# Patient Record
Sex: MALE | Race: WHITE | URBAN - METROPOLITAN AREA
[De-identification: names, ages, dates, MRNs, and addresses within clinical notes are randomized per-mention and may not be internally consistent; named-entity substitution may affect disease eponyms.]

---

## 2020-05-06 ENCOUNTER — APPOINTMENT (OUTPATIENT)
Dept: URBAN - METROPOLITAN AREA CLINIC 232 | Age: 75
Setting detail: DERMATOLOGY
End: 2020-05-06

## 2020-05-06 DIAGNOSIS — D485 NEOPLASM OF UNCERTAIN BEHAVIOR OF SKIN: ICD-10-CM

## 2020-05-06 DIAGNOSIS — D18.0 HEMANGIOMA: ICD-10-CM

## 2020-05-06 DIAGNOSIS — Z85.828 PERSONAL HISTORY OF OTHER MALIGNANT NEOPLASM OF SKIN: ICD-10-CM

## 2020-05-06 DIAGNOSIS — L91.8 OTHER HYPERTROPHIC DISORDERS OF THE SKIN: ICD-10-CM

## 2020-05-06 DIAGNOSIS — Z85.820 PERSONAL HISTORY OF MALIGNANT MELANOMA OF SKIN: ICD-10-CM

## 2020-05-06 DIAGNOSIS — D22 MELANOCYTIC NEVI: ICD-10-CM

## 2020-05-06 DIAGNOSIS — L57.0 ACTINIC KERATOSIS: ICD-10-CM

## 2020-05-06 PROBLEM — D22.5 MELANOCYTIC NEVI OF TRUNK: Status: ACTIVE | Noted: 2020-05-06

## 2020-05-06 PROBLEM — D48.5 NEOPLASM OF UNCERTAIN BEHAVIOR OF SKIN: Status: ACTIVE | Noted: 2020-05-06

## 2020-05-06 PROBLEM — D18.01 HEMANGIOMA OF SKIN AND SUBCUTANEOUS TISSUE: Status: ACTIVE | Noted: 2020-05-06

## 2020-05-06 PROCEDURE — 17000 DESTRUCT PREMALG LESION: CPT | Mod: 59

## 2020-05-06 PROCEDURE — OTHER ADDITIONAL NOTES: OTHER

## 2020-05-06 PROCEDURE — 99214 OFFICE O/P EST MOD 30 MIN: CPT | Mod: 25

## 2020-05-06 PROCEDURE — 11102 TANGNTL BX SKIN SINGLE LES: CPT

## 2020-05-06 PROCEDURE — OTHER PHOTO-DOCUMENTATION: OTHER

## 2020-05-06 PROCEDURE — OTHER OBSERVATION: OTHER

## 2020-05-06 PROCEDURE — 17003 DESTRUCT PREMALG LES 2-14: CPT

## 2020-05-06 PROCEDURE — OTHER BIOPSY BY SHAVE METHOD: OTHER

## 2020-05-06 PROCEDURE — OTHER OBSERVATION AND MEASURE: OTHER

## 2020-05-06 PROCEDURE — OTHER LIQUID NITROGEN: OTHER

## 2020-05-06 PROCEDURE — OTHER COUNSELING: OTHER

## 2020-05-06 ASSESSMENT — LOCATION DETAILED DESCRIPTION DERM
LOCATION DETAILED: RIGHT LATERAL MALAR CHEEK
LOCATION DETAILED: RIGHT CENTRAL EYEBROW
LOCATION DETAILED: PERIANAL SKIN
LOCATION DETAILED: LEFT ULNAR DORSAL HAND
LOCATION DETAILED: RIGHT BUTTOCK
LOCATION DETAILED: LEFT RIB CAGE
LOCATION DETAILED: LEFT INFERIOR FOREHEAD
LOCATION DETAILED: RIGHT NASAL ALA
LOCATION DETAILED: SUPERIOR THORACIC SPINE
LOCATION DETAILED: LEFT CLAVICULAR NECK
LOCATION DETAILED: LEFT RADIAL DORSAL HAND
LOCATION DETAILED: RIGHT LATERAL UPPER BACK
LOCATION DETAILED: RIGHT DISTAL POSTERIOR THIGH
LOCATION DETAILED: RIGHT CENTRAL ZYGOMA
LOCATION DETAILED: EPIGASTRIC SKIN

## 2020-05-06 ASSESSMENT — LOCATION SIMPLE DESCRIPTION DERM
LOCATION SIMPLE: LEFT HAND
LOCATION SIMPLE: UPPER BACK
LOCATION SIMPLE: RIGHT CHEEK
LOCATION SIMPLE: RIGHT POSTERIOR THIGH
LOCATION SIMPLE: RIGHT EYEBROW
LOCATION SIMPLE: RIGHT ZYGOMA
LOCATION SIMPLE: LEFT ANTERIOR NECK
LOCATION SIMPLE: PERIANAL SKIN
LOCATION SIMPLE: LEFT FOREHEAD
LOCATION SIMPLE: RIGHT NOSE
LOCATION SIMPLE: ABDOMEN
LOCATION SIMPLE: RIGHT BUTTOCK
LOCATION SIMPLE: RIGHT UPPER BACK

## 2020-05-06 ASSESSMENT — LOCATION ZONE DERM
LOCATION ZONE: NOSE
LOCATION ZONE: LEG
LOCATION ZONE: NECK
LOCATION ZONE: HAND
LOCATION ZONE: TRUNK
LOCATION ZONE: FACE
LOCATION ZONE: ANUS

## 2020-05-06 NOTE — PROCEDURE: OBSERVATION
Size Of Lesion: 5x2 mm
X Size Of Lesion In Cm (Optional): 0
Morphology Per Location (Optional): Medium brown
Detail Level: Detailed
Morphology Per Location (Optional): Dark brown m

## 2020-05-06 NOTE — PROCEDURE: BIOPSY BY SHAVE METHOD
X Size Of Lesion In Cm: 0
Electrodesiccation And Curettage Text: The wound bed was treated with electrodesiccation and curettage after the biopsy was performed.
Notification Instructions: Patient will be notified of biopsy results. However, patient instructed to call the office if not contacted within 2 weeks.
Validate Triangulation: No
Biopsy Method: Dermablade
Post-Care Instructions: I reviewed with the patient in detail post-care instructions. Patient is to keep the biopsy site dry overnight, and then apply bacitracin twice daily until healed. Patient may apply hydrogen peroxide soaks to remove any crusting.
Dressing: Band-Aid
Information: Selecting Yes will display possible errors in your note based on the variables you have selected. This validation is only offered as a suggestion for you. PLEASE NOTE THAT THE VALIDATION TEXT WILL BE REMOVED WHEN YOU FINALIZE YOUR NOTE. IF YOU WANT TO FAX A PRELIMINARY NOTE YOU WILL NEED TO TOGGLE THIS TO 'NO' IF YOU DO NOT WANT IT IN YOUR FAXED NOTE.
Billing Type: Third-Party Bill
Biopsy Type: H and E
Cryotherapy Text: The wound bed was treated with cryotherapy after the biopsy was performed.
Hemostasis: Electrocautery
Was A Bandage Applied: Yes
Electrodesiccation Text: The wound bed was treated with electrodesiccation after the biopsy was performed.
Wound Care: Petrolatum
Curettage Text: The wound bed was treated with curettage after the biopsy was performed.
Depth Of Biopsy: dermis
Silver Nitrate Text: The wound bed was treated with silver nitrate after the biopsy was performed.
Anesthesia Type: 1% lidocaine with epinephrine
Type Of Destruction Used: Curettage
Detail Level: Detailed
Anesthesia Volume In Cc (Will Not Render If 0): 0.1
Consent: Written consent was obtained and risks were reviewed including but not limited to scarring, infection, bleeding, scabbing, incomplete removal, nerve damage and allergy to anesthesia.

## 2020-05-06 NOTE — PROCEDURE: LIQUID NITROGEN
Number Of Freeze-Thaw Cycles: 2 freeze-thaw cycles
Render Post-Care Instructions In Note?: no
Post-Care Instructions: I reviewed with the patient in detail post-care instructions. Patient is to wear sunprotection, and avoid picking at any of the treated lesions. Pt may apply Vaseline to crusted or scabbing areas.
Duration Of Freeze Thaw-Cycle (Seconds): 0
Consent: The patient's consent was obtained including but not limited to risks of crusting, scabbing, blistering, scarring, darker or lighter pigmentary change, recurrence, incomplete removal and infection.
Detail Level: Simple

## 2020-06-02 ENCOUNTER — APPOINTMENT (OUTPATIENT)
Dept: URBAN - METROPOLITAN AREA CLINIC 232 | Age: 75
Setting detail: DERMATOLOGY
End: 2020-06-02

## 2020-06-02 PROBLEM — C44.41 BASAL CELL CARCINOMA OF SKIN OF SCALP AND NECK: Status: ACTIVE | Noted: 2020-06-02

## 2020-06-02 PROCEDURE — 13132 CMPLX RPR F/C/C/M/N/AX/G/H/F: CPT

## 2020-06-02 PROCEDURE — OTHER EXCISION: OTHER

## 2020-06-02 PROCEDURE — 11623 EXC S/N/H/F/G MAL+MRG 2.1-3: CPT

## 2020-06-02 NOTE — PROCEDURE: EXCISION
Bill [id number] For Specimen Handling/Conveyance To Laboratory?: no
O-L Flap Text: The defect edges were debeveled with a #15 scalpel blade.  Given the location of the defect, shape of the defect and the proximity to free margins an O-L flap was deemed most appropriate.  Using a sterile surgical marker, an appropriate advancement flap was drawn incorporating the defect and placing the expected incisions within the relaxed skin tension lines where possible.    The area thus outlined was incised deep to adipose tissue with a #15 scalpel blade.  The skin margins were undermined to an appropriate distance in all directions utilizing iris scissors.
Complex Repair And Rhombic Flap Text: The defect edges were debeveled with a #15 scalpel blade.  The primary defect was closed partially with a complex linear closure.  Given the location of the remaining defect, shape of the defect and the proximity to free margins a rhombic flap was deemed most appropriate for complete closure of the defect.  Using a sterile surgical marker, an appropriate advancement flap was drawn incorporating the defect and placing the expected incisions within the relaxed skin tension lines where possible.    The area thus outlined was incised deep to adipose tissue with a #15 scalpel blade.  The skin margins were undermined to an appropriate distance in all directions utilizing iris scissors.
Chonodrocutaneous Helical Advancement Flap Text: The defect edges were debeveled with a #15 scalpel blade.  Given the location of the defect and the proximity to free margins a chondrocutaneous helical advancement flap was deemed most appropriate.  Using a sterile surgical marker, the appropriate advancement flap was drawn incorporating the defect and placing the expected incisions within the relaxed skin tension lines where possible.    The area thus outlined was incised deep to adipose tissue with a #15 scalpel blade.  The skin margins were undermined to an appropriate distance in all directions utilizing iris scissors.
Muscle Hinge Flap Text: The defect edges were debeveled with a #15 scalpel blade.  Given the size, depth and location of the defect and the proximity to free margins a muscle hinge flap was deemed most appropriate.  Using a sterile surgical marker, an appropriate hinge flap was drawn incorporating the defect. The area thus outlined was incised with a #15 scalpel blade.  The skin margins were undermined to an appropriate distance in all directions utilizing iris scissors.
Burow's Graft Text: The defect edges were debeveled with a #15 scalpel blade.  Given the location of the defect, shape of the defect, the proximity to free margins and the presence of a standing cone deformity a Burow's skin graft was deemed most appropriate. The standing cone was removed and this tissue was then trimmed to the shape of the primary defect. The adipose tissue was also removed until only dermis and epidermis were left.  The skin margins of the secondary defect were undermined to an appropriate distance in all directions utilizing iris scissors.  The secondary defect was closed with interrupted buried subcutaneous sutures.  The skin edges were then re-apposed with running  sutures.  The skin graft was then placed in the primary defect and oriented appropriately.
Bilobed Transposition Flap Text: The defect edges were debeveled with a #15 scalpel blade.  Given the location of the defect and the proximity to free margins a bilobed transposition flap was deemed most appropriate.  Using a sterile surgical marker, an appropriate bilobe flap drawn around the defect.    The area thus outlined was incised deep to adipose tissue with a #15 scalpel blade.  The skin margins were undermined to an appropriate distance in all directions utilizing iris scissors.
Posterior Auricular Interpolation Flap Text: A decision was made to reconstruct the defect utilizing an interpolation axial flap and a staged reconstruction.  A telfa template was made of the defect.  This telfa template was then used to outline the posterior auricular interpolation flap.  The donor area for the pedicle flap was then injected with anesthesia.  The flap was excised through the skin and subcutaneous tissue down to the layer of the underlying musculature.  The pedicle flap was carefully excised within this deep plane to maintain its blood supply.  The edges of the donor site were undermined.   The donor site was closed in a primary fashion.  The pedicle was then rotated into position and sutured.  Once the tube was sutured into place, adequate blood supply was confirmed with blanching and refill.  The pedicle was then wrapped with xeroform gauze and dressed appropriately with a telfa and gauze bandage to ensure continued blood supply and protect the attached pedicle.
Complex Repair And Epidermal Autograft Text: The defect edges were debeveled with a #15 scalpel blade.  The primary defect was closed partially with a complex linear closure.  Given the location of the defect, shape of the defect and the proximity to free margins an epidermal autograft was deemed most appropriate to repair the remaining defect.  The graft was trimmed to fit the size of the remaining defect.  The graft was then placed in the primary defect, oriented appropriately, and sutured into place.
Keystone Flap Text: The defect edges were debeveled with a #15 scalpel blade.  Given the location of the defect, shape of the defect a keystone flap was deemed most appropriate.  Using a sterile surgical marker, an appropriate keystone flap was drawn incorporating the defect, outlining the appropriate donor tissue and placing the expected incisions within the relaxed skin tension lines where possible. The area thus outlined was incised deep to adipose tissue with a #15 scalpel blade.  The skin margins were undermined to an appropriate distance in all directions around the primary defect and laterally outward around the flap utilizing iris scissors.
Show Date Of Previous Biopsy Variable: Yes
Alar Island Pedicle Flap Text: The defect edges were debeveled with a #15 scalpel blade.  Given the location of the defect, shape of the defect and the proximity to the alar rim an island pedicle advancement flap was deemed most appropriate.  Using a sterile surgical marker, an appropriate advancement flap was drawn incorporating the defect, outlining the appropriate donor tissue and placing the expected incisions within the nasal ala running parallel to the alar rim. The area thus outlined was incised with a #15 scalpel blade.  The skin margins were undermined minimally to an appropriate distance in all directions around the primary defect and laterally outward around the island pedicle utilizing iris scissors.  There was minimal undermining beneath the pedicle flap.
Composite Graft Text: The defect edges were debeveled with a #15 scalpel blade.  Given the location of the defect, shape of the defect, the proximity to free margins and the fact the defect was full thickness a composite graft was deemed most appropriate.  The defect was outline and then transferred to the donor site.  A full thickness graft was then excised from the donor site. The graft was then placed in the primary defect, oriented appropriately and then sutured into place.  The secondary defect was then repaired using a primary closure.
Excision Method: Elliptical
Advancement Flap (Single) Text: The defect edges were debeveled with a #15 scalpel blade.  Given the location of the defect and the proximity to free margins a single advancement flap was deemed most appropriate.  Using a sterile surgical marker, an appropriate advancement flap was drawn incorporating the defect and placing the expected incisions within the relaxed skin tension lines where possible.    The area thus outlined was incised deep to adipose tissue with a #15 scalpel blade.  The skin margins were undermined to an appropriate distance in all directions utilizing iris scissors.
Anesthesia Volume In Cc: 3
Island Pedicle Flap Text: The defect edges were debeveled with a #15 scalpel blade.  Given the location of the defect, shape of the defect and the proximity to free margins an island pedicle advancement flap was deemed most appropriate.  Using a sterile surgical marker, an appropriate advancement flap was drawn incorporating the defect, outlining the appropriate donor tissue and placing the expected incisions within the relaxed skin tension lines where possible.    The area thus outlined was incised deep to adipose tissue with a #15 scalpel blade.  The skin margins were undermined to an appropriate distance in all directions around the primary defect and laterally outward around the island pedicle utilizing iris scissors.  There was minimal undermining beneath the pedicle flap.
Rotation Flap Text: The defect edges were debeveled with a #15 scalpel blade.  Given the location of the defect, shape of the defect and the proximity to free margins a rotation flap was deemed most appropriate.  Using a sterile surgical marker, an appropriate rotation flap was drawn incorporating the defect and placing the expected incisions within the relaxed skin tension lines where possible.    The area thus outlined was incised deep to adipose tissue with a #15 scalpel blade.  The skin margins were undermined to an appropriate distance in all directions utilizing iris scissors.
Island Pedicle Flap With Canthal Suspension Text: The defect edges were debeveled with a #15 scalpel blade.  Given the location of the defect, shape of the defect and the proximity to free margins an island pedicle advancement flap was deemed most appropriate.  Using a sterile surgical marker, an appropriate advancement flap was drawn incorporating the defect, outlining the appropriate donor tissue and placing the expected incisions within the relaxed skin tension lines where possible. The area thus outlined was incised deep to adipose tissue with a #15 scalpel blade.  The skin margins were undermined to an appropriate distance in all directions around the primary defect and laterally outward around the island pedicle utilizing iris scissors.  There was minimal undermining beneath the pedicle flap. A suspension suture was placed in the canthal tendon to prevent tension and prevent ectropion.
Excisional Biopsy Additional Text (Leave Blank If You Do Not Want): The margin was drawn around the clinically apparent lesion. An elliptical shape was then drawn on the skin incorporating the lesion and margins.  Incisions were then made along these lines to the appropriate tissue plane and the lesion was extirpated.
Complex Repair And A-T Advancement Flap Text: The defect edges were debeveled with a #15 scalpel blade.  The primary defect was closed partially with a complex linear closure.  Given the location of the remaining defect, shape of the defect and the proximity to free margins an A-T advancement flap was deemed most appropriate for complete closure of the defect.  Using a sterile surgical marker, an appropriate advancement flap was drawn incorporating the defect and placing the expected incisions within the relaxed skin tension lines where possible.    The area thus outlined was incised deep to adipose tissue with a #15 scalpel blade.  The skin margins were undermined to an appropriate distance in all directions utilizing iris scissors.
Complex Repair And O-T Advancement Flap Text: The defect edges were debeveled with a #15 scalpel blade.  The primary defect was closed partially with a complex linear closure.  Given the location of the remaining defect, shape of the defect and the proximity to free margins an O-T advancement flap was deemed most appropriate for complete closure of the defect.  Using a sterile surgical marker, an appropriate advancement flap was drawn incorporating the defect and placing the expected incisions within the relaxed skin tension lines where possible.    The area thus outlined was incised deep to adipose tissue with a #15 scalpel blade.  The skin margins were undermined to an appropriate distance in all directions utilizing iris scissors.
Complex Repair And W Plasty Text: The defect edges were debeveled with a #15 scalpel blade.  The primary defect was closed partially with a complex linear closure.  Given the location of the remaining defect, shape of the defect and the proximity to free margins a W plasty was deemed most appropriate for complete closure of the defect.  Using a sterile surgical marker, an appropriate advancement flap was drawn incorporating the defect and placing the expected incisions within the relaxed skin tension lines where possible.    The area thus outlined was incised deep to adipose tissue with a #15 scalpel blade.  The skin margins were undermined to an appropriate distance in all directions utilizing iris scissors.
V-Y Flap Text: The defect edges were debeveled with a #15 scalpel blade.  Given the location of the defect, shape of the defect and the proximity to free margins a V-Y flap was deemed most appropriate.  Using a sterile surgical marker, an appropriate advancement flap was drawn incorporating the defect and placing the expected incisions within the relaxed skin tension lines where possible.    The area thus outlined was incised deep to adipose tissue with a #15 scalpel blade.  The skin margins were undermined to an appropriate distance in all directions utilizing iris scissors.
Additional Anesthesia Volume In Cc: 6
Complex Repair And Dorsal Nasal Flap Text: The defect edges were debeveled with a #15 scalpel blade.  The primary defect was closed partially with a complex linear closure.  Given the location of the remaining defect, shape of the defect and the proximity to free margins a dorsal nasal flap was deemed most appropriate for complete closure of the defect.  Using a sterile surgical marker, an appropriate flap was drawn incorporating the defect and placing the expected incisions within the relaxed skin tension lines where possible.    The area thus outlined was incised deep to adipose tissue with a #15 scalpel blade.  The skin margins were undermined to an appropriate distance in all directions utilizing iris scissors.
Lazy S Intermediate Repair Preamble Text (Leave Blank If You Do Not Want): Undermining was performed with blunt dissection.
Positioning (Leave Blank If You Do Not Want): The patient was placed in a comfortable position exposing the surgical site.
Lip Wedge Excision Repair Text: Given the location of the defect and the proximity to free margins a full thickness wedge repair was deemed most appropriate.  Using a sterile surgical marker, the appropriate repair was drawn incorporating the defect and placing the expected incisions perpendicular to the vermilion border.  The vermilion border was also meticulously outlined to ensure appropriate reapproximation during the repair.  The area thus outlined was incised through and through with a #15 scalpel blade.  The muscularis and dermis were reaproximated with deep sutures following hemostasis. Care was taken to realign the vermilion border before proceeding with the superficial closure.  Once the vermilion was realigned the superfical and mucosal closure was finished.
Complex Repair Preamble Text (Leave Blank If You Do Not Want): Extensive wide undermining was performed.
Z Plasty Text: The lesion was extirpated to the level of the fat with a #15 scalpel blade.  Given the location of the defect, shape of the defect and the proximity to free margins a Z-plasty was deemed most appropriate for repair.  Using a sterile surgical marker, the appropriate transposition arms of the Z-plasty were drawn incorporating the defect and placing the expected incisions within the relaxed skin tension lines where possible.    The area thus outlined was incised deep to adipose tissue with a #15 scalpel blade.  The skin margins were undermined to an appropriate distance in all directions utilizing iris scissors.  The opposing transposition arms were then transposed into place in opposite direction and anchored with interrupted buried subcutaneous sutures.
H Plasty Text: Given the location of the defect, shape of the defect and the proximity to free margins a H-plasty was deemed most appropriate for repair.  Using a sterile surgical marker, the appropriate advancement arms of the H-plasty were drawn incorporating the defect and placing the expected incisions within the relaxed skin tension lines where possible. The area thus outlined was incised deep to adipose tissue with a #15 scalpel blade. The skin margins were undermined to an appropriate distance in all directions utilizing iris scissors.  The opposing advancement arms were then advanced into place in opposite direction and anchored with interrupted buried subcutaneous sutures.
Anesthesia Type: 1% lidocaine with epinephrine and a 1:10 solution of 8.4% sodium bicarbonate
Complex Repair And Ftsg Text: The defect edges were debeveled with a #15 scalpel blade.  The primary defect was closed partially with a complex linear closure.  Given the location of the defect, shape of the defect and the proximity to free margins a full thickness skin graft was deemed most appropriate to repair the remaining defect.  The graft was trimmed to fit the size of the remaining defect.  The graft was then placed in the primary defect, oriented appropriately, and sutured into place.
Epidermal Closure Graft Donor Site (Optional): simple interrupted
Complex Repair And Dermal Autograft Text: The defect edges were debeveled with a #15 scalpel blade.  The primary defect was closed partially with a complex linear closure.  Given the location of the defect, shape of the defect and the proximity to free margins an dermal autograft was deemed most appropriate to repair the remaining defect.  The graft was trimmed to fit the size of the remaining defect.  The graft was then placed in the primary defect, oriented appropriately, and sutured into place.
Detail Level: Detailed
Transposition Flap Text: The defect edges were debeveled with a #15 scalpel blade.  Given the location of the defect and the proximity to free margins a transposition flap was deemed most appropriate.  Using a sterile surgical marker, an appropriate transposition flap was drawn incorporating the defect.    The area thus outlined was incised deep to adipose tissue with a #15 scalpel blade.  The skin margins were undermined to an appropriate distance in all directions utilizing iris scissors.
Partial Purse String (Intermediate) Text: Given the location of the defect and the characteristics of the surrounding skin an intermediate purse string closure was deemed most appropriate.  Undermining was performed circumferentially around the surgical defect.  A purse string suture was then placed and tightened. Wound tension of the circular defect prevented complete closure of the wound.
Purse String (Intermediate) Text: Given the location of the defect and the characteristics of the surrounding skin a purse string intermediate closure was deemed most appropriate.  Undermining was performed circumfirentially around the surgical defect.  A purse string suture was then placed and tightened.
Saucerization Excision Additional Text (Leave Blank If You Do Not Want): The margin was drawn around the clinically apparent lesion.  Incisions were then made along these lines, in a tangential fashion, to the appropriate tissue plane and the lesion was extirpated.
O-Z Flap Text: The defect edges were debeveled with a #15 scalpel blade.  Given the location of the defect, shape of the defect and the proximity to free margins an O-Z flap was deemed most appropriate.  Using a sterile surgical marker, an appropriate transposition flap was drawn incorporating the defect and placing the expected incisions within the relaxed skin tension lines where possible. The area thus outlined was incised deep to adipose tissue with a #15 scalpel blade.  The skin margins were undermined to an appropriate distance in all directions utilizing iris scissors.
Slit Excision Additional Text (Leave Blank If You Do Not Want): A linear line was drawn on the skin overlying the lesion. An incision was made slowly until the lesion was visualized.  Once visualized, the lesion was removed with blunt dissection.
No Repair - Repaired With Adjacent Surgical Defect Text (Leave Blank If You Do Not Want): After the excision the defect was repaired concurrently with another surgical defect which was in close approximation.
Primary Defect Width (In Cm): 0
Epidermal Autograft Text: The defect edges were debeveled with a #15 scalpel blade.  Given the location of the defect, shape of the defect and the proximity to free margins an epidermal autograft was deemed most appropriate.  Using a sterile surgical marker, the primary defect shape was transferred to the donor site. The epidermal graft was then harvested.  The skin graft was then placed in the primary defect and oriented appropriately.
Peng Advancement Flap Text: The defect edges were debeveled with a #15 scalpel blade.  Given the location of the defect, shape of the defect and the proximity to free margins a Peng advancement flap was deemed most appropriate.  Using a sterile surgical marker, an appropriate advancement flap was drawn incorporating the defect and placing the expected incisions within the relaxed skin tension lines where possible. The area thus outlined was incised deep to adipose tissue with a #15 scalpel blade.  The skin margins were undermined to an appropriate distance in all directions utilizing iris scissors.
Complex Repair And Rotation Flap Text: The defect edges were debeveled with a #15 scalpel blade.  The primary defect was closed partially with a complex linear closure.  Given the location of the remaining defect, shape of the defect and the proximity to free margins a rotation flap was deemed most appropriate for complete closure of the defect.  Using a sterile surgical marker, an appropriate advancement flap was drawn incorporating the defect and placing the expected incisions within the relaxed skin tension lines where possible.    The area thus outlined was incised deep to adipose tissue with a #15 scalpel blade.  The skin margins were undermined to an appropriate distance in all directions utilizing iris scissors.
Complex Repair And Transposition Flap Text: The defect edges were debeveled with a #15 scalpel blade.  The primary defect was closed partially with a complex linear closure.  Given the location of the remaining defect, shape of the defect and the proximity to free margins a transposition flap was deemed most appropriate for complete closure of the defect.  Using a sterile surgical marker, an appropriate advancement flap was drawn incorporating the defect and placing the expected incisions within the relaxed skin tension lines where possible.    The area thus outlined was incised deep to adipose tissue with a #15 scalpel blade.  The skin margins were undermined to an appropriate distance in all directions utilizing iris scissors.
Xenograft Text: The defect edges were debeveled with a #15 scalpel blade.  Given the location of the defect, shape of the defect and the proximity to free margins a xenograft was deemed most appropriate.  The graft was then trimmed to fit the size of the defect.  The graft was then placed in the primary defect and oriented appropriately.
Advancement-Rotation Flap Text: The defect edges were debeveled with a #15 scalpel blade.  Given the location of the defect, shape of the defect and the proximity to free margins an advancement-rotation flap was deemed most appropriate.  Using a sterile surgical marker, an appropriate flap was drawn incorporating the defect and placing the expected incisions within the relaxed skin tension lines where possible. The area thus outlined was incised deep to adipose tissue with a #15 scalpel blade.  The skin margins were undermined to an appropriate distance in all directions utilizing iris scissors.
Fusiform Excision Additional Text (Leave Blank If You Do Not Want): The margin was drawn around the clinically apparent lesion.  A fusiform shape was then drawn on the skin incorporating the lesion and margins.  Incisions were then made along these lines to the appropriate tissue plane and the lesion was extirpated.
Double O-Z Flap Text: The defect edges were debeveled with a #15 scalpel blade.  Given the location of the defect, shape of the defect and the proximity to free margins a Double O-Z flap was deemed most appropriate.  Using a sterile surgical marker, an appropriate transposition flap was drawn incorporating the defect and placing the expected incisions within the relaxed skin tension lines where possible. The area thus outlined was incised deep to adipose tissue with a #15 scalpel blade.  The skin margins were undermined to an appropriate distance in all directions utilizing iris scissors.
Advancement Flap (Double) Text: The defect edges were debeveled with a #15 scalpel blade.  Given the location of the defect and the proximity to free margins a double advancement flap was deemed most appropriate.  Using a sterile surgical marker, the appropriate advancement flaps were drawn incorporating the defect and placing the expected incisions within the relaxed skin tension lines where possible.    The area thus outlined was incised deep to adipose tissue with a #15 scalpel blade.  The skin margins were undermined to an appropriate distance in all directions utilizing iris scissors.
Complex Repair And Skin Substitute Graft Text: The defect edges were debeveled with a #15 scalpel blade.  The primary defect was closed partially with a complex linear closure.  Given the location of the remaining defect, shape of the defect and the proximity to free margins a skin substitute graft was deemed most appropriate to repair the remaining defect.  The graft was trimmed to fit the size of the remaining defect.  The graft was then placed in the primary defect, oriented appropriately, and sutured into place.
Size Of Lesion In Cm: 2
Helical Rim Text: The closure involved the helical rim.
Where Do You Want The Question To Include Opioid Counseling Located?: Case Summary Tab
Burow's Advancement Flap Text: The defect edges were debeveled with a #15 scalpel blade.  Given the location of the defect and the proximity to free margins a Burow's advancement flap was deemed most appropriate.  Using a sterile surgical marker, the appropriate advancement flap was drawn incorporating the defect and placing the expected incisions within the relaxed skin tension lines where possible.    The area thus outlined was incised deep to adipose tissue with a #15 scalpel blade.  The skin margins were undermined to an appropriate distance in all directions utilizing iris scissors.
Partial Purse String (Simple) Text: Given the location of the defect and the characteristics of the surrounding skin a simple purse string closure was deemed most appropriate.  Undermining was performed circumferentially around the surgical defect.  A purse string suture was then placed and tightened. Wound tension of the circular defect prevented complete closure of the wound.
V-Y Plasty Text: The defect edges were debeveled with a #15 scalpel blade.  Given the location of the defect, shape of the defect and the proximity to free margins an V-Y advancement flap was deemed most appropriate.  Using a sterile surgical marker, an appropriate advancement flap was drawn incorporating the defect and placing the expected incisions within the relaxed skin tension lines where possible.    The area thus outlined was incised deep to adipose tissue with a #15 scalpel blade.  The skin margins were undermined to an appropriate distance in all directions utilizing iris scissors.
Star Wedge Flap Text: The defect edges were debeveled with a #15 scalpel blade.  Given the location of the defect, shape of the defect and the proximity to free margins a star wedge flap was deemed most appropriate.  Using a sterile surgical marker, an appropriate rotation flap was drawn incorporating the defect and placing the expected incisions within the relaxed skin tension lines where possible. The area thus outlined was incised deep to adipose tissue with a #15 scalpel blade.  The skin margins were undermined to an appropriate distance in all directions utilizing iris scissors.
A-T Advancement Flap Text: The defect edges were debeveled with a #15 scalpel blade.  Given the location of the defect, shape of the defect and the proximity to free margins an A-T advancement flap was deemed most appropriate.  Using a sterile surgical marker, an appropriate advancement flap was drawn incorporating the defect and placing the expected incisions within the relaxed skin tension lines where possible.    The area thus outlined was incised deep to adipose tissue with a #15 scalpel blade.  The skin margins were undermined to an appropriate distance in all directions utilizing iris scissors.
Repair Performed By Another Provider Text (Leave Blank If You Do Not Want): After the tissue was excised the defect was repaired by another provider.
Dorsal Nasal Flap Text: The defect edges were debeveled with a #15 scalpel blade.  Given the location of the defect and the proximity to free margins a dorsal nasal flap was deemed most appropriate.  Using a sterile surgical marker, an appropriate dorsal nasal flap was drawn around the defect.    The area thus outlined was incised deep to adipose tissue with a #15 scalpel blade.  The skin margins were undermined to an appropriate distance in all directions utilizing iris scissors.
Cartilage Graft Text: The defect edges were debeveled with a #15 scalpel blade.  Given the location of the defect, shape of the defect, the fact the defect involved a full thickness cartilage defect a cartilage graft was deemed most appropriate.  An appropriate donor site was identified, cleansed, and anesthetized. The cartilage graft was then harvested and transferred to the recipient site, oriented appropriately and then sutured into place.  The secondary defect was then repaired using a primary closure.
Complex Repair And Modified Advancement Flap Text: The defect edges were debeveled with a #15 scalpel blade.  The primary defect was closed partially with a complex linear closure.  Given the location of the remaining defect, shape of the defect and the proximity to free margins a modified advancement flap was deemed most appropriate for complete closure of the defect.  Using a sterile surgical marker, an appropriate advancement flap was drawn incorporating the defect and placing the expected incisions within the relaxed skin tension lines where possible.    The area thus outlined was incised deep to adipose tissue with a #15 scalpel blade.  The skin margins were undermined to an appropriate distance in all directions utilizing iris scissors.
Bi-Rhombic Flap Text: The defect edges were debeveled with a #15 scalpel blade.  Given the location of the defect and the proximity to free margins a bi-rhombic flap was deemed most appropriate.  Using a sterile surgical marker, an appropriate rhombic flap was drawn incorporating the defect. The area thus outlined was incised deep to adipose tissue with a #15 scalpel blade.  The skin margins were undermined to an appropriate distance in all directions utilizing iris scissors.
Complex Repair And Double M Plasty Text: The defect edges were debeveled with a #15 scalpel blade.  The primary defect was closed partially with a complex linear closure.  Given the location of the remaining defect, shape of the defect and the proximity to free margins a double M plasty was deemed most appropriate for complete closure of the defect.  Using a sterile surgical marker, an appropriate advancement flap was drawn incorporating the defect and placing the expected incisions within the relaxed skin tension lines where possible.    The area thus outlined was incised deep to adipose tissue with a #15 scalpel blade.  The skin margins were undermined to an appropriate distance in all directions utilizing iris scissors.
O-T Advancement Flap Text: The defect edges were debeveled with a #15 scalpel blade.  Given the location of the defect, shape of the defect and the proximity to free margins an O-T advancement flap was deemed most appropriate.  Using a sterile surgical marker, an appropriate advancement flap was drawn incorporating the defect and placing the expected incisions within the relaxed skin tension lines where possible.    The area thus outlined was incised deep to adipose tissue with a #15 scalpel blade.  The skin margins were undermined to an appropriate distance in all directions utilizing iris scissors.
Graft Donor Site Bandage (Optional-Leave Blank If You Don't Want In Note): Steri-strips and a pressure bandage were applied to the donor site.
Complex Repair And M Plasty Text: The defect edges were debeveled with a #15 scalpel blade.  The primary defect was closed partially with a complex linear closure.  Given the location of the remaining defect, shape of the defect and the proximity to free margins an M plasty was deemed most appropriate for complete closure of the defect.  Using a sterile surgical marker, an appropriate advancement flap was drawn incorporating the defect and placing the expected incisions within the relaxed skin tension lines where possible.    The area thus outlined was incised deep to adipose tissue with a #15 scalpel blade.  The skin margins were undermined to an appropriate distance in all directions utilizing iris scissors.
Anesthesia Type: 1% lidocaine with epinephrine
Double Island Pedicle Flap Text: The defect edges were debeveled with a #15 scalpel blade.  Given the location of the defect, shape of the defect and the proximity to free margins a double island pedicle advancement flap was deemed most appropriate.  Using a sterile surgical marker, an appropriate advancement flap was drawn incorporating the defect, outlining the appropriate donor tissue and placing the expected incisions within the relaxed skin tension lines where possible.    The area thus outlined was incised deep to adipose tissue with a #15 scalpel blade.  The skin margins were undermined to an appropriate distance in all directions around the primary defect and laterally outward around the island pedicle utilizing iris scissors.  There was minimal undermining beneath the pedicle flap.
Purse String (Simple) Text: Given the location of the defect and the characteristics of the surrounding skin a purse string simple closure was deemed most appropriate.  Undermining was performed circumferentially around the surgical defect.  A purse string suture was then placed and tightened.
Mastoid Interpolation Flap Text: A decision was made to reconstruct the defect utilizing an interpolation axial flap and a staged reconstruction.  A telfa template was made of the defect.  This telfa template was then used to outline the mastoid interpolation flap.  The donor area for the pedicle flap was then injected with anesthesia.  The flap was excised through the skin and subcutaneous tissue down to the layer of the underlying musculature.  The pedicle flap was carefully excised within this deep plane to maintain its blood supply.  The edges of the donor site were undermined.   The donor site was closed in a primary fashion.  The pedicle was then rotated into position and sutured.  Once the tube was sutured into place, adequate blood supply was confirmed with blanching and refill.  The pedicle was then wrapped with xeroform gauze and dressed appropriately with a telfa and gauze bandage to ensure continued blood supply and protect the attached pedicle.
Complex Repair And Z Plasty Text: The defect edges were debeveled with a #15 scalpel blade.  The primary defect was closed partially with a complex linear closure.  Given the location of the remaining defect, shape of the defect and the proximity to free margins a Z plasty was deemed most appropriate for complete closure of the defect.  Using a sterile surgical marker, an appropriate advancement flap was drawn incorporating the defect and placing the expected incisions within the relaxed skin tension lines where possible.    The area thus outlined was incised deep to adipose tissue with a #15 scalpel blade.  The skin margins were undermined to an appropriate distance in all directions utilizing iris scissors.
Curvilinear Excision Additional Text (Leave Blank If You Do Not Want): The margin was drawn around the clinically apparent lesion.  A curvilinear shape was then drawn on the skin incorporating the lesion and margins.  Incisions were then made along these lines to the appropriate tissue plane and the lesion was extirpated.
Debridement Text: The wound edges were debrided prior to proceeding with the closure to facilitate wound healing.
Ear Star Wedge Flap Text: The defect edges were debeveled with a #15 blade scalpel.  Given the location of the defect and the proximity to free margins (helical rim) an ear star wedge flap was deemed most appropriate.  Using a sterile surgical marker, the appropriate flap was drawn incorporating the defect and placing the expected incisions between the helical rim and antihelix where possible.  The area thus outlined was incised through and through with a #15 scalpel blade.
Information: Selecting Yes will display possible errors in your note based on the variables you have selected. This validation is only offered as a suggestion for you. PLEASE NOTE THAT THE VALIDATION TEXT WILL BE REMOVED WHEN YOU FINALIZE YOUR NOTE. IF YOU WANT TO FAX A PRELIMINARY NOTE YOU WILL NEED TO TOGGLE THIS TO 'NO' IF YOU DO NOT WANT IT IN YOUR FAXED NOTE.
W Plasty Text: The lesion was extirpated to the level of the fat with a #15 scalpel blade.  Given the location of the defect, shape of the defect and the proximity to free margins a W-plasty was deemed most appropriate for repair.  Using a sterile surgical marker, the appropriate transposition arms of the W-plasty were drawn incorporating the defect and placing the expected incisions within the relaxed skin tension lines where possible.    The area thus outlined was incised deep to adipose tissue with a #15 scalpel blade.  The skin margins were undermined to an appropriate distance in all directions utilizing iris scissors.  The opposing transposition arms were then transposed into place in opposite direction and anchored with interrupted buried subcutaneous sutures.
Elliptical Excision Additional Text (Leave Blank If You Do Not Want): The margin was drawn around the clinically apparent lesion.  An elliptical shape was then drawn on the skin incorporating the lesion and margins.  Incisions were then made along these lines to the appropriate tissue plane and the lesion was extirpated.
Post-Care Instructions: I reviewed with the patient in detail post-care instructions. Patient is not to engage in any heavy lifting, exercise, or swimming for the next 14 days. Should the patient develop any fevers, chills, bleeding, severe pain patient will contact the office immediately. HS wound care given and discussed.
Nostril Rim Text: The closure involved the nostril rim.
Home Suture Removal Text: Patient was provided a home suture removal kit and will remove their sutures at home.  If they have any questions or difficulties they will call the office.
Complex Repair And Burow's Graft Text: The defect edges were debeveled with a #15 scalpel blade.  The primary defect was closed partially with a complex linear closure.  Given the location of the defect, shape of the defect, the proximity to free margins and the presence of a standing cone deformity a Burow's graft was deemed most appropriate to repair the remaining defect.  The graft was trimmed to fit the size of the remaining defect.  The graft was then placed in the primary defect, oriented appropriately, and sutured into place.
Mucosal Advancement Flap Text: Given the location of the defect, shape of the defect and the proximity to free margins a mucosal advancement flap was deemed most appropriate. Incisions were made with a 15 blade scalpel in the appropriate fashion along the cutaneous vermilion border and the mucosal lip. The remaining actinically damaged mucosal tissue was excised.  The mucosal advancement flap was then elevated to the gingival sulcus with care taken to preserve the neurovascular structures and advanced into the primary defect. Care was taken to ensure that precise realignment of the vermilion border was achieved.
Wound Care: Petrolatum
Crescentic Advancement Flap Text: The defect edges were debeveled with a #15 scalpel blade.  Given the location of the defect and the proximity to free margins a crescentic advancement flap was deemed most appropriate.  Using a sterile surgical marker, the appropriate advancement flap was drawn incorporating the defect and placing the expected incisions within the relaxed skin tension lines where possible.    The area thus outlined was incised deep to adipose tissue with a #15 scalpel blade.  The skin margins were undermined to an appropriate distance in all directions utilizing iris scissors.
Deep Sutures: 4-0 Polysorb
Cheek Interpolation Flap Text: A decision was made to reconstruct the defect utilizing an interpolation axial flap and a staged reconstruction.  A telfa template was made of the defect.  This telfa template was then used to outline the Cheek Interpolation flap.  The donor area for the pedicle flap was then injected with anesthesia.  The flap was excised through the skin and subcutaneous tissue down to the layer of the underlying musculature.  The interpolation flap was carefully excised within this deep plane to maintain its blood supply.  The edges of the donor site were undermined.   The donor site was closed in a primary fashion.  The pedicle was then rotated into position and sutured.  Once the tube was sutured into place, adequate blood supply was confirmed with blanching and refill.  The pedicle was then wrapped with xeroform gauze and dressed appropriately with a telfa and gauze bandage to ensure continued blood supply and protect the attached pedicle.
Hemostasis: Electrocautery
Bilobed Flap Text: The defect edges were debeveled with a #15 scalpel blade.  Given the location of the defect and the proximity to free margins a bilobe flap was deemed most appropriate.  Using a sterile surgical marker, an appropriate bilobe flap drawn around the defect.    The area thus outlined was incised deep to adipose tissue with a #15 scalpel blade.  The skin margins were undermined to an appropriate distance in all directions utilizing iris scissors.
Path Notes (To The Dermatopathologist): Please check margins.
Pre-Excision Curettage Text (Leave Blank If You Do Not Want): Prior to drawing the surgical margin the visible lesion was removed with electrodesiccation and curettage to clearly define the lesion size.
Helical Rim Advancement Flap Text: The defect edges were debeveled with a #15 blade scalpel.  Given the location of the defect and the proximity to free margins (helical rim) a double helical rim advancement flap was deemed most appropriate.  Using a sterile surgical marker, the appropriate advancement flaps were drawn incorporating the defect and placing the expected incisions between the helical rim and antihelix where possible.  The area thus outlined was incised through and through with a #15 scalpel blade.  With a skin hook and iris scissors, the flaps were gently and sharply undermined and freed up.
Suture Removal: 9 days
Complex Repair And Bilobe Flap Text: The defect edges were debeveled with a #15 scalpel blade.  The primary defect was closed partially with a complex linear closure.  Given the location of the remaining defect, shape of the defect and the proximity to free margins a bilobe flap was deemed most appropriate for complete closure of the defect.  Using a sterile surgical marker, an appropriate advancement flap was drawn incorporating the defect and placing the expected incisions within the relaxed skin tension lines where possible.    The area thus outlined was incised deep to adipose tissue with a #15 scalpel blade.  The skin margins were undermined to an appropriate distance in all directions utilizing iris scissors.
Ftsg Text: The defect edges were debeveled with a #15 scalpel blade.  Given the location of the defect, shape of the defect and the proximity to free margins a full thickness skin graft was deemed most appropriate.  Using a sterile surgical marker, the primary defect shape was transferred to the donor site. The area thus outlined was incised deep to adipose tissue with a #15 scalpel blade.  The harvested graft was then trimmed of adipose tissue until only dermis and epidermis was left.  The skin margins of the secondary defect were undermined to an appropriate distance in all directions utilizing iris scissors.  The secondary defect was closed with interrupted buried subcutaneous sutures.  The skin edges were then re-apposed with running  sutures.  The skin graft was then placed in the primary defect and oriented appropriately.
Complex Repair And Double Advancement Flap Text: The defect edges were debeveled with a #15 scalpel blade.  The primary defect was closed partially with a complex linear closure.  Given the location of the remaining defect, shape of the defect and the proximity to free margins a double advancement flap was deemed most appropriate for complete closure of the defect.  Using a sterile surgical marker, an appropriate advancement flap was drawn incorporating the defect and placing the expected incisions within the relaxed skin tension lines where possible.    The area thus outlined was incised deep to adipose tissue with a #15 scalpel blade.  The skin margins were undermined to an appropriate distance in all directions utilizing iris scissors.
Scalpel Size: 15 blade
Trilobed Flap Text: The defect edges were debeveled with a #15 scalpel blade.  Given the location of the defect and the proximity to free margins a trilobed flap was deemed most appropriate.  Using a sterile surgical marker, an appropriate trilobed flap drawn around the defect.    The area thus outlined was incised deep to adipose tissue with a #15 scalpel blade.  The skin margins were undermined to an appropriate distance in all directions utilizing iris scissors.
Complex Repair And Xenograft Text: The defect edges were debeveled with a #15 scalpel blade.  The primary defect was closed partially with a complex linear closure.  Given the location of the defect, shape of the defect and the proximity to free margins a xenograft was deemed most appropriate to repair the remaining defect.  The graft was trimmed to fit the size of the remaining defect.  The graft was then placed in the primary defect, oriented appropriately, and sutured into place.
Dermal Autograft Text: The defect edges were debeveled with a #15 scalpel blade.  Given the location of the defect, shape of the defect and the proximity to free margins a dermal autograft was deemed most appropriate.  Using a sterile surgical marker, the primary defect shape was transferred to the donor site. The area thus outlined was incised deep to adipose tissue with a #15 scalpel blade.  The harvested graft was then trimmed of adipose and epidermal tissue until only dermis was left.  The skin graft was then placed in the primary defect and oriented appropriately.
Banner Transposition Flap Text: The defect edges were debeveled with a #15 scalpel blade.  Given the location of the defect and the proximity to free margins a Banner transposition flap was deemed most appropriate.  Using a sterile surgical marker, an appropriate flap drawn around the defect. The area thus outlined was incised deep to adipose tissue with a #15 scalpel blade.  The skin margins were undermined to an appropriate distance in all directions utilizing iris scissors.
Bilateral Helical Rim Advancement Flap Text: The defect edges were debeveled with a #15 blade scalpel.  Given the location of the defect and the proximity to free margins (helical rim) a bilateral helical rim advancement flap was deemed most appropriate.  Using a sterile surgical marker, the appropriate advancement flaps were drawn incorporating the defect and placing the expected incisions between the helical rim and antihelix where possible.  The area thus outlined was incised through and through with a #15 scalpel blade.  With a skin hook and iris scissors, the flaps were gently and sharply undermined and freed up.
Interpolation Flap Text: A decision was made to reconstruct the defect utilizing an interpolation axial flap and a staged reconstruction.  A telfa template was made of the defect.  This telfa template was then used to outline the interpolation flap.  The donor area for the pedicle flap was then injected with anesthesia.  The flap was excised through the skin and subcutaneous tissue down to the layer of the underlying musculature.  The interpolation flap was carefully excised within this deep plane to maintain its blood supply.  The edges of the donor site were undermined.   The donor site was closed in a primary fashion.  The pedicle was then rotated into position and sutured.  Once the tube was sutured into place, adequate blood supply was confirmed with blanching and refill.  The pedicle was then wrapped with xeroform gauze and dressed appropriately with a telfa and gauze bandage to ensure continued blood supply and protect the attached pedicle.
X Size Of Lesion In Cm (Optional): 0.5
Complex Repair And O-L Flap Text: The defect edges were debeveled with a #15 scalpel blade.  The primary defect was closed partially with a complex linear closure.  Given the location of the remaining defect, shape of the defect and the proximity to free margins an O-L flap was deemed most appropriate for complete closure of the defect.  Using a sterile surgical marker, an appropriate flap was drawn incorporating the defect and placing the expected incisions within the relaxed skin tension lines where possible.    The area thus outlined was incised deep to adipose tissue with a #15 scalpel blade.  The skin margins were undermined to an appropriate distance in all directions utilizing iris scissors.
Tissue Cultured Epidermal Autograft Text: The defect edges were debeveled with a #15 scalpel blade.  Given the location of the defect, shape of the defect and the proximity to free margins a tissue cultured epidermal autograft was deemed most appropriate.  The graft was then trimmed to fit the size of the defect.  The graft was then placed in the primary defect and oriented appropriately.
Modified Advancement Flap Text: The defect edges were debeveled with a #15 scalpel blade.  Given the location of the defect, shape of the defect and the proximity to free margins a modified advancement flap was deemed most appropriate.  Using a sterile surgical marker, an appropriate advancement flap was drawn incorporating the defect and placing the expected incisions within the relaxed skin tension lines where possible.    The area thus outlined was incised deep to adipose tissue with a #15 scalpel blade.  The skin margins were undermined to an appropriate distance in all directions utilizing iris scissors.
Complex Repair And V-Y Plasty Text: The defect edges were debeveled with a #15 scalpel blade.  The primary defect was closed partially with a complex linear closure.  Given the location of the remaining defect, shape of the defect and the proximity to free margins a V-Y plasty was deemed most appropriate for complete closure of the defect.  Using a sterile surgical marker, an appropriate advancement flap was drawn incorporating the defect and placing the expected incisions within the relaxed skin tension lines where possible.    The area thus outlined was incised deep to adipose tissue with a #15 scalpel blade.  The skin margins were undermined to an appropriate distance in all directions utilizing iris scissors.
Complex Repair And Melolabial Flap Text: The defect edges were debeveled with a #15 scalpel blade.  The primary defect was closed partially with a complex linear closure.  Given the location of the remaining defect, shape of the defect and the proximity to free margins a melolabial flap was deemed most appropriate for complete closure of the defect.  Using a sterile surgical marker, an appropriate advancement flap was drawn incorporating the defect and placing the expected incisions within the relaxed skin tension lines where possible.    The area thus outlined was incised deep to adipose tissue with a #15 scalpel blade.  The skin margins were undermined to an appropriate distance in all directions utilizing iris scissors.
Hatchet Flap Text: The defect edges were debeveled with a #15 scalpel blade.  Given the location of the defect, shape of the defect and the proximity to free margins a hatchet flap was deemed most appropriate.  Using a sterile surgical marker, an appropriate hatchet flap was drawn incorporating the defect and placing the expected incisions within the relaxed skin tension lines where possible.    The area thus outlined was incised deep to adipose tissue with a #15 scalpel blade.  The skin margins were undermined to an appropriate distance in all directions utilizing iris scissors.
O-T Plasty Text: The defect edges were debeveled with a #15 scalpel blade.  Given the location of the defect, shape of the defect and the proximity to free margins an O-T plasty was deemed most appropriate.  Using a sterile surgical marker, an appropriate O-T plasty was drawn incorporating the defect and placing the expected incisions within the relaxed skin tension lines where possible.    The area thus outlined was incised deep to adipose tissue with a #15 scalpel blade.  The skin margins were undermined to an appropriate distance in all directions utilizing iris scissors.
Complex Repair And Tissue Cultured Epidermal Autograft Text: The defect edges were debeveled with a #15 scalpel blade.  The primary defect was closed partially with a complex linear closure.  Given the location of the defect, shape of the defect and the proximity to free margins an tissue cultured epidermal autograft was deemed most appropriate to repair the remaining defect.  The graft was trimmed to fit the size of the remaining defect.  The graft was then placed in the primary defect, oriented appropriately, and sutured into place.
Surgeon Performing The Repair (Optional): Caryn Wasserman PA-C
Complex Repair And Single Advancement Flap Text: The defect edges were debeveled with a #15 scalpel blade.  The primary defect was closed partially with a complex linear closure.  Given the location of the remaining defect, shape of the defect and the proximity to free margins a single advancement flap was deemed most appropriate for complete closure of the defect.  Using a sterile surgical marker, an appropriate advancement flap was drawn incorporating the defect and placing the expected incisions within the relaxed skin tension lines where possible.    The area thus outlined was incised deep to adipose tissue with a #15 scalpel blade.  The skin margins were undermined to an appropriate distance in all directions utilizing iris scissors.
Repair Type: Complex
Consent was obtained from the patient. The risks and benefits to therapy were discussed in detail. Specifically, the risks of infection, scarring, bleeding, prolonged wound healing, incomplete removal, allergy to anesthesia, nerve injury and recurrence were addressed. Prior to the procedure, the treatment site was clearly identified and confirmed by the patient. All components of Universal Protocol/PAUSE Rule completed.
Excision Depth: adipose tissue
Rhomboid Transposition Flap Text: The defect edges were debeveled with a #15 scalpel blade.  Given the location of the defect and the proximity to free margins a rhomboid transposition flap was deemed most appropriate.  Using a sterile surgical marker, an appropriate rhomboid flap was drawn incorporating the defect.    The area thus outlined was incised deep to adipose tissue with a #15 scalpel blade.  The skin margins were undermined to an appropriate distance in all directions utilizing iris scissors.
S Plasty Text: Given the location and shape of the defect, and the orientation of relaxed skin tension lines, an S-plasty was deemed most appropriate for repair.  Using a sterile surgical marker, the appropriate outline of the S-plasty was drawn, incorporating the defect and placing the expected incisions within the relaxed skin tension lines where possible.  The area thus outlined was incised deep to adipose tissue with a #15 scalpel blade.  The skin margins were undermined to an appropriate distance in all directions utilizing iris scissors. The skin flaps were advanced over the defect.  The opposing margins were then approximated with interrupted buried subcutaneous sutures.
Spiral Flap Text: The defect edges were debeveled with a #15 scalpel blade.  Given the location of the defect, shape of the defect and the proximity to free margins a spiral flap was deemed most appropriate.  Using a sterile surgical marker, an appropriate rotation flap was drawn incorporating the defect and placing the expected incisions within the relaxed skin tension lines where possible. The area thus outlined was incised deep to adipose tissue with a #15 scalpel blade.  The skin margins were undermined to an appropriate distance in all directions utilizing iris scissors.
Undermining Type: Entire Wound
Epidermal Closure: running
O-Z Plasty Text: The defect edges were debeveled with a #15 scalpel blade.  Given the location of the defect, shape of the defect and the proximity to free margins an O-Z plasty (double transposition flap) was deemed most appropriate.  Using a sterile surgical marker, the appropriate transposition flaps were drawn incorporating the defect and placing the expected incisions within the relaxed skin tension lines where possible.    The area thus outlined was incised deep to adipose tissue with a #15 scalpel blade.  The skin margins were undermined to an appropriate distance in all directions utilizing iris scissors.  Hemostasis was achieved with electrocautery.  The flaps were then transposed into place, one clockwise and the other counterclockwise, and anchored with interrupted buried subcutaneous sutures.
Intermediate / Complex Repair - Final Wound Length In Cm: 4.4
Epidermal Sutures: 5-0 Prolene
Dressing: pressure dressing
Melolabial Transposition Flap Text: The defect edges were debeveled with a #15 scalpel blade.  Given the location of the defect and the proximity to free margins a melolabial flap was deemed most appropriate.  Using a sterile surgical marker, an appropriate melolabial transposition flap was drawn incorporating the defect.    The area thus outlined was incised deep to adipose tissue with a #15 scalpel blade.  The skin margins were undermined to an appropriate distance in all directions utilizing iris scissors.
Retention Suture Text: Retention sutures were placed to support the closure and prevent dehiscence.
Billing Type: Third-Party Bill
Double O-Z Plasty Text: The defect edges were debeveled with a #15 scalpel blade.  Given the location of the defect, shape of the defect and the proximity to free margins a Double O-Z plasty (double transposition flap) was deemed most appropriate.  Using a sterile surgical marker, the appropriate transposition flaps were drawn incorporating the defect and placing the expected incisions within the relaxed skin tension lines where possible. The area thus outlined was incised deep to adipose tissue with a #15 scalpel blade.  The skin margins were undermined to an appropriate distance in all directions utilizing iris scissors.  Hemostasis was achieved with electrocautery.  The flaps were then transposed into place, one clockwise and the other counterclockwise, and anchored with interrupted buried subcutaneous sutures.
Complex Repair And Split-Thickness Skin Graft Text: The defect edges were debeveled with a #15 scalpel blade.  The primary defect was closed partially with a complex linear closure.  Given the location of the defect, shape of the defect and the proximity to free margins a split thickness skin graft was deemed most appropriate to repair the remaining defect.  The graft was trimmed to fit the size of the remaining defect.  The graft was then placed in the primary defect, oriented appropriately, and sutured into place.
Perilesional Excision Additional Text (Leave Blank If You Do Not Want): The margin was drawn around the clinically apparent lesion. Incisions were then made along these lines to the appropriate tissue plane and the lesion was extirpated.
Size Of Margin In Cm: 0.4
Island Pedicle Flap-Requiring Vessel Identification Text: The defect edges were debeveled with a #15 scalpel blade.  Given the location of the defect, shape of the defect and the proximity to free margins an island pedicle advancement flap was deemed most appropriate.  Using a sterile surgical marker, an appropriate advancement flap was drawn, based on the axial vessel mentioned above, incorporating the defect, outlining the appropriate donor tissue and placing the expected incisions within the relaxed skin tension lines where possible.    The area thus outlined was incised deep to adipose tissue with a #15 scalpel blade.  The skin margins were undermined to an appropriate distance in all directions around the primary defect and laterally outward around the island pedicle utilizing iris scissors.  There was minimal undermining beneath the pedicle flap.
Split-Thickness Skin Graft Text: The defect edges were debeveled with a #15 scalpel blade.  Given the location of the defect, shape of the defect and the proximity to free margins a split thickness skin graft was deemed most appropriate.  Using a sterile surgical marker, the primary defect shape was transferred to the donor site. The split thickness graft was then harvested.  The skin graft was then placed in the primary defect and oriented appropriately.
Vermilion Border Text: The closure involved the vermilion border.
Skin Substitute Text: The defect edges were debeveled with a #15 scalpel blade.  Given the location of the defect, shape of the defect and the proximity to free margins a skin substitute graft was deemed most appropriate.  The graft material was trimmed to fit the size of the defect. The graft was then placed in the primary defect and oriented appropriately.
Surgeon (Optional): Caryn Wasserman PA-C
Paramedian Forehead Flap Text: A decision was made to reconstruct the defect utilizing an interpolation axial flap and a staged reconstruction.  A telfa template was made of the defect.  This telfa template was then used to outline the paramedian forehead pedicle flap.  The donor area for the pedicle flap was then injected with anesthesia.  The flap was excised through the skin and subcutaneous tissue down to the layer of the underlying musculature.  The pedicle flap was carefully excised within this deep plane to maintain its blood supply.  The edges of the donor site were undermined.   The donor site was closed in a primary fashion.  The pedicle was then rotated into position and sutured.  Once the tube was sutured into place, adequate blood supply was confirmed with blanching and refill.  The pedicle was then wrapped with xeroform gauze and dressed appropriately with a telfa and gauze bandage to ensure continued blood supply and protect the attached pedicle.
Estimated Blood Loss (Cc): minimal
Mercedes Flap Text: The defect edges were debeveled with a #15 scalpel blade.  Given the location of the defect, shape of the defect and the proximity to free margins a Mercedes flap was deemed most appropriate.  Using a sterile surgical marker, an appropriate advancement flap was drawn incorporating the defect and placing the expected incisions within the relaxed skin tension lines where possible. The area thus outlined was incised deep to adipose tissue with a #15 scalpel blade.  The skin margins were undermined to an appropriate distance in all directions utilizing iris scissors.
Melolabial Interpolation Flap Text: A decision was made to reconstruct the defect utilizing an interpolation axial flap and a staged reconstruction.  A telfa template was made of the defect.  This telfa template was then used to outline the melolabial interpolation flap.  The donor area for the pedicle flap was then injected with anesthesia.  The flap was excised through the skin and subcutaneous tissue down to the layer of the underlying musculature.  The pedicle flap was carefully excised within this deep plane to maintain its blood supply.  The edges of the donor site were undermined.   The donor site was closed in a primary fashion.  The pedicle was then rotated into position and sutured.  Once the tube was sutured into place, adequate blood supply was confirmed with blanching and refill.  The pedicle was then wrapped with xeroform gauze and dressed appropriately with a telfa and gauze bandage to ensure continued blood supply and protect the attached pedicle.
Rhombic Flap Text: The defect edges were debeveled with a #15 scalpel blade.  Given the location of the defect and the proximity to free margins a rhombic flap was deemed most appropriate.  Using a sterile surgical marker, an appropriate rhombic flap was drawn incorporating the defect.    The area thus outlined was incised deep to adipose tissue with a #15 scalpel blade.  The skin margins were undermined to an appropriate distance in all directions utilizing iris scissors.
Cheek-To-Nose Interpolation Flap Text: A decision was made to reconstruct the defect utilizing an interpolation axial flap and a staged reconstruction.  A telfa template was made of the defect.  This telfa template was then used to outline the Cheek-To-Nose Interpolation flap.  The donor area for the pedicle flap was then injected with anesthesia.  The flap was excised through the skin and subcutaneous tissue down to the layer of the underlying musculature.  The interpolation flap was carefully excised within this deep plane to maintain its blood supply.  The edges of the donor site were undermined.   The donor site was closed in a primary fashion.  The pedicle was then rotated into position and sutured.  Once the tube was sutured into place, adequate blood supply was confirmed with blanching and refill.  The pedicle was then wrapped with xeroform gauze and dressed appropriately with a telfa and gauze bandage to ensure continued blood supply and protect the attached pedicle.

## 2020-06-11 ENCOUNTER — APPOINTMENT (OUTPATIENT)
Dept: URBAN - METROPOLITAN AREA CLINIC 232 | Age: 75
Setting detail: DERMATOLOGY
End: 2020-06-11

## 2020-06-11 DIAGNOSIS — Z48.02 ENCOUNTER FOR REMOVAL OF SUTURES: ICD-10-CM

## 2020-06-11 PROCEDURE — 99024 POSTOP FOLLOW-UP VISIT: CPT

## 2020-06-11 PROCEDURE — OTHER SUTURE REMOVAL (GLOBAL PERIOD): OTHER

## 2020-06-11 ASSESSMENT — LOCATION DETAILED DESCRIPTION DERM: LOCATION DETAILED: LEFT CLAVICULAR NECK

## 2020-06-11 ASSESSMENT — LOCATION ZONE DERM: LOCATION ZONE: NECK

## 2020-06-11 ASSESSMENT — LOCATION SIMPLE DESCRIPTION DERM: LOCATION SIMPLE: LEFT ANTERIOR NECK

## 2020-06-11 NOTE — PROCEDURE: SUTURE REMOVAL (GLOBAL PERIOD)
Add 60429 Cpt? (Important Note: In 2017 The Use Of 41156 Is Being Tracked By Cms To Determine Future Global Period Reimbursement For Global Periods): yes
Detail Level: Detailed

## 2020-11-04 ENCOUNTER — APPOINTMENT (OUTPATIENT)
Dept: URBAN - METROPOLITAN AREA CLINIC 232 | Age: 75
Setting detail: DERMATOLOGY
End: 2020-11-04

## 2020-11-04 DIAGNOSIS — Z85.828 PERSONAL HISTORY OF OTHER MALIGNANT NEOPLASM OF SKIN: ICD-10-CM

## 2020-11-04 DIAGNOSIS — D22 MELANOCYTIC NEVI: ICD-10-CM

## 2020-11-04 DIAGNOSIS — L81.4 OTHER MELANIN HYPERPIGMENTATION: ICD-10-CM

## 2020-11-04 DIAGNOSIS — L85.3 XEROSIS CUTIS: ICD-10-CM

## 2020-11-04 DIAGNOSIS — D18.0 HEMANGIOMA: ICD-10-CM

## 2020-11-04 DIAGNOSIS — Z85.820 PERSONAL HISTORY OF MALIGNANT MELANOMA OF SKIN: ICD-10-CM

## 2020-11-04 DIAGNOSIS — L82.1 OTHER SEBORRHEIC KERATOSIS: ICD-10-CM

## 2020-11-04 PROBLEM — D48.5 NEOPLASM OF UNCERTAIN BEHAVIOR OF SKIN: Status: ACTIVE | Noted: 2020-11-04

## 2020-11-04 PROBLEM — D22.5 MELANOCYTIC NEVI OF TRUNK: Status: ACTIVE | Noted: 2020-11-04

## 2020-11-04 PROBLEM — D18.01 HEMANGIOMA OF SKIN AND SUBCUTANEOUS TISSUE: Status: ACTIVE | Noted: 2020-11-04

## 2020-11-04 PROCEDURE — 99214 OFFICE O/P EST MOD 30 MIN: CPT | Mod: 25

## 2020-11-04 PROCEDURE — OTHER OBSERVATION: OTHER

## 2020-11-04 PROCEDURE — OTHER SHAVE REMOVAL: OTHER

## 2020-11-04 PROCEDURE — 11305 SHAVE SKIN LESION 0.5 CM/<: CPT

## 2020-11-04 PROCEDURE — OTHER PRESCRIPTION: OTHER

## 2020-11-04 PROCEDURE — OTHER COUNSELING: OTHER

## 2020-11-04 RX ORDER — TRIAMCINOLONE ACETONIDE 1 MG/G
CREAM TOPICAL
Qty: 1 | Refills: 1 | Status: ERX | COMMUNITY
Start: 2020-11-04

## 2020-11-04 ASSESSMENT — LOCATION SIMPLE DESCRIPTION DERM
LOCATION SIMPLE: LEFT UPPER BACK
LOCATION SIMPLE: LEFT ANTERIOR NECK
LOCATION SIMPLE: LEFT PRETIBIAL REGION
LOCATION SIMPLE: RIGHT LOWER BACK
LOCATION SIMPLE: RIGHT UPPER BACK
LOCATION SIMPLE: LEFT FOOT

## 2020-11-04 ASSESSMENT — LOCATION ZONE DERM
LOCATION ZONE: FEET
LOCATION ZONE: TRUNK
LOCATION ZONE: LEG
LOCATION ZONE: NECK

## 2020-11-04 ASSESSMENT — LOCATION DETAILED DESCRIPTION DERM
LOCATION DETAILED: LEFT DORSAL FOOT
LOCATION DETAILED: LEFT PROXIMAL PRETIBIAL REGION
LOCATION DETAILED: LEFT CLAVICULAR NECK
LOCATION DETAILED: LEFT SUPERIOR UPPER BACK
LOCATION DETAILED: RIGHT MEDIAL UPPER BACK
LOCATION DETAILED: RIGHT SUPERIOR UPPER BACK
LOCATION DETAILED: RIGHT SUPERIOR MEDIAL MIDBACK

## 2020-11-04 NOTE — PROCEDURE: SHAVE REMOVAL
Wound Care: Petrolatum
Was A Bandage Applied: Yes
Bill 85257 For Specimen Handling/Conveyance To Laboratory?: no
Detail Level: Detailed
X Size Of Lesion In Cm (Optional): 0
Post-Care Instructions: I reviewed with the patient in detail post-care instructions. Patient is to keep the biopsy site dry overnight, and then apply bacitracin twice daily until healed. Patient may apply hydrogen peroxide soaks to remove any crusting.
Notification Instructions: Patient will be notified of biopsy results. However, patient instructed to call the office if not contacted within 2 weeks.
Consent was obtained from the patient. The risks and benefits to therapy were discussed in detail. Specifically, the risks of infection, scarring, bleeding, prolonged wound healing, incomplete removal, allergy to anesthesia, nerve injury and recurrence were addressed. Prior to the procedure, the treatment site was clearly identified and confirmed by the patient. All components of Universal Protocol/PAUSE Rule completed.
Size Of Lesion In Cm (Required): 0.2
Medical Necessity Clause: This procedure was medically necessary because the lesion that was treated was:
Biopsy Method: Dermablade
Path Notes (To The Dermatopathologist): Please check margins.
Anesthesia Type: 1% lidocaine with epinephrine
Medical Necessity Information: It is in your best interest to select a reason for this procedure from the list below. All of these items fulfill various CMS LCD requirements except the new and changing color options.
Billing Type: Third-Party Bill
Hemostasis: Drysol

## 2020-11-04 NOTE — HPI: EVALUATION OF SKIN LESION(S)
What Type Of Note Output Would You Prefer (Optional)?: Standard Output
How Severe Are Your Spot(S)?: mild
Have Your Spot(S) Been Treated In The Past?: has not been treated
Hpi Title: Evaluation of Skin Lesions
Additional History: Pt is here for a FBSE he has a history of BCC on his left clavicle

## 2021-07-28 ENCOUNTER — HOSPITAL ENCOUNTER (EMERGENCY)
Facility: CLINIC | Age: 76
Discharge: ANOTHER HOSPITAL | End: 2021-07-28
Attending: EMERGENCY MEDICINE | Admitting: EMERGENCY MEDICINE
Payer: COMMERCIAL

## 2021-07-28 ENCOUNTER — HOSPITAL ENCOUNTER (INPATIENT)
Facility: CLINIC | Age: 76
LOS: 9 days | Discharge: HOME HEALTH | DRG: 673 | End: 2021-08-06
Attending: HOSPITALIST | Admitting: HOSPITALIST
Payer: COMMERCIAL

## 2021-07-28 ENCOUNTER — APPOINTMENT (OUTPATIENT)
Dept: GENERAL RADIOLOGY | Facility: CLINIC | Age: 76
End: 2021-07-28
Attending: EMERGENCY MEDICINE
Payer: COMMERCIAL

## 2021-07-28 VITALS
HEART RATE: 78 BPM | TEMPERATURE: 97.6 F | RESPIRATION RATE: 11 BRPM | SYSTOLIC BLOOD PRESSURE: 165 MMHG | OXYGEN SATURATION: 100 % | DIASTOLIC BLOOD PRESSURE: 94 MMHG

## 2021-07-28 DIAGNOSIS — N18.6 END STAGE RENAL DISEASE (H): Primary | ICD-10-CM

## 2021-07-28 DIAGNOSIS — E11.10 DIABETIC KETOACIDOSIS WITHOUT COMA ASSOCIATED WITH TYPE 2 DIABETES MELLITUS (H): ICD-10-CM

## 2021-07-28 DIAGNOSIS — R79.89 ELEVATED TROPONIN: ICD-10-CM

## 2021-07-28 DIAGNOSIS — M62.81 GENERALIZED MUSCLE WEAKNESS: ICD-10-CM

## 2021-07-28 DIAGNOSIS — N18.6 END STAGE RENAL DISEASE (H): ICD-10-CM

## 2021-07-28 LAB
ALBUMIN SERPL-MCNC: 2.4 G/DL (ref 3.4–5)
ALBUMIN UR-MCNC: 300 MG/DL
ALP SERPL-CCNC: 258 U/L (ref 40–150)
ALT SERPL W P-5'-P-CCNC: 52 U/L (ref 0–70)
ANION GAP SERPL CALCULATED.3IONS-SCNC: 11 MMOL/L (ref 3–14)
ANION GAP SERPL CALCULATED.3IONS-SCNC: 4 MMOL/L (ref 3–14)
APPEARANCE UR: CLEAR
AST SERPL W P-5'-P-CCNC: 27 U/L (ref 0–45)
ATRIAL RATE - MUSE: 150 BPM
ATRIAL RATE - MUSE: 312 BPM
BASOPHILS # BLD AUTO: 0 10E3/UL (ref 0–0.2)
BASOPHILS NFR BLD AUTO: 0 %
BILIRUB DIRECT SERPL-MCNC: 0.1 MG/DL (ref 0–0.2)
BILIRUB SERPL-MCNC: 0.4 MG/DL (ref 0.2–1.3)
BILIRUB UR QL STRIP: NEGATIVE
BUN SERPL-MCNC: 55 MG/DL (ref 7–30)
BUN SERPL-MCNC: 57 MG/DL (ref 7–30)
CALCIUM SERPL-MCNC: 8.8 MG/DL (ref 8.5–10.1)
CALCIUM SERPL-MCNC: 9.3 MG/DL (ref 8.5–10.1)
CHLORIDE BLD-SCNC: 101 MMOL/L (ref 94–109)
CHLORIDE BLD-SCNC: 108 MMOL/L (ref 94–109)
CO2 SERPL-SCNC: 23 MMOL/L (ref 20–32)
CO2 SERPL-SCNC: 28 MMOL/L (ref 20–32)
COLOR UR AUTO: YELLOW
CREAT SERPL-MCNC: 3.66 MG/DL (ref 0.66–1.25)
CREAT SERPL-MCNC: 4.31 MG/DL (ref 0.66–1.25)
DIASTOLIC BLOOD PRESSURE - MUSE: NORMAL MMHG
DIASTOLIC BLOOD PRESSURE - MUSE: NORMAL MMHG
EOSINOPHIL # BLD AUTO: 0.1 10E3/UL (ref 0–0.7)
EOSINOPHIL NFR BLD AUTO: 2 %
ERYTHROCYTE [DISTWIDTH] IN BLOOD BY AUTOMATED COUNT: 14 % (ref 10–15)
ETHANOL SERPL-MCNC: <0.01 G/DL
GFR SERPL CREATININE-BSD FRML MDRD: 13 ML/MIN/1.73M2
GFR SERPL CREATININE-BSD FRML MDRD: 15 ML/MIN/1.73M2
GLUCOSE BLD-MCNC: 259 MG/DL (ref 70–99)
GLUCOSE BLD-MCNC: 646 MG/DL (ref 70–99)
GLUCOSE BLDC GLUCOMTR-MCNC: 133 MG/DL (ref 70–99)
GLUCOSE BLDC GLUCOMTR-MCNC: 169 MG/DL (ref 70–99)
GLUCOSE BLDC GLUCOMTR-MCNC: 212 MG/DL (ref 70–99)
GLUCOSE BLDC GLUCOMTR-MCNC: 266 MG/DL (ref 70–99)
GLUCOSE BLDC GLUCOMTR-MCNC: 299 MG/DL (ref 70–99)
GLUCOSE BLDC GLUCOMTR-MCNC: 363 MG/DL (ref 70–99)
GLUCOSE BLDC GLUCOMTR-MCNC: 385 MG/DL (ref 70–99)
GLUCOSE BLDC GLUCOMTR-MCNC: 439 MG/DL (ref 70–99)
GLUCOSE BLDC GLUCOMTR-MCNC: 487 MG/DL (ref 70–99)
GLUCOSE BLDC GLUCOMTR-MCNC: 552 MG/DL (ref 70–99)
GLUCOSE BLDC GLUCOMTR-MCNC: 564 MG/DL (ref 70–99)
GLUCOSE UR STRIP-MCNC: >=1000 MG/DL
HBA1C MFR BLD: 11.7 % (ref 0–5.6)
HCO3 BLDV-SCNC: 22 MMOL/L (ref 21–28)
HCT VFR BLD AUTO: 32 % (ref 40–53)
HGB BLD-MCNC: 10.6 G/DL (ref 13.3–17.7)
HGB UR QL STRIP: ABNORMAL
HOLD SPECIMEN: NORMAL
HOLD SPECIMEN: NORMAL
HYALINE CASTS: 1 /LPF
IMM GRANULOCYTES # BLD: 0 10E3/UL
IMM GRANULOCYTES NFR BLD: 0 %
INTERPRETATION ECG - MUSE: NORMAL
INTERPRETATION ECG - MUSE: NORMAL
KETONES BLD-SCNC: 0.2 MMOL/L (ref 0–0.6)
KETONES BLD-SCNC: 4.3 MMOL/L (ref 0–0.6)
KETONES UR STRIP-MCNC: 20 MG/DL
LACTATE BLD-SCNC: 1.5 MMOL/L
LEUKOCYTE ESTERASE UR QL STRIP: NEGATIVE
LYMPHOCYTES # BLD AUTO: 1.1 10E3/UL (ref 0.8–5.3)
LYMPHOCYTES NFR BLD AUTO: 15 %
MAGNESIUM SERPL-MCNC: 2.5 MG/DL (ref 1.6–2.3)
MCH RBC QN AUTO: 29 PG (ref 26.5–33)
MCHC RBC AUTO-ENTMCNC: 33.1 G/DL (ref 31.5–36.5)
MCV RBC AUTO: 87 FL (ref 78–100)
MONOCYTES # BLD AUTO: 0.4 10E3/UL (ref 0–1.3)
MONOCYTES NFR BLD AUTO: 5 %
MUCOUS THREADS #/AREA URNS LPF: PRESENT /LPF
NEUTROPHILS # BLD AUTO: 5.5 10E3/UL (ref 1.6–8.3)
NEUTROPHILS NFR BLD AUTO: 78 %
NITRATE UR QL: NEGATIVE
NRBC # BLD AUTO: 0 10E3/UL
NRBC BLD AUTO-RTO: 0 /100
NT-PROBNP SERPL-MCNC: 8429 PG/ML (ref 0–900)
OSMOLALITY SERPL: 336 MMOL/KG (ref 280–301)
P AXIS - MUSE: NORMAL DEGREES
P AXIS - MUSE: NORMAL DEGREES
PCO2 BLDV: 45 MM HG (ref 40–50)
PH BLDV: 7.31 [PH] (ref 7.32–7.43)
PH UR STRIP: 6 [PH] (ref 5–7)
PHOSPHATE SERPL-MCNC: 1.6 MG/DL (ref 2.5–4.5)
PLATELET # BLD AUTO: 223 10E3/UL (ref 150–450)
PO2 BLDV: 23 MM HG (ref 25–47)
POTASSIUM BLD-SCNC: 3.3 MMOL/L (ref 3.4–5.3)
POTASSIUM BLD-SCNC: 3.8 MMOL/L (ref 3.4–5.3)
PR INTERVAL - MUSE: NORMAL MS
PR INTERVAL - MUSE: NORMAL MS
PROT SERPL-MCNC: 6.1 G/DL (ref 6.8–8.8)
QRS DURATION - MUSE: 92 MS
QRS DURATION - MUSE: 96 MS
QT - MUSE: 402 MS
QT - MUSE: 404 MS
QTC - MUSE: 499 MS
QTC - MUSE: 513 MS
R AXIS - MUSE: -17 DEGREES
R AXIS - MUSE: -22 DEGREES
RBC # BLD AUTO: 3.66 10E6/UL (ref 4.4–5.9)
RBC URINE: 1 /HPF
SAO2 % BLDV: 35 % (ref 94–100)
SARS-COV-2 RNA RESP QL NAA+PROBE: NEGATIVE
SODIUM SERPL-SCNC: 135 MMOL/L (ref 133–144)
SODIUM SERPL-SCNC: 140 MMOL/L (ref 133–144)
SP GR UR STRIP: 1.02 (ref 1–1.03)
SQUAMOUS EPITHELIAL: <1 /HPF
SYSTOLIC BLOOD PRESSURE - MUSE: NORMAL MMHG
SYSTOLIC BLOOD PRESSURE - MUSE: NORMAL MMHG
T AXIS - MUSE: 194 DEGREES
T AXIS - MUSE: 217 DEGREES
TROPONIN I SERPL-MCNC: 0.06 UG/L (ref 0–0.04)
TROPONIN I SERPL-MCNC: 0.07 UG/L (ref 0–0.04)
TSH SERPL DL<=0.005 MIU/L-ACNC: 2.58 MU/L (ref 0.4–4)
UROBILINOGEN UR STRIP-MCNC: NORMAL MG/DL
VENTRICULAR RATE- MUSE: 93 BPM
VENTRICULAR RATE- MUSE: 97 BPM
WBC # BLD AUTO: 7.1 10E3/UL (ref 4–11)
WBC URINE: 1 /HPF

## 2021-07-28 PROCEDURE — 85025 COMPLETE CBC W/AUTO DIFF WBC: CPT | Performed by: EMERGENCY MEDICINE

## 2021-07-28 PROCEDURE — 93005 ELECTROCARDIOGRAM TRACING: CPT | Mod: 76

## 2021-07-28 PROCEDURE — 250N000009 HC RX 250: Performed by: EMERGENCY MEDICINE

## 2021-07-28 PROCEDURE — 999N000127 HC STATISTIC PERIPHERAL IV START W US GUIDANCE

## 2021-07-28 PROCEDURE — 36415 COLL VENOUS BLD VENIPUNCTURE: CPT | Performed by: EMERGENCY MEDICINE

## 2021-07-28 PROCEDURE — 250N000011 HC RX IP 250 OP 636: Performed by: PHYSICIAN ASSISTANT

## 2021-07-28 PROCEDURE — 96361 HYDRATE IV INFUSION ADD-ON: CPT

## 2021-07-28 PROCEDURE — 82077 ASSAY SPEC XCP UR&BREATH IA: CPT | Performed by: EMERGENCY MEDICINE

## 2021-07-28 PROCEDURE — 258N000003 HC RX IP 258 OP 636: Performed by: PHYSICIAN ASSISTANT

## 2021-07-28 PROCEDURE — 84100 ASSAY OF PHOSPHORUS: CPT | Performed by: PHYSICIAN ASSISTANT

## 2021-07-28 PROCEDURE — 83880 ASSAY OF NATRIURETIC PEPTIDE: CPT | Performed by: EMERGENCY MEDICINE

## 2021-07-28 PROCEDURE — 258N000001 HC RX 258: Performed by: PHYSICIAN ASSISTANT

## 2021-07-28 PROCEDURE — 250N000011 HC RX IP 250 OP 636: Performed by: EMERGENCY MEDICINE

## 2021-07-28 PROCEDURE — 96365 THER/PROPH/DIAG IV INF INIT: CPT

## 2021-07-28 PROCEDURE — 83735 ASSAY OF MAGNESIUM: CPT | Performed by: EMERGENCY MEDICINE

## 2021-07-28 PROCEDURE — 93005 ELECTROCARDIOGRAM TRACING: CPT

## 2021-07-28 PROCEDURE — 258N000003 HC RX IP 258 OP 636: Performed by: EMERGENCY MEDICINE

## 2021-07-28 PROCEDURE — 81001 URINALYSIS AUTO W/SCOPE: CPT | Performed by: EMERGENCY MEDICINE

## 2021-07-28 PROCEDURE — 82247 BILIRUBIN TOTAL: CPT | Performed by: EMERGENCY MEDICINE

## 2021-07-28 PROCEDURE — 84484 ASSAY OF TROPONIN QUANT: CPT | Mod: 91 | Performed by: EMERGENCY MEDICINE

## 2021-07-28 PROCEDURE — 82803 BLOOD GASES ANY COMBINATION: CPT

## 2021-07-28 PROCEDURE — 96366 THER/PROPH/DIAG IV INF ADDON: CPT

## 2021-07-28 PROCEDURE — 80048 BASIC METABOLIC PNL TOTAL CA: CPT | Performed by: EMERGENCY MEDICINE

## 2021-07-28 PROCEDURE — 250N000013 HC RX MED GY IP 250 OP 250 PS 637: Performed by: EMERGENCY MEDICINE

## 2021-07-28 PROCEDURE — 82010 KETONE BODYS QUAN: CPT | Performed by: EMERGENCY MEDICINE

## 2021-07-28 PROCEDURE — 84155 ASSAY OF PROTEIN SERUM: CPT | Performed by: EMERGENCY MEDICINE

## 2021-07-28 PROCEDURE — 99223 1ST HOSP IP/OBS HIGH 75: CPT | Mod: AI | Performed by: PHYSICIAN ASSISTANT

## 2021-07-28 PROCEDURE — 250N000012 HC RX MED GY IP 250 OP 636 PS 637: Performed by: PHYSICIAN ASSISTANT

## 2021-07-28 PROCEDURE — 84484 ASSAY OF TROPONIN QUANT: CPT | Performed by: EMERGENCY MEDICINE

## 2021-07-28 PROCEDURE — 83036 HEMOGLOBIN GLYCOSYLATED A1C: CPT | Performed by: EMERGENCY MEDICINE

## 2021-07-28 PROCEDURE — 82010 KETONE BODYS QUAN: CPT | Performed by: PHYSICIAN ASSISTANT

## 2021-07-28 PROCEDURE — 83930 ASSAY OF BLOOD OSMOLALITY: CPT | Performed by: EMERGENCY MEDICINE

## 2021-07-28 PROCEDURE — 87635 SARS-COV-2 COVID-19 AMP PRB: CPT | Performed by: EMERGENCY MEDICINE

## 2021-07-28 PROCEDURE — 80048 BASIC METABOLIC PNL TOTAL CA: CPT | Performed by: PHYSICIAN ASSISTANT

## 2021-07-28 PROCEDURE — 99285 EMERGENCY DEPT VISIT HI MDM: CPT | Mod: 25

## 2021-07-28 PROCEDURE — 258N000002 HC RX IP 258 OP 250: Performed by: PHYSICIAN ASSISTANT

## 2021-07-28 PROCEDURE — 84443 ASSAY THYROID STIM HORMONE: CPT | Performed by: EMERGENCY MEDICINE

## 2021-07-28 PROCEDURE — 250N000013 HC RX MED GY IP 250 OP 250 PS 637: Performed by: PHYSICIAN ASSISTANT

## 2021-07-28 PROCEDURE — 36415 COLL VENOUS BLD VENIPUNCTURE: CPT | Performed by: PHYSICIAN ASSISTANT

## 2021-07-28 PROCEDURE — 120N000013 HC R&B IMCU

## 2021-07-28 PROCEDURE — 71046 X-RAY EXAM CHEST 2 VIEWS: CPT

## 2021-07-28 RX ORDER — SIMVASTATIN 40 MG
40 TABLET ORAL AT BEDTIME
Status: ON HOLD | COMMUNITY
End: 2021-08-06

## 2021-07-28 RX ORDER — EZETIMIBE 10 MG/1
10 TABLET ORAL EVERY MORNING
Status: DISCONTINUED | OUTPATIENT
Start: 2021-07-29 | End: 2021-08-01

## 2021-07-28 RX ORDER — SIMVASTATIN 40 MG
40 TABLET ORAL AT BEDTIME
Status: DISCONTINUED | OUTPATIENT
Start: 2021-07-28 | End: 2021-08-01

## 2021-07-28 RX ORDER — ASPIRIN 81 MG/1
81 TABLET ORAL AT BEDTIME
Status: DISCONTINUED | OUTPATIENT
Start: 2021-07-28 | End: 2021-08-06 | Stop reason: HOSPADM

## 2021-07-28 RX ORDER — METOPROLOL TARTRATE 100 MG
100 TABLET ORAL 2 TIMES DAILY
Status: ON HOLD | COMMUNITY
End: 2021-08-06

## 2021-07-28 RX ORDER — HYDRALAZINE HYDROCHLORIDE 25 MG/1
25 TABLET, FILM COATED ORAL 2 TIMES DAILY
Status: ON HOLD | COMMUNITY
End: 2021-08-06

## 2021-07-28 RX ORDER — FAMOTIDINE 20 MG
1000 TABLET ORAL EVERY MORNING
Status: ON HOLD | COMMUNITY
End: 2023-01-01

## 2021-07-28 RX ORDER — FUROSEMIDE 40 MG
40 TABLET ORAL EVERY MORNING
Status: ON HOLD | COMMUNITY
End: 2021-08-06

## 2021-07-28 RX ORDER — HYDRALAZINE HYDROCHLORIDE 25 MG/1
25 TABLET, FILM COATED ORAL 2 TIMES DAILY
Status: DISCONTINUED | OUTPATIENT
Start: 2021-07-28 | End: 2021-08-01

## 2021-07-28 RX ORDER — FOLIC ACID 1 MG/1
1 TABLET ORAL EVERY MORNING
Status: DISCONTINUED | OUTPATIENT
Start: 2021-07-29 | End: 2021-08-06 | Stop reason: HOSPADM

## 2021-07-28 RX ORDER — DEXTROSE MONOHYDRATE 25 G/50ML
25-50 INJECTION, SOLUTION INTRAVENOUS
Status: DISCONTINUED | OUTPATIENT
Start: 2021-07-28 | End: 2021-07-28 | Stop reason: HOSPADM

## 2021-07-28 RX ORDER — POTASSIUM CHLORIDE 1500 MG/1
20 TABLET, EXTENDED RELEASE ORAL ONCE
Status: COMPLETED | OUTPATIENT
Start: 2021-07-28 | End: 2021-07-28

## 2021-07-28 RX ORDER — POTASSIUM CHLORIDE 1500 MG/1
40 TABLET, EXTENDED RELEASE ORAL ONCE
Status: COMPLETED | OUTPATIENT
Start: 2021-07-28 | End: 2021-07-28

## 2021-07-28 RX ORDER — EZETIMIBE 10 MG/1
10 TABLET ORAL EVERY MORNING
Status: ON HOLD | COMMUNITY
End: 2023-01-01

## 2021-07-28 RX ORDER — METOPROLOL TARTRATE 100 MG
100 TABLET ORAL 2 TIMES DAILY
Status: DISCONTINUED | OUTPATIENT
Start: 2021-07-28 | End: 2021-08-03

## 2021-07-28 RX ORDER — CLONIDINE HYDROCHLORIDE 0.1 MG/1
0.2 TABLET ORAL 2 TIMES DAILY
Status: DISCONTINUED | OUTPATIENT
Start: 2021-07-28 | End: 2021-08-06 | Stop reason: HOSPADM

## 2021-07-28 RX ORDER — CLOPIDOGREL BISULFATE 75 MG/1
75 TABLET ORAL EVERY MORNING
Status: ON HOLD | COMMUNITY
End: 2021-08-11

## 2021-07-28 RX ORDER — ALLOPURINOL 300 MG/1
300 TABLET ORAL EVERY MORNING
Status: ON HOLD | COMMUNITY
End: 2021-08-06

## 2021-07-28 RX ORDER — INSULIN ASPART 100 [IU]/ML
15 INJECTION, SOLUTION INTRAVENOUS; SUBCUTANEOUS
Status: ON HOLD | COMMUNITY
End: 2021-08-06

## 2021-07-28 RX ORDER — ALLOPURINOL 300 MG/1
300 TABLET ORAL EVERY MORNING
Status: DISCONTINUED | OUTPATIENT
Start: 2021-07-29 | End: 2021-07-29

## 2021-07-28 RX ORDER — CLONIDINE HYDROCHLORIDE 0.2 MG/1
0.2 TABLET ORAL 2 TIMES DAILY
Status: ON HOLD | COMMUNITY
End: 2021-12-09

## 2021-07-28 RX ORDER — FOLIC ACID 1 MG/1
1 TABLET ORAL EVERY MORNING
Status: ON HOLD | COMMUNITY
End: 2023-01-01

## 2021-07-28 RX ORDER — DEXTROSE MONOHYDRATE 25 G/50ML
25-50 INJECTION, SOLUTION INTRAVENOUS
Status: DISCONTINUED | OUTPATIENT
Start: 2021-07-28 | End: 2021-08-03

## 2021-07-28 RX ORDER — ASPIRIN 81 MG/1
81 TABLET ORAL AT BEDTIME
Status: ON HOLD | COMMUNITY
End: 2021-12-09

## 2021-07-28 RX ORDER — ZOLPIDEM TARTRATE 6.25 MG/1
6.25 TABLET, FILM COATED, EXTENDED RELEASE ORAL
Status: ON HOLD | COMMUNITY
End: 2021-08-06

## 2021-07-28 RX ORDER — POTASSIUM CHLORIDE 7.45 MG/ML
10 INJECTION INTRAVENOUS ONCE
Status: COMPLETED | OUTPATIENT
Start: 2021-07-28 | End: 2021-07-28

## 2021-07-28 RX ORDER — POTASSIUM CHLORIDE 1500 MG/1
20 TABLET, EXTENDED RELEASE ORAL ONCE
Status: DISCONTINUED | OUTPATIENT
Start: 2021-07-28 | End: 2021-07-28

## 2021-07-28 RX ORDER — CHLORAL HYDRATE 500 MG
1 CAPSULE ORAL EVERY MORNING
Status: ON HOLD | COMMUNITY
End: 2023-01-01

## 2021-07-28 RX ORDER — NICOTINE POLACRILEX 4 MG
15-30 LOZENGE BUCCAL
Status: DISCONTINUED | OUTPATIENT
Start: 2021-07-28 | End: 2021-08-03

## 2021-07-28 RX ORDER — CLOPIDOGREL BISULFATE 75 MG/1
75 TABLET ORAL EVERY MORNING
Status: DISCONTINUED | OUTPATIENT
Start: 2021-07-29 | End: 2021-08-06 | Stop reason: HOSPADM

## 2021-07-28 RX ADMIN — METOPROLOL TARTRATE 100 MG: 100 TABLET, FILM COATED ORAL at 22:57

## 2021-07-28 RX ADMIN — POTASSIUM CHLORIDE: 149 INJECTION, SOLUTION, CONCENTRATE INTRAVENOUS at 22:23

## 2021-07-28 RX ADMIN — SODIUM CHLORIDE: 9 INJECTION, SOLUTION INTRAVENOUS at 21:04

## 2021-07-28 RX ADMIN — SODIUM CHLORIDE: 9 INJECTION, SOLUTION INTRAVENOUS at 23:36

## 2021-07-28 RX ADMIN — HYDRALAZINE HYDROCHLORIDE 25 MG: 25 TABLET ORAL at 22:58

## 2021-07-28 RX ADMIN — POTASSIUM CHLORIDE 20 MEQ: 1500 TABLET, EXTENDED RELEASE ORAL at 22:58

## 2021-07-28 RX ADMIN — SODIUM CHLORIDE 16 UNITS/HR: 9 INJECTION, SOLUTION INTRAVENOUS at 18:38

## 2021-07-28 RX ADMIN — ASPIRIN 81 MG: 81 TABLET, COATED ORAL at 22:57

## 2021-07-28 RX ADMIN — POTASSIUM CHLORIDE 10 MEQ: 7.46 INJECTION, SOLUTION INTRAVENOUS at 14:45

## 2021-07-28 RX ADMIN — SODIUM CHLORIDE, POTASSIUM CHLORIDE, SODIUM LACTATE AND CALCIUM CHLORIDE 1000 ML: 600; 310; 30; 20 INJECTION, SOLUTION INTRAVENOUS at 12:48

## 2021-07-28 RX ADMIN — POTASSIUM CHLORIDE: 149 INJECTION, SOLUTION, CONCENTRATE INTRAVENOUS at 23:05

## 2021-07-28 RX ADMIN — SIMVASTATIN 40 MG: 40 TABLET, FILM COATED ORAL at 22:58

## 2021-07-28 RX ADMIN — SODIUM CHLORIDE: 9 INJECTION, SOLUTION INTRAVENOUS at 14:45

## 2021-07-28 RX ADMIN — CLONIDINE HYDROCHLORIDE 0.2 MG: 0.1 TABLET ORAL at 22:57

## 2021-07-28 RX ADMIN — SODIUM CHLORIDE, POTASSIUM CHLORIDE, SODIUM LACTATE AND CALCIUM CHLORIDE 1000 ML: 600; 310; 30; 20 INJECTION, SOLUTION INTRAVENOUS at 15:37

## 2021-07-28 RX ADMIN — POTASSIUM CHLORIDE 40 MEQ: 1500 TABLET, EXTENDED RELEASE ORAL at 15:16

## 2021-07-28 ASSESSMENT — ENCOUNTER SYMPTOMS
FREQUENCY: 1
CONFUSION: 1
WEAKNESS: 1

## 2021-07-28 ASSESSMENT — MIFFLIN-ST. JEOR: SCORE: 1579.5

## 2021-07-28 NOTE — ED PROVIDER NOTES
History   Chief Complaint:  Hyperglycemia                      Gordon Gann is a 76 year old male on Eliquis and Plavix with a history of atrial fibrillation, type 2 diabetes, and hypertension who presents via EMS after his son-in-law, who is an ICU nurse, noticed a high blood sugar reading. EMS note that son-in-law reported increased urination, generalized weakness, and confusion. He was given 15 units of Novolog about 2 hours ago.      Collateral from the daughter.  Gradually worsening functional decline over the last several weeks and may be as long as months.  She states that he has had cognitive slowing and slow speech as well as being slow to answer questions over the last couple of months as well.  She is not aware of any falls.  She is not aware of any fever recently.  He came to live with her and her  in the middle of June approximately 6 weeks ago when he moved from [place].  Prior to that he was hospitalized in [place] for she tells me his DKA and had similar symptoms to what he is having now.  She does not remember an inciting/provoking underlying medical pathology at that time and sounds like it may well have been a combination of noncompliance as well as using alcohol.  She does not think he has been drinking at her house.  They have explored independent senior living or possible assisted living as he seems to struggle with glucose control.        Review of Systems   Genitourinary: Positive for frequency.   Neurological: Positive for weakness.   Psychiatric/Behavioral: Positive for confusion.   All other systems reviewed and are negative.      Allergies:  The patient has no known allergies.     Medications:  Novolog  Eliquis   Plavix  Catapres  Zetia  Lasix  Apresoline  Lantus  Lopressor  Zocor  Ambien    Past Medical History:    Atrial fibrillation  DKA  Type 2 diabetes  Hypertension  Depression  Alcohol abuse  CKD  Anemia    Social History:  Patient presents to the ED via EMS.  Moved  from [place]    Physical Exam     Patient Vitals for the past 24 hrs:   BP Temp Temp src Pulse Resp SpO2   07/28/21 1500 -- -- -- -- 12 100 %   07/28/21 1459 -- -- -- -- 19 100 %   07/28/21 1315 (!) 149/98 -- -- 92 17 --   07/28/21 1245 130/84 -- -- 86 19 --   07/28/21 1200 (!) 137/96 97.6  F (36.4  C) Oral 103 18 99 %       Physical Exam    HENT: Atraumatic, normocephalic, mucous membranes are dry, no rhinorrhea  Eyes: periorbital tissues and sclera normal, pupils 4 mm bilaterally, no nystagmus  Neck: supple, no abnormal swelling  Lungs:  CTAB,  no resp distress  CV: Tachycardic, no m/r/g, ppi  Abd: soft, nontender, nondistended, no rebound/masses/guarding/hsm  Ext: 3+ pitting edema bilateral lower extremities to the mid tibia  Skin: warm, dry, well perfused, no rashes/bruising/lesions on exposed skin  Neuro: Awake, oriented to person place and time.  Able to follow two-step commands and answer questions.  He is a slow to respond to questions often a several second pause from question to beginning of his answer and he speaks with a slow laure.  No discrete dysarthria or aphasia.  Remainder of his cranial nerves are normal.  5 out of 5 strength bilateral upper and lower extremities.  No pronator drift.  Psych: Normal mood, normal affect      Emergency Department Course   ECG  ECG taken at 1214, ECG read at 1220  Atrial fibrillation. ST depression, consider subendocardial injury. Abnormal QRS-T felicia, consider primary T wave abnormality. Prolonged QT. Abnormal ECG.   Rate 97 bpm. WY interval * ms. QRS duration 96 ms. QT/QTc 404/513 ms. P-R-T axes * -17 194.     ECG #2  ECG taken at 1405, ECG read at 1408  Atrial fibrillation. Minimal voltage criteria for LVH, may be normal variant. ST & T wave abnormality, consider lateral ischemia. Abnormal ECG.   No significant change as compared to prior, dated 7/28/21.  Rate 93 bpm. WY interval * ms. QRS duration 92 ms. QT/QTc 402/499 ms. P-R-T axes * -96 109.     Imaging:  XR  Chest, G/E 2 views:   No radiographic evidence of acute chest abnormality. As per radiology.    Laboratory:   CBC: WBC: 7.1, HGB: 10.6 (L), PLT: 223    BMP: Glucose 646 (H), Urea Nitrogen: 57 (H), GFR: 13 (L), o/w WNL (Creatinine: 4.31 (H))    ISTAT gases venous POCT: O2 sat venous: 35 (L), ph Venous: 7.31 (L), pO2 venous: 23 (L), o/w WNL    Hepatic Panel: Protein: 6.1 (L), Albumin: 2.4 (L), Alkaline Phosphatase: 258 (H) o/w WNL     Glucose by meter(Collection time 1404): 564 (H)    Glucose by meter(Collection time 1438): 552 (H)     Glucose by meter(Collection time 1517): 487 (H)     Troponin (Collected 1237): 0.064 (H)    Troponin (Collected 1409): 0.068 (H)    TSH with Free T4 Reflex: 2.58    Ketone Beta-Hydroxybutyrate: 4.3 (H)    Hemoglobin A1c: 11.7 (H)    UA with microscopic: Glucose: >=1000 (A), Ketones: 20 (A), Blood: Small (A), Protein Albumin: 300 (A), Mucous: Present (A) o/w WNL     Osmolality: Pending    Magnesium: Pending    Alcohol level blood: Pending    Asymptomatic COVID19 Virus PCR by nasopharyngeal swab: Pending     Emergency Department Course:    Reviewed:  I reviewed nursing notes, vitals and past medical history    Assessments:  1202 I obtained history and examined the patient as noted above.     1250 I rechecked the patient and explained findings.     1512 Spoke with patient's daughter to obtain further history.    Consults:   1502 I spoke with Dr. Muhammad, hospitalist, who accepts the patient.     Interventions:  1248 Lactated Ringers 1,000 mL IV  1445 Potassium chloride 10 mEq IV  1445 Humulin regular 1 unit/1 mL drip IV  1516 Klor-Con 40 mEq PO    Disposition:  The patient was transferred to [facility] via EMS. Dr. Muhammad accepted the patient for transfer.     Impression & Plan   Medical Decision Makin-year-old male here with hyperglycemia and mild DKA.  No evidence at this point of underlying provoking infection.  No secondary fall/trauma.  He has a nonfocal neurologic examination but does  have a general cognitive slowing.  Supportive care with intravenous fluids, insulin drip, supplemental potassium.  He does have an elevated troponin I suspect this is due to demand as well as chronic underlying renal dysfunction.  A repeat troponin do not show significant rise in a repeat EKG did not show dynamic ischemic changes.  He does not describe any what would be considered anginal equivalent symptoms.  I do not think he needs emergent revascularization therapy.  He is chronically on Eliquis due to chronic atrial fibrillation.  Peripheral edema suggests possible underlying pulmonary hypertension or congestive heart failure.  He has no significant abnormal lung sounds hypoxia or tachypnea.  We will add on a BNP chest x-ray shows no concerning findings and may need formal echocardiogram during his hospitalization.  Last hospitalization was in [facility] in [facility].  I do not see that in Care Everywhere at this time of the patient has not been registered.  We will do paper fax request to see if we can obtain those records from his hospitalization in June.        Covid-19  Gordon Gann was evaluated during a global COVID-19 pandemic, which necessitated consideration that the patient might be at risk for infection with the SARS-CoV-2 virus that causes COVID-19.   Applicable protocols for evaluation were followed during the patient's care.   COVID-19 was considered as part of the patient's evaluation. The plan for testing is:  a test was obtained during this visit.    Diagnosis:    ICD-10-CM    1. Diabetic ketoacidosis without coma associated with type 2 diabetes mellitus (H)  E11.10    2. Generalized muscle weakness  M62.81    3. Elevated troponin  R77.8        Scribe Disclosure:  I, Collin Gillespie, am serving as a scribe at 12:06 PM on 7/28/2021 to document services personally performed by Pierre Padilla MD based on my observations and the provider's statements to me.                Randy  Pierre Portillo MD  07/28/21 6521

## 2021-07-28 NOTE — ED NOTES
DATE:  7/28/2021   TIME OF RECEIPT FROM LAB:  1331  LAB TEST:  glucose  LAB VALUE:  646  RESULTS GIVEN WITH READ-BACK TO (PROVIDER):  Pierre Padilla, *  TIME LAB VALUE REPORTED TO PROVIDER:   5331

## 2021-07-28 NOTE — ED TRIAGE NOTES
"Pt arrives via EMS from home after son-in-low noted pt's BG reading of \"high\" and increased urination. Pt reports being \"confused\" but is A&Ox4. Son-in-law gave 15 units Novolog 1115. ABC intact. A&O x4.   "

## 2021-07-29 ENCOUNTER — APPOINTMENT (OUTPATIENT)
Dept: PHYSICAL THERAPY | Facility: CLINIC | Age: 76
DRG: 673 | End: 2021-07-29
Attending: PHYSICIAN ASSISTANT
Payer: COMMERCIAL

## 2021-07-29 ENCOUNTER — APPOINTMENT (OUTPATIENT)
Dept: CARDIOLOGY | Facility: CLINIC | Age: 76
DRG: 673 | End: 2021-07-29
Attending: PHYSICIAN ASSISTANT
Payer: COMMERCIAL

## 2021-07-29 ENCOUNTER — APPOINTMENT (OUTPATIENT)
Dept: OCCUPATIONAL THERAPY | Facility: CLINIC | Age: 76
DRG: 673 | End: 2021-07-29
Attending: PHYSICIAN ASSISTANT
Payer: COMMERCIAL

## 2021-07-29 LAB
ANION GAP SERPL CALCULATED.3IONS-SCNC: 4 MMOL/L (ref 3–14)
ANION GAP SERPL CALCULATED.3IONS-SCNC: 4 MMOL/L (ref 3–14)
ANION GAP SERPL CALCULATED.3IONS-SCNC: 5 MMOL/L (ref 3–14)
BUN SERPL-MCNC: 51 MG/DL (ref 7–30)
BUN SERPL-MCNC: 52 MG/DL (ref 7–30)
BUN SERPL-MCNC: 53 MG/DL (ref 7–30)
CALCIUM SERPL-MCNC: 8.5 MG/DL (ref 8.5–10.1)
CALCIUM SERPL-MCNC: 8.6 MG/DL (ref 8.5–10.1)
CALCIUM SERPL-MCNC: 8.8 MG/DL (ref 8.5–10.1)
CHLORIDE BLD-SCNC: 110 MMOL/L (ref 94–109)
CHLORIDE BLD-SCNC: 111 MMOL/L (ref 94–109)
CHLORIDE BLD-SCNC: 111 MMOL/L (ref 94–109)
CO2 SERPL-SCNC: 26 MMOL/L (ref 20–32)
CO2 SERPL-SCNC: 27 MMOL/L (ref 20–32)
CO2 SERPL-SCNC: 27 MMOL/L (ref 20–32)
CREAT SERPL-MCNC: 3.73 MG/DL (ref 0.66–1.25)
CREAT SERPL-MCNC: 3.73 MG/DL (ref 0.66–1.25)
CREAT SERPL-MCNC: 3.8 MG/DL (ref 0.66–1.25)
GFR SERPL CREATININE-BSD FRML MDRD: 15 ML/MIN/1.73M2
GLUCOSE BLD-MCNC: 134 MG/DL (ref 70–99)
GLUCOSE BLD-MCNC: 135 MG/DL (ref 70–99)
GLUCOSE BLD-MCNC: 149 MG/DL (ref 70–99)
GLUCOSE BLDC GLUCOMTR-MCNC: 122 MG/DL (ref 70–99)
GLUCOSE BLDC GLUCOMTR-MCNC: 123 MG/DL (ref 70–99)
GLUCOSE BLDC GLUCOMTR-MCNC: 124 MG/DL (ref 70–99)
GLUCOSE BLDC GLUCOMTR-MCNC: 136 MG/DL (ref 70–99)
GLUCOSE BLDC GLUCOMTR-MCNC: 142 MG/DL (ref 70–99)
GLUCOSE BLDC GLUCOMTR-MCNC: 177 MG/DL (ref 70–99)
GLUCOSE BLDC GLUCOMTR-MCNC: 195 MG/DL (ref 70–99)
GLUCOSE BLDC GLUCOMTR-MCNC: 201 MG/DL (ref 70–99)
GLUCOSE BLDC GLUCOMTR-MCNC: 284 MG/DL (ref 70–99)
GLUCOSE BLDC GLUCOMTR-MCNC: 298 MG/DL (ref 70–99)
LVEF ECHO: NORMAL
PHOSPHATE SERPL-MCNC: 2.1 MG/DL (ref 2.5–4.5)
PHOSPHATE SERPL-MCNC: 3.2 MG/DL (ref 2.5–4.5)
PHOSPHATE SERPL-MCNC: 4.3 MG/DL (ref 2.5–4.5)
POTASSIUM BLD-SCNC: 3 MMOL/L (ref 3.4–5.3)
POTASSIUM BLD-SCNC: 3.3 MMOL/L (ref 3.4–5.3)
POTASSIUM BLD-SCNC: 3.3 MMOL/L (ref 3.4–5.3)
POTASSIUM BLD-SCNC: 4.6 MMOL/L (ref 3.4–5.3)
SODIUM SERPL-SCNC: 141 MMOL/L (ref 133–144)
SODIUM SERPL-SCNC: 142 MMOL/L (ref 133–144)
SODIUM SERPL-SCNC: 142 MMOL/L (ref 133–144)

## 2021-07-29 PROCEDURE — 99233 SBSQ HOSP IP/OBS HIGH 50: CPT | Performed by: HOSPITALIST

## 2021-07-29 PROCEDURE — 120N000013 HC R&B IMCU

## 2021-07-29 PROCEDURE — 97530 THERAPEUTIC ACTIVITIES: CPT | Mod: GP

## 2021-07-29 PROCEDURE — 250N000013 HC RX MED GY IP 250 OP 250 PS 637: Performed by: STUDENT IN AN ORGANIZED HEALTH CARE EDUCATION/TRAINING PROGRAM

## 2021-07-29 PROCEDURE — 93306 TTE W/DOPPLER COMPLETE: CPT | Mod: 26 | Performed by: INTERNAL MEDICINE

## 2021-07-29 PROCEDURE — 250N000013 HC RX MED GY IP 250 OP 250 PS 637: Performed by: HOSPITALIST

## 2021-07-29 PROCEDURE — 255N000002 HC RX 255 OP 636: Performed by: HOSPITALIST

## 2021-07-29 PROCEDURE — 250N000012 HC RX MED GY IP 250 OP 636 PS 637: Performed by: HOSPITALIST

## 2021-07-29 PROCEDURE — 84132 ASSAY OF SERUM POTASSIUM: CPT | Performed by: HOSPITALIST

## 2021-07-29 PROCEDURE — 250N000013 HC RX MED GY IP 250 OP 250 PS 637: Performed by: PHYSICIAN ASSISTANT

## 2021-07-29 PROCEDURE — 80048 BASIC METABOLIC PNL TOTAL CA: CPT | Performed by: PHYSICIAN ASSISTANT

## 2021-07-29 PROCEDURE — 36415 COLL VENOUS BLD VENIPUNCTURE: CPT | Performed by: PHYSICIAN ASSISTANT

## 2021-07-29 PROCEDURE — 97161 PT EVAL LOW COMPLEX 20 MIN: CPT | Mod: GP

## 2021-07-29 PROCEDURE — 84100 ASSAY OF PHOSPHORUS: CPT | Performed by: PHYSICIAN ASSISTANT

## 2021-07-29 PROCEDURE — 258N000003 HC RX IP 258 OP 636: Performed by: PHYSICIAN ASSISTANT

## 2021-07-29 PROCEDURE — 36415 COLL VENOUS BLD VENIPUNCTURE: CPT | Performed by: HOSPITALIST

## 2021-07-29 PROCEDURE — 250N000009 HC RX 250: Performed by: PHYSICIAN ASSISTANT

## 2021-07-29 PROCEDURE — 97535 SELF CARE MNGMENT TRAINING: CPT | Mod: GO

## 2021-07-29 PROCEDURE — 999N000208 ECHOCARDIOGRAM COMPLETE

## 2021-07-29 PROCEDURE — 97165 OT EVAL LOW COMPLEX 30 MIN: CPT | Mod: GO

## 2021-07-29 RX ORDER — POTASSIUM CHLORIDE 1500 MG/1
20 TABLET, EXTENDED RELEASE ORAL ONCE
Status: COMPLETED | OUTPATIENT
Start: 2021-07-29 | End: 2021-07-29

## 2021-07-29 RX ORDER — NICOTINE POLACRILEX 4 MG
15-30 LOZENGE BUCCAL
Status: DISCONTINUED | OUTPATIENT
Start: 2021-07-29 | End: 2021-07-29

## 2021-07-29 RX ORDER — FUROSEMIDE 40 MG
40 TABLET ORAL DAILY
Status: DISCONTINUED | OUTPATIENT
Start: 2021-07-29 | End: 2021-08-01

## 2021-07-29 RX ORDER — DEXTROSE MONOHYDRATE 25 G/50ML
25-50 INJECTION, SOLUTION INTRAVENOUS
Status: DISCONTINUED | OUTPATIENT
Start: 2021-07-29 | End: 2021-07-29

## 2021-07-29 RX ORDER — LANOLIN ALCOHOL/MO/W.PET/CERES
3 CREAM (GRAM) TOPICAL
Status: DISCONTINUED | OUTPATIENT
Start: 2021-07-29 | End: 2021-08-06 | Stop reason: HOSPADM

## 2021-07-29 RX ORDER — ALLOPURINOL 100 MG/1
200 TABLET ORAL DAILY
Status: DISCONTINUED | OUTPATIENT
Start: 2021-07-29 | End: 2021-08-06 | Stop reason: HOSPADM

## 2021-07-29 RX ADMIN — FOLIC ACID 1 MG: 1 TABLET ORAL at 08:40

## 2021-07-29 RX ADMIN — MELATONIN 3 MG: 3 TAB ORAL at 22:02

## 2021-07-29 RX ADMIN — METOPROLOL TARTRATE 100 MG: 100 TABLET, FILM COATED ORAL at 21:04

## 2021-07-29 RX ADMIN — CLOPIDOGREL BISULFATE 75 MG: 75 TABLET ORAL at 08:40

## 2021-07-29 RX ADMIN — INSULIN GLARGINE 15 UNITS: 100 INJECTION, SOLUTION SUBCUTANEOUS at 22:02

## 2021-07-29 RX ADMIN — ALLOPURINOL 200 MG: 100 TABLET ORAL at 11:12

## 2021-07-29 RX ADMIN — HYDRALAZINE HYDROCHLORIDE 25 MG: 25 TABLET ORAL at 08:40

## 2021-07-29 RX ADMIN — INSULIN ASPART 3 UNITS: 100 INJECTION, SOLUTION INTRAVENOUS; SUBCUTANEOUS at 10:29

## 2021-07-29 RX ADMIN — INSULIN ASPART 6 UNITS: 100 INJECTION, SOLUTION INTRAVENOUS; SUBCUTANEOUS at 18:24

## 2021-07-29 RX ADMIN — INSULIN ASPART 3 UNITS: 100 INJECTION, SOLUTION INTRAVENOUS; SUBCUTANEOUS at 12:52

## 2021-07-29 RX ADMIN — FUROSEMIDE 40 MG: 40 TABLET ORAL at 17:11

## 2021-07-29 RX ADMIN — INSULIN GLARGINE 15 UNITS: 100 INJECTION, SOLUTION SUBCUTANEOUS at 10:29

## 2021-07-29 RX ADMIN — HYDRALAZINE HYDROCHLORIDE 25 MG: 25 TABLET ORAL at 21:04

## 2021-07-29 RX ADMIN — POTASSIUM CHLORIDE 20 MEQ: 1500 TABLET, EXTENDED RELEASE ORAL at 08:39

## 2021-07-29 RX ADMIN — EZETIMIBE 10 MG: 10 TABLET ORAL at 08:40

## 2021-07-29 RX ADMIN — SODIUM PHOSPHATE, MONOBASIC, MONOHYDRATE 15 MMOL: 276; 142 INJECTION, SOLUTION INTRAVENOUS at 00:35

## 2021-07-29 RX ADMIN — ASPIRIN 81 MG: 81 TABLET, COATED ORAL at 21:04

## 2021-07-29 RX ADMIN — HUMAN ALBUMIN MICROSPHERES AND PERFLUTREN 9 ML: 10; .22 INJECTION, SOLUTION INTRAVENOUS at 15:53

## 2021-07-29 RX ADMIN — SIMVASTATIN 40 MG: 40 TABLET, FILM COATED ORAL at 21:04

## 2021-07-29 RX ADMIN — CLONIDINE HYDROCHLORIDE 0.2 MG: 0.1 TABLET ORAL at 08:39

## 2021-07-29 RX ADMIN — CLONIDINE HYDROCHLORIDE 0.2 MG: 0.1 TABLET ORAL at 21:04

## 2021-07-29 RX ADMIN — METOPROLOL TARTRATE 100 MG: 100 TABLET, FILM COATED ORAL at 08:39

## 2021-07-29 RX ADMIN — APIXABAN 2.5 MG: 2.5 TABLET, FILM COATED ORAL at 08:41

## 2021-07-29 ASSESSMENT — ACTIVITIES OF DAILY LIVING (ADL)
ADLS_ACUITY_SCORE: 21
ADLS_ACUITY_SCORE: 21
PREVIOUS_RESPONSIBILITIES: MEAL PREP;HOUSEKEEPING;LAUNDRY;MEDICATION MANAGEMENT;FINANCES;DRIVING
ADLS_ACUITY_SCORE: 20
ADLS_ACUITY_SCORE: 21

## 2021-07-29 NOTE — PHARMACY-ADMISSION MEDICATION HISTORY
"Admission medication history interview status for the 7/28/2021  admission is complete. See EPIC admission navigator for prior to admission medications     Medication history source reliability:Good    Actions taken by pharmacist (provider contacted, etc):Referenced Surescripts, interviewed daughter Omayra, and interviewed patient.    Additional medication history information not noted on PTA med list :None    Medication reconciliation/reorder completed by provider prior to medication history? No    Time spent in this activity: 60\"    Prior to Admission medications    Medication Sig Last Dose Taking? Auth Provider   allopurinol (ZYLOPRIM) 300 MG tablet Take 300 mg by mouth every morning 7/28/2021 at am Yes Unknown, Entered By History   apixaban ANTICOAGULANT (ELIQUIS) 2.5 MG tablet Take 2.5 mg by mouth every morning RX is for twice daily but he takes once daily in the a.m.  He states twice daily causes a rash. 7/28/2021 at 0830 Yes Unknown, Entered By History   aspirin 81 MG EC tablet Take 81 mg by mouth At Bedtime 7/27/2021 at hs Yes Unknown, Entered By History   cloNIDine (CATAPRES) 0.2 MG tablet Take 0.2 mg by mouth 2 times daily 7/28/2021 at am Yes Unknown, Entered By History   clopidogrel (PLAVIX) 75 MG tablet Take 75 mg by mouth every morning 7/28/2021 at am Yes Unknown, Entered By History   ezetimibe (ZETIA) 10 MG tablet Take 10 mg by mouth every morning 7/28/2021 at am Yes Unknown, Entered By History   fish oil-omega-3 fatty acids 1000 MG capsule Take 1 g by mouth every morning 7/28/2021 at am Yes Unknown, Entered By History   folic acid (FOLVITE) 1 MG tablet Take 1 mg by mouth every morning 7/28/2021 at am Yes Unknown, Entered By History   furosemide (LASIX) 40 MG tablet Take 40 mg by mouth every morning 7/27/2021 at am Yes Unknown, Entered By History   hydrALAZINE (APRESOLINE) 25 MG tablet Take 25 mg by mouth 2 times daily 7/28/2021 at am Yes Unknown, Entered By History   insulin aspart (NOVOLOG FLEXPEN) " 100 UNIT/ML pen Inject 15 Units Subcutaneous 3 times daily (with meals) 7/28/2021 at am Yes Unknown, Entered By History   insulin glargine (LANTUS PEN) 100 UNIT/ML pen Inject 30 Units Subcutaneous At Bedtime 7/27/2021 at hs Yes Unknown, Entered By History   metoprolol tartrate (LOPRESSOR) 100 MG tablet Take 100 mg by mouth 2 times daily 7/27/2021 at not sure a.m. or pm Yes Unknown, Entered By History   simvastatin (ZOCOR) 40 MG tablet Take 40 mg by mouth At Bedtime 7/27/2021 at hs Yes Unknown, Entered By History   vitamin B complex with vitamin C (STRESS TAB) tablet Take 1 tablet by mouth every morning 7/28/2021 at am Yes Unknown, Entered By History   Vitamin D, Cholecalciferol, 25 MCG (1000 UT) CAPS Take 1,000 Units by mouth every morning 7/28/2021 at am Yes Unknown, Entered By History   zolpidem ER (AMBIEN CR) 6.25 MG CR tablet Take 6.25 mg by mouth nightly as needed for sleep 7/28/2021 at am Yes Unknown, Entered By History

## 2021-07-29 NOTE — PROGRESS NOTES
07/29/21 0948   Quick Adds   Type of Visit Initial PT Evaluation   Living Environment   People in home child(shirley), adult   Current Living Arrangements house   Home Accessibility no concerns   Transportation Anticipated car, drives self;family or friend will provide   Living Environment Comments Pt is in transition, moving from [place] to daughter's home here in [place]. Pt reports he does not have any stairs to do at daughter's home.   Self-Care   Usual Activity Tolerance good   Current Activity Tolerance moderate   Regular Exercise No   Equipment Currently Used at Home none   Activity/Exercise/Self-Care Comment Pt reports he is independent without use of assistive device at baseline though does have FWW and cane available for use if needed.   Disability/Function   Fall history within last six months no   General Information   Onset of Illness/Injury or Date of Surgery 07/28/21   Referring Physician Pierre Padilla MD   Patient/Family Therapy Goals Statement (PT) To get better   Pertinent History of Current Problem (include personal factors and/or comorbidities that impact the POC) Pt is 76 year old male adm on 7/28/21 as a direct admit from [facility] for further management of DKA.    Existing Precautions/Restrictions fall   Cognition   Orientation Status (Cognition) oriented x 3   Affect/Mental Status (Cognition) WFL   Follows Commands (Cognition) WFL   Cognitive Status Comments Pt follows commands appropriately, able to answer orientation questions.   Pain Assessment   Patient Currently in Pain No   Integumentary/Edema   Integumentary/Edema no deficits were identifed   Posture    Posture Forward head position;Protracted shoulders   Range of Motion (ROM)   ROM Comment B LE ROM WFL   Strength   Strength Comments B LE strength WFL, generally deconditioned but no focal weakness noted   Bed Mobility   Comment (Bed Mobility) Min assist   Transfers   Transfer Safety Comments Min assist   Gait/Stairs  (Locomotion)   Comment (Gait/Stairs) Min assist without assistive device   Balance   Balance Comments Fair sitting balance, needing min assist and UE support with standing balance   Sensory Examination   Sensory Perception Comments Denies any changes in sensation   Clinical Impression   Criteria for Skilled Therapeutic Intervention yes, treatment indicated   PT Diagnosis (PT) Impaired mobility   Influenced by the following impairments Decreased strength, decreased balance, decreased activity tolerance   Functional limitations due to impairments Decreased ability to participate in daily tasks   Clinical Presentation Stable/Uncomplicated   Clinical Presentation Rationale Current presentation, [facility]   Clinical Decision Making (Complexity) low complexity   Therapy Frequency (PT) Daily   Predicted Duration of Therapy Intervention (days/wks) 4 days   Planned Therapy Interventions (PT) balance training;bed mobility training;gait training;home exercise program;patient/family education;stair training;strengthening;transfer training   Risk & Benefits of therapy have been explained evaluation/treatment results reviewed;care plan/treatment goals reviewed;risks/benefits reviewed;current/potential barriers reviewed;participants voiced agreement with care plan;participants included;patient   PT Discharge Planning    PT Discharge Recommendation (DC Rec) Transitional Care Facility   PT Rationale for DC Rec At this time recommend continued inpatient rehab to optimize functional recovery and safety. Depending on progress during hospital stay, pt may be able to discharge to home, will continue to follow during hospital stay.   Total Evaluation Time   Total Evaluation Time (Minutes) 10

## 2021-07-29 NOTE — PROGRESS NOTES
[facility]    Medicine Progress Note - Hospitalist Service       Date of Admission:  7/28/2021    Assessment & Plan           Gordon Gann is a 76 year old male admitted on 7/28/2021.  Past history DM II on insulin, depression, alcohol abuse, hypertension, chronic kidney disease stage V, chronic atrial fibrillation, who presents as a direct admission from [facility], where he was brought in by family with generalized weakness, confusion, and increased urination, found to have mild diabetic ketoacidosis.       Diabetic ketoacidosis, resolving  DM II on long term insulin, uncontrolled  [Lantus 30 units at bedtime and novolog 15 units t.i.d. with meals]  Recent history of non-compliance with insulin, admission A1C 11.7%.  Initial glucose >600, ketones elevated with acidosis and borderline elevated gap, improved with IVF and insulin gtt.   - transition off insulin gtt; start lantus 15 units bid with prandial insulin 1u/10g carb, high ssi  - discontinue IVF, start mod carb diet    Hypokalemia  Hypophosphatemia  - received supplementation with improvement; will discontinue electrolyte protocol due to renal function    Anasarca and bilateral peripheral edema  Possible type II NSTEMI  Serial trop mildly elevated but flat 0.06, EKG with non-specific ST changes.  Pro-BNP >8400.  CXR negative for edema.  - continue tele for now  - TTE pending  - resume PTA lasix    CKD stage V  It appears that his nephrologist has been discussing dialysis.  Baseline creatinine is in the upper-3 to low-4 range.  - Cr remains stable    Chronic A-fib  CAD s/p PCI 2006  HTN  HLD  Further details unknown of underlying CAD.  Pharmacy confirms the patient only takes Eliquis once daily, stating he gets a rash if he takes it twice daily.  - continue PTA aspirin, Plavix, Zetia, simvastatin, hydralazine, clonidine, metoprolol and Eliquis  - resume lasix as above  - Follow I's and O's, and daily weights    Possible  cognitive impairment  Possible depression  Daughter concerned about depression and also reports he has been mismanaging his insulin recently and has concerns regarding forgetfulness.   - monitor  - discontinue Ambien  - will discuss depressive symptoms with patient  - outpatient cognitive eval with OT    Insomnia  - Hold PTA Ambien as the patient is high risk for delirium.  - prn melatonin    History of alcohol abuse  Reports no alcohol use for >1 week    Gout   - continue allopurinol (dose reduced due to renal function)         Diet: Consistent Carb 75 grams CHO per Meal Diet    DVT Prophylaxis: eliquis  Flood Catheter: Not present  Central Lines: None  Code Status: No CPR- Do NOT Intubate      Disposition Plan   Expected discharge: 07/31/2021 recommended to transitional care unit once DKA resolved, glucose controlled.     The patient's care was discussed with the Bedside Nurse, Patient and Patient's Family.    Samir Simmons MD  Hospitalist Service  [facility]  Securely message with the Vocera Web Console (learn more here)  Text page via [employer] Paging/Directory      Clinically Significant Risk Factors Present on Admission             # Coagulation Defect: home medication list includes an anticoagulant medication  # Platelet Defect: home medication list includes an antiplatelet medication      ______________________________________________________________________    Interval History   He reports feeling well.  Denies any fever/chills, dyspnea, chest pain/pressure.  Reports some mild right abdominal tenderness which has been present for some time.  He reports issues with constipation.  No other complaints.     Data reviewed today: I reviewed all medications, new labs and imaging results over the last 24 hours. I personally reviewed no images or EKG's today.    Physical Exam   Vital Signs: Temp: 97.7  F (36.5  C) Temp src: Oral BP: 125/85 Pulse: 71   Resp: 8 SpO2: 99 % O2 Device: None (Room  air)    Weight: 191 lbs 12.8 oz  General Appearance: Well nourished male in NAD  Respiratory: lungs CTAB, no wheezes or crackles, no tachypnea  Cardiovascular: RRR, normal s1/s2 without murmur  GI: abdomen soft, normal bowel sounds, reports mild right sided tenderness with palpation  Skin: 2-3+ pitting lower extremity edema   Other: Alert and appropriate, cranial nerves grossly intact     Data   Recent Labs   Lab 07/29/21  1233 07/29/21  1027 07/29/21  0829 07/29/21  0634 07/29/21  0208 07/28/21  2335 07/28/21  1438 07/28/21  1409 07/28/21  1237   WBC  --   --   --   --   --   --   --   --  7.1   HGB  --   --   --   --   --   --   --   --  10.6*   MCV  --   --   --   --   --   --   --   --  87   PLT  --   --   --   --   --   --   --   --  223   NA  --   --   --  141 142 142   < >  --  135   POTASSIUM  --   --   --  3.3* 3.3* 3.0*   < >  --  3.8   CHLORIDE  --   --   --  111* 111* 110*   < >  --  101   CO2  --   --   --  26 27 27   < >  --  23   BUN  --   --   --  51* 53* 52*   < >  --  57*   CR  --   --   --  3.80* 3.73* 3.73*   < >  --  4.31*   ANIONGAP  --   --   --  4 4 5   < >  --  11   LC  --   --   --  8.6 8.5 8.8   < >  --  8.8   * 201* 177* 135*  123* 134* 149*   < >  --  646*   ALBUMIN  --   --   --   --   --   --   --   --  2.4*   PROTTOTAL  --   --   --   --   --   --   --   --  6.1*   BILITOTAL  --   --   --   --   --   --   --   --  0.4   ALKPHOS  --   --   --   --   --   --   --   --  258*   ALT  --   --   --   --   --   --   --   --  52   AST  --   --   --   --   --   --   --   --  27   TROPONIN  --   --   --   --   --   --   --  0.068* 0.064*    < > = values in this interval not displayed.

## 2021-07-29 NOTE — H&P
Admitted: 07/28/2021    HOSPITALIST ADMISSION    CHIEF COMPLAINT:  DKA.    History is provided by the patient and in chart review.    HISTORY OF PRESENT ILLNESS:  Gordon Gann is a 76-year-old male with depression, alcohol abuse, hypertension, CKD stage V, chronic atrial fibrillation, and insulin-dependent type 2 diabetes, who presents as a direct admission from [facility], where he presented with generalized weakness, increased urination and confusion, found to have DKA.    In the Emergency Department, the patient was found to have an initial blood sugar of 646.  His ketones were 4.3.  He did not have an elevated anion gap.  A #venous pH was 7.31, and CO2 was 45, and bicarbonate was 22.  The patient's son-in-law, who is an ICU nurse, noted his symptoms and gave him 15 units of NovoLog around 10:00 a.m. prior to presenting to the Emergency Department.  It appears from chart review and the ED provider's discussion with family today that the patient has a history of noncompliance and recently moved from [place] into the patient's daughter's home out of concerns for a general decline in cognitive and physical function.  It appears that they are looking for a higher level of care setting.    The patient was treated with a total of 2 liters LR fluid bolus and was started on an insulin drip as well as received a total of 50 mEq of potassium at the Emergency Department.    The patient is presently evaluated at bedside.  He is a poor historian.  He feels thirsty and states that he feels confused.  He is oriented x 3 and answers questions overall appropriately.  He denies recent fevers, URI symptoms, coughing, chest pain, trouble breathing, abdominal pain, nausea, vomiting, diarrhea, new skin rashes or lesions.  He does feel constipated, stating that his last good bowel movement was 24 days ago on 07/04/2021, but states that he has had small nuggets since that time.    The patient endorses that he has not  consistently been taking his insulin medications and attributes this to the stress of moving in with his daughter, of which he has a very different daily schedule then.    In chart review, I see a history of alcohol abuse.  The patient reports he has cut down but still drinks a little.  His last drink was over a week ago, when he was at a restaurant.  He has not been drinking since moving in with his daughter.    PAST MEDICAL HISTORY:    1.  CAD with stenting 2006.  Further details unknown.  2.  Hypertension.  3.  Hyperlipidemia.  4.  Insulin-dependent type 2 diabetes.  5.  Chronic kidney disease stage V.  He is debating whether or not to pursue hemodialysis with his nephrologist.  6.  Alcohol abuse.  7.  Anemia of chronic kidney disease.    PAST SURGICAL HISTORY:    1.  Cardiac stenting.  2.  Parotidectomy.  3.  TURP.  4.  Mohs surgery.    FAMILY HISTORY:  -- Father:  Heart disease.  -- Mother:  Cerebral aneurysm.    SOCIAL HISTORY:  The patient is a never-smoker.  He does not use illicit drugs.  His more recent alcohol use has been negligible since moving in with his daughter.  Prior to that is unknown at this time.    JZCEG-KI-ZMXPLACDD MEDICATIONS:  Medications Prior to Admission   Medication Sig Dispense Refill Last Dose     allopurinol (ZYLOPRIM) 300 MG tablet Take 300 mg by mouth every morning   7/28/2021 at am     apixaban ANTICOAGULANT (ELIQUIS) 2.5 MG tablet Take 2.5 mg by mouth every morning RX is for twice daily but he takes once daily in the a.m.  He states twice daily causes a rash.   7/28/2021 at 0830     aspirin 81 MG EC tablet Take 81 mg by mouth At Bedtime   7/27/2021 at hs     cloNIDine (CATAPRES) 0.2 MG tablet Take 0.2 mg by mouth 2 times daily   7/28/2021 at am     clopidogrel (PLAVIX) 75 MG tablet Take 75 mg by mouth every morning   7/28/2021 at am     ezetimibe (ZETIA) 10 MG tablet Take 10 mg by mouth every morning   7/28/2021 at am     fish oil-omega-3 fatty acids 1000 MG capsule Take 1 g by  mouth every morning   7/28/2021 at am     folic acid (FOLVITE) 1 MG tablet Take 1 mg by mouth every morning   7/28/2021 at am     furosemide (LASIX) 40 MG tablet Take 40 mg by mouth every morning   7/27/2021 at am     hydrALAZINE (APRESOLINE) 25 MG tablet Take 25 mg by mouth 2 times daily   7/28/2021 at am     insulin aspart (NOVOLOG FLEXPEN) 100 UNIT/ML pen Inject 15 Units Subcutaneous 3 times daily (with meals)   7/28/2021 at am     insulin glargine (LANTUS PEN) 100 UNIT/ML pen Inject 30 Units Subcutaneous At Bedtime   7/27/2021 at hs     metoprolol tartrate (LOPRESSOR) 100 MG tablet Take 100 mg by mouth 2 times daily   7/27/2021 at not sure a.m. or pm     simvastatin (ZOCOR) 40 MG tablet Take 40 mg by mouth At Bedtime   7/27/2021 at hs     vitamin B complex with vitamin C (STRESS TAB) tablet Take 1 tablet by mouth every morning   7/28/2021 at am     Vitamin D, Cholecalciferol, 25 MCG (1000 UT) CAPS Take 1,000 Units by mouth every morning   7/28/2021 at am     zolpidem ER (AMBIEN CR) 6.25 MG CR tablet Take 6.25 mg by mouth nightly as needed for sleep   7/28/2021 at am     ALLERGIES:  NO KNOWN DRUG ALLERGIES.    REVIEW OF SYSTEMS:  A complete review of systems was performed and was negative except for that noted in the HPI.    PHYSICAL EXAMINATION:    VITAL SIGNS:  Heart rate is 69, respirations 16, and oxygen saturation is 99%.  GENERAL:  A pleasant male who appears stated age.  He looks comfortable, sitting upright in bed.  He answers questions appropriately and is alert and oriented x 3.  SKIN:  Warm and dry.  He is pale.  HEENT:  Normocephalic, atraumatic.  EOMs grossly intact.  NECK:  Supple.  LUNGS:  Breath sounds clear to auscultation.  No increased work of breathing on room air.  CARDIOVASCULAR:  Irregularly irregular.  No rub or murmurs appreciated.  He has 1+ bilateral peripheral edema.  ABDOMEN:  Soft, obese, nontender, nondistended.  He has trace anasarca.  MUSCULOSKELETAL:  Moves all 4 extremities.   Diffuse muscle atrophy.  NEUROLOGICAL:  Cranial nerves II-XII grossly intact.    LABORATORY DATA:    1.  Sodium 135, potassium 3.8, BUN is 57, creatinine 4.31, GFR is 13, initial glucose 646, most recent glucose 266.  2.  Alkaline phosphatase 258, ALT 52, AST 27.  3.  Initial ketones 4.3, most recent ketones 0.2.   4.  WBC 7.1, hemoglobin 10.6, platelets 223.  5.  Troponin 0.064 and 0.068.  6.  BNP 8429.  7.  Ethanol level undetectable.    IMAGING:  Chest x-ray does not show acute cardiopulmonary pathology.    EKG personally reviewed and shows ST depression in the V4, V5, V6 leads that is less than 2 boxes.    ASSESSMENT AND PLAN:  Loi Gann is a 76-year-old male with depression, alcohol abuse, hypertension, chronic kidney disease stage V, insulin-dependent diabetes mellitus type 2, chronic atrial fibrillation, who presents as a direct admission from [facility], where he was brought in by family with generalized weakness, confusion, and increased urination, found to have mild diabetic ketoacidosis.    Diabetic ketoacidosis.  -- Continue insulin drip and will transition to PTA medications in the morning as long as continues to trend in the right direction.  -- Half normal saline with potassium at 50 mL per hour given his underlying peripheral edema and anasarca.  Transition to D5 half normal saline with potassium once serum glucose of 200.  We will trend BMP, phosphorus and ketones.  -- Social Work consulted.  Anticipate the patient will need a TCU at discharge or, at minimum, home healthcare to assist with medication set up and compliance.    Anasarca and bilateral peripheral edema:  BNP is elevated at 8.4 thousand.  Aside from his edema, chest x-ray does not show pulmonary congestion.  I do not appreciate crackles on exam, or JVD.  He does have a mild troponin elevation at 0.064 that is flat on serial troponin.  His EKG does show nonsignificant ST depressions in lateral leads V4 through V6.  He denies  ischemic symptoms. Suspect troponin elevation is from demand ischemia in the context of his underlying heart disease and stage V kidney disease.  -- Telemetry.  -- Trend troponin.  -- Obtain echocardiogram.  -- Resume PTA diuretic once off IV fluids.  -- Reducing rate of IV fluids to 50 mL per hour as above.      Chronic atrial fibrillation.  -- Resume PTA Eliquis and Lopressor.  -- Pharmacy informs the patient only takes Eliquis once daily, stating he gets a rash if he takes it twice daily.    Coronary artery disease status post stenting in 2006.  Hypertension.  Hyperlipidemia.  Further details unknown of underlying CAD.  I do not see recent echocardiogram in Care Everywhere.  -- Resume PTA aspirin 81 mg, clonidine, Plavix, Zetia, hydralazine.  -- Resume PTA Lasix once off IV fluids.  -- Follow I's and O's, and daily weights.    Insulin-dependent type 2 diabetes:  Last A1c 11.7.   -- PTA Lantus 30 units at bedtime and 15 units t.i.d. with meals.  The patient endorses noncompliance attributed to the stress of moving and different routine as daughter, whom he now lives with.    Sleep disturbance:  Requests a sleep aid.  Pharmacist informs the patient believes he accidentally took his Ambien on the morning of admission.  -- Hold PTA Ambien as the patient is high risk for delirium.  -- Melatonin available.  This was discussed with the patient.    Chronic kidney disease stage V:  It appears that his nephrologist has been discussing dialysis.  The patient remains to be thinking about it.  His baseline creatinine is in the upper-3 to low-4 range, and he is near this on admission.  -- Close attention to fluid status.  Will monitor kidney function.     History of alcohol abuse:  The patient has not been drinking since he moved in with his daughter.  His last drink was over 1 week ago.    DEEP VENOUS THROMBOSIS PROPHYLAXIS:  PTA Apixaban and PCDS.    CODE STATUS:  DNR/DNI expressed by patient.  While he feels confused, he  answers questions appropriately and is oriented x 3.  I believe he has decision-making capacity.    DISPOSITION:  I anticipate discharge in 2-3 days.  We will have PT, OT evaluate prior to discharge.    This patient was discussed with Dr. Berta Muhammad of the Hospitalist Service, who is in agreement with my assessment and plan of care as outlined above.      Berta Muhammad, DO    As Dictated by JOANNA ULMEN BARTHELL, PA-C        D: 2021   T: 2021   MT: [id number]    Name:     JULIO RIZVISaloni  MRN:      [id number]        Account:     [id number]   :      1945           Admitted:    2021       Document: [id number]

## 2021-07-29 NOTE — PLAN OF CARE
A/OX3-4. Can be forgetful at times. AVSS . Tele controlled A-fib. Advanced to mod carb diet. Tolerating well. Insulin drip discontinued at 1230. IMC and fluids also discontinued. K 3.3 replaced and recheck now 4.6. voiding adequately.  Denies pain. Started on sliding scale and Lantus. , 195 and 284. PT eval. today, possible discharge to TCU tomorrow.

## 2021-07-29 NOTE — PLAN OF CARE
Pt here with DKA. Alert and orientated, but confused at baseline. VSS on RA. Neuros intact. IVF and insulin gtt infusing. Tele: A fib with CVR. Denies pain. NPO except meds and ice chips. Takes pills whole. K+ and phosphorus replaced X1. Pt had minimal sleep overnight d/t frequent BS checks. Plan for PT/OT/SW evaluation. Discharge likely to TCU vs home healthcare pending.

## 2021-07-29 NOTE — PROGRESS NOTES
HENOK  D: SW aware of consult and was informed by hospitalist that pt would be ready for discharge tomorrow. SW called pt's daughter Omayra since pt is confused. Omayra did not answer, so HENOK left a VM requesting a return call.     P: Would continue to follow     LIANA Santiago     [facility]

## 2021-07-29 NOTE — PROGRESS NOTES
07/29/21 1510   Quick Adds   Type of Visit Occupational Therapy Re-evaluation   Living Environment   People in home child(shirley), adult   Current Living Arrangements house   Home Accessibility no concerns   Transportation Anticipated car, drives self;family or friend will provide   Living Environment Comments Pt is in transition, moving from [place] to daughter's home here in [place]. Pt reports he does not have any stairs to do at daughter's home. He states he will likely be moving into [facility] [place] Sept 1st.   Self-Care   Usual Activity Tolerance good   Current Activity Tolerance moderate   Regular Exercise No   Equipment Currently Used at Home none   Activity/Exercise/Self-Care Comment At baseline pt reports indep all ADL, IADL, mob without AD. Owns FWW and cane.    Instrumental Activities of Daily Living (IADL)   Previous Responsibilities meal prep;housekeeping;laundry;medication management;finances;driving   IADL Comments Above per pt's report when  he was living indep   General Information   Onset of Illness/Injury or Date of Surgery 07/28/21   Referring Physician Joanna Barthell PA-C   Patient/Family Therapy Goal Statement (OT) discharge to daughter's home   Additional Occupational Profile Info/Pertinent History of Current Problem 77 yo M with h/o depression, alcohol abuse, hypertension, CKD 5, diabetes mellitus, chronic atrial fibrillation, admitted with generalized weakness, confusion, increased urination with mild diabetic ketoacidosis   Existing Precautions/Restrictions fall   Limitations/Impairments safety/cognitive   Cognitive Status Examination   Orientation Status orientation to person, place and time   Affect/Mental Status (Cognitive) WFL   Follows Commands follows one-step commands;over 90% accuracy   Cognitive Status Comments Will further  assess - patient contributes recent confusion to low blood sugar   Visual Perception   Visual Impairment/Limitations WFL;corrective lenses for reading    Impact of Vision Impairment on Function (Vision) Able to read menu w/glasses, and see TV, read clock and board on far wall without   Pain Assessment   Patient Currently in Pain No   Range of Motion Comprehensive   General Range of Motion no range of motion deficits identified   Strength Comprehensive (MMT)   Comment, General Manual Muscle Testing (MMT) Assessment R delmar 5/5, L delmar 4/5   Bed Mobility   Comment (Bed Mobility) supine to sit Min A at trunk, sit to supine SBA   Transfers   Transfers sit-stand transfer;toilet transfer   Sit-Stand Transfer   Sit-Stand [place] (Transfers) contact guard   Toilet Transfer   Type (Toilet Transfer) sit-stand;stand-sit   [place] Level (Toilet Transfer) contact guard   Assistive Device (Toilet Transfer) raised toilet seat;grab bars/safety frame   Balance   Balance Comments Dynamic sitting balance fair (during LB dressing task)   Activities of Daily Living   BADL Assessment lower body dressing   Lower Body Dressing Assessment   [place] Level (Lower Body Dressing) minimum assist (75% patient effort);set up   Position (Lower Body Dressing) edge of bed sitting   Comment (Lower Body Dressing) Patient fatigued, much distraction and noise in room, he reports he gets dressed from lower surface at home   Clinical Impression   Criteria for Skilled Therapeutic Interventions Met (OT) yes   OT Diagnosis decline in ADL/IADL performance   OT Problem List-Impairments impacting ADL problems related to;activity tolerance impaired;cognition;strength   Assessment of Occupational Performance 3-5 Performance Deficits   Identified Performance Deficits functional mob, dressing, household mgmt, medication-financial mgmt   Planned Therapy Interventions (OT) ADL retraining;IADL retraining;cognition   Clinical Decision Making Complexity (OT) low complexity   Therapy Frequency (OT) Daily   Predicted Duration of Therapy 3 days   Risk & Benefits of therapy have been explained  evaluation/treatment results reviewed;care plan/treatment goals reviewed;participants included;patient   OT Discharge Planning    OT Discharge Recommendation (DC Rec) Home with assist;home with outpatient occupational therapy   OT Rationale for DC Rec Anticipate patient will be able to discharge back to daughter's home w/A for medication/financial mgmt, transport and supv with other potentially hazardous tasks such as stove use. If daughter unable to provide that level of assist, recommend TCU w/continued OT to address safety and indep with IADLs. If home w/daughter, recommend follow up with OP OT for IADL/cognitive function.    Total Evaluation Time (Minutes)   Total Evaluation Time (Minutes) 15

## 2021-07-29 NOTE — PROGRESS NOTES
Hospitalist admission note dictated. Confirmation #: [id number].    JoAnna Barthell, PA-C  7/28/2021   10:07 PM    76 year old male adm with dka d/t non-compliance. No localizing sxs infection.  CKD 5  Chronic afib  CAD  HTN  HLD    DNR/DNI

## 2021-07-29 NOTE — UTILIZATION REVIEW
[facility] Utilization Review  Admission Status; Secondary Review Determination     Admission Date: 7/28/2021  7:45 PM      Under the authority of the Utilization Management Committee, the utilization review process indicated a secondary review on the above patient.  The review outcome is based on review of the medical records, discussions with staff, and applying clinical experience noted on the date of the review.        (X)      Inpatient Status Appropriate - This patient's medical care is consistent with medical management for inpatient care and reasonable inpatient medical practice.          RATIONALE FOR DETERMINATION   75 yo M with h/o depression, alcohol abuse, hypertension, CKD 5, diabetes mellitus, chronic atrial fibrillation, admitted with generalized weakness, confusion, increased urination with mild diabetic ketoacidosis.  Patient started on insulin drip, now transitioning to Lantus and sliding scale insulin, with IV fluid hydration with potassium, checking serial BMPs.  Patient continues to have hypokalemia, blood sugars improving.  Complex patient needs close monitoring in the hospital with aggressive blood sugar check, with diabetic ketoacidosis, also aggressive potassium replacement, needs close monitoring in the hospital with ongoing interventions, anticipate more than 2 midnight hospital stay, recommend continue inpatient status      The severity of illness, intensity of service provided, expected LOS and risk for adverse outcome make the care complex, high risk and appropriate for hospital admission.The patient requires hospital based medical care which is anticipated to require a stay of 2 or more midnights; according to CMS guidelines the patient should be admitted as inpatient        The information on this document is developed by the utilization review team in order for the business office to ensure compliance.  This only denotes the appropriateness of proper admission status and  does not reflect the quality of care rendered.         The definitions of Inpatient Status and Observation Status used in making the determination above are those provided in the CMS Coverage Manual, Chapter 1 and Chapter 6, section 70.4.      Sincerely,       Jacob Duque MD  Physician Advisor  Utilization Review-[place]    Phone: [phone]

## 2021-07-30 ENCOUNTER — APPOINTMENT (OUTPATIENT)
Dept: ULTRASOUND IMAGING | Facility: CLINIC | Age: 76
DRG: 673 | End: 2021-07-30
Attending: HOSPITALIST
Payer: COMMERCIAL

## 2021-07-30 ENCOUNTER — APPOINTMENT (OUTPATIENT)
Dept: OCCUPATIONAL THERAPY | Facility: CLINIC | Age: 76
DRG: 673 | End: 2021-07-30
Attending: HOSPITALIST
Payer: COMMERCIAL

## 2021-07-30 LAB
ALBUMIN SERPL-MCNC: 1.9 G/DL (ref 3.4–5)
ALP SERPL-CCNC: 203 U/L (ref 40–150)
ALT SERPL W P-5'-P-CCNC: 96 U/L (ref 0–70)
ANION GAP SERPL CALCULATED.3IONS-SCNC: 3 MMOL/L (ref 3–14)
AST SERPL W P-5'-P-CCNC: 115 U/L (ref 0–45)
BILIRUB DIRECT SERPL-MCNC: <0.1 MG/DL (ref 0–0.2)
BILIRUB SERPL-MCNC: 0.4 MG/DL (ref 0.2–1.3)
BUN SERPL-MCNC: 54 MG/DL (ref 7–30)
CALCIUM SERPL-MCNC: 8.5 MG/DL (ref 8.5–10.1)
CHLORIDE BLD-SCNC: 109 MMOL/L (ref 94–109)
CO2 SERPL-SCNC: 26 MMOL/L (ref 20–32)
CREAT SERPL-MCNC: 3.94 MG/DL (ref 0.66–1.25)
GFR SERPL CREATININE-BSD FRML MDRD: 14 ML/MIN/1.73M2
GLUCOSE BLD-MCNC: 212 MG/DL (ref 70–99)
GLUCOSE BLDC GLUCOMTR-MCNC: 204 MG/DL (ref 70–99)
GLUCOSE BLDC GLUCOMTR-MCNC: 231 MG/DL (ref 70–99)
GLUCOSE BLDC GLUCOMTR-MCNC: 236 MG/DL (ref 70–99)
GLUCOSE BLDC GLUCOMTR-MCNC: 248 MG/DL (ref 70–99)
GLUCOSE BLDC GLUCOMTR-MCNC: 301 MG/DL (ref 70–99)
PHOSPHATE SERPL-MCNC: 4.2 MG/DL (ref 2.5–4.5)
POTASSIUM BLD-SCNC: 3.9 MMOL/L (ref 3.4–5.3)
PROT SERPL-MCNC: 5.3 G/DL (ref 6.8–8.8)
SODIUM SERPL-SCNC: 138 MMOL/L (ref 133–144)

## 2021-07-30 PROCEDURE — 82040 ASSAY OF SERUM ALBUMIN: CPT | Performed by: HOSPITALIST

## 2021-07-30 PROCEDURE — 76705 ECHO EXAM OF ABDOMEN: CPT

## 2021-07-30 PROCEDURE — 97535 SELF CARE MNGMENT TRAINING: CPT | Mod: GO

## 2021-07-30 PROCEDURE — 250N000013 HC RX MED GY IP 250 OP 250 PS 637: Performed by: PHYSICIAN ASSISTANT

## 2021-07-30 PROCEDURE — 250N000013 HC RX MED GY IP 250 OP 250 PS 637: Performed by: HOSPITALIST

## 2021-07-30 PROCEDURE — 84100 ASSAY OF PHOSPHORUS: CPT | Performed by: HOSPITALIST

## 2021-07-30 PROCEDURE — 99233 SBSQ HOSP IP/OBS HIGH 50: CPT | Performed by: HOSPITALIST

## 2021-07-30 PROCEDURE — 80069 RENAL FUNCTION PANEL: CPT | Performed by: HOSPITALIST

## 2021-07-30 PROCEDURE — 250N000012 HC RX MED GY IP 250 OP 636 PS 637: Performed by: HOSPITALIST

## 2021-07-30 PROCEDURE — 97116 GAIT TRAINING THERAPY: CPT | Mod: GP | Performed by: PHYSICAL THERAPIST

## 2021-07-30 PROCEDURE — 36415 COLL VENOUS BLD VENIPUNCTURE: CPT | Performed by: HOSPITALIST

## 2021-07-30 PROCEDURE — 97530 THERAPEUTIC ACTIVITIES: CPT | Mod: GP | Performed by: PHYSICAL THERAPIST

## 2021-07-30 PROCEDURE — 120N000013 HC R&B IMCU

## 2021-07-30 RX ORDER — SENNOSIDES 8.6 MG
1-2 TABLET ORAL 2 TIMES DAILY PRN
Status: DISCONTINUED | OUTPATIENT
Start: 2021-07-30 | End: 2021-08-06 | Stop reason: HOSPADM

## 2021-07-30 RX ORDER — BISACODYL 10 MG
10 SUPPOSITORY, RECTAL RECTAL DAILY PRN
Status: DISCONTINUED | OUTPATIENT
Start: 2021-07-30 | End: 2021-08-06 | Stop reason: HOSPADM

## 2021-07-30 RX ORDER — POLYETHYLENE GLYCOL 3350 17 G/17G
17 POWDER, FOR SOLUTION ORAL 2 TIMES DAILY PRN
Status: DISCONTINUED | OUTPATIENT
Start: 2021-07-30 | End: 2021-08-06 | Stop reason: HOSPADM

## 2021-07-30 RX ORDER — MIRTAZAPINE 7.5 MG/1
7.5 TABLET, FILM COATED ORAL AT BEDTIME
Status: DISCONTINUED | OUTPATIENT
Start: 2021-07-30 | End: 2021-07-31

## 2021-07-30 RX ADMIN — SIMVASTATIN 40 MG: 40 TABLET, FILM COATED ORAL at 23:18

## 2021-07-30 RX ADMIN — SENNOSIDES 1 TABLET: 8.6 TABLET, FILM COATED ORAL at 23:18

## 2021-07-30 RX ADMIN — ALLOPURINOL 200 MG: 100 TABLET ORAL at 09:48

## 2021-07-30 RX ADMIN — METOPROLOL TARTRATE 100 MG: 100 TABLET, FILM COATED ORAL at 23:17

## 2021-07-30 RX ADMIN — HYDRALAZINE HYDROCHLORIDE 25 MG: 25 TABLET ORAL at 09:48

## 2021-07-30 RX ADMIN — HYDRALAZINE HYDROCHLORIDE 25 MG: 25 TABLET ORAL at 23:18

## 2021-07-30 RX ADMIN — CLOPIDOGREL BISULFATE 75 MG: 75 TABLET ORAL at 09:48

## 2021-07-30 RX ADMIN — INSULIN ASPART 7 UNITS: 100 INJECTION, SOLUTION INTRAVENOUS; SUBCUTANEOUS at 17:37

## 2021-07-30 RX ADMIN — SENNOSIDES 1 TABLET: 8.6 TABLET, FILM COATED ORAL at 14:16

## 2021-07-30 RX ADMIN — INSULIN GLARGINE 15 UNITS: 100 INJECTION, SOLUTION SUBCUTANEOUS at 09:47

## 2021-07-30 RX ADMIN — FOLIC ACID 1 MG: 1 TABLET ORAL at 09:48

## 2021-07-30 RX ADMIN — INSULIN GLARGINE 20 UNITS: 100 INJECTION, SOLUTION SUBCUTANEOUS at 21:35

## 2021-07-30 RX ADMIN — FUROSEMIDE 40 MG: 40 TABLET ORAL at 09:48

## 2021-07-30 RX ADMIN — INSULIN GLARGINE 5 UNITS: 100 INJECTION, SOLUTION SUBCUTANEOUS at 14:17

## 2021-07-30 RX ADMIN — APIXABAN 2.5 MG: 2.5 TABLET, FILM COATED ORAL at 09:48

## 2021-07-30 RX ADMIN — ASPIRIN 81 MG: 81 TABLET, COATED ORAL at 23:18

## 2021-07-30 RX ADMIN — INSULIN ASPART 3 UNITS: 100 INJECTION, SOLUTION INTRAVENOUS; SUBCUTANEOUS at 10:57

## 2021-07-30 RX ADMIN — METOPROLOL TARTRATE 100 MG: 100 TABLET, FILM COATED ORAL at 09:47

## 2021-07-30 RX ADMIN — MIRTAZAPINE 7.5 MG: 7.5 TABLET, FILM COATED ORAL at 23:18

## 2021-07-30 RX ADMIN — INSULIN ASPART 5 UNITS: 100 INJECTION, SOLUTION INTRAVENOUS; SUBCUTANEOUS at 14:16

## 2021-07-30 RX ADMIN — EZETIMIBE 10 MG: 10 TABLET ORAL at 09:48

## 2021-07-30 RX ADMIN — CLONIDINE HYDROCHLORIDE 0.2 MG: 0.1 TABLET ORAL at 09:48

## 2021-07-30 RX ADMIN — CLONIDINE HYDROCHLORIDE 0.2 MG: 0.1 TABLET ORAL at 23:17

## 2021-07-30 ASSESSMENT — ACTIVITIES OF DAILY LIVING (ADL)
ADLS_ACUITY_SCORE: 20

## 2021-07-30 NOTE — PLAN OF CARE
Alert and oriented x4. Vital signs stable on RA. Assist of 1, GB. Tolerating consistent CHO diet. Lungs sounds clear. BS+. BM-. Voiding. Denies pain. Denies nausea. Tele afib, CVR.  and 231.

## 2021-07-30 NOTE — PROGRESS NOTES
Care Management Follow Up    Length of Stay (days): 2    Expected Discharge Date: 07/30/2021  Expected Time of Departure: Unknown, but pt's daughter would like to provide transport  Concerns to be Addressed: discharge planning  Pt recently moved from [place] to [place]. Pt lives with his marva in her home in [place].  Patient plan of care discussed at interdisciplinary rounds: Yes    Anticipated Discharge Disposition: Transitional Care  Disposition Comments: pt for pt to discharge to TCU.  Anticipated Discharge Services: None  Anticipated Discharge DME: None    Patient/family educated on Medicare website which has current facility and service quality ratings: yes  Education Provided on the Discharge Plan:    Patient/Family in Agreement with the Plan: yes    Referrals Placed by CM/SW: [facility], Post Acute Facilities  Private pay costs discussed: Not applicable    Additional Information:  SW notified pt was declined by [facility] due to no beds. Call placed to [facility] regarding recent referral. HENOK spoke with Yen in admissions and she reports they will not take more admits for today and throughout the weekend. HENOK waiting on a return call from Chandler LANDON.       LIANA Ballard

## 2021-07-30 NOTE — CONSULTS
Care Management Initial Consult    General Information  Assessment completed with: Family, Omayra, pt's daughter   Type of CM/SW Visit: Initial Assessment    Primary Care Provider verified and updated as needed: No   Readmission within the last 30 days: no previous admission in last 30 days      Reason for Consult: discharge planning  Advance Care Planning: Advance Care Planning Reviewed: other (comment) (No scanned docs, but dtr thinks she is the healthcare agent )          Communication Assessment  Patient's communication style: spoken language (English or Bilingual)    Hearing Difficulty or Deaf: yes   Wear Glasses or Blind: yes    Cognitive  Cognitive/Neuro/Behavioral: WDL  Level of Consciousness: alert  Arousal Level: opens eyes spontaneously  Orientation: oriented x 4  Mood/Behavior: calm, cooperative  Best Language: 0 - No aphasia  Speech: clear, spontaneous    Living Environment:   People in home: child(shirley), adult  Omayra  Current living Arrangements: house      Able to return to prior arrangements:    Living Arrangement Comments: Pt's daughter lives in [place]     Family/Social Support:  Care provided by: child(shirley) (Living with daughter Omayra, until he moves into an [facility])  Provides care for: no one  Marital Status:   Children          Description of Support System:           Current Resources:   Patient receiving home care services: No     Community Resources: None  Equipment currently used at home: walker, standard  Supplies currently used at home: Diabetic Supplies    Employment/Financial:  Employment Status: retired     Employment/ Comments: No  Financial Concerns: other (see comments) (Pt has Medicare & BCBS from [place]. Insurance info not in chart)   Referral to Financial Counselor: No (FC team called Omayra 7/29 for insurance info)       Lifestyle & Psychosocial Needs:  Social Determinants of Health     Tobacco Use:      Smoking Tobacco Use:      Smokeless Tobacco Use:    Alcohol Use:       Frequency of Alcohol Consumption:      Average Number of Drinks:      Frequency of Binge Drinking:    Financial Resource Strain:      Difficulty of Paying Living Expenses:    Food Insecurity:      Worried About Running Out of Food in the Last Year:      Ran Out of Food in the Last Year:    Transportation Needs:      Lack of Transportation (Medical):      Lack of Transportation (Non-Medical):    Physical Activity:      Days of Exercise per Week:      Minutes of Exercise per Session:    Stress:      Feeling of Stress :    Social Connections:      Frequency of Communication with Friends and Family:      Frequency of Social Gatherings with Friends and Family:      Attends Confucianist Services:      Active Member of Clubs or Organizations:      Attends Club or Organization Meetings:      Marital Status:    Intimate Partner Violence:      Fear of Current or Ex-Partner:      Emotionally Abused:      Physically Abused:      Sexually Abused:    Depression:      PHQ-2 Score:    Housing Stability:      Unable to Pay for Housing in the Last Year:      Number of Places Lived in the Last Year:      Unstable Housing in the Last Year:        Functional Status:  Prior to admission patient needed assistance:              Mental Health Status:          Chemical Dependency Status:                Values/Beliefs:  Spiritual, Cultural Beliefs, Confucianist Practices, Values that affect care: no               Additional Information:  HENOK called pt's daughter, Omayra at [phone]. Omayra reports pt recently moved from his condo to in [place] to [place] to live with her. Pt's stay at Omayra's is to be temporary. Pt plans to move into independent living at [facility] in [facility]. Omayra is setting up this transition. Omayra reports pt has a 2WW that he uses in the community and is of ambulating devices when at home. Omayra and her boyfriend cannot care for pt at home due to their work schedules. Omayra asked if pt could attend TCU. HENOK went over  some options and Omayra reports she is interested in referrals at [facility], [facility], and [facility]. SW inquired what type of insurance pt has. SW was informed pt still has his [place] insurance which is Medicare and BCBS. Omayra reports financial counseling called her yesterday to obtain this information to update the chart.  HENOK sent referrals via DOD.     LIANA Ballard

## 2021-07-30 NOTE — PROGRESS NOTES
[facility]    Medicine Progress Note - Hospitalist Service       Date of Admission:  7/28/2021    Assessment & Plan           Gordon Gann is a 76 year old male admitted on 7/28/2021.  Past history DM II on insulin, depression, alcohol abuse, hypertension, chronic kidney disease stage V, chronic atrial fibrillation, who presents as a direct admission from [facility], where he was brought in by family with generalized weakness, confusion, and increased urination, found to have mild diabetic ketoacidosis.       Diabetic ketoacidosis, resolved  DM II on long term insulin, uncontrolled  [Lantus 30 units at bedtime and novolog 15 units t.i.d. with meals]  Recent history of non-compliance with insulin, admission A1C 11.7%.  Initial glucose >600, ketones elevated with acidosis and borderline elevated gap, improved with IVF and insulin gtt.   - increase lantus to 20 units bid (give 5 units x1 now)  - prandial insulin 1u/10g carb, high ssi  - mod carb diet    Elevated LFT's  - LFT's up from admission and reporting intermittent RUQ pain though currently non-tender on exam  - obtain RUQ US and repeat LFT's in AM    Hypokalemia  Hypophosphatemia  - normalized with supplementation  - no protocol due to renal function    Anasarca and bilateral peripheral edema  Possible type II NSTEMI  Serial trop mildly elevated but flat 0.06, EKG with non-specific ST changes.  Pro-BNP >8400.  CXR negative for edema.  TTE with EF 50-55%, septal motion consistent with conduction abnormality, no valvular disease.   - continue tele for now  - continue PTA lasix    CKD stage V  It appears that his nephrologist has been discussing dialysis (he remains undecided).  Baseline creatinine is in the upper-3 to low-4 range.  - Cr remains stable 7/30    Chronic A-fib  CAD s/p PCI 2006  HTN  HLD  Further details unknown of underlying CAD.  Pharmacy confirms the patient only takes Eliquis once daily, stating he gets a rash if  he takes it twice daily.  - continue PTA aspirin, Plavix, Zetia, simvastatin, hydralazine, clonidine, metoprolol and Eliquis, lasix  - Follow I's and O's, and daily weights    Possible cognitive impairment  Possible depression  Daughter concerned about depression and also reports he has been mismanaging his insulin recently and has concerns regarding forgetfulness.   - discontinue Ambien  - outpatient cognitive eval with OT  - on discussion 7/30 he does agree he has been feeling depressed, states that he has though about suicide but would never act on it - denies any plan and currently declines psychiatry assessment; he has been on mirtazapine in the past (primarily as a sleep aid) but stopped this about 1 year ago as he felt it no longer worked but does not recall the dose - agrees to resume at this time; will start 7.5 at bedtime with plan to increase in 1 week if tolerated    Insomnia  - Hold PTA Ambien as the patient is high risk for delirium.  - prn melatonin    History of alcohol abuse  Reports no alcohol use for >1 week    Gout   - continue allopurinol (dose reduced due to renal function)         Diet: Consistent Carb 75 grams CHO per Meal Diet    DVT Prophylaxis: eliquis  Flood Catheter: Not present  Central Lines: None  Code Status: No CPR- Do NOT Intubate      Disposition Plan   Expected discharge: 07/30/2021 recommended to transitional care unit once glucose adequately controlled, LFT's stable, dispo found.     The patient's care was discussed with the Bedside Nurse, Patient and Patient's Family.    Samir Simmons MD  Hospitalist Service  [facility]  Securely message with the Vocera Web Console (learn more here)  Text page via [employer] Paging/Directory      Clinically Significant Risk Factors Present on Admission                ______________________________________________________________________    Interval History   Reports mild intermittent RUQ pain, not associated with food.   "Denies fever/chills, dyspnea, chest pressure.   Otherwise feeling well physically.  Admits he has been feeling depressed largely due to medical issues and loss of independence with aging.  Has thought about suicide but states he would never act.  Declines psych assessment, \"I hate psychiatrists\" but is willing to resume antidepressant as above.      Data reviewed today: I reviewed all medications, new labs and imaging results over the last 24 hours. I personally reviewed no images or EKG's today.    Physical Exam   Vital Signs: Temp: 97.5  F (36.4  C) Temp src: Oral BP: 107/69 Pulse: 68   Resp: 16 SpO2: 98 % O2 Device: None (Room air)    Weight: 191 lbs 12.8 oz     General Appearance: Well nourished male in NAD  Respiratory: lungs CTAB, no wheezes or crackles, no tachypnea  Cardiovascular: RRR, normal s1/s2 without murmur  GI: abdomen soft, normal bowel sounds, nontender, nondistended  Skin: 2+ pitting lower extremity edema   Other: Alert and appropriate, cranial nerves grossly intact  Psych: flat affect    Data   Recent Labs   Lab 07/30/21  0807 07/30/21  0730 07/30/21  0215 07/29/21  1602 07/29/21  0634 07/29/21  0208 07/28/21  1438 07/28/21  1409 07/28/21  1237   WBC  --   --   --   --   --   --   --   --  7.1   HGB  --   --   --   --   --   --   --   --  10.6*   MCV  --   --   --   --   --   --   --   --  87   PLT  --   --   --   --   --   --   --   --  223   NA  --  138  --   --  141 142   < >  --  135   POTASSIUM  --  3.9  --  4.6 3.3* 3.3*   < >  --  3.8   CHLORIDE  --  109  --   --  111* 111*   < >  --  101   CO2  --  26  --   --  26 27   < >  --  23   BUN  --  54*  --   --  51* 53*   < >  --  57*   CR  --  3.94*  --   --  3.80* 3.73*   < >  --  4.31*   ANIONGAP  --  3  --   --  4 4   < >  --  11   LC  --  8.5  --   --  8.6 8.5   < >  --  8.8   * 212* 231*  --  135*  123* 134*   < >  --  646*   ALBUMIN  --  1.9*  --   --   --   --   --   --  2.4*   PROTTOTAL  --  5.3*  --   --   --   --   --   --  " 6.1*   BILITOTAL  --  0.4  --   --   --   --   --   --  0.4   ALKPHOS  --  203*  --   --   --   --   --   --  258*   ALT  --  96*  --   --   --   --   --   --  52   AST  --  115*  --   --   --   --   --   --  27   TROPONIN  --   --   --   --   --   --   --  0.068* 0.064*    < > = values in this interval not displayed.

## 2021-07-30 NOTE — PROVIDER NOTIFICATION
MD Notification    Notified Person: MD    Notified Person Name:  Katherinejonny    Notification Date/Time: 1350 7/30/21    Notification Interaction: Amcom    Purpose of Notification: 320-1: pt to be NPO for ultrasound from 2PM to 10PM, would you like 5 units lantus to be given still? Thank you! Anaay Frankel RN    Orders Received: ok to give.    Comments:

## 2021-07-30 NOTE — PLAN OF CARE
A&Ox4, VSS on RA. Ax1, w/GB. NPO from 2PM to 10PM for abdominal ultrasound. LS clear. Had a small BM in the AM, senna given at pt request. Denies pain. BS in the 200's, additional 5 units lantus given at lunchtime. Tele afib CVR.

## 2021-07-30 NOTE — PROVIDER NOTIFICATION
MD Notification    Notified Person: Jett Branham MD    Notified Person Name: Yoandy Dick RN    Notification Date/Time: 2134    Notification Interaction: Amcom    Purpose of Notification: Patient requesting sleep aid.    Orders Received:    Comments:

## 2021-07-30 NOTE — PROGRESS NOTES
Dr. Simmons, the pt's BG is 301 and he is NPO from 2-10pm for a US set to happen sometime in the night. Would you like me to administer the Novolog, per sliding scale? TY

## 2021-07-31 ENCOUNTER — APPOINTMENT (OUTPATIENT)
Dept: PHYSICAL THERAPY | Facility: CLINIC | Age: 76
DRG: 673 | End: 2021-07-31
Attending: HOSPITALIST
Payer: COMMERCIAL

## 2021-07-31 LAB
ALBUMIN SERPL-MCNC: 1.9 G/DL (ref 3.4–5)
ALP SERPL-CCNC: 212 U/L (ref 40–150)
ALT SERPL W P-5'-P-CCNC: 101 U/L (ref 0–70)
ANION GAP SERPL CALCULATED.3IONS-SCNC: 3 MMOL/L (ref 3–14)
AST SERPL W P-5'-P-CCNC: 77 U/L (ref 0–45)
BILIRUB SERPL-MCNC: 0.3 MG/DL (ref 0.2–1.3)
BUN SERPL-MCNC: 58 MG/DL (ref 7–30)
CALCIUM SERPL-MCNC: 8 MG/DL (ref 8.5–10.1)
CHLORIDE BLD-SCNC: 110 MMOL/L (ref 94–109)
CO2 SERPL-SCNC: 27 MMOL/L (ref 20–32)
CREAT SERPL-MCNC: 4.12 MG/DL (ref 0.66–1.25)
ERYTHROCYTE [DISTWIDTH] IN BLOOD BY AUTOMATED COUNT: 14.4 % (ref 10–15)
GFR SERPL CREATININE-BSD FRML MDRD: 13 ML/MIN/1.73M2
GLUCOSE BLD-MCNC: 152 MG/DL (ref 70–99)
GLUCOSE BLDC GLUCOMTR-MCNC: 131 MG/DL (ref 70–99)
GLUCOSE BLDC GLUCOMTR-MCNC: 204 MG/DL (ref 70–99)
GLUCOSE BLDC GLUCOMTR-MCNC: 234 MG/DL (ref 70–99)
GLUCOSE BLDC GLUCOMTR-MCNC: 253 MG/DL (ref 70–99)
HBA1C MFR BLD: 11 % (ref 0–5.6)
HCT VFR BLD AUTO: 34 % (ref 40–53)
HGB BLD-MCNC: 11.2 G/DL (ref 13.3–17.7)
MCH RBC QN AUTO: 28.4 PG (ref 26.5–33)
MCHC RBC AUTO-ENTMCNC: 32.9 G/DL (ref 31.5–36.5)
MCV RBC AUTO: 86 FL (ref 78–100)
PLATELET # BLD AUTO: 276 10E3/UL (ref 150–450)
POTASSIUM BLD-SCNC: 3.9 MMOL/L (ref 3.4–5.3)
PROT SERPL-MCNC: 5.4 G/DL (ref 6.8–8.8)
RBC # BLD AUTO: 3.94 10E6/UL (ref 4.4–5.9)
SODIUM SERPL-SCNC: 140 MMOL/L (ref 133–144)
WBC # BLD AUTO: 7.1 10E3/UL (ref 4–11)

## 2021-07-31 PROCEDURE — 83036 HEMOGLOBIN GLYCOSYLATED A1C: CPT | Performed by: INTERNAL MEDICINE

## 2021-07-31 PROCEDURE — 250N000013 HC RX MED GY IP 250 OP 250 PS 637: Performed by: PHYSICIAN ASSISTANT

## 2021-07-31 PROCEDURE — 36415 COLL VENOUS BLD VENIPUNCTURE: CPT | Performed by: INTERNAL MEDICINE

## 2021-07-31 PROCEDURE — 250N000013 HC RX MED GY IP 250 OP 250 PS 637: Performed by: HOSPITALIST

## 2021-07-31 PROCEDURE — 250N000012 HC RX MED GY IP 250 OP 636 PS 637: Performed by: HOSPITALIST

## 2021-07-31 PROCEDURE — 80053 COMPREHEN METABOLIC PANEL: CPT | Performed by: HOSPITALIST

## 2021-07-31 PROCEDURE — 250N000013 HC RX MED GY IP 250 OP 250 PS 637: Performed by: INTERNAL MEDICINE

## 2021-07-31 PROCEDURE — 120N000013 HC R&B IMCU

## 2021-07-31 PROCEDURE — 97750 PHYSICAL PERFORMANCE TEST: CPT | Mod: GP | Performed by: PHYSICAL THERAPIST

## 2021-07-31 PROCEDURE — 36415 COLL VENOUS BLD VENIPUNCTURE: CPT | Performed by: HOSPITALIST

## 2021-07-31 PROCEDURE — 85014 HEMATOCRIT: CPT | Performed by: HOSPITALIST

## 2021-07-31 PROCEDURE — 99233 SBSQ HOSP IP/OBS HIGH 50: CPT | Performed by: INTERNAL MEDICINE

## 2021-07-31 PROCEDURE — 97116 GAIT TRAINING THERAPY: CPT | Mod: GP | Performed by: PHYSICAL THERAPIST

## 2021-07-31 RX ORDER — HYDROXYZINE HYDROCHLORIDE 10 MG/1
10 TABLET, FILM COATED ORAL 3 TIMES DAILY PRN
Status: DISCONTINUED | OUTPATIENT
Start: 2021-07-31 | End: 2021-07-31

## 2021-07-31 RX ORDER — NICOTINE POLACRILEX 4 MG
15-30 LOZENGE BUCCAL
Status: DISCONTINUED | OUTPATIENT
Start: 2021-07-31 | End: 2021-07-31

## 2021-07-31 RX ORDER — DEXTROSE MONOHYDRATE 25 G/50ML
25-50 INJECTION, SOLUTION INTRAVENOUS
Status: DISCONTINUED | OUTPATIENT
Start: 2021-07-31 | End: 2021-07-31

## 2021-07-31 RX ADMIN — Medication 1 MG: at 21:32

## 2021-07-31 RX ADMIN — CLOPIDOGREL BISULFATE 75 MG: 75 TABLET ORAL at 08:36

## 2021-07-31 RX ADMIN — INSULIN GLARGINE 20 UNITS: 100 INJECTION, SOLUTION SUBCUTANEOUS at 21:27

## 2021-07-31 RX ADMIN — INSULIN GLARGINE 20 UNITS: 100 INJECTION, SOLUTION SUBCUTANEOUS at 09:37

## 2021-07-31 RX ADMIN — FUROSEMIDE 40 MG: 40 TABLET ORAL at 08:36

## 2021-07-31 RX ADMIN — CLONIDINE HYDROCHLORIDE 0.2 MG: 0.1 TABLET ORAL at 21:32

## 2021-07-31 RX ADMIN — CLONIDINE HYDROCHLORIDE 0.2 MG: 0.1 TABLET ORAL at 08:35

## 2021-07-31 RX ADMIN — HYDRALAZINE HYDROCHLORIDE 25 MG: 25 TABLET ORAL at 08:35

## 2021-07-31 RX ADMIN — METOPROLOL TARTRATE 100 MG: 100 TABLET, FILM COATED ORAL at 08:35

## 2021-07-31 RX ADMIN — FOLIC ACID 1 MG: 1 TABLET ORAL at 08:35

## 2021-07-31 RX ADMIN — INSULIN ASPART 3 UNITS: 100 INJECTION, SOLUTION INTRAVENOUS; SUBCUTANEOUS at 18:39

## 2021-07-31 RX ADMIN — ALLOPURINOL 200 MG: 100 TABLET ORAL at 08:35

## 2021-07-31 RX ADMIN — METOPROLOL TARTRATE 100 MG: 100 TABLET, FILM COATED ORAL at 21:32

## 2021-07-31 RX ADMIN — ASPIRIN 81 MG: 81 TABLET, COATED ORAL at 21:32

## 2021-07-31 RX ADMIN — SIMVASTATIN 40 MG: 40 TABLET, FILM COATED ORAL at 21:32

## 2021-07-31 RX ADMIN — INSULIN ASPART 4 UNITS: 100 INJECTION, SOLUTION INTRAVENOUS; SUBCUTANEOUS at 12:06

## 2021-07-31 RX ADMIN — HYDRALAZINE HYDROCHLORIDE 25 MG: 25 TABLET ORAL at 21:32

## 2021-07-31 RX ADMIN — EZETIMIBE 10 MG: 10 TABLET ORAL at 08:35

## 2021-07-31 RX ADMIN — APIXABAN 2.5 MG: 2.5 TABLET, FILM COATED ORAL at 08:35

## 2021-07-31 ASSESSMENT — ACTIVITIES OF DAILY LIVING (ADL)
ADLS_ACUITY_SCORE: 20

## 2021-07-31 ASSESSMENT — MIFFLIN-ST. JEOR: SCORE: 1600.5

## 2021-07-31 NOTE — PROGRESS NOTES
A&O X4; VSS on RA; NPO until US after 10pm;  ( 7U Arun given per protocol); A1 gb/walker; denies pain.

## 2021-07-31 NOTE — PROGRESS NOTES
Care Management Follow Up    Length of Stay (days): 3    Expected Discharge Date: 08/01/2021  Expected Time of Departure: Unknown, but pt's daughter would like to provide transport  Concerns to be Addressed: discharge planning  Pt recently moved from [place] to [place]. Pt lives with his marva in her home in [place].  Patient plan of care discussed at interdisciplinary rounds: Yes    Anticipated Discharge Disposition: Transitional Care  Disposition Comments: pt for pt to discharge to TCU.  Anticipated Discharge Services: None  Anticipated Discharge DME: None    Patient/family educated on Medicare website which has current facility and service quality ratings: yes  Education Provided on the Discharge Plan:  ye  Patient/Family in Agreement with the Plan: yes    Referrals Placed by CM/SW: [facility], Post Acute Facilities  Private pay costs discussed: Not applicable    Additional Information:  Call placed to patient's daughter Omayra to discuss discharge plans.  Reviewed the therapy discharge recommendations with Omayra and she us worried about patient being home alone all day.  Belen states that if her boyfriend was not home the ay that patient was admitted he never would have been found when he was down.  Explained that we have to have a skilled need to send him to TCU so maybe homecare may be an option too.  Belen states she understands and would be in agreement with homecare if that is the best option for him.  Explained that we will see how well he does in the therapy yet today and tomorrow and then we can make the final decision.  Belen states that patient has been vaccinated.  And she gave me patient's insurance information, Patient's Medicare # [id number].  His [facility] # is [id number] with a group # [id number].    Gave this information to [facility].  They will review the referral and return the call.  Will follow up with patient's daughter tomorrow.    Katlyn FERRARA  ZULEMA Aldana, [facility]    [phone]  [facility]

## 2021-07-31 NOTE — PLAN OF CARE
A&O x4, VSS on RA. Pt up with assist of 1 GB/W. Pt denies pain. Voiding adequately. CMS intact. Tolerating consistent CHO diet. Tele afib CVR.

## 2021-07-31 NOTE — PROGRESS NOTES
[facility]    Medicine Progress Note - Hospitalist Service       Date of Admission:  7/28/2021    Assessment & Plan           Gordon Gann is a 76 year old male admitted on 7/28/2021.  Past history DM II on insulin, depression, alcohol abuse, hypertension, chronic kidney disease stage V, chronic atrial fibrillation, who presents as a direct admission from [facility], where he was brought in by family with generalized weakness, confusion, and increased urination, found to have mild diabetic ketoacidosis.        Diabetic ketoacidosis, resolved  DM II on long term insulin, uncontrolled  [Lantus 30 units at bedtime and novolog 15 units t.i.d. with meals]  Recent history of non-compliance with insulin, admission A1C 11.7%.  Initial glucose >600, ketones elevated with acidosis and borderline elevated gap, improved with IVF and insulin gtt.   - continue with lantus to 20 units bid  - may be hard for patient to do carb counting at home  - change Aspart to 7 units TID, continue with sliding scale insulin   - mod carb diet     Elevated LFT's  LFT's up from admission on 7/30 and reporting intermittent RUQ pain though currently non-tender on exam- RUQ US without acute finding.  On 7/31, no abdominal pain, n/v.   - AST down, ALT/alk phos slightly increased but stable  - will monitor     Hypokalemia  Hypophosphatemia  - normalized with supplementation  - no protocol due to renal function     Anasarca and bilateral peripheral edema  Possible type II NSTEMI  Serial trop mildly elevated but flat 0.06, EKG with non-specific ST changes.  Pro-BNP >8400.  CXR negative for edema.  TTE with EF 50-55%, septal motion consistent with conduction abnormality, no valvular disease.   - continue tele for now  - continue PTA lasix     CKD stage V  It appears that his nephrologist has been discussing dialysis (he remains undecided). Patient just moved from [place] and does not have established nephrologist here.   Baseline creatinine is in the upper-3 to low-4 range. Reviewed his nephrologist note from 5/4 in care  Everywhere, labs from 5/4 listed as cr of 4.16.  - Cr 3.7>3.8>>4.12  - will monitor renal function, if trending up may need to see nephrologist  - he will need nephrology referral at discharge to establish care     Chronic A-fib  CAD s/p PCI 2006  HTN  HLD  Further details unknown of underlying CAD.  Pharmacy confirms the patient only takes Eliquis once daily, stating he gets a rash if he takes it twice daily.  - continue PTA aspirin, Plavix, Zetia, simvastatin, hydralazine, clonidine, metoprolol and Eliquis, lasix  - Follow I's and O's, and daily weights     Possible cognitive impairment  Possible depression  Daughter concerned about depression and also reports he has been mismanaging his insulin recently and has concerns regarding forgetfulness.   - discontinue Ambien  - outpatient cognitive eval with OT  - after discussion with prior rounder, started on Remeron 7.5mg at bedtime on 7/30. He slept good, but feeling very tired today. Will stop remeron     Insomnia  - Hold PTA Ambien as the patient is high risk for delirium.  - prn melatonin     History of alcohol abuse  Reports no alcohol use for >1 week     Gout   - continue allopurinol (dose reduced due to renal function)       Diet: Consistent Carb 75 grams CHO per Meal Diet    DVT Prophylaxis: Pneumatic Compression Devices  Flood Catheter: Not present  Central Lines: None  Code Status: No CPR- Do NOT Intubate      Disposition Plan   Expected discharge: 08/01/2021 recommended to transitional care unit once stable renal function, better control BG and stable LFT.     The patient's care was discussed with the Bedside Nurse and Patient.    Palak Wheeler MD  Hospitalist Service  [facility]  Securely message with the Vocera Web Console (learn more here)  Text page via [employer] Paging/Directory      Clinically Significant Risk Factors  Present on Admission               ______________________________________________________________________    Interval History   Patient reports he feels very tired today. States he did sleep fine last night. Of note, started on Remeron last night.   He otherwise denies n/v/abdominal pain, chest pain or shortness of breath. He is afebrile.     Data reviewed today: I reviewed all medications, new labs and imaging results over the last 24 hours. I personally reviewed no images or EKG's today.    Physical Exam   Vital Signs: Temp: 97.8  F (36.6  C) Temp src: Oral BP: 119/74 Pulse: 58   Resp: 16 SpO2: 95 % O2 Device: None (Room air)    Weight: 196 lbs 6.88 oz  General Appearance: Alert, awake and appropriate  Respiratory: clear to ausculat  Cardiovascular: regular rate and rhythm  GI: soft and non-tender  Skin: warm and dry      Data   Recent Labs   Lab 07/31/21  1146 07/31/21  0751 07/31/21  0746 07/30/21  0730 07/29/21  1602 07/29/21  0634 07/28/21  1438 07/28/21  1409 07/28/21  1237   WBC  --  7.1  --   --   --   --   --   --  7.1   HGB  --  11.2*  --   --   --   --   --   --  10.6*   MCV  --  86  --   --   --   --   --   --  87   PLT  --  276  --   --   --   --   --   --  223   NA  --  140  --  138  --  141   < >  --  135   POTASSIUM  --  3.9  --  3.9 4.6 3.3*   < >  --  3.8   CHLORIDE  --  110*  --  109  --  111*   < >  --  101   CO2  --  27  --  26  --  26   < >  --  23   BUN  --  58*  --  54*  --  51*   < >  --  57*   CR  --  4.12*  --  3.94*  --  3.80*   < >  --  4.31*   ANIONGAP  --  3  --  3  --  4   < >  --  11   LC  --  8.0*  --  8.5  --  8.6   < >  --  8.8   * 152* 131* 212*  --  135*  123*   < >  --  646*   ALBUMIN  --  1.9*  --  1.9*  --   --   --   --  2.4*   PROTTOTAL  --  5.4*  --  5.3*  --   --   --   --  6.1*   BILITOTAL  --  0.3  --  0.4  --   --   --   --  0.4   ALKPHOS  --  212*  --  203*  --   --   --   --  258*   ALT  --  101*  --  96*  --   --   --   --  52   AST  --  77*  --  115*  --    --   --   --  27   TROPONIN  --   --   --   --   --   --   --  0.068* 0.064*    < > = values in this interval not displayed.     Recent Results (from the past 24 hour(s))   US Abdomen Limited    Narrative    EXAM: US ABDOMEN LIMITED  LOCATION: [facility]  DATE/TIME: 7/30/2021 10:10 PM    INDICATION: Intermittent right upper quadrant abdominal pain with elevated liver function studies.  COMPARISON: None.  TECHNIQUE: Limited abdominal ultrasound.    FINDINGS:    GALLBLADDER: Normal. No gallstones, wall thickening, or pericholecystic fluid. Negative sonographic Velasquez's sign.    BILE DUCTS: No biliary dilatation. The common duct measures 4 mm.    LIVER: Normal parenchyma with smooth contour. No focal mass.    RIGHT KIDNEY: No hydronephrosis. 11 cm in length.    PANCREAS: Not visualized due to bowel gas.    Minimal right-sided pleural fluid. No ascites.      Impression    IMPRESSION:  1.  No overt hepatic abnormality.    2.  No calcified gallstones, gallbladder wall thickening or pericholecystic fluid.    3.  No biliary dilatation.    4.  No hydronephrosis.         Medications       allopurinol  200 mg Oral Daily     apixaban ANTICOAGULANT  2.5 mg Oral QAM     aspirin  81 mg Oral At Bedtime     cloNIDine  0.2 mg Oral BID     clopidogrel  75 mg Oral QAM     ezetimibe  10 mg Oral QAM     folic acid  1 mg Oral QAM     furosemide  40 mg Oral Daily     hydrALAZINE  25 mg Oral BID     insulin aspart  7 Units Subcutaneous TID w/meals     insulin aspart  1-10 Units Subcutaneous TID AC     insulin aspart  1-7 Units Subcutaneous At Bedtime     insulin glargine  20 Units Subcutaneous BID     melatonin  1 mg Oral At Bedtime     metoprolol tartrate  100 mg Oral BID     simvastatin  40 mg Oral At Bedtime

## 2021-07-31 NOTE — PLAN OF CARE
A&O x4. Pt tired this shift. Discussed with provider. Possible medication related. VSS HTN prior to antihypertensives on RA. Neuros intact. Lungs clear. BS+, BM-, flatus+. Tolerating CHO diet. - N/V. BGs (131, 234, 204). Voiding good UOP. denied pain. Up 1 assist.

## 2021-07-31 NOTE — PROGRESS NOTES
07/31/21 1600   Signing Clinician's Name / Credentials   Signing clinician's name / credentials Liliane Patton PT   Hooker Balance Scale (HOOKER MOSES, ALVARADO S, EMMANUEL SHEPHERD, TIFFANY, B: MEASURING BALANCE IN THE ELDERLY: VALIDATION OF AN INSTRUMENT. CAN. J. PUB. HEALTH, JULY/AUGUST SUPPLEMENT 2:S7-11, 1992.)   Sit To Stand 4   Standing Unsupported 4   Sitting Unsupported 4   Stand to Sit 4   Transfers 4   Standing with Eyes Closed 4   Standing Unsupported, Feet Together 4   Reach Forward With Outstretched Arm 3   Retrieve Object From Floor 1  (Needs to put hand on a stable object and then can. )   Turning to Look Behind 4   Turn 360 Degrees 2   Placing Alternate Foot on Stool (4-6 inches) 1   Unsupported Tandem Stand (Demonstrate to Subject) 3   One Leg Stand 1   Total Score (A score of 45 or less has been correlated with an increased risk of falls)   Total Score (out of 56) 43   Hooker Balance Scale (BBS) Cutoff Scores: A score of < 45/56 indicates an increased risk for falls.   Patient Score: 43/56    The BBS is a measure of static and dynamic standing balance that has been validated in community dwelling elderly individuals and individuals who have Parkinson's Disease, MS, and those who are s/p CVA and TBI. The test is administered without an assistive device. Scores from the Hooker are used to determine the probability of falling based on the patient's previous history of falls and their test performance.     Minimal Detectable Change = 6.5   & Minimal Detectable Change (Parkinson's Disease) = 5 according to Mo & Rogelioey 2008    Assessment (rationale for performing, application to patient s function & care plan): Impaired balance  Minutes billed as physical performance test: 15

## 2021-08-01 ENCOUNTER — APPOINTMENT (OUTPATIENT)
Dept: PHYSICAL THERAPY | Facility: CLINIC | Age: 76
DRG: 673 | End: 2021-08-01
Attending: HOSPITALIST
Payer: COMMERCIAL

## 2021-08-01 LAB
ALBUMIN SERPL-MCNC: 1.8 G/DL (ref 3.4–5)
ALP SERPL-CCNC: 247 U/L (ref 40–150)
ALT SERPL W P-5'-P-CCNC: 148 U/L (ref 0–70)
ANION GAP SERPL CALCULATED.3IONS-SCNC: 5 MMOL/L (ref 3–14)
AST SERPL W P-5'-P-CCNC: 128 U/L (ref 0–45)
BILIRUB SERPL-MCNC: 0.6 MG/DL (ref 0.2–1.3)
BUN SERPL-MCNC: 64 MG/DL (ref 7–30)
CALCIUM SERPL-MCNC: 7.9 MG/DL (ref 8.5–10.1)
CHLORIDE BLD-SCNC: 110 MMOL/L (ref 94–109)
CK SERPL-CCNC: 46 U/L (ref 30–300)
CO2 SERPL-SCNC: 26 MMOL/L (ref 20–32)
CREAT SERPL-MCNC: 4.36 MG/DL (ref 0.66–1.25)
GFR SERPL CREATININE-BSD FRML MDRD: 12 ML/MIN/1.73M2
GLUCOSE BLD-MCNC: 105 MG/DL (ref 70–99)
GLUCOSE BLDC GLUCOMTR-MCNC: 112 MG/DL (ref 70–99)
GLUCOSE BLDC GLUCOMTR-MCNC: 136 MG/DL (ref 70–99)
GLUCOSE BLDC GLUCOMTR-MCNC: 169 MG/DL (ref 70–99)
GLUCOSE BLDC GLUCOMTR-MCNC: 222 MG/DL (ref 70–99)
GLUCOSE BLDC GLUCOMTR-MCNC: 253 MG/DL (ref 70–99)
POTASSIUM BLD-SCNC: 3.7 MMOL/L (ref 3.4–5.3)
PROT SERPL-MCNC: 5.3 G/DL (ref 6.8–8.8)
SODIUM SERPL-SCNC: 141 MMOL/L (ref 133–144)

## 2021-08-01 PROCEDURE — 82550 ASSAY OF CK (CPK): CPT | Performed by: INTERNAL MEDICINE

## 2021-08-01 PROCEDURE — 250N000012 HC RX MED GY IP 250 OP 636 PS 637: Performed by: HOSPITALIST

## 2021-08-01 PROCEDURE — 250N000013 HC RX MED GY IP 250 OP 250 PS 637: Performed by: PHYSICIAN ASSISTANT

## 2021-08-01 PROCEDURE — 99233 SBSQ HOSP IP/OBS HIGH 50: CPT | Performed by: INTERNAL MEDICINE

## 2021-08-01 PROCEDURE — 36415 COLL VENOUS BLD VENIPUNCTURE: CPT | Performed by: INTERNAL MEDICINE

## 2021-08-01 PROCEDURE — 250N000013 HC RX MED GY IP 250 OP 250 PS 637: Performed by: HOSPITALIST

## 2021-08-01 PROCEDURE — 97530 THERAPEUTIC ACTIVITIES: CPT | Mod: GP | Performed by: PHYSICAL THERAPIST

## 2021-08-01 PROCEDURE — 80053 COMPREHEN METABOLIC PANEL: CPT | Performed by: INTERNAL MEDICINE

## 2021-08-01 PROCEDURE — 120N000013 HC R&B IMCU

## 2021-08-01 PROCEDURE — 250N000013 HC RX MED GY IP 250 OP 250 PS 637: Performed by: INTERNAL MEDICINE

## 2021-08-01 PROCEDURE — 97116 GAIT TRAINING THERAPY: CPT | Mod: GP | Performed by: PHYSICAL THERAPIST

## 2021-08-01 PROCEDURE — 99232 SBSQ HOSP IP/OBS MODERATE 35: CPT | Performed by: INTERNAL MEDICINE

## 2021-08-01 RX ORDER — FUROSEMIDE 20 MG
20 TABLET ORAL DAILY
Status: DISCONTINUED | OUTPATIENT
Start: 2021-08-02 | End: 2021-08-06 | Stop reason: HOSPADM

## 2021-08-01 RX ADMIN — POLYETHYLENE GLYCOL 3350 17 G: 17 POWDER, FOR SOLUTION ORAL at 10:14

## 2021-08-01 RX ADMIN — APIXABAN 2.5 MG: 2.5 TABLET, FILM COATED ORAL at 09:37

## 2021-08-01 RX ADMIN — INSULIN GLARGINE 20 UNITS: 100 INJECTION, SOLUTION SUBCUTANEOUS at 09:36

## 2021-08-01 RX ADMIN — INSULIN GLARGINE 20 UNITS: 100 INJECTION, SOLUTION SUBCUTANEOUS at 21:10

## 2021-08-01 RX ADMIN — CLONIDINE HYDROCHLORIDE 0.2 MG: 0.1 TABLET ORAL at 09:37

## 2021-08-01 RX ADMIN — ALLOPURINOL 200 MG: 100 TABLET ORAL at 09:37

## 2021-08-01 RX ADMIN — METOPROLOL TARTRATE 100 MG: 100 TABLET, FILM COATED ORAL at 21:09

## 2021-08-01 RX ADMIN — CLONIDINE HYDROCHLORIDE 0.2 MG: 0.1 TABLET ORAL at 21:09

## 2021-08-01 RX ADMIN — CLOPIDOGREL BISULFATE 75 MG: 75 TABLET ORAL at 09:38

## 2021-08-01 RX ADMIN — FOLIC ACID 1 MG: 1 TABLET ORAL at 09:37

## 2021-08-01 RX ADMIN — Medication 1 MG: at 21:10

## 2021-08-01 RX ADMIN — METOPROLOL TARTRATE 100 MG: 100 TABLET, FILM COATED ORAL at 09:38

## 2021-08-01 RX ADMIN — HYDRALAZINE HYDROCHLORIDE 25 MG: 25 TABLET ORAL at 09:37

## 2021-08-01 RX ADMIN — SENNOSIDES 2 TABLET: 8.6 TABLET, FILM COATED ORAL at 09:38

## 2021-08-01 RX ADMIN — ASPIRIN 81 MG: 81 TABLET, COATED ORAL at 21:10

## 2021-08-01 RX ADMIN — INSULIN ASPART 2 UNITS: 100 INJECTION, SOLUTION INTRAVENOUS; SUBCUTANEOUS at 17:40

## 2021-08-01 RX ADMIN — FUROSEMIDE 40 MG: 40 TABLET ORAL at 09:38

## 2021-08-01 ASSESSMENT — ACTIVITIES OF DAILY LIVING (ADL)
ADLS_ACUITY_SCORE: 20
ADLS_ACUITY_SCORE: 20
ADLS_ACUITY_SCORE: 18
ADLS_ACUITY_SCORE: 18
ADLS_ACUITY_SCORE: 20
ADLS_ACUITY_SCORE: 20

## 2021-08-01 ASSESSMENT — MIFFLIN-ST. JEOR: SCORE: 1595.5

## 2021-08-01 NOTE — PROGRESS NOTES
[facility]    Medicine Progress Note - Hospitalist Service       Date of Admission:  7/28/2021    Assessment & Plan           Gordon Gann is a 76 year old male admitted on 7/28/2021.  Past history DM II on insulin, depression, alcohol abuse, hypertension, chronic kidney disease stage V, chronic atrial fibrillation, who presents as a direct admission from [facility], where he was brought in by family with generalized weakness, confusion, and increased urination, found to have mild diabetic ketoacidosis.        Diabetic ketoacidosis, resolved  DM II on long term insulin, uncontrolled  [Lantus 30 units at bedtime and novolog 15 units t.i.d. with meals]  Recent history of non-compliance with insulin, admission A1C 11.7%.  Initial glucose >600, ketones elevated with acidosis and borderline elevated gap, improved with IVF and insulin gtt.   - AM  this morning  - continue with lantus to 20 units bid  - continue Aspart to 7 units TID, continue with sliding scale insulin   - mod carb diet     Elevated transaminase/alk phos  LFT's up from admission on 7/30 and reporting intermittent RUQ pain though currently non-tender on exam- RUQ US without acute finding.  Currently patient without abdominal pain, nausea or vomiting. Etiology not clear at this time  - ALT 96> 101->148, >77->128, alk phos also increased  - check CK  - hold PTA Zetia/statin at this time. Not on acetaminophen  - CMP in AM     Hypokalemia  Hypophosphatemia  - normalized with supplementation  - no protocol due to renal function     Anasarca and bilateral peripheral edema  Possible type II NSTEMI  Serial trop mildly elevated but flat 0.06, EKG with non-specific ST changes.  Pro-BNP >8400.  CXR negative for edema.  TTE with EF 50-55%, septal motion consistent with conduction abnormality, no valvular disease.   - continue tele for now  - continue PTA lasix     CKD stage V  It appears that his nephrologist has been  discussing dialysis (he remains undecided). Patient just moved from [place] and does not have established nephrologist locally.  Baseline creatinine is in the upper-3 to low-4 range. Reviewed his nephrologist note from 5/4 in care  Everywhere, labs from 5/4 listed as cr of 4.16.  - Cr 3.7>3.8>>4.12>4.36  - will consult nephrology for further recommendations given uptrend creatine since admission     Chronic A-fib  CAD s/p PCI 2006  HTN  HLD  Further details unknown of underlying CAD.  Pharmacy confirms the patient only takes Eliquis once daily, stating he gets a rash if he takes it twice daily.  - continue PTA aspirin, Plavix, Zetia, simvastatin, hydralazine, clonidine, metoprolol and Eliquis, lasix  - Follow I's and O's, and daily weights     Possible cognitive impairment  Possible depression  Daughter concerned about depression and also reports he has been mismanaging his insulin recently and has concerns regarding forgetfulness.   - discontinue Ambien  - outpatient cognitive eval with OT  - after discussion with prior rounder, started on Remeron 7.5mg at bedtime on 7/30. He slept good, however remained tired most of the day on 7/31. Remeron stopped.      Insomnia  - resume PTA Ambien at lower dose 2.5mg at bedtime prn per patient request. He reports he is not sleeping well  - prn melatonin     History of alcohol abuse  Reports no alcohol use for >1 week     Gout   - continue allopurinol (dose reduced due to renal function)       Diet: Consistent Carb 75 grams CHO per Meal Diet    DVT Prophylaxis: Pneumatic Compression Devices  Flood Catheter: Not present  Central Lines: None  Code Status: No CPR- Do NOT Intubate      Disposition Plan   Expected discharge: 08/01/2021 recommended to transitional care unit once stable/improving transaminase, stable renal function.     The patient's care was discussed with the Bedside Nurse and Patient.    Palak Wheeler MD  Hospitalist Service  [facility]  Hospital  Securely message with the Vocera Web Console (learn more here)  Text page via [employer] Paging/Directory      Clinically Significant Risk Factors Present on Admission                ______________________________________________________________________    Interval History   Patient reports he did not sleep well last night, requests to have ambien restarted. Denies chest pain or shortness of breath. Denies n/v,abdominal pain,fever or chills.     Data reviewed today: I reviewed all medications, new labs and imaging results over the last 24 hours. I personally reviewed no images or EKG's today.    Physical Exam   Vital Signs: Temp: 97.7  F (36.5  C) Temp src: Oral BP: (!) 145/99 Pulse: 57   Resp: 16 SpO2: 96 % O2 Device: None (Room air)    Weight: 195 lbs 5.24 oz  General Appearance: Alert, awake and appropriate  Respiratory: clear to ausculat  Cardiovascular: regular rate and rhythm, +LE edema  GI: soft and non-tender  Skin: warm and dry      Data   Recent Labs   Lab 08/01/21  0748 08/01/21  0743 08/01/21  0205 07/31/21  0751 07/30/21  0730 07/28/21  1438 07/28/21  1409 07/28/21  1237   WBC  --   --   --  7.1  --   --   --  7.1   HGB  --   --   --  11.2*  --   --   --  10.6*   MCV  --   --   --  86  --   --   --  87   PLT  --   --   --  276  --   --   --  223   NA  --  141  --  140 138   < >  --  135   POTASSIUM  --  3.7  --  3.9 3.9   < >  --  3.8   CHLORIDE  --  110*  --  110* 109   < >  --  101   CO2  --  26  --  27 26   < >  --  23   BUN  --  64*  --  58* 54*   < >  --  57*   CR  --  4.36*  --  4.12* 3.94*   < >  --  4.31*   ANIONGAP  --  5  --  3 3   < >  --  11   LC  --  7.9*  --  8.0* 8.5   < >  --  8.8   * 105* 222* 152* 212*   < >  --  646*   ALBUMIN  --  1.8*  --  1.9* 1.9*  --   --  2.4*   PROTTOTAL  --  5.3*  --  5.4* 5.3*  --   --  6.1*   BILITOTAL  --  0.6  --  0.3 0.4  --   --  0.4   ALKPHOS  --  247*  --  212* 203*  --   --  258*   ALT  --  148*  --  101* 96*  --   --  52   AST  --  128*   --  77* 115*  --   --  27   TROPONIN  --   --   --   --   --   --  0.068* 0.064*    < > = values in this interval not displayed.     No results found for this or any previous visit (from the past 24 hour(s)).  Medications       allopurinol  200 mg Oral Daily     apixaban ANTICOAGULANT  2.5 mg Oral QAM     aspirin  81 mg Oral At Bedtime     cloNIDine  0.2 mg Oral BID     clopidogrel  75 mg Oral QAM     [Held by provider] ezetimibe  10 mg Oral QAM     folic acid  1 mg Oral QAM     furosemide  40 mg Oral Daily     hydrALAZINE  25 mg Oral BID     insulin aspart  7 Units Subcutaneous TID w/meals     insulin aspart  1-10 Units Subcutaneous TID AC     insulin aspart  1-7 Units Subcutaneous At Bedtime     insulin glargine  20 Units Subcutaneous BID     melatonin  1 mg Oral At Bedtime     metoprolol tartrate  100 mg Oral BID     [Held by provider] simvastatin  40 mg Oral At Bedtime

## 2021-08-01 NOTE — PLAN OF CARE
A&O x4, VSS on RA. Pt up with assist of 1 GB/W. Pt denies pain. Voiding adequately. CMS intact. Tolerating consistent CHO diet. Tele afib CVR. Pt has been tired this shift. +BS, + flatus, -BM this shift.

## 2021-08-01 NOTE — PLAN OF CARE
8/1/2021 [id number]  A&O X4; flat affect. VSS on RA, bradycardic at times. Combo renal diet. Ax1 gb and walker, little more unsteady today. Encouraged to ambulate. Edema BLE +2/3, pitting. Encouraged to elevate legs and have daughter bring compression stockings. Denies pain. Continent, up to bathroom. Constipated - gave PRN Senna and Miralax. PIV SL. , 136, 169. , . Tele Afiv SVR. Neuros intact. Skin pale, CDI, bruising and scabs to BUE. CMS intact, pulses present - weak in feet. Nephrology consulted today. Pending liver labs in morning then planned to discharge to TCU. PT/OT/SW following.

## 2021-08-01 NOTE — CONSULTS
Consult Date: 08/01/2021    REFERRING PHYSICIAN: Hospitalist    CONSULTANT:  Dontae Roger MD    REASON FOR CONSULTATION:  Chronic kidney disease stage V.    HISTORY OF PRESENT ILLNESS:  This is a 76-year-old admitted on 07/28/2021, 4 days ago with DKA.  He was treated with IV fluids and insulin and this resolved.  He apparently has been drinking heavily and is noncompliant with his medications.  He has chronic kidney disease stage V.  He saw a nephrologist in [place] before he moved here who told him he would need dialysis and was considering placing a fistula.  This was not done, however.  The patient now lives with his daughter in [place].  Prior to admission, he was on clonidine, metoprolol, Lasix and hydralazine.    PAST MEDICAL HISTORY:  Includes alcoholism, hypertension, atrial fibrillation, ASCVD status post stents, diabetes and gout.    SURGICAL HISTORY:  Includes Mohs surgery, TURP, parotid surgery.  Currently, he is on clonidine, hydralazine, metoprolol and also on Lasix 40 mg per day.    SOCIAL HISTORY:  He lives with his daughter.  He drinks heavily.  He does not smoke.    FAMILY HISTORY:  Father had heart disease.  Mother had an aneurysm.    PHYSICAL EXAMINATION:    GENERAL:  He is alert.  He is lying comfortably in bed.  His weight is 87.0 kilograms on admission and 88.6 kilograms today.  VITAL SIGNS:  His blood pressure is 111/69, respiratory rate 16, pulse 65.  SKIN:  Negative.  HEENT:  Head shows no trauma.  The eyes show pupils, round and react to light.  Mouth shows good dentition.  Posterior pharynx is clear.  NECK:  Supple.  LUNGS:  Clear breath sounds.  CARDIOVASCULAR:  No jugular venous distention.  Regular rate and rhythm, normal S1, S2, no murmur.  ABDOMEN:  Normal bowel sounds, soft and nontender.  No bruit.  EXTREMITIES:  1+ edema in the lower extremities, good pulses.  Upper extremities shows normal nails, joints and pulses.  NEUROLOGIC:  Speech is slow, but intact.  Cranial  nerves appear symmetric.    LABORATORY DATA:  Today, his creatinine is 4.36, estimated GFR 12, potassium 3.7, bicarbonate 26.  Sodium 141.  Albumin 1.8.  He has abnormal liver function tests.  On admission, his creatinine was 3.66, estimated GFR 15.  A urinalysis showed 300 protein, small blood.  Ultrasound done to evaluate his liver showed right kidney 11.0 cm and no hydronephrosis.    IMPRESSION:    1.  This patient has chronic kidney disease stage V with proteinuria.  This is almost certainly due to diabetes and hypertension.  We will put him on a renal diet.  I am going to decrease his Lasix to 20 mg per day and stop the hydralazine.  He will continue on clonidine, metoprolol, and the lower dose of Lasix for blood pressure control.  Most importantly, he needs follow up in our office 2 weeks after discharge for establishment of care and transition smoothly into end-stage renal disease and hemodialysis.  Given his alcoholism, he would not be a candidate for peritoneal dialysis and given his advanced age, he would not be a candidate for transplant.  2.  Hypertension.  Again, I am just adjusting his meds and decreasing his diuretics.    Dontae Roger MD        D: 2021   T: 2021   MT: [id number]    Name:     JULIO RIZVI  MRN:      [id number]        Account:      [id number]   :      1945           Consult Date: 2021     Document: [id number]

## 2021-08-01 NOTE — PROGRESS NOTES
Care Management Follow Up    Length of Stay (days): 4    Expected Discharge Date: 08/01/2021  Expected Time of Departure: Unknown, but pt's daughter would like to provide transport  Concerns to be Addressed: discharge planning  Pt recently moved from [place] to [place]. Pt lives with his marva in her home in [place].  Patient plan of care discussed at interdisciplinary rounds: Yes    Anticipated Discharge Disposition: Transitional Care  Disposition Comments: pt for pt to discharge to TCU.  Anticipated Discharge Services: None  Anticipated Discharge DME: None    Patient/family educated on Medicare website which has current facility and service quality ratings: yes  Education Provided on the Discharge Plan:  yes  Patient/Family in Agreement with the Plan: yes    Referrals Placed by CM/SW: Senior Linkage Line, Post Acute Facilities  Private pay costs discussed: Not applicable    Additional Information:  Received a call back from [facility].  They can accept patient tomorrow.  Since patient has [employer] Out of State he will likely have a co-pay if he stays longer than 20 days.  She is also asking if the family can get their hands on the COVID vaccine card since he was vaccinated in [place] and it is not in the [facility] site.  Call placed to patient's daughter to update her that [facility] can accept patient, however he may be more appropriate for homecare.  Explained that we will see how well he does in therapy today.  Explained that yesterday afternoon he did not do as well in therapy.  Patient's daughter is in agreement.  Also asked about the vaccine card.  Patient's daughter states she will look for it and let us know.  Explained that we will follow up with her tomorrow regarding the discharge plan.    Will continue to follow.      ZULEMA Palma, [facility]    [phone]

## 2021-08-02 ENCOUNTER — APPOINTMENT (OUTPATIENT)
Dept: OCCUPATIONAL THERAPY | Facility: CLINIC | Age: 76
DRG: 673 | End: 2021-08-02
Attending: HOSPITALIST
Payer: COMMERCIAL

## 2021-08-02 ENCOUNTER — APPOINTMENT (OUTPATIENT)
Dept: PHYSICAL THERAPY | Facility: CLINIC | Age: 76
DRG: 673 | End: 2021-08-02
Attending: HOSPITALIST
Payer: COMMERCIAL

## 2021-08-02 LAB
ALBUMIN SERPL-MCNC: 2 G/DL (ref 3.4–5)
ALP SERPL-CCNC: 288 U/L (ref 40–150)
ALT SERPL W P-5'-P-CCNC: 199 U/L (ref 0–70)
ANION GAP SERPL CALCULATED.3IONS-SCNC: 5 MMOL/L (ref 3–14)
AST SERPL W P-5'-P-CCNC: 181 U/L (ref 0–45)
BILIRUB SERPL-MCNC: 0.2 MG/DL (ref 0.2–1.3)
BUN SERPL-MCNC: 64 MG/DL (ref 7–30)
CALCIUM SERPL-MCNC: 8.3 MG/DL (ref 8.5–10.1)
CHLORIDE BLD-SCNC: 107 MMOL/L (ref 94–109)
CO2 SERPL-SCNC: 26 MMOL/L (ref 20–32)
CREAT SERPL-MCNC: 4.26 MG/DL (ref 0.66–1.25)
CREAT UR-MCNC: 109 MG/DL
FERRITIN SERPL-MCNC: 410 NG/ML (ref 26–388)
FERRITIN SERPL-MCNC: 420 NG/ML (ref 26–388)
GFR SERPL CREATININE-BSD FRML MDRD: 13 ML/MIN/1.73M2
GLUCOSE BLD-MCNC: 68 MG/DL (ref 70–99)
GLUCOSE BLDC GLUCOMTR-MCNC: 113 MG/DL (ref 70–99)
GLUCOSE BLDC GLUCOMTR-MCNC: 148 MG/DL (ref 70–99)
GLUCOSE BLDC GLUCOMTR-MCNC: 156 MG/DL (ref 70–99)
GLUCOSE BLDC GLUCOMTR-MCNC: 189 MG/DL (ref 70–99)
GLUCOSE BLDC GLUCOMTR-MCNC: 96 MG/DL (ref 70–99)
HAV IGM SERPL QL IA: NONREACTIVE
HBV SURFACE AB SERPL IA-ACNC: 0.96 M[IU]/ML
HBV SURFACE AG SERPL QL IA: NONREACTIVE
HCV AB SERPL QL IA: NONREACTIVE
INR PPP: 0.99 (ref 0.85–1.15)
IRON SATN MFR SERPL: 14 % (ref 15–46)
IRON SATN MFR SERPL: 16 % (ref 15–46)
IRON SERPL-MCNC: 31 UG/DL (ref 35–180)
IRON SERPL-MCNC: 35 UG/DL (ref 35–180)
PHOSPHATE SERPL-MCNC: 6.2 MG/DL (ref 2.5–4.5)
POTASSIUM BLD-SCNC: 3.7 MMOL/L (ref 3.4–5.3)
PROT SERPL-MCNC: 5.7 G/DL (ref 6.8–8.8)
PROT UR-MCNC: 5.73 G/L
PROT/CREAT 24H UR: 5.26 G/G CR (ref 0–0.2)
SODIUM SERPL-SCNC: 138 MMOL/L (ref 133–144)
TIBC SERPL-MCNC: 224 UG/DL (ref 240–430)
TIBC SERPL-MCNC: 224 UG/DL (ref 240–430)

## 2021-08-02 PROCEDURE — 86803 HEPATITIS C AB TEST: CPT | Performed by: NURSE PRACTITIONER

## 2021-08-02 PROCEDURE — 82784 ASSAY IGA/IGD/IGG/IGM EACH: CPT | Performed by: NURSE PRACTITIONER

## 2021-08-02 PROCEDURE — 250N000012 HC RX MED GY IP 250 OP 636 PS 637: Performed by: INTERNAL MEDICINE

## 2021-08-02 PROCEDURE — 250N000013 HC RX MED GY IP 250 OP 250 PS 637: Performed by: HOSPITALIST

## 2021-08-02 PROCEDURE — 97535 SELF CARE MNGMENT TRAINING: CPT | Mod: GO

## 2021-08-02 PROCEDURE — 97530 THERAPEUTIC ACTIVITIES: CPT | Mod: GO

## 2021-08-02 PROCEDURE — 82728 ASSAY OF FERRITIN: CPT | Performed by: INTERNAL MEDICINE

## 2021-08-02 PROCEDURE — 84100 ASSAY OF PHOSPHORUS: CPT | Performed by: INTERNAL MEDICINE

## 2021-08-02 PROCEDURE — 86709 HEPATITIS A IGM ANTIBODY: CPT | Performed by: NURSE PRACTITIONER

## 2021-08-02 PROCEDURE — 83516 IMMUNOASSAY NONANTIBODY: CPT | Performed by: NURSE PRACTITIONER

## 2021-08-02 PROCEDURE — 97110 THERAPEUTIC EXERCISES: CPT | Mod: GP | Performed by: PHYSICAL THERAPIST

## 2021-08-02 PROCEDURE — 99232 SBSQ HOSP IP/OBS MODERATE 35: CPT | Performed by: INTERNAL MEDICINE

## 2021-08-02 PROCEDURE — 250N000013 HC RX MED GY IP 250 OP 250 PS 637: Performed by: INTERNAL MEDICINE

## 2021-08-02 PROCEDURE — 84075 ASSAY ALKALINE PHOSPHATASE: CPT | Performed by: NURSE PRACTITIONER

## 2021-08-02 PROCEDURE — 82040 ASSAY OF SERUM ALBUMIN: CPT | Performed by: INTERNAL MEDICINE

## 2021-08-02 PROCEDURE — 120N000013 HC R&B IMCU

## 2021-08-02 PROCEDURE — 87340 HEPATITIS B SURFACE AG IA: CPT | Performed by: NURSE PRACTITIONER

## 2021-08-02 PROCEDURE — 99233 SBSQ HOSP IP/OBS HIGH 50: CPT | Performed by: INTERNAL MEDICINE

## 2021-08-02 PROCEDURE — 36415 COLL VENOUS BLD VENIPUNCTURE: CPT | Performed by: NURSE PRACTITIONER

## 2021-08-02 PROCEDURE — 36415 COLL VENOUS BLD VENIPUNCTURE: CPT | Performed by: INTERNAL MEDICINE

## 2021-08-02 PROCEDURE — 83550 IRON BINDING TEST: CPT | Performed by: INTERNAL MEDICINE

## 2021-08-02 PROCEDURE — 82728 ASSAY OF FERRITIN: CPT | Performed by: NURSE PRACTITIONER

## 2021-08-02 PROCEDURE — 86706 HEP B SURFACE ANTIBODY: CPT | Performed by: NURSE PRACTITIONER

## 2021-08-02 PROCEDURE — 85610 PROTHROMBIN TIME: CPT | Performed by: NURSE PRACTITIONER

## 2021-08-02 PROCEDURE — 250N000013 HC RX MED GY IP 250 OP 250 PS 637: Performed by: PHYSICIAN ASSISTANT

## 2021-08-02 PROCEDURE — 83550 IRON BINDING TEST: CPT | Performed by: NURSE PRACTITIONER

## 2021-08-02 PROCEDURE — 84156 ASSAY OF PROTEIN URINE: CPT | Performed by: INTERNAL MEDICINE

## 2021-08-02 PROCEDURE — 97116 GAIT TRAINING THERAPY: CPT | Mod: GP | Performed by: PHYSICAL THERAPIST

## 2021-08-02 RX ORDER — LORAZEPAM 0.5 MG/1
0.25 TABLET ORAL ONCE
Status: DISCONTINUED | OUTPATIENT
Start: 2021-08-02 | End: 2021-08-06 | Stop reason: HOSPADM

## 2021-08-02 RX ADMIN — METOPROLOL TARTRATE 100 MG: 100 TABLET, FILM COATED ORAL at 08:12

## 2021-08-02 RX ADMIN — POLYETHYLENE GLYCOL 3350 17 G: 17 POWDER, FOR SOLUTION ORAL at 08:11

## 2021-08-02 RX ADMIN — ALLOPURINOL 200 MG: 100 TABLET ORAL at 08:12

## 2021-08-02 RX ADMIN — CLOPIDOGREL BISULFATE 75 MG: 75 TABLET ORAL at 08:12

## 2021-08-02 RX ADMIN — APIXABAN 2.5 MG: 2.5 TABLET, FILM COATED ORAL at 08:12

## 2021-08-02 RX ADMIN — METOPROLOL TARTRATE 100 MG: 100 TABLET, FILM COATED ORAL at 21:35

## 2021-08-02 RX ADMIN — CLONIDINE HYDROCHLORIDE 0.2 MG: 0.1 TABLET ORAL at 21:35

## 2021-08-02 RX ADMIN — FUROSEMIDE 20 MG: 20 TABLET ORAL at 08:12

## 2021-08-02 RX ADMIN — FOLIC ACID 1 MG: 1 TABLET ORAL at 08:12

## 2021-08-02 RX ADMIN — SENNOSIDES 2 TABLET: 8.6 TABLET, FILM COATED ORAL at 08:12

## 2021-08-02 RX ADMIN — Medication 1 MG: at 21:34

## 2021-08-02 RX ADMIN — INSULIN ASPART 2 UNITS: 100 INJECTION, SOLUTION INTRAVENOUS; SUBCUTANEOUS at 18:17

## 2021-08-02 RX ADMIN — CLONIDINE HYDROCHLORIDE 0.2 MG: 0.1 TABLET ORAL at 08:12

## 2021-08-02 RX ADMIN — INSULIN GLARGINE 25 UNITS: 100 INJECTION, SOLUTION SUBCUTANEOUS at 21:37

## 2021-08-02 RX ADMIN — ASPIRIN 81 MG: 81 TABLET, COATED ORAL at 21:35

## 2021-08-02 ASSESSMENT — ACTIVITIES OF DAILY LIVING (ADL)
ADLS_ACUITY_SCORE: 18

## 2021-08-02 NOTE — PROGRESS NOTES
[facility]    Medicine Progress Note - Hospitalist Service       Date of Admission:  7/28/2021    Assessment & Plan           Gordon Gann is a 76 year old male admitted on 7/28/2021.  Past history DM II on insulin, depression, alcohol abuse, hypertension, chronic kidney disease stage V, chronic atrial fibrillation, who presents as a direct admission from [facility], where he was brought in by family with generalized weakness, confusion, and increased urination, found to have mild diabetic ketoacidosis.        Diabetic ketoacidosis, resolved  DM II on long term insulin, uncontrolled  [Lantus 30 units at bedtime and novolog 15 units t.i.d. with meals]  Recent history of non-compliance with insulin, admission A1C 11.7%.  Initial glucose >600, ketones elevated with acidosis and borderline elevated gap, improved with IVF and insulin gtt.   -Lantus adjusted to 20 units twice daily this hospital stay, hypoglycemic episode this morning down to 68.  Hold a.m. dose of Lantus.  We will schedule Lantus 25 units at bedtime  - continue Aspart to 7 units TID, continue with sliding scale insulin   -mod carb diet  -Monitor for hypoglycemia     Elevated transaminase/alk phos  LFT's up from admission on 7/30 and reporting intermittent RUQ pain though currently non-tender on exam- RUQ US without acute finding.  Currently patient without abdominal pain, nausea or vomiting. Etiology not clear at this time.  CK is normal  -AST/ALT/alk phos with upward trend  -GI consulted, appreciate assistance  -MRCP ordered, patient initially refused, after discussion agreed to proceed with MRCP.  Will give lorazepam 0.25 mg 30 minutes before MRI  -Hepatitis panel, mitochondrial antibody, ferritin, tissue transglutaminase antibody have been ordered per GI  - hold PTA Zetia/statin at this time. Not on acetaminophen  -Follow labs in a.m.     Hypokalemia  Hypophosphatemia  - normalized with supplementation  - no protocol  due to renal function     Anasarca and bilateral peripheral edema  Possible type II NSTEMI  Serial trop mildly elevated but flat 0.06, EKG with non-specific ST changes.  Pro-BNP >8400.  CXR negative for edema.  TTE with EF 50-55%, septal motion consistent with conduction abnormality, no valvular disease.   - continue tele for now  - PTA Lasix 40 mg daily reduced to 20 mg daily per nephrology     CKD stage V  It appears that his nephrologist has been discussing dialysis (he remains undecided). Patient just moved from [place] and does not have established nephrologist locally.  Baseline creatinine is in the upper-3 to low-4 range. Reviewed his nephrologist note from 5/4 in care  Everywhere, labs from 5/4 listed as cr of 4.16.  - Cr 3.7>3.8>>4.12>4.36-4.26-stable  -Appreciate nephrology consultation, reduced Lasix dose.  Hydralazine stopped.  Other PTA medications resumed as below  - will need nephrology clinic follow-up in 2 weeks after discharge     Chronic A-fib  CAD s/p PCI 2006  HTN  HLD  Further details unknown of underlying CAD.  Pharmacy confirms the patient only takes Eliquis once daily, stating he gets a rash if he takes it twice daily.  - continue PTA aspirin, Plavix, clonidine, metoprolol and Eliquis  -As above Lasix reduced to 20 mg daily  -Zetia and simvastatin on hold at this time given rising transaminase levels  - Follow I's and O's, and daily weights     Possible cognitive impairment  Possible depression  Daughter concerned about depression and also reports he has been mismanaging his insulin recently and has concerns regarding forgetfulness.   - outpatient cognitive eval with OT  - after discussion with prior rounder, started on Remeron 7.5mg at bedtime on 7/30. He slept good, however remained tired most of the day on 7/31. Remeron stopped.      Insomnia  - resume PTA Ambien at lower dose 2.5mg at bedtime prn per patient request. He reports he is not sleeping well  - prn melatonin     History of alcohol  abuse  Reports no alcohol use for >1 week     Gout   - continue allopurinol (dose reduced due to renal function)       Diet: Combination Diet Renal Diet    DVT Prophylaxis: Pneumatic Compression Devices  Flood Catheter: Not present  Central Lines: None  Code Status: No CPR- Do NOT Intubate      Disposition Plan   Expected discharge: 08/02/2021 recommended to transitional care unit once stable/improving transaminase, MRCP of the abdomen.     The patient's care was discussed with the Bedside Nurse, Care Coordinator/ and Patient.    Palak Wheeler MD  Hospitalist Service  [facility]  Securely message with the Vocera Web Console (learn more here)  Text page via [employer] Paging/Directory      Clinically Significant Risk Factors Present on Admission                ______________________________________________________________________    Interval History   Patient denies chest pain, shortness of breath, nausea, vomiting or abdominal pain.  He is afebrile.    Patient initially declined MRCP.  He reports he did not like the tubing when he had MRI previously.  Discussed about trial of lorazepam and that attempting to do MRCP.  He has agreed to to do the MRCP  Data reviewed today: I reviewed all medications, new labs and imaging results over the last 24 hours. I personally reviewed no images or EKG's today.    Physical Exam   Vital Signs: Temp: 98.6  F (37  C) Temp src: Oral BP: 123/82 Pulse: 64   Resp: 18 SpO2: 96 % O2 Device: None (Room air)    Weight: 195 lbs 5.24 oz  General Appearance: Alert, awake and appropriate  Respiratory: clear to ausculat  Cardiovascular: regular rate and rhythm, +LE edema  GI: soft and non-tender  Skin: warm and dry      Data   Recent Labs   Lab 08/02/21  1152 08/02/21  0859 08/02/21  0734 08/01/21  0743 07/31/21  0751 07/28/21  1438 07/28/21  1409 07/28/21  1237   WBC  --   --   --   --  7.1  --   --  7.1   HGB  --   --   --   --  11.2*  --   --  10.6*    MCV  --   --   --   --  86  --   --  87   PLT  --   --   --   --  276  --   --  223   NA  --   --  138 141 140   < >  --  135   POTASSIUM  --   --  3.7 3.7 3.9   < >  --  3.8   CHLORIDE  --   --  107 110* 110*   < >  --  101   CO2  --   --  26 26 27   < >  --  23   BUN  --   --  64* 64* 58*   < >  --  57*   CR  --   --  4.26* 4.36* 4.12*   < >  --  4.31*   ANIONGAP  --   --  5 5 3   < >  --  11   LC  --   --  8.3* 7.9* 8.0*   < >  --  8.8   * 96 68* 105* 152*   < >  --  646*   ALBUMIN  --   --  2.0* 1.8* 1.9*   < >  --  2.4*   PROTTOTAL  --   --  5.7* 5.3* 5.4*   < >  --  6.1*   BILITOTAL  --   --  0.2 0.6 0.3   < >  --  0.4   ALKPHOS  --   --  288* 247* 212*   < >  --  258*   ALT  --   --  199* 148* 101*   < >  --  52   AST  --   --  181* 128* 77*   < >  --  27   TROPONIN  --   --   --   --   --   --  0.068* 0.064*    < > = values in this interval not displayed.     No results found for this or any previous visit (from the past 24 hour(s)).  Medications       allopurinol  200 mg Oral Daily     apixaban ANTICOAGULANT  2.5 mg Oral QAM     aspirin  81 mg Oral At Bedtime     cloNIDine  0.2 mg Oral BID     clopidogrel  75 mg Oral QAM     folic acid  1 mg Oral QAM     furosemide  20 mg Oral Daily     insulin aspart  7 Units Subcutaneous TID w/meals     insulin aspart  1-10 Units Subcutaneous TID AC     insulin aspart  1-7 Units Subcutaneous At Bedtime     [Held by provider] insulin glargine  20 Units Subcutaneous BID     melatonin  1 mg Oral At Bedtime     metoprolol tartrate  100 mg Oral BID

## 2021-08-02 NOTE — PROGRESS NOTES
Care Management Follow Up    Length of Stay (days): 5    Expected Discharge Date: 08/02/2021  Expected Time of Departure: Unknown, but pt's daughter would like to provide transport  Concerns to be Addressed: discharge planning  Pt recently moved from [place] to [place]. Pt lives with his marva in her home in [place].  Patient plan of care discussed at interdisciplinary rounds: Yes    Anticipated Discharge Disposition: Transitional Care  Disposition Comments: pt for pt to discharge to TCU.  Anticipated Discharge Services: None  Anticipated Discharge DME: None    Patient/family educated on Medicare website which has current facility and service quality ratings: yes  Education Provided on the Discharge Plan:    Patient/Family in Agreement with the Plan: yes    Referrals Placed by CM/SW: Senior Linkage Line, Post Acute Facilities  Private pay costs discussed: Not applicable    Additional Information:  HENOK received a call from Hina at [facility] reporting they can take pt, but are wondering what the plan for discharge is. HENOK expressed that TCU is the current recommendation. Hina reports she would start auth now, but it may take a few days since it is out-of-state insurance. HENOK called pt's dtr and jacquelyn SESAY. HENOK went to update pt on placement. Pt was receptive to placement, but seemed a little upset he could not leave today.        LIANA Ballard

## 2021-08-02 NOTE — PROGRESS NOTES
Renal Medicine Progress Note                                Gordon Gann MRN# [id number]   Age: 76 year old YOB: 1945   Date of Admission: 7/28/2021 Hospital LOS: 5                  Assessment/Plan:       76 year old male admitted on 7/28/2021.  Past history DM II on insulin, depression, alcohol abuse, hypertension, chronic kidney disease stage V, chronic atrial fibrillation, who presents as a direct admission from [facility], where he was brought in by family with generalized weakness, confusion, and increased urination, found to have mild diabetic ketoacidosis    Seen for evaluation of elevated creatinine      1.  CKD V   -eGFR 15 ml/min range   -presumed diabetic nephropathy   -progressive   -willing to proceed to dialysis as indicated   2.  Proteinuria   -no quantification  3.  HTN   -moderate control   4.  Anemia  5.  Secondary hyperparathyroidism  6.  DM   -poor control   -A1C 11% range   7.  DKA   -resolved  8.  Hypoalbuminemia  9.  ? ARF component      PCR  Fe studies  PTH and vitamin D   Watch creatinine on  Diuretic    Consider vascular surgery evaluation for AVF          Interval History:     Up in chair follows  Flat  IO and urine reviewed    Labs reviewed  Recent hospitalization in [place] reviewed  Nephrology evaluation at that time    GN workup negative      ROS:     GENERAL: NAD, No fever,chills  R: NEGATIVE for significant cough or SOB  CV: NEGATIVE for chest pain, palpitations  EXT: no change edema  ROS otherwise negative    Medications and Allergies:     Reviewed    Physical Exam:     Vitals were reviewed  Patient Vitals for the past 8 hrs:   BP Temp Temp src Pulse Resp SpO2   08/02/21 1151 123/82 98.6  F (37  C) Oral 64 18 96 %   08/02/21 0848 (!) 177/94 -- -- 85 -- --   08/02/21 0727 137/78 97.8  F (36.6  C) Oral 62 16 95 %     I/O last 3 completed shifts:  In: 360 [P.O.:360]  Out: 450 [Urine:450]    Vitals:    07/28/21 2100 07/31/21 0550 08/01/21 0610   Weight: 87 kg (191 lb  12.8 oz) 89.1 kg (196 lb 6.9 oz) 88.6 kg (195 lb 5.2 oz)         GENERAL: awake, alert, follows  HEENT: NC/AT, PERRLA, EOMI, non icteric, pharynx moist without lesion  RESP:  clear anteriorly  CV: RRR, normal S1 S2  ABDOMEN: soft, nontender, no HSM or masses and bowel sounds normal  MS: no clubbing, cyanosis   SKIN: clear without significant rashes or lesions  NEURO: speech normal and cranial nerves 2-12 intact  PSYCH: affect flat  EXT: 1 plus edema    Data:     Recent Labs   Lab 08/02/21  1152 08/02/21  0859 08/02/21  0734 08/02/21  0150 08/01/21  0743 07/31/21  0751 07/30/21  0730   NA  --   --  138  --  141 140 138   POTASSIUM  --   --  3.7  --  3.7 3.9 3.9   CHLORIDE  --   --  107  --  110* 110* 109   CO2  --   --  26  --  26 27 26   ANIONGAP  --   --  5  --  5 3 3   * 96 68* 148* 105* 152* 212*   BUN  --   --  64*  --  64* 58* 54*   CR  --   --  4.26*  --  4.36* 4.12* 3.94*   GFRESTIMATED  --   --  13*  --  12* 13* 14*   LC  --   --  8.3*  --  7.9* 8.0* 8.5         Recent Labs   Lab Test 08/02/21  0734 08/01/21  0743 07/31/21  0751 07/30/21  0730 07/29/21  0634 07/29/21  0208 07/28/21  2335 07/28/21  2118 07/28/21  1237   CR 4.26* 4.36* 4.12* 3.94* 3.80* 3.73* 3.73* 3.66* 4.31*     Recent Labs   Lab 08/02/21  0734 08/01/21  0743 07/31/21  0751 07/30/21  0730   ALBUMIN 2.0* 1.8* 1.9* 1.9*     Recent Labs   Lab 07/31/21  0751 07/30/21  0730 07/29/21  0634 07/29/21  0208 07/28/21  2335 07/28/21  1237   PHOS  --  4.2 4.3 3.2 2.1*  --    HGB 11.2*  --   --   --   --  10.6*         G Garret Perla MD    [facility]ph[facility]  [phone]

## 2021-08-02 NOTE — PLAN OF CARE
A&O x4, VSS on RA. Pt up with assist of 1 GB/W. Pt denies pain. Voiding adequately. CMS intact. Tolerating combo renal diet. Edema to BLE +2 pitting. Pt has ambulated 2 times this shift. +BS, + flatus, -BM this shift. Neuro's intact.

## 2021-08-02 NOTE — PLAN OF CARE
A&O x4. VSS on RA. Tele afib cvr. CMS intact. Neuros intact. Lungs clear. BS+, BM+, flatus+. Tolerating renal diet. Denies N/V. BGs 96/113/186. Voiding adequately. Denies pain. Up SBA. Awaiting MRI

## 2021-08-02 NOTE — CONSULTS
"GASTROENTEROLOGY CONSULTATION    Gordon Gann  [address]  [facility] [address]  76 year old male    Admission Date/Time: 7/28/2021  Primary Care Provider: No Ref-Primary, Physician    We were asked to see the patient in consultation by Dr. Wheeler for evaluation of abnormal LFTs.       HPI: Gordon Gann is a 76 year old male with PMH including DM II on insulin, depression, alcohol abuse, hypertension, chronic kidney disease stage V, chronic atrial fibrillation, who presents as a direct admission from [facility], where he was brought in by family with generalized weakness, confusion, and increased urination, found to have mild diabetic ketoacidosis.    From admission to now total bili 0.4-->0.2, alk phos 258-->288, ALT 52-->199, AST 27-->181. Creatinine 4.26. Hgb 11.2, WBC 7.1. US 7/30/21 no overt hepatic abnormality, no biliary dilation, no gallbladder abnormality.      There is report of intermittent RUQ discomfort in the chart. He thought this was due to his fall. He denies RUQ at home or currently. Zetia and statin are on hold. He has never been told his liver enzymes were elevated. No new medications or supplements. Rare tylenol use.     There is report of alcohol abuse. He reports drinking 3-4 drinks/day until the last 6 months. He has been gradually drinking less over the last several months and states he has \"almost stopped.\"     No family history of liver disease. There is gallbladder disease in his daughter.     ROS: A comprehensive ten point review of systems was negative aside from those in mentioned in the HPI.      MEDICATIONS: No current facility-administered medications on file prior to encounter.  allopurinol (ZYLOPRIM) 300 MG tablet, Take 300 mg by mouth every morning  apixaban ANTICOAGULANT (ELIQUIS) 2.5 MG tablet, Take 2.5 mg by mouth every morning RX is for twice daily but he takes once daily in the a.m.  He states twice daily causes a rash.  aspirin 81 MG EC tablet, Take 81 mg by " "mouth At Bedtime  cloNIDine (CATAPRES) 0.2 MG tablet, Take 0.2 mg by mouth 2 times daily  clopidogrel (PLAVIX) 75 MG tablet, Take 75 mg by mouth every morning  ezetimibe (ZETIA) 10 MG tablet, Take 10 mg by mouth every morning  fish oil-omega-3 fatty acids 1000 MG capsule, Take 1 g by mouth every morning  folic acid (FOLVITE) 1 MG tablet, Take 1 mg by mouth every morning  furosemide (LASIX) 40 MG tablet, Take 40 mg by mouth every morning  hydrALAZINE (APRESOLINE) 25 MG tablet, Take 25 mg by mouth 2 times daily  insulin aspart (NOVOLOG FLEXPEN) 100 UNIT/ML pen, Inject 15 Units Subcutaneous 3 times daily (with meals)  insulin glargine (LANTUS PEN) 100 UNIT/ML pen, Inject 30 Units Subcutaneous At Bedtime  metoprolol tartrate (LOPRESSOR) 100 MG tablet, Take 100 mg by mouth 2 times daily  simvastatin (ZOCOR) 40 MG tablet, Take 40 mg by mouth At Bedtime  vitamin B complex with vitamin C (STRESS TAB) tablet, Take 1 tablet by mouth every morning  Vitamin D, Cholecalciferol, 25 MCG (1000 UT) CAPS, Take 1,000 Units by mouth every morning  zolpidem ER (AMBIEN CR) 6.25 MG CR tablet, Take 6.25 mg by mouth nightly as needed for sleep        ALLERGIES: No Known Allergies    No past medical history on file.    No past surgical history on file.    SOCIAL HISTORY:  Social History     Tobacco Use     Smoking status: Not on file   Substance Use Topics     Alcohol use: Not on file     Drug use: Not on file       FAMILY HISTORY:  No family history on file.    PHYSICAL EXAM:   BP (!) 177/94 (BP Location: Left arm)   Pulse 85   Temp 97.8  F (36.6  C) (Oral)   Resp 16   Ht 1.727 m (5' 8\")   Wt 88.6 kg (195 lb 5.2 oz)   SpO2 95%   BMI 29.70 kg/m     Constitutional: no acute distress  Cardiovascular: regular rate and rhythm  Respiratory: clear to auscultation bilaterally  Psychiatric: normal pleasant affect  Neck: supple, no thyromegaly  ENT: mucous membranes are moist, no oral lesions are noted, no scleral icterus   Abdomen: soft, " non-tender, non-distended, normally active bowel sounds  Neuro: Neurologically intact grossly   Skin: warm, dry, no rashes are noted      LABORATORY WORK  Recent Labs   Lab Test 07/31/21  0751 07/28/21  1237   WBC 7.1 7.1   HGB 11.2* 10.6*   MCV 86 87    223     Recent Labs   Lab Test 08/02/21  0734 08/01/21  0743 07/31/21  0751    141 140   POTASSIUM 3.7 3.7 3.9   CHLORIDE 107 110* 110*   CO2 26 26 27   BUN 64* 64* 58*   CR 4.26* 4.36* 4.12*   ANIONGAP 5 5 3   LC 8.3* 7.9* 8.0*     Recent Labs   Lab Test 08/02/21  0734 08/01/21  0743 07/31/21  0751 07/30/21  0730 07/28/21  1354 07/28/21  1237   ALBUMIN 2.0* 1.8* 1.9* 1.9*  --  2.4*   BILITOTAL 0.2 0.6 0.3 0.4  --  0.4   DBIL  --   --   --  <0.1  --  0.1   * 148* 101* 96*  --  52   * 128* 77* 115*  --  27   ALKPHOS 288* 247* 212* 203*  --  258*   PROTEIN  --   --   --   --  300 *  --        IMAGING   EXAM: US ABDOMEN LIMITED  LOCATION: [facility]  DATE/TIME: 7/30/2021 10:10 PM    IMPRESSION:  1.  No overt hepatic abnormality.     2.  No calcified gallstones, gallbladder wall thickening or pericholecystic fluid.     3.  No biliary dilatation.     4.  No hydronephrosis.    CONSULTATION ASSESSMENT AND PLAN  76 year old with a history of DM II on insulin, depression, alcohol abuse, hypertension, chronic kidney disease stage V, chronic atrial fibrillation, who presents as a direct admission from [facility], where he was brought in by family with generalized weakness, confusion, and increased urination, found to have mild diabetic ketoacidosis. Alk phos has been elevated since admission and transaminases have been slowly increasing. He had RUQ discomfort earlier during hospitalization, which he attributed to falling on that side prior to admission. It has resolved and he has no tenderness on palpation. No evidence of fatty liver, biliary/gallbladder abnormality on recent ultrasound. Bilirubin is normal. Platelets  are normal. Differentials are broad and include passed nonobstructive  Sludge/stone in bile duct, viral hepatitis, other evolving liver disease, PSC, medication side effect, among others.     Plan   -Monitor LFTs/check INR   -MRCP for further assessment of bile ducts  -Additional liver serology workup initiated (alk phos isoenzymes, hepatitis serology, autoimmune hepatitis, iron studies, etc).     I will discuss the patient plan with Dr. Naik. Thank you for asking us to participate in the care of this patient.    Toshia Guerrero, CNP  [facility]  Cell: [phone] until 1PM.   Office: [phone]

## 2021-08-03 ENCOUNTER — APPOINTMENT (OUTPATIENT)
Dept: MRI IMAGING | Facility: CLINIC | Age: 76
DRG: 673 | End: 2021-08-03
Attending: NURSE PRACTITIONER
Payer: COMMERCIAL

## 2021-08-03 ENCOUNTER — ANESTHESIA (OUTPATIENT)
Dept: MEDSURG UNIT | Facility: CLINIC | Age: 76
DRG: 673 | End: 2021-08-03
Payer: COMMERCIAL

## 2021-08-03 ENCOUNTER — APPOINTMENT (OUTPATIENT)
Dept: OCCUPATIONAL THERAPY | Facility: CLINIC | Age: 76
DRG: 673 | End: 2021-08-03
Attending: HOSPITALIST
Payer: COMMERCIAL

## 2021-08-03 ENCOUNTER — ANESTHESIA EVENT (OUTPATIENT)
Dept: MEDSURG UNIT | Facility: CLINIC | Age: 76
DRG: 673 | End: 2021-08-03
Payer: COMMERCIAL

## 2021-08-03 ENCOUNTER — APPOINTMENT (OUTPATIENT)
Dept: ULTRASOUND IMAGING | Facility: CLINIC | Age: 76
DRG: 673 | End: 2021-08-03
Attending: SURGERY
Payer: COMMERCIAL

## 2021-08-03 LAB
ALBUMIN SERPL-MCNC: 2.4 G/DL (ref 3.4–5)
ALP BONE SERPL-CCNC: 46 U/L
ALP LIVER SERPL-CCNC: 242 U/L
ALP OTHER SERPL-CCNC: 0 U/L
ALP SERPL-CCNC: 288 U/L
ALP SERPL-CCNC: 298 U/L (ref 40–150)
ALT SERPL W P-5'-P-CCNC: 189 U/L (ref 0–70)
ANION GAP SERPL CALCULATED.3IONS-SCNC: 8 MMOL/L (ref 3–14)
AST SERPL W P-5'-P-CCNC: 128 U/L (ref 0–45)
BILIRUB DIRECT SERPL-MCNC: 0.1 MG/DL (ref 0–0.2)
BILIRUB SERPL-MCNC: 0.2 MG/DL (ref 0.2–1.3)
BUN SERPL-MCNC: 68 MG/DL (ref 7–30)
CALCIUM SERPL-MCNC: 8.6 MG/DL (ref 8.5–10.1)
CHLORIDE BLD-SCNC: 106 MMOL/L (ref 94–109)
CO2 SERPL-SCNC: 25 MMOL/L (ref 20–32)
CREAT SERPL-MCNC: 4.49 MG/DL (ref 0.66–1.25)
DEPRECATED CALCIDIOL+CALCIFEROL SERPL-MC: 24 UG/L (ref 20–75)
GFR SERPL CREATININE-BSD FRML MDRD: 12 ML/MIN/1.73M2
GLUCOSE BLD-MCNC: 46 MG/DL (ref 70–99)
GLUCOSE BLDC GLUCOMTR-MCNC: 114 MG/DL (ref 70–99)
GLUCOSE BLDC GLUCOMTR-MCNC: 180 MG/DL (ref 70–99)
GLUCOSE BLDC GLUCOMTR-MCNC: 213 MG/DL (ref 70–99)
GLUCOSE BLDC GLUCOMTR-MCNC: 237 MG/DL (ref 70–99)
GLUCOSE BLDC GLUCOMTR-MCNC: 268 MG/DL (ref 70–99)
GLUCOSE BLDC GLUCOMTR-MCNC: 281 MG/DL (ref 70–99)
GLUCOSE BLDC GLUCOMTR-MCNC: 60 MG/DL (ref 70–99)
HOLD SPECIMEN: NORMAL
IGA SERPL-MCNC: 266 MG/DL (ref 84–499)
IGG SERPL-MCNC: 720 MG/DL (ref 610–1616)
IGM SERPL-MCNC: 103 MG/DL (ref 35–242)
INR PPP: 1.01 (ref 0.85–1.15)
MAGNESIUM SERPL-MCNC: 2.3 MG/DL (ref 1.6–2.3)
MITOCHONDRIA M2 IGG SER-ACNC: <1 U/ML
POTASSIUM BLD-SCNC: 3.1 MMOL/L (ref 3.4–5.3)
POTASSIUM BLD-SCNC: 3.9 MMOL/L (ref 3.4–5.3)
POTASSIUM BLD-SCNC: 4.2 MMOL/L (ref 3.4–5.3)
PROT SERPL-MCNC: 6.5 G/DL (ref 6.8–8.8)
PTH-INTACT SERPL-MCNC: 143 PG/ML (ref 18–80)
SMA IGG SER-ACNC: 8 UNITS
SODIUM SERPL-SCNC: 139 MMOL/L (ref 133–144)
TTG IGA SER-ACNC: 1 U/ML
TTG IGG SER-ACNC: <0.6 U/ML

## 2021-08-03 PROCEDURE — 250N000013 HC RX MED GY IP 250 OP 250 PS 637: Performed by: INTERNAL MEDICINE

## 2021-08-03 PROCEDURE — 93970 EXTREMITY STUDY: CPT

## 2021-08-03 PROCEDURE — 250N000011 HC RX IP 250 OP 636: Performed by: INTERNAL MEDICINE

## 2021-08-03 PROCEDURE — 85610 PROTHROMBIN TIME: CPT | Performed by: INTERNAL MEDICINE

## 2021-08-03 PROCEDURE — 99233 SBSQ HOSP IP/OBS HIGH 50: CPT | Performed by: INTERNAL MEDICINE

## 2021-08-03 PROCEDURE — 97535 SELF CARE MNGMENT TRAINING: CPT | Mod: GO

## 2021-08-03 PROCEDURE — 36415 COLL VENOUS BLD VENIPUNCTURE: CPT | Performed by: INTERNAL MEDICINE

## 2021-08-03 PROCEDURE — 82248 BILIRUBIN DIRECT: CPT | Performed by: INTERNAL MEDICINE

## 2021-08-03 PROCEDURE — 250N000012 HC RX MED GY IP 250 OP 636 PS 637: Performed by: INTERNAL MEDICINE

## 2021-08-03 PROCEDURE — 86038 ANTINUCLEAR ANTIBODIES: CPT | Performed by: HOSPITALIST

## 2021-08-03 PROCEDURE — 84132 ASSAY OF SERUM POTASSIUM: CPT | Performed by: INTERNAL MEDICINE

## 2021-08-03 PROCEDURE — 36415 COLL VENOUS BLD VENIPUNCTURE: CPT | Performed by: HOSPITALIST

## 2021-08-03 PROCEDURE — 120N000013 HC R&B IMCU

## 2021-08-03 PROCEDURE — 370N000003 HC ANESTHESIA WARD SERVICE

## 2021-08-03 PROCEDURE — 74181 MRI ABDOMEN W/O CONTRAST: CPT

## 2021-08-03 PROCEDURE — 258N000003 HC RX IP 258 OP 636: Performed by: INTERNAL MEDICINE

## 2021-08-03 PROCEDURE — 83970 ASSAY OF PARATHORMONE: CPT | Performed by: HOSPITALIST

## 2021-08-03 PROCEDURE — 250N000013 HC RX MED GY IP 250 OP 250 PS 637: Performed by: HOSPITALIST

## 2021-08-03 PROCEDURE — 93005 ELECTROCARDIOGRAM TRACING: CPT

## 2021-08-03 PROCEDURE — 83735 ASSAY OF MAGNESIUM: CPT | Performed by: INTERNAL MEDICINE

## 2021-08-03 PROCEDURE — 250N000013 HC RX MED GY IP 250 OP 250 PS 637: Performed by: PHYSICIAN ASSISTANT

## 2021-08-03 PROCEDURE — 82306 VITAMIN D 25 HYDROXY: CPT | Performed by: INTERNAL MEDICINE

## 2021-08-03 RX ORDER — METOPROLOL TARTRATE 50 MG
50 TABLET ORAL 2 TIMES DAILY
Status: DISCONTINUED | OUTPATIENT
Start: 2021-08-03 | End: 2021-08-06 | Stop reason: HOSPADM

## 2021-08-03 RX ORDER — DIPHENHYDRAMINE HYDROCHLORIDE 50 MG/ML
50 INJECTION INTRAMUSCULAR; INTRAVENOUS
Status: DISCONTINUED | OUTPATIENT
Start: 2021-08-03 | End: 2021-08-06 | Stop reason: HOSPADM

## 2021-08-03 RX ORDER — NICOTINE POLACRILEX 4 MG
15-30 LOZENGE BUCCAL
Status: DISCONTINUED | OUTPATIENT
Start: 2021-08-03 | End: 2021-08-06 | Stop reason: HOSPADM

## 2021-08-03 RX ORDER — METHYLPREDNISOLONE SODIUM SUCCINATE 125 MG/2ML
125 INJECTION, POWDER, LYOPHILIZED, FOR SOLUTION INTRAMUSCULAR; INTRAVENOUS
Status: DISCONTINUED | OUTPATIENT
Start: 2021-08-03 | End: 2021-08-06 | Stop reason: HOSPADM

## 2021-08-03 RX ORDER — HYDRALAZINE HYDROCHLORIDE 25 MG/1
25 TABLET, FILM COATED ORAL EVERY 6 HOURS PRN
Status: DISCONTINUED | OUTPATIENT
Start: 2021-08-03 | End: 2021-08-06 | Stop reason: HOSPADM

## 2021-08-03 RX ORDER — POTASSIUM CHLORIDE 750 MG/1
10 TABLET, EXTENDED RELEASE ORAL ONCE
Status: COMPLETED | OUTPATIENT
Start: 2021-08-03 | End: 2021-08-03

## 2021-08-03 RX ORDER — DEXTROSE MONOHYDRATE 25 G/50ML
25-50 INJECTION, SOLUTION INTRAVENOUS
Status: DISCONTINUED | OUTPATIENT
Start: 2021-08-03 | End: 2021-08-06 | Stop reason: HOSPADM

## 2021-08-03 RX ADMIN — FOLIC ACID 1 MG: 1 TABLET ORAL at 08:43

## 2021-08-03 RX ADMIN — ALLOPURINOL 200 MG: 100 TABLET ORAL at 08:43

## 2021-08-03 RX ADMIN — METOPROLOL TARTRATE 50 MG: 50 TABLET, FILM COATED ORAL at 21:46

## 2021-08-03 RX ADMIN — INSULIN GLARGINE 10 UNITS: 100 INJECTION, SOLUTION SUBCUTANEOUS at 21:46

## 2021-08-03 RX ADMIN — IRON SUCROSE 200 MG: 20 INJECTION, SOLUTION INTRAVENOUS at 12:19

## 2021-08-03 RX ADMIN — Medication 1 MG: at 21:45

## 2021-08-03 RX ADMIN — CLOPIDOGREL BISULFATE 75 MG: 75 TABLET ORAL at 08:43

## 2021-08-03 RX ADMIN — ASPIRIN 81 MG: 81 TABLET, COATED ORAL at 21:46

## 2021-08-03 RX ADMIN — POTASSIUM CHLORIDE 10 MEQ: 750 TABLET, EXTENDED RELEASE ORAL at 10:26

## 2021-08-03 RX ADMIN — APIXABAN 2.5 MG: 2.5 TABLET, FILM COATED ORAL at 08:43

## 2021-08-03 RX ADMIN — CLONIDINE HYDROCHLORIDE 0.2 MG: 0.1 TABLET ORAL at 21:46

## 2021-08-03 RX ADMIN — FUROSEMIDE 20 MG: 20 TABLET ORAL at 08:44

## 2021-08-03 ASSESSMENT — ACTIVITIES OF DAILY LIVING (ADL)
ADLS_ACUITY_SCORE: 18
ADLS_ACUITY_SCORE: 18
ADLS_ACUITY_SCORE: 20
ADLS_ACUITY_SCORE: 21
ADLS_ACUITY_SCORE: 18
ADLS_ACUITY_SCORE: 18

## 2021-08-03 NOTE — PROGRESS NOTES
"Patient approached to discuss MRCP. Currently refuses MRCP, stating \"I'm 76 I don't care what it says.\" Will continue to re-approach/encourage,  "

## 2021-08-03 NOTE — PROVIDER NOTIFICATION
MD Notification    Notified Person: MD    Notified Person Name: Dr. Wheeler    Notification Date/Time: 1719 8/3/21    Notification Interaction: Amcom    Purpose of Notification: 320-1: unable to get IV access, have not been able to give iron, please advice. Thank you. ANA Julien    Orders Received: ok to hold iron, order for midline placed, page IV team, if unable to do today, monitor till able to.     Comments:

## 2021-08-03 NOTE — PROGRESS NOTES
Renal Medicine Progress Note                                Gordon Gann MRN# [id number]   Age: 76 year old YOB: 1945   Date of Admission: 7/28/2021 Hospital LOS: 6                  Assessment/Plan:       76 year old male admitted on 7/28/2021.  Past history DM II on insulin, depression, alcohol abuse, hypertension, chronic kidney disease stage V, chronic atrial fibrillation, who presents as a direct admission from [facility], where he was brought in by family with generalized weakness, confusion, and increased urination, found to have mild diabetic ketoacidosis    Seen for evaluation of elevated creatinine      1.  CKD V   -eGFR 15 ml/min range   -presumed diabetic nephropathy   -progressive   -willing to proceed to dialysis as indicated   2.  Proteinuria   -PCR: 5.26 g/g  3.  HTN   -moderate control   4.  Anemia   -borderline Fe studies  5.  Secondary hyperparathyroidism   - pg/ml   -vitamin D pending   6.  DM   -poor control   -A1C 11% range   7.  DKA   -resolved  8.  Hypoalbuminemia  9.  ? ARF component      Creatinine increased slightly  ? intravascular volume    Hold diuretic (already given today)  Reassess in am    IV Fe  AVF consult         Interval History:     Up in chair follows  Flat  IO and urine reviewed    Reviewed issues with daughter by phone  Agreed with rationale for AVF       ROS:     GENERAL: NAD, No fever,chills  R: NEGATIVE for significant cough or SOB  CV: NEGATIVE for chest pain, palpitations  EXT: no change edema  ROS otherwise negative    Medications and Allergies:     Reviewed    Physical Exam:     Vitals were reviewed  Patient Vitals for the past 8 hrs:   BP Temp Temp src Pulse Resp SpO2   08/03/21 0832 (!) 127/98 -- -- 53 -- --   08/03/21 0741 124/73 97.9  F (36.6  C) Oral 96 16 98 %   08/03/21 0400 131/60 97.3  F (36.3  C) Oral 59 20 96 %     I/O last 3 completed shifts:  In: 1080 [P.O.:1080]  Out: 500 [Urine:500]    Vitals:    07/28/21 2100 07/31/21 0550  08/01/21 0610   Weight: 87 kg (191 lb 12.8 oz) 89.1 kg (196 lb 6.9 oz) 88.6 kg (195 lb 5.2 oz)         GENERAL: awake, alert, follows  HEENT: NC/AT, PERRLA, EOMI, non icteric, pharynx moist without lesion  RESP:  clear anteriorly  CV: RRR, normal S1 S2  ABDOMEN: soft, nontender, no HSM or masses and bowel sounds normal  MS: no clubbing, cyanosis   SKIN: clear without significant rashes or lesions  NEURO: speech normal and cranial nerves 2-12 intact  PSYCH: affect flat  EXT: 1 plus edema    Data:     Recent Labs   Lab 08/03/21  0839 08/03/21  0748 08/03/21  0512 08/02/21 2116 08/02/21  0734 08/01/21  0743 07/31/21  0751   NA  --   --  139  --  138 141 140   POTASSIUM  --   --  3.1*  --  3.7 3.7 3.9   CHLORIDE  --   --  106  --  107 110* 110*   CO2  --   --  25  --  26 26 27   ANIONGAP  --   --  8  --  5 5 3   * 60* 46* 156* 68* 105* 152*   BUN  --   --  68*  --  64* 64* 58*   CR  --   --  4.49*  --  4.26* 4.36* 4.12*   GFRESTIMATED  --   --  12*  --  13* 12* 13*   LC  --   --  8.6  --  8.3* 7.9* 8.0*         Recent Labs   Lab Test 08/03/21  0512 08/02/21  0734 08/01/21 0743 07/31/21  0751 07/30/21  0730 07/29/21  0634 07/29/21  0208 07/28/21  2335 07/28/21  2118 07/28/21  1237   CR 4.49* 4.26* 4.36* 4.12* 3.94* 3.80* 3.73* 3.73* 3.66* 4.31*     Recent Labs   Lab 08/03/21  0512 08/02/21  0734 08/01/21  0743 07/31/21  0751   ALBUMIN 2.4* 2.0* 1.8* 1.9*     Recent Labs   Lab 08/02/21  1212 07/31/21  0751 07/30/21  0730 07/29/21  0634 07/29/21  0208 07/28/21  1237   PHOS 6.2*  --  4.2 4.3 3.2  --    HGB  --  11.2*  --   --   --  10.6*         G Garret Perla MD    [facility] - Nephrology  [phone]

## 2021-08-03 NOTE — CONSULTS
Vascular Surgery Consultation    Gordon Gann MRN# [id number]   Age: 76 year old YOB: 1945     Date of Admission:  7/28/2021    Reason for consult: Long term vascular access       Requesting physician: Dr. Garret Perla                Assessment and Plan:   Assessment:   Gordon Gann is a 76 year old male, right-hand dominant, with PMHx of CKD stage V secondary to T2DM, atrial fibrillation on eliquis, CAD s/p PCI in 2006 now on plavix, MDD, EtOH abuse, HTN, who was admitted with generalized weakness, confusion, and found to be in diabetic ketoacidosis. Given the progression of his CKD, nephrology has been following and recommended initiation of hemodialysis. Vein mapping of bilateral upper extremities performed which showed small caliber cephalic and basilic veins in bilateral upper extremities, proximal right basilic vein measures 4.3 mm in diameter, but is 2.1 mm at the AC fossa. Given the small caliber of his veins, would recommend placement of left upper extremity loop graft. If able to hold his plavix and eliquis, may be able to plan for surgery on 8/5      Plan:   - Plan for placement of left upper extremity loop graft for hemodialysis, tentatively on 8/5  - Will discuss with primary service regarding holding plavix and eliquis for surgery  - NPO 8/4/21 at midnight  - Vascular surgery team will continue to follow             Chief Complaint:   No chief complaint on file.     History is obtained from the patient         History of Present Illness:   This patient is a 76 year old  male with a significant past medical history of CKD stage V secondary to T2DM, atrial fibrillation on eliquis, CAD s/p PCI in 2006 now on plavix, MDD, EtOH abuse, HTN who was admitted for management of diabetic ketoacidosis. Please see most recent hospitalist note for further details. Vascular surgery team consulted for initiation of hemodialysis. Met with patient to discuss surgical options and plans. He  denies ever being on dialysis before, states he has never had any procedures or lines placed in his neck in the past. States he is right-hand dominant. Discussed both hemodialysis and peritoneal dialysis, explained recovery times and that both are acceptable options for dialysis needs. He states he would prefer a fistula or graft in his arm so that he can continue to shower and swim if he wants. He otherwise feels well and is amenable to moving forward with dialysis as needed.   ..          Past Medical History:   - CKD stage V  - T2DM  - atrial fibrillation  - CAD s/p PCI  - MDD  - EtOH abuse  - HTN          Past Surgical History:   - Coronary artery stent placement  - TURP  - Parotidectomy        Medications:   No current facility-administered medications on file prior to encounter.  allopurinol (ZYLOPRIM) 300 MG tablet, Take 300 mg by mouth every morning  apixaban ANTICOAGULANT (ELIQUIS) 2.5 MG tablet, Take 2.5 mg by mouth every morning RX is for twice daily but he takes once daily in the a.m.  He states twice daily causes a rash.  aspirin 81 MG EC tablet, Take 81 mg by mouth At Bedtime  cloNIDine (CATAPRES) 0.2 MG tablet, Take 0.2 mg by mouth 2 times daily  clopidogrel (PLAVIX) 75 MG tablet, Take 75 mg by mouth every morning  ezetimibe (ZETIA) 10 MG tablet, Take 10 mg by mouth every morning  fish oil-omega-3 fatty acids 1000 MG capsule, Take 1 g by mouth every morning  folic acid (FOLVITE) 1 MG tablet, Take 1 mg by mouth every morning  furosemide (LASIX) 40 MG tablet, Take 40 mg by mouth every morning  hydrALAZINE (APRESOLINE) 25 MG tablet, Take 25 mg by mouth 2 times daily  insulin aspart (NOVOLOG FLEXPEN) 100 UNIT/ML pen, Inject 15 Units Subcutaneous 3 times daily (with meals)  insulin glargine (LANTUS PEN) 100 UNIT/ML pen, Inject 30 Units Subcutaneous At Bedtime  metoprolol tartrate (LOPRESSOR) 100 MG tablet, Take 100 mg by mouth 2 times daily  simvastatin (ZOCOR) 40 MG tablet, Take 40 mg by mouth At  "Bedtime  vitamin B complex with vitamin C (STRESS TAB) tablet, Take 1 tablet by mouth every morning  Vitamin D, Cholecalciferol, 25 MCG (1000 UT) CAPS, Take 1,000 Units by mouth every morning  zolpidem ER (AMBIEN CR) 6.25 MG CR tablet, Take 6.25 mg by mouth nightly as needed for sleep          Allergies:    No Known Allergies            Family History:   The patient has no family history of any bleeding, clotting or anesthesia problems.          Review of Systems:   Ten point Review of Systems is negative other than noted in the HPI          Physical Exam:   Gen:  This is a well developed, well nourished man in no apparent distress.  Blood pressure (!) 127/98, pulse 53, temperature 97.9  F (36.6  C), temperature source Oral, resp. rate 16, height 1.727 m (5' 8\"), weight 88.6 kg (195 lb 5.2 oz), SpO2 98 %.  HEENT - Normocephalic, atraumatic, mucous membranes moist.    Neck - supple without masses  Lungs - nonlabored breathing on room air.    Heart - Extremities warm and well perfused  Abdomen: Soft, nontender, nondistended  Extremities - radial pulse 2+ bilaterally  Neurologic - nonfocal          Data:   WBC -   WBC Count   Date Value Ref Range Status   07/31/2021 7.1 4.0 - 11.0 10e3/uL Final   ], HgB -   Hemoglobin   Date Value Ref Range Status   07/31/2021 11.2 (L) 13.3 - 17.7 g/dL Final       Ultrasound of bilateral upper extremities (8/3/21):  Right upper extremity: The right cephalic vein is patent in the arm  and has diameters that range between 1.7 to 1.0 mm. There is occlusive  thrombus in the proximal to mid distal forearm.     The right basilic vein is patent in the arm and has diameters that  range between 4.3 to 2.1 mm.     Left upper extremity: The left cephalic vein is patent. Its diameters  in the arm range between 1.9 to 0.9 mm. Its diameters in the forearm  range between 1.2 to 0.7 mm.     The left basilic vein is patent in the arm. Its diameters range  between 1.6 to 1.5 mm.     Darwin Bee MD "   8/3/2021 at 1:29 PM  General Surgery - PGY4  [phone]

## 2021-08-03 NOTE — PROGRESS NOTES
"  GASTROENTEROLOGY PROGRESS NOTE     IMPRESSION:  1. Increased LFTs - uncertain source, awaiting the MRCP and extended serologic work-up.    RECOMMENDATIONS:  1. MRCP  2. Monitor LFTs    Marcel Naik MD  [facility]  [phone]      ________________________________________________________________________      SUBJECTIVE:  Patient would like to get the MRCP done today.       OBJECTIVE:  BP (!) 127/98   Pulse 53   Temp 97.9  F (36.6  C) (Oral)   Resp 16   Ht 1.727 m (5' 8\")   Wt 88.6 kg (195 lb 5.2 oz)   SpO2 98%   BMI 29.70 kg/m    Temp (24hrs), Av.7  F (36.5  C), Min:97.2  F (36.2  C), Max:98.6  F (37  C)    Patient Vitals for the past 72 hrs:   Weight   21 0610 88.6 kg (195 lb 5.2 oz)        PHYSICAL EXAM  GEN: Alert, NAD.    HRT: reg  LUNGS: CTA  ABD: +BS, non-tender, non-distended, no rebound or guarding.    Additional Data:  I have reviewed the patient's new clinical lab results:     Recent Labs   Lab Test 21  0512 21  1212 21  0751 21  1237   WBC  --   --  7.1 7.1   HGB  --   --  11.2* 10.6*   MCV  --   --  86 87   PLT  --   --  276 223   INR 1.01 0.99  --   --      Recent Labs   Lab Test 21  0839 21  0748 21  0512 21  0734 21  0743   NA  --   --  139 138 141   POTASSIUM  --   --  3.1* 3.7 3.7   CHLORIDE  --   --  106 107 110*   CO2  --   --  25 26 26   BUN  --   --  68* 64* 64*   CR  --   --  4.49* 4.26* 4.36*   ANIONGAP  --   --  8 5 5   LC  --   --  8.6 8.3* 7.9*   * 60* 46* 68* 105*     Recent Labs   Lab Test 21  0512 21  0734 21  0743 21  1354   ALBUMIN 2.4* 2.0* 1.8*  --    BILITOTAL 0.2 0.2 0.6  --    * 199* 148*  --    * 181* 128*  --    PROTEIN  --   --   --  300 *          "

## 2021-08-03 NOTE — PROGRESS NOTES
"Patient re-approached to encourage MRCP. Currently refuses MRCP, stating \"I don't care, let me sleep.\"   "

## 2021-08-03 NOTE — PROGRESS NOTES
PIV requested by nursing staff.  Patient found with minimal visible, palpable vasculature suitable for PIV access.  Two attempts by two VAT RN's without success.  Recommend anesthesia consult, primary RN to call.

## 2021-08-03 NOTE — PROGRESS NOTES
Consult received. Upper extremity vein mapping ordered. I introduced the vascular surgery team to the patient.  Full note to follow. Appreciate the consult.

## 2021-08-03 NOTE — PROVIDER NOTIFICATION
MD Notification    Notified Person: MD    Notified Person Name: Dr. Wheeler    Notification Date/Time: 8/3/21     Notification Interaction: Amcom    Purpose of Notification: HR in the low 50's, held metoprolol, any other meds to hold?    Orders Received: hold clonidine.    Comments:

## 2021-08-03 NOTE — PLAN OF CARE
A&Ox4, VSS on RA. SBA with walker/gb. Tele afib CVR with PVCs. CMS intact. LS clear. Voiding well, pt having diarrhea. NPO for MRCP, pt was upset that it is taking so long. Pt willing to wait till 10PM and states will then eat and refuse to do the test after. Denies nausea/pain. Lost IV access today, midline to be placed tomorrow. Plan for fistula to be placed for possible dialysis.

## 2021-08-03 NOTE — PROGRESS NOTES
[facility]    Medicine Progress Note - Hospitalist Service       Date of Admission:  7/28/2021    Assessment & Plan           Gordon Gann is a 76 year old male admitted on 7/28/2021.  Past history DM II on insulin, depression, alcohol abuse, hypertension, chronic kidney disease stage V, chronic atrial fibrillation, who presents as a direct admission from [facility], where he was brought in by family with generalized weakness, confusion, and increased urination, found to have mild diabetic ketoacidosis.        Diabetic ketoacidosis, resolved  DM II on long term insulin, uncontrolled  [Lantus 30 units at bedtime and novolog 15 units t.i.d. with meals]  Recent history of non-compliance with insulin, admission A1C 11.7%.  Initial glucose >600, ketones elevated with acidosis and borderline elevated gap, improved with IVF and insulin gtt.   -Hypoglycemic episode down to 68 on 8/2, insulin changed from Lantus 20 twice daily to 25 units nightly  -Recurrent hypoglycemic episode down to 48 on 8/3, will further reduce bedtime Lantus from 25 units nightly to 10 units nightly, will need to gradually titrate up for better control of blood sugar  -Decrease aspart from 7 units to 5 units 3 times daily with meals, adjust sliding scale insulin to medium intensity from high intensity  -mod carb diet  -Monitor for hypoglycemia  -Currently n.p.o. x6 hours for MRCP this afternoon, will check blood glucose every hour while he is n.p.o. to ensure he does not have recurrent hypoglycemic episodes     Elevated transaminase/alk phos  LFT's up from admission on 7/30 and reporting intermittent RUQ pain though currently non-tender on exam- RUQ US without acute finding.  Currently patient without abdominal pain, nausea or vomiting. Etiology not clear at this time.  CK is normal.  Ferritin 420, hepatitis serologies negative.  -AST/ALT/alk phos remain elevated, but stable relative to yesterday, INR  1.01  -mitochondrial antibody, ferritin, tissue transglutaminase antibody pending  -MRCP for today,  lorazepam 0.25 mg 30 minutes before MRI  -GI consulted, appreciate assistance  - hold PTA Zetia/statin at this time. Not on acetaminophen  -Follow LFT/INR in a.m.     Hypokalemia  -Now potassium down to 3.1  -Potassium chloride 10 mEq x 1, recheck this afternoon and replace as needed  -No protocol ordered due to renal dysfunction    ADDENDUM:  Tele with possible PVCs.   -check K and Mag now.  - EKG to confirm rhythm    EKG shows sinus dolly with PAC, non-specific t wave changes in lateral leads. Follow lytes and replace cautiously.     Hypophosphatemia  - normalized with supplementation  - no protocol due to renal function     Anasarca and bilateral peripheral edema  Possible type II NSTEMI  Serial trop mildly elevated but flat 0.06, EKG with non-specific ST changes.  Pro-BNP >8400.  CXR negative for edema.  TTE with EF 50-55%, septal motion consistent with conduction abnormality, no valvular disease.   - continue tele for now  -Compression stockings for lower extremity edema  - PTA Lasix 40 mg daily reduced to 20 mg daily and now stopped for nephrology, last dose on 8/3 morning     CKD stage V  It appears that his nephrologist has been discussing dialysis (he remains undecided). Patient just moved from [place] and does not have established nephrologist locally.  Baseline creatinine is in the upper-3 to low-4 range. Reviewed his nephrologist note from 5/4 in care  Everywhere, labs from 5/4 listed as cr of 4.16.  - Cr 3.7>3.8>>4.12>4.36-4.26>49  -Appreciate nephrology consultation,Hydralazine stopped.  Lasix now stopped.   -  Other PTA medications resumed as below  -Nephrology follow-up scheduled with Dr. Chowdhury for 8/24/2021  -Vascular surgery consulted for aVF, venous mapping ultrasound completed today     Chronic A-fib  CAD s/p PCI 2006  HTN  HLD  Further details unknown of underlying CAD.  Pharmacy confirms the  patient only takes Eliquis once daily, stating he gets a rash if he takes it twice daily. PTA aspirin, Plavix, clonidine, metoprolol and Eliquis  -Heart rate in the 50s this morning, reduce metoprolol from 100 mg twice daily to 50 mg twice daily with holding parameters,   -Continue clonidine 0.2 mg twice daily, with holding parameters as this could also cause bradycardia  -Zetia and simvastatin on hold at this time given rising transaminase levels  - Follow I's and O's, and daily weights     Possible cognitive impairment  Possible depression  Daughter concerned about depression and also reports he has been mismanaging his insulin recently and has concerns regarding forgetfulness.   - outpatient cognitive eval with OT  - after discussion with prior rounder, started on Remeron 7.5mg at bedtime on 7/30. He slept good, however remained tired most of the day on 7/31. Remeron stopped.   -Discussed with patient on 8/3, possible psychiatric consultation for further recommendation, patient declined at this time     Insomnia  - resume PTA Ambien at lower dose 2.5mg at bedtime prn per patient request. He reports he is not sleeping well  - prn melatonin     History of alcohol abuse  Reports no alcohol use for >1 week     Gout   - continue allopurinol (dose reduced due to renal function)       Diet: Combination Diet Renal Diet    DVT Prophylaxis: Pneumatic Compression Devices  Flood Catheter: Not present  Central Lines: None  Code Status: No CPR- Do NOT Intubate      Disposition Plan   Expected discharge: 08/04/2021 recommended to transitional care unit once stable/improving transaminase, MRCP of the abdomen, stable blood glucose.     The patient's care was discussed with the Bedside Nurse, Care Coordinator/ and Patient.    Palak Wheeler MD  Hospitalist Service  [facility]  Securely message with the Vocera Web Console (learn more here)  Text page via [employer]  Paging/Directory      Clinically Significant Risk Factors Present on Admission                ______________________________________________________________________    Interval History   Hypoglycemic episode this morning down to 48.  Patient does report symptoms of feeling sweaty and chilled. This was corrected.  Patient currently denies nausea, vomiting, abdominal pain.  He reports he had a bowel movement today and does not feel constipated.    Data reviewed today: I reviewed all medications, new labs and imaging results over the last 24 hours. I personally reviewed no images or EKG's today.    Physical Exam   Vital Signs: Temp: 97.9  F (36.6  C) Temp src: Oral BP: (!) 127/98 Pulse: 53   Resp: 16 SpO2: 98 % O2 Device: None (Room air)    Weight: 195 lbs 5.24 oz  General Appearance: Alert, awake and appropriate  Respiratory: clear to ausculat  Cardiovascular: regular rate and rhythm, +LE edema  GI: soft and non-tender  Skin: warm and dry      Data   Recent Labs   Lab 08/03/21  0839 08/03/21  0748 08/03/21  0512 08/02/21  1212 08/02/21  0734 08/01/21  0743 07/31/21  0751 07/28/21  1438 07/28/21  1409 07/28/21  1237   WBC  --   --   --   --   --   --  7.1  --   --  7.1   HGB  --   --   --   --   --   --  11.2*  --   --  10.6*   MCV  --   --   --   --   --   --  86  --   --  87   PLT  --   --   --   --   --   --  276  --   --  223   INR  --   --  1.01 0.99  --   --   --   --   --   --    NA  --   --  139  --  138 141 140   < >  --  135   POTASSIUM  --   --  3.1*  --  3.7 3.7 3.9   < >  --  3.8   CHLORIDE  --   --  106  --  107 110* 110*   < >  --  101   CO2  --   --  25  --  26 26 27   < >  --  23   BUN  --   --  68*  --  64* 64* 58*   < >  --  57*   CR  --   --  4.49*  --  4.26* 4.36* 4.12*   < >  --  4.31*   ANIONGAP  --   --  8  --  5 5 3   < >  --  11   LC  --   --  8.6  --  8.3* 7.9* 8.0*   < >  --  8.8   * 60* 46*  --  68* 105* 152*   < >  --  646*   ALBUMIN  --   --  2.4*  --  2.0* 1.8* 1.9*   < >  --   2.4*   PROTTOTAL  --   --  6.5*  --  5.7* 5.3* 5.4*   < >  --  6.1*   BILITOTAL  --   --  0.2  --  0.2 0.6 0.3   < >  --  0.4   ALKPHOS  --   --  298*  --  288* 247* 212*   < >  --  258*   ALT  --   --  189*  --  199* 148* 101*   < >  --  52   AST  --   --  128*  --  181* 128* 77*   < >  --  27   TROPONIN  --   --   --   --   --   --   --   --  0.068* 0.064*    < > = values in this interval not displayed.     No results found for this or any previous visit (from the past 24 hour(s)).  Medications       allopurinol  200 mg Oral Daily     apixaban ANTICOAGULANT  2.5 mg Oral QAM     aspirin  81 mg Oral At Bedtime     cloNIDine  0.2 mg Oral BID     clopidogrel  75 mg Oral QAM     folic acid  1 mg Oral QAM     [Held by provider] furosemide  20 mg Oral Daily     insulin aspart  5 Units Subcutaneous TID w/meals     insulin aspart  1-7 Units Subcutaneous TID AC     insulin glargine  10 Units Subcutaneous At Bedtime     iron sucrose (VENOFER) intermittent infusion (200 mg)  200 mg Intravenous Daily     LORazepam  0.25 mg Oral Once     melatonin  1 mg Oral At Bedtime     metoprolol tartrate  100 mg Oral BID

## 2021-08-03 NOTE — PROGRESS NOTES
Renal Medicine       Patient has scheduled Renal follow-up  08/24/21 @ 0966 Dr. Acacia Perla    [facility]  [phone]

## 2021-08-03 NOTE — PROGRESS NOTES
"A&O x4. VSS on RA. Tele afib cvr. CMS intact. Neuros intact. Lungs clear. BS+, BM+, flatus+. Tolerating renal diet. Denies N/V.  Voiding adequately. Denies pain. Up SBA.   Denies abdominal pain. See previous notes concerning MRI. Patient continues to refuse at this time, stating, \"just let me sleep.\"      "

## 2021-08-04 ENCOUNTER — APPOINTMENT (OUTPATIENT)
Dept: PHYSICAL THERAPY | Facility: CLINIC | Age: 76
DRG: 673 | End: 2021-08-04
Attending: HOSPITALIST
Payer: COMMERCIAL

## 2021-08-04 ENCOUNTER — APPOINTMENT (OUTPATIENT)
Dept: ULTRASOUND IMAGING | Facility: CLINIC | Age: 76
DRG: 673 | End: 2021-08-04
Attending: SURGERY
Payer: COMMERCIAL

## 2021-08-04 PROBLEM — N18.6 END STAGE RENAL DISEASE (H): Status: ACTIVE | Noted: 2021-07-28

## 2021-08-04 LAB
ALBUMIN SERPL-MCNC: 1.9 G/DL (ref 3.4–5)
ALP SERPL-CCNC: 226 U/L (ref 40–150)
ALT SERPL W P-5'-P-CCNC: 118 U/L (ref 0–70)
ANA PAT SER IF-IMP: ABNORMAL
ANA SER QL IF: ABNORMAL
ANA TITR SER IF: ABNORMAL {TITER}
ANION GAP SERPL CALCULATED.3IONS-SCNC: 6 MMOL/L (ref 3–14)
AST SERPL W P-5'-P-CCNC: 68 U/L (ref 0–45)
BILIRUB DIRECT SERPL-MCNC: 0.1 MG/DL (ref 0–0.2)
BILIRUB SERPL-MCNC: 0.3 MG/DL (ref 0.2–1.3)
BUN SERPL-MCNC: 62 MG/DL (ref 7–30)
CALCIUM SERPL-MCNC: 8.2 MG/DL (ref 8.5–10.1)
CHLORIDE BLD-SCNC: 108 MMOL/L (ref 94–109)
CO2 SERPL-SCNC: 24 MMOL/L (ref 20–32)
CREAT SERPL-MCNC: 4.23 MG/DL (ref 0.66–1.25)
GFR SERPL CREATININE-BSD FRML MDRD: 13 ML/MIN/1.73M2
GLUCOSE BLD-MCNC: 75 MG/DL (ref 70–99)
GLUCOSE BLDC GLUCOMTR-MCNC: 105 MG/DL (ref 70–99)
GLUCOSE BLDC GLUCOMTR-MCNC: 157 MG/DL (ref 70–99)
GLUCOSE BLDC GLUCOMTR-MCNC: 192 MG/DL (ref 70–99)
GLUCOSE BLDC GLUCOMTR-MCNC: 201 MG/DL (ref 70–99)
INR PPP: 1.12 (ref 0.85–1.15)
POTASSIUM BLD-SCNC: 3.8 MMOL/L (ref 3.4–5.3)
PROT SERPL-MCNC: 5.3 G/DL (ref 6.8–8.8)
SODIUM SERPL-SCNC: 138 MMOL/L (ref 133–144)

## 2021-08-04 PROCEDURE — 250N000012 HC RX MED GY IP 250 OP 636 PS 637: Performed by: INTERNAL MEDICINE

## 2021-08-04 PROCEDURE — 36415 COLL VENOUS BLD VENIPUNCTURE: CPT | Performed by: INTERNAL MEDICINE

## 2021-08-04 PROCEDURE — 82248 BILIRUBIN DIRECT: CPT | Performed by: INTERNAL MEDICINE

## 2021-08-04 PROCEDURE — 99233 SBSQ HOSP IP/OBS HIGH 50: CPT | Performed by: INTERNAL MEDICINE

## 2021-08-04 PROCEDURE — 97116 GAIT TRAINING THERAPY: CPT | Mod: GP | Performed by: PHYSICAL THERAPIST

## 2021-08-04 PROCEDURE — 97530 THERAPEUTIC ACTIVITIES: CPT | Mod: GP | Performed by: PHYSICAL THERAPIST

## 2021-08-04 PROCEDURE — 250N000013 HC RX MED GY IP 250 OP 250 PS 637: Performed by: HOSPITALIST

## 2021-08-04 PROCEDURE — 250N000013 HC RX MED GY IP 250 OP 250 PS 637: Performed by: PHYSICIAN ASSISTANT

## 2021-08-04 PROCEDURE — 999N000040 HC STATISTIC CONSULT NO CHARGE VASC ACCESS

## 2021-08-04 PROCEDURE — 250N000013 HC RX MED GY IP 250 OP 250 PS 637: Performed by: INTERNAL MEDICINE

## 2021-08-04 PROCEDURE — 120N000013 HC R&B IMCU

## 2021-08-04 PROCEDURE — 80048 BASIC METABOLIC PNL TOTAL CA: CPT | Performed by: INTERNAL MEDICINE

## 2021-08-04 PROCEDURE — 93970 EXTREMITY STUDY: CPT

## 2021-08-04 PROCEDURE — 85610 PROTHROMBIN TIME: CPT | Performed by: INTERNAL MEDICINE

## 2021-08-04 RX ADMIN — ASPIRIN 81 MG: 81 TABLET, COATED ORAL at 22:03

## 2021-08-04 RX ADMIN — INSULIN GLARGINE 7 UNITS: 100 INJECTION, SOLUTION SUBCUTANEOUS at 22:38

## 2021-08-04 RX ADMIN — CLONIDINE HYDROCHLORIDE 0.2 MG: 0.1 TABLET ORAL at 08:49

## 2021-08-04 RX ADMIN — Medication 1 MG: at 22:03

## 2021-08-04 RX ADMIN — METOPROLOL TARTRATE 50 MG: 50 TABLET, FILM COATED ORAL at 08:49

## 2021-08-04 RX ADMIN — ALLOPURINOL 200 MG: 100 TABLET ORAL at 08:49

## 2021-08-04 RX ADMIN — HYDRALAZINE HYDROCHLORIDE 25 MG: 25 TABLET, FILM COATED ORAL at 09:26

## 2021-08-04 RX ADMIN — FOLIC ACID 1 MG: 1 TABLET ORAL at 08:49

## 2021-08-04 ASSESSMENT — ACTIVITIES OF DAILY LIVING (ADL)
ADLS_ACUITY_SCORE: 21
ADLS_ACUITY_SCORE: 20
ADLS_ACUITY_SCORE: 20
ADLS_ACUITY_SCORE: 21
ADLS_ACUITY_SCORE: 21
ADLS_ACUITY_SCORE: 19

## 2021-08-04 ASSESSMENT — MIFFLIN-ST. JEOR: SCORE: 1607.5

## 2021-08-04 NOTE — PROGRESS NOTES
"BRIEF NUTRITION ASSESSMENT      REASON FOR ASSESSMENT:  Gordon Gann is a 76 year old male seen by Registered Dietitian for [facility]    NUTRITION HISTORY:  Patient reports eating 2-3 meals daily at baseline. He has been trying to follow a low CHO diet in an effort to control his blood sugar and states he has lost 25 lb within the past 3 months as a result of this. Recently moved to [place] from [place] to live with his daughter and son-in-law due to general decline in cognitive and physical function. No known food allergies.     CURRENT DIET AND INTAKE:  Diet:  Renal               Eating 100% of 2-3 meals daily over admission (mostly 3). States the food here is really good and has a good appetite.     ANTHROPOMETRICS:  Height: 5' 8\"  Weight:  197 lbs 15.57 oz  Body mass index is 30.1 kg/m .   Weight Status: Obesity Grade I BMI 30-34.9  Weight History: Pt appears to have lost 20 lb within the past 3 months (9% wt loss), though states this was intentional with low CHO diet and trying to control blood sugars.   Wt Readings from Last 10 Encounters:   08/04/21 : 89.8 kg (197 lb 15.6 oz)     Per Care Everywhere --   5/17/21: 217 lb (98.8 kg)    LABS:  Labs noted    MALNUTRITION:  Visual Nutrition Focused Physical Assessment (NFPA) completed.   Patient does not meet two of the following criteria necessary for diagnosing malnutrition.     % Weight Loss:  > 7.5% in 3 months (severe malnutrition)  % Intake:  No decreased intake noted  Subcutaneous Fat Loss:  None observed  Muscle Loss:  None observed  Fluid Retention:  None noted    NUTRITION INTERVENTION:  Nutrition Diagnosis:  No nutrition diagnosis at this time.    Implementation:  Nutrition Education: Per Provider order if indicated    FOLLOW UP/MONITORING:   Will re-evaluate in 7 - 10 days, or sooner, if re-consulted.      Rachel Lange RD, LD  Unit RD Pager: [phone]        "

## 2021-08-04 NOTE — PLAN OF CARE
A&O X4. VSS on RA; HTN, gave PRN hydralazine. Combo renal diet tolerated. Ax1 gb and walker. Encouraged to ambulate. Edema BLE & BUE 2+. Denies pain. Continent, up to bathroom. Neuros intact. Skin pale, CDI, bruising and scabs to BUE. CMS intact.  CTM

## 2021-08-04 NOTE — PROGRESS NOTES
Renal Medicine Progress Note                                Gordon Gann MRN# [id number]   Age: 76 year old YOB: 1945   Date of Admission: 7/28/2021 Hospital LOS: 7                  Assessment/Plan:       76 year old male admitted on 7/28/2021.  Past history DM II on insulin, depression, alcohol abuse, hypertension, chronic kidney disease stage V, chronic atrial fibrillation, who presents as a direct admission from [facility], where he was brought in by family with generalized weakness, confusion, and increased urination, found to have mild diabetic ketoacidosis    Seen for evaluation of elevated creatinine      1.  CKD V   -eGFR 15 ml/min range   -presumed diabetic nephropathy   -progressive   -willing to proceed to dialysis as indicated   2.  Proteinuria   -PCR: 5.26 g/g  3.  HTN   -moderate control   4.  Anemia   -borderline Fe studies  5.  Secondary hyperparathyroidism   - pg/ml   -vitamin D pending   6.  DM   -poor control   -A1C 11% range   7.  DKA   -resolved  8.  Hypoalbuminemia  9.  ? ARF component      Creatinine increased slightly  ? intravascular volume    Hold diuretic   AVG per vascular surgery    Renal follow-up as scheduled         Interval History:     Up in chair follows  Flat  IO and urine reviewed    Reviewed issues with daughter by phone  Agreed with rationale for AVF       ROS:     GENERAL: NAD, No fever,chills  R: NEGATIVE for significant cough or SOB  CV: NEGATIVE for chest pain, palpitations  EXT: no change edema  ROS otherwise negative    Medications and Allergies:     Reviewed    Physical Exam:     Vitals were reviewed  Patient Vitals for the past 8 hrs:   BP Temp Temp src Pulse Resp SpO2 Weight   08/04/21 1128 135/70 97.3  F (36.3  C) Oral 50 20 100 % --   08/04/21 0900 (!) 182/91 -- -- -- -- -- --   08/04/21 0809 (!) 197/88 98  F (36.7  C) Oral 58 16 100 % --   08/04/21 0615 -- -- -- -- -- -- 89.8 kg (197 lb 15.6 oz)     I/O last 3 completed shifts:  In: 600  [P.O.:600]  Out: -     Vitals:    07/28/21 2100 07/31/21 0550 08/01/21 0610 08/04/21 0615   Weight: 87 kg (191 lb 12.8 oz) 89.1 kg (196 lb 6.9 oz) 88.6 kg (195 lb 5.2 oz) 89.8 kg (197 lb 15.6 oz)       GENERAL: awake, alert, follows  HEENT: NC/AT, PERRLA, EOMI, non icteric, pharynx moist without lesion  RESP:  clear anteriorly  CV: RRR, normal S1 S2  ABDOMEN: soft, nontender, no HSM or masses and bowel sounds normal  MS: no clubbing, cyanosis   SKIN: clear without significant rashes or lesions  NEURO: speech normal and cranial nerves 2-12 intact  PSYCH: affect flat  EXT: 1 plus edema    Data:     Recent Labs   Lab 08/04/21  1139 08/04/21  0701 08/04/21  0230 08/03/21  2105 08/03/21  1508 08/03/21  1354 08/03/21  0512 08/02/21  0734 08/01/21  0743   NA  --  138  --   --   --   --  139 138 141   POTASSIUM  --  3.8  --   --  3.9 4.2 3.1* 3.7 3.7   CHLORIDE  --  108  --   --   --   --  106 107 110*   CO2  --  24  --   --   --   --  25 26 26   ANIONGAP  --  6  --   --   --   --  8 5 5   * 75 157* 180*  --   --  46* 68* 105*   BUN  --  62*  --   --   --   --  68* 64* 64*   CR  --  4.23*  --   --   --   --  4.49* 4.26* 4.36*   GFRESTIMATED  --  13*  --   --   --   --  12* 13* 12*   LC  --  8.2*  --   --   --   --  8.6 8.3* 7.9*         Recent Labs   Lab Test 08/04/21  0701 08/03/21  0512 08/02/21  0734 08/01/21  0743 07/31/21  0751 07/30/21  0730 07/29/21  0634 07/29/21  0208 07/28/21  2335 07/28/21  2118   CR 4.23* 4.49* 4.26* 4.36* 4.12* 3.94* 3.80* 3.73* 3.73* 3.66*     Recent Labs   Lab 08/04/21  0701 08/03/21  0512 08/02/21  0734 08/01/21  0743   ALBUMIN 1.9* 2.4* 2.0* 1.8*     Recent Labs   Lab 08/02/21  1212 07/31/21  0751 07/30/21  0730 07/29/21  0634 07/29/21  0208 07/28/21  1237   PHOS 6.2*  --  4.2 4.3 3.2  --    HGB  --  11.2*  --   --   --  10.6*         G Garret Perla MD    [facility] - Nephrology  [phone]

## 2021-08-04 NOTE — PROGRESS NOTES
"  GASTROENTEROLOGY PROGRESS NOTE     IMPRESSION:  1. Increased LFTs - some improvement. Work-up:  - Negative - HCV, HAV IgM, HBsAg, HBsAb  - Borderline ADILSON, but negative F-actin  - AMA negative  - TTG IgA negative, normal IgA level.  - Alk phos - increased liver source  - Iron studies consistent with anemia of chronic disease. No iron overload.  - Ultrasound negative  - MRCP negative    -- No clear source at this time, some improvement, may be a combination of prior alcohol and fatty liver given diabetes.    RECOMMENDATIONS:  1. Given improvement, would check LFTs as an outpatient by primary clinic.  2. If ongoing elevation in LFTs, then would refer patient to [facility] for ongoing evaluation.  3. We will sign off, please call with any questions or concerns.    Marcel Naik MD  [facility]  [phone]      ________________________________________________________________________      SUBJECTIVE:  Patient feels fine, just awaiting determination on fistula       OBJECTIVE:  /70 (BP Location: Right arm)   Pulse 50   Temp 97.3  F (36.3  C) (Oral)   Resp 20   Ht 1.727 m (5' 8\")   Wt 89.8 kg (197 lb 15.6 oz)   SpO2 100%   BMI 30.10 kg/m    Temp (24hrs), Av  F (36.7  C), Min:97.3  F (36.3  C), Max:98.4  F (36.9  C)    Patient Vitals for the past 72 hrs:   Weight   21 0615 89.8 kg (197 lb 15.6 oz)        PHYSICAL EXAM  GEN: Alert, NAD.    HRT: reg  LUNGS: CTA  ABD: +BS, non-tender, non-distended, no rebound or guarding.    Additional Data:  I have reviewed the patient's new clinical lab results:     Recent Labs   Lab Test 21  0701 21  0512 21  1212 21  0751 21  1237   WBC  --   --   --  7.1 7.1   HGB  --   --   --  11.2* 10.6*   MCV  --   --   --  86 87   PLT  --   --   --  276 223   INR 1.12 1.01 0.99  --   --      Recent Labs   Lab Test 21  1139 21  0701 21  0230 21  1508 21  1354 21  0512 21  0734   NA  -- "  138  --   --   --  139 138   POTASSIUM  --  3.8  --  3.9 4.2 3.1* 3.7   CHLORIDE  --  108  --   --   --  106 107   CO2  --  24  --   --   --  25 26   BUN  --  62*  --   --   --  68* 64*   CR  --  4.23*  --   --   --  4.49* 4.26*   ANIONGAP  --  6  --   --   --  8 5   LC  --  8.2*  --   --   --  8.6 8.3*   * 75 157*  --   --  46* 68*     Recent Labs   Lab Test 08/04/21  0701 08/03/21  0512 08/02/21  0734 07/28/21  1354   ALBUMIN 1.9* 2.4* 2.0*  --    BILITOTAL 0.3 0.2 0.2  --    * 189* 199*  --    AST 68* 128* 181*  --    PROTEIN  --   --   --  300 *

## 2021-08-04 NOTE — PROGRESS NOTES
"[facility]  VASCULAR SURGERY Progress Note    Admission Date: 7/28/2021 8/4/2021         Assessment and Plan:     Gordon Gann is a 76 year old male, right-hand dominant, with PMHx of CKD stage V secondary to T2DM, atrial fibrillation on eliquis, CAD s/p PCI in 2006 now on plavix, MDD, EtOH abuse, HTN, who was admitted with generalized weakness, confusion, and found to be in diabetic ketoacidosis. Given the progression of his CKD, nephrology has been following and recommended initiation of hemodialysis. Vein mapping of bilateral upper extremities performed which showed small caliber cephalic and basilic veins in bilateral upper extremities, proximal right basilic vein measures 4.3 mm in diameter, but is 2.1 mm at the AC fossa. Given the small caliber of his veins, would recommend placement of left upper extremity loop graft.     Please do not discharge yet.                Interval History:     Ambulating with PT this morning, no issues overnight. No fevers, chills, nausea, emesis, dyspnea                     Physical Exam:   Blood pressure (!) 182/91, pulse 58, temperature 98  F (36.7  C), temperature source Oral, resp. rate 16, height 1.727 m (5' 8\"), weight 89.8 kg (197 lb 15.6 oz), SpO2 100 %.  I/O last 3 completed shifts:  In: 600 [P.O.:600]  Out: -   Constitutional:  Awake, alert, oriented, and in no apparent distress.   Lungs: No increased work of breathing on room air   Cardiovascular: Extremities warm and well perfused   Extremities: Ambulatory, no focal deficits          Data:     Recent Labs   Lab Test 07/31/21  0751 07/28/21  1237   WBC 7.1 7.1   HGB 11.2* 10.6*   HCT 34.0* 32.0*    223      Recent Labs   Lab Test 08/04/21  0701 08/03/21  1508 08/03/21  1354 08/03/21  0512 08/02/21  0734     --   --  139 138   POTASSIUM 3.8 3.9 4.2 3.1* 3.7   CHLORIDE 108  --   --  106 107   CO2 24  --   --  25 26   BUN 62*  --   --  68* 64*   CR 4.23*  --   --  4.49* 4.26*     Recent Labs "   Lab Test 08/04/21  0701 08/03/21  0512 08/02/21  0734   BILITOTAL 0.3 0.2 0.2   * 189* 199*   AST 68* 128* 181*   ALKPHOS 226* 298* 288*      Recent Labs   Lab Test 08/04/21  0701 08/03/21  0512 08/02/21  1212   INR 1.12 1.01 0.99           Darwin Rohit  General Surgery Resident - [id number]  [phone]

## 2021-08-04 NOTE — PLAN OF CARE
Care Plan Nursing Note    Patient Information  Name: Gordon Gann  Age: 76 year old  Reason for admission: DKA    Patient Assessment   DATE & TIME: 08/04/2021, 1473-2063   Cognitive Concerns/ Orientation : A & O times four  BEHAVIOR & AGGRESSION TOOL COLOR: calm  ABNL VS/O2:  VSS, room air  MOBILITY: SBA  PAIN MANAGMENT: Denied  DIET: Renal  BOWEL/BLADDER: continent  ABNL LAB/BG: GFR=13, cr=4.23, BUN=62, ALT=118, AST=68, Alkaline phosphate 226, ZVK=979/  DRAIN/DEVICES: PIV SL  TELEMETRY RHYTHM:  SKIN: Bruises on arms, and mild edema in the lower ext  TESTS/PROCEDURES: Arteriovenous Graft (AVG) in AM, will be NPO after mid night  D/C DATE: Pending  OTHER IMPORTANT INFO:  Continue to monitor

## 2021-08-04 NOTE — PROGRESS NOTES
Care Management Follow Up    Length of Stay (days): 7    Expected Discharge Date: 08/05/2021  Expected Time of Departure: Unknown, but pt's daughter would like to provide transport  Concerns to be Addressed: discharge planning  Pt recently moved from [place] to [place]. Pt lives with his marva in her home in [place].  Patient plan of care discussed at interdisciplinary rounds: Yes    Anticipated Discharge Disposition: Transitional Care  Disposition Comments: pt for pt to discharge to TCU.  Anticipated Discharge Services: None  Anticipated Discharge DME: None    Patient/family educated on Medicare website which has current facility and service quality ratings: yes  Education Provided on the Discharge Plan:    Patient/Family in Agreement with the Plan: yes    Referrals Placed by CM/SW: Senior Linkage Line, Post Acute Facilities  Private pay costs discussed: Not applicable    Additional Information:  Chandler LANDON has not received word on the insurance auth for t's BC[place]of[place] insurance. Pt's daughter, Omayra, found pt's vaccination card. The following is a picture of the vaccination card. SW did speak with Omayra about pt discharging home. Omayra felt it would be best of pt discharged to TCU. SW recommended Omayra call pt to discuss discharge planning and if having pt at Omayra's home is appropriate.           LIANA Ballard

## 2021-08-04 NOTE — PROGRESS NOTES
A+Ox4, withdrawn and irritable at times. Verbalized frustration with possibility of fistula placement, stating he was certain he was discharging today. Encouraged to communicate with physician in AM.  VSS on RA, occasionally hypertensive. Hypertension last evening resolved after MRI complete - patient highly agitated waiting for MRI. MRI performed.    Up SBA. LS CTA, RA. BG's WNL. IV access remains tenuous, flushes though sluggish. Plan for midline placement today. NPO since midnight per MD note, though no order exists, and note indicates potential loop graft placement on 8/5, will seek clarification.

## 2021-08-04 NOTE — PROGRESS NOTES
Per Jocelynn RN, we are going to hold on Midline placement for now.  Would need to consult with Renal if fistula being placed in patient or out patient as to whether Midline is appropriate.  Patient really wants discharge today.  Jocelynn states that fistula will be placed as outpatient.  RN will follow up with MD to change meds to PO or discontinue completely.  Will re-contact us if line is needed.

## 2021-08-04 NOTE — PROGRESS NOTES
[facility]    Medicine Progress Note - Hospitalist Service       Date of Admission:  7/28/2021    Assessment & Plan           Gordon Gann is a 76 year old male admitted on 7/28/2021.  Past history DM II on insulin, depression, alcohol abuse, hypertension, chronic kidney disease stage V, chronic atrial fibrillation, who presents as a direct admission from [facility], where he was brought in by family with generalized weakness, confusion, and increased urination, found to have mild diabetic ketoacidosis.        Diabetic ketoacidosis, resolved  DM II on long term insulin, uncontrolled  [Lantus 30 units at bedtime and novolog 15 units t.i.d. with meals]  Recent history of non-compliance with insulin, admission A1C 11.7%.  Initial glucose >600, ketones elevated with acidosis and borderline elevated gap, improved with IVF and insulin gtt.   *Hypoglycemic episode down to 68 on 8/2, insulin changed from Lantus 20 twice daily to 25 units nightly. Recurrent hypoglycemic episode down to 48 on 8/3,. Lantus decreased to10 units nightly, will need to gradually titrate up for better control of blood sugar  - decreased Lantus to 7 units at bedtime for 8/4 given NPO status  - continue with aspart 5 units TID with meals  - continue medium intensity sliding scale insulin TID. No bed time sliding scale insulin to minimize risk of hypoglycemia  - monitor for hypoglycemia  - patient NPO after MN for possible procedure tomorrow, need to monitor for hypoglycemia       Elevated transaminase/alk phos  Etiology not clear. LFT's up from admission on 7/30 and reporting intermittent RUQ pain though currently non-tender on exam- RUQ US without acute finding.  Currently patient without abdominal pain, nausea or vomiting. Etiology not clear at this time.  CK is normal.  Ferritin 420, hepatitis serologies negative.  -mitochondrial antibody, ferritin, tissue transglutaminase antibody pending  -MRCP normal gall  bladder, liver  - mitochondrial antibody, tissue transglutaminase antibody pending  - LFTs are now trending down, will monitor intermittently  - GI consulted, appreciate assistance  - hold PTA Zetia/statin at this time, can be restarted once LFTs improved       Hypokalemia- resolved  - replaced orally. Not on protocol due to renal insufficiency.     Hypophosphatemia  - normalized with supplementation  - no protocol due to renal function     Anasarca and bilateral peripheral edema  Possible type II NSTEMI  Serial trop mildly elevated but flat 0.06, EKG with non-specific ST changes.  Pro-BNP >8400.  CXR negative for edema.  TTE with EF 50-55%, septal motion consistent with conduction abnormality, no valvular disease.   - continue tele for now  - Compression stockings for lower extremity edema  - PTA Lasix stopped per nephrology recommendation.      CKD stage V  It appears that his nephrologist has been discussing dialysis (he remains undecided). Patient just moved from [place] and does not have established nephrologist locally.  Baseline creatinine is in the upper-3 to low-4 range. Reviewed his nephrologist note from 5/4 in care  Everywhere, labs from 5/4 listed as cr of 4.16.  - creatinine is currently stable  - PTA Lasix and hydralazine stopped per nephrology recommendation  -Nephrology follow-up scheduled with Dr. Roger for 8/24/2021  - Vascular surgery consulted for AVF, vein mapping completed   - Discussed with Dr. Hernandez, if patient staying in hospital, will be able to do procedure tomorrow 8/5 afternoon.   - discussed with patient. He is hesitant about it and would like to discuss with vascular surgery tomorrow morning. He agrees with NPO after MN. Vascular surgery needs to discuss with patient in AM   - Plavix and Eliquis are on hold, last given on 8/3 in an event he proceeds with surgery       Chronic A-fib  CAD s/p PCI 2006  HTN  HLD  Further details unknown of underlying CAD.  Pharmacy confirms the patient  "only takes Eliquis once daily, stating he gets a rash if he takes it twice daily. PTA aspirin, Plavix, clonidine, metoprolol and Eliquis  - PTA metoprolol decreased to 50mg BID due to low HR   -Continue clonidine 0.2 mg twice daily, with holding parameters   - Zetia and simvastatin on hold at this time given rising transaminase levels  - continue with ASA  - Plavix and Eliquis held due to possible surgery tomorrow.  - Follow I's and O's, and daily weights  - per discussion with patient, last stent was 2006 done in [place]. Has not followed in long time and not sure how long he needs to be on Plavix.      Possible cognitive impairment  Possible depression  Daughter concerned about depression and also reports he has been mismanaging his insulin recently and has concerns regarding forgetfulness.   - outpatient cognitive eval with OT  - after discussion with prior rounder, started on Remeron 7.5mg at bedtime on 7/30. He slept good, however remained tired most of the day on 7/31. Remeron stopped.   -Discussed with patient on 8/3, possible psychiatric consultation for further recommendation, patient declined at this time     Insomnia  - resume PTA Ambien at lower dose 2.5mg at bedtime prn per patient request. He reports he is not sleeping well  - prn melatonin     History of alcohol abuse  Reports no alcohol use for >1 week     Gout   - continue allopurinol (dose reduced due to renal function)    Discharge planning:  PT/OT recommend TCU.  Patient want to discharge back to his daughters home and then TCU after that. Discussed with patient's daughter who is concerned about patient's safety if he goes home. He won't have continuous supervision. She works all day and her boyfriend also not at home during the day. Discussed with patient his daughter's concerns and PT recommendations. He states \"If my daughter says, I will stay.\" He is currently awaiting insurance authorization for TCU placement.        Diet: Combination Diet Renal " Diet  NPO for Medical/Clinical Reasons Except for: Meds    DVT Prophylaxis: Pneumatic Compression Devices  Flood Catheter: Not present  Central Lines: None  Code Status: No CPR- Do NOT Intubate      Disposition Plan   Expected discharge: 08/05/2021 recommended to transitional care unit once safe disposition plan/ TCU bed available and awaiting insurance authorization.     The patient's care was discussed with the Bedside Nurse, Care Coordinator/, Patient and Patient's Family.    Palak Wheeler MD  Hospitalist Service  [facility]  Securely message with the Vocera Web Console (learn more here)  Text page via [facility] Paging/Directory      Clinically Significant Risk Factors Present on Admission                ______________________________________________________________________    Interval History   Denies chest pain or shortness of breath. He is afebrile. .  Denies abdominal pain, nausea or vomiting.     Data reviewed today: I reviewed all medications, new labs and imaging results over the last 24 hours. I personally reviewed no images or EKG's today.    Physical Exam   Vital Signs: Temp: 98  F (36.7  C) Temp src: Oral BP: (!) 175/82 Pulse: (!) 49   Resp: 20 SpO2: 93 % O2 Device: None (Room air)    Weight: 197 lbs 15.57 oz  General Appearance: Alert, awake and appropriate  Respiratory: clear to auscultation bilaterally.  Cardiovascular: regular rate and rhythm, +LE edema  GI: soft and non-tender  Skin: warm and dry      Data   Recent Labs   Lab 08/04/21  1139 08/04/21  0701 08/04/21  0230 08/03/21  1508 08/03/21  1354 08/03/21  0512 08/02/21  1212 08/02/21  0734 07/31/21  1146 07/31/21  0751   WBC  --   --   --   --   --   --   --   --   --  7.1   HGB  --   --   --   --   --   --   --   --   --  11.2*   MCV  --   --   --   --   --   --   --   --   --  86   PLT  --   --   --   --   --   --   --   --   --  276   INR  --  1.12  --   --   --  1.01 0.99  --   --   --    NA  --   138  --   --   --  139  --  138   < > 140   POTASSIUM  --  3.8  --  3.9 4.2 3.1*  --  3.7   < > 3.9   CHLORIDE  --  108  --   --   --  106  --  107   < > 110*   CO2  --  24  --   --   --  25  --  26   < > 27   BUN  --  62*  --   --   --  68*  --  64*   < > 58*   CR  --  4.23*  --   --   --  4.49*  --  4.26*   < > 4.12*   ANIONGAP  --  6  --   --   --  8  --  5   < > 3   LC  --  8.2*  --   --   --  8.6  --  8.3*   < > 8.0*   * 75 157*  --   --  46*  --  68*   < > 152*   ALBUMIN  --  1.9*  --   --   --  2.4*  --  2.0*   < > 1.9*   PROTTOTAL  --  5.3*  --   --   --  6.5*  --  5.7*   < > 5.4*   BILITOTAL  --  0.3  --   --   --  0.2  --  0.2   < > 0.3   ALKPHOS  --  226*  --   --   --  298*  --  288*   < > 212*   ALT  --  118*  --   --   --  189*  --  199*   < > 101*   AST  --  68*  --   --   --  128*  --  181*   < > 77*    < > = values in this interval not displayed.     Recent Results (from the past 24 hour(s))   MR Abdomen MRCP without Contrast    Narrative    MRI ABDOMEN    CLINICAL HISTORY:  Elevated alkaline phosphatase, aspartate  aminotransferase and is empty. Previous history of right upper  quadrant pain.    TECHNIQUE: MRI of the abdomen without intravenous contrast and 3-D  MRCP. FINDINGS:    Comparison study: Right upper quadrant ultrasound 7/30/2021    FINDINGS: Evaluation is limited by absence of intravenous contrast.    Normal gallbladder. No intrahepatic or extrahepatic biliary ductal  dilatation.  No significant hepatic steatosis. No focal hepatic masses evident on  this noncontrast exam. Normal size and signal of the pancreatic  parenchyma. No pancreatic duct dilatation. The spleen and adrenal  glands are normal. Simple and punctate hemorrhagic cysts in the  kidneys. Visualized loops of small bowel and colon are normal in  caliber. No abdominal lymphadenopathy. No ascites. Partly visualized  small right pleural effusion.      Impression    IMPRESSION:  1. No intrahepatic or extrahepatic biliary  ductal dilatation. Normal  gallbladder,  liver and pancreas on this noncontrast MRI.  2. Partially visualized small right pleural effusion.    BRIDGETT WARNER MD         SYSTEM ID:  [id number]   US Upper Extremity Venous Duplex Bilat    Narrative    US UPPER EXTREMITY VENOUS DUPLEX BILATERAL  8/4/2021 11:19 AM     HISTORY: 76-year-old patient with plan for AV fistula creation.  Request made to ensure patency of veins in both upper extremities as  well as diameter measurements of applicable veins in both upper  extremities.    COMPARISON: None    TECHNIQUE: Color Doppler and spectral waveform analysis obtained  throughout the deep and superficial veins of both upper extremities.    FINDINGS: Both internal jugular, subclavian, axillary, and brachial  veins demonstrate normal blood flow, compression (where applicable),  and augmentation. The basilic and cephalic veins are patent.    Brachial vein measurements were obtained with tourniquet applied in  both upper extremities.    Right upper extremity: The medial brachial venous branches 2.2 mm and  the lateral branch is 3.3 mm. The axillary vein a 7.6 mm, subclavian  vein ranges from 5 to 6.8 mm and the internal jugular vein is 10.4 mm.    Left upper extremity: The medial brachial vein is 2.7 mm and lateral  brachial vein is 3.8 mm. The axillary vein is 6.7 mm. The subclavian  vein ranges from 6.7 to 6.9 mm. The internal jugular vein is 9.2 mm.      Impression    IMPRESSION:  1. Negative for deep venous thrombosis throughout both upper  extremities.  2. Venous measurements of both brachial, axillary, subclavian, and  internal jugular veins as described above.    BON ROSE MD         SYSTEM ID:  [id number]     Medications       allopurinol  200 mg Oral Daily     [Held by provider] apixaban ANTICOAGULANT  2.5 mg Oral QAM     aspirin  81 mg Oral At Bedtime     cloNIDine  0.2 mg Oral BID     [Held by provider] clopidogrel  75 mg Oral QAM     folic acid  1 mg Oral QAM      [Held by provider] furosemide  20 mg Oral Daily     insulin aspart  5 Units Subcutaneous TID w/meals     insulin aspart  1-7 Units Subcutaneous TID AC     insulin glargine  7 Units Subcutaneous At Bedtime     iron sucrose (VENOFER) intermittent infusion (200 mg)  200 mg Intravenous Daily     LORazepam  0.25 mg Oral Once     melatonin  1 mg Oral At Bedtime     metoprolol tartrate  50 mg Oral BID

## 2021-08-05 ENCOUNTER — ANESTHESIA EVENT (OUTPATIENT)
Dept: SURGERY | Facility: CLINIC | Age: 76
DRG: 673 | End: 2021-08-05
Payer: COMMERCIAL

## 2021-08-05 ENCOUNTER — ANESTHESIA (OUTPATIENT)
Dept: SURGERY | Facility: CLINIC | Age: 76
DRG: 673 | End: 2021-08-05
Payer: COMMERCIAL

## 2021-08-05 LAB
FASTING STATUS PATIENT QL REPORTED: YES
GLUCOSE BLD-MCNC: 124 MG/DL (ref 70–99)
GLUCOSE BLDC GLUCOMTR-MCNC: 104 MG/DL (ref 70–99)
GLUCOSE BLDC GLUCOMTR-MCNC: 110 MG/DL (ref 70–99)
GLUCOSE BLDC GLUCOMTR-MCNC: 112 MG/DL (ref 70–99)
GLUCOSE BLDC GLUCOMTR-MCNC: 121 MG/DL (ref 70–99)
GLUCOSE BLDC GLUCOMTR-MCNC: 198 MG/DL (ref 70–99)
GLUCOSE BLDC GLUCOMTR-MCNC: 68 MG/DL (ref 70–99)
GLUCOSE BLDC GLUCOMTR-MCNC: 70 MG/DL (ref 70–99)
GLUCOSE BLDC GLUCOMTR-MCNC: 76 MG/DL (ref 70–99)
POTASSIUM BLD-SCNC: 3.7 MMOL/L (ref 3.4–5.3)
SARS-COV-2 RNA RESP QL NAA+PROBE: NEGATIVE

## 2021-08-05 PROCEDURE — 250N000011 HC RX IP 250 OP 636: Performed by: SURGERY

## 2021-08-05 PROCEDURE — 99233 SBSQ HOSP IP/OBS HIGH 50: CPT | Performed by: INTERNAL MEDICINE

## 2021-08-05 PROCEDURE — 250N000013 HC RX MED GY IP 250 OP 250 PS 637: Performed by: PHYSICIAN ASSISTANT

## 2021-08-05 PROCEDURE — 120N000013 HC R&B IMCU

## 2021-08-05 PROCEDURE — 710N000009 HC RECOVERY PHASE 1, LEVEL 1, PER MIN: Performed by: SURGERY

## 2021-08-05 PROCEDURE — 87635 SARS-COV-2 COVID-19 AMP PRB: CPT | Performed by: INTERNAL MEDICINE

## 2021-08-05 PROCEDURE — 360N000077 HC SURGERY LEVEL 4, PER MIN: Performed by: SURGERY

## 2021-08-05 PROCEDURE — 250N000009 HC RX 250: Performed by: HOSPITALIST

## 2021-08-05 PROCEDURE — 272N000001 HC OR GENERAL SUPPLY STERILE: Performed by: SURGERY

## 2021-08-05 PROCEDURE — 272N000433 HC KIT CATH IV 18 OR 20G CM, POWERGLIDE W MAX BARRIER

## 2021-08-05 PROCEDURE — 250N000013 HC RX MED GY IP 250 OP 250 PS 637: Performed by: HOSPITALIST

## 2021-08-05 PROCEDURE — 250N000009 HC RX 250: Performed by: ANESTHESIOLOGY

## 2021-08-05 PROCEDURE — 258N000003 HC RX IP 258 OP 636: Performed by: ANESTHESIOLOGY

## 2021-08-05 PROCEDURE — 250N000013 HC RX MED GY IP 250 OP 250 PS 637: Performed by: INTERNAL MEDICINE

## 2021-08-05 PROCEDURE — 36830 ARTERY-VEIN NONAUTOGRAFT: CPT | Mod: AS | Performed by: PHYSICIAN ASSISTANT

## 2021-08-05 PROCEDURE — 84132 ASSAY OF SERUM POTASSIUM: CPT | Performed by: ANESTHESIOLOGY

## 2021-08-05 PROCEDURE — 36569 INSJ PICC 5 YR+ W/O IMAGING: CPT

## 2021-08-05 PROCEDURE — 370N000017 HC ANESTHESIA TECHNICAL FEE, PER MIN: Performed by: SURGERY

## 2021-08-05 PROCEDURE — 36415 COLL VENOUS BLD VENIPUNCTURE: CPT | Performed by: ANESTHESIOLOGY

## 2021-08-05 PROCEDURE — 250N000011 HC RX IP 250 OP 636: Performed by: ANESTHESIOLOGY

## 2021-08-05 PROCEDURE — C1768 GRAFT, VASCULAR: HCPCS | Performed by: SURGERY

## 2021-08-05 PROCEDURE — 250N000012 HC RX MED GY IP 250 OP 636 PS 637: Performed by: PHYSICIAN ASSISTANT

## 2021-08-05 PROCEDURE — 250N000009 HC RX 250: Performed by: SURGERY

## 2021-08-05 PROCEDURE — 03180JD BYPASS LEFT BRACHIAL ARTERY TO UPPER ARM VEIN WITH SYNTHETIC SUBSTITUTE, OPEN APPROACH: ICD-10-PCS | Performed by: SURGERY

## 2021-08-05 PROCEDURE — 999N000141 HC STATISTIC PRE-PROCEDURE NURSING ASSESSMENT: Performed by: SURGERY

## 2021-08-05 PROCEDURE — 99232 SBSQ HOSP IP/OBS MODERATE 35: CPT | Performed by: INTERNAL MEDICINE

## 2021-08-05 PROCEDURE — 258N000001 HC RX 258: Performed by: INTERNAL MEDICINE

## 2021-08-05 PROCEDURE — 36830 ARTERY-VEIN NONAUTOGRAFT: CPT | Performed by: SURGERY

## 2021-08-05 PROCEDURE — 82947 ASSAY GLUCOSE BLOOD QUANT: CPT | Performed by: ANESTHESIOLOGY

## 2021-08-05 PROCEDURE — 99222 1ST HOSP IP/OBS MODERATE 55: CPT | Mod: 57 | Performed by: SURGERY

## 2021-08-05 PROCEDURE — 258N000003 HC RX IP 258 OP 636: Performed by: SURGERY

## 2021-08-05 DEVICE — PROPATEN VASCULAR GRAFT SW 4-7MMX45CM TAPERED HEPARIN
Type: IMPLANTABLE DEVICE | Site: ARM | Status: FUNCTIONAL
Brand: GORE PROPATEN VASCULAR GRAFT

## 2021-08-05 RX ORDER — ACETAMINOPHEN 325 MG/1
650 TABLET ORAL EVERY 4 HOURS PRN
Status: DISCONTINUED | OUTPATIENT
Start: 2021-08-05 | End: 2021-08-06 | Stop reason: HOSPADM

## 2021-08-05 RX ORDER — BUPIVACAINE HYDROCHLORIDE 5 MG/ML
INJECTION, SOLUTION EPIDURAL; INTRACAUDAL
Status: DISCONTINUED
Start: 2021-08-05 | End: 2021-08-05 | Stop reason: HOSPADM

## 2021-08-05 RX ORDER — FENTANYL CITRATE 50 UG/ML
INJECTION, SOLUTION INTRAMUSCULAR; INTRAVENOUS PRN
Status: DISCONTINUED | OUTPATIENT
Start: 2021-08-05 | End: 2021-08-05

## 2021-08-05 RX ORDER — ONDANSETRON 4 MG/1
4 TABLET, ORALLY DISINTEGRATING ORAL EVERY 30 MIN PRN
Status: DISCONTINUED | OUTPATIENT
Start: 2021-08-05 | End: 2021-08-05 | Stop reason: HOSPADM

## 2021-08-05 RX ORDER — SODIUM CHLORIDE, SODIUM LACTATE, POTASSIUM CHLORIDE, CALCIUM CHLORIDE 600; 310; 30; 20 MG/100ML; MG/100ML; MG/100ML; MG/100ML
INJECTION, SOLUTION INTRAVENOUS CONTINUOUS
Status: DISCONTINUED | OUTPATIENT
Start: 2021-08-05 | End: 2021-08-05 | Stop reason: HOSPADM

## 2021-08-05 RX ORDER — LIDOCAINE 40 MG/G
CREAM TOPICAL
Status: DISCONTINUED | OUTPATIENT
Start: 2021-08-05 | End: 2021-08-05 | Stop reason: HOSPADM

## 2021-08-05 RX ORDER — HYDROMORPHONE HCL IN WATER/PF 6 MG/30 ML
0.2 PATIENT CONTROLLED ANALGESIA SYRINGE INTRAVENOUS
Status: DISCONTINUED | OUTPATIENT
Start: 2021-08-05 | End: 2021-08-06 | Stop reason: HOSPADM

## 2021-08-05 RX ORDER — ONDANSETRON 2 MG/ML
4 INJECTION INTRAMUSCULAR; INTRAVENOUS EVERY 30 MIN PRN
Status: DISCONTINUED | OUTPATIENT
Start: 2021-08-05 | End: 2021-08-05 | Stop reason: HOSPADM

## 2021-08-05 RX ORDER — HEPARIN SODIUM 1000 [USP'U]/ML
INJECTION, SOLUTION INTRAVENOUS; SUBCUTANEOUS
Status: DISCONTINUED
Start: 2021-08-05 | End: 2021-08-05 | Stop reason: HOSPADM

## 2021-08-05 RX ORDER — ACETAMINOPHEN 650 MG/1
650 SUPPOSITORY RECTAL EVERY 4 HOURS PRN
Status: DISCONTINUED | OUTPATIENT
Start: 2021-08-05 | End: 2021-08-06 | Stop reason: HOSPADM

## 2021-08-05 RX ORDER — PROTAMINE SULFATE 10 MG/ML
INJECTION, SOLUTION INTRAVENOUS PRN
Status: DISCONTINUED | OUTPATIENT
Start: 2021-08-05 | End: 2021-08-05

## 2021-08-05 RX ORDER — PROPOFOL 10 MG/ML
INJECTION, EMULSION INTRAVENOUS PRN
Status: DISCONTINUED | OUTPATIENT
Start: 2021-08-05 | End: 2021-08-05

## 2021-08-05 RX ORDER — SODIUM CHLORIDE 9 MG/ML
INJECTION, SOLUTION INTRAVENOUS CONTINUOUS
Status: DISCONTINUED | OUTPATIENT
Start: 2021-08-05 | End: 2021-08-05 | Stop reason: HOSPADM

## 2021-08-05 RX ORDER — NALOXONE HYDROCHLORIDE 0.4 MG/ML
0.2 INJECTION, SOLUTION INTRAMUSCULAR; INTRAVENOUS; SUBCUTANEOUS
Status: DISCONTINUED | OUTPATIENT
Start: 2021-08-05 | End: 2021-08-06 | Stop reason: HOSPADM

## 2021-08-05 RX ORDER — FENTANYL CITRATE 50 UG/ML
25 INJECTION, SOLUTION INTRAMUSCULAR; INTRAVENOUS EVERY 5 MIN PRN
Status: DISCONTINUED | OUTPATIENT
Start: 2021-08-05 | End: 2021-08-05 | Stop reason: HOSPADM

## 2021-08-05 RX ORDER — LIDOCAINE HYDROCHLORIDE 10 MG/ML
INJECTION, SOLUTION INFILTRATION; PERINEURAL
Status: DISCONTINUED
Start: 2021-08-05 | End: 2021-08-05 | Stop reason: HOSPADM

## 2021-08-05 RX ORDER — NALOXONE HYDROCHLORIDE 0.4 MG/ML
0.4 INJECTION, SOLUTION INTRAMUSCULAR; INTRAVENOUS; SUBCUTANEOUS
Status: DISCONTINUED | OUTPATIENT
Start: 2021-08-05 | End: 2021-08-06 | Stop reason: HOSPADM

## 2021-08-05 RX ORDER — FENTANYL CITRATE 0.05 MG/ML
50 INJECTION, SOLUTION INTRAMUSCULAR; INTRAVENOUS
Status: DISCONTINUED | OUTPATIENT
Start: 2021-08-05 | End: 2021-08-05 | Stop reason: HOSPADM

## 2021-08-05 RX ORDER — OXYCODONE HYDROCHLORIDE 5 MG/1
5 TABLET ORAL EVERY 4 HOURS PRN
Status: DISCONTINUED | OUTPATIENT
Start: 2021-08-05 | End: 2021-08-05

## 2021-08-05 RX ORDER — HEPARIN SODIUM 1000 [USP'U]/ML
INJECTION, SOLUTION INTRAVENOUS; SUBCUTANEOUS PRN
Status: DISCONTINUED | OUTPATIENT
Start: 2021-08-05 | End: 2021-08-05

## 2021-08-05 RX ORDER — HEPARIN SODIUM 1000 [USP'U]/ML
INJECTION, SOLUTION INTRAVENOUS; SUBCUTANEOUS PRN
Status: DISCONTINUED | OUTPATIENT
Start: 2021-08-05 | End: 2021-08-05 | Stop reason: HOSPADM

## 2021-08-05 RX ORDER — HYDROMORPHONE HCL IN WATER/PF 6 MG/30 ML
0.2 PATIENT CONTROLLED ANALGESIA SYRINGE INTRAVENOUS EVERY 5 MIN PRN
Status: DISCONTINUED | OUTPATIENT
Start: 2021-08-05 | End: 2021-08-05 | Stop reason: HOSPADM

## 2021-08-05 RX ORDER — CEFAZOLIN SODIUM 2 G/100ML
INJECTION, SOLUTION INTRAVENOUS PRN
Status: DISCONTINUED | OUTPATIENT
Start: 2021-08-05 | End: 2021-08-05

## 2021-08-05 RX ORDER — HYDROCODONE BITARTRATE AND ACETAMINOPHEN 5; 325 MG/1; MG/1
1 TABLET ORAL EVERY 6 HOURS PRN
Status: DISCONTINUED | OUTPATIENT
Start: 2021-08-05 | End: 2021-08-06 | Stop reason: HOSPADM

## 2021-08-05 RX ADMIN — DEXMEDETOMIDINE HYDROCHLORIDE 0.5 MCG/KG/HR: 100 INJECTION, SOLUTION INTRAVENOUS at 14:14

## 2021-08-05 RX ADMIN — CLONIDINE HYDROCHLORIDE 0.2 MG: 0.1 TABLET ORAL at 08:11

## 2021-08-05 RX ADMIN — MIDAZOLAM HYDROCHLORIDE 2 MG: 1 INJECTION, SOLUTION INTRAMUSCULAR; INTRAVENOUS at 13:40

## 2021-08-05 RX ADMIN — INSULIN GLARGINE 7 UNITS: 100 INJECTION, SOLUTION SUBCUTANEOUS at 21:39

## 2021-08-05 RX ADMIN — MELATONIN 3 MG: 3 TAB ORAL at 22:48

## 2021-08-05 RX ADMIN — LIDOCAINE HYDROCHLORIDE 0.5 ML: 10 INJECTION, SOLUTION INFILTRATION; PERINEURAL at 12:37

## 2021-08-05 RX ADMIN — PROPOFOL 20 MG: 10 INJECTION, EMULSION INTRAVENOUS at 15:13

## 2021-08-05 RX ADMIN — ZOLPIDEM TARTRATE 2.5 MG: 5 TABLET, FILM COATED ORAL at 22:48

## 2021-08-05 RX ADMIN — FOLIC ACID 1 MG: 1 TABLET ORAL at 08:11

## 2021-08-05 RX ADMIN — HEPARIN SODIUM 5000 UNITS: 1000 INJECTION INTRAVENOUS; SUBCUTANEOUS at 15:39

## 2021-08-05 RX ADMIN — PROPOFOL 20 MG: 10 INJECTION, EMULSION INTRAVENOUS at 15:41

## 2021-08-05 RX ADMIN — ACETAMINOPHEN 650 MG: 325 TABLET, FILM COATED ORAL at 22:47

## 2021-08-05 RX ADMIN — ALLOPURINOL 200 MG: 100 TABLET ORAL at 08:11

## 2021-08-05 RX ADMIN — PROPOFOL 10 MG: 10 INJECTION, EMULSION INTRAVENOUS at 15:00

## 2021-08-05 RX ADMIN — PROPOFOL 10 MG: 10 INJECTION, EMULSION INTRAVENOUS at 15:05

## 2021-08-05 RX ADMIN — CEFAZOLIN SODIUM 2 G: 2 INJECTION, SOLUTION INTRAVENOUS at 14:14

## 2021-08-05 RX ADMIN — Medication 1 MG: at 22:47

## 2021-08-05 RX ADMIN — DEXTROSE MONOHYDRATE 25 ML: 500 INJECTION PARENTERAL at 12:42

## 2021-08-05 RX ADMIN — METOPROLOL TARTRATE 50 MG: 50 TABLET, FILM COATED ORAL at 08:11

## 2021-08-05 RX ADMIN — ASPIRIN 81 MG: 81 TABLET, COATED ORAL at 21:34

## 2021-08-05 RX ADMIN — CLONIDINE HYDROCHLORIDE 0.2 MG: 0.1 TABLET ORAL at 21:34

## 2021-08-05 RX ADMIN — FENTANYL CITRATE 25 MCG: 50 INJECTION, SOLUTION INTRAMUSCULAR; INTRAVENOUS at 15:09

## 2021-08-05 RX ADMIN — SODIUM CHLORIDE: 9 INJECTION, SOLUTION INTRAVENOUS at 13:50

## 2021-08-05 RX ADMIN — PROPOFOL 10 MG: 10 INJECTION, EMULSION INTRAVENOUS at 14:43

## 2021-08-05 RX ADMIN — PROTAMINE SULFATE 25 MG: 10 INJECTION, SOLUTION INTRAVENOUS at 16:01

## 2021-08-05 ASSESSMENT — ACTIVITIES OF DAILY LIVING (ADL)
ADLS_ACUITY_SCORE: 20
ADLS_ACUITY_SCORE: 19

## 2021-08-05 ASSESSMENT — ENCOUNTER SYMPTOMS
SEIZURES: 0
DYSRHYTHMIAS: 1

## 2021-08-05 ASSESSMENT — LIFESTYLE VARIABLES: TOBACCO_USE: 0

## 2021-08-05 NOTE — ANESTHESIA PREPROCEDURE EVALUATION
Anesthesia Pre-Procedure Evaluation    Patient: Gordon Gann   MRN: [id number] : 1945        Preoperative Diagnosis: End stage renal disease (H) [N18.6]   Procedure : Procedure(s):  left arm arteriovenous graft fistula.     No past medical history on file.   No past surgical history on file.   No Known Allergies   Social History     Tobacco Use     Smoking status: Not on file   Substance Use Topics     Alcohol use: Not on file      Wt Readings from Last 1 Encounters:   21 89.8 kg (197 lb 15.6 oz)        No Known Allergies  Prior to Admission medications    Medication Sig Start Date End Date Taking? Authorizing Provider   allopurinol (ZYLOPRIM) 300 MG tablet Take 300 mg by mouth every morning   Yes Unknown, Entered By History   apixaban ANTICOAGULANT (ELIQUIS) 2.5 MG tablet Take 2.5 mg by mouth every morning RX is for twice daily but he takes once daily in the a.m.  He states twice daily causes a rash.   Yes Unknown, Entered By History   aspirin 81 MG EC tablet Take 81 mg by mouth At Bedtime   Yes Unknown, Entered By History   cloNIDine (CATAPRES) 0.2 MG tablet Take 0.2 mg by mouth 2 times daily   Yes Unknown, Entered By History   clopidogrel (PLAVIX) 75 MG tablet Take 75 mg by mouth every morning   Yes Unknown, Entered By History   ezetimibe (ZETIA) 10 MG tablet Take 10 mg by mouth every morning   Yes Unknown, Entered By History   fish oil-omega-3 fatty acids 1000 MG capsule Take 1 g by mouth every morning   Yes Unknown, Entered By History   folic acid (FOLVITE) 1 MG tablet Take 1 mg by mouth every morning   Yes Unknown, Entered By History   furosemide (LASIX) 40 MG tablet Take 40 mg by mouth every morning   Yes Unknown, Entered By History   hydrALAZINE (APRESOLINE) 25 MG tablet Take 25 mg by mouth 2 times daily   Yes Unknown, Entered By History   insulin aspart (NOVOLOG FLEXPEN) 100 UNIT/ML pen Inject 15 Units Subcutaneous 3 times daily (with meals)   Yes Unknown, Entered By History   insulin  glargine (LANTUS PEN) 100 UNIT/ML pen Inject 30 Units Subcutaneous At Bedtime   Yes Unknown, Entered By History   metoprolol tartrate (LOPRESSOR) 100 MG tablet Take 100 mg by mouth 2 times daily   Yes Unknown, Entered By History   simvastatin (ZOCOR) 40 MG tablet Take 40 mg by mouth At Bedtime   Yes Unknown, Entered By History   vitamin B complex with vitamin C (STRESS TAB) tablet Take 1 tablet by mouth every morning   Yes Unknown, Entered By History   Vitamin D, Cholecalciferol, 25 MCG (1000 UT) CAPS Take 1,000 Units by mouth every morning   Yes Unknown, Entered By History   zolpidem ER (AMBIEN CR) 6.25 MG CR tablet Take 6.25 mg by mouth nightly as needed for sleep   Yes Unknown, Entered By History       ECG 8-3-21:   Sinus bradycardia with Premature atrial complexes   Incomplete right bundle branch block   Nonspecific ST and T wave abnormality   Prolonged QT    ECHO 7-29-21: Interpretation Summary     The left ventricle is normal in size.  There is mild concentric left ventricular hypertrophy.  The visual ejection fraction is 50-55%.  Septal motion is consistent with conduction abnormality.  The right ventricle is normal in size and function.  No significant valvular heart disease.  ______________________________________________________________________________  Left Ventricle  The left ventricle is normal in size. There is mild concentric left  ventricular hypertrophy. The visual ejection fraction is 50-55%. Diastolic  Doppler findings (E/E' ratio and/or other parameters) suggest left ventricular  filling pressures are indeterminate. Septal motion is consistent with  conduction abnormality.     Right Ventricle  The right ventricle is normal in size and function.     Atria  The left atrium is borderline dilated. Right atrial size is normal. There is  no color Doppler evidence of an atrial shunt.     Mitral Valve  The mitral valve leaflets are mildly thickened. There is trace mitral  regurgitation.     Tricuspid  Valve  There is trace tricuspid regurgitation. IVC diameter <2.1 cm collapsing >50%  with sniff suggests a normal RA pressure of 3 mmHg.     Aortic Valve  There is trivial trileaflet aortic sclerosis. No aortic regurgitation is  present.     Pulmonic Valve  There is trace pulmonic valvular regurgitation.     Vessels  Normal size aorta. The aortic root is normal size.     Pericardium  There is no pericardial effusion.     Rhythm  The rhythm was atrial fibrillation       Anesthesia Evaluation   Pt has had prior anesthetic. Type: General.    No history of anesthetic complications       ROS/MED HX  ENT/Pulmonary:    (-) tobacco use, asthma and sleep apnea   Neurologic:    (-) no seizures, no CVA and migraines   Cardiovascular:     (+) hypertension--CAD --stent-dysrhythmias, a-fib,  (-) BORDEN, valvular problems/murmurs and dyslipidemia   METS/Exercise Tolerance: >4 METS    Hematologic:     (+) anemia,  (-) history of blood clots   Musculoskeletal: Comment: Gout     (-) arthritis   GI/Hepatic:    (-) GERD and liver disease   Renal/Genitourinary:     (+) renal disease, type: ESRD,  (-) nephrolithiasis   Endo:     (+) type II DM (DM admitted with DKA), Diabetic complications: nephropathy neuropathy.  (-) Type I DM, thyroid disease and obesity   Psychiatric/Substance Use:     (+) alcohol abuse  (-) psychiatric history   Infectious Disease:    (-) Recent Fever   Malignancy:       Other:            Physical Exam    Airway  airway exam normal           Respiratory Devices and Support         Dental  no notable dental history         Cardiovascular   cardiovascular exam normal       Rhythm and rate: normal     Pulmonary   pulmonary exam normal        breath sounds clear to auscultation           OUTSIDE LABS:  CBC:   Lab Results   Component Value Date    WBC 7.1 07/31/2021    WBC 7.1 07/28/2021    HGB 11.2 (L) 07/31/2021    HGB 10.6 (L) 07/28/2021    HCT 34.0 (L) 07/31/2021    HCT 32.0 (L) 07/28/2021     07/31/2021    PLT  223 07/28/2021     BMP:   Lab Results   Component Value Date     08/04/2021     08/03/2021    POTASSIUM 3.8 08/04/2021    POTASSIUM 3.9 08/03/2021    CHLORIDE 108 08/04/2021    CHLORIDE 106 08/03/2021    CO2 24 08/04/2021    CO2 25 08/03/2021    BUN 62 (H) 08/04/2021    BUN 68 (H) 08/03/2021    CR 4.23 (H) 08/04/2021    CR 4.49 (H) 08/03/2021    GLC 76 08/05/2021    GLC 68 (L) 08/05/2021     COAGS:   Lab Results   Component Value Date    INR 1.12 08/04/2021     POC: No results found for: BGM, HCG, HCGS  HEPATIC:   Lab Results   Component Value Date    ALBUMIN 1.9 (L) 08/04/2021    PROTTOTAL 5.3 (L) 08/04/2021     (H) 08/04/2021    AST 68 (H) 08/04/2021    ALKPHOS 226 (H) 08/04/2021    BILITOTAL 0.3 08/04/2021     OTHER:   Lab Results   Component Value Date    PH 7.31 (L) 07/28/2021    LACT 1.5 07/28/2021    A1C 11.0 (H) 07/31/2021    LC 8.2 (L) 08/04/2021    PHOS 6.2 (H) 08/02/2021    MAG 2.3 08/03/2021    TSH 2.58 07/28/2021       Anesthesia Plan    ASA Status:  3   NPO Status:  NPO Appropriate    Anesthesia Type: Peripheral Nerve Block.              Consents    Anesthesia Plan(s) and associated risks, benefits, and realistic alternatives discussed. Questions answered and patient/representative(s) expressed understanding.     - Discussed with:  Patient         Postoperative Care    Pain management: Multi-modal analgesia, Peripheral nerve block (Single Shot).   PONV prophylaxis: Ondansetron (or other 5HT-3)     Comments:                Oriana Moya MD

## 2021-08-05 NOTE — PROVIDER NOTIFICATION
MD Notification    Notified Person: MD    Notified Person Name: Dr. Perla, nephrology    Notification Date/Time: 1053 8/5/21    Notification Interaction: office page    Purpose of Notification: vascular team needs clearance from nephrology for midline placement. Pt with low blood sugars and also to have surgery for dialysis fistula placement.    Orders Received: ok to place midline on the right    Comments:

## 2021-08-05 NOTE — PROCEDURES
[facility]    Single Lumen Midline Placement    Date/Time: 8/5/2021 12:31 PM  Performed by: Fern Topete RN  Authorized by: Berta Muhammad DO   Indications: vascular access    UNIVERSAL PROTOCOL   Site Marked: Yes  Prior Images Obtained and Reviewed:  Yes  Required items: Required blood products, implants, devices and special equipment available    Patient identity confirmed:  Verbally with patient and arm band  NA - No sedation, light sedation, or local anesthesia  Confirmation Checklist:  Patient's identity using two indicators  Time out: Immediately prior to the procedure a time out was called    Universal Protocol: the Joint Commission Universal Protocol was followed    Preparation: Patient was prepped and draped in usual sterile fashion    ESBL (mL):  0         ANESTHESIA    Local Anesthetic: Lidocaine 1% without epinephrine  Anesthetic Total (mL):  0.5      SEDATION    Patient Sedated: No        Preparation: skin prepped with ChloraPrep  Skin prep agent: skin prep agent completely dried prior to procedure  Sterile barriers: maximum sterile barriers were used: cap, mask, sterile gown, sterile gloves, and large sterile sheet  Hand hygiene: hand hygiene performed prior to central venous catheter insertion  Type of line used: Midline  Catheter type: single lumen  Lumen type: non-valved  Catheter size: 4 Fr  Brand: Bard  Lot number: [id number]  Placement method: MST  Number of attempts: 1  Successful placement: yes  Orientation: right  Location: basilic vein  Arm circumference: adults 10 cm (20 g)  Extremity circumference: 31  Visible catheter length: 0  Internal length: 8 cm  Total catheter length: 8  Dressing and securement: blood cleaned with CHG, chlorhexidine disc applied, occlusive dressing applied, secured with tape and securement device  Post procedure assessment: blood return through all ports and free fluid flow  PROCEDURE   Patient Tolerance:  Patient tolerated the  procedure well with no immediate complications  Describe Procedure: Midline placed HARMONY without difficulty.

## 2021-08-05 NOTE — PLAN OF CARE
A&Ox4, VSS on RA except dolly at times. Ax1 with walker/gb. LS clear. Pt had midline placed in R arm and dialysis fistula in L arm. Pt with numbness and unable to move LUE due to LUE nerve block. Skin tear to L forearm, with dressing with drainage. +2 edema to LUE and BLE. Discharge pending placement.

## 2021-08-05 NOTE — PROGRESS NOTES
Renal Medicine Progress Note                                Gordon Gann MRN# [id number]   Age: 76 year old YOB: 1945   Date of Admission: 7/28/2021 Hospital LOS: 8                  Assessment/Plan:       76 year old male admitted on 7/28/2021.  Past history DM II on insulin, depression, alcohol abuse, hypertension, chronic kidney disease stage V, chronic atrial fibrillation, who presents as a direct admission from [facility], where he was brought in by family with generalized weakness, confusion, and increased urination, found to have mild diabetic ketoacidosis    Seen for evaluation of elevated creatinine      1.  CKD V   -eGFR 15 ml/min range   -presumed diabetic nephropathy   -progressive   -willing to proceed to dialysis as indicated   2.  Proteinuria   -PCR: 5.26 g/g  3.  HTN   -moderate control   4.  Anemia   -borderline Fe studies  5.  Secondary hyperparathyroidism   - pg/ml   -vitamin D pending   6.  DM   -poor control   -A1C 11% range   7.  DKA   -resolved  8.  Hypoalbuminemia  9.  ? ARF component          Continue to hold diuretic   AVG today     Renal follow-up as scheduled   OK for discharge post procedure from renal standpoint       Interval History:     Up in chair follows  Flat  IO and urine reviewed    No clinical change       ROS:     GENERAL: NAD, No fever,chills  R: NEGATIVE for significant cough or SOB  CV: NEGATIVE for chest pain, palpitations  EXT: no change edema  ROS otherwise negative    Medications and Allergies:     Reviewed    Physical Exam:     Vitals were reviewed  Patient Vitals for the past 8 hrs:   BP Temp Temp src Pulse Resp SpO2   08/05/21 1006 (!) 145/76 -- -- -- -- --   08/05/21 0715 (!) 186/88 98.6  F (37  C) Oral 62 17 96 %   08/05/21 0404 (!) 174/91 98.1  F (36.7  C) Oral 53 16 98 %     I/O last 3 completed shifts:  In: 600 [P.O.:600]  Out: -     Vitals:    07/28/21 2100 07/31/21 0550 08/01/21 0610 08/04/21 0615   Weight: 87 kg (191 lb 12.8 oz)  89.1 kg (196 lb 6.9 oz) 88.6 kg (195 lb 5.2 oz) 89.8 kg (197 lb 15.6 oz)       GENERAL: awake, alert, follows  HEENT: NC/AT, PERRLA, EOMI, non icteric, pharynx moist without lesion  RESP:  clear anteriorly  CV: RRR, normal S1 S2  ABDOMEN: soft, nontender, no HSM or masses and bowel sounds normal  MS: no clubbing, cyanosis   SKIN: clear without significant rashes or lesions  NEURO: speech normal and cranial nerves 2-12 intact  PSYCH: affect flat  EXT: 1 plus edema    Data:     Recent Labs   Lab 08/05/21  1001 08/05/21  0818 08/05/21  0212 08/04/21  2129 08/04/21  0701 08/03/21  1508 08/03/21  1354 08/03/21  0512 08/02/21  0734 08/01/21  0743   NA  --   --   --   --  138  --   --  139 138 141   POTASSIUM  --   --   --   --  3.8 3.9 4.2 3.1* 3.7 3.7   CHLORIDE  --   --   --   --  108  --   --  106 107 110*   CO2  --   --   --   --  24  --   --  25 26 26   ANIONGAP  --   --   --   --  6  --   --  8 5 5   GLC 68* 70 112* 192* 75  --   --  46* 68* 105*   BUN  --   --   --   --  62*  --   --  68* 64* 64*   CR  --   --   --   --  4.23*  --   --  4.49* 4.26* 4.36*   GFRESTIMATED  --   --   --   --  13*  --   --  12* 13* 12*   LC  --   --   --   --  8.2*  --   --  8.6 8.3* 7.9*         Recent Labs   Lab Test 08/04/21  0701 08/03/21  0512 08/02/21  0734 08/01/21  0743 07/31/21  0751 07/30/21  0730 07/29/21  0634 07/29/21  0208 07/28/21  2335 07/28/21  2118   CR 4.23* 4.49* 4.26* 4.36* 4.12* 3.94* 3.80* 3.73* 3.73* 3.66*     Recent Labs   Lab 08/04/21  0701 08/03/21  0512 08/02/21  0734 08/01/21  0743   ALBUMIN 1.9* 2.4* 2.0* 1.8*     Recent Labs   Lab 08/02/21  1212 07/31/21  0751 07/30/21  0730   PHOS 6.2*  --  4.2   HGB  --  11.2*  --          MIGDALIA Perla MD    [facility] - Nephrology  [phone]

## 2021-08-05 NOTE — ANESTHESIA CARE TRANSFER NOTE
Patient: Gordon Gann    Procedure(s):  left arm arteriovenous graft fistula.    Diagnosis: End stage renal disease (H) [N18.6]  Diagnosis Additional Information: No value filed.    Anesthesia Type:   Peripheral Nerve Block     Note:    Oropharynx: oropharynx clear of all foreign objects and spontaneously breathing  Level of Consciousness: awake  Oxygen Supplementation: room air    Independent Airway: airway patency satisfactory and stable  Dentition: dentition unchanged  Vital Signs Stable: post-procedure vital signs reviewed and stable  Report to RN Given: handoff report given  Patient transferred to: PACU  Comments: At end of procedure, spontaneous respirations. Patient breathing room air at room air to PACU. SpO2, NiBP, and EKG monitors and alarms on and functioning, Erick Hugger warmer connected to patient gown, report on patient's clinical status given to PACU RN, RN questions answered.  Handoff Report: Identifed the Patient, Identified the Reponsible Provider, Reviewed the pertinent medical history, Discussed the surgical course, Reviewed Intra-OP anesthesia mangement and issues during anesthesia, Set expectations for post-procedure period and Allowed opportunity for questions and acknowledgement of understanding      Vitals:  Vitals Value Taken Time   /73 08/05/21 1640   Temp     Pulse 48 08/05/21 1644   Resp 10 08/05/21 1644   SpO2 89 % 08/05/21 1644   Vitals shown include unvalidated device data.    Electronically Signed By: Melly Hanna  August 5, 2021  4:45 PM

## 2021-08-05 NOTE — PLAN OF CARE
A&O x4. VSS with bradycardia (50's). Mild neck pain, denied intervention. Tele: SB. NPO as ordered for possible procedure in the morning. Edema in BLE +2. LS: clear/diminished. BS: audible. -void, +flatus, -BM. Up A-1 w/gb.

## 2021-08-05 NOTE — ANESTHESIA POSTPROCEDURE EVALUATION
Patient: Gordon Gann    Procedure(s):  left arm arteriovenous graft fistula.    Diagnosis:End stage renal disease (H) [N18.6]    Anesthesia Type:  Peripheral Nerve Block    Note:  Disposition: Inpatient   Postop Pain Control: Uneventful            Sign Out: Well controlled pain   PONV: No   Neuro/Psych: Uneventful            Sign Out: Acceptable/Baseline neuro status   Airway/Respiratory: Uneventful            Sign Out: Acceptable/Baseline resp. status   CV/Hemodynamics: Uneventful            Sign Out: Acceptable CV status; No obvious hypovolemia; No obvious fluid overload   Other NRE: NONE   DID A NON-ROUTINE EVENT OCCUR? No           Last vitals:  Vitals Value Taken Time   /80 08/05/21 1740   Temp 36.7  C (98  F) 08/05/21 1747   Pulse 47 08/05/21 1744   Resp 13 08/05/21 1744   SpO2 97 % 08/05/21 1747   Vitals shown include unvalidated device data.    Electronically Signed By: Myles Manning DO  August 5, 2021  6:50 PM

## 2021-08-05 NOTE — OP NOTE
Vascular Surgery Operative Note     PREOPERATIVE DIAGNOSIS:  End-stage renal disease, with need for hemodialysis access     POSTOPERATIVE DIAGNOSIS:  same     PROCEDURE: left brachioaxillary arteriovenous graft creation with tapered PTFE     SURGEON:  Noemí Hernandez MD     ASSISTANT:  Dick Weiner PA-C     ANESTHESIA:  General Endotracheal     ESTIMATED BLOOD LOSS:  30 mL    SPECIMENS: None      INDICATIONS:    Mr. Gann is a 76M with history of CAD s/p stenting, a-fib on eliquis anticoagulation, IDDM, depression, alcohol abuse, HTN, DL, gout, and CKD stage V. He was admitted with DKA and progression of renal insufficiency, with presumed need for HD in the near future. He was brought to the OR today to undergo access creation. He has small deep veins and no adequately sized basilic or cephalic venous conduit, and so synthetic conduit was used.     INTRAOPERATIVE FINDINGS:    Marginal size of the left axillary vein. Minimal calcification of the left brachial artery. Palpable thrill in the fistula following surgery. Edema of the left arm with scattered ecchymoses throughout. Skin tear in forearm.      DESCRIPTION OF TECHNIQUE:   Mr. Gann underwent regional block pre-operatively by anesthesia. He was brought into the operating room and transferred to the operating table in the supine position. Monitored anesthesia care was initiated. Antibiotics were administered. The left arm was prepped and draped in standard sterile fashion. Timeout was performed and the entire surgical team was in agreement with the planned procedure and correctly marked side.      The left axillary vein was evaluated with intraoperative ultrasonography, confirmed to be of adequate size, and the course marked. The skin overlying this was longitudinally incised and dissection deepened through subcutaneous tissue with bovie electrocautery. The fascia was incised and the vein was identified, with great care taken to avoid injury to the cords  of the brachial plexus, which were gently retracted. The vein was sharply dissected circumferentially and was noted to be relatively small in size. Vessel loops were used to encircle the axillary vein at this point.      A transverse incision was created sharply in the antecubital fossa. The incision was deepened through the subcutaneous fat with bovie electrocautery. The brachial artery was palpated and the overlying fascia incised. Sharp dissection was used to clear the periadventitial areolar tissues, taking care to preserve the brachial veins and ligating small branches. The artery was circumferentially dissected and vessel loops placed proximally and distally. A subcutaneous tunnel was created and a 4-to-7 mm tapered PTFE heparin-bonded Propatent graft was brought through this, taking care to avoid twisting or kinking.     Systemic heparinization was given. The vein was clamped at inflow and outflow. A longitudinal venotomy was created and extended. The distal end of the graft was spatulated and an end-to-side anastomosis was created with running prolene suture, taking care to flush the lumen with venous flow prior to completion of the anastomosis. The vein was opened and the anastomosis found to be essentially hemostatic. The clamp was moved to the graft. The artery was elevated and clamped. An arteriotomy was created and extended with Nino scissors to a length of approximately 1 cm. The proximal end of the graft was spatulated and anastomosed to the artery in an end-to-side fashion with running prolene suture. Prior to completion of the anastomosis, the vessels were flushed with inflow, backbleeding, and heparinized saline. The anastomosis was completed and the clamps serially released on the graft, arterial inflow, and arterial outflow. The suture line was hemostatic and a thrill was palpable in the vein adjacent to the anastomosis. The radial and ulnar pulses were found to be triphasic on Doppler  evaluation. A single repair suture was used at the heel of the venous anastomosis to achieve hemostasis. Protamine was administered for heparin reversal.     The incisions were dried and hemostasis confirmed. The subcutaneous tissues were closed with interrupted absorbable sutures, followed by skin closure in a running subcuticular fashion with a 4-0 Monocryl suture. The skin was cleaned and dried and skin glue and a sterile bandage applied.     At the end of the case all needle, sponge and instrument counts were correct.  Mr. Gann was awoken from sedation in stable condition and was taken to the postoperative care unit.      The Physician Assistant was medically necessary for their expertise in retraction, exposure, and suctioning.

## 2021-08-05 NOTE — PROVIDER NOTIFICATION
MD Notification    Notified Person: MD    Notified Person Name: Dr. Combs Julia    Notification Date/Time: 8/5/21 0821    Notification Interaction: [employer]    Purpose of Notification: 320-1: pt NPO, blood glucose 70, please advice. Thank you. ANA Julien    Orders Received: order for midline placed, give IM if BG dips below 60 in the meantime.    Comments:    1011 8/5/21, repage: 320-1: blood glucose 68, no IV access, Pt NPO, please advice. ANA julien

## 2021-08-05 NOTE — PROGRESS NOTES
[facility]    Medicine Progress Note - Hospitalist Service       Date of Admission:  7/28/2021    Assessment & Plan           Gordon Gann is a 76 year old male admitted on 7/28/2021.  Past history DM II on insulin, depression, alcohol abuse, hypertension, chronic kidney disease stage V, chronic atrial fibrillation, who presents as a direct admission from [facility], where he was brought in by family with generalized weakness, confusion, and increased urination, found to have mild diabetic ketoacidosis.        Diabetic ketoacidosis, resolved  DM II on long term insulin, uncontrolled  [Lantus 30 units at bedtime and novolog 15 units t.i.d. with meals]  Recent history of non-compliance with insulin, admission A1C 11.7%.  Initial glucose >600, ketones elevated with acidosis and borderline elevated gap, improved with IVF and insulin gtt.   *Hypoglycemic episode down to 68 on 8/2, insulin changed from Lantus 20 twice daily to 25 units nightly. Recurrent hypoglycemic episode down to 48 on 8/3,. Lantus decreased to10 units nightly, will need to gradually titrate up for better control of blood sugar  -Patient received only 7 units of Lantus at bedtime on 8/4,, 8/5 he had low blood sugars in the morning, still above 60 without any symptoms, plan is to monitor.  - continue with aspart 5 units TID with meals  - continue medium intensity sliding scale insulin TID. No bed time sliding scale insulin to minimize risk of hypoglycemia  - monitor for hypoglycemia  -Will continue with 7 units of Lantus tonight bedtime as well due to hypoglycemic events in the morning.       Elevated transaminase/alk phos  Etiology not clear. LFT's up from admission on 7/30 and reporting intermittent RUQ pain though currently non-tender on exam- RUQ US without acute finding.  Currently patient without abdominal pain, nausea or vomiting. Etiology not clear at this time.  CK is normal.  Ferritin 420, hepatitis serologies  negative.  -mitochondrial antibody, ferritin, tissue transglutaminase antibody pending  -MRCP normal gall bladder, liver  - mitochondrial antibody, tissue transglutaminase antibody pending  - LFTs are now trending down, will obtain CMP in a.m.  - GI consulted, appreciate assistance  - hold PTA Zetia/statin at this time, can be restarted once LFTs improved       Hypokalemia- resolved  - replaced orally. Not on protocol due to renal insufficiency.     Hypophosphatemia  - normalized with supplementation  - no protocol due to renal function     Anasarca and bilateral peripheral edema  Possible type II NSTEMI  Serial trop mildly elevated but flat 0.06, EKG with non-specific ST changes.  Pro-BNP >8400.  CXR negative for edema.  TTE with EF 50-55%, septal motion consistent with conduction abnormality, no valvular disease.   - continue tele for now  - Compression stockings for lower extremity edema  - PTA Lasix stopped per nephrology recommendation.      CKD stage V  It appears that his nephrologist has been discussing dialysis (he remains undecided). Patient just moved from [place] and does not have established nephrologist locally.  Baseline creatinine is in the upper-3 to low-4 range. Reviewed his nephrologist note from 5/4 in care  Everywhere, labs from 5/4 listed as cr of 4.16.  - creatinine is currently stable  - PTA Lasix and hydralazine stopped per nephrology recommendation  -Nephrology follow-up scheduled with Dr. Roger for 8/24/2021  - Vascular surgery consulted for AVF, vein mapping completed   -Vascular surgery discussed with the patient about the procedure and plan is to proceed with the AV fistula placement  on 8/5.   - Plavix and Eliquis are on hold, last given on 8/3 in an event he proceeds with surgery       Chronic A-fib  CAD s/p PCI 2006  HTN  HLD  Further details unknown of underlying CAD.  Pharmacy confirms the patient only takes Eliquis once daily, stating he gets a rash if he takes it twice daily. PTA  "aspirin, Plavix, clonidine, metoprolol and Eliquis  - PTA metoprolol decreased to 50mg BID due to low HR   -Continue clonidine 0.2 mg twice daily, with holding parameters   - Zetia and simvastatin on hold at this time given rising transaminase levels  - continue with ASA  - Plavix and Eliquis held due to surgery today, or can restart once okay by vascular surgery.  - Follow I's and O's, and daily weights  - per discussion with patient, last stent was 2006 done in [place]. Has not followed in long time and not sure how long he needs to be on Plavix.      Possible cognitive impairment  Possible depression  Daughter concerned about depression and also reports he has been mismanaging his insulin recently and has concerns regarding forgetfulness.   - outpatient cognitive eval with OT  - after discussion with prior rounder, started on Remeron 7.5mg at bedtime on 7/30. He slept good, however remained tired most of the day on 7/31. Remeron stopped.   -Discussed with patient on 8/3, possible psychiatric consultation for further recommendation, patient declined at this time     Insomnia  - resume PTA Ambien at lower dose 2.5mg at bedtime prn per patient request. He reports he is not sleeping well  - prn melatonin     History of alcohol abuse  Reports no alcohol use for >1 week     Gout   - continue allopurinol (dose reduced due to renal function)    Discharge planning:  PT/OT recommend TCU.  Patient want to discharge back to his daughters home and then TCU after that. Discussed with patient's daughter who is concerned about patient's safety if he goes home. He won't have continuous supervision. She works all day and her boyfriend also not at home during the day. Discussed with patient his daughter's concerns and PT recommendations. He states \"If my daughter says, I will stay.\" He is currently awaiting insurance authorization for TCU placement.  So far no information regarding that, will discharge to TCU when okay by vascular " surgery after today's procedure.       Diet: NPO for Medical/Clinical Reasons Except for: Meds    DVT Prophylaxis: Pneumatic Compression Devices  Flood Catheter: Not present  Central Lines: None  Code Status: No CPR- Do NOT Intubate      Disposition Plan   Expected discharge: 08/05/2021 recommended to transitional care unit once safe disposition plan/ TCU bed available and awaiting insurance authorization.     The patient's care was discussed with the Bedside Nurse, Care Coordinator/, Patient and Patient's Family.    Julia Combs MD  Hospitalist Service  [facility]  Securely message with the Vocera Web Console (learn more here)  Text page via [email or web address] Paging/Directory      Clinically Significant Risk Factors Present on Admission                ______________________________________________________________________    Interval History   Patient is resting comfortably, he had low blood sugars today morning, he was n.p.o. for AV fistula placement, he received only 7 units of Lantus yesterday night, blood sugars have not dropped below 68, he did not have any significant symptoms with his low blood sugars.  Discussed with the nursing.  Patient is anxious to go home or to be discharged from the hospital, he was asking about that, discussed about pending insurance authorization.    Data reviewed today: I reviewed all medications, new labs and imaging results over the last 24 hours. I personally reviewed no images or EKG's today.    Physical Exam   Vital Signs: Temp: 98.6  F (37  C) Temp src: Oral BP: (!) 186/88 (pt given AM BP meds) Pulse: 62   Resp: 17 SpO2: 96 % O2 Device: None (Room air)    Weight: 197 lbs 15.57 oz  Constitutional: Awake, alert, cooperative, no apparent distress  Respiratory: Clear to auscultation bilaterally, no crackles or wheezing  Cardiovascular: Regular rate and rhythm, normal S1 and S2, and no murmur noted  GI: Normal bowel sounds, soft, non-distended,  non-tender  Skin/Integumen:  no cyanosis, 2+ edema noted bilateral lower extremity, scattered areas of skin breakdown and ecchymosis noted bilaterally.  Neuro : moving all 4 extremities, no focal deficit noted       Data   Recent Labs   Lab 08/05/21  0818 08/05/21  0212 08/04/21  2129 08/04/21  0701 08/03/21  1508 08/03/21  1354 08/03/21  0512 08/02/21  1212 08/02/21  0734 07/31/21  1146 07/31/21  0751   WBC  --   --   --   --   --   --   --   --   --   --  7.1   HGB  --   --   --   --   --   --   --   --   --   --  11.2*   MCV  --   --   --   --   --   --   --   --   --   --  86   PLT  --   --   --   --   --   --   --   --   --   --  276   INR  --   --   --  1.12  --   --  1.01 0.99  --   --   --    NA  --   --   --  138  --   --  139  --  138   < > 140   POTASSIUM  --   --   --  3.8 3.9 4.2 3.1*  --  3.7   < > 3.9   CHLORIDE  --   --   --  108  --   --  106  --  107   < > 110*   CO2  --   --   --  24  --   --  25  --  26   < > 27   BUN  --   --   --  62*  --   --  68*  --  64*   < > 58*   CR  --   --   --  4.23*  --   --  4.49*  --  4.26*   < > 4.12*   ANIONGAP  --   --   --  6  --   --  8  --  5   < > 3   LC  --   --   --  8.2*  --   --  8.6  --  8.3*   < > 8.0*   GLC 70 112* 192* 75  --   --  46*  --  68*   < > 152*   ALBUMIN  --   --   --  1.9*  --   --  2.4*  --  2.0*   < > 1.9*   PROTTOTAL  --   --   --  5.3*  --   --  6.5*  --  5.7*   < > 5.4*   BILITOTAL  --   --   --  0.3  --   --  0.2  --  0.2   < > 0.3   ALKPHOS  --   --   --  226*  --   --  298*  --  288*   < > 212*   ALT  --   --   --  118*  --   --  189*  --  199*   < > 101*   AST  --   --   --  68*  --   --  128*  --  181*   < > 77*    < > = values in this interval not displayed.     Recent Results (from the past 24 hour(s))   US Upper Extremity Venous Duplex Bilat    Narrative    US UPPER EXTREMITY VENOUS DUPLEX BILATERAL  8/4/2021 11:19 AM     HISTORY: 76-year-old patient with plan for AV fistula creation.  Request made to ensure patency of veins  in both upper extremities as  well as diameter measurements of applicable veins in both upper  extremities.    COMPARISON: None    TECHNIQUE: Color Doppler and spectral waveform analysis obtained  throughout the deep and superficial veins of both upper extremities.    FINDINGS: Both internal jugular, subclavian, axillary, and brachial  veins demonstrate normal blood flow, compression (where applicable),  and augmentation. The basilic and cephalic veins are patent.    Brachial vein measurements were obtained with tourniquet applied in  both upper extremities.    Right upper extremity: The medial brachial venous branches 2.2 mm and  the lateral branch is 3.3 mm. The axillary vein a 7.6 mm, subclavian  vein ranges from 5 to 6.8 mm and the internal jugular vein is 10.4 mm.    Left upper extremity: The medial brachial vein is 2.7 mm and lateral  brachial vein is 3.8 mm. The axillary vein is 6.7 mm. The subclavian  vein ranges from 6.7 to 6.9 mm. The internal jugular vein is 9.2 mm.      Impression    IMPRESSION:  1. Negative for deep venous thrombosis throughout both upper  extremities.  2. Venous measurements of both brachial, axillary, subclavian, and  internal jugular veins as described above.    BON ROSE MD         SYSTEM ID:  [id number]     Medications       allopurinol  200 mg Oral Daily     [Held by provider] apixaban ANTICOAGULANT  2.5 mg Oral QAM     aspirin  81 mg Oral At Bedtime     cloNIDine  0.2 mg Oral BID     [Held by provider] clopidogrel  75 mg Oral QAM     folic acid  1 mg Oral QAM     [Held by provider] furosemide  20 mg Oral Daily     insulin aspart  5 Units Subcutaneous TID w/meals     insulin aspart  1-7 Units Subcutaneous TID AC     insulin glargine  7 Units Subcutaneous At Bedtime     iron sucrose (VENOFER) intermittent infusion (200 mg)  200 mg Intravenous Daily     LORazepam  0.25 mg Oral Once     melatonin  1 mg Oral At Bedtime     metoprolol tartrate  50 mg Oral BID

## 2021-08-05 NOTE — OR NURSING
Order for potassium draw prior to surgery, lab here to draw now. K+ 3.8 yesterday. Will draw now. Discussed with Dr. Moya, will bring to surgery without results.

## 2021-08-05 NOTE — PROGRESS NOTES
Vascular Surgery Progress Note    Long discussion with Mr. Gann this morning.   Discussed rationale behind dialysis, along with a brief overview of peritoneal dialysis vs hemodialysis, and catheter-based HD vs arteriovenous fistula-based HD.     Reviewed the basics of how hemodialysis works.     Explained he does not have enough autologous conduit (vein) for an AVF, and we would construct an arteriovenous graft instead.     Reviewed the risks: numbness, weakness in the hand or arm; bruising or bleeding from surgery; arterial steal syndrome; swelling in the hand or arm; perioperative pain; infection; clotting of the access; pseudoaneurysmal degeneration of the access; scar formation requiring revision; future failure of the access. Explained that he most likely will require long-term surveillance and maintenance of his access.     Mr. Gann is unsure about whether he would like to proceed with dialysis.     He states that he is not sure he values prolonging his life.    Cites his ex-wife and his daughter as being part of the reason he is making the decision to go on dialysis, and he isn't sure if he is making the decision for himself or for others.     I asked him what he enjoys doing or would enjoy doing; he says he has finished most of his hobbies, which included playing golf, tennis, basketball, and now mostly just enjoys watching them on television. He is not sure what he would look forward to beyond travel, and he feels he is not likely to achieve his travel goals at this stage in his life.     I explained that establishing access is reasonable if he is considering dialysis.     If he decides he absolutely does not want it, we should not do the surgery.     If he has some ambiguity (which he does this morning), I explained he will still have opportunities either to not initiate or to not continue dialysis once he begins.     Current plan to proceed with creation of left arm arteriovenous graft  (brachioaxillary) today in the OR. Case scheduled for approx 1 pm. He understands and agrees to go ahead with surgery.    Noemí Hernandez MD

## 2021-08-05 NOTE — OR NURSING
Called Station #[id number] for report on patient. No current IV access, midline catheter being placed at this time. Per ANA Julien, on Station #33, she will call when this is placed. Notified OR control desk.

## 2021-08-05 NOTE — DISCHARGE INSTRUCTIONS
HOMECARE NOTE:   Your doctor has ordered home care to help you after your hospital stay.  The staff will contact you to schedule your first visit.  This service will be provided by [facility].  If you have any question, or have not received a call within 48 hours of discharge, please call them at [phone].  *please see homecare quality ratings for all homecares in your area at www.medicare.gov       Activities  1. Do not lift anything heavier than 10 lbs (nothing heavier than a gallon of milk) for the next 4 weeks.  2. Do not do any strenuous exercise or activity. It is OK to walk and climb stairs.   3. Do not drive while taking pain medications after surgery.     Diet  You can resume your regular diet without restrictions.      Medications  1. Please call the office if you have any trouble getting your medications or for refills.   2. Alternate taking acetaminophen (Tylenol) and ibuprofen (Motrin) every 4-6 hours for additional pain relief. You can also alternate heat packs and ice packs over the incision for pain relief.     Taking care of the incision  1.  Keep dressings and bandages clean and dry for 48 hours.  2.  You may shower beginning 48 hours after surgery. Do not soak the incision; let soap and water rinse over it and carefully dry it.   3.  If you have white steri-strips on over the incision, these will peel up and fall off on their own. It is OK to wash over them.  4. You do not need another bandage or any ointment over the incision.   5. Please call the office if you have a temperature above 101 F, chills, nausea, vomiting, redness, drainage, or increasing pain.     Follow up   1. Follow up with Dr. Hernandez in 2 weeks with an ultrasound of the left arm fistula. Call [phone] to schedule appointment.

## 2021-08-05 NOTE — OR NURSING
Patient denying pain, nausea, VSS. Sign out given by MDA for patient to return to inpatient floor.

## 2021-08-06 VITALS
SYSTOLIC BLOOD PRESSURE: 123 MMHG | HEIGHT: 68 IN | RESPIRATION RATE: 18 BRPM | BODY MASS INDEX: 30 KG/M2 | HEART RATE: 84 BPM | OXYGEN SATURATION: 98 % | WEIGHT: 197.97 LBS | TEMPERATURE: 98.2 F | DIASTOLIC BLOOD PRESSURE: 60 MMHG

## 2021-08-06 LAB
ATRIAL RATE - MUSE: 57 BPM
DIASTOLIC BLOOD PRESSURE - MUSE: NORMAL MMHG
GLUCOSE BLDC GLUCOMTR-MCNC: 107 MG/DL (ref 70–99)
GLUCOSE BLDC GLUCOMTR-MCNC: 126 MG/DL (ref 70–99)
GLUCOSE BLDC GLUCOMTR-MCNC: 186 MG/DL (ref 70–99)
INTERPRETATION ECG - MUSE: NORMAL
P AXIS - MUSE: 35 DEGREES
PR INTERVAL - MUSE: 198 MS
QRS DURATION - MUSE: 94 MS
QT - MUSE: 488 MS
QTC - MUSE: 474 MS
R AXIS - MUSE: 21 DEGREES
SYSTOLIC BLOOD PRESSURE - MUSE: NORMAL MMHG
T AXIS - MUSE: 91 DEGREES
VENTRICULAR RATE- MUSE: 57 BPM

## 2021-08-06 PROCEDURE — 250N000013 HC RX MED GY IP 250 OP 250 PS 637: Performed by: INTERNAL MEDICINE

## 2021-08-06 PROCEDURE — 250N000013 HC RX MED GY IP 250 OP 250 PS 637: Performed by: PHYSICIAN ASSISTANT

## 2021-08-06 PROCEDURE — 99239 HOSP IP/OBS DSCHRG MGMT >30: CPT | Performed by: INTERNAL MEDICINE

## 2021-08-06 PROCEDURE — 99232 SBSQ HOSP IP/OBS MODERATE 35: CPT | Performed by: INTERNAL MEDICINE

## 2021-08-06 RX ORDER — OXYCODONE HYDROCHLORIDE 5 MG/1
5 TABLET ORAL EVERY 6 HOURS PRN
Qty: 12 TABLET | Refills: 0 | Status: ON HOLD | OUTPATIENT
Start: 2021-08-06 | End: 2021-08-11

## 2021-08-06 RX ORDER — ACETAMINOPHEN 325 MG/1
325 TABLET ORAL EVERY 6 HOURS PRN
Status: ON HOLD | COMMUNITY
Start: 2021-08-06 | End: 2021-12-09

## 2021-08-06 RX ORDER — ALLOPURINOL 100 MG/1
200 TABLET ORAL DAILY
Qty: 30 TABLET | Refills: 0 | Status: ON HOLD | OUTPATIENT
Start: 2021-08-07 | End: 2021-10-12

## 2021-08-06 RX ORDER — HYDRALAZINE HYDROCHLORIDE 25 MG/1
25 TABLET, FILM COATED ORAL EVERY 6 HOURS PRN
Qty: 30 TABLET | Refills: 0 | Status: ON HOLD | OUTPATIENT
Start: 2021-08-06 | End: 2023-01-01

## 2021-08-06 RX ORDER — FUROSEMIDE 20 MG
20 TABLET ORAL DAILY
Qty: 30 TABLET | Refills: 0 | Status: ON HOLD | OUTPATIENT
Start: 2021-08-07 | End: 2021-10-12

## 2021-08-06 RX ORDER — SIMVASTATIN 40 MG
40 TABLET ORAL AT BEDTIME
Status: ON HOLD | COMMUNITY
Start: 2021-08-06 | End: 2021-12-04

## 2021-08-06 RX ORDER — ZOLPIDEM TARTRATE 5 MG/1
2.5 TABLET ORAL
Qty: 30 TABLET | Refills: 0 | Status: ON HOLD | OUTPATIENT
Start: 2021-08-06 | End: 2021-08-11

## 2021-08-06 RX ORDER — METOPROLOL TARTRATE 50 MG
50 TABLET ORAL 2 TIMES DAILY
Qty: 60 TABLET | Refills: 0 | Status: ON HOLD | OUTPATIENT
Start: 2021-08-06 | End: 2021-08-11

## 2021-08-06 RX ADMIN — CLOPIDOGREL BISULFATE 75 MG: 75 TABLET ORAL at 10:30

## 2021-08-06 RX ADMIN — APIXABAN 2.5 MG: 2.5 TABLET, FILM COATED ORAL at 10:30

## 2021-08-06 RX ADMIN — METOPROLOL TARTRATE 50 MG: 50 TABLET, FILM COATED ORAL at 08:48

## 2021-08-06 RX ADMIN — ACETAMINOPHEN 650 MG: 325 TABLET, FILM COATED ORAL at 08:49

## 2021-08-06 RX ADMIN — ALLOPURINOL 200 MG: 100 TABLET ORAL at 08:49

## 2021-08-06 RX ADMIN — FUROSEMIDE 20 MG: 20 TABLET ORAL at 10:30

## 2021-08-06 RX ADMIN — ACETAMINOPHEN 650 MG: 325 TABLET, FILM COATED ORAL at 16:27

## 2021-08-06 RX ADMIN — FOLIC ACID 1 MG: 1 TABLET ORAL at 08:49

## 2021-08-06 RX ADMIN — CLONIDINE HYDROCHLORIDE 0.2 MG: 0.1 TABLET ORAL at 08:49

## 2021-08-06 ASSESSMENT — ACTIVITIES OF DAILY LIVING (ADL)
ADLS_ACUITY_SCORE: 20

## 2021-08-06 NOTE — PLAN OF CARE
PT: Pt reports no PT needs, plan is to disch home this evening at 6pm. Has a walker for home use.  Physical Therapy Discharge Summary    Reason for therapy discharge:    Discharged to home with home therapy.    Progress towards therapy goal(s). See goals on Care Plan in [facility] electronic health record for goal details.  Goals partially met.  Barriers to achieving goals:   limited tolerance for therapy and discharge from facility.    Therapy recommendation(s):    Continued therapy is recommended.  Rationale/Recommendations:  Pt is improving but remains below baseline at this time. Pt currently requiring assist with functional mobility. Pt would benefit from continued skilled PT services to improve activity tolerance, balance, and IND with safety and functional mobility..

## 2021-08-06 NOTE — DISCHARGE SUMMARY
[facility]    Discharge Summary  Hospitalist    Date of Admission:  7/28/2021  Date of Discharge:  8/6/2021  Discharging Provider: Julia Combs MD    Discharge Diagnoses   DKA  ESRD status post AV fistula placement this admission.    History of Present Illness   Please review admission history and physical.    Hospital Course   Gordon Gann was admitted on 7/28/2021.  The following problems were addressed during his hospitalization:    Principal Problem:    End stage renal disease (H)  Active Problems:    DKA (diabetic ketoacidoses) (H)    Gordon Gann is a 76 year old male admitted on 7/28/2021.  Past history DM II on insulin, depression, alcohol abuse, hypertension, chronic kidney disease stage V, chronic atrial fibrillation, who presents as a direct admission from [facility], where he was brought in by family with generalized weakness, confusion, and increased urination, found to have mild diabetic ketoacidosis.        Diabetic ketoacidosis, resolved  DM II on long term insulin, uncontrolled  [Lantus 30 units at bedtime and novolog 15 units t.i.d. with meals]  Recent history of non-compliance with insulin, admission A1C 11.7%.  Initial glucose >600, ketones elevated with acidosis and borderline elevated gap, improved with IVF and insulin gtt.   *Hypoglycemic episode down to 68 on 8/2, insulin changed from Lantus 20 twice daily to 25 units nightly. Recurrent hypoglycemic episode down to 48 on 8/3,. Lantus decreased to10 units nightly, will need to gradually titrate up for better control of blood sugar  -Patient received only 7 units of Lantus at bedtime on 8/4,, 8/5 he had low blood sugars in the morning, currently he is being discharged on 7 units of Lantus at bedtime.  This can be adjusted according to his morning sugars.continue with aspart 5 units TID with meals, he is to continue with sliding scale. monitor for hypoglycemia at home.       Elevated transaminase/alk  phos  Etiology not clear. LFT's up from admission on 7/30 and reporting intermittent RUQ pain though currently non-tender on exam- RUQ US without acute finding.  Currently patient without abdominal pain, nausea or vomiting. Etiology not clear at this time.  CK is normal.  Ferritin 420, hepatitis serologies negative.mitochondrial antibody, ferritin, tissue transglutaminase antibody pending, these need to be followed up outpatient, repeat CMP ordered on discharge.  MRCP done here indicates normal gallbladder and liver, statin is on hold, LFTs are now trending down, CMP to be done in few days, GI was consulted this admission.  Statin is still on hold.          Hypokalemia- resolved       Hypophosphatemia  - normalized with supplementation    Anasarca and bilateral peripheral edema  Possible type II NSTEMI  Serial trop mildly elevated but flat 0.06, EKG with non-specific ST changes.  Pro-BNP >8400.  CXR negative for edema.  TTE with EF 50-55%, septal motion consistent with conduction abnormality, no valvular disease.  He was monitored on telemetry, compression stockings to continue, PTA Lasix was stopped for due to nephrology recommendations.       CKD stage V  It appears that his nephrologist has been discussing dialysis (he remains undecided). Patient just moved from [place] and does not have established nephrologist locally.  Baseline creatinine is in the upper-3 to low-4 range. Reviewed his nephrologist note from 5/4 in care  Everywhere, labs from 5/4 listed as cr of 4.16.  - creatinine is currently stable, PTA Lasix and hydralazine stopped per nephrology recommendations, currently only as needed, patient to follow-up with Dr. Mckeon on 8/24/2021.  Vascular surgery was consulted and he underwent AV fistula placement on 8/5, he had swelling on his arm after that for which he needs to continue compression dressings.       Chronic A-fib  CAD s/p PCI 2006  HTN  HLD  Further details unknown of underlying CAD.  Pharmacy  confirms the patient only takes Eliquis once daily, stating he gets a rash if he takes it twice daily. PTA aspirin, Plavix, clonidine, metoprolol and Eliquis  - PTA metoprolol decreased to 50mg BID due to low HR, clonidine to continue 0.2 mg twice daily, continue ASA, Plavix and Eliquis was temporarily held for vascular surgery but later restarted.       Possible cognitive impairment  Possible depression  Daughter concerned about depression and also reports he has been mismanaging his insulin recently and has concerns regarding forgetfulness.  Patient will be discharged with home OT PT and RN visits.  He had received a dose of Remeron here which was stopped because he was quite sleepy, psychiatry evaluation was recommended which he declined at this time.       Insomnia  - resume PTA Ambien at lower dose 2.5mg at bedtime prn per patient request. He reports he is not sleeping well, prescription provided for home use, use cautiously due to side effects.       History of alcohol abuse  Reports no alcohol use for >1 week     Gout   - continue allopurinol (dose reduced due to renal function)    Julia Combs MD    Significant Results and Procedures       Pending Results     Unresulted Labs Ordered in the Past 30 Days of this Admission     No orders found from 6/28/2021 to 7/29/2021.          Code Status   DNR / DNI       Primary Care Physician   Belen Prieto    Physical Exam   Temp: 98.2  F (36.8  C) Temp src: Oral BP: 123/65 Pulse: 50   Resp: 16 SpO2: 95 % O2 Device: None (Room air) Oxygen Delivery: 3 LPM  Vitals:    07/31/21 0550 08/01/21 0610 08/04/21 0615   Weight: 89.1 kg (196 lb 6.9 oz) 88.6 kg (195 lb 5.2 oz) 89.8 kg (197 lb 15.6 oz)     Vital Signs with Ranges  Temp:  [97.1  F (36.2  C)-98.8  F (37.1  C)] 98.2  F (36.8  C)  Pulse:  [46-74] 50  Resp:  [11-18] 16  BP: (119-174)/(65-86) 123/65  SpO2:  [93 %-98 %] 95 %  I/O last 3 completed shifts:  In: 100 [I.V.:100]  Out: 730 [Urine:700; Blood:30]    The patient  was examined on the day of discharge.    Discharge Disposition   Discharged to home with home care  Condition at discharge: Stable    Consultations This Hospital Stay   PHYSICAL THERAPY ADULT IP CONSULT  OCCUPATIONAL THERAPY ADULT IP CONSULT  CARE MANAGEMENT / SOCIAL WORK IP CONSULT  NEPHROLOGY IP CONSULT  GASTROENTEROLOGY IP CONSULT  VASCULAR SURGERY IP CONSULT  VASCULAR ACCESS ADULT IP CONSULT  VASCULAR ACCESS ADULT IP CONSULT  VASCULAR ACCESS ADULT IP CONSULT    Time Spent on this Encounter   Julia DA SILVA MD, personally saw the patient today and spent greater than 30 minutes discharging this patient.    Discharge Orders      Home care nursing referral      Home Care PT Referral for Hospital Discharge      Home Care OT Referral for Hospital Discharge      Follow-up and recommended labs and tests     Nephrology Follow up 2 weeks  You have an Appointment scheduled with Dr Roger Aug 24 at 9:30 am  [facility].   [phone]  [facility]  [place], [place]     Reason for your hospital stay    Dka     Follow-up and recommended labs and tests     Follow up with primary care provider, Belen Prieto, within 7 days to evaluate medication change, for hospital follow- up, and to follow up on results.  The following labs/tests are recommended: cmp in 5-6 days, cbc in few days .  Follow-up with the vascular surgery as recommended.     Activity    Your activity upon discharge: activity as tolerated     MD face to face encounter    Documentation of Face to Face and Certification for Home Health Services    I certify that patient: Gordon Gann is under my care and that I, or a nurse practitioner or physician's assistant working with me, had a face-to-face encounter that meets the physician face-to-face encounter requirements with this patient on: 8/6/2021.    This encounter with the patient was in whole, or in part, for the following medical condition, which is the primary reason for home  health care: .    I certify that, based on my findings, the following services are medically necessary home health services: Nursing, Occupational Therapy, and Physical Therapy.    My clinical findings support the need for the above services because: Nurse is needed: To provide assessment and oversight required in the home to assure adherence to the medical plan due to: Deconditioning, occasional confusion.., Occupational Therapy Services are needed to assess and treat cognitive ability and address ADL safety due to impairment in deconditioning., and Physical Therapy Services are needed to assess and treat the following functional impairments: Deconditioning    Further, I certify that my clinical findings support that this patient is homebound (i.e. absences from home require considerable and taxing effort and are for medical reasons or [employer] services or infrequently or of short duration when for other reasons) because: Requires assistance of another person or specialized equipment to access medical services because patient: Is unable to operate assistive equipment on their own...    Based on the above findings. I certify that this patient is confined to the home and needs intermittent skilled nursing care, physical therapy and/or speech therapy.  The patient is under my care, and I have initiated the establishment of the plan of care.  This patient will be followed by a physician who will periodically review the plan of care.  Physician/Provider to provide follow up care: Belen Prieto    Attending hospital physician (the Medicare certified [place] provider): Julia Combs MD  Physician Signature: See electronic signature associated with these discharge orders.  Date: 8/6/2021     Diet    Follow this diet upon discharge: Orders Placed This Encounter      Combination Diet Renal Diet; Consistent Carb 75 Grams CHO per Meal Diet     Discharge Medications   Current Discharge Medication List      START taking these  medications    Details   acetaminophen (TYLENOL) 325 MG tablet Take 1 tablet (325 mg) by mouth every 6 hours as needed for mild pain      oxyCODONE (ROXICODONE) 5 MG tablet Take 1 tablet (5 mg) by mouth every 6 hours as needed for pain  Qty: 12 tablet, Refills: 0    Associated Diagnoses: Diabetic ketoacidosis without coma associated with type 2 diabetes mellitus (H)      zolpidem (AMBIEN) 5 MG tablet Take 0.5 tablets (2.5 mg) by mouth nightly as needed for sleep  Qty: 30 tablet, Refills: 0    Associated Diagnoses: Diabetic ketoacidosis without coma associated with type 2 diabetes mellitus (H)         CONTINUE these medications which have CHANGED    Details   allopurinol (ZYLOPRIM) 100 MG tablet Take 2 tablets (200 mg) by mouth daily  Qty: 30 tablet, Refills: 0    Associated Diagnoses: Diabetic ketoacidosis without coma associated with type 2 diabetes mellitus (H)      apixaban ANTICOAGULANT (ELIQUIS) 2.5 MG tablet Take 1 tablet (2.5 mg) by mouth every morning  Qty: 30 tablet, Refills: 0    Associated Diagnoses: Diabetic ketoacidosis without coma associated with type 2 diabetes mellitus (H)      furosemide (LASIX) 20 MG tablet Take 1 tablet (20 mg) by mouth daily  Qty: 30 tablet, Refills: 0    Associated Diagnoses: Diabetic ketoacidosis without coma associated with type 2 diabetes mellitus (H)      hydrALAZINE (APRESOLINE) 25 MG tablet Take 1 tablet (25 mg) by mouth every 6 hours as needed (SBP >180)  Qty: 30 tablet, Refills: 0    Associated Diagnoses: Diabetic ketoacidosis without coma associated with type 2 diabetes mellitus (H)      insulin aspart (NOVOLOG PEN) 100 UNIT/ML pen Inject 5 Units Subcutaneous 3 times daily (with meals)  Qty: 15 mL, Refills: 0    Associated Diagnoses: Diabetic ketoacidosis without coma associated with type 2 diabetes mellitus (H)      insulin glargine (LANTUS PEN) 100 UNIT/ML pen Inject 7 Units Subcutaneous At Bedtime  Qty: 15 mL, Refills: 0    Comments: If Lantus is not covered by  insurance, may substitute Basaglar at same dose and frequency.    Associated Diagnoses: Diabetic ketoacidosis without coma associated with type 2 diabetes mellitus (H)      metoprolol tartrate (LOPRESSOR) 50 MG tablet Take 1 tablet (50 mg) by mouth 2 times daily  Qty: 60 tablet, Refills: 0    Associated Diagnoses: Diabetic ketoacidosis without coma associated with type 2 diabetes mellitus (H)      simvastatin (ZOCOR) 40 MG tablet Take 1 tablet (40 mg) by mouth At Bedtime    Comments: Hold until liver function test is close to baseline.cmp ordered in 5-6 days         CONTINUE these medications which have NOT CHANGED    Details   aspirin 81 MG EC tablet Take 81 mg by mouth At Bedtime      cloNIDine (CATAPRES) 0.2 MG tablet Take 0.2 mg by mouth 2 times daily      clopidogrel (PLAVIX) 75 MG tablet Take 75 mg by mouth every morning      ezetimibe (ZETIA) 10 MG tablet Take 10 mg by mouth every morning      fish oil-omega-3 fatty acids 1000 MG capsule Take 1 g by mouth every morning      folic acid (FOLVITE) 1 MG tablet Take 1 mg by mouth every morning      vitamin B complex with vitamin C (STRESS TAB) tablet Take 1 tablet by mouth every morning      Vitamin D, Cholecalciferol, 25 MCG (1000 UT) CAPS Take 1,000 Units by mouth every morning         STOP taking these medications       zolpidem ER (AMBIEN CR) 6.25 MG CR tablet Comments:   Reason for Stopping:             Allergies   No Known Allergies  Data   Most Recent 3 CBC's:Recent Labs   Lab Test 07/31/21  0751 07/28/21  1237   WBC 7.1 7.1   HGB 11.2* 10.6*   MCV 86 87    223      Most Recent 3 BMP's:  Recent Labs   Lab Test 08/06/21  1206 08/06/21  0233 08/05/21  2123 08/05/21  1413 08/04/21  0701 08/03/21  1508 08/03/21  0748 08/03/21  0512 08/02/21  0734   NA  --   --   --   --  138  --   --  139 138   POTASSIUM  --   --   --  3.7 3.8 3.9   < > 3.1* 3.7   CHLORIDE  --   --   --   --  108  --   --  106 107   CO2  --   --   --   --  24  --   --  25 26   BUN  --    --   --   --  62*  --   --  68* 64*   CR  --   --   --   --  4.23*  --   --  4.49* 4.26*   ANIONGAP  --   --   --   --  6  --   --  8 5   LC  --   --   --   --  8.2*  --   --  8.6 8.3*   * 107* 198* 124* 75  --   --  46* 68*    < > = values in this interval not displayed.     Most Recent 2 LFT's:  Recent Labs   Lab Test 08/04/21  0701 08/03/21  0512   AST 68* 128*   * 189*   ALKPHOS 226* 298*   BILITOTAL 0.3 0.2     Most Recent INR's and Anticoagulation Dosing History:  Anticoagulation Dose History     Recent Dosing and Labs Latest Ref Rng & Units 8/2/2021 8/3/2021 8/4/2021    INR 0.85 - 1.15 0.99 1.01 1.12        Most Recent 3 Troponin's:  Recent Labs   Lab Test 07/28/21  1409 07/28/21  1237   TROPONIN 0.068* 0.064*     Most Recent Cholesterol Panel:No lab results found.  Most Recent 6 Bacteria Isolates From Any Culture (See EPIC Reports for Culture Details):No lab results found.  Most Recent TSH, T4 and A1c Labs:  Recent Labs   Lab Test 07/31/21  1445 07/28/21  1237   TSH  --  2.58   A1C 11.0* 11.7*     Results for orders placed or performed during the hospital encounter of 07/28/21   US Abdomen Limited    Narrative    EXAM: US ABDOMEN LIMITED  LOCATION: [facility]  DATE/TIME: 7/30/2021 10:10 PM    INDICATION: Intermittent right upper quadrant abdominal pain with elevated liver function studies.  COMPARISON: None.  TECHNIQUE: Limited abdominal ultrasound.    FINDINGS:    GALLBLADDER: Normal. No gallstones, wall thickening, or pericholecystic fluid. Negative sonographic Velasquez's sign.    BILE DUCTS: No biliary dilatation. The common duct measures 4 mm.    LIVER: Normal parenchyma with smooth contour. No focal mass.    RIGHT KIDNEY: No hydronephrosis. 11 cm in length.    PANCREAS: Not visualized due to bowel gas.    Minimal right-sided pleural fluid. No ascites.      Impression    IMPRESSION:  1.  No overt hepatic abnormality.    2.  No calcified gallstones, gallbladder wall  thickening or pericholecystic fluid.    3.  No biliary dilatation.    4.  No hydronephrosis.       MR Abdomen MRCP without Contrast    Narrative    MRI ABDOMEN    CLINICAL HISTORY:  Elevated alkaline phosphatase, aspartate  aminotransferase and is empty. Previous history of right upper  quadrant pain.    TECHNIQUE: MRI of the abdomen without intravenous contrast and 3-D  MRCP. FINDINGS:    Comparison study: Right upper quadrant ultrasound 7/30/2021    FINDINGS: Evaluation is limited by absence of intravenous contrast.    Normal gallbladder. No intrahepatic or extrahepatic biliary ductal  dilatation.  No significant hepatic steatosis. No focal hepatic masses evident on  this noncontrast exam. Normal size and signal of the pancreatic  parenchyma. No pancreatic duct dilatation. The spleen and adrenal  glands are normal. Simple and punctate hemorrhagic cysts in the  kidneys. Visualized loops of small bowel and colon are normal in  caliber. No abdominal lymphadenopathy. No ascites. Partly visualized  small right pleural effusion.      Impression    IMPRESSION:  1. No intrahepatic or extrahepatic biliary ductal dilatation. Normal  gallbladder,  liver and pancreas on this noncontrast MRI.  2. Partially visualized small right pleural effusion.    BRIDGETT WARNER MD         SYSTEM ID:  [id number]   US Upper Extremity Venous Mapping Bilateral    Narrative    ULTRASOUND BILATERAL UPPER EXTREMITIES VENOUS MAPPING August 3, 2021  11:28 AM     HISTORY: End-stage renal disease requires dialysis access.  Preoperative evaluation in a revascularization procedure when it is  expected that an autologous vein will be used and if there is  uncertainty regarding availability of a suitable vein for bypass.      COMPARISON: None.    FINDINGS:   Right upper extremity: The right cephalic vein is patent in the arm  and has diameters that range between 1.7 to 1.0 mm. There is occlusive  thrombus in the proximal to mid distal forearm.    The right  basilic vein is patent in the arm and has diameters that  range between 4.3 to 2.1 mm.    Left upper extremity: The left cephalic vein is patent. Its diameters  in the arm range between 1.9 to 0.9 mm. Its diameters in the forearm  range between 1.2 to 0.7 mm.    The left basilic vein is patent in the arm. Its diameters range  between 1.6 to 1.5 mm.      Impression    IMPRESSION: Vein mapping performed as above. The right basilic vein in  the arm is the largest caliber vessel.     BOBY KAUR MD         SYSTEM ID:  [id number]    Upper Extremity Venous Duplex Bilat    Narrative    US UPPER EXTREMITY VENOUS DUPLEX BILATERAL  8/4/2021 11:19 AM     HISTORY: 76-year-old patient with plan for AV fistula creation.  Request made to ensure patency of veins in both upper extremities as  well as diameter measurements of applicable veins in both upper  extremities.    COMPARISON: None    TECHNIQUE: Color Doppler and spectral waveform analysis obtained  throughout the deep and superficial veins of both upper extremities.    FINDINGS: Both internal jugular, subclavian, axillary, and brachial  veins demonstrate normal blood flow, compression (where applicable),  and augmentation. The basilic and cephalic veins are patent.    Brachial vein measurements were obtained with tourniquet applied in  both upper extremities.    Right upper extremity: The medial brachial venous branches 2.2 mm and  the lateral branch is 3.3 mm. The axillary vein a 7.6 mm, subclavian  vein ranges from 5 to 6.8 mm and the internal jugular vein is 10.4 mm.    Left upper extremity: The medial brachial vein is 2.7 mm and lateral  brachial vein is 3.8 mm. The axillary vein is 6.7 mm. The subclavian  vein ranges from 6.7 to 6.9 mm. The internal jugular vein is 9.2 mm.      Impression    IMPRESSION:  1. Negative for deep venous thrombosis throughout both upper  extremities.  2. Venous measurements of both brachial, axillary, subclavian, and  internal jugular  veins as described above.    BON RSOE MD         SYSTEM ID:  [id number]   Echocardiogram Complete     Value    LVEF  50-55%    Narrative    [id number]  [id number]  [id number]^BARTHELL^NORMA^ASAEL CHANDRA     [facility]  Echocardiography Laboratory  [address] [place] [address]     Name: JULIO RIZVI  MRN: [id number]  : 1945  Study Date: 2021 03:23 PM  Age: 76 yrs  Gender: Male  Patient Location: [facility]  Reason For Study: Edema  Ordering Physician: BARTHELL, JOANNA KERSTEN ULMEN  Referring Physician: GI HINKLE  Performed By: Carlos Davis RDCS     BSA: 2.0 m2  Height: 68 in  Weight: 191 lb  HR: 64  BP: 165/94 mmHg  ______________________________________________________________________________  Procedure  Complete Portable Echo Adult. Optison (NDC #5431-6232) given intravenously.  ______________________________________________________________________________  Interpretation Summary     The left ventricle is normal in size.  There is mild concentric left ventricular hypertrophy.  The visual ejection fraction is 50-55%.  Septal motion is consistent with conduction abnormality.  The right ventricle is normal in size and function.  No significant valvular heart disease.  ______________________________________________________________________________  Left Ventricle  The left ventricle is normal in size. There is mild concentric left  ventricular hypertrophy. The visual ejection fraction is 50-55%. Diastolic  Doppler findings (E/E' ratio and/or other parameters) suggest left ventricular  filling pressures are indeterminate. Septal motion is consistent with  conduction abnormality.     Right Ventricle  The right ventricle is normal in size and function.     Atria  The left atrium is borderline dilated. Right atrial size is normal. There is  no color Doppler evidence of an atrial shunt.     Mitral Valve  The mitral valve leaflets are mildly thickened. There is  trace mitral  regurgitation.     Tricuspid Valve  There is trace tricuspid regurgitation. IVC diameter <2.1 cm collapsing >50%  with sniff suggests a normal RA pressure of 3 mmHg.     Aortic Valve  There is trivial trileaflet aortic sclerosis. No aortic regurgitation is  present.     Pulmonic Valve  There is trace pulmonic valvular regurgitation.     Vessels  Normal size aorta. The aortic root is normal size.     Pericardium  There is no pericardial effusion.     Rhythm  The rhythm was atrial fibrillation.  ______________________________________________________________________________  MMode/2D Measurements & Calculations  IVSd: 1.5 cm     LVIDd: 4.9 cm  LVIDs: 3.3 cm  LVPWd: 1.2 cm  FS: 33.5 %  LV mass(C)d: 270.7 grams  LV mass(C)dI: 135.1 grams/m2  Ao root diam: 3.4 cm  LA dimension: 4.7 cm  LA/Ao: 1.4  LA Volume (BP): 61.8 ml  LA Volume Index (BP): 30.9 ml/m2  RWT: 0.51     Doppler Measurements & Calculations  MV E max michelle: 61.1 cm/sec  MV dec time: 0.18 sec  PA acc time: 0.11 sec  E/E' av.9  Lateral E/e': 5.7  Medial E/e': 12.2     ______________________________________________________________________________  Report approved by: Luisa Alvarez 2021 04:16 PM

## 2021-08-06 NOTE — PROGRESS NOTES
Care Management Discharge Note    Discharge Date: 08/06/2021  Expected Time of Departure: Unknown, but pt's daughter would like to provide transport    Discharge Disposition: Transitional Care    Discharge Services: None    Discharge DME: None    Discharge Transportation: family or friend will provide    Private pay costs discussed: Not applicable    Patient/family educated on Medicare website which has current facility and service quality ratings: yes    Education Provided on the Discharge Plan: yes   Persons Notified of Discharge Plans: pt's daughter Omayra  Patient/Family in Agreement with the Plan: yes    Handoff Referral Completed: No    Additional Information:  Per , plan for discharge now home with daughter and [facility].  Referral had been sent to [facility] [place] but they are unable to do SOC until Thursday.  Contacted other agencies, and [facility] is able to accept referral with SOC by Monday.  Referral/orders faxed to [facility] at [phone].  Called patient's daughter Omayra to update her with this information.    Vonda Valdez RN

## 2021-08-06 NOTE — PROGRESS NOTES
SPIRITUAL HEALTH SERVICES Progress Note  [facility]    Visited pt due to length of stay. I introduced myself and SH services and pt decline visit.    SHS remains available.      Annette Pacheco  Chaplain Resident

## 2021-08-06 NOTE — PLAN OF CARE
VSS, on RA, A/Ox4 and flat affect, L arm numbness due to nerve block from fistula placement, fistula site CDI, faint thrill and bruit, L forearm skin tear, voiding in urinal, systolic BP <180, tele: junctional, complains of neck stiffness addressed with hot packs and tylenol, continue to monitor

## 2021-08-06 NOTE — PROGRESS NOTES
MD Notification    Notified Person: MD    Notified Person Name: Dr. Winn/Lila    Notification Date/Time: August 6, 2021, 4:53 AM    Notification Interaction: pgd    Purpose of Notification:  Increased swelling in arm of new fistula with redness    Orders Received: No orders received, MD explained that this can be a normal reaction and rounding team will assess in the AM     Comments:

## 2021-08-06 NOTE — PROGRESS NOTES
Care Management Follow Up    Length of Stay (days): 9    Expected Discharge Date: 08/05/2021  Expected Time of Departure: Unknown, but pt's daughter would like to provide transport  Concerns to be Addressed: discharge planning  Pt recently moved from [place] to [place]. Pt lives with his marva in her home in [place].  Patient plan of care discussed at interdisciplinary rounds: Yes    Anticipated Discharge Disposition: Transitional Care  Disposition Comments: pt for pt to discharge to TCU.  Anticipated Discharge Services: None  Anticipated Discharge DME: None    Patient/family educated on Medicare website which has current facility and service quality ratings: yes  Education Provided on the Discharge Plan:    Patient/Family in Agreement with the Plan: yes    Referrals Placed by CM/HENOK: Senior Linkage Line, Post Acute Facilities  Private pay costs discussed: Not applicable    Additional Information:  LÁZARO left with Chandler LANDON regarding status of  Insurance auth. HENOK to wait for return call.      Call back from Hina in admissions. She reports she sent in more updated information to the insurance company for the auth. Hina reports she would call once she had more information. HENOK went to update pt and would call his daughter, Omayra.     Call placed to Omayra and she inquired if pt went home on home care, would we run into the same insurance issue? HENOK consulted with RNCC about this and she requested HENOK send an email to the [facility] home care liaisons. HENOK also paged surgery to clarify when pt is ready. Call from Grace Meyer MD regarding when pt is ready for discharge. She reports pt can discharge at anytime. HENOK called central intake with [facility]  to inquire about the possibility of an insurance auth. HENOK was informed when they have encountered similar plans in the past that an authorization did not hold up the discharge process. HENOK called Omayra back to inform her. Omayra would like to pursue home care  instead of TCU due to the length of the authorization. [place]may[place] [address] [place] [address]. HENOK sent a referral to [facility].     HENOK updated RNCC and she would page hospitalist regarding discharge orders. HENOK called Omayra regarding plans for discharge. Omayra reports she can come pick pt up at 6:00pm. HENOK updated pt and called Chandler Torres handed off to RNCC to finalize discharge plan.     LIANA Ballard

## 2021-08-06 NOTE — PROGRESS NOTES
[facility]    Medicine Progress Note - Hospitalist Service       Date of Admission:  7/28/2021    Assessment & Plan           Gordon Gann is a 76 year old male admitted on 7/28/2021.  Past history DM II on insulin, depression, alcohol abuse, hypertension, chronic kidney disease stage V, chronic atrial fibrillation, who presents as a direct admission from [facility], where he was brought in by family with generalized weakness, confusion, and increased urination, found to have mild diabetic ketoacidosis.        Diabetic ketoacidosis, resolved  DM II on long term insulin, uncontrolled  [Lantus 30 units at bedtime and novolog 15 units t.i.d. with meals]  Recent history of non-compliance with insulin, admission A1C 11.7%.  Initial glucose >600, ketones elevated with acidosis and borderline elevated gap, improved with IVF and insulin gtt.   *Hypoglycemic episode down to 68 on 8/2, insulin changed from Lantus 20 twice daily to 25 units nightly. Recurrent hypoglycemic episode down to 48 on 8/3,. Lantus decreased to10 units nightly, will need to gradually titrate up for better control of blood sugar  -Patient received only 7 units of Lantus at bedtime on 8/4,, 8/5 he had low blood sugars in the morning, still above 60 without any symptoms, plan is to monitor.  - continue with aspart 5 units TID with meals  - continue medium intensity sliding scale insulin TID. No bed time sliding scale insulin to minimize risk of hypoglycemia  - monitor for hypoglycemia  -Will continue with 7 units of Lantus tonight bedtime as well due to hypoglycemic events 8/5     Elevated transaminase/alk phos  Etiology not clear. LFT's up from admission on 7/30 and reporting intermittent RUQ pain though currently non-tender on exam- RUQ US without acute finding.  Currently patient without abdominal pain, nausea or vomiting. Etiology not clear at this time.  CK is normal.  Ferritin 420, hepatitis serologies  negative.  -mitochondrial antibody, ferritin, tissue transglutaminase antibody pending  -MRCP normal gall bladder, liver  - mitochondrial antibody, tissue transglutaminase antibody pending  - LFTs are now trending down, will obtain CMP in a.m.  - GI consulted, appreciate assistance  - hold PTA Zetia/statin at this time, can be restarted once LFTs improved       Hypokalemia- resolved  - replaced orally. Not on protocol due to renal insufficiency.     Hypophosphatemia  - normalized with supplementation  - no protocol due to renal function     Anasarca and bilateral peripheral edema  Possible type II NSTEMI  Serial trop mildly elevated but flat 0.06, EKG with non-specific ST changes.  Pro-BNP >8400.  CXR negative for edema.  TTE with EF 50-55%, septal motion consistent with conduction abnormality, no valvular disease.   - continue tele for now  - Compression stockings for lower extremity edema  - PTA Lasix stopped per nephrology recommendation.      CKD stage V  It appears that his nephrologist has been discussing dialysis (he remains undecided). Patient just moved from [place] and does not have established nephrologist locally.  Baseline creatinine is in the upper-3 to low-4 range. Reviewed his nephrologist note from 5/4 in care  Everywhere, labs from 5/4 listed as cr of 4.16.  - creatinine is currently stable  - PTA Lasix and hydralazine stopped per nephrology recommendation  -Nephrology follow-up scheduled with Dr. Roger for 8/24/2021  - Vascular surgery consulted for AVF, vein mapping completed   -Vascular surgery discussed with the patient about the procedure and plan is to proceed with the AV fistula placement  on 8/5.  -Patient had AV fistula placed on 8/5, restarted Plavix and Eliquis on 8/6.       Chronic A-fib  CAD s/p PCI 2006  HTN  HLD  Further details unknown of underlying CAD.  Pharmacy confirms the patient only takes Eliquis once daily, stating he gets a rash if he takes it twice daily. PTA aspirin, Plavix,  "clonidine, metoprolol and Eliquis  - PTA metoprolol decreased to 50mg BID due to low HR   -Continue clonidine 0.2 mg twice daily, with holding parameters   - Zetia and simvastatin on hold at this time given rising transaminase levels  - continue with ASA  - Plavix and Eliquis held due to surgery today, or can restart once okay by vascular surgery.  - Follow I's and O's, and daily weights  - per discussion with patient, last stent was 2006 done in [place]. Has not followed in long time and not sure how long he needs to be on Plavix.      Possible cognitive impairment  Possible depression  Daughter concerned about depression and also reports he has been mismanaging his insulin recently and has concerns regarding forgetfulness.   - outpatient cognitive eval with OT  - after discussion with prior rounder, started on Remeron 7.5mg at bedtime on 7/30. He slept good, however remained tired most of the day on 7/31. Remeron stopped.   -Discussed with patient on 8/3, possible psychiatric consultation for further recommendation, patient declined at this time     Insomnia  - resume PTA Ambien at lower dose 2.5mg at bedtime prn per patient request. He reports he is not sleeping well  - prn melatonin     History of alcohol abuse  Reports no alcohol use for >1 week     Gout   - continue allopurinol (dose reduced due to renal function)    Discharge planning:  PT/OT recommend TCU.  Patient want to discharge back to his daughters home and then TCU after that. Discussed with patient's daughter who is concerned about patient's safety if he goes home. He won't have continuous supervision. She works all day and her boyfriend also not at home during the day. Discussed with patient his daughter's concerns and PT recommendations. He states \"If my daughter says, I will stay.\" He is currently awaiting insurance authorization for TCU placement.  So far no information regarding that, will discharge to TCU when okay by vascular surgery .  Patient has " TCU acceptance but insurance authorization is pending, waiting for that.       Diet: Combination Diet Renal Diet; Consistent Carb 75 Grams CHO per Meal Diet    DVT Prophylaxis: Pneumatic Compression Devices  Flood Catheter: Not present  Central Lines: None  Code Status: No CPR- Do NOT Intubate      Disposition Plan   Expected discharge: 08/06/2021 recommended to transitional care unit once safe disposition plan/ TCU bed available and awaiting insurance authorization.     The patient's care was discussed with the Bedside Nurse, Care Coordinator/, Patient and Patient's Family.    Julia Combs MD  Hospitalist Service  [facility]  Securely message with the Vocera Web Console (learn more here)  Text page via [email or web address] Paging/Directory      Clinically Significant Risk Factors Present on Admission                ______________________________________________________________________    Interval History   He had AV fistula placed on the left upper extremity, edema noted, currently his arm is wrapped, seen by vascular surgery today, recommendation is to elevate above the heart, he denies any other concerns, he is anxious to know when he can be released from the hospital.    Data reviewed today: I reviewed all medications, new labs and imaging results over the last 24 hours. I personally reviewed no images or EKG's today.    Physical Exam   Vital Signs: Temp: 98.2  F (36.8  C) Temp src: Oral BP: 123/65 Pulse: 50   Resp: 16 SpO2: 95 % O2 Device: None (Room air) Oxygen Delivery: 3 LPM  Weight: 197 lbs 15.57 oz  Constitutional: Awake, alert, cooperative, no apparent distress  Respiratory: Clear to auscultation bilaterally, no crackles or wheezing  Cardiovascular: Regular rate and rhythm, normal S1 and S2, and no murmur noted  GI: Normal bowel sounds, soft, non-distended, non-tender  Skin/Integumen:  no cyanosis, 2+ edema noted bilateral lower extremity, scattered areas of skin breakdown and  ecchymosis noted bilaterally.  Neuro : moving all 4 extremities, no focal deficit noted       Data   Recent Labs   Lab 08/06/21  1206 08/06/21  0233 08/05/21  2123 08/05/21  1413 08/04/21  0701 08/03/21  1508 08/03/21  0748 08/03/21  0512 08/02/21  1212 08/02/21  0734 07/31/21  1146 07/31/21  0751   WBC  --   --   --   --   --   --   --   --   --   --   --  7.1   HGB  --   --   --   --   --   --   --   --   --   --   --  11.2*   MCV  --   --   --   --   --   --   --   --   --   --   --  86   PLT  --   --   --   --   --   --   --   --   --   --   --  276   INR  --   --   --   --  1.12  --   --  1.01 0.99  --   --   --    NA  --   --   --   --  138  --   --  139  --  138   < > 140   POTASSIUM  --   --   --  3.7 3.8 3.9   < > 3.1*  --  3.7   < > 3.9   CHLORIDE  --   --   --   --  108  --   --  106  --  107   < > 110*   CO2  --   --   --   --  24  --   --  25  --  26   < > 27   BUN  --   --   --   --  62*  --   --  68*  --  64*   < > 58*   CR  --   --   --   --  4.23*  --   --  4.49*  --  4.26*   < > 4.12*   ANIONGAP  --   --   --   --  6  --   --  8  --  5   < > 3   LC  --   --   --   --  8.2*  --   --  8.6  --  8.3*   < > 8.0*   * 107* 198* 124* 75  --   --  46*  --  68*   < > 152*   ALBUMIN  --   --   --   --  1.9*  --   --  2.4*  --  2.0*   < > 1.9*   PROTTOTAL  --   --   --   --  5.3*  --   --  6.5*  --  5.7*   < > 5.4*   BILITOTAL  --   --   --   --  0.3  --   --  0.2  --  0.2   < > 0.3   ALKPHOS  --   --   --   --  226*  --   --  298*  --  288*   < > 212*   ALT  --   --   --   --  118*  --   --  189*  --  199*   < > 101*   AST  --   --   --   --  68*  --   --  128*  --  181*   < > 77*    < > = values in this interval not displayed.     No results found for this or any previous visit (from the past 24 hour(s)).  Medications       - MEDICATION INSTRUCTIONS for Dialysis Patients -   Does not apply See Admin Instructions     allopurinol  200 mg Oral Daily     apixaban ANTICOAGULANT  2.5 mg Oral QAM      aspirin  81 mg Oral At Bedtime     cloNIDine  0.2 mg Oral BID     clopidogrel  75 mg Oral QAM     folic acid  1 mg Oral QAM     furosemide  20 mg Oral Daily     insulin aspart  5 Units Subcutaneous TID w/meals     insulin aspart  1-7 Units Subcutaneous TID AC     insulin glargine  7 Units Subcutaneous At Bedtime     LORazepam  0.25 mg Oral Once     melatonin  1 mg Oral At Bedtime     metoprolol tartrate  50 mg Oral BID     sodium chloride (PF)  10 mL Intracatheter Q8H     sodium chloride (PF)  10 mL Intracatheter Q8H

## 2021-08-06 NOTE — PROGRESS NOTES
"VASCULAR SURGERY PROGRESS NOTE    Subjective:  Reports left upper extremity swelling and pain as block has worn off.    Objective:  Intake/Output Summary (Last 24 hours) at 8/6/2021 0739  Last data filed at 8/5/2021 2200  Gross per 24 hour   Intake 100 ml   Output 730 ml   Net -630 ml     Labs:  ROUTINE IP LABS (Last four results)  BMP  Recent Labs   Lab 08/06/21  0233 08/05/21  2123 08/05/21  1817 08/05/21  1651 08/05/21  1413 08/04/21  0701 08/03/21  1508 08/03/21  1354 08/03/21  0512 08/02/21  0734 08/01/21  0743   NA  --   --   --   --   --  138  --   --  139 138 141   POTASSIUM  --   --   --   --  3.7 3.8 3.9 4.2 3.1* 3.7 3.7   CHLORIDE  --   --   --   --   --  108  --   --  106 107 110*   LC  --   --   --   --   --  8.2*  --   --  8.6 8.3* 7.9*   CO2  --   --   --   --   --  24  --   --  25 26 26   BUN  --   --   --   --   --  62*  --   --  68* 64* 64*   CR  --   --   --   --   --  4.23*  --   --  4.49* 4.26* 4.36*   * 198* 104* 121* 124* 75  --   --  46* 68* 105*     CBC  Recent Labs   Lab 07/31/21  0751   WBC 7.1   RBC 3.94*   HGB 11.2*   HCT 34.0*   MCV 86   MCH 28.4   MCHC 32.9   RDW 14.4        INR  Recent Labs   Lab 08/04/21  0701 08/03/21  0512 08/02/21  1212   INR 1.12 1.01 0.99     PHYSICAL EXAM:  BP (!) 169/86   Pulse 58   Temp 97.7  F (36.5  C) (Oral)   Resp 16   Ht 1.727 m (5' 8\")   Wt 89.8 kg (197 lb 15.6 oz)   SpO2 98%   BMI 30.10 kg/m    General: The patient is alert and oriented. Appropriate. No acute distress  Psych: Pleasant affect, Answers questions appropriately  Skin: Color appropriate for race, warm, dry.  Respiratory: Breathing unlabored  GI:  Abdomen soft, nontender to light palpation.  Extremities: See picture below, expected ecchymosis and swelling with incisions clean dry intact, strong multiphasic ulnar signal, monophasic radial and palmar arch signal, +signal through graft w/ doppler              Imaging:   No new imaging.    ASSESSMENT:  76M with history of " CAD s/p stenting, a-fib on eliquis anticoagulation, IDDM, depression, alcohol abuse, HTN, DL, gout, and CKD stage V.  S/p L brachioaxillary AV graft POD1.    PLAN:  Elevate left upper extremity above heart such as possible  Will place compression to assist w/ swelling  Continue ASA, ok to start plavix and Tim Meyer MD  Vascular Surgery Fellow  Pager: [phone]

## 2021-08-06 NOTE — PROGRESS NOTES
MD Notification    Notified Person: MD    Notified Person Name: Robert    Notification Date/Time: August 5, 2021, 10:13 PM    Notification Interaction: pgd    Purpose of Notification:  320-1 (JF)   Pt requesting Tylenol, no Tylenol orders in MAR. Complaining of stiff muscles, could we get a PRN Tylenol and/or muscle relaxer ordered?   Thanks,   Nasreen PEREZ *[phone]    Orders Received: PRN Tylenol entered by MD     Comments:

## 2021-08-06 NOTE — PROGRESS NOTES
Renal Medicine Progress Note                                Gordon Gann MRN# [id number]   Age: 76 year old YOB: 1945   Date of Admission: 7/28/2021 Hospital LOS: 9                  Assessment/Plan:       76 year old male admitted on 7/28/2021.  Past history DM II on insulin, depression, alcohol abuse, hypertension, chronic kidney disease stage V, chronic atrial fibrillation, who presents as a direct admission from [facility], where he was brought in by family with generalized weakness, confusion, and increased urination, found to have mild diabetic ketoacidosis    Seen for evaluation of elevated creatinine      1.  CKD V   -eGFR 15 ml/min range   -presumed diabetic nephropathy   -progressive   -willing to proceed to dialysis as indicated   2.  Proteinuria   -PCR: 5.26 g/g  3.  HTN   -moderate control   4.  Anemia   -borderline Fe studies  5.  Secondary hyperparathyroidism   - pg/ml   -vitamin D 24  6.  DM   -poor control   -A1C 11% range   7.  DKA   -resolved  8.  Hypoalbuminemia  9.  ? ARF component            AVG today placed 08/05/21    Renal follow-up as scheduled         Interval History:     In bed  Arm wrapped  Some soreness       ROS:     GENERAL: NAD, No fever,chills  R: NEGATIVE for significant cough or SOB  CV: NEGATIVE for chest pain, palpitations  EXT: no change edema  ROS otherwise negative    Medications and Allergies:     Reviewed    Physical Exam:     Vitals were reviewed  Patient Vitals for the past 8 hrs:   BP Temp Temp src Pulse Resp SpO2   08/06/21 0752 (!) 157/74 98.8  F (37.1  C) Oral 74 16 97 %     I/O last 3 completed shifts:  In: 100 [I.V.:100]  Out: 730 [Urine:700; Blood:30]    Vitals:    07/28/21 2100 07/31/21 0550 08/01/21 0610 08/04/21 0615   Weight: 87 kg (191 lb 12.8 oz) 89.1 kg (196 lb 6.9 oz) 88.6 kg (195 lb 5.2 oz) 89.8 kg (197 lb 15.6 oz)       GENERAL: awake, alert, follows  HEENT: NC/AT, PERRLA, EOMI, non icteric, pharynx moist without lesion  RESP:   clear anteriorly  CV: RRR, normal S1 S2  ABDOMEN: soft, nontender, no HSM or masses and bowel sounds normal  MS: no clubbing, cyanosis   SKIN: clear without significant rashes or lesions  NEURO: speech normal and cranial nerves 2-12 intact  PSYCH: affect flat  EXT: 1 plus edema    Data:     Recent Labs   Lab 08/06/21  0233 08/05/21  2123 08/05/21  1817 08/05/21  1651 08/05/21  1413 08/04/21  0701 08/03/21  1508 08/03/21  1354 08/03/21  0512 08/02/21  0734 08/01/21  0743   NA  --   --   --   --   --  138  --   --  139 138 141   POTASSIUM  --   --   --   --  3.7 3.8 3.9 4.2 3.1* 3.7 3.7   CHLORIDE  --   --   --   --   --  108  --   --  106 107 110*   CO2  --   --   --   --   --  24  --   --  25 26 26   ANIONGAP  --   --   --   --   --  6  --   --  8 5 5   * 198* 104* 121* 124* 75  --   --  46* 68* 105*   BUN  --   --   --   --   --  62*  --   --  68* 64* 64*   CR  --   --   --   --   --  4.23*  --   --  4.49* 4.26* 4.36*   GFRESTIMATED  --   --   --   --   --  13*  --   --  12* 13* 12*   LC  --   --   --   --   --  8.2*  --   --  8.6 8.3* 7.9*         Recent Labs   Lab Test 08/04/21  0701 08/03/21  0512 08/02/21  0734 08/01/21  0743 07/31/21  0751 07/30/21  0730 07/29/21  0634 07/29/21  0208 07/28/21  2335 07/28/21  2118   CR 4.23* 4.49* 4.26* 4.36* 4.12* 3.94* 3.80* 3.73* 3.73* 3.66*     Recent Labs   Lab 08/04/21  0701 08/03/21  0512 08/02/21  0734 08/01/21  0743   ALBUMIN 1.9* 2.4* 2.0* 1.8*     Recent Labs   Lab 08/02/21  1212 07/31/21  0751   PHOS 6.2*  --    HGB  --  11.2*         G Garret Perla MD    [facility] - Nephrology  [phone]

## 2021-08-07 ENCOUNTER — HOSPITAL ENCOUNTER (OUTPATIENT)
Facility: CLINIC | Age: 76
Setting detail: OBSERVATION
Discharge: NURSING, REHAB OR LONG-TERM CARE | End: 2021-08-11
Attending: EMERGENCY MEDICINE | Admitting: HOSPITALIST
Payer: COMMERCIAL

## 2021-08-07 DIAGNOSIS — I10 HYPERTENSION, UNSPECIFIED TYPE: ICD-10-CM

## 2021-08-07 DIAGNOSIS — E11.10 DIABETIC KETOACIDOSIS WITHOUT COMA ASSOCIATED WITH TYPE 2 DIABETES MELLITUS (H): ICD-10-CM

## 2021-08-07 DIAGNOSIS — R73.9 HYPERGLYCEMIA: ICD-10-CM

## 2021-08-07 DIAGNOSIS — N18.6 END STAGE RENAL DISEASE (H): Primary | ICD-10-CM

## 2021-08-07 DIAGNOSIS — I48.20 CHRONIC ATRIAL FIBRILLATION (H): ICD-10-CM

## 2021-08-07 DIAGNOSIS — K75.9 HEPATITIS: ICD-10-CM

## 2021-08-07 DIAGNOSIS — Z78.9 UNABLE TO CARE FOR SELF: ICD-10-CM

## 2021-08-07 DIAGNOSIS — N18.9 CHRONIC KIDNEY DISEASE, UNSPECIFIED CKD STAGE: ICD-10-CM

## 2021-08-07 LAB
ALBUMIN SERPL-MCNC: 2.1 G/DL (ref 3.4–5)
ALP SERPL-CCNC: 273 U/L (ref 40–150)
ALT SERPL W P-5'-P-CCNC: 51 U/L (ref 0–70)
ANION GAP SERPL CALCULATED.3IONS-SCNC: 10 MMOL/L (ref 3–14)
AST SERPL W P-5'-P-CCNC: 56 U/L (ref 0–45)
BASE EXCESS BLDV CALC-SCNC: -2.5 MMOL/L (ref -7.7–1.9)
BASOPHILS # BLD AUTO: 0 10E3/UL (ref 0–0.2)
BASOPHILS NFR BLD AUTO: 0 %
BILIRUB SERPL-MCNC: 0.3 MG/DL (ref 0.2–1.3)
BUN SERPL-MCNC: 64 MG/DL (ref 7–30)
CALCIUM SERPL-MCNC: 8.4 MG/DL (ref 8.5–10.1)
CHLORIDE BLD-SCNC: 107 MMOL/L (ref 94–109)
CO2 SERPL-SCNC: 21 MMOL/L (ref 20–32)
CREAT SERPL-MCNC: 4.33 MG/DL (ref 0.66–1.25)
EOSINOPHIL # BLD AUTO: 0.1 10E3/UL (ref 0–0.7)
EOSINOPHIL NFR BLD AUTO: 1 %
ERYTHROCYTE [DISTWIDTH] IN BLOOD BY AUTOMATED COUNT: 14.2 % (ref 10–15)
GFR SERPL CREATININE-BSD FRML MDRD: 12 ML/MIN/1.73M2
GLUCOSE BLD-MCNC: 149 MG/DL (ref 70–99)
GLUCOSE BLDC GLUCOMTR-MCNC: 116 MG/DL (ref 70–99)
GLUCOSE BLDC GLUCOMTR-MCNC: 121 MG/DL (ref 70–99)
GLUCOSE BLDC GLUCOMTR-MCNC: 125 MG/DL (ref 70–99)
GLUCOSE BLDC GLUCOMTR-MCNC: 130 MG/DL (ref 70–99)
GLUCOSE BLDC GLUCOMTR-MCNC: 130 MG/DL (ref 70–99)
GLUCOSE BLDC GLUCOMTR-MCNC: 133 MG/DL (ref 70–99)
GLUCOSE BLDC GLUCOMTR-MCNC: 147 MG/DL (ref 70–99)
GLUCOSE BLDC GLUCOMTR-MCNC: 287 MG/DL (ref 70–99)
HCO3 BLDV-SCNC: 21 MMOL/L (ref 21–28)
HCT VFR BLD AUTO: 27.7 % (ref 40–53)
HGB BLD-MCNC: 9 G/DL (ref 13.3–17.7)
HOLD SPECIMEN: NORMAL
IMM GRANULOCYTES # BLD: 0 10E3/UL
IMM GRANULOCYTES NFR BLD: 0 %
KETONES BLD-SCNC: 0.1 MMOL/L (ref 0–0.6)
LYMPHOCYTES # BLD AUTO: 2.4 10E3/UL (ref 0.8–5.3)
LYMPHOCYTES NFR BLD AUTO: 22 %
MAGNESIUM SERPL-MCNC: 2.2 MG/DL (ref 1.6–2.3)
MCH RBC QN AUTO: 28.9 PG (ref 26.5–33)
MCHC RBC AUTO-ENTMCNC: 32.5 G/DL (ref 31.5–36.5)
MCV RBC AUTO: 89 FL (ref 78–100)
MONOCYTES # BLD AUTO: 1.5 10E3/UL (ref 0–1.3)
MONOCYTES NFR BLD AUTO: 13 %
NEUTROPHILS # BLD AUTO: 7 10E3/UL (ref 1.6–8.3)
NEUTROPHILS NFR BLD AUTO: 64 %
NRBC # BLD AUTO: 0 10E3/UL
NRBC BLD AUTO-RTO: 0 /100
O2/TOTAL GAS SETTING VFR VENT: 0 %
OXYHGB MFR BLDV: 66 % (ref 70–75)
PCO2 BLDV: 32 MM HG (ref 40–50)
PH BLDV: 7.43 [PH] (ref 7.32–7.43)
PLATELET # BLD AUTO: 305 10E3/UL (ref 150–450)
PO2 BLDV: 34 MM HG (ref 25–47)
POTASSIUM BLD-SCNC: 3.9 MMOL/L (ref 3.4–5.3)
PROT SERPL-MCNC: 6.1 G/DL (ref 6.8–8.8)
RBC # BLD AUTO: 3.11 10E6/UL (ref 4.4–5.9)
SODIUM SERPL-SCNC: 138 MMOL/L (ref 133–144)
WBC # BLD AUTO: 11.2 10E3/UL (ref 4–11)

## 2021-08-07 PROCEDURE — G0378 HOSPITAL OBSERVATION PER HR: HCPCS

## 2021-08-07 PROCEDURE — 250N000012 HC RX MED GY IP 250 OP 636 PS 637: Performed by: PHYSICIAN ASSISTANT

## 2021-08-07 PROCEDURE — 96360 HYDRATION IV INFUSION INIT: CPT

## 2021-08-07 PROCEDURE — 99220 PR INITIAL OBSERVATION CARE,LEVEL III: CPT | Performed by: PHYSICIAN ASSISTANT

## 2021-08-07 PROCEDURE — 83735 ASSAY OF MAGNESIUM: CPT | Performed by: EMERGENCY MEDICINE

## 2021-08-07 PROCEDURE — 82040 ASSAY OF SERUM ALBUMIN: CPT | Performed by: EMERGENCY MEDICINE

## 2021-08-07 PROCEDURE — 250N000013 HC RX MED GY IP 250 OP 250 PS 637: Mod: GY | Performed by: PHYSICIAN ASSISTANT

## 2021-08-07 PROCEDURE — 36415 COLL VENOUS BLD VENIPUNCTURE: CPT | Performed by: EMERGENCY MEDICINE

## 2021-08-07 PROCEDURE — 99285 EMERGENCY DEPT VISIT HI MDM: CPT | Mod: 25

## 2021-08-07 PROCEDURE — 85025 COMPLETE CBC W/AUTO DIFF WBC: CPT | Performed by: EMERGENCY MEDICINE

## 2021-08-07 PROCEDURE — 82805 BLOOD GASES W/O2 SATURATION: CPT | Performed by: EMERGENCY MEDICINE

## 2021-08-07 PROCEDURE — 96372 THER/PROPH/DIAG INJ SC/IM: CPT | Mod: XS | Performed by: PHYSICIAN ASSISTANT

## 2021-08-07 PROCEDURE — 82010 KETONE BODYS QUAN: CPT | Performed by: EMERGENCY MEDICINE

## 2021-08-07 PROCEDURE — 258N000003 HC RX IP 258 OP 636: Performed by: EMERGENCY MEDICINE

## 2021-08-07 RX ORDER — AMOXICILLIN 250 MG
1 CAPSULE ORAL 2 TIMES DAILY PRN
Status: DISCONTINUED | OUTPATIENT
Start: 2021-08-07 | End: 2021-08-11 | Stop reason: HOSPADM

## 2021-08-07 RX ORDER — POLYETHYLENE GLYCOL 3350 17 G/17G
17 POWDER, FOR SOLUTION ORAL DAILY PRN
Status: DISCONTINUED | OUTPATIENT
Start: 2021-08-07 | End: 2021-08-11 | Stop reason: HOSPADM

## 2021-08-07 RX ORDER — ONDANSETRON 2 MG/ML
4 INJECTION INTRAMUSCULAR; INTRAVENOUS EVERY 6 HOURS PRN
Status: DISCONTINUED | OUTPATIENT
Start: 2021-08-07 | End: 2021-08-11 | Stop reason: HOSPADM

## 2021-08-07 RX ORDER — METOPROLOL TARTRATE 50 MG
50 TABLET ORAL 2 TIMES DAILY
Status: DISCONTINUED | OUTPATIENT
Start: 2021-08-07 | End: 2021-08-09

## 2021-08-07 RX ORDER — FOLIC ACID 1 MG/1
1 TABLET ORAL EVERY MORNING
Status: DISCONTINUED | OUTPATIENT
Start: 2021-08-08 | End: 2021-08-11 | Stop reason: HOSPADM

## 2021-08-07 RX ORDER — NALOXONE HYDROCHLORIDE 0.4 MG/ML
0.2 INJECTION, SOLUTION INTRAMUSCULAR; INTRAVENOUS; SUBCUTANEOUS
Status: DISCONTINUED | OUTPATIENT
Start: 2021-08-07 | End: 2021-08-11 | Stop reason: HOSPADM

## 2021-08-07 RX ORDER — AMOXICILLIN 250 MG
2 CAPSULE ORAL 2 TIMES DAILY PRN
Status: DISCONTINUED | OUTPATIENT
Start: 2021-08-07 | End: 2021-08-11 | Stop reason: HOSPADM

## 2021-08-07 RX ORDER — NALOXONE HYDROCHLORIDE 0.4 MG/ML
0.4 INJECTION, SOLUTION INTRAMUSCULAR; INTRAVENOUS; SUBCUTANEOUS
Status: DISCONTINUED | OUTPATIENT
Start: 2021-08-07 | End: 2021-08-11 | Stop reason: HOSPADM

## 2021-08-07 RX ORDER — ACETAMINOPHEN 650 MG/1
650 SUPPOSITORY RECTAL EVERY 6 HOURS PRN
Status: DISCONTINUED | OUTPATIENT
Start: 2021-08-07 | End: 2021-08-11 | Stop reason: HOSPADM

## 2021-08-07 RX ORDER — SIMVASTATIN 40 MG
40 TABLET ORAL AT BEDTIME
Status: DISCONTINUED | OUTPATIENT
Start: 2021-08-07 | End: 2021-08-08

## 2021-08-07 RX ORDER — LIDOCAINE 40 MG/G
CREAM TOPICAL
Status: DISCONTINUED | OUTPATIENT
Start: 2021-08-07 | End: 2021-08-11 | Stop reason: HOSPADM

## 2021-08-07 RX ORDER — FUROSEMIDE 20 MG
20 TABLET ORAL DAILY
Status: DISCONTINUED | OUTPATIENT
Start: 2021-08-07 | End: 2021-08-11 | Stop reason: HOSPADM

## 2021-08-07 RX ORDER — OXYCODONE HYDROCHLORIDE 5 MG/1
5 TABLET ORAL EVERY 6 HOURS PRN
Status: DISCONTINUED | OUTPATIENT
Start: 2021-08-07 | End: 2021-08-11 | Stop reason: HOSPADM

## 2021-08-07 RX ORDER — NICOTINE POLACRILEX 4 MG
15-30 LOZENGE BUCCAL
Status: DISCONTINUED | OUTPATIENT
Start: 2021-08-07 | End: 2021-08-11 | Stop reason: HOSPADM

## 2021-08-07 RX ORDER — CLOPIDOGREL BISULFATE 75 MG/1
75 TABLET ORAL EVERY MORNING
Status: DISCONTINUED | OUTPATIENT
Start: 2021-08-08 | End: 2021-08-11 | Stop reason: HOSPADM

## 2021-08-07 RX ORDER — ONDANSETRON 4 MG/1
4 TABLET, ORALLY DISINTEGRATING ORAL EVERY 6 HOURS PRN
Status: DISCONTINUED | OUTPATIENT
Start: 2021-08-07 | End: 2021-08-11 | Stop reason: HOSPADM

## 2021-08-07 RX ORDER — EZETIMIBE 10 MG/1
10 TABLET ORAL EVERY MORNING
Status: DISCONTINUED | OUTPATIENT
Start: 2021-08-08 | End: 2021-08-11 | Stop reason: HOSPADM

## 2021-08-07 RX ORDER — CLONIDINE HYDROCHLORIDE 0.1 MG/1
0.2 TABLET ORAL 2 TIMES DAILY
Status: DISCONTINUED | OUTPATIENT
Start: 2021-08-07 | End: 2021-08-11 | Stop reason: HOSPADM

## 2021-08-07 RX ORDER — ASPIRIN 81 MG/1
81 TABLET ORAL AT BEDTIME
Status: DISCONTINUED | OUTPATIENT
Start: 2021-08-07 | End: 2021-08-11 | Stop reason: HOSPADM

## 2021-08-07 RX ORDER — HYDRALAZINE HYDROCHLORIDE 25 MG/1
25 TABLET, FILM COATED ORAL EVERY 6 HOURS PRN
Status: DISCONTINUED | OUTPATIENT
Start: 2021-08-07 | End: 2021-08-11 | Stop reason: HOSPADM

## 2021-08-07 RX ORDER — ACETAMINOPHEN 325 MG/1
650 TABLET ORAL EVERY 6 HOURS PRN
Status: DISCONTINUED | OUTPATIENT
Start: 2021-08-07 | End: 2021-08-11 | Stop reason: HOSPADM

## 2021-08-07 RX ORDER — ALLOPURINOL 100 MG/1
200 TABLET ORAL DAILY
Status: DISCONTINUED | OUTPATIENT
Start: 2021-08-07 | End: 2021-08-11 | Stop reason: HOSPADM

## 2021-08-07 RX ORDER — DEXTROSE MONOHYDRATE 25 G/50ML
25-50 INJECTION, SOLUTION INTRAVENOUS
Status: DISCONTINUED | OUTPATIENT
Start: 2021-08-07 | End: 2021-08-11 | Stop reason: HOSPADM

## 2021-08-07 RX ADMIN — CLONIDINE HYDROCHLORIDE 0.2 MG: 0.1 TABLET ORAL at 19:48

## 2021-08-07 RX ADMIN — ACETAMINOPHEN 650 MG: 325 TABLET, FILM COATED ORAL at 16:17

## 2021-08-07 RX ADMIN — ACETAMINOPHEN 650 MG: 325 TABLET, FILM COATED ORAL at 22:17

## 2021-08-07 RX ADMIN — INSULIN ASPART 5 UNITS: 100 INJECTION, SOLUTION INTRAVENOUS; SUBCUTANEOUS at 18:21

## 2021-08-07 RX ADMIN — METOPROLOL TARTRATE 50 MG: 50 TABLET, FILM COATED ORAL at 19:47

## 2021-08-07 RX ADMIN — APIXABAN 2.5 MG: 2.5 TABLET, FILM COATED ORAL at 16:18

## 2021-08-07 RX ADMIN — SODIUM CHLORIDE 500 ML: 9 INJECTION, SOLUTION INTRAVENOUS at 11:05

## 2021-08-07 RX ADMIN — ASPIRIN 81 MG: 81 TABLET ORAL at 22:17

## 2021-08-07 RX ADMIN — ALLOPURINOL 200 MG: 100 TABLET ORAL at 16:17

## 2021-08-07 RX ADMIN — ZOLPIDEM TARTRATE 2.5 MG: 5 TABLET, FILM COATED ORAL at 22:27

## 2021-08-07 RX ADMIN — INSULIN GLARGINE 7 UNITS: 100 INJECTION, SOLUTION SUBCUTANEOUS at 22:17

## 2021-08-07 RX ADMIN — FUROSEMIDE 20 MG: 20 TABLET ORAL at 16:17

## 2021-08-07 RX ADMIN — SIMVASTATIN 40 MG: 40 TABLET, FILM COATED ORAL at 22:17

## 2021-08-07 ASSESSMENT — ENCOUNTER SYMPTOMS: HEADACHES: 1

## 2021-08-07 ASSESSMENT — ACTIVITIES OF DAILY LIVING (ADL): DEPENDENT_IADLS:: CLEANING;COOKING;LAUNDRY;SHOPPING;MEDICATION MANAGEMENT

## 2021-08-07 NOTE — ED PROVIDER NOTES
History   Chief Complaint:  Hyperglycemia     The history is provided by the patient.      Gordon Gann is a 76 year old male with history of end stage renal disease, chronic a-fib, and type II diabetes who presents for evaluation of hyperglycemia. The patient was admitted to [facility] on 07/28/21 and was discharged on 08/06/21. He was admitted for concerns of end stage renal disease and DKA. He was discharged yesterday after a left brachioaxillary arteriovenous graft creation with tapered PTFE. The patient comes in today because when he got home last night he had a large dinner with a cheeseburger and fries and did not take insulin. Then this morning around 0600 when he woke up his family took his glucose levels and it was measured to be in the 480s. At 0600 he got 12 units of insulin, then 8 units at 0700, and 6 units at 0900. Thus he presents with concerns of this hyperglycemia and a mild headache. He states that he does not have much left arm pain after his procedure and denies eating any food yet this morning.     Review of Systems   Cardiovascular:        Elevated Glucose    Neurological: Positive for headaches.   All other systems reviewed and are negative.        Allergies:  The patient has no known allergies.     Medications:  Zyloprim  Eliquis  Aspirin 81 mg   Catapres  Plavix  Zetia  Plavix  Folvite  Lasix  Insulin Aspart & Glargine  Lopressor  Zocor  Vitamin B  Vitamin D    Past Medical History:    Alcohol Abuse  Atrial fibrillation  End stage renal disease  Diabetes, Type II  Gout  Hypokalemia  Hypophosphatemia  Insomnia  STEMI  DKA     Past Surgical History:    Left brachioaxillary arteriovenous graft creation with tapered PTFE  Coronary stent placement  Parotidectomy  MOHS Surgery     Social History:  The patient presents to the ED with daughter.    Physical Exam     Patient Vitals for the past 24 hrs:   BP Temp Temp src Pulse Resp SpO2   08/07/21 1300 (!) 166/69 -- -- 57 -- --    08/07/21 1230 (!) 159/98 -- -- 81 -- 99 %   08/07/21 1200 (!) 150/88 -- -- 69 -- 100 %   08/07/21 1145 -- -- -- -- -- 100 %   08/07/21 1130 (!) 161/86 -- -- 71 -- 100 %   08/07/21 1115 (!) 162/73 -- -- 61 -- 100 %   08/07/21 1114 -- 97.7  F (36.5  C) Oral -- -- --   08/07/21 1049 (!) 169/74 -- -- -- -- --   08/07/21 1046 -- -- Oral 70 18 99 %       Physical Exam  Constitutional:       Appearance: He is well-developed.   HENT:      Right Ear: External ear normal.      Left Ear: External ear normal.      Mouth/Throat:      Mouth: Mucous membranes are moist.      Pharynx: Oropharynx is clear. No oropharyngeal exudate or posterior oropharyngeal erythema.   Eyes:      General: No scleral icterus.     Extraocular Movements: Extraocular movements intact.      Conjunctiva/sclera: Conjunctivae normal.      Pupils: Pupils are equal, round, and reactive to light.   Cardiovascular:      Rate and Rhythm: Normal rate and regular rhythm.      Heart sounds: Normal heart sounds. No murmur heard.   No friction rub. No gallop.    Pulmonary:      Effort: Pulmonary effort is normal. No respiratory distress.      Breath sounds: Normal breath sounds. No wheezing or rales.   Abdominal:      General: Bowel sounds are normal. There is no distension.      Palpations: Abdomen is soft. There is no mass.      Tenderness: There is no abdominal tenderness.   Musculoskeletal:         General: Swelling present.      Cervical back: Normal range of motion and neck supple.      Comments: LUE swelling due to recent AV fistula surgery. No bleeding noted   Skin:     General: Skin is warm and dry.      Capillary Refill: Capillary refill takes less than 2 seconds.      Findings: No rash.   Neurological:      General: No focal deficit present.      Mental Status: He is alert.           Emergency Department Course   Laboratory:  CBC: WBC 11.2 (H), HGB 9.0 (L),     CMP: Urea Nitrogen 64 (H), Creat 4.33 (H), Ca 8.4 (L), Glucose 149 (H), Alkaline  Phosphatase 273 (H), AST 56 (H), Protein 6.1 (L), Albumin 2.1 (L), GFR 12 (L), o/w WNL    Blood Gas Venous and OxyHGB: PCO2 32 (L), OxyHemoglobin 66 (L), o/w WNL    Ketone Beta-Hydroxybutyrate: 0.1    Magnesium: 2.2    Glucose by meter (1117): 133 (H)    Glucose by meter (1147): 130 (H)    Glucose by meter (1219): 130 (H)    Glucose by meter (1252): 125 (H)    Glucose by meter (1404): 116 (H)    Emergency Department Course:  Reviewed:  I reviewed nursing notes, vitals, past medical history and care everywhere    Assessments:  1046 I performed a physical exam of the patient. Findings as above.     1105 Patient rechecked and updated.     1209 Spoke with daughter and updated her.     1313 Spoke with patient and family. Plan of care discussed and questions answered.     Consults:  1325 I spoke with Nidhi Aldana PA-C for  of the Hospitalist service regarding patient's presentation, findings, and plan of care.    Interventions:  1105 NaCl 0.9% BOLUS 500 mL IV    Disposition:  The patient was admitted to the hospital under the care of Dr. Cartagena.     Impression & Plan   Medical Decision Making:  Gordon Gann is a 76 year old male who presents from home after being discharged by [facility] yesterday. Blood sugar was elevated at home and he has received total of 26 units of insulin from family member already., We monitored his glucose very 30 minutes just to avoid any substantial drop given his insulin intake recently. Apparently he is not having insulin since he was discharged yesterday and he also ate a very large meal and was non-compliant with his diabetic diet. Daughter did come here and indicated they were having trouble trying to take care of him. They are uncomfortable giving insulin at home and also is concerned that he is unmonitored while family goes to work. I did consult social work and tried to figure out what was done at [facility] during his last admission. It sounds like they were looking at [facility] but  no beds were found so family agreed to do home health care with PT/OT Nurse that is not supposed to start until Monday which is 2 days from now. Given the surrounding situation the family is requesting admission as they do not feel they can care for him and he can't care for himself. At this point I did consult social work requiring TCU placement. Patient needs authorization which will not happen on the weekend so we have no choice other than to admit this patient. The patient was submitted to  for observation that is pending for social work evaluation and placement.     Diagnosis:    ICD-10-CM    1. Hyperglycemia  R73.9    2. Unable to care for self  Z78.9    3. Chronic kidney disease, unspecified CKD stage  N18.9      Scribe Disclosure:  I, Kareem Guan, am serving as a scribe at 10:46 AM on 8/7/2021 to document services personally performed by Ines Cardoza MD based on my observations and the provider's statements to me.     [facility]         Ines Cardoza MD  08/07/21 [id number]

## 2021-08-07 NOTE — ED NOTES
[facility]  ED Nurse Handoff Report    Gordon Gann is a 76 year old male   ED Chief complaint: Hyperglycemia  . ED Diagnosis:   Final diagnoses:   Hyperglycemia   Unable to care for self   Chronic kidney disease, unspecified CKD stage     Allergies: No Known Allergies    Code Status: DNR / DNI  Activity level - Baseline/Home:  Assist X 1. Activity Level - Current:   Assist X 1. Lift room needed: No. Bariatric: No   Needed: No   Isolation: No. Infection: Not Applicable.     Vital Signs:   Vitals:    08/07/21 1115 08/07/21 1130 08/07/21 1145 08/07/21 1200   BP: (!) 162/73 (!) 161/86  (!) 150/88   Pulse: 61 71  69   Resp:       Temp:       TempSrc:       SpO2: 100% 100% 100% 100%       Cardiac Rhythm:  ,      Pain level:    Patient confused: No. Patient Falls Risk: Yes.   Elimination Status: Has voided   Patient Report - Initial Complaint: high blood sugar. Focused Assessment: Pt with high blood sugar reading at home this morning, in 400s. Family giving Novolog [id number] PTA. Denies dizziness but c/o increase in urination.   Tests Performed: labs, BS checks. Abnormal Results: N/A.   Treatments provided: 500ml NS  Family Comments: Daughter at bedside. Additional education pamphlet provided by pharmacy regarding managing diabetes. Social work consult done also.   OBS brochure/video discussed/provided to patient:  YES  ED Medications:   Medications   0.9% sodium chloride BOLUS (0 mLs Intravenous Stopped 8/7/21 1212)     Drips infusing:  No  For the majority of the shift, the patient's behavior Green. Interventions performed were N/A.    Sepsis treatment initiated: No     Patient tested for COVID 19 prior to admission: NO-negative test 8/5/21.    ED Nurse Name/Phone Number: Doretha Brumfield RN,   1:22 PM    RECEIVING UNIT ED HANDOFF REVIEW    Above ED Nurse Handoff Report was reviewed: yes  Reviewed by: Evonne Rain RN on August 7, 2021 at 2:37 PM

## 2021-08-07 NOTE — ED TRIAGE NOTES
Pt with high blood sugar reading when waking up, 480 at home. Pt took 28 units Novolog, now 185. C/o headache also. ABC's intact, alert and oriented X3.

## 2021-08-07 NOTE — PHARMACY-ADMISSION MEDICATION HISTORY
Admission medication history interview status for this patient is complete. See [facility] admission navigator for allergy information, prior to admission medications and immunization status.     Medication history interview done, indicate source(s): Patient and Family (Omayra Daughter)  Medication history resources (including written lists, pill bottles, clinic record): [facility], [facility] and [facility]: [place], [place] - [address] [facility]      Changes made to PTA medication list:  Added: None  Changed: None  Reported as Not Taking: Oxycodone  Removed: None    Actions taken by pharmacist (provider contacted, etc): Sticky Note to provider     Additional medication history information: Patient confused about sliding insulin scale. Daughter assisted in insulin dosing.    Medication reconciliation/reorder completed by provider prior to medication history?  N   (Y/N)     For patients on insulin therapy:   Do you use sliding scale insulin based on blood sugars? Yes  What is your pre-meal insulin coverage?  5U  Do you typically eat three meals a day? Yes  How many times do you check your blood glucose per day? 2-3 times  How many episodes of hypoglycemia do you typically have per month? None  Do you have a Continuous Glucose Monitor (CGM)?  No    Prior to Admission medications    Medication Sig Last Dose Taking? Auth Provider   acetaminophen (TYLENOL) 325 MG tablet Take 1 tablet (325 mg) by mouth every 6 hours as needed for mild pain 8/6/2021 at Unknown time Yes Julia Combs MD   allopurinol (ZYLOPRIM) 100 MG tablet Take 2 tablets (200 mg) by mouth daily 8/6/2021 at AM Yes Julia Combs MD   apixaban ANTICOAGULANT (ELIQUIS) 2.5 MG tablet Take 1 tablet (2.5 mg) by mouth every morning 8/6/2021 at AM Yes Julia Combs MD   aspirin 81 MG EC tablet Take 81 mg by mouth At Bedtime 8/6/2021 at PM Yes Unknown, Entered By History   cloNIDine (CATAPRES) 0.2 MG tablet Take 0.2 mg by  mouth 2 times daily 8/6/2021 at x2 Yes Unknown, Entered By History   clopidogrel (PLAVIX) 75 MG tablet Take 75 mg by mouth every morning 8/6/2021 at AM Yes Unknown, Entered By History   ezetimibe (ZETIA) 10 MG tablet Take 10 mg by mouth every morning 8/6/2021 at AM Yes Unknown, Entered By History   fish oil-omega-3 fatty acids 1000 MG capsule Take 1 g by mouth every morning 8/6/2021 at AM Yes Unknown, Entered By History   folic acid (FOLVITE) 1 MG tablet Take 1 mg by mouth every morning 8/6/2021 at AM Yes Unknown, Entered By History   furosemide (LASIX) 20 MG tablet Take 1 tablet (20 mg) by mouth daily 8/6/2021 at AM Yes Julia Combs MD   hydrALAZINE (APRESOLINE) 25 MG tablet Take 1 tablet (25 mg) by mouth every 6 hours as needed (SBP >180) 8/6/2021 at Unknown time Yes Julia Combs MD   insulin aspart (NOVOLOG PEN) 100 UNIT/ML pen Inject 5 Units Subcutaneous 3 times daily (with meals) 8/7/2021 at Unknown time Yes Julia Combs MD   insulin aspart (NOVOLOG PEN) 100 UNIT/ML pen Inject 1-20 Units Subcutaneous 3 times daily (before meals) 8/7/2021 at Unknown time Yes Julia Combs MD   insulin glargine (LANTUS PEN) 100 UNIT/ML pen Inject 7 Units Subcutaneous At Bedtime 8/6/2021 at Unknown time Yes Julia Combs MD   metoprolol tartrate (LOPRESSOR) 50 MG tablet Take 1 tablet (50 mg) by mouth 2 times daily 8/6/2021 at x2 Yes Julia Combs MD   simvastatin (ZOCOR) 40 MG tablet Take 1 tablet (40 mg) by mouth At Bedtime 8/6/2021 at PM Yes Julia Combs MD   vitamin B complex with vitamin C (STRESS TAB) tablet Take 1 tablet by mouth every morning 8/6/2021 at AM Yes Unknown, Entered By History   Vitamin D, Cholecalciferol, 25 MCG (1000 UT) CAPS Take 1,000 Units by mouth every morning 8/6/2021 at AM Yes Unknown, Entered By History   zolpidem (AMBIEN) 5 MG tablet Take 0.5 tablets (2.5 mg) by mouth nightly as needed for sleep 8/6/2021 at PM Yes Julia Combs MD   oxyCODONE (ROXICODONE) 5 MG tablet Take 1  tablet (5 mg) by mouth every 6 hours as needed for pain  Patient not taking: Reported on 8/7/2021 Not Taking at Unknown time  Julia Combs MD

## 2021-08-07 NOTE — PLAN OF CARE
Occupational Therapy Discharge Summary    Reason for therapy discharge:    Discharged to home with home therapy.    Progress towards therapy goal(s). See goals on Care Plan in [facility] electronic health record for goal details.  Goals partially met.  Barriers to achieving goals:   discharge from facility.    Therapy recommendation(s):    Continued therapy is recommended.  Rationale/Recommendations:  home with home therapy to address deficits in ADLs and functional mobility. Last treatment session discharge instructions were to TCU, however pt went home with homecare.

## 2021-08-07 NOTE — PROGRESS NOTES
ROOM # 212-2    Living Situation (if not independent, order SW consult): Ind with daughter   Facility name:  : Daughter     Activity level at baseline: Ind  Activity level on admit: x1      Patient registered to observation; given Patient Bill of Rights; given the opportunity to ask questions about observation status and their plan of care.  Patient has been oriented to the observation room, bathroom and call light is in place.    Discussed discharge goals and expectations with patient/family.

## 2021-08-07 NOTE — PROGRESS NOTES
Called [facility] TCU. They reported they had a bed for patient, but he went home from hospital yesterday with Home Care. [facility] reported they will need a new referral with updated PT/ OT notes. Patient has BCBS[place]BCBS out of [place] medicare. Patient and daughter are aware of the authorization process and the time it may take to get into TCU. Patient and family also aware of the limited TCU beds. Family prefers [facility] or [facility] in [place], but would be open to going anywhere [facility].       ZULEMA Callejas, [facility]  , ED Care Management   [phone]

## 2021-08-07 NOTE — H&P
History and Physical     Gordon Gann MRN# [id number]   YOB: 1945 Age: 76 year old      Date of Admission:  8/7/2021    Primary care provider: Belen Prieto          Assessment and Plan:   Gordon Gann is a 76 year old male with a PMH significant for end-stage renal disease, type 2 diabetes with insulin dependence, depression, alcohol abuse, hypertension, chronic atrial fibrillation and recent admission for diabetic ketoacidosis who presents from home after being discharged from [facility] on 8/6 with inability to take care of himself at home requesting TCU placement now.    Patient was discussed with Dr. Cardoza, who was provider in ED. Chart review of ED work up was reviewed as well as chart review of Care Everywhere, previous visits and admissions.     #Failure to thrive due to multiple medical comorbidities  Patient was waiting for TCU placement and insurance authorization at but went home on 8/6 returning today with family stating he is unable to take care of himself after he forgot to take his insulin yesterday.  -PT/OT and social work consult for TCU placement    #DM II on long term insulin, uncontrolled  Recent history of non-compliance with insulin, A1C 11.7% and recent [facility] admission for DKA.  -Lantus 7 units at bedtime  -Aspart 5 units with meals  -Insulin sliding scale ordered  -We will need titration based on glucoses here      #Elevated transaminase/alk phos  Etiology not clear.  RUQ negative. MRCP negative.  - Negative - HCV, HAV IgM, HBsAg, HBsAb  - Borderline ADILSON, but negative F-actin  - AMA negative  - TTG IgA negative, normal IgA level.  - Alk phos - increased liver source  - Iron studies consistent with anemia of chronic disease. No iron overload.  - Ultrasound negative  - MRCP negative  -If LFTs worsening GI recommends referring to [facility] liver clinic     #CKD stage V  It appears that his nephrologist has been discussing dialysis (he remains undecided). [place] and does not have established nephrologist locally.  Baseline creatinine is in the upper-3 to low-4 range. Reviewed his nephrologist note from 5/4 in care  Everywhere, labs from 5/4 listed as cr of 4.16.  - creatinine is currently stable, PTA Lasix and hydralazine stopped per nephrology recommendations, currently only as needed, patient to follow-up with Dr. Mckeon on 8/24/2021. -Vascular surgery underwent AV fistula placement on 8/5, he had swelling on his arm after that for which he needs to continue compression dressings. Follow up with them as outpatient        #Chronic A-fib  #CAD s/p PCI 2006  #HTN  #HLD  Further details unknown of underlying CAD.  Pharmacy confirms the patient only takes Eliquis once daily, stating he gets a rash if he takes it twice daily. PTA aspirin, Plavix, clonidine, metoprolol and Eliquis  - PTA metoprolol decreased to 50mg BID due to low HR, clonidine to continue 0.2 mg twice daily, continue ASA, Plavix and Eliquis         #Possible cognitive impairment  #Possible depression  Daughter concerned about depression and also reports he has been mismanaging his insulin recently and has concerns regarding forgetfulness.          #Insomnia  - resume PTA Ambien at lower dose 2.5mg at bedtime prn per patient request. He reports he is not sleeping well, prescription provided for home use, use cautiously due to side effects.        #History of alcohol abuse     Social: Likely needs a higher level of care  Code: Discussed with patient and they have chosen DNR/DNI  VTE prophylaxis: Eliquis  Disposition: Observation                    Chief Complaint:   Failure to thrive         History of Present Illness:   Gordon Gann is a 76 year old male who presents from home after being discharged from [facility] yesterday.  He reports that since going home with his daughter he has not taking any of his medicines or insulin.  He reports he just forgot and his daughter did not remember either.  He  denies specific pain, shortness of breath, cough, chest discomfort, lightheadedness, dizziness, nausea, vomiting, diarrhea and constipation.  He did not smoke any cigarettes when he went home or drink alcohol.             Past Medical History:     Past Medical History:   Diagnosis Date     Alcohol abuse      Atrial fibrillation (H)      CKD (chronic kidney disease) stage 4, GFR 15-29 ml/min (H)      Diabetes (H)      Gout      Hypokalemia      Hypophosphatemia      Insomnia      STEMI (ST elevation myocardial infarction) (H)                Past Surgical History:   No past surgical history on file.            Social History:     Social History     Socioeconomic History     Marital status:      Spouse name: Not on file     Number of children: Not on file     Years of education: Not on file     Highest education level: Not on file   Occupational History     Not on file   Tobacco Use     Smoking status: Not on file   Substance and Sexual Activity     Alcohol use: Not on file     Drug use: Not on file     Sexual activity: Not on file   Other Topics Concern     Parent/sibling w/ CABG, MI or angioplasty before 65F 55M? Not Asked   Social History Narrative     Not on file     Social Determinants of Health     Financial Resource Strain:      Difficulty of Paying Living Expenses:    Food Insecurity:      Worried About Running Out of Food in the Last Year:      Ran Out of Food in the Last Year:    Transportation Needs:      Lack of Transportation (Medical):      Lack of Transportation (Non-Medical):    Physical Activity:      Days of Exercise per Week:      Minutes of Exercise per Session:    Stress:      Feeling of Stress :    Social Connections:      Frequency of Communication with Friends and Family:      Frequency of Social Gatherings with Friends and Family:      Attends Shinto Services:      Active Member of Clubs or Organizations:      Attends Club or Organization Meetings:      Marital Status:    Intimate  Partner Violence:      Fear of Current or Ex-Partner:      Emotionally Abused:      Physically Abused:      Sexually Abused:                Family History:   Family history reviewed and is non contributory         Allergies:    No Known Allergies            Medications:     Prior to Admission medications    Medication Sig Last Dose Taking? Auth Provider   acetaminophen (TYLENOL) 325 MG tablet Take 1 tablet (325 mg) by mouth every 6 hours as needed for mild pain   Julia Combs MD   allopurinol (ZYLOPRIM) 100 MG tablet Take 2 tablets (200 mg) by mouth daily   Julia Combs MD   apixaban ANTICOAGULANT (ELIQUIS) 2.5 MG tablet Take 1 tablet (2.5 mg) by mouth every morning   Julia Combs MD   aspirin 81 MG EC tablet Take 81 mg by mouth At Bedtime   Unknown, Entered By History   cloNIDine (CATAPRES) 0.2 MG tablet Take 0.2 mg by mouth 2 times daily   Unknown, Entered By History   clopidogrel (PLAVIX) 75 MG tablet Take 75 mg by mouth every morning   Unknown, Entered By History   ezetimibe (ZETIA) 10 MG tablet Take 10 mg by mouth every morning   Unknown, Entered By History   fish oil-omega-3 fatty acids 1000 MG capsule Take 1 g by mouth every morning   Unknown, Entered By History   folic acid (FOLVITE) 1 MG tablet Take 1 mg by mouth every morning   Unknown, Entered By History   furosemide (LASIX) 20 MG tablet Take 1 tablet (20 mg) by mouth daily   Julia Combs MD   hydrALAZINE (APRESOLINE) 25 MG tablet Take 1 tablet (25 mg) by mouth every 6 hours as needed (SBP >180)   Julia Combs MD   insulin aspart (NOVOLOG PEN) 100 UNIT/ML pen Inject 5 Units Subcutaneous 3 times daily (with meals)   Julia Combs MD   insulin aspart (NOVOLOG PEN) 100 UNIT/ML pen Inject 1-20 Units Subcutaneous 3 times daily (before meals)   Julia Combs MD   insulin glargine (LANTUS PEN) 100 UNIT/ML pen Inject 7 Units Subcutaneous At Bedtime   Julia Combs MD   metoprolol tartrate (LOPRESSOR) 50 MG tablet Take 1 tablet (50 mg)  by mouth 2 times daily   Julia Combs MD   oxyCODONE (ROXICODONE) 5 MG tablet Take 1 tablet (5 mg) by mouth every 6 hours as needed for pain   Julia Combs MD   simvastatin (ZOCOR) 40 MG tablet Take 1 tablet (40 mg) by mouth At Bedtime   Julia Combs MD   vitamin B complex with vitamin C (STRESS TAB) tablet Take 1 tablet by mouth every morning   Unknown, Entered By History   Vitamin D, Cholecalciferol, 25 MCG (1000 UT) CAPS Take 1,000 Units by mouth every morning   Unknown, Entered By History   zolpidem (AMBIEN) 5 MG tablet Take 0.5 tablets (2.5 mg) by mouth nightly as needed for sleep   Julia Combs MD              Review of Systems:   A Comprehensive greater than 10 system review of systems was carried out.  Pertinent positives and negatives are noted above.  Otherwise negative for contributory information.            Physical Exam:   Blood pressure (!) 158/69, pulse 65, temperature 97.7  F (36.5  C), temperature source Oral, resp. rate 18, SpO2 99 %.  Exam:  GENERAL:  Comfortable.  PSYCH: flat affect, oriented, No acute distress.  HEENT:  PERRLA. Normal conjunctiva, normal hearing, nasal mucosa and Oropharynx are normal.  NECK:  Supple, no neck vein distention, adenopathy or bruits, normal thyroid.  HEART:  Normal S1, S2 with no murmur, no pericardial rub, gallops or S3 or S4.  LUNGS:  Clear to auscultation, normal Respiratory effort. No wheezing, rales or ronchi.  ABDOMEN:  Soft, no hepatosplenomegaly, normal bowel sounds. Non-tender, non distended.   EXTREMITIES:  No pedal edema, +2 pulses bilateral and equal.  SKIN:  Dry to touch, No rash, wound or ulcerations.  NEUROLOGIC:  CN 2-12 grossly intact,  sensation is intact with no focal deficits.               Data:     Recent Labs   Lab 08/07/21  1103   WBC 11.2*   HGB 9.0*   HCT 27.7*   MCV 89        Recent Labs   Lab 08/07/21  1404 08/07/21  1252 08/07/21  1219 08/07/21  1103 08/05/21  1413 08/04/21  0701 08/03/21  1403 08/03/21  1354  08/03/21  0512 08/02/21  1212   NA  --   --   --  138  --  138  --   --  139  --    POTASSIUM  --   --   --  3.9 3.7 3.8   < > 4.2 3.1*  --    CHLORIDE  --   --   --  107  --  108  --   --  106  --    CO2  --   --   --  21  --  24  --   --  25  --    ANIONGAP  --   --   --  10  --  6  --   --  8  --    * 125* 130* 149* 124* 75  --   --  46*  --    BUN  --   --   --  64*  --  62*  --   --  68*  --    CR  --   --   --  4.33*  --  4.23*  --   --  4.49*  --    GFRESTIMATED  --   --   --  12*  --  13*  --   --  12*  --    LC  --   --   --  8.4*  --  8.2*  --   --  8.6  --    MAG  --   --   --  2.2  --   --   --  2.3  --   --    PHOS  --   --   --   --   --   --   --   --   --  6.2*   PROTTOTAL  --   --   --  6.1*  --  5.3*  --   --  6.5*  --    ALBUMIN  --   --   --  2.1*  --  1.9*  --   --  2.4*  --    BILITOTAL  --   --   --  0.3  --  0.3  --   --  0.2  --    ALKPHOS  --   --   --  273*  --  226*  --   --  298*  --    AST  --   --   --  56*  --  68*  --   --  128*  --    ALT  --   --   --  51  --  118*  --   --  189*  --     < > = values in this interval not displayed.     No results for input(s): COLOR, APPEARANCE, URINEGLC, URINEBILI, URINEKETONE, SG, UBLD, URINEPH, PROTEIN, UROBILINOGEN, NITRITE, LEUKEST, RBCU, WBCU in the last 168 hours.      No results found for this or any previous visit (from the past 24 hour(s)).      Nidhi Aldana PA-C

## 2021-08-07 NOTE — ED NOTES
Care Management Initial Consult    General Information  Assessment completed with: Patient, Children (Daughter Omayra), Patient and Daughter Omayra  Type of CM/SW Visit: Offer D/C Planning    Primary Care Provider verified and updated as needed: Yes (Barry in [place])   Readmission within the last 30 days: previous discharge plan unsuccessful   Return Category: Medically unsuccessful treatment plan  Reason for Consult: discharge planning  Advance Care Planning:            Communication Assessment  Patient's communication style: spoken language (English or Bilingual)             Cognitive  Cognitive/Neuro/Behavioral: WDL                      Living Environment:   People in home: child(shirley), adult  Omayra  Current living Arrangements: house      Able to return to prior arrangements: other (see comments)  Living Arrangement Comments: TBD- Can return home but would like TCU    Family/Social Support:  Care provided by: self, homecare agency  Provides care for: no one     Children          Description of Support System: Supportive, Involved    Support Assessment: Adequate family and caregiver support    Current Resources:   Patient receiving home care services:       Community Resources:    Equipment currently used at home:    Supplies currently used at home:      Employment/Financial:  Employment Status:          Financial Concerns:             Lifestyle & Psychosocial Needs:  Social Determinants of Health     Tobacco Use:      Smoking Tobacco Use:      Smokeless Tobacco Use:    Alcohol Use:      Frequency of Alcohol Consumption:      Average Number of Drinks:      Frequency of Binge Drinking:    Financial Resource Strain:      Difficulty of Paying Living Expenses:    Food Insecurity:      Worried About Running Out of Food in the Last Year:      Ran Out of Food in the Last Year:    Transportation Needs:      Lack of Transportation (Medical):      Lack of Transportation (Non-Medical):    Physical Activity:      Days of  Exercise per Week:      Minutes of Exercise per Session:    Stress:      Feeling of Stress :    Social Connections:      Frequency of Communication with Friends and Family:      Frequency of Social Gatherings with Friends and Family:      Attends Jainism Services:      Active Member of Clubs or Organizations:      Attends Club or Organization Meetings:      Marital Status:    Intimate Partner Violence:      Fear of Current or Ex-Partner:      Emotionally Abused:      Physically Abused:      Sexually Abused:    Depression:      PHQ-2 Score:    Housing Stability:      Unable to Pay for Housing in the Last Year:      Number of Places Lived in the Last Year:      Unstable Housing in the Last Year:        Functional Status:  Prior to admission patient needed assistance:   Dependent ADLs:: Dressing, Bathing  Dependent IADLs:: Cleaning, Cooking, Laundry, Shopping, Medication Management  Assesssment of Functional Status: Not at baseline with ADL Functioning, Not at  functional baseline, Needs assistance with dressing, Needs assistance with bathing, Needs assistance with medications, Needs assistance with laundry/houskeeping    Mental Health Status:          Chemical Dependency Status:                Values/Beliefs:  Spiritual, Cultural Beliefs, Jainism Practices, Values that affect care:                 Additional Information:  Met with patient and patient's daughter Omayra after Dr. Cardoza requested consult.     Patient was discharged from hospital yesterday. Daughter stated they wanted TCU but there were no beds available, so they went home with Home Care. Daughter believed patient was ordered OT, PT, and nurse, but did not know what Home Care agency it was.     Patient is home bound.     Daughter reported her father has been pretty weak and feels like the best plan for her father is to go to TCU. She would prefer [facility] in [place] or [facility], but also noted she is open to anywhere in the [facility].      Patient is supposed to move to independent living facility on 8/22/21 at [facility] in [place].     ZULEMA Callejas, [facility]  , ED Care Management   [phone]      Shelby Hector

## 2021-08-07 NOTE — PROGRESS NOTES
PRIMARY DIAGNOSIS: GENERALIZED WEAKNESS    OUTPATIENT/OBSERVATION GOALS TO BE MET BEFORE DISCHARGE  1. Orthostatic performed: N/A    2. Tolerating PO medications: Yes    3. Return to near baseline physical activity: No    4. Cleared for discharge by consultants (if involved): No- PT/OT/SW    Discharge Planner Nurse   Safe discharge environment identified: Yes  Barriers to discharge: Yes       Entered by: Evonne Rain 08/07/2021 5:06 PM     Please review provider order for any additional goals.   Nurse to notify provider when observation goals have been met and patient is ready for discharge.    3/10 left arm pain- prn tylenol given. Alert and oriented. Up x1-2 with gb. PT/OT/SW consulted. Using urinal in bed. Lives with daughter baseline. Renal diet ordered. Pt has fistula on left arm- not using this stay. Left arm swollen and painful. SL.

## 2021-08-07 NOTE — PROGRESS NOTES
SADE mcfarland for pt not to be swabbed for covid prior to admission. Patient was covid negative 2 days ago and in epic.

## 2021-08-07 NOTE — ED NOTES
Bed: ED19  Expected date: 8/7/21  Expected time: 10:36 AM  Means of arrival: Ambulance  Comments:  BV2

## 2021-08-08 ENCOUNTER — APPOINTMENT (OUTPATIENT)
Dept: PHYSICAL THERAPY | Facility: CLINIC | Age: 76
End: 2021-08-08
Attending: PHYSICIAN ASSISTANT
Payer: COMMERCIAL

## 2021-08-08 ENCOUNTER — APPOINTMENT (OUTPATIENT)
Dept: OCCUPATIONAL THERAPY | Facility: CLINIC | Age: 76
End: 2021-08-08
Attending: PHYSICIAN ASSISTANT
Payer: COMMERCIAL

## 2021-08-08 LAB
ALBUMIN UR-MCNC: 300 MG/DL
AMORPH CRY #/AREA URNS HPF: ABNORMAL /HPF
ANION GAP SERPL CALCULATED.3IONS-SCNC: 9 MMOL/L (ref 3–14)
APPEARANCE UR: ABNORMAL
BACTERIA #/AREA URNS HPF: ABNORMAL /HPF
BILIRUB UR QL STRIP: NEGATIVE
BUN SERPL-MCNC: 56 MG/DL (ref 7–30)
CALCIUM SERPL-MCNC: 8.5 MG/DL (ref 8.5–10.1)
CHLORIDE BLD-SCNC: 107 MMOL/L (ref 94–109)
CO2 SERPL-SCNC: 19 MMOL/L (ref 20–32)
COLOR UR AUTO: ABNORMAL
CREAT SERPL-MCNC: 4.01 MG/DL (ref 0.66–1.25)
ERYTHROCYTE [DISTWIDTH] IN BLOOD BY AUTOMATED COUNT: 14.1 % (ref 10–15)
GFR SERPL CREATININE-BSD FRML MDRD: 14 ML/MIN/1.73M2
GLUCOSE BLD-MCNC: 237 MG/DL (ref 70–99)
GLUCOSE BLDC GLUCOMTR-MCNC: 215 MG/DL (ref 70–99)
GLUCOSE BLDC GLUCOMTR-MCNC: 251 MG/DL (ref 70–99)
GLUCOSE BLDC GLUCOMTR-MCNC: 264 MG/DL (ref 70–99)
GLUCOSE BLDC GLUCOMTR-MCNC: 282 MG/DL (ref 70–99)
GLUCOSE BLDC GLUCOMTR-MCNC: 301 MG/DL (ref 70–99)
GLUCOSE UR STRIP-MCNC: 1000 MG/DL
GRANULAR CAST: 5 /LPF
HCT VFR BLD AUTO: 29.2 % (ref 40–53)
HGB BLD-MCNC: 9.6 G/DL (ref 13.3–17.7)
HGB UR QL STRIP: ABNORMAL
HYALINE CASTS: 9 /LPF
KETONES UR STRIP-MCNC: NEGATIVE MG/DL
LACTATE SERPL-SCNC: 1 MMOL/L (ref 0.7–2)
LEUKOCYTE ESTERASE UR QL STRIP: NEGATIVE
MCH RBC QN AUTO: 29 PG (ref 26.5–33)
MCHC RBC AUTO-ENTMCNC: 32.9 G/DL (ref 31.5–36.5)
MCV RBC AUTO: 88 FL (ref 78–100)
MUCOUS THREADS #/AREA URNS LPF: PRESENT /LPF
NITRATE UR QL: NEGATIVE
PH UR STRIP: 6 [PH] (ref 5–7)
PLATELET # BLD AUTO: 341 10E3/UL (ref 150–450)
POTASSIUM BLD-SCNC: 4.3 MMOL/L (ref 3.4–5.3)
RBC # BLD AUTO: 3.31 10E6/UL (ref 4.4–5.9)
RBC URINE: 5 /HPF
SODIUM SERPL-SCNC: 135 MMOL/L (ref 133–144)
SP GR UR STRIP: 1.02 (ref 1–1.03)
SQUAMOUS EPITHELIAL: <1 /HPF
UROBILINOGEN UR STRIP-MCNC: NORMAL MG/DL
WBC # BLD AUTO: 15.7 10E3/UL (ref 4–11)
WBC URINE: 1 /HPF

## 2021-08-08 PROCEDURE — 85027 COMPLETE CBC AUTOMATED: CPT | Performed by: PHYSICIAN ASSISTANT

## 2021-08-08 PROCEDURE — 80048 BASIC METABOLIC PNL TOTAL CA: CPT | Performed by: PHYSICIAN ASSISTANT

## 2021-08-08 PROCEDURE — 99226 PR SUBSEQUENT OBSERVATION CARE,LEVEL III: CPT | Performed by: PHYSICIAN ASSISTANT

## 2021-08-08 PROCEDURE — 81001 URINALYSIS AUTO W/SCOPE: CPT | Performed by: PHYSICIAN ASSISTANT

## 2021-08-08 PROCEDURE — 97116 GAIT TRAINING THERAPY: CPT | Mod: GP | Performed by: PHYSICAL THERAPIST

## 2021-08-08 PROCEDURE — G0378 HOSPITAL OBSERVATION PER HR: HCPCS

## 2021-08-08 PROCEDURE — 83605 ASSAY OF LACTIC ACID: CPT | Performed by: PHYSICIAN ASSISTANT

## 2021-08-08 PROCEDURE — 250N000013 HC RX MED GY IP 250 OP 250 PS 637: Performed by: PHYSICIAN ASSISTANT

## 2021-08-08 PROCEDURE — 250N000012 HC RX MED GY IP 250 OP 636 PS 637: Performed by: PHYSICIAN ASSISTANT

## 2021-08-08 PROCEDURE — 96372 THER/PROPH/DIAG INJ SC/IM: CPT | Performed by: PHYSICIAN ASSISTANT

## 2021-08-08 PROCEDURE — 97161 PT EVAL LOW COMPLEX 20 MIN: CPT | Mod: GP | Performed by: PHYSICAL THERAPIST

## 2021-08-08 PROCEDURE — 36415 COLL VENOUS BLD VENIPUNCTURE: CPT | Performed by: PHYSICIAN ASSISTANT

## 2021-08-08 PROCEDURE — 97165 OT EVAL LOW COMPLEX 30 MIN: CPT | Mod: GO | Performed by: OCCUPATIONAL THERAPIST

## 2021-08-08 PROCEDURE — 97530 THERAPEUTIC ACTIVITIES: CPT | Mod: GO | Performed by: OCCUPATIONAL THERAPIST

## 2021-08-08 RX ADMIN — APIXABAN 2.5 MG: 2.5 TABLET, FILM COATED ORAL at 09:20

## 2021-08-08 RX ADMIN — ACETAMINOPHEN 650 MG: 325 TABLET, FILM COATED ORAL at 16:13

## 2021-08-08 RX ADMIN — CLONIDINE HYDROCHLORIDE 0.2 MG: 0.1 TABLET ORAL at 22:00

## 2021-08-08 RX ADMIN — FUROSEMIDE 20 MG: 20 TABLET ORAL at 09:19

## 2021-08-08 RX ADMIN — EZETIMIBE 10 MG: 10 TABLET ORAL at 09:20

## 2021-08-08 RX ADMIN — METOPROLOL TARTRATE 50 MG: 50 TABLET, FILM COATED ORAL at 22:00

## 2021-08-08 RX ADMIN — CLONIDINE HYDROCHLORIDE 0.2 MG: 0.1 TABLET ORAL at 09:20

## 2021-08-08 RX ADMIN — ACETAMINOPHEN 650 MG: 325 TABLET, FILM COATED ORAL at 09:20

## 2021-08-08 RX ADMIN — CLOPIDOGREL BISULFATE 75 MG: 75 TABLET ORAL at 09:20

## 2021-08-08 RX ADMIN — ALLOPURINOL 200 MG: 100 TABLET ORAL at 09:21

## 2021-08-08 RX ADMIN — ASPIRIN 81 MG: 81 TABLET ORAL at 22:01

## 2021-08-08 RX ADMIN — INSULIN ASPART 5 UNITS: 100 INJECTION, SOLUTION INTRAVENOUS; SUBCUTANEOUS at 11:12

## 2021-08-08 RX ADMIN — INSULIN GLARGINE 10 UNITS: 100 INJECTION, SOLUTION SUBCUTANEOUS at 21:58

## 2021-08-08 RX ADMIN — ACETAMINOPHEN 650 MG: 325 TABLET, FILM COATED ORAL at 22:00

## 2021-08-08 RX ADMIN — INSULIN ASPART 5 UNITS: 100 INJECTION, SOLUTION INTRAVENOUS; SUBCUTANEOUS at 14:00

## 2021-08-08 RX ADMIN — METOPROLOL TARTRATE 50 MG: 50 TABLET, FILM COATED ORAL at 09:20

## 2021-08-08 RX ADMIN — FOLIC ACID 1 MG: 1 TABLET ORAL at 09:21

## 2021-08-08 ASSESSMENT — ACTIVITIES OF DAILY LIVING (ADL): PREVIOUS_RESPONSIBILITIES: MEAL PREP;HOUSEKEEPING;LAUNDRY;SHOPPING;MEDICATION MANAGEMENT;DRIVING

## 2021-08-08 NOTE — PROGRESS NOTES
[facility]    Hospitalist Progress Note  Name: Gordon Gann    MRN: [id number]  Provider:  Daniela Valentine PA-C  Date of Service: 08/08/2021    Assessment & Plan   Summary of Stay: Gordon Gann is a 76 year old male with a PMH significant for end-stage renal disease, type 2 diabetes with insulin dependence, depression, alcohol abuse, hypertension, chronic atrial fibrillation and recent admission for diabetic ketoacidosis who presents from home on 8/7/21 after being discharged from [facility] on 8/6 with inability to take care of himself at home requesting TCU placement now.    #Failure to thrive due to multiple medical comorbidities  Patient was waiting for TCU placement and insurance authorization at [facility] and per patient was declined. He ultimately went home on 8/6 with home care and was readmitted on 8/7 due to family's inability to care for him.   -PT consult, appreciate recommendation  -OT consult, recommend TCU  -SW consulted to assist with discharge planning      #Weakness, low grade fever: patient reports increased weakness and lethargy today along with low grade fever of 100.7 and increased leukocytosis of 15.7 today. Denies urinary symptoms. He has an occasional cough which is reportedly his baseline. Recent AV fistula does not appear to have surrounding erythema.   - check UA  - monitor fever trend  - per RN patient appears to be coughing with PO intake at times, will monitor and consider speech evaluation  - if ongoing fever with cough, would consider checking CXR     #DM II on long term insulin, uncontrolled  Recent history of non-compliance with insulin, A1C 11.7% and recent [facility] admission for DKA. Blood glucose levels elevated in the 200s over the last day.  -Lantus 10 units at bedtime (increase from 7)  -Aspart 6 units with meals (increase from 6)  -Medium insulin sliding scale ordered  -Will continue to titrate insulin as appropriate while admitted      #Elevated  transaminase/alk phos  Etiology not clear. RUQ negative. MRCP negative.  - Negative - HCV, HAV IgM, HBsAg, HBsAb  - Borderline ADILSON, but negative F-actin  - AMA negative  - TTG IgA negative, normal IgA level.  - Alk phos - increased liver source  - Iron studies consistent with anemia of chronic disease. No iron overload.  - RUQ Ultrasound negative  - MRCP negative  - If LFTs worsening GI recommends referring to [facility] liver clinic, stable on 8/7     #CKD stage V  Previous followed with nephrologist in [place] and had discussed possible dialysis which patient had been undecided about as of 05/2021.  Baseline creatinine is in the upper-3 to low-4 range. Per nephology note on 5/17/21 patient's creatinine was 4.16.  - creatinine is currently stable at 4.01 today  - nephrology decreased PTA Lasix to 20 mg daily and Hydralazine was stopped during most recent hospital stay  - scheduled follow-up with Dr. Mckeon on 8/24/2021  - s/p AV fistula placement on 8/5 by vascular surgery, he had swelling on his arm after that for which he needs to continue compression dressing; keep planned f/u with vascular surgery as outpatient    #Chronic A-fib  #CAD s/p PCI 2006  #HTN  #HLD  Further details unknown of underlying CAD.  Pharmacy confirms the patient only takes Eliquis once daily, stating he gets a rash if he takes it twice daily. PTA aspirin, Plavix, clonidine, metoprolol and Eliquis  - PTA metoprolol decreased to 50mg BID due to low HR, clonidine to continue 0.2 mg twice daily, continue ASA, Plavix and Eliquis      #Possible cognitive impairment  #Possible depression  Daughter concerned about depression and also reports he has been mismanaging his insulin recently and has concerns regarding forgetfulness.       #Insomnia  - resume PTA Ambien at lower dose 2.5mg at bedtime prn per patient request. He reports he is not sleeping well, prescription provided for home use, use cautiously due to side effects.     #History of alcohol  abuse    DVT Prophylaxis: on Eliquis   Code Status: No CPR- Do NOT Intubate  Disposition: Expected discharge in 1-2 days pending safe discharge plan     Daniela Valentine PA-C  [facility]    Interval History   Patient reports feeling weaker and more lethargic today.  He states he got little sleep yesterday and believes that may be the reason.  He reports an occasional cough which is his baseline.  Denies dysuria, urinary urgency, but does have baseline frequency.  He notes feeling lightheaded with standing.  Denies feeling chilled or diaphoretic with low-grade fever earlier today.     -Data reviewed today: I reviewed all new labs and imaging reports over the last 24 hours.    Physical Exam   Temp: 98.3  F (36.8  C) Temp src: Oral BP: 131/55 Pulse: 54   Resp: 18 SpO2: 97 % O2 Device: None (Room air)    There were no vitals filed for this visit.  Vital Signs with Ranges  Temp:  [97.9  F (36.6  C)-100.7  F (38.2  C)] 98.3  F (36.8  C)  Pulse:  [54-94] 54  Resp:  [18-22] 18  BP: (131-173)/(55-79) 131/55  SpO2:  [96 %-99 %] 97 %  I/O last 3 completed shifts:  In: 480 [P.O.:480]  Out: 950 [Urine:950]    GEN:  Alert, oriented x 3, appears comfortable sitting up in chair, NAD.  HEENT:  Normocephalic/atraumatic, no scleral icterus, no nasal discharge, mouth moist.  CV: Regular rate and rhythm, no murmur or JVD.  S1 + S2 noted, no S3 or S4.  LUNGS:  Clear to auscultation bilaterally without rales/rhonchi/wheezing/retractions.  Symmetric chest rise on inhalation noted.  ABD:  Active bowel sounds, soft, non-tender/non-distended.  No rebound/guarding/rigidity.  EXT:  No edema.  No cyanosis.  No acute joint synovitis noted. AV fistula to left forearm without surrounding erythema or warmth to touch.  SKIN:  Dry to touch, no exanthems noted in the visualized areas.  PSYCH: flat affect     Medications       allopurinol  200 mg Oral Daily     apixaban ANTICOAGULANT  2.5 mg Oral QAM     aspirin  81 mg Oral At Bedtime     cloNIDine   0.2 mg Oral BID     clopidogrel  75 mg Oral QAM     ezetimibe  10 mg Oral QAM     folic acid  1 mg Oral QAM     furosemide  20 mg Oral Daily     insulin aspart  1-7 Units Subcutaneous TID AC     insulin aspart  1-5 Units Subcutaneous At Bedtime     insulin aspart  5 Units Subcutaneous TID w/meals     insulin glargine  7 Units Subcutaneous At Bedtime     metoprolol tartrate  50 mg Oral BID     sodium chloride (PF)  3 mL Intracatheter Q8H     Data   Results for orders placed or performed during the hospital encounter of 08/07/21   CBC with platelets differential     Status: Abnormal    Narrative    The following orders were created for panel order CBC with platelets differential.  Procedure                               Abnormality         Status                     ---------                               -----------         ------                     CBC with platelets and d...[[id number]]  Abnormal            Final result                 Please view results for these tests on the individual orders.   Comprehensive metabolic panel     Status: Abnormal   Result Value Ref Range    Sodium 138 133 - 144 mmol/L    Potassium 3.9 3.4 - 5.3 mmol/L    Chloride 107 94 - 109 mmol/L    Carbon Dioxide (CO2) 21 20 - 32 mmol/L    Anion Gap 10 3 - 14 mmol/L    Urea Nitrogen 64 (H) 7 - 30 mg/dL    Creatinine 4.33 (H) 0.66 - 1.25 mg/dL    Calcium 8.4 (L) 8.5 - 10.1 mg/dL    Glucose 149 (H) 70 - 99 mg/dL    Alkaline Phosphatase 273 (H) 40 - 150 U/L    AST 56 (H) 0 - 45 U/L    ALT 51 0 - 70 U/L    Protein Total 6.1 (L) 6.8 - 8.8 g/dL    Albumin 2.1 (L) 3.4 - 5.0 g/dL    Bilirubin Total 0.3 0.2 - 1.3 mg/dL    GFR Estimate 12 (L) >60 mL/min/1.73m2   Blood gas venous and oxyhgb     Status: Abnormal   Result Value Ref Range    pH Venous 7.43 7.32 - 7.43    pCO2 Venous 32 (L) 40 - 50 mm Hg    pO2 Venous 34 25 - 47 mm Hg    Bicarbonate Venous 21 21 - 28 mmol/L    FIO2 0     Oxyhemoglobin Venous 66 (L) 70 - 75 %    Base Excess/Deficit (+/-)  -2.5 -7.7 - 1.9 mmol/L   Ketone Beta-Hydroxybutyrate Quantitative     Status: Normal   Result Value Ref Range    Ketone (Beta-Hydroxybutyrate) Quantitative 0.1 0.0 - 0.6 mmol/L   Magnesium     Status: Normal   Result Value Ref Range    Magnesium 2.2 1.6 - 2.3 mg/dL   CBC with platelets and differential     Status: Abnormal   Result Value Ref Range    WBC Count 11.2 (H) 4.0 - 11.0 10e3/uL    RBC Count 3.11 (L) 4.40 - 5.90 10e6/uL    Hemoglobin 9.0 (L) 13.3 - 17.7 g/dL    Hematocrit 27.7 (L) 40.0 - 53.0 %    MCV 89 78 - 100 fL    MCH 28.9 26.5 - 33.0 pg    MCHC 32.5 31.5 - 36.5 g/dL    RDW 14.2 10.0 - 15.0 %    Platelet Count 305 150 - 450 10e3/uL    % Neutrophils 64 %    % Lymphocytes 22 %    % Monocytes 13 %    % Eosinophils 1 %    % Basophils 0 %    % Immature Granulocytes 0 %    NRBCs per 100 WBC 0 <1 /100    Absolute Neutrophils 7.0 1.6 - 8.3 10e3/uL    Absolute Lymphocytes 2.4 0.8 - 5.3 10e3/uL    Absolute Monocytes 1.5 (H) 0.0 - 1.3 10e3/uL    Absolute Eosinophils 0.1 0.0 - 0.7 10e3/uL    Absolute Basophils 0.0 0.0 - 0.2 10e3/uL    Absolute Immature Granulocytes 0.0 <=0.0 10e3/uL    Absolute NRBCs 0.0 10e3/uL   Extra Red Top Tube     Status: None   Result Value Ref Range    Hold Specimen [facility]    Glucose by meter     Status: Abnormal   Result Value Ref Range    GLUCOSE BY METER POCT 133 (H) 70 - 99 mg/dL   Glucose by meter     Status: Abnormal   Result Value Ref Range    GLUCOSE BY METER POCT 130 (H) 70 - 99 mg/dL   Glucose by meter     Status: Abnormal   Result Value Ref Range    GLUCOSE BY METER POCT 130 (H) 70 - 99 mg/dL   Glucose by meter     Status: Abnormal   Result Value Ref Range    GLUCOSE BY METER POCT 125 (H) 70 - 99 mg/dL   Glucose by meter     Status: Abnormal   Result Value Ref Range    GLUCOSE BY METER POCT 116 (H) 70 - 99 mg/dL   Glucose by meter     Status: Abnormal   Result Value Ref Range    GLUCOSE BY METER POCT 121 (H) 70 - 99 mg/dL   Glucose by meter     Status: Abnormal   Result Value Ref  Range    GLUCOSE BY METER POCT 147 (H) 70 - 99 mg/dL   Glucose by meter     Status: Abnormal   Result Value Ref Range    GLUCOSE BY METER POCT 287 (H) 70 - 99 mg/dL   Basic metabolic panel     Status: Abnormal   Result Value Ref Range    Sodium 135 133 - 144 mmol/L    Potassium 4.3 3.4 - 5.3 mmol/L    Chloride 107 94 - 109 mmol/L    Carbon Dioxide (CO2) 19 (L) 20 - 32 mmol/L    Anion Gap 9 3 - 14 mmol/L    Urea Nitrogen 56 (H) 7 - 30 mg/dL    Creatinine 4.01 (H) 0.66 - 1.25 mg/dL    Calcium 8.5 8.5 - 10.1 mg/dL    Glucose 237 (H) 70 - 99 mg/dL    GFR Estimate 14 (L) >60 mL/min/1.73m2   CBC with platelets     Status: Abnormal   Result Value Ref Range    WBC Count 15.7 (H) 4.0 - 11.0 10e3/uL    RBC Count 3.31 (L) 4.40 - 5.90 10e6/uL    Hemoglobin 9.6 (L) 13.3 - 17.7 g/dL    Hematocrit 29.2 (L) 40.0 - 53.0 %    MCV 88 78 - 100 fL    MCH 29.0 26.5 - 33.0 pg    MCHC 32.9 31.5 - 36.5 g/dL    RDW 14.1 10.0 - 15.0 %    Platelet Count 341 150 - 450 10e3/uL   Glucose by meter     Status: Abnormal   Result Value Ref Range    GLUCOSE BY METER POCT 215 (H) 70 - 99 mg/dL   Glucose by meter     Status: Abnormal   Result Value Ref Range    GLUCOSE BY METER POCT 251 (H) 70 - 99 mg/dL   Lactic Acid STAT     Status: Normal   Result Value Ref Range    Lactic Acid 1.0 0.7 - 2.0 mmol/L   Glucose by meter     Status: Abnormal   Result Value Ref Range    GLUCOSE BY METER POCT 282 (H) 70 - 99 mg/dL   Care Management / Social Work IP Consult     Status: None ()    Narrative    Shelby Hector     8/7/2021 11:50 AM  See ED Notes for SW Care Management Consult.    Extra Tube (Morrison Draw)     Status: None    Narrative    The following orders were created for panel order Extra Tube (Morrison Draw).  Procedure                               Abnormality         Status                     ---------                               -----------         ------                     Extra Red Top Tube[[id number]]                               Final result                  Please view results for these tests on the individual orders.

## 2021-08-08 NOTE — PROGRESS NOTES
08/08/21 1437   Quick Adds   Type of Visit Initial PT Evaluation   Living Environment   Living Environment Comments Pt lives in a home with daughter since move to [place]. 2 JEFF with grab bar. Pt use of walker at times.    Self-Care   Usual Activity Tolerance good   Current Activity Tolerance moderate   Equipment Currently Used at Home cane, straight;walker, standard   Disability/Function   Fall history within last six months yes   Number of times patient has fallen within last six months 1   General Information   Onset of Illness/Injury or Date of Surgery 08/07/21   Referring Physician Nidhi Aldana PA-C   Patient/Family Therapy Goals Statement (PT) To improve mobility to return home   Pertinent History of Current Problem (include personal factors and/or comorbidities that impact the POC) Pt is a 76 year old male with a PMH significant for end-stage renal disease, type 2 diabetes with insulin dependence, depression, alcohol abuse, hypertension, chronic atrial fibrillation and recent admission for diabetic ketoacidosis who presents from home on 8/7/21 after being discharged from [facility] on 8/6 with inability to take care of himself at home requesting TCU placement now.   Existing Precautions/Restrictions fall   Weight-Bearing Status - LLE full weight-bearing   Weight-Bearing Status - RLE full weight-bearing   Cognition   Orientation Status (Cognition) oriented x 3   Affect/Mental Status (Cognition) WFL   Follows Commands (Cognition) WFL   Pain Assessment   Patient Currently in Pain No   Range of Motion (ROM)   ROM Comment B LE WFL   Strength   Strength Comments B LE functionally demonstrated >3/5 strength; grossly decreased strength/activity tolerance   Bed Mobility   Comment (Bed Mobility) [place]y supine <> sit   Transfers   Transfer Safety Comments CGA sit <> stand   Gait/Stairs (Locomotion)   Pattern (Gait) step-through   Deviations/Abnormal Patterns (Gait) base of support, wide;gait speed  decreased  (lateral path deviation)   Comment (Gait/Stairs) CGA with FWW   Balance   Balance Comments fair sitting balance; fair standing balance w/o UE support   Clinical Impression   Criteria for Skilled Therapeutic Intervention yes, treatment indicated   PT Diagnosis (PT) impaired functional mobilit   Influenced by the following impairments impaired balance, B LE functional strength   Functional limitations due to impairments impaired bed mobility, transfers, ambulation, stairs   Clinical Presentation Stable/Uncomplicated   Clinical Presentation Rationale Pt is medically stable   Clinical Decision Making (Complexity) low complexity   Therapy Frequency (PT) 5x/week   Predicted Duration of Therapy Intervention (days/wks) 3 days   Planned Therapy Interventions (PT) balance training;bed mobility training;gait training;home exercise program;patient/family education;stair training;strengthening;transfer training   Anticipated Equipment Needs at Discharge (PT) walker, rolling   Risk & Benefits of therapy have been explained evaluation/treatment results reviewed;care plan/treatment goals reviewed;risks/benefits reviewed;current/potential barriers reviewed;participants voiced agreement with care plan;participants included;patient   PT Discharge Planning    PT Discharge Recommendation (DC Rec) Transitional Care Facility   PT Rationale for DC Rec Patient below baseline level of mobility, would benefit from continued skilled PT services in TCU setting to address functional impairments.    Total Evaluation Time   Total Evaluation Time (Minutes) 5

## 2021-08-08 NOTE — PLAN OF CARE
PRIMARY DIAGNOSIS: General weakness  OUTPATIENT/OBSERVATION GOALS TO BE MET BEFORE DISCHARGE:  1. Vitals stable or at baseline : HTN     2. Tolerating PO medications: Yes     3. Return to near baseline physical activity: No - A1 w/ walker and belt     4. Cleared for discharge by consultants and safe plan (if involved): No- PT/OT/SW    Discharge Planner Nurse   Safe discharge environment identified: No - waiting for placement  Barriers to discharge: Yes       Entered by: Collin Huang 08/08/2021 12:14 AM     Please review provider order for any additional goals.   Nurse to notify provider when observation goals have been met and patient is ready for discharge.    Upper L arm pain 5/10. Forgetful as night went on. A1 w/ walker. Renal diet.

## 2021-08-08 NOTE — PROGRESS NOTES
PRIMARY DIAGNOSIS: GENERALIZED WEAKNESS     OUTPATIENT/OBSERVATION GOALS TO BE MET BEFORE DISCHARGE  1. Orthostatic performed: N/A     2. Tolerating PO medications: Yes     3. Return to near baseline physical activity: No     4. Cleared for discharge by consultants (if involved): No- PT/SW to see         Discharge Planner Nurse      Safe discharge environment identified: Yes  Barriers to discharge: Yes- possibly placement        Entered by: Evonne Rain 08/08/2021 10:47 AM        Please review provider order for any additional goals.   Nurse to notify provider when observation goals have been met and patient is ready for discharge.     Denies pain . Afebrile. SL. Left arm swollen and ace-wrapped from fistula placement. Alert and oriented but forgetful. OT saw and recommended tcu. PT/SW still to see.  Up x1 in chair this am. Lives with daughter baseline. Renal diet ordered.  Pt very sleepy this am- arouses to voice but easily falls back asleep. Per report- pt did not sleep much last night. UA still needed.

## 2021-08-08 NOTE — PROGRESS NOTES
PRIMARY DIAGNOSIS: GENERALIZED WEAKNESS    OUTPATIENT/OBSERVATION GOALS TO BE MET BEFORE DISCHARGE  1. Orthostatic performed: N/A    2. Tolerating PO medications: Yes    3. Return to near baseline physical activity: No    4. Cleared for discharge by consultants (if involved): No- PT/OT/SW to see    Discharge Planner Nurse   Safe discharge environment identified: Yes  Barriers to discharge: Yes- possibly placement        Entered by: Evonne Rain 08/08/2021 10:47 AM     Please review provider order for any additional goals.   Nurse to notify provider when observation goals have been met and patient is ready for discharge.    5/10 headache- prn tylenol given. Febrile this morning-100.7, on recheck this was resolved before tylenol was given. SL. Left arm swollen and ace-wrapped from fistula placement. Alert and oriented but forgetful. PT/OT/SW to see. Up x1 in chair this am. Lives with daughter baseline. Renal diet ordered.

## 2021-08-08 NOTE — PROGRESS NOTES
08/08/21 0953   Quick Adds   Type of Visit Initial Occupational Therapy Evaluation   Living Environment   People in home child(shirley), adult   Current Living Arrangements house  (rambler with basement)   Home Accessibility no concerns  (pt stays on main floor)   Transportation Anticipated family or friend will provide   Living Environment Comments pt currently living with cassi, had moved from [place] to [place].  Note in chart pt will be moving to an [facility] 8/22/21   Self-Care   Usual Activity Tolerance good   Current Activity Tolerance poor   Regular Exercise No   Equipment Currently Used at Home cane, straight;walker, standard   Activity/Exercise/Self-Care Comment pt reports being independent in basic ADLS until recently   Instrumental Activities of Daily Living (IADL)   Previous Responsibilities meal prep;housekeeping;laundry;shopping;medication management;driving   Disability/Function   Hearing Difficulty or Deaf no   Describe hearing loss bilateral hearing loss   Use of hearing assistive devices bilateral hearing aids   Were auxiliary aids offered? no   Wear Glasses or Blind yes   Vision Management glasses for reading   Concentrating, Remembering or Making Decisions Difficulty yes   Difficulty Communicating no   Difficulty Eating/Swallowing no   Walking or Climbing Stairs Difficulty no   Dressing/Bathing Difficulty no   Toileting issues no   Doing Errands Independently Difficulty (such as shopping) no   Fall history within last six months no   General Information   Onset of Illness/Injury or Date of Surgery 08/07/21   Referring Physician Nidhi Aldana PA-C   Patient/Family Therapy Goal Statement (OT) eventual move to [facility] [place], wanting to have rehab at this time   Additional Occupational Profile Info/Pertinent History of Current Problem Pt is a 76 year old male admitted from home with failure to thrive and inability to care for self at home.  Pt discharged from [facility] 8/6 for diabetic  ketoacidosis.  PMH includes end stage renal disease (CKD5), DM2 with insulin dependence, depression, ETOH abuse, HTN, chronic Afib   Performance Patterns (Routines, Roles, Habits) pt is currently dependent at home for dressing, bathing, medication management.  Has considered dialysis, port inserted in left UE this admission   Existing Precautions/Restrictions fall   Limitations/Impairments safety/cognitive   General Observations and Info pt sitting up in recliner, willing to participate in a limited fashion   Cognitive Status Examination   Orientation Status orientation to person, place and time   Affect/Mental Status (Cognitive) WFL   Visual Perception   Visual Impairment/Limitations corrective lenses for reading   Pain Assessment   Patient Currently in Pain Yes, see Vital Sign flowsheet   Range of Motion Comprehensive   General Range of Motion upper extremity range of motion deficits identified   General Upper Extremity Assessment (Range of Motion)   Comment: Upper Extremity ROM Pt has full AROM of right arm and left hand.  Very cautious about moving left arm secondary to fistula placement in arm and pain.  Able to get WFL movement of left arm and shoulder movement although pt grimacing with movement   Strength Comprehensive (MMT)   General Manual Muscle Testing (MMT) Assessment upper extremity strength deficits identified   Comment, General Manual Muscle Testing (MMT) Assessment Pt is very cautious about moving arms, 4-/3+ movement with pain in left arm.  Gross grasp very weak   Coordination   Upper Extremity Coordination No deficits were identified   Transfers   Transfers sit-stand transfer   Transfer Comments Pt delined all activity out of chair despite encouragement.  Was able to achieve a figure 4 position on both sides sitting in the recliner   Activities of Daily Living   BADL Assessment lower body dressing   Lower Body Dressing Assessment   [place] Level (Lower Body Dressing) dependent (less than 25%  patient effort)   Clinical Impression   Criteria for Skilled Therapeutic Interventions Met (OT) yes   OT Diagnosis decreased independence and endurance in ADLS   OT Problem List-Impairments impacting ADL problems related to;activity tolerance impaired;cognition;inability to direct their own care;strength   Assessment of Occupational Performance 5 or more Performance Deficits   Identified Performance Deficits decreased independence in drsssing, bathing, functional mobility, household chores medication management, financial management   Planned Therapy Interventions (OT) ADL retraining;cognition   Clinical Decision Making Complexity (OT) low complexity   Therapy Frequency (OT) 5x/week   Predicted Duration of Therapy 3 days   Risk & Benefits of therapy have been explained evaluation/treatment results reviewed;care plan/treatment goals reviewed;patient   OT Discharge Planning    OT Discharge Recommendation (DC Rec) Transitional Care Facility   OT Rationale for DC Rec OT: Pt is currently very weak and at least Mod/Max A with all ADLS and movement for transfers.  Pt is oriented but demonstrated some inconsistencies in typical home problem solving, evidence of mismanagement of medications.  Would benefit from skilled OT to gain strength and mobility for greater independence in ADLS, check on cognitive skills.  Would recommend 24 hour supervision at this time due to weakness, some confusion, and pt being a fall risk.  Pt reports daughter works and not able to manage this level of care at this time.  Recommend TCU placement to allow pt to get rehab services in a superised safe environment   Total Evaluation Time (Minutes)   Total Evaluation Time (Minutes) 20

## 2021-08-08 NOTE — PLAN OF CARE
PRIMARY DIAGNOSIS: General weakness  OUTPATIENT/OBSERVATION GOALS TO BE MET BEFORE DISCHARGE:  1. Vitals stable or at baseline : HTN     2. Tolerating PO medications: Yes     3. Return to near baseline physical activity: No - A1 w/ walker and belt     4. Cleared for discharge by consultants and safe plan (if involved): No- PT/OT/SW     Discharge Planner Nurse   Safe discharge environment identified: No - waiting for placement  Barriers to discharge: Yes       Entered by: Collin Huang 08/08/2021 12:14 AM  Please review provider order for any additional goals.   Nurse to notify provider when observation goals have been met and patient is ready for discharge.     Forgetful as night went on. A1 w/ walker. Renal diet.

## 2021-08-09 LAB
ALBUMIN SERPL-MCNC: 1.7 G/DL (ref 3.4–5)
ALP SERPL-CCNC: 285 U/L (ref 40–150)
ALT SERPL W P-5'-P-CCNC: 44 U/L (ref 0–70)
ANION GAP SERPL CALCULATED.3IONS-SCNC: 7 MMOL/L (ref 3–14)
AST SERPL W P-5'-P-CCNC: 38 U/L (ref 0–45)
BASOPHILS # BLD AUTO: 0 10E3/UL (ref 0–0.2)
BASOPHILS NFR BLD AUTO: 0 %
BILIRUB SERPL-MCNC: 0.4 MG/DL (ref 0.2–1.3)
BUN SERPL-MCNC: 60 MG/DL (ref 7–30)
CALCIUM SERPL-MCNC: 8.1 MG/DL (ref 8.5–10.1)
CHLORIDE BLD-SCNC: 106 MMOL/L (ref 94–109)
CO2 SERPL-SCNC: 22 MMOL/L (ref 20–32)
CREAT SERPL-MCNC: 4.02 MG/DL (ref 0.66–1.25)
EOSINOPHIL # BLD AUTO: 0.5 10E3/UL (ref 0–0.7)
EOSINOPHIL NFR BLD AUTO: 5 %
ERYTHROCYTE [DISTWIDTH] IN BLOOD BY AUTOMATED COUNT: 13.8 % (ref 10–15)
GFR SERPL CREATININE-BSD FRML MDRD: 14 ML/MIN/1.73M2
GLUCOSE BLD-MCNC: 185 MG/DL (ref 70–99)
GLUCOSE BLDC GLUCOMTR-MCNC: 139 MG/DL (ref 70–99)
GLUCOSE BLDC GLUCOMTR-MCNC: 154 MG/DL (ref 70–99)
GLUCOSE BLDC GLUCOMTR-MCNC: 189 MG/DL (ref 70–99)
GLUCOSE BLDC GLUCOMTR-MCNC: 201 MG/DL (ref 70–99)
GLUCOSE BLDC GLUCOMTR-MCNC: 226 MG/DL (ref 70–99)
GLUCOSE BLDC GLUCOMTR-MCNC: 227 MG/DL (ref 70–99)
HCT VFR BLD AUTO: 26.5 % (ref 40–53)
HGB BLD-MCNC: 8.3 G/DL (ref 13.3–17.7)
IMM GRANULOCYTES # BLD: 0 10E3/UL
IMM GRANULOCYTES NFR BLD: 0 %
LYMPHOCYTES # BLD AUTO: 2.3 10E3/UL (ref 0.8–5.3)
LYMPHOCYTES NFR BLD AUTO: 25 %
MCH RBC QN AUTO: 28.6 PG (ref 26.5–33)
MCHC RBC AUTO-ENTMCNC: 31.3 G/DL (ref 31.5–36.5)
MCV RBC AUTO: 91 FL (ref 78–100)
MONOCYTES # BLD AUTO: 1.1 10E3/UL (ref 0–1.3)
MONOCYTES NFR BLD AUTO: 12 %
NEUTROPHILS # BLD AUTO: 5.3 10E3/UL (ref 1.6–8.3)
NEUTROPHILS NFR BLD AUTO: 58 %
NRBC # BLD AUTO: 0 10E3/UL
NRBC BLD AUTO-RTO: 0 /100
PLATELET # BLD AUTO: 269 10E3/UL (ref 150–450)
POTASSIUM BLD-SCNC: 4.2 MMOL/L (ref 3.4–5.3)
PROT SERPL-MCNC: 5.5 G/DL (ref 6.8–8.8)
RBC # BLD AUTO: 2.9 10E6/UL (ref 4.4–5.9)
SODIUM SERPL-SCNC: 135 MMOL/L (ref 133–144)
WBC # BLD AUTO: 9.2 10E3/UL (ref 4–11)

## 2021-08-09 PROCEDURE — G0378 HOSPITAL OBSERVATION PER HR: HCPCS

## 2021-08-09 PROCEDURE — 250N000012 HC RX MED GY IP 250 OP 636 PS 637: Performed by: PHYSICIAN ASSISTANT

## 2021-08-09 PROCEDURE — 36415 COLL VENOUS BLD VENIPUNCTURE: CPT | Performed by: PHYSICIAN ASSISTANT

## 2021-08-09 PROCEDURE — 99226 PR SUBSEQUENT OBSERVATION CARE,LEVEL III: CPT | Performed by: PHYSICIAN ASSISTANT

## 2021-08-09 PROCEDURE — 250N000013 HC RX MED GY IP 250 OP 250 PS 637: Mod: GY | Performed by: PHYSICIAN ASSISTANT

## 2021-08-09 PROCEDURE — 85025 COMPLETE CBC W/AUTO DIFF WBC: CPT | Performed by: PHYSICIAN ASSISTANT

## 2021-08-09 PROCEDURE — 82040 ASSAY OF SERUM ALBUMIN: CPT | Performed by: PHYSICIAN ASSISTANT

## 2021-08-09 PROCEDURE — 96372 THER/PROPH/DIAG INJ SC/IM: CPT | Performed by: PHYSICIAN ASSISTANT

## 2021-08-09 RX ORDER — METOPROLOL TARTRATE 25 MG/1
25 TABLET, FILM COATED ORAL 2 TIMES DAILY
Status: DISCONTINUED | OUTPATIENT
Start: 2021-08-09 | End: 2021-08-10

## 2021-08-09 RX ADMIN — ASPIRIN 81 MG: 81 TABLET ORAL at 21:50

## 2021-08-09 RX ADMIN — ACETAMINOPHEN 650 MG: 325 TABLET, FILM COATED ORAL at 22:00

## 2021-08-09 RX ADMIN — ALLOPURINOL 200 MG: 100 TABLET ORAL at 08:44

## 2021-08-09 RX ADMIN — CLOPIDOGREL BISULFATE 75 MG: 75 TABLET ORAL at 08:44

## 2021-08-09 RX ADMIN — ACETAMINOPHEN 650 MG: 325 TABLET, FILM COATED ORAL at 17:30

## 2021-08-09 RX ADMIN — METOPROLOL TARTRATE 25 MG: 25 TABLET, FILM COATED ORAL at 20:22

## 2021-08-09 RX ADMIN — INSULIN GLARGINE 12 UNITS: 100 INJECTION, SOLUTION SUBCUTANEOUS at 21:49

## 2021-08-09 RX ADMIN — APIXABAN 2.5 MG: 2.5 TABLET, FILM COATED ORAL at 08:44

## 2021-08-09 RX ADMIN — CLONIDINE HYDROCHLORIDE 0.2 MG: 0.1 TABLET ORAL at 08:42

## 2021-08-09 RX ADMIN — FOLIC ACID 1 MG: 1 TABLET ORAL at 08:44

## 2021-08-09 RX ADMIN — EZETIMIBE 10 MG: 10 TABLET ORAL at 08:44

## 2021-08-09 RX ADMIN — CLONIDINE HYDROCHLORIDE 0.2 MG: 0.1 TABLET ORAL at 20:19

## 2021-08-09 RX ADMIN — FUROSEMIDE 20 MG: 20 TABLET ORAL at 08:44

## 2021-08-09 NOTE — PLAN OF CARE
PRIMARY DIAGNOSIS: Hyperglycemia/ Placement     OUTPATIENT/OBSERVATION GOALS TO BE MET BEFORE DISCHARGE  1. Orthostatic performed: No     2. Tolerating PO medications: Yes     3. Return to near baseline physical activity: No     4. Cleared for discharge by consultants (if involved): No     Discharge Planner Nurse   Safe discharge environment identified: No  Barriers to discharge: Yes       Entered by: Denise Le 08/08/2021 5:03 PM  Please review provider order for any additional goals.   Nurse to notify provider when observation goals have been met and patient is ready for discharge.     Temp: 98.6  F (37  C) Temp src: Oral BP: (!) 143/54 Pulse: 54   Resp: 18 SpO2: 96 % O2 Device: None (Room air)         A&Ox4. Pt up Ax1 with walker and gait belt. Complains of neck pain from the hospital bed, prn tylenol given x1 and heating pad in place. Renal diet- ate well for dinner. LUE swollen/ace wrapped, elevated on pillows. PT/OT recommending TCU. UA collected, sent to lab. Plan- elevate LUE, recheck labs in am, SW involvement for TCU placement.

## 2021-08-09 NOTE — PLAN OF CARE
PRIMARY DIAGNOSIS: GENERALIZED WEAKNESS/hyperglycemia    OUTPATIENT/OBSERVATION GOALS TO BE MET BEFORE DISCHARGE  1. Orthostatic performed: No    2. Tolerating PO medications: Yes    3. Return to near baseline physical activity: No    4. Cleared for discharge by consultants (if involved): No    Discharge Planner Nurse   Safe discharge environment identified: No  Barriers to discharge: Yes       Entered by: Ingrid Huang 08/09/2021 3:55 AM     Patient alert and oriented, forgetful at times. Up with assist of 1, walker, and GB. Denied pain overnight. 2am bs was 226. PT recommending TCU, SW following for discharge planning.     Please review provider order for any additional goals.   Nurse to notify provider when observation goals have been met and patient is ready for discharge.

## 2021-08-09 NOTE — PLAN OF CARE
Pt is alert and oriented - very flat affect, assist of 1 w/ walker, renal diet - ordered late lunch, history of CKD - does produce some urine, currently up in chair, left hand/forearm minor edema - mild ULE motor weakness/reduced grasp strength, PT/OT and SW following, continued BG monitoring and supportive care.    /57 (BP Location: Right arm)   Pulse 57   Temp 97.6  F (36.4  C) (Oral)   Resp 18   SpO2 96%        PRIMARY DIAGNOSIS: HYPERGLYCEMIA    OUTPATIENT/OBSERVATION GOALS TO BE MET BEFORE DISCHARGE  1. BG greater than 100 and less than 250 on two consecutive readings: No  No results for input(s): BGM in the last 96940 hours.    2. Ketones absent from urine  (hyperglycemia):  n/a - untested    3. Tolerating oral intake to maintain hydration: Yes    4. Return to near baseline physical activity: Yes    Discharge Planner Nurse   Safe discharge environment identified: No - DKA once discharged home  Barriers to discharge: Yes       Entered by: Collin Colmenares 08/09/2021      Please review provider order for any additional goals.   Nurse to notify provider when observation goals have been met and patient is ready for discharge.

## 2021-08-09 NOTE — PROGRESS NOTES
Care Management Follow Up    Length of Stay (days): 0    Expected Discharge Date: 08/09/2021     Concerns to be Addressed: Discharge planning      Patient plan of care discussed at interdisciplinary rounds: Yes    Anticipated Discharge Disposition:  TCU, referrals sent    Patient/family educated on Medicare website which has current facility and service quality ratings:  Yes  Education Provided on the Discharge Plan:  Yes  Patient/Family in Agreement with the Plan:  Yes    Referrals Placed by CM/SW:  Skilled Nursing Facility  Private pay costs discussed: Not applicable    Additional Information:  Initial assessment completed in ED. PT and OT have evaluated and are recommending TCU. Referrals were sent to [facility] and [facility] in [place]. Awaiting response on bed availability. Pt will need prior auth with BCBS out of [place]. SW will continue to follow.     1035: [facility] TCU has clinically accepted patient, semi-prvt room available. Facility has requested [employer] out of [place] Medicare insurance auth. Updated daughter Omayra via phone. Family will transport at discharge.SW will continue to follow.     ZULEMA Murray

## 2021-08-09 NOTE — PROGRESS NOTES
[facility]    Hospitalist Progress Note  Name: Gordon Gann    MRN: [id number]  Provider:  Daniela Valentine PA-C  Date of Service: 08/09/2021    Assessment & Plan   Summary of Stay: Gordon Gann is a 76 year old male with a PMH significant for end-stage renal disease, type 2 diabetes with insulin dependence, depression, alcohol abuse, hypertension, chronic atrial fibrillation and recent admission for diabetic ketoacidosis who presents from home on 8/7/21 after being discharged from [facility] on 8/6 with inability to take care of himself at home requesting TCU placement now.    #Failure to thrive due to multiple medical comorbidities  Patient was waiting for TCU placement where he was accepted and insurance authorized (patient reported initially this was not the case) but declined to go at time of discharge. He ultimately went home on 8/6 with home care and was readmitted on 8/7 due to family's inability to care for him.   -PT/OT recommend TCU  -SW consulted to assist with discharge planning, medically accepted at [facility] with insurance authorization pending on 8/9     #Weakness, low grade fever: patient reported increased weakness and lethargy on 8/8 along with low grade fever of 100.7 and increased leukocytosis of 15.7. No urinary symptoms and UA unremarkable for infection. He has an occasional cough which is reportedly his baseline. Recent AV fistula does not appear to have surrounding erythema or signs of cellulitis.   - no additional fevers  - WBC normalized on recheck on 8/9  - hold off on further infectious workup at this time    #DM II on long term insulin, uncontrolled  Recent history of non-compliance with insulin, A1C 11.7% and recent [facility] admission for DKA. PTA taking Lantus 7 units at bedtime and Aspart 5 units TID with meals.  -increase Lantus 12 units at bedtime   -keep Aspart at 6 units TID with meals   -Medium insulin sliding scale ordered  -Will continue to  titrate insulin as appropriate while admitted   -daughter plans to discuss with PCP about patient obtaining DexCom for glucose monitoring upon discharge      #Elevated transaminase/alk phos  Etiology not clear. RUQ negative. MRCP negative.  - Negative - HCV, HAV IgM, HBsAg, HBsAb  - Borderline ADILSON, but negative F-actin  - AMA negative  - TTG IgA negative, normal IgA level.  - Alk phos - increased liver source  - Iron studies consistent with anemia of chronic disease. No iron overload.  - RUQ Ultrasound negative  - MRCP negative  - If LFTs worsening GI recommends referring to [facility] liver clinic, ALT, AST and total bilirubin WNL with alkaline phosphatase still elevated at 285     #CKD stage V  Previous followed with nephrologist in [place] and had discussed possible dialysis which patient had been undecided about as of 05/2021.  Baseline creatinine is in the upper-3 to low-4 range. Per nephology note on 5/17/21 patient's creatinine was 4.16.  - creatinine is currently stable at 4.02 today  - nephrology decreased PTA Lasix to 20 mg daily and Hydralazine was stopped during most recent hospital stay  - scheduled follow-up with Dr. Mckeon on 8/24/2021  - s/p AV fistula placement on 8/5 by vascular surgery, he had swelling on his arm after that for which he needs to continue compression dressing; keep planned f/u with vascular surgery as outpatient    #Chronic A-fib  #CAD s/p PCI 2006  #HTN  #HLD  Further details unknown of underlying CAD.  Pharmacy confirms the patient only takes Eliquis once daily, stating he gets a rash if he takes it twice daily. PTA aspirin, Plavix, clonidine, metoprolol and Eliquis  - decrease PTA Metoprolol further to 25 mg BID due to bradycardia in the 50s  - continue clonidine 0.2 mg twice daily, ASA, Plavix and Eliquis      #Possible cognitive impairment  #Possible depression  Daughter concerned about depression and also reports he has been mismanaging his insulin recently and has concerns regarding  forgetfulness. Previously tried Mirtazapine during last hospital stay which was discontinued due to increased somnolence.   -would discuss antidepressant with PCP if willing, patient previously declined psychiatry evaluation while admitted at [facility]      #Insomnia  - PTA taking Ambien, possibly causing side effects of hallucinations and patient has not requested since admission, will discontinue      #History of alcohol abuse    DVT Prophylaxis: on Eliquis   Code Status: No CPR- Do NOT Intubate  Disposition: Expected discharge in 1-2 days pending safe discharge plan     Daniela Valentine PA-C  Hospital Medicine    Interval History   Patient is still feeling weak but better than yesterday. He feels the swelling in his arm is the same. He notes discomfort in left arm with movement. Denies chest pain, shortness of breath, nausea, vomiting, or abdominal pain. He has a bowel movement over the last day.      -Data reviewed today: I reviewed all new labs and imaging reports over the last 24 hours.    Physical Exam   Temp: 97.6  F (36.4  C) Temp src: Oral BP: (!) 153/61 Pulse: 53   Resp: 18 SpO2: 100 % O2 Device: None (Room air)    There were no vitals filed for this visit.  Vital Signs with Ranges  Temp:  [97.6  F (36.4  C)-98.6  F (37  C)] 97.6  F (36.4  C)  Pulse:  [50-81] 53  Resp:  [18] 18  BP: (120-161)/(54-61) 153/61  SpO2:  [93 %-100 %] 100 %  I/O last 3 completed shifts:  In: 720 [P.O.:720]  Out: -     GEN:  Alert, oriented x 3, appears comfortable sitting up in chair, NAD.  HEENT:  Normocephalic/atraumatic, no scleral icterus, no nasal discharge, mouth moist.  CV: Regular rate and rhythm, no murmur or JVD.  S1 + S2 noted, no S3 or S4.  LUNGS:  Clear to auscultation bilaterally without rales/rhonchi/wheezing/retractions.  Symmetric chest rise on inhalation noted.  ABD:  Active bowel sounds, soft, non-tender/non-distended.  No rebound/guarding/rigidity.  EXT: swelling of left hand and arm (improving with ACE wrap),   No cyanosis.  No acute joint synovitis noted. AV fistula to left forearm without surrounding erythema or warmth to touch.  SKIN:  Dry to touch, no exanthems noted in the visualized areas.  PSYCH: flat affect, no SI or HI, no hallucinations     Medications       allopurinol  200 mg Oral Daily     apixaban ANTICOAGULANT  2.5 mg Oral QAM     aspirin  81 mg Oral At Bedtime     cloNIDine  0.2 mg Oral BID     clopidogrel  75 mg Oral QAM     ezetimibe  10 mg Oral QAM     folic acid  1 mg Oral QAM     furosemide  20 mg Oral Daily     insulin aspart  6 Units Subcutaneous TID w/meals     insulin aspart  1-7 Units Subcutaneous TID AC     insulin aspart  1-5 Units Subcutaneous At Bedtime     insulin glargine  10 Units Subcutaneous At Bedtime     metoprolol tartrate  50 mg Oral BID     sodium chloride (PF)  3 mL Intracatheter Q8H     Data   Results for orders placed or performed during the hospital encounter of 08/07/21   CBC with platelets differential     Status: Abnormal    Narrative    The following orders were created for panel order CBC with platelets differential.  Procedure                               Abnormality         Status                     ---------                               -----------         ------                     CBC with platelets and d...[[id number]]  Abnormal            Final result                 Please view results for these tests on the individual orders.   Comprehensive metabolic panel     Status: Abnormal   Result Value Ref Range    Sodium 138 133 - 144 mmol/L    Potassium 3.9 3.4 - 5.3 mmol/L    Chloride 107 94 - 109 mmol/L    Carbon Dioxide (CO2) 21 20 - 32 mmol/L    Anion Gap 10 3 - 14 mmol/L    Urea Nitrogen 64 (H) 7 - 30 mg/dL    Creatinine 4.33 (H) 0.66 - 1.25 mg/dL    Calcium 8.4 (L) 8.5 - 10.1 mg/dL    Glucose 149 (H) 70 - 99 mg/dL    Alkaline Phosphatase 273 (H) 40 - 150 U/L    AST 56 (H) 0 - 45 U/L    ALT 51 0 - 70 U/L    Protein Total 6.1 (L) 6.8 - 8.8 g/dL    Albumin 2.1 (L) 3.4  - 5.0 g/dL    Bilirubin Total 0.3 0.2 - 1.3 mg/dL    GFR Estimate 12 (L) >60 mL/min/1.73m2   Blood gas venous and oxyhgb     Status: Abnormal   Result Value Ref Range    pH Venous 7.43 7.32 - 7.43    pCO2 Venous 32 (L) 40 - 50 mm Hg    pO2 Venous 34 25 - 47 mm Hg    Bicarbonate Venous 21 21 - 28 mmol/L    FIO2 0     Oxyhemoglobin Venous 66 (L) 70 - 75 %    Base Excess/Deficit (+/-) -2.5 -7.7 - 1.9 mmol/L   Ketone Beta-Hydroxybutyrate Quantitative     Status: Normal   Result Value Ref Range    Ketone (Beta-Hydroxybutyrate) Quantitative 0.1 0.0 - 0.6 mmol/L   Magnesium     Status: Normal   Result Value Ref Range    Magnesium 2.2 1.6 - 2.3 mg/dL   CBC with platelets and differential     Status: Abnormal   Result Value Ref Range    WBC Count 11.2 (H) 4.0 - 11.0 10e3/uL    RBC Count 3.11 (L) 4.40 - 5.90 10e6/uL    Hemoglobin 9.0 (L) 13.3 - 17.7 g/dL    Hematocrit 27.7 (L) 40.0 - 53.0 %    MCV 89 78 - 100 fL    MCH 28.9 26.5 - 33.0 pg    MCHC 32.5 31.5 - 36.5 g/dL    RDW 14.2 10.0 - 15.0 %    Platelet Count 305 150 - 450 10e3/uL    % Neutrophils 64 %    % Lymphocytes 22 %    % Monocytes 13 %    % Eosinophils 1 %    % Basophils 0 %    % Immature Granulocytes 0 %    NRBCs per 100 WBC 0 <1 /100    Absolute Neutrophils 7.0 1.6 - 8.3 10e3/uL    Absolute Lymphocytes 2.4 0.8 - 5.3 10e3/uL    Absolute Monocytes 1.5 (H) 0.0 - 1.3 10e3/uL    Absolute Eosinophils 0.1 0.0 - 0.7 10e3/uL    Absolute Basophils 0.0 0.0 - 0.2 10e3/uL    Absolute Immature Granulocytes 0.0 <=0.0 10e3/uL    Absolute NRBCs 0.0 10e3/uL   Extra Red Top Tube     Status: None   Result Value Ref Range    Hold Specimen JI    Glucose by meter     Status: Abnormal   Result Value Ref Range    GLUCOSE BY METER POCT 133 (H) 70 - 99 mg/dL   Glucose by meter     Status: Abnormal   Result Value Ref Range    GLUCOSE BY METER POCT 130 (H) 70 - 99 mg/dL   Glucose by meter     Status: Abnormal   Result Value Ref Range    GLUCOSE BY METER POCT 130 (H) 70 - 99 mg/dL   Glucose  by meter     Status: Abnormal   Result Value Ref Range    GLUCOSE BY METER POCT 125 (H) 70 - 99 mg/dL   Glucose by meter     Status: Abnormal   Result Value Ref Range    GLUCOSE BY METER POCT 116 (H) 70 - 99 mg/dL   Glucose by meter     Status: Abnormal   Result Value Ref Range    GLUCOSE BY METER POCT 121 (H) 70 - 99 mg/dL   Glucose by meter     Status: Abnormal   Result Value Ref Range    GLUCOSE BY METER POCT 147 (H) 70 - 99 mg/dL   Glucose by meter     Status: Abnormal   Result Value Ref Range    GLUCOSE BY METER POCT 287 (H) 70 - 99 mg/dL   Basic metabolic panel     Status: Abnormal   Result Value Ref Range    Sodium 135 133 - 144 mmol/L    Potassium 4.3 3.4 - 5.3 mmol/L    Chloride 107 94 - 109 mmol/L    Carbon Dioxide (CO2) 19 (L) 20 - 32 mmol/L    Anion Gap 9 3 - 14 mmol/L    Urea Nitrogen 56 (H) 7 - 30 mg/dL    Creatinine 4.01 (H) 0.66 - 1.25 mg/dL    Calcium 8.5 8.5 - 10.1 mg/dL    Glucose 237 (H) 70 - 99 mg/dL    GFR Estimate 14 (L) >60 mL/min/1.73m2   CBC with platelets     Status: Abnormal   Result Value Ref Range    WBC Count 15.7 (H) 4.0 - 11.0 10e3/uL    RBC Count 3.31 (L) 4.40 - 5.90 10e6/uL    Hemoglobin 9.6 (L) 13.3 - 17.7 g/dL    Hematocrit 29.2 (L) 40.0 - 53.0 %    MCV 88 78 - 100 fL    MCH 29.0 26.5 - 33.0 pg    MCHC 32.9 31.5 - 36.5 g/dL    RDW 14.1 10.0 - 15.0 %    Platelet Count 341 150 - 450 10e3/uL   Glucose by meter     Status: Abnormal   Result Value Ref Range    GLUCOSE BY METER POCT 215 (H) 70 - 99 mg/dL   Glucose by meter     Status: Abnormal   Result Value Ref Range    GLUCOSE BY METER POCT 251 (H) 70 - 99 mg/dL   Lactic Acid STAT     Status: Normal   Result Value Ref Range    Lactic Acid 1.0 0.7 - 2.0 mmol/L   UA reflex to Microscopic and Culture     Status: Abnormal    Specimen: Urine, Clean Catch   Result Value Ref Range    Color Urine Light Yellow Colorless, Straw, Light Yellow, Yellow    Appearance Urine Slightly Cloudy (A) Clear    Glucose Urine 1000  (A) Negative mg/dL     Bilirubin Urine Negative Negative    Ketones Urine Negative Negative mg/dL    Specific Gravity Urine 1.017 1.003 - 1.035    Blood Urine Small (A) Negative    pH Urine 6.0 5.0 - 7.0    Protein Albumin Urine 300  (A) Negative mg/dL    Urobilinogen Urine Normal Normal, 2.0 mg/dL    Nitrite Urine Negative Negative    Leukocyte Esterase Urine Negative Negative    Bacteria Urine Few (A) None Seen /HPF    Mucus Urine Present (A) None Seen /LPF    Amorphous Crystals Urine Few (A) None Seen /HPF    RBC Urine 5 (H) <=2 /HPF    WBC Urine 1 <=5 /HPF    Squamous Epithelials Urine <1 <=1 /HPF    Hyaline Casts Urine 9 (H) <=2 /LPF    Granular Casts Urine 5 (H) None Seen /LPF    Narrative    Urine Culture not indicated   Glucose by meter     Status: Abnormal   Result Value Ref Range    GLUCOSE BY METER POCT 282 (H) 70 - 99 mg/dL   Glucose by meter     Status: Abnormal   Result Value Ref Range    GLUCOSE BY METER POCT 264 (H) 70 - 99 mg/dL   Glucose by meter     Status: Abnormal   Result Value Ref Range    GLUCOSE BY METER POCT 301 (H) 70 - 99 mg/dL   Glucose by meter     Status: Abnormal   Result Value Ref Range    GLUCOSE BY METER POCT 226 (H) 70 - 99 mg/dL   Glucose by meter     Status: Abnormal   Result Value Ref Range    GLUCOSE BY METER POCT 154 (H) 70 - 99 mg/dL   CBC with Platelets & Differential     Status: Abnormal    Narrative    The following orders were created for panel order CBC with Platelets & Differential.  Procedure                               Abnormality         Status                     ---------                               -----------         ------                     CBC with platelets and d...[[id number]]  Abnormal            Final result                 Please view results for these tests on the individual orders.   Comprehensive metabolic panel     Status: Abnormal   Result Value Ref Range    Sodium 135 133 - 144 mmol/L    Potassium 4.2 3.4 - 5.3 mmol/L    Chloride 106 94 - 109 mmol/L    Carbon Dioxide  (CO2) 22 20 - 32 mmol/L    Anion Gap 7 3 - 14 mmol/L    Urea Nitrogen 60 (H) 7 - 30 mg/dL    Creatinine 4.02 (H) 0.66 - 1.25 mg/dL    Calcium 8.1 (L) 8.5 - 10.1 mg/dL    Glucose 185 (H) 70 - 99 mg/dL    Alkaline Phosphatase 285 (H) 40 - 150 U/L    AST 38 0 - 45 U/L    ALT 44 0 - 70 U/L    Protein Total 5.5 (L) 6.8 - 8.8 g/dL    Albumin 1.7 (L) 3.4 - 5.0 g/dL    Bilirubin Total 0.4 0.2 - 1.3 mg/dL    GFR Estimate 14 (L) >60 mL/min/1.73m2   CBC with platelets and differential     Status: Abnormal   Result Value Ref Range    WBC Count 9.2 4.0 - 11.0 10e3/uL    RBC Count 2.90 (L) 4.40 - 5.90 10e6/uL    Hemoglobin 8.3 (L) 13.3 - 17.7 g/dL    Hematocrit 26.5 (L) 40.0 - 53.0 %    MCV 91 78 - 100 fL    MCH 28.6 26.5 - 33.0 pg    MCHC 31.3 (L) 31.5 - 36.5 g/dL    RDW 13.8 10.0 - 15.0 %    Platelet Count 269 150 - 450 10e3/uL    % Neutrophils 58 %    % Lymphocytes 25 %    % Monocytes 12 %    % Eosinophils 5 %    % Basophils 0 %    % Immature Granulocytes 0 %    NRBCs per 100 WBC 0 <1 /100    Absolute Neutrophils 5.3 1.6 - 8.3 10e3/uL    Absolute Lymphocytes 2.3 0.8 - 5.3 10e3/uL    Absolute Monocytes 1.1 0.0 - 1.3 10e3/uL    Absolute Eosinophils 0.5 0.0 - 0.7 10e3/uL    Absolute Basophils 0.0 0.0 - 0.2 10e3/uL    Absolute Immature Granulocytes 0.0 <=0.0 10e3/uL    Absolute NRBCs 0.0 10e3/uL   Glucose by meter     Status: Abnormal   Result Value Ref Range    GLUCOSE BY METER POCT 201 (H) 70 - 99 mg/dL   Glucose by meter     Status: Abnormal   Result Value Ref Range    GLUCOSE BY METER POCT 139 (H) 70 - 99 mg/dL   Care Management / Social Work IP Consult     Status: None ()    Narrative    Shelby Hector     8/7/2021 11:50 AM  See ED Notes for SW Care Management Consult.    Extra Tube ([facility] Draw)     Status: None    Narrative    The following orders were created for panel order Extra Tube ([facility] Draw).  Procedure                               Abnormality         Status                     ---------                                -----------         ------                     Extra Red Top Tube[[id number]]                               Final result                 Please view results for these tests on the individual orders.

## 2021-08-09 NOTE — PROGRESS NOTES
Care Management Follow Up    Length of Stay (days): 0    Expected Discharge Date: 08/09/2021     Additional Information:  Received call from [employer]. [phone]. Patient was discharged on 8/6 with referral for home care. Patient was readmitted on 8/7. Home Care referral cancelled at this time.    CM will continue to follow for discharge planning to TCU.     Mitra Young RN      Mitra Young RN Case Manager  Inpatient Care Coordination  [facility]   [phone]

## 2021-08-09 NOTE — PLAN OF CARE
Pt is alert and oriented - very flat affect, assist of 1 w/ walker, renal diet, history of CKD - does produce some urine, left hand/forearm edema, PT/OT and SW following, continued BG monitoring and supportive care.    Glucose Values Latest Ref Rng & Units 8/2/2021 8/3/2021 8/4/2021 8/5/2021 8/7/2021 8/8/2021 8/9/2021   Bedside Glucose (mg/dl )  - -- -- -- -- -- 264 --   GLUCOSE 70 - 99 mg/dL 68(L) 46(LL) 75 124(H) 149(H) 237(H) 185(H)   Some recent data might be hidden        PRIMARY DIAGNOSIS: HYPERGLYCEMIA    OUTPATIENT/OBSERVATION GOALS TO BE MET BEFORE DISCHARGE  1. BG greater than 100 and less than 250 on two consecutive readings: No  No results for input(s): BGM in the last 22203 hours.    2. Ketones absent from urine  (hyperglycemia):  n/a - untested    3. Tolerating oral intake to maintain hydration: Yes    4. Return to near baseline physical activity: Yes    Discharge Planner Nurse   Safe discharge environment identified: No - DKA once discharged home  Barriers to discharge: Yes       Entered by: Collin Colmenares 08/09/2021      Please review provider order for any additional goals.   Nurse to notify provider when observation goals have been met and patient is ready for discharge.

## 2021-08-09 NOTE — PLAN OF CARE
PRIMARY DIAGNOSIS: GENERALIZED WEAKNESS/hyperglycemia     OUTPATIENT/OBSERVATION GOALS TO BE MET BEFORE DISCHARGE  1. Orthostatic performed: No     2. Tolerating PO medications: Yes     3. Return to near baseline physical activity: No-up with assist of 1, walker, gait belt.     4. Cleared for discharge by consultants (if involved): No- SW following     Discharge Planner Nurse   Safe discharge environment identified: No  Barriers to discharge: Yes       Entered by: Ingrid Huang RN on 8/9/2021 at 6:46 AM    Slept most of the night. Fistula in left arm covered by ace bandage. PT recommending TCU, SW following for discharge planning.      Please review provider order for any additional goals.   Nurse to notify provider when observation goals have been met and patient is ready for discharge.

## 2021-08-09 NOTE — PLAN OF CARE
PRIMARY DIAGNOSIS: GENERALIZED WEAKNESS    OUTPATIENT/OBSERVATION GOALS TO BE MET BEFORE DISCHARGE  1. Orthostatic performed: No    2. Tolerating PO medications: Yes    3. Return to near baseline physical activity: No    4. Cleared for discharge by consultants (if involved): No    Patient alert and oriented x4. Vitals are Temp: 97.6  F (36.4  C) Temp src: Oral BP: (!) 153/61 Pulse: 53   Resp: 18 SpO2: 100 % RA. Tylenol given for 5/10 generalized pain. Up with assist of 1 with walker and gait belt. PT and OT recommending TCU. SW assisting with placement. Continuing to monitor blood sugars- insulin given per orders. Continuing with symptom management and supportive cares.      Discharge Planner Nurse   Safe discharge environment identified: No  Barriers to discharge: Yes       Entered by: Carie Beltran 08/09/2021     Please review provider order for any additional goals.   Nurse to notify provider when observation goals have been met and patient is ready for discharge.

## 2021-08-10 LAB
GLUCOSE BLDC GLUCOMTR-MCNC: 210 MG/DL (ref 70–99)
GLUCOSE BLDC GLUCOMTR-MCNC: 212 MG/DL (ref 70–99)
GLUCOSE BLDC GLUCOMTR-MCNC: 217 MG/DL (ref 70–99)
GLUCOSE BLDC GLUCOMTR-MCNC: 275 MG/DL (ref 70–99)
GLUCOSE BLDC GLUCOMTR-MCNC: 280 MG/DL (ref 70–99)

## 2021-08-10 PROCEDURE — 250N000013 HC RX MED GY IP 250 OP 250 PS 637: Performed by: PHYSICIAN ASSISTANT

## 2021-08-10 PROCEDURE — G0378 HOSPITAL OBSERVATION PER HR: HCPCS

## 2021-08-10 PROCEDURE — 96372 THER/PROPH/DIAG INJ SC/IM: CPT | Performed by: PHYSICIAN ASSISTANT

## 2021-08-10 PROCEDURE — 99226 PR SUBSEQUENT OBSERVATION CARE,LEVEL III: CPT | Performed by: PHYSICIAN ASSISTANT

## 2021-08-10 PROCEDURE — 250N000012 HC RX MED GY IP 250 OP 636 PS 637: Mod: GY | Performed by: PHYSICIAN ASSISTANT

## 2021-08-10 RX ADMIN — CLONIDINE HYDROCHLORIDE 0.2 MG: 0.1 TABLET ORAL at 08:02

## 2021-08-10 RX ADMIN — FOLIC ACID 1 MG: 1 TABLET ORAL at 08:02

## 2021-08-10 RX ADMIN — CLONIDINE HYDROCHLORIDE 0.2 MG: 0.1 TABLET ORAL at 20:28

## 2021-08-10 RX ADMIN — FUROSEMIDE 20 MG: 20 TABLET ORAL at 08:02

## 2021-08-10 RX ADMIN — APIXABAN 2.5 MG: 2.5 TABLET, FILM COATED ORAL at 08:02

## 2021-08-10 RX ADMIN — ACETAMINOPHEN 650 MG: 325 TABLET, FILM COATED ORAL at 20:34

## 2021-08-10 RX ADMIN — EZETIMIBE 10 MG: 10 TABLET ORAL at 08:02

## 2021-08-10 RX ADMIN — ALLOPURINOL 200 MG: 100 TABLET ORAL at 08:02

## 2021-08-10 RX ADMIN — ASPIRIN 81 MG: 81 TABLET ORAL at 22:28

## 2021-08-10 RX ADMIN — INSULIN GLARGINE 12 UNITS: 100 INJECTION, SOLUTION SUBCUTANEOUS at 22:36

## 2021-08-10 RX ADMIN — CLOPIDOGREL BISULFATE 75 MG: 75 TABLET ORAL at 08:02

## 2021-08-10 NOTE — PLAN OF CARE
PRIMARY DIAGNOSIS: GENERALIZED WEAKNESS     OUTPATIENT/OBSERVATION GOALS TO BE MET BEFORE DISCHARGE  1. Orthostatic performed: No     2. Tolerating PO medications: Yes     3. Return to near baseline physical activity: No     4. Cleared for discharge by consultants (if involved): No        Temp: 97.6  F (36.4  C) Temp src: Oral BP: (!) 156/72 Pulse: 50   Resp: 20 SpO2: 100 % O2 Device: None (Room air)       Patient alert and oriented x4. VSS. Up with assist of 1 with walker and gait belt. PT and OT recommending TCU. SW assisting with placement. Pt is resting w/ no complaints. Continue monitoring blood sugars- insulin given per orders. Continuing with symptom management and supportive cares.       Discharge Planner Nurse   Safe discharge environment identified: No  Barriers to discharge: Yes       Entered by: Brianna Emery 08/10/2021        Please review provider order for any additional goals.   Nurse to notify provider when observation goals have been met and patient is ready for discharge

## 2021-08-10 NOTE — PLAN OF CARE
PRIMARY DIAGNOSIS: GENERALIZED WEAKNESS    OUTPATIENT/OBSERVATION GOALS TO BE MET BEFORE DISCHARGE  1. Orthostatic performed: No    2. Tolerating PO medications: Yes    3. Return to near baseline physical activity: No    4. Cleared for discharge by consultants (if involved): No    Discharge Planner Nurse   Safe discharge environment identified: Yes  Barriers to discharge: Yes       Entered by: Doretha Lomeli 08/10/2021 4:04 PM     Please review provider order for any additional goals.   Nurse to notify provider when observation goals have been met and patient is ready for discharge.  .  End of Shift Note  See flowsheets for vital signs and complete assessments.    Pertinent Assessments: A&Ox4 but forgetful. Bradycardic throughout the day, asymptomatic. Denies pain, nausea, and SOB. Baseline numbness & tingling noted. Up SBA with walker and GB.     Major Shift Events: None    Treatment Plan: Symptom management.    Discharge: Pending insurance authorization, accepted at TCU.    Bedside RN: Doretha Lomeli

## 2021-08-10 NOTE — PLAN OF CARE
[facility]      OUTPATIENT PHYSICAL THERAPY EVALUATION  PLAN OF TREATMENT FOR OUTPATIENT REHABILITATION  (COMPLETE FOR INITIAL CLAIMS ONLY)  Patient's Last Name, First Name, M.I.  YOB: 1945  Gordon Gann                        Provider's Name  [facility] Medical Record No.  [id number]                               Onset Date:  08/07/21   Start of Care Date:    8/8/2021     Type:     _X_PT   ___OT   ___SLP Medical Diagnosis:   failure to thrive                        PT Diagnosis:  impaired functional mobilit   Visits from SOC:  1   _________________________________________________________________________________  Plan of Treatment/Functional Goals    Planned Interventions: balance training, bed mobility training, gait training, home exercise program, patient/family education, stair training, strengthening, transfer training     Goals: See Physical Therapy Goals on Care Plan in [facility] electronic health record.    Therapy Frequency: 5x/week  Predicted Duration of Therapy Intervention: 3 days  _________________________________________________________________________________    I CERTIFY THE NEED FOR THESE SERVICES FURNISHED UNDER        THIS PLAN OF TREATMENT AND WHILE UNDER MY CARE     (Physician co-signature of this document indicates review and certification of the therapy plan).               ,      Referring Physician: Nidhi Aldana PA-C            Initial Assessment        See Physical Therapy evaluation dated   in Epic electronic health record.

## 2021-08-10 NOTE — PROGRESS NOTES
Care Management Discharge Note    Discharge Date: 08/10/2021     Discharge Disposition:  [facility], pending [employer] insurance auth    Discharge Transportation: family or friend will provide. Carlos Cutler    Private pay costs discussed: Not applicable    PAS Confirmation Code: [id number]  Patient/family educated on Medicare website which has current facility and service quality ratings:  Yes    Education Provided on the Discharge Plan:  Yes  Persons Notified of Discharge Plans: Patient, daughter Omayra  Patient/Family in Agreement with the Plan:  Yes    Handoff Referral Completed: No    Additional Information:  HENOK spoke with [facility], insurance auth still pending. PAS completed. Daughter to transport. SW will continue to follow.    1315: Updated carlos Cutler via phone.      ZULEMA Murray

## 2021-08-10 NOTE — PROGRESS NOTES
[facility]    Hospitalist Progress Note      Assessment & Plan   Gordon Gann is a 76 year old male with a PMH significant for end-stage renal disease, type 2 diabetes with insulin dependence, depression, alcohol abuse, hypertension, chronic atrial fibrillation and recent admission for diabetic ketoacidosis who presents from home on 8/7/21 after being discharged from [facility] on 8/6 with inability to take care of himself at home requesting TCU placement now.     #Failure to thrive due to multiple medical comorbidities  Patient was waiting for TCU placement where he was accepted and insurance authorized (patient reported initially this was not the case) but declined to go at time of discharge. He ultimately went home on 8/6 with home care and was readmitted on 8/7 due to family's inability to care for him.   -PT/OT recommend TCU  -decrease metoprolol  -monitor hgb  -SW consulted to assist with discharge planning, [facility] with [facility] pending on 8/9     #Generalized Weakness, low grade fever: patient reported increased weakness and lethargy on 8/8 along with low grade fever of 100.7 and increased leukocytosis of 15.7.    No urinary symptoms and UA unremarkable for infection. He has an occasional cough which is reportedly his baseline. Recent AV fistula does not appear to have surrounding erythema or signs of cellulitis.   - no additional fevers  - WBC normalized on recheck on 8/9  - hold off on further infectious workup at this time    #Anemia of Chronic Disease:   Hgb 8.3 on dual antiplatelet therapy and anticoagulation. May be contributing to global weakness. No active bleeding symptoms. Hgb 10-11 in prior hospitalization with fistula placement. Iron studies borderline, consistent with anemia of chronic disease. No iron overload.  -will need ongoing outpatient follow up and management  -monitor for bleeding sx  -transfuse for hgb  <7  -may need to hold/stop plavix ; per discussion with patient, last stent was 2006 done in [place]. Has not followed in long time and not sure how long he needs to be on Plavix.      #DM II on long term insulin, uncontrolled  Recent history of non-compliance with insulin, A1C 11.7% and recent [facility] admission for DKA. PTA taking Lantus 7 units at bedtime and Aspart 5 units TID with meals.  -increase Lantus 12 units at bedtime   -increase PTA Aspart at 8 units TID with meals   -Medium insulin sliding scale ordered  -Will continue to titrate insulin as appropriate while admitted   -daughter plans to discuss with PCP about patient obtaining DexCom for glucose monitoring upon discharge      #Elevated transaminase/alk phos  Etiology not clear. RUQ negative. MRCP negative.  - Negative - HCV, HAV IgM, HBsAg, HBsAb  - Borderline ADILSON, but negative F-actin  - AMA negative  - TTG IgA negative, normal IgA level.  - Alk phos - increased liver source  - Iron studies consistent with anemia of chronic disease. No iron overload.  - RUQ Ultrasound negative  - MRCP negative  - If LFTs worsening GI recommends referring to [facility] liver clinic, ALT, AST and total bilirubin WNL with alkaline phosphatase still elevated at 285     #CKD stage V  Previous followed with nephrologist in [place] and had discussed possible dialysis which patient had been undecided about as of 05/2021.  Baseline creatinine is in the upper-3 to low-4 range. Per nephology note on 5/17/21 patient's creatinine was 4.16.  - creatinine is currently stable at 4.02 today  - nephrology decreased PTA Lasix to 20 mg daily and Hydralazine was stopped during most recent hospital stay  - scheduled follow-up with Dr. Saldana on 8/24/2021  - s/p AV fistula placement on 8/5 by vascular surgery, he had swelling on his arm after that for which he needs to continue compression dressing; keep planned f/u with vascular surgery as outpatient     #Chronic A-fib  #CAD s/p PCI  2006  #HTN  #HLD  Further details unknown of underlying CAD.  Pharmacy confirms the patient only takes Eliquis once daily, stating he gets a rash if he takes it twice daily. PTA aspirin, Plavix, clonidine, metoprolol and Eliquis  - decrease PTA Metoprolol decreased further to 12.5 mg BID due to bradycardia in the 50s, fatigue  - continue clonidine 0.2 mg twice daily, ASA, Plavix and Eliquis      #Possible cognitive impairment  #Possible depression  Daughter concerned about depression and also reports he has been mismanaging his insulin recently and has concerns regarding forgetfulness. Previously tried Mirtazapine during last hospital stay which was discontinued due to increased somnolence.   -would discuss antidepressant with PCP if willing, patient previously declined psychiatry evaluation while admitted at [facility]      #Insomnia  - PTA taking Ambien, possibly causing side effects of hallucinations and patient has not requested since admission, will discontinue   - consider melatonin      #History of alcohol abuse       DVT Prophylaxis: eliquis  Code Status: No CPR- Do NOT Intubate  Expected discharge: pending insurance auth, accepted at [facility]    Nyla Pan PA-C      Interval History   No acute events overnight. Tolerating diet. No abdominal pain, no chest pain or dyspnea.  Last bm yesterday, no reported hematochezia. Afebrile. Reports unchanged global weakness. No dizziness. No myalgias.  Remains with slight discomfort in LUE however improved since admission. Some reported numbness in hand since fistula, no tingling. Able to move elbow, shoulder freely.     -Data reviewed today: I reviewed all new labs and imaging results over the last 24 hours.     Physical Exam   Temp: 97.8  F (36.6  C) Temp src: Oral BP: (!) 175/70 Pulse: 56   Resp: 20 SpO2: 99 % O2 Device: None (Room air)    There were no vitals filed for this visit.  Vital Signs with Ranges  Temp:  [97.6  F (36.4  C)-97.8  F (36.6  C)] 97.8  F (36.6   C)  Pulse:  [50-57] 56  Resp:  [16-20] 20  BP: (135-175)/(57-72) 175/70  SpO2:  [96 %-100 %] 99 %  I/O last 3 completed shifts:  In: 420 [P.O.:420]  Out: -       Constitutional:Awake, alert, no apparent distress  Respiratory:  Normal work of breathing. Lungs clear to auscultation bilaterally, no crackles or wheezing.  Cardiovascular: Regular rate and irregular rhythm, normal S1 and S2, and no murmur appreciated.   GI: Normal bowel sounds, soft, non-distended, non-tender.   Skin/Integument: Pale. No rashes, no cyanosis, +1 peripheral le edema. +1 LUE peripheral edema.   Neuro: Moving all extremities with normal strength. Coordination and sensation grossly intact. Speech clear. No focal deficits.   MK:swelling of left hand and arm, No cyanosis. Sensation intact.  No acute joint synovitis noted. AV fistula   Psych: flat. No overt hallucinations.             Medications       allopurinol  200 mg Oral Daily     apixaban ANTICOAGULANT  2.5 mg Oral QAM     aspirin  81 mg Oral At Bedtime     cloNIDine  0.2 mg Oral BID     clopidogrel  75 mg Oral QAM     ezetimibe  10 mg Oral QAM     folic acid  1 mg Oral QAM     furosemide  20 mg Oral Daily     insulin aspart  6 Units Subcutaneous TID w/meals     insulin aspart  1-7 Units Subcutaneous TID AC     insulin aspart  1-5 Units Subcutaneous At Bedtime     insulin glargine  12 Units Subcutaneous At Bedtime     metoprolol tartrate  25 mg Oral BID       Data   Recent Labs   Lab 08/10/21  0156 08/09/21  2129 08/09/21  1820 08/09/21  0850 08/08/21  0832 08/07/21  1103 08/04/21  1139 08/04/21  0701   WBC  --   --   --  9.2 15.7* 11.2*  --   --    HGB  --   --   --  8.3* 9.6* 9.0*  --   --    MCV  --   --   --  91 88 89  --   --    PLT  --   --   --  269 341 305  --   --    INR  --   --   --   --   --   --   --  1.12   NA  --   --   --  135 135 138  --  138   POTASSIUM  --   --   --  4.2 4.3 3.9   < > 3.8   CHLORIDE  --   --   --  106 107 107  --  108   CO2  --   --   --  22 19* 21  --   24   BUN  --   --   --  60* 56* 64*  --  62*   CR  --   --   --  4.02* 4.01* 4.33*  --  4.23*   ANIONGAP  --   --   --  7 9 10  --  6   LC  --   --   --  8.1* 8.5 8.4*  --  8.2*   * 227* 189* 185* 237* 149*   < > 75   ALBUMIN  --   --   --  1.7*  --  2.1*  --  1.9*   PROTTOTAL  --   --   --  5.5*  --  6.1*  --  5.3*   BILITOTAL  --   --   --  0.4  --  0.3  --  0.3   ALKPHOS  --   --   --  285*  --  273*  --  226*   ALT  --   --   --  44  --  51  --  118*   AST  --   --   --  38  --  56*  --  68*    < > = values in this interval not displayed.       No results found for this or any previous visit (from the past 24 hour(s)).

## 2021-08-10 NOTE — PLAN OF CARE
PRIMARY DIAGNOSIS: GENERALIZED WEAKNESS    OUTPATIENT/OBSERVATION GOALS TO BE MET BEFORE DISCHARGE  1. Orthostatic performed: No    2. Tolerating PO medications: Yes    3. Return to near baseline physical activity: No    4. Cleared for discharge by consultants (if involved): No    Discharge Planner Nurse   Safe discharge environment identified: No  Barriers to discharge: Yes       Entered by: Doretha Lomeli 08/10/2021 10:50 AM     Please review provider order for any additional goals.   Nurse to notify provider when observation goals have been met and patient is ready for discharge.

## 2021-08-10 NOTE — PLAN OF CARE
PRIMARY DIAGNOSIS: GENERALIZED WEAKNESS    OUTPATIENT/OBSERVATION GOALS TO BE MET BEFORE DISCHARGE  1. Orthostatic performed: No    2. Tolerating PO medications: Yes    3. Return to near baseline physical activity: No    4. Cleared for discharge by consultants (if involved): No    Discharge Planner Nurse   Safe discharge environment identified: Yes  Barriers to discharge: Yes       Entered by: Doretha Lomeli 08/10/2021 1:57 PM     Please review provider order for any additional goals.   Nurse to notify provider when observation goals have been met and patient is ready for discharge.

## 2021-08-10 NOTE — PLAN OF CARE
PRIMARY DIAGNOSIS: GENERALIZED WEAKNESS     OUTPATIENT/OBSERVATION GOALS TO BE MET BEFORE DISCHARGE  1. Orthostatic performed: No     2. Tolerating PO medications: Yes     3. Return to near baseline physical activity: No     4. Cleared for discharge by consultants (if involved): No      Temp: 97.8  F (36.6  C) Temp src: Oral BP: 139/61 Pulse: 51   Resp: 20 SpO2: 99 % O2 Device: None (Room air)     Patient alert and oriented x4. VSS. Up with assist of 1 with walker and gait belt. PT and OT recommending TCU. SW assisting with placement. Pt is resting w/ no complaints. 1u of Novolog was given and 12u of Lantus was given for BS of 227. Continuing to monitor blood sugars- insulin given per orders. Continuing with symptom management and supportive cares.       Discharge Planner Nurse   Safe discharge environment identified: No  Barriers to discharge: Yes       Entered by: Brianna Emery 08/10/2021      Please review provider order for any additional goals.   Nurse to notify provider when observation goals have been met and patient is ready for discharge.

## 2021-08-10 NOTE — PLAN OF CARE
PRIMARY DIAGNOSIS: GENERALIZED WEAKNESS     OUTPATIENT/OBSERVATION GOALS TO BE MET BEFORE DISCHARGE  1. Orthostatic performed: No     2. Tolerating PO medications: Yes     3. Return to near baseline physical activity: No     4. Cleared for discharge by consultants (if involved): No     Temp: 97.7  F (36.5  C) Temp src: Oral BP: (!) 148/59 Pulse: 50   Resp: 16 SpO2: 100 % O2 Device: None (Room air)     Patient alert and oriented x4. VSS. Up with assist of 1 with walker and gait belt. PT and OT recommending TCU. SW assisting with placement. Continuing to monitor blood sugars- insulin given per orders. Continuing with symptom management and supportive cares.       Discharge Planner Nurse   Safe discharge environment identified: No  Barriers to discharge: Yes       Entered by: Brianna Emery 08/09/2021  Please review provider order for any additional goals.   Nurse to notify provider when observation goals have been met and patient is ready for discharge.

## 2021-08-11 ENCOUNTER — LAB REQUISITION (OUTPATIENT)
Dept: LAB | Facility: CLINIC | Age: 76
End: 2021-08-11
Payer: COMMERCIAL

## 2021-08-11 VITALS
OXYGEN SATURATION: 99 % | SYSTOLIC BLOOD PRESSURE: 180 MMHG | DIASTOLIC BLOOD PRESSURE: 71 MMHG | TEMPERATURE: 97.7 F | RESPIRATION RATE: 16 BRPM | HEART RATE: 53 BPM

## 2021-08-11 DIAGNOSIS — Z11.1 ENCOUNTER FOR SCREENING FOR RESPIRATORY TUBERCULOSIS: ICD-10-CM

## 2021-08-11 PROBLEM — I77.0: Status: ACTIVE | Noted: 2021-08-11

## 2021-08-11 LAB
GLUCOSE BLDC GLUCOMTR-MCNC: 130 MG/DL (ref 70–99)
GLUCOSE BLDC GLUCOMTR-MCNC: 134 MG/DL (ref 70–99)
GLUCOSE BLDC GLUCOMTR-MCNC: 146 MG/DL (ref 70–99)
GLUCOSE BLDC GLUCOMTR-MCNC: 174 MG/DL (ref 70–99)

## 2021-08-11 PROCEDURE — 99217 PR OBSERVATION CARE DISCHARGE: CPT | Performed by: PHYSICIAN ASSISTANT

## 2021-08-11 PROCEDURE — 250N000013 HC RX MED GY IP 250 OP 250 PS 637: Mod: GY | Performed by: PHYSICIAN ASSISTANT

## 2021-08-11 PROCEDURE — 250N000013 HC RX MED GY IP 250 OP 250 PS 637: Performed by: PHYSICIAN ASSISTANT

## 2021-08-11 PROCEDURE — G0378 HOSPITAL OBSERVATION PER HR: HCPCS

## 2021-08-11 RX ORDER — METOPROLOL TARTRATE 25 MG/1
12.5 TABLET, FILM COATED ORAL 2 TIMES DAILY
Status: ON HOLD | DISCHARGE
Start: 2021-08-11 | End: 2021-10-12

## 2021-08-11 RX ORDER — FUROSEMIDE 10 MG/ML
20 INJECTION INTRAMUSCULAR; INTRAVENOUS ONCE
Status: DISCONTINUED | OUTPATIENT
Start: 2021-08-11 | End: 2021-08-11

## 2021-08-11 RX ORDER — AMLODIPINE BESYLATE 5 MG/1
5 TABLET ORAL DAILY
Status: DISCONTINUED | OUTPATIENT
Start: 2021-08-11 | End: 2021-08-11

## 2021-08-11 RX ORDER — FUROSEMIDE 20 MG
20 TABLET ORAL ONCE
Status: COMPLETED | OUTPATIENT
Start: 2021-08-11 | End: 2021-08-11

## 2021-08-11 RX ADMIN — FOLIC ACID 1 MG: 1 TABLET ORAL at 08:08

## 2021-08-11 RX ADMIN — APIXABAN 2.5 MG: 2.5 TABLET, FILM COATED ORAL at 08:08

## 2021-08-11 RX ADMIN — FUROSEMIDE 20 MG: 20 TABLET ORAL at 08:08

## 2021-08-11 RX ADMIN — METOPROLOL TARTRATE 12.5 MG: 25 TABLET ORAL at 08:07

## 2021-08-11 RX ADMIN — FUROSEMIDE 20 MG: 20 TABLET ORAL at 12:11

## 2021-08-11 RX ADMIN — HYDRALAZINE HYDROCHLORIDE 25 MG: 25 TABLET, FILM COATED ORAL at 17:12

## 2021-08-11 RX ADMIN — EZETIMIBE 10 MG: 10 TABLET ORAL at 08:08

## 2021-08-11 RX ADMIN — ALLOPURINOL 200 MG: 100 TABLET ORAL at 08:10

## 2021-08-11 RX ADMIN — CLONIDINE HYDROCHLORIDE 0.2 MG: 0.1 TABLET ORAL at 08:08

## 2021-08-11 NOTE — PLAN OF CARE
"PRIMARY DIAGNOSIS: GENERALIZED WEAKNESS    OUTPATIENT/OBSERVATION GOALS TO BE MET BEFORE DISCHARGE  1. Orthostatic performed: No    2. Tolerating PO medications: Yes    3. Return to near baseline physical activity: No    4. Cleared for discharge by consultants (if involved): No    Pt. Ambulated to bathroom with staff and was steady, but moved very slowly. Pt requested PRN tylenol for neck and back ache at 1/10 and stated, \" it helped some.\"  Discharge Planner Nurse   Safe discharge environment identified: No  Barriers to discharge: Yes       Entered by: Tan Kim 08/10/2021 9:25 PM     Please review provider order for any additional goals.   Nurse to notify provider when observation goals have been met and patient is ready for discharge.  "

## 2021-08-11 NOTE — PLAN OF CARE
PRIMARY DIAGNOSIS: GENERALIZED WEAKNESS    OUTPATIENT/OBSERVATION GOALS TO BE MET BEFORE DISCHARGE  1. Orthostatic performed: No    2. Tolerating PO medications: Yes    3. Return to near baseline physical activity: No    4. Cleared for discharge by consultants (if involved): No    PT/OT/SW ordered. Pt not elevating his left arm where new fistula is, encouraged. Ordered new ace wrap. Pt answers questions appropriately, alert and oriented. Insurance authorization pending for TCU.    Discharge Planner Nurse   Safe discharge environment identified: No  Barriers to discharge: Yes       Entered by: Christopher Bray 08/11/2021 12:43 PM     Please review provider order for any additional goals.   Nurse to notify provider when observation goals have been met and patient is ready for discharge.

## 2021-08-11 NOTE — PLAN OF CARE
Patient's After Visit Summary was reviewed with patient   Patient verbalized understanding of After Visit Summary, recommended follow up and was given an opportunity to ask questions.   Discharge medications sent home with patient/family: YES, picked up at pharm  Discharged with daughter    OBSERVATION patient END time: 0522

## 2021-08-11 NOTE — PLAN OF CARE
Occupational Therapy Discharge Summary    Reason for therapy discharge:    Discharged to transitional care facility.    Progress towards therapy goal(s). See goals on Care Plan in [facility] electronic health record for goal details.  Goals not met.  Barriers to achieving goals:   discharge from facility.    Therapy recommendation(s):    Continued therapy is recommended.  Rationale/Recommendations:  Patient continues to be below baseline with ADLS/IADLS with cognitive concerns.

## 2021-08-11 NOTE — DISCHARGE INSTRUCTIONS
You have an Appointment scheduled with Dr Roger Aug 24 at 9:30 am  [facility].   [phone] [address]  [place]

## 2021-08-11 NOTE — PLAN OF CARE
PRIMARY DIAGNOSIS: GENERALIZED WEAKNESS    OUTPATIENT/OBSERVATION GOALS TO BE MET BEFORE DISCHARGE  1. Orthostatic performed: No    2. Tolerating PO medications: Yes    3. Return to near baseline physical activity: No    4. Cleared for discharge by consultants (if involved): Yes     TCU recommended at previous hospitalization. Pt not elevating his left arm where new fistula is, encouraged. Ordered new ace wrap, applied. Pt answers questions appropriately, alert and oriented. Accepted to [facility], daughter to transport at 1730. Blood sugars before meals 130-146    Discharge Planner Nurse   Safe discharge environment identified: No  Barriers to discharge: Yes       Entered by: Christopher Bray 08/11/2021 5:49 PM     Please review provider order for any additional goals.   Nurse to notify provider when observation goals have been met and patient is ready for discharge.

## 2021-08-11 NOTE — PLAN OF CARE
PRIMARY DIAGNOSIS: GENERALIZED WEAKNESS    OUTPATIENT/OBSERVATION GOALS TO BE MET BEFORE DISCHARGE  1. Orthostatic performed: No    2. Tolerating PO medications: Yes    3. Return to near baseline physical activity: No    4. Cleared for discharge by consultants (if involved): No      Pt. Slept majority of shift after PRN tylenol. Pt SBA with walker and is steady with mobility. Plan is to be evaluated by PT/OT and [facility] [place].      Discharge Planner Nurse   Safe discharge environment identified: No  Barriers to discharge: Yes       Entered by: Tan Kim 08/11/2021 5:07 AM     Please review provider order for any additional goals.   Nurse to notify provider when observation goals have been met and patient is ready for discharge.

## 2021-08-11 NOTE — PLAN OF CARE
PRIMARY DIAGNOSIS: GENERALIZED WEAKNESS    OUTPATIENT/OBSERVATION GOALS TO BE MET BEFORE DISCHARGE  1. Orthostatic performed: No    2. Tolerating PO medications: Yes    3. Return to near baseline physical activity: No    4. Cleared for discharge by consultants (if involved): No    Discharge Planner Nurse   Safe discharge environment identified: No  Barriers to discharge: Yes       Entered by: Tan Kim 08/11/2021 12:38 AM     Please review provider order for any additional goals.   Nurse to notify provider when observation goals have been met and patient is ready for discharge.

## 2021-08-11 NOTE — PROGRESS NOTES
BP (!) 180/71   Pulse 53   Temp 97.7  F (36.5  C) (Oral)   Resp 16   SpO2 99%       B/P reported to KHURRAM Garcia who reports pt can discharge after PRN oral hydralazine given. Pt plans to leave at 1730 with daughter to [facility].

## 2021-08-11 NOTE — PLAN OF CARE
PRIMARY DIAGNOSIS: GENERALIZED WEAKNESS    OUTPATIENT/OBSERVATION GOALS TO BE MET BEFORE DISCHARGE  1. Orthostatic performed: No    2. Tolerating PO medications: Yes    3. Return to near baseline physical activity: No    4. Cleared for discharge by consultants (if involved): No    Seen by OT, TCU recommended. Pt not elevating his left arm where new fistula is, encouraged. Ordered new ace wrap, applied. Pt answers questions appropriately, alert and oriented. Accepted to [facility], daughter to transport at 1730.    Discharge Planner Nurse   Safe discharge environment identified: No  Barriers to discharge: Yes       Entered by: Christopher Bray 08/11/2021 12:47 PM     Please review provider order for any additional goals.   Nurse to notify provider when observation goals have been met and patient is ready for discharge.

## 2021-08-11 NOTE — DISCHARGE SUMMARY
[facility]    Discharge Summary  Hospitalist    Date of Admission:  8/7/2021  Date of Discharge:  8/11/2021  Discharging Provider: Nyla Pan PA-C  Date of Service (when I saw the patient): 08/11/21    Discharge Diagnoses   Failure to thrive s/s deconditioning, multiple medical comorbidities and progression of chronic kidney disease  Acute on chronic anemia  CKD stage V  Elevated transaminases  Uncontrolled type 2 insulin-dependent diabetes mellitus  Chronic atrial fibrillation  Depression      History of Present Illness   Gordon Gann is a 76 year old male with a PMH significant for end-stage renal disease, type 2 diabetes with insulin dependence, depression, alcohol abuse, hypertension, chronic atrial fibrillation and recent admission for diabetic ketoacidosis who presents from home on 8/7/21 after being discharged from [facility] on 8/6 with inability to take care of himself at home requesting TCU placement now.    Please see admitting H & P  by Radha Aldana PA-C on 8/7/2021 for full details of the encounter.     Hospital Course   Gordon Gann was admitted on 8/7/2021.  The following problems were addressed during his hospitalization:    #Failure to thrive due to multiple medical comorbidities  Patient was waiting for TCU placement where he was accepted and insurance authorized (patient reported initially this was not the case) but declined to go at time of discharge. He ultimately went home on 8/6 with home care and was readmitted on 8/7 due to family's inability to care for him.   -PT/OT recommend TCU  -decrease metoprolol  -monitor hgb  -SW consulted to assist with discharge planning, medically accepted [facility] with insurance authorization pending on 8/9     #Generalized Weakness, low grade fever: patient reported increased weakness and lethargy on 8/8 along with low grade fever of 100.7 and increased leukocytosis of 15.7.    No urinary symptoms and UA  unremarkable for infection. He has an occasional cough which is reportedly his baseline. Recent AV fistula does not appear to have surrounding erythema or signs of cellulitis.   - no additional fevers  - WBC normalized on recheck on 8/9  - hold off on further infectious workup at this time     #Anemia of Chronic Disease:   Hgb 8.3 on dual antiplatelet therapy and anticoagulation. May be contributing to global weakness. No active bleeding symptoms. No presyncopal symptoms with ambulation. Hgb 10-11 in prior hospitalization with fistula placement. Iron studies borderline, consistent with anemia of chronic disease. No iron overload.  -will need ongoing outpatient follow up and management, Follow up with Nephrology   -monitor for bleeding sx  -may need to hold/stop plavix ; per discussion with patient, last stent was 2006 done in [place]. Has not followed in long time and not sure how long he needs to be on Plavix. plavix stopped for now.     #DM II on long term insulin, uncontrolled  Recent history of non-compliance with insulin, A1C 11.7% and recent [facility] admission for DKA. PTA taking Lantus 7 units at bedtime and Aspart 5 units TID with meals.  -increase Lantus 15 units at bedtime   -increase PTA Aspart TID with meals   -cont Medium insulin sliding scale ordered     #Elevated transaminase/alk phos  Etiology not clear. RUQ negative. MRCP negative.  - Negative - HCV, HAV IgM, HBsAg, HBsAb  - Borderline ADILSON, but negative F-actin  - AMA negative  - TTG IgA negative, normal IgA level.  - Alk phos - increased liver source  - Iron studies consistent with anemia of chronic disease. No iron overload.  - RUQ Ultrasound negative  - MRCP negative  - If LFTs worsening GI recommends referring to [facility] liver clinic, ALT, AST and total bilirubin WNL with alkaline phosphatase still elevated at 285  - GI referral at discharge      #CKD stage V  Previous followed with nephrologist in [place] and had discussed possible dialysis which patient  had been undecided about as of 05/2021.  Baseline creatinine is in the upper-3 to low-4 range. Per nephology note on 5/17/21 patient's creatinine was 4.16.  - creatinine is currently stable at 4.02 today  - nephrology decreased PTA Lasix to 20 mg daily and Hydralazine was stopped during most recent hospital stay  - scheduled follow-up with Dr. Saldana on 8/24/2021  - s/p AV fistula placement on 8/5 by vascular surgery, he had swelling on his arm after that for which he needs to continue compression dressing      #Chronic A-fib  #CAD s/p PCI 2006  #HTN  #HLD  Further details unknown of underlying CAD.  Pharmacy confirms the patient only takes Eliquis once daily, stating he gets a rash if he takes it twice daily. PTA aspirin, Plavix, clonidine, metoprolol and Eliquis  -decrease PTA Metoprolol decreased further to 12.5 mg BID due to bradycardia in the 50s, fatigue  -continue clonidine 0.2 mg twice daily, ASA, Plavix and Eliquis   -He has been discontinued on his Plavix as he has no current indications to remain on this medication.  Referred to cardiology at discharge for further medical management       #Possible cognitive impairment  #Depression  Daughter concerned about depression and also reports he has been mismanaging his insulin recently and has concerns regarding forgetfulness. Previously tried Mirtazapine during last hospital stay which was discontinued due to increased somnolence.   Depression is uncontrolled.   -would discuss antidepressant with PCP if willing, patient previously declined psychiatry evaluation while admitted at [facility].  This should be revisited as an outpatient  -We will also refer for OT    #Insomnia  - PTA taking Ambien, possibly causing side effects of hallucinations and patient has not requested since admission, will discontinue   - consider melatonin      #History of alcohol abuse - no evidence of withdrawal during admission or prior admission.  No recent alcohol use.       Pending  Results   None.    Code Status   DNR / DNI       Primary Care Physician   Belen Prieto      INTERVAL HISTORY  No acute events overnight.  No presyncopal symptoms or dizziness.  Tolerating normal diet.  Somewhat oliguric.  No nausea or vomiting.  No abdominal pain.  Last bm 8/9, no reported hematochezia. Afebrile. Reports unchanged global weakness. No dizziness. No myalgias.  Remains with slight discomfort in LUE however improved since admission. Some reported numbness in hand since fistula, no tingling. Able to move elbow, shoulder freely.          BP (!) 182/80 (BP Location: Right arm)   Pulse 69   Temp 97.5  F (36.4  C) (Oral)   Resp 18   SpO2 91%     Constitutional:Awake, alert, no apparent distress  Respiratory:  Normal work of breathing. Lungs clear to auscultation bilaterally, no crackles or wheezing.  Cardiovascular: Regular rate and irregular rhythm, normal S1 and S2, and no murmur appreciated.   GI: Normal bowel sounds, soft, non-distended, non-tender.   Skin/Integument: Pale. No rashes, no cyanosis, +1 peripheral le edema. +1 LUE peripheral edema.   Neuro: Moving all extremities with normal strength. Coordination and sensation grossly intact. Speech clear. No focal deficits.   MK:swelling of left hand and arm, No cyanosis. Sensation intact.  No acute joint synovitis noted. AV fistula   Psych: flat. No overt hallucinations.          Discharge Disposition   Discharged to short-term care facility  Condition at discharge: Stable    Consultations This Hospital Stay   CARE MANAGEMENT / SOCIAL WORK IP CONSULT  CARE MANAGEMENT / SOCIAL WORK IP CONSULT  PHYSICAL THERAPY ADULT IP CONSULT  OCCUPATIONAL THERAPY ADULT IP CONSULT  PHYSICAL THERAPY ADULT IP CONSULT  OCCUPATIONAL THERAPY ADULT IP CONSULT    Time Spent on this Encounter   Nyla DA SILVA PA-C, personally saw the patient today and spent greater than 30 minutes discharging this patient.    Discharge Orders       Referral       Cardiology Eval Adult Referral      General info for SNF    Length of Stay Estimate: Short Term Care: Estimated # of Days <30  Condition at Discharge: Stable  Level of care:skilled   Rehabilitation Potential: Fair  Admission H&P remains valid and up-to-date: Yes  Recent Chemotherapy: N/A  Use Nursing Home Standing Orders: Yes     Mantoux instructions    Give two-step Mantoux (PPD) Per Facility Policy Yes     Follow Up and recommended labs and tests    1. Follow up with [facility] physician.  The following labs/tests are recommended: Hgb, BP check, BMP.  2. Follow up with Nephrology as directed on 8/24 with Dr. Saldana  3.     Reason for your hospital stay    You were re-admitting for placement assistance following hospital stay for complications of diabetes, progression of chronic kidney disease warranting fistula placement.     Glucose monitor nursing POCT    Before meals and at bedtime     Intake and output    Every shift     Daily weights    Call Provider for weight gain of more than 2 pounds per day or 5 pounds per week.     Activity - Up with nursing assistance    Elevate left upper extremity above heart such as possible     Physical Therapy Adult Consult    Evaluate and treat as clinically indicated.    Reason:  deconditioning, cognition and adl     Occupational Therapy Adult Consult    Evaluate and treat as clinically indicated.    Reason: deconditioning, cognition and adl     Fall precautions     Compression Sleeve/Stocking Order    Compression Sleeve/Stocking Documentation:   The patient needs a compression sleeve for healing AV fistula in left upper arm    I, the undersigned, certify that the above prescribed supplies are medically necessary for this patient and is both reasonable and necessary in reference to accepted standards of medical and necessary in reference to accepted standards of medical practice in the treatment of this patient's condition and is not prescribed as a convenience.     Advance  Diet as Tolerated    Follow this diet upon discharge: Orders Placed This Encounter      Renal Diet (non-dialysis)     Discharge Medications   Current Discharge Medication List      CONTINUE these medications which have CHANGED    Details   !! insulin aspart (NOVOLOG PEN) 100 UNIT/ML pen Inject 1-20 Units Subcutaneous 3 times daily (before meals) Correction Scale - MEDIUM INSULIN RESISTANCE DOSING   Do Not give Correction Insulin if Pre-Meal BG less than 140. For Pre-Meal  - 189 give 1 unit. For Pre-Meal  - 239 give 2 units. For Pre-Meal  - 289 give 3 units. For Pre-Meal  - 339 give 4 units. For Pre-Meal - 399 give 5 units. For Pre-Meal -449 give 6 units For Pre-Meal BG greater than or equal to 450 give 7 units. To be given with prandial insulin, and based on pre-meal blood glucose.  Notify provider if glucose greater than or equal to 350 mg/dL after administration of correction dose. If given at mealtime, administer within 30 minutes of start of meal  Qty: 30 mL, Refills: 0    Associated Diagnoses: Diabetic ketoacidosis without coma associated with type 2 diabetes mellitus (H)      insulin glargine (LANTUS PEN) 100 UNIT/ML pen Inject 15 Units Subcutaneous At Bedtime  Qty: 15 mL, Refills: 0    Comments: If Lantus is not covered by insurance, may substitute Basaglar at same dose and frequency.    Associated Diagnoses: Diabetic ketoacidosis without coma associated with type 2 diabetes mellitus (H)      metoprolol tartrate (LOPRESSOR) 25 MG tablet Take 0.5 tablets (12.5 mg) by mouth 2 times daily Hold for HR <55    Associated Diagnoses: Hypertension, unspecified type       !! - Potential duplicate medications found. Please discuss with provider.      CONTINUE these medications which have NOT CHANGED    Details   acetaminophen (TYLENOL) 325 MG tablet Take 1 tablet (325 mg) by mouth every 6 hours as needed for mild pain      allopurinol (ZYLOPRIM) 100 MG tablet Take 2 tablets (200 mg)  by mouth daily  Qty: 30 tablet, Refills: 0    Associated Diagnoses: Diabetic ketoacidosis without coma associated with type 2 diabetes mellitus (H)      apixaban ANTICOAGULANT (ELIQUIS) 2.5 MG tablet Take 1 tablet (2.5 mg) by mouth every morning  Qty: 30 tablet, Refills: 0    Associated Diagnoses: Diabetic ketoacidosis without coma associated with type 2 diabetes mellitus (H)      aspirin 81 MG EC tablet Take 81 mg by mouth At Bedtime      cloNIDine (CATAPRES) 0.2 MG tablet Take 0.2 mg by mouth 2 times daily      ezetimibe (ZETIA) 10 MG tablet Take 10 mg by mouth every morning      fish oil-omega-3 fatty acids 1000 MG capsule Take 1 g by mouth every morning      folic acid (FOLVITE) 1 MG tablet Take 1 mg by mouth every morning      furosemide (LASIX) 20 MG tablet Take 1 tablet (20 mg) by mouth daily  Qty: 30 tablet, Refills: 0    Associated Diagnoses: Diabetic ketoacidosis without coma associated with type 2 diabetes mellitus (H)      hydrALAZINE (APRESOLINE) 25 MG tablet Take 1 tablet (25 mg) by mouth every 6 hours as needed (SBP >180)  Qty: 30 tablet, Refills: 0    Associated Diagnoses: Diabetic ketoacidosis without coma associated with type 2 diabetes mellitus (H)      !! insulin aspart (NOVOLOG PEN) 100 UNIT/ML pen Inject 5 Units Subcutaneous 3 times daily (with meals)  Qty: 15 mL, Refills: 0    Associated Diagnoses: Diabetic ketoacidosis without coma associated with type 2 diabetes mellitus (H)      simvastatin (ZOCOR) 40 MG tablet Take 1 tablet (40 mg) by mouth At Bedtime    Comments: Hold until liver function test is close to baseline.cmp ordered in 5-6 days      vitamin B complex with vitamin C (STRESS TAB) tablet Take 1 tablet by mouth every morning      Vitamin D, Cholecalciferol, 25 MCG (1000 UT) CAPS Take 1,000 Units by mouth every morning       !! - Potential duplicate medications found. Please discuss with provider.      STOP taking these medications       clopidogrel (PLAVIX) 75 MG tablet Comments:    Reason for Stopping:         oxyCODONE (ROXICODONE) 5 MG tablet Comments:   Reason for Stopping:         zolpidem (AMBIEN) 5 MG tablet Comments:   Reason for Stopping:             Allergies   No Known Allergies  Data   Most Recent 3 CBC's:Recent Labs   Lab Test 08/09/21  0850 08/08/21  0832 08/07/21  1103   WBC 9.2 15.7* 11.2*   HGB 8.3* 9.6* 9.0*   MCV 91 88 89    341 305      Most Recent 3 BMP's:  Recent Labs   Lab Test 08/11/21  0753 08/11/21  0315 08/10/21  2156 08/09/21  0850 08/08/21  0832 08/07/21  1103   NA  --   --   --  135 135 138   POTASSIUM  --   --   --  4.2 4.3 3.9   CHLORIDE  --   --   --  106 107 107   CO2  --   --   --  22 19* 21   BUN  --   --   --  60* 56* 64*   CR  --   --   --  4.02* 4.01* 4.33*   ANIONGAP  --   --   --  7 9 10   LC  --   --   --  8.1* 8.5 8.4*   * 174* 210* 185* 237* 149*     Most Recent 2 LFT's:  Recent Labs   Lab Test 08/09/21  0850 08/07/21  1103   AST 38 56*   ALT 44 51   ALKPHOS 285* 273*   BILITOTAL 0.4 0.3     Most Recent INR's and Anticoagulation Dosing History:  Anticoagulation Dose History     Recent Dosing and Labs Latest Ref Rng & Units 8/2/2021 8/3/2021 8/4/2021    INR 0.85 - 1.15 0.99 1.01 1.12        Most Recent 3 Troponin's:  Recent Labs   Lab Test 07/28/21  1409 07/28/21  1237   TROPONIN 0.068* 0.064*     Most Recent Cholesterol Panel:No lab results found.  Most Recent 6 Bacteria Isolates From Any Culture (See EPIC Reports for Culture Details):No lab results found.  Most Recent TSH, T4 and A1c Labs:  Recent Labs   Lab Test 07/31/21  1445 07/28/21  1237   TSH  --  2.58   A1C 11.0* 11.7*     Results for orders placed or performed during the hospital encounter of 07/28/21   US Abdomen Limited    Narrative    EXAM: US ABDOMEN LIMITED  LOCATION: [facility]  DATE/TIME: 7/30/2021 10:10 PM    INDICATION: Intermittent right upper quadrant abdominal pain with elevated liver function studies.  COMPARISON: None.  TECHNIQUE:  Limited abdominal ultrasound.    FINDINGS:    GALLBLADDER: Normal. No gallstones, wall thickening, or pericholecystic fluid. Negative sonographic Velasquez's sign.    BILE DUCTS: No biliary dilatation. The common duct measures 4 mm.    LIVER: Normal parenchyma with smooth contour. No focal mass.    RIGHT KIDNEY: No hydronephrosis. 11 cm in length.    PANCREAS: Not visualized due to bowel gas.    Minimal right-sided pleural fluid. No ascites.      Impression    IMPRESSION:  1.  No overt hepatic abnormality.    2.  No calcified gallstones, gallbladder wall thickening or pericholecystic fluid.    3.  No biliary dilatation.    4.  No hydronephrosis.       MR Abdomen MRCP without Contrast    Narrative    MRI ABDOMEN    CLINICAL HISTORY:  Elevated alkaline phosphatase, aspartate  aminotransferase and is empty. Previous history of right upper  quadrant pain.    TECHNIQUE: MRI of the abdomen without intravenous contrast and 3-D  MRCP. FINDINGS:    Comparison study: Right upper quadrant ultrasound 7/30/2021    FINDINGS: Evaluation is limited by absence of intravenous contrast.    Normal gallbladder. No intrahepatic or extrahepatic biliary ductal  dilatation.  No significant hepatic steatosis. No focal hepatic masses evident on  this noncontrast exam. Normal size and signal of the pancreatic  parenchyma. No pancreatic duct dilatation. The spleen and adrenal  glands are normal. Simple and punctate hemorrhagic cysts in the  kidneys. Visualized loops of small bowel and colon are normal in  caliber. No abdominal lymphadenopathy. No ascites. Partly visualized  small right pleural effusion.      Impression    IMPRESSION:  1. No intrahepatic or extrahepatic biliary ductal dilatation. Normal  gallbladder,  liver and pancreas on this noncontrast MRI.  2. Partially visualized small right pleural effusion.    BRIDGETT AWRNER MD         SYSTEM ID:  [id number]   US Upper Extremity Venous Mapping Bilateral    Narrative    ULTRASOUND BILATERAL  UPPER EXTREMITIES VENOUS MAPPING August 3, 2021  11:28 AM     HISTORY: End-stage renal disease requires dialysis access.  Preoperative evaluation in a revascularization procedure when it is  expected that an autologous vein will be used and if there is  uncertainty regarding availability of a suitable vein for bypass.      COMPARISON: None.    FINDINGS:   Right upper extremity: The right cephalic vein is patent in the arm  and has diameters that range between 1.7 to 1.0 mm. There is occlusive  thrombus in the proximal to mid distal forearm.    The right basilic vein is patent in the arm and has diameters that  range between 4.3 to 2.1 mm.    Left upper extremity: The left cephalic vein is patent. Its diameters  in the arm range between 1.9 to 0.9 mm. Its diameters in the forearm  range between 1.2 to 0.7 mm.    The left basilic vein is patent in the arm. Its diameters range  between 1.6 to 1.5 mm.      Impression    IMPRESSION: Vein mapping performed as above. The right basilic vein in  the arm is the largest caliber vessel.     BOBY KAUR MD         SYSTEM ID:  [id number]    Upper Extremity Venous Duplex Bilat    Narrative     UPPER EXTREMITY VENOUS DUPLEX BILATERAL  8/4/2021 11:19 AM     HISTORY: 76-year-old patient with plan for AV fistula creation.  Request made to ensure patency of veins in both upper extremities as  well as diameter measurements of applicable veins in both upper  extremities.    COMPARISON: None    TECHNIQUE: Color Doppler and spectral waveform analysis obtained  throughout the deep and superficial veins of both upper extremities.    FINDINGS: Both internal jugular, subclavian, axillary, and brachial  veins demonstrate normal blood flow, compression (where applicable),  and augmentation. The basilic and cephalic veins are patent.    Brachial vein measurements were obtained with tourniquet applied in  both upper extremities.    Right upper extremity: The medial brachial venous branches  2.2 mm and  the lateral branch is 3.3 mm. The axillary vein a 7.6 mm, subclavian  vein ranges from 5 to 6.8 mm and the internal jugular vein is 10.4 mm.    Left upper extremity: The medial brachial vein is 2.7 mm and lateral  brachial vein is 3.8 mm. The axillary vein is 6.7 mm. The subclavian  vein ranges from 6.7 to 6.9 mm. The internal jugular vein is 9.2 mm.      Impression    IMPRESSION:  1. Negative for deep venous thrombosis throughout both upper  extremities.  2. Venous measurements of both brachial, axillary, subclavian, and  internal jugular veins as described above.    BON ROSE MD         SYSTEM ID:  [id number]   Echocardiogram Complete     Value    LVEF  50-55%    Narrative    [id number]  [id number]  [id number]^BARTHELL^NORMA^ASAEL CHANDRA     [facility]  Echocardiography Laboratory  [address] [place] [address]     Name: JULIO RIZVI  MRN: [id number]  : 1945  Study Date: 2021 03:23 PM  Age: 76 yrs  Gender: Male  Patient Location: [facility]  Reason For Study: Edema  Ordering Physician: BARTHELL, JOANNA KERSTEN ULMEN  Referring Physician: GI HINKLE  Performed By: Carlos Davis RDCS     BSA: 2.0 m2  Height: 68 in  Weight: 191 lb  HR: 64  BP: 165/94 mmHg  ______________________________________________________________________________  Procedure  Complete Portable Echo Adult. Optison (NDC #8064-4221) given intravenously.  ______________________________________________________________________________  Interpretation Summary     The left ventricle is normal in size.  There is mild concentric left ventricular hypertrophy.  The visual ejection fraction is 50-55%.  Septal motion is consistent with conduction abnormality.  The right ventricle is normal in size and function.  No significant valvular heart disease.  ______________________________________________________________________________  Left Ventricle  The left ventricle is normal in size. There is  mild concentric left  ventricular hypertrophy. The visual ejection fraction is 50-55%. Diastolic  Doppler findings (E/E' ratio and/or other parameters) suggest left ventricular  filling pressures are indeterminate. Septal motion is consistent with  conduction abnormality.     Right Ventricle  The right ventricle is normal in size and function.     Atria  The left atrium is borderline dilated. Right atrial size is normal. There is  no color Doppler evidence of an atrial shunt.     Mitral Valve  The mitral valve leaflets are mildly thickened. There is trace mitral  regurgitation.     Tricuspid Valve  There is trace tricuspid regurgitation. IVC diameter <2.1 cm collapsing >50%  with sniff suggests a normal RA pressure of 3 mmHg.     Aortic Valve  There is trivial trileaflet aortic sclerosis. No aortic regurgitation is  present.     Pulmonic Valve  There is trace pulmonic valvular regurgitation.     Vessels  Normal size aorta. The aortic root is normal size.     Pericardium  There is no pericardial effusion.     Rhythm  The rhythm was atrial fibrillation.  ______________________________________________________________________________  MMode/2D Measurements & Calculations  IVSd: 1.5 cm     LVIDd: 4.9 cm  LVIDs: 3.3 cm  LVPWd: 1.2 cm  FS: 33.5 %  LV mass(C)d: 270.7 grams  LV mass(C)dI: 135.1 grams/m2  Ao root diam: 3.4 cm  LA dimension: 4.7 cm  LA/Ao: 1.4  LA Volume (BP): 61.8 ml  LA Volume Index (BP): 30.9 ml/m2  RWT: 0.51     Doppler Measurements & Calculations  MV E max michelle: 61.1 cm/sec  MV dec time: 0.18 sec  PA acc time: 0.11 sec  E/E' av.9  Lateral E/e': 5.7  Medial E/e': 12.2     ______________________________________________________________________________  Report approved by: Luisa Alvarez 2021 04:16 PM               Nyla Pan PA-C  [facility]

## 2021-08-11 NOTE — PLAN OF CARE
Physical Therapy Discharge Summary     Reason for therapy discharge:    Discharged to transitional care facility.     Progress towards therapy goal(s). See goals on Care Plan in [facility] electronic health record for goal details.  Goals not met.  Barriers to achieving goals:   Continued need for supervision/CGA for mobility items due to poor balance  Therapy recommendation(s):    Continued therapy is recommended.  Rationale/Recommendations:  Pt is below baseline with functional mobility and strength and would benefit from continued PT to progress skills.

## 2021-08-12 PROCEDURE — 86481 TB AG RESPONSE T-CELL SUSP: CPT | Mod: ORL | Performed by: INTERNAL MEDICINE

## 2021-08-12 PROCEDURE — P9604 ONE-WAY ALLOW PRORATED TRIP: HCPCS | Mod: ORL | Performed by: INTERNAL MEDICINE

## 2021-08-12 PROCEDURE — 36415 COLL VENOUS BLD VENIPUNCTURE: CPT | Mod: ORL | Performed by: INTERNAL MEDICINE

## 2021-08-13 LAB
GAMMA INTERFERON BACKGROUND BLD IA-ACNC: 0.14 IU/ML
M TB IFN-G BLD-IMP: NEGATIVE
M TB IFN-G CD4+ BCKGRND COR BLD-ACNC: 9.32 IU/ML
MITOGEN IGNF BCKGRD COR BLD-ACNC: -0.03 IU/ML
MITOGEN IGNF BCKGRD COR BLD-ACNC: -0.04 IU/ML
QUANTIFERON MITOGEN: 9.46 IU/ML
QUANTIFERON NIL TUBE: 0.14 IU/ML
QUANTIFERON TB1 TUBE: 0.11 IU/ML
QUANTIFERON TB2 TUBE: 0.1

## 2021-08-14 ENCOUNTER — LAB REQUISITION (OUTPATIENT)
Dept: LAB | Facility: CLINIC | Age: 76
End: 2021-08-14
Payer: COMMERCIAL

## 2021-08-14 DIAGNOSIS — D64.9 ANEMIA, UNSPECIFIED: ICD-10-CM

## 2021-08-14 DIAGNOSIS — N18.5 CHRONIC KIDNEY DISEASE, STAGE 5 (H): ICD-10-CM

## 2021-08-24 ENCOUNTER — TELEPHONE (OUTPATIENT)
Dept: OTHER | Facility: CLINIC | Age: 76
End: 2021-08-24

## 2021-08-24 DIAGNOSIS — T82.898A OTHER SPECIFIED COMPLICATION OF VASCULAR PROSTHETIC DEVICES, IMPLANTS AND GRAFTS, INITIAL ENCOUNTER (H): ICD-10-CM

## 2021-08-24 DIAGNOSIS — I77.0: Primary | ICD-10-CM

## 2021-08-24 NOTE — TELEPHONE ENCOUNTER
[facility]     Who is the name of the provider? Dr Hernandez     What is the location you see this provider at?  [place]     Reason for call:  Per 08/06/21 discharge notes:    Follow up  1. Follow up with Dr. Hernandez in 2 weeks with an ultrasound of the left arm fistula. Call [phone] to schedule  appointment.    :  Gordon    Phone number to call:  [phone]    Additional Notes:  Need order for Ultrasound

## 2021-08-24 NOTE — TELEPHONE ENCOUNTER
Called patient to schedule his Ultrasound and 2 week PO with Dr Hernandez and his mailbox is full. (Unable to LM)

## 2021-08-26 NOTE — PROGRESS NOTES
[facility]      OUTPATIENT OCCUPATIONAL THERAPY  EVALUATION  PLAN OF TREATMENT FOR OUTPATIENT REHABILITATION  (COMPLETE FOR INITIAL CLAIMS ONLY)  Patient's Last Name, First Name, M.I.  YOB: 1945  Gordon Gann                          Provider's Name  [facility] Medical Record No.  [id number]                               Onset Date:  08/07/21   Start of Care Date:  08/08/21     Type:     ___PT   _X_OT   ___SLP Medical Diagnosis:  failure to thrive                        OT Diagnosis:  decreased independence and endurance in ADLS   Visits from SOC:  1   _________________________________________________________________________________  Plan of Treatment/Functional Goals    Planned Interventions: ADL retraining, cognition   Goals: See Occupational Therapy Goals on Care Plan in [employer] electronic health record.    Therapy Frequency: 5x/week  Predicted Duration of Therapy Intervention: 3 days  _________________________________________________________________________________    I CERTIFY THE NEED FOR THESE SERVICES FURNISHED UNDER        THIS PLAN OF TREATMENT AND WHILE UNDER MY CARE     (Physician co-signature of this document indicates review and certification of the therapy plan).                Certification date from: 08/08/21, Certification date to: 08/15/21    Referring Physician: Nidhi Aldana PA-C            Initial Assessment        See Occupational Therapy evaluation dated 08/08/21 in Epic electronic health record.

## 2021-08-27 NOTE — ADDENDUM NOTE
Addendum  created 08/27/21 0156 by Oriana Moya MD    Child order released for a procedure order, Clinical Note Signed, Intraprocedure Blocks edited

## 2021-08-27 NOTE — ANESTHESIA PROCEDURE NOTES
Other (Axillary) Procedure Note  Pre-Procedure   Staff -        Anesthesiologist:  Oriana Moya MD       Performed By: anesthesiologist       Location: pre-op       Procedure Start/Stop Times: 8/5/2021 2:04 PM and 8/5/2021 2:14 PM       Pre-Anesthestic Checklist: patient identified, IV checked, site marked, risks and benefits discussed, informed consent, monitors and equipment checked, pre-op evaluation, at physician/surgeon's request and post-op pain management  Timeout:       Correct Patient: Yes        Correct Procedure: Yes        Correct Site: Yes        Correct Position: Yes        Correct Laterality: Yes        Site Marked: Yes  Procedure Documentation  Procedure: Other (Axillary)       Laterality: left       Patient Position: sitting       Skin prep: Betadine       Local skin infiltrated with 1 mL of 1% lidocaine.        Needle Type: insulated       Needle Gauge: 21.        Needle Length (Inches): 3.13        Ultrasound guided (image saved)       1. Ultrasound was used to identify targeted nerve, plexus, vascular marker, or fascial plane and place a needle adjacent to it in real-time.       2. Ultrasound was used to visualize the spread of anesthetic in close proximity to the above referenced structure.       3. A permanent image is entered into the patient's record.       4. The visualized anatomic structures appeared normal.       5. There were no apparent abnormal pathologic findings.    Assessment/Narrative         The placement was negative for: blood aspirated, painful injection and site bleeding       Paresthesias: No.      Bolus given via needle. No blood aspirated via catheter.        Secured via.        Insertion/Infusion Method: Single Shot    Comments:  Bolus via needle, 35 ml of 0.5% Bupivacaine with 1:400,000 epinephrine.  Patient tolerated well, was mildly sedated but communicative throughout the procedure.   The surgeon has given a verbal order transferring care of this patient to me for  the performance of regional analgesia block for post op pain control. It is requested of me because I am uniquely trained and qualified to perform this block and the surgeon is neither trained nor qualified to perform this procedure.

## 2021-09-03 NOTE — TELEPHONE ENCOUNTER
Spoke with Gordon and he is currently at [facility].  He arranges his own appts and rides, but the bus from the facility comes Fridays and only arrive at certain times.  Due to this, Gordon is scheduled for AVF ultrasound and Dr. Hernandez appt on 10/08/21.

## 2021-09-06 NOTE — ADDENDUM NOTE
Addendum  created 09/06/21 0022 by Oriana Moya MD    Clinical Note Signed, Intraprocedure Blocks edited

## 2021-09-10 ENCOUNTER — TELEPHONE (OUTPATIENT)
Dept: OTHER | Facility: CLINIC | Age: 76
End: 2021-09-10

## 2021-09-10 NOTE — TELEPHONE ENCOUNTER
Mr. Gann underwent brachial artery to axillary vein arteriovenous graft for dialysis on 8/5/2021.  He presented to [facility] where his nursing home was concerned for swelling.  No fever or chills reported.  I was called by Dr. Mayen.   I reported that there is a prosthetic valve and I am concerned for infection.  I advised her to get some basic labs with CBC, CRP and ESR and then page me.  I gave her my pager number.

## 2021-09-13 ENCOUNTER — TELEPHONE (OUTPATIENT)
Dept: OTHER | Facility: CLINIC | Age: 76
End: 2021-09-13
Payer: COMMERCIAL

## 2021-09-13 NOTE — TELEPHONE ENCOUNTER
Katie from [facility] is calling regarding the patient's arm swelling after AVF surgery. Patient was seen in the ED on Friday evening but discharged after speaking with Dr. Monahan. Patient is still having swelling in his left arm. Homecare would like to know if it would be ok to use compression on his arm to help his symptoms. Katie would like a call back at [phone]

## 2021-09-14 NOTE — TELEPHONE ENCOUNTER
Pt is s/p left brachial to axillary AV graft creation with tapered PTFE on 8/5/21 with Dr. Hernandez.    1st PO is scheduled 10/8/21.  Pt was evaluated at [facility] ED on 9/10/21.    Will discuss with Dr. Hernandez in clinic today to determine if she has other recommendations.    Georgiana Ross, LAURAN, RN-[facility]

## 2021-09-15 NOTE — TELEPHONE ENCOUNTER
Returned call to Katie.  Patient has continued left arm swelling, +3 pitting edema from wrist to upper arm.  Katie reports patient has arm elevated frequently.  Katie would like to know if homecare can use tubagrip on patients arm.     Routing to Dr. Hernandez to confirm if patient may use tubagrip on left arm.    Georgiana Ross, LAURAN, RN-[facility]    ADDENDUM:  Katie will no longer be patient's .  Will need to contact Cathy at [phone] with updates and recommendations.

## 2021-09-20 NOTE — ADDENDUM NOTE
Addendum  created 09/20/21 0725 by Oriana Moya MD    Clinical Note Signed, Intraprocedure Blocks edited

## 2021-09-23 ENCOUNTER — TELEPHONE (OUTPATIENT)
Dept: OTHER | Facility: CLINIC | Age: 76
End: 2021-09-23

## 2021-09-23 DIAGNOSIS — M79.89 LEFT UPPER EXTREMITY SWELLING: ICD-10-CM

## 2021-09-23 DIAGNOSIS — T82.898A OTHER SPECIFIED COMPLICATION OF VASCULAR PROSTHETIC DEVICES, IMPLANTS AND GRAFTS, INITIAL ENCOUNTER (H): Primary | ICD-10-CM

## 2021-09-23 NOTE — TELEPHONE ENCOUNTER
Per Dr. Hernandez, pt needs to be seen either 9/24/21 or 9/27/21 with US AVF and central veins (clarified with Dr. Hernandez re central veins: subclavian, Internal jugular into axillary duplex due to arm swelling), along with in person OV.       Chart review 9/13/21   Left upper extremity edema.        Routing to  to coordinate Left upper extremity venous U/S, AVF U/S and in person OV. (please note AVF imaging and ov is scheduled for 10/8/21 but needs to be moved up to either 9/24/21 or 9/27/21).     (also routed to [facility] RN triage to watch for appt confirmation).     MARIO Camacho, RN  [facility]  Office:  [phone] Fax: [phone]

## 2021-09-24 NOTE — TELEPHONE ENCOUNTER
Spoke with Gordon, he does not think he can navigate parking and getting into our clinic.  He is going to talk to his daughter.  I will call him back on Monday.

## 2021-09-27 NOTE — TELEPHONE ENCOUNTER
Spoke with pt scheduled for US's and follow-up with Dr. Hernandez on Friday Oct 8th.   Daughter out of town this week.

## 2021-09-28 NOTE — TELEPHONE ENCOUNTER
Spoke with Gordon today and he was very confused by his appointments and how to figure out how to get here.  I asked for his permission to speak with his daughter.  I called Belen and she said she will arrange transportation for him on October 8th.  I gave her the option of moving up appointments to October 1st and due to that being an earlier time of day she did not think he could manage that.  So, we will leave appointments for 10/08/21 and daughter is aware and will arrange transportation.

## 2021-10-04 ENCOUNTER — APPOINTMENT (OUTPATIENT)
Dept: GENERAL RADIOLOGY | Facility: CLINIC | Age: 76
DRG: 308 | End: 2021-10-04
Attending: EMERGENCY MEDICINE
Payer: COMMERCIAL

## 2021-10-04 ENCOUNTER — HOSPITAL ENCOUNTER (INPATIENT)
Facility: CLINIC | Age: 76
LOS: 8 days | Discharge: NURSING, REHAB OR LONG-TERM CARE | DRG: 308 | End: 2021-10-12
Attending: EMERGENCY MEDICINE | Admitting: INTERNAL MEDICINE
Payer: COMMERCIAL

## 2021-10-04 DIAGNOSIS — R79.89 ELEVATED TROPONIN: ICD-10-CM

## 2021-10-04 DIAGNOSIS — G47.00 INSOMNIA, UNSPECIFIED TYPE: Primary | ICD-10-CM

## 2021-10-04 DIAGNOSIS — I48.92 ATRIAL FLUTTER WITH RAPID VENTRICULAR RESPONSE (H): ICD-10-CM

## 2021-10-04 DIAGNOSIS — E11.10 DIABETIC KETOACIDOSIS WITHOUT COMA ASSOCIATED WITH TYPE 2 DIABETES MELLITUS (H): ICD-10-CM

## 2021-10-04 LAB
ALBUMIN SERPL-MCNC: 2.2 G/DL (ref 3.4–5)
ALP SERPL-CCNC: 343 U/L (ref 40–150)
ALT SERPL W P-5'-P-CCNC: 72 U/L (ref 0–70)
ANION GAP SERPL CALCULATED.3IONS-SCNC: 8 MMOL/L (ref 3–14)
AST SERPL W P-5'-P-CCNC: 69 U/L (ref 0–45)
ATRIAL RATE - MUSE: 129 BPM
BILIRUB SERPL-MCNC: 0.3 MG/DL (ref 0.2–1.3)
BUN SERPL-MCNC: 68 MG/DL (ref 7–30)
CALCIUM SERPL-MCNC: 8.2 MG/DL (ref 8.5–10.1)
CHLORIDE BLD-SCNC: 112 MMOL/L (ref 94–109)
CO2 SERPL-SCNC: 23 MMOL/L (ref 20–32)
CREAT SERPL-MCNC: 4.37 MG/DL (ref 0.66–1.25)
DIASTOLIC BLOOD PRESSURE - MUSE: NORMAL MMHG
ERYTHROCYTE [DISTWIDTH] IN BLOOD BY AUTOMATED COUNT: 15.8 % (ref 10–15)
GFR SERPL CREATININE-BSD FRML MDRD: 12 ML/MIN/1.73M2
GLUCOSE BLD-MCNC: 69 MG/DL (ref 70–99)
GLUCOSE BLDC GLUCOMTR-MCNC: 56 MG/DL (ref 70–99)
GLUCOSE BLDC GLUCOMTR-MCNC: 76 MG/DL (ref 70–99)
HCT VFR BLD AUTO: 37.8 % (ref 40–53)
HGB BLD-MCNC: 11.6 G/DL (ref 13.3–17.7)
HOLD SPECIMEN: NORMAL
INR PPP: 1.05 (ref 0.85–1.15)
INTERPRETATION ECG - MUSE: NORMAL
MCH RBC QN AUTO: 28.6 PG (ref 26.5–33)
MCHC RBC AUTO-ENTMCNC: 30.7 G/DL (ref 31.5–36.5)
MCV RBC AUTO: 93 FL (ref 78–100)
P AXIS - MUSE: NORMAL DEGREES
PLATELET # BLD AUTO: 364 10E3/UL (ref 150–450)
POTASSIUM BLD-SCNC: 4.4 MMOL/L (ref 3.4–5.3)
PR INTERVAL - MUSE: NORMAL MS
PROT SERPL-MCNC: 6.2 G/DL (ref 6.8–8.8)
QRS DURATION - MUSE: 86 MS
QT - MUSE: 330 MS
QTC - MUSE: 487 MS
R AXIS - MUSE: 32 DEGREES
RBC # BLD AUTO: 4.06 10E6/UL (ref 4.4–5.9)
SARS-COV-2 RNA RESP QL NAA+PROBE: NEGATIVE
SODIUM SERPL-SCNC: 143 MMOL/L (ref 133–144)
SYSTOLIC BLOOD PRESSURE - MUSE: NORMAL MMHG
T AXIS - MUSE: 89 DEGREES
TROPONIN I SERPL-MCNC: 0.66 UG/L (ref 0–0.04)
VENTRICULAR RATE- MUSE: 131 BPM
WBC # BLD AUTO: 9.8 10E3/UL (ref 4–11)

## 2021-10-04 PROCEDURE — 210N000001 HC R&B IMCU HEART CARE

## 2021-10-04 PROCEDURE — 96376 TX/PRO/DX INJ SAME DRUG ADON: CPT

## 2021-10-04 PROCEDURE — 99285 EMERGENCY DEPT VISIT HI MDM: CPT | Mod: 25

## 2021-10-04 PROCEDURE — 96365 THER/PROPH/DIAG IV INF INIT: CPT

## 2021-10-04 PROCEDURE — 84484 ASSAY OF TROPONIN QUANT: CPT | Performed by: EMERGENCY MEDICINE

## 2021-10-04 PROCEDURE — 258N000003 HC RX IP 258 OP 636: Performed by: EMERGENCY MEDICINE

## 2021-10-04 PROCEDURE — 80053 COMPREHEN METABOLIC PANEL: CPT | Performed by: EMERGENCY MEDICINE

## 2021-10-04 PROCEDURE — 85027 COMPLETE CBC AUTOMATED: CPT | Performed by: EMERGENCY MEDICINE

## 2021-10-04 PROCEDURE — 250N000011 HC RX IP 250 OP 636: Performed by: EMERGENCY MEDICINE

## 2021-10-04 PROCEDURE — 250N000009 HC RX 250: Performed by: EMERGENCY MEDICINE

## 2021-10-04 PROCEDURE — 71046 X-RAY EXAM CHEST 2 VIEWS: CPT

## 2021-10-04 PROCEDURE — 87635 SARS-COV-2 COVID-19 AMP PRB: CPT | Performed by: EMERGENCY MEDICINE

## 2021-10-04 PROCEDURE — 85610 PROTHROMBIN TIME: CPT | Performed by: EMERGENCY MEDICINE

## 2021-10-04 PROCEDURE — 93005 ELECTROCARDIOGRAM TRACING: CPT

## 2021-10-04 PROCEDURE — C9803 HOPD COVID-19 SPEC COLLECT: HCPCS

## 2021-10-04 PROCEDURE — 96366 THER/PROPH/DIAG IV INF ADDON: CPT

## 2021-10-04 PROCEDURE — 258N000001 HC RX 258

## 2021-10-04 PROCEDURE — 36415 COLL VENOUS BLD VENIPUNCTURE: CPT | Performed by: EMERGENCY MEDICINE

## 2021-10-04 PROCEDURE — 99223 1ST HOSP IP/OBS HIGH 75: CPT | Mod: AI | Performed by: INTERNAL MEDICINE

## 2021-10-04 RX ORDER — DEXTROSE MONOHYDRATE 25 G/50ML
INJECTION, SOLUTION INTRAVENOUS
Status: COMPLETED
Start: 2021-10-04 | End: 2021-10-04

## 2021-10-04 RX ORDER — DEXTROSE MONOHYDRATE 25 G/50ML
25 INJECTION, SOLUTION INTRAVENOUS ONCE
Status: COMPLETED | OUTPATIENT
Start: 2021-10-04 | End: 2021-10-04

## 2021-10-04 RX ORDER — DILTIAZEM HYDROCHLORIDE 5 MG/ML
20 INJECTION INTRAVENOUS ONCE
Status: COMPLETED | OUTPATIENT
Start: 2021-10-04 | End: 2021-10-04

## 2021-10-04 RX ORDER — HEPARIN SODIUM 10000 [USP'U]/100ML
0-5000 INJECTION, SOLUTION INTRAVENOUS CONTINUOUS
Status: DISPENSED | OUTPATIENT
Start: 2021-10-04 | End: 2021-10-11

## 2021-10-04 RX ORDER — ZOLPIDEM TARTRATE 5 MG/1
5 TABLET ORAL
Status: ON HOLD | COMMUNITY
End: 2021-10-12

## 2021-10-04 RX ADMIN — DEXTROSE MONOHYDRATE 25 ML: 500 INJECTION PARENTERAL at 16:42

## 2021-10-04 RX ADMIN — DEXTROSE MONOHYDRATE 25 ML: 25 INJECTION, SOLUTION INTRAVENOUS at 16:42

## 2021-10-04 RX ADMIN — DILTIAZEM HYDROCHLORIDE 5 MG/HR: 5 INJECTION INTRAVENOUS at 17:57

## 2021-10-04 RX ADMIN — HEPARIN SODIUM 1200 UNITS/HR: 10000 INJECTION, SOLUTION INTRAVENOUS at 17:45

## 2021-10-04 RX ADMIN — DILTIAZEM HYDROCHLORIDE 20 MG: 5 INJECTION INTRAVENOUS at 17:41

## 2021-10-04 ASSESSMENT — ENCOUNTER SYMPTOMS
PALPITATIONS: 0
LIGHT-HEADEDNESS: 0
SHORTNESS OF BREATH: 0

## 2021-10-04 ASSESSMENT — MIFFLIN-ST. JEOR: SCORE: 1730.09

## 2021-10-04 NOTE — ED NOTES
Answered the patient's call light, the patient is asking if he is being admitted. Will notify RN as it seems like this discussion has been talked about.

## 2021-10-04 NOTE — ED NOTES
"[facility]  ED Nurse Handoff Report    ED Chief complaint: Tachycardia      ED Diagnosis:   Final diagnoses:   Atrial flutter with rapid ventricular response (H)   Elevated troponin       Code Status: DNR / DNI    Allergies: No Known Allergies    Patient Story: pt with high blood pressure and tachy at his care facility for a few days.  No c/o pain, no sob, edema to LE's bilat 3+. HR 130s, aflutter with occational PVC's.  Sats stable. HR now 80-90s flutter with occational PVC's.  Pt on cardizem gtt at 5mcg.  Pt blood sugar was 56, half amp of D50 given with blood sugar up to 70s, pt taking juice and water. Alert and oriented. sba with mobility.   Focused Assessment:  See above    Treatments and/or interventions provided: meds  Patient's response to treatments and/or interventions: see chart    To be done/followed up on inpatient unit:  na    Does this patient have any cognitive concerns?: none    Activity level - Baseline/Home:  Stand with Assist  Activity Level - Current:   Stand with Assist    Patient's Preferred language: English   Needed?: No    Isolation: None  Infection: Not Applicable  Patient tested for COVID 19 prior to admission: YES  Bariatric?: No    Vital Signs:   Vitals:    10/04/21 1434   BP: (!) 160/100   Pulse: (!) 130   Resp: 18   Temp: 97.9  F (36.6  C)   TempSrc: Oral   SpO2: 97%   Weight: 102.1 kg (225 lb)   Height: 1.727 m (5' 8\")       Cardiac Rhythm:Cardiac Rhythm: Atrial flutter (130s)    Was the PSS-3 completed:   Yes  What interventions are required if any?               Family Comments: none here  OBS brochure/video discussed/provided to patient/family: N/A              Name of person given brochure if not patient: na              Relationship to patient: na    For the majority of the shift this patient's behavior was Green.   Behavioral interventions performed were none.    ED NURSE PHONE NUMBER: [phone]       "

## 2021-10-04 NOTE — LETTER
Dialysis Intake Checklist      Your Name: Natalie Patiño RN Contact Phone Number: [phone]     Fax Number and Email: [phone] Hospital/Practice: [facility]     Patient Name: Gordon Gann   Referring Nephrologist: Dr. Pratt     Requested Facility(s) or Zip Code: [facility]     Patient Started Date of Dialysis: 10/6/2021     Estimated Outpatient Start Date of Dialysis: 10/11/2021   Treatment Duration & Frequency: 2.5 hrs 3x per week     Preferred Schedule: Monday, Wednesday  and Friday    Is the patient employed? No   Is there a working shift we can accommodate?  No   Is patient flexible? Yes Shift: late morning or early afternoon on M-W-F       Modality:   In-Center Hemo   Diagnosis:   End Stage Renal Disease (ESRD - Stage 5 Chronic Kidney Disease)     Access Type(s):   Graft    If only a CVC, is there an active AV Access Plan (e.g., Vessel Mapping, Surgeon Consult, etc.)?:   n/a         Where will this patient be discharged to? Home     Is this patient trach or vent dependent? No     Does this patient have an L-VAD or Life Vest? No     Does this patient have outpatient dialysis history? No     Does this patient receive continuous medication via infusion pumps? No     Daily Care - Activity Management/Activity assistance needed?   Activity Management: activity adjusted per tolerance   Activity Assistance Provided: assistance, 1 person   Assistive Device Utilized: gait belt, walker      Can this patient sit in a standard chair to dialyze? Yes     Require treatment in a bed?  No     Is the patient HEP B positive? No     Can this patient sign their own legal consents? Yes     Other special needs? N/A       Allergies: No Known Allergies     Medication List:   Current Facility-Administered Medications   Medication     - MEDICATION INSTRUCTIONS for Dialysis Patients -     0.9% sodium chloride BOLUS     0.9% sodium chloride BOLUS     0.9% sodium chloride BOLUS      allopurinol (ZYLOPRIM) tablet 100 mg     cloNIDine (CATAPRES) tablet 0.2 mg     Continuing ACE inhibitor/ARB/ARNI from home medication list OR ACE inhibitor/ARB order already placed during this visit     Continuing aspirin from home medication list OR daily aspirin order already placed during this visit     Continuing beta blocker from home medication list OR beta blocker order already placed during this visit     Continuing statin from home medication list OR statin order already placed during this visit     glucose gel 15-30 g    Or     dextrose 50 % injection 25-50 mL    Or     glucagon injection 1 mg     ezetimibe (ZETIA) tablet 10 mg     folic acid (FOLVITE) tablet 1 mg     furosemide (LASIX) tablet 80 mg     heparin 25,000 units in 0.45% NaCl 250 mL ANTICOAGULANT infusion     hydrALAZINE (APRESOLINE) tablet 25 mg     insulin aspart (NovoLOG) injection (RAPID ACTING)     insulin aspart (NovoLOG) injection (RAPID ACTING)     insulin aspart (NovoLOG) injection (RAPID ACTING)     insulin glargine (LANTUS PEN) injection 15 Units     lidocaine (LMX4) cream     lidocaine 1 % 0.1-1 mL     lidocaine 1 % 0.5 mL     lidocaine 1 % 0.5 mL     medication instruction     melatonin tablet 1 mg     metoprolol tartrate (LOPRESSOR) tablet 50 mg     No heparin via hemodialysis machine     ondansetron (ZOFRAN-ODT) ODT tab 4 mg    Or     ondansetron (ZOFRAN) injection 4 mg     Patient is already receiving anticoagulation with heparin, enoxaparin (LOVENOX), warfarin (COUMADIN)  or other anticoagulant medication     simvastatin (ZOCOR) tablet 40 mg     sodium chloride (PF) 0.9% PF flush 3 mL     sodium chloride (PF) 0.9% PF flush 3 mL     Stop Heparin 60 minutes before end of treatment     vitamin B complex with vitamin C (STRESS TAB) tablet 1 tablet     Vitamin D3 (CHOLECALCIFEROL) tablet 1,000 Units     Warfarin Therapy Reminder (Check START DATE - warfarin may be starting in the FUTURE)     zolpidem (AMBIEN) tablet 5 mg           HEP Panel:  Hep B Antigen (HBsAG):   Lab Results   Component Value Date    HEPBANG Nonreactive 10/05/2021     Hep B Surface Antibody (HBsAb):   Lab Results   Component Value Date    AUSAB 0.48 10/05/2021     Hep B Total Core Antibody:   Lab Results   Component Value Date    HBCAB Nonreactive 10/05/2021        Chest X-Ray:   Results for orders placed during the hospital encounter of 10/04/21    XR CHEST 2 VW    Narrative  EXAM: XR CHEST 2 VW  LOCATION: [facility]  DATE/TIME: 10/4/2021 5:14 PM    INDICATION: tachycardia  COMPARISON: 07/20/2021    Impression  IMPRESSION: Mild opacity at the left base may represent atelectasis or pneumonia. Small left pleural effusion. No pneumothorax. Upper normal heart size.       PPD:  M TUBERCULOSIS RESULT: No results found for: TBRSLT  M TUBERCULOSIS ANTIGEN VALUE: No results found for: TBAGN

## 2021-10-04 NOTE — ED NOTES
DATE:  10/4/2021   TIME OF RECEIPT FROM LAB:  1535  LAB TEST:  troponin  LAB VALUE:  0.66  RESULTS GIVEN WITH READ-BACK TO (PROVIDER):    TIME LAB VALUE REPORTED TO PROVIDER:

## 2021-10-04 NOTE — ED PROVIDER NOTES
"  History   Chief Complaint:  Tachycardia     HPI   Gordon Gann is a 76 year old male anticoagulated on Eliquis with history of atrial fibrillation, diabetes mellitus on insulin and end stage renal disease not on dialysis who presents with tachycardia. The patient reports that his home health care nurse measured his heart rate and blood pressure to be increased 3 days ago. He was hoping it would go away until he decided to come in today. He does not feel any different than baseline. He does report increased leg swelling for the last 3 days which intermittently occurs. His normal blood sugar levels are between . He denies chest pain, shortness of breath, palpitations or feeling light-headed.    Review of Systems   Respiratory: Negative for shortness of breath.    Cardiovascular: Positive for leg swelling. Negative for chest pain and palpitations.        Tachycardia   Neurological: Negative for light-headedness.   All other systems reviewed and are negative.        Allergies:  No Known Drug Allergies    Medications:  Insulin  Lopressor  Eliquis  Aspirin 81 mg  Catapres  Zetia    Past Medical History:     Atrial fibrillation  Diabetes mellitus   Gout  STEMI  Insomnia  End stage renal disease not on dialysis    Past Surgical History:    Create graft loop arteriovenous upper extremity (L)    Social History:  Presents alone  Lives alone    Physical Exam     Patient Vitals for the past 24 hrs:   BP Temp Temp src Pulse Resp SpO2 Height Weight   10/04/21 1434 (!) 160/100 97.9  F (36.6  C) Oral (!) 130 18 97 % 1.727 m (5' 8\") 102.1 kg (225 lb)       Physical Exam  General/Appearance: appears stated age, well-groomed, appears comfortable  Eyes: EOMI, no scleral injection, no icterus  ENT: MMM  Neck: supple, nl ROM, no stiffness  Cardiovascular: tachy, nl S1S2, no m/r/g, 2+ pulses in all 4 extremities, cap refill <2sec  Respiratory: CTAB, good air movement throughout, no wheezes/rhonchi/rales, no increased WOB, no " retractions  Back: no lesions  GI: abd soft, non-distended, nttp,  no HSM, no rebound, no guarding, nl BS  MSK: KOTHARI, good tone, no bony abnormality  Skin: warm and well-perfused, no rash, diffuse mild anasarca, no ecchymosis, nl turgor  Neuro: GCS 15, alert and oriented, no gross focal neuro deficits  Psych: interacts appropriately  Heme: no petechia, no purpura, no active bleeding    Emergency Department Course   ECG  ECG obtained at 1452, ECG read at 1702  Tachycardia   No significant change as compared to prior, dated 8/3/21.  Rate 131 bpm. IL interval * ms. QRS duration 86 ms. QT/QTc 330/487 ms. P-R-T axes * 32 89. Supraventricular tachycardia with frequent premature ventricular complexes. Anterior infarct, age undetermined. Aflutter 2:1.     Imaging:  XR Chest 2 Views  Mild opacity at the left base may represent atelectasis or pneumonia. Small left pleural effusion. No pneumothorax. Upper normal heart size.  As read by Radiology.      Laboratory:  CBC: WBC 9.8, HGB 11.6 (L),   CMP: glucose 69 (L), chloride 112 (H), urea nitrogen 68 (H), creatinine 4.37 (H), calcium 8.2 (L), alkaline phosphatase 343 (H), AST 69 (H), ALT 72 (H), protein total 6.2 (L), albumin 2.2 (L), GFR 12 (L) o/w WNL     Asymptomatic COVID-19 Virus (Coronavirus) by PCR: negative    Glucose (1731): 76  Glucose (1633): 56 (L)    Troponin (Collected 1443): 0.660 (H)     INR: 1.05    Emergency Department Course:  Reviewed:  I reviewed nursing notes, vitals and past medical history    Assessments:  1705 I obtained history and examined the patient as noted above.     Consults:  1838 I spoke with Dr. Vallecillo of the hospitalist service.     Interventions:  1642: Dextrose 50% injection 25 mL IV  1741: Cardizem 20 mg IV   1757: Cardizem 125 mg in sodium chloride 0.9% 125 mL infusion IV   1745: Heparin infusion 25,000 units at 1200 units/hr IV Infusion       Disposition:  The patient was admitted to the hospital under the care of Dr. Vallecillo.      Impression & Plan     Medical Decision Making:  This patient is a pleasant 76-year-old male on Eliquis who presents with tachycardia and hypertension. He is in a flutter with 2-1 block and RVR. As his heart rate is in the 130s he was given a diltiazem bolus and started on a dill drip. Additionally he is showing that his troponin is almost 10 times is normal elevated level. This is concerning for an NSTEMI. Is very possible that this is secondary to the fact that he has been in RVR for the past several days but without trending troponins this would be difficult for me to tell. Because of that I have opted to start him on heparin without a bolus, given his Eliquis use. Otherwise is stable without significant abnormal labs and will be coming into the hospitalist service for continued management. Of note one thing that did show up abnormal is his chest x-ray is being read as a possible infiltrate. He has no chest pain, cough, shortness of breath, fever, or elevated white count so at this time I suspect the abnormality is more likely secondary to atelectasis.    Diagnosis:    ICD-10-CM    1. Atrial flutter with rapid ventricular response (H)  I48.92    2. Elevated troponin  R77.8      Scribe Disclosure:  I, Terri Otero, am serving as a scribe at 4:56 PM on 10/4/2021 to document services personally performed by Noemí Sykes MD based on my observations and the provider's statements to me.             Noemí Sykes MD  10/04/21 3947

## 2021-10-04 NOTE — H&P
[facility]    History and Physical  Hospitalist       Date of Admission:  10/4/2021    Assessment & Plan   Gordon Gann is a 76 year old male with history of CKD stage V not on dialysis, atrial fibrillation on chronic Eliquis, insulin requiring diabetes mellitus type 2 presents to the emergency room with tachycardia.    Atrial fibrillation/flutter with RVR  History of chronic atrial fibrillation   -EKG today shows rapid atrial flutter with 2 is to 1 conduction versus rapid atrial fibrillation  -Continue Cardizem drip  -Monitor on telemetry  -Cardiology consult tomorrow morning  -Anticoagulation discussed below    Elevated troponin.  History of coronary artery disease s/p PCI in 2006  Hypertension  Hyperlipidemia  -Troponin elevated at 0.66 most likely demand ischemia  -Trend troponin  -Continue heparin drip that was started in the emergency room(in place of prior to admission Eliquis)  -Echo in a.m.  -Cardiology consult as above.  -Continue Cardizem drip as above  -Resume prior to admission antihypertensives once verified by the pharmacy.    CKD stage V, currently not on dialysis  -Follows up with Dr. Saldana  -Left upper extremity graft placed by Dr. Hernandez on 8/5/2021, eventual plan is for hemodialysis, timing unclear at this time.  -Patient still complains of some oozing from left upper extremity wound, was due to see Dr. Hernandez this Friday.  -We will ask vascular surgery to assess his wounds tomorrow morning    Anemia of chronic disease  -Hemoglobin is actually much better today at 11.6, recent baseline appears to be between 9-10  -Monitor intermittently    Insulin requiring diabetes mellitus type 2  -Continue prior to admission Lantus once dose verified by the pharmacy.    -It also appears like he is on 5 units NovoLog scheduled along with sliding scale at home.  -Continue medium intensity sliding scale for now.  Resume 5 units NovoLog scheduled tomorrow pending blood sugars  tonight.    Atelectasis versus pneumonia  -Chest x-ray today shows mild opacity at the left base may represent atelectasis or pneumonia  -Patient denies fever, cough or dyspnea.  He has a normal white cell count.  Clinically I favor atelectasis, we will not start any antibiotics at this time.    Gordon is a LOW SUSPICION PUI.  Follow these instructions:    If COVID test positive -> continue isolation precautions    If COVID test negative -> discontinue COVID-specific isolation precautions     DVT Prophylaxis: DOAC  Code Status: DNR / DNI    Disposition: Expected discharge in 2+ days    Linwood Vallecillo MD    Primary Care Physician   Belen Prieto    Chief Complaint   Tachycardia    History is obtained from the patient    History of Present Illness   Gordon Gann is a 76 year old male  with history of atrial fibrillation on chronic Eliquis, insulin requiring diabetes mellitus type 2, chronic kidney disease stage V, coronary artery disease with prior PCI several years ago who presents to the emergency room with tachycardia.  Patient's home health nurse measured his heart rate and was found to be tachycardic 3 days ago along with being hypertensive.  Patient was advised to go to the emergency room at that time however he refused initially.  Today he was still found to be tachycardic by his home health nurse and so he decided to come to the emergency room.He denies chest pain, shortness of breath, palpitations, lightheadedness or dizziness.  He does endorse mild intermittent cough without any sputum production.  He has chronic lower extremity edema which is unchanged.  Denies orthopnea or PND.  He is fully vaccinated for COVID-19.  No nausea, vomiting or diarrhea.  No recent fever or chills. No hematochezia or melena or bleeding from any source.  In the emergency room he was found to have a heart rate of 130s with what appears like rapid a flutter with 2 is to 1 conduction, received Cardizem bolus and was started on a  low-dose Cardizem drip.  He was also found to have an elevated troponin of 0.66 and was started on heparin drip in place of prior to admission Eliquis.    Past Medical History    I have reviewed this patient's medical history and updated it with pertinent information if needed.   Past Medical History:   Diagnosis Date     Alcohol abuse      Atrial fibrillation (H)      CKD (chronic kidney disease) stage 4, GFR 15-29 ml/min (H)      Diabetes (H)      Gout      Hypokalemia      Hypophosphatemia      Insomnia      STEMI (ST elevation myocardial infarction) (H)        Past Surgical History   I have reviewed this patient's surgical history and updated it with pertinent information if needed.  Past Surgical History:   Procedure Laterality Date     CREATE GRAFT LOOP ARTERIOVENOUS UPPER EXTREMITY Left 8/5/2021    Procedure: left arm arteriovenous graft fistula.;  Surgeon: Noemí Hernandez MD;  Location:  OR       Prior to Admission Medications   Prior to Admission Medications   Prescriptions Last Dose Informant Patient Reported? Taking?   Vitamin D, Cholecalciferol, 25 MCG (1000 UT) CAPS  Self Yes No   Sig: Take 1,000 Units by mouth every morning   acetaminophen (TYLENOL) 325 MG tablet   Yes No   Sig: Take 1 tablet (325 mg) by mouth every 6 hours as needed for mild pain   allopurinol (ZYLOPRIM) 100 MG tablet   No No   Sig: Take 2 tablets (200 mg) by mouth daily   apixaban ANTICOAGULANT (ELIQUIS) 2.5 MG tablet   No No   Sig: Take 1 tablet (2.5 mg) by mouth every morning   aspirin 81 MG EC tablet  Self Yes No   Sig: Take 81 mg by mouth At Bedtime   cloNIDine (CATAPRES) 0.2 MG tablet  Self Yes No   Sig: Take 0.2 mg by mouth 2 times daily   ezetimibe (ZETIA) 10 MG tablet  Self Yes No   Sig: Take 10 mg by mouth every morning   fish oil-omega-3 fatty acids 1000 MG capsule  Self Yes No   Sig: Take 1 g by mouth every morning   folic acid (FOLVITE) 1 MG tablet  Self Yes No   Sig: Take 1 mg by mouth every morning    furosemide (LASIX) 20 MG tablet   No No   Sig: Take 1 tablet (20 mg) by mouth daily   hydrALAZINE (APRESOLINE) 25 MG tablet   No No   Sig: Take 1 tablet (25 mg) by mouth every 6 hours as needed (SBP >180)   insulin aspart (NOVOLOG PEN) 100 UNIT/ML pen   No No   Sig: Inject 5 Units Subcutaneous 3 times daily (with meals)   insulin aspart (NOVOLOG PEN) 100 UNIT/ML pen   No No   Sig: Inject 1-20 Units Subcutaneous 3 times daily (before meals) Correction Scale - MEDIUM INSULIN RESISTANCE DOSING   Do Not give Correction Insulin if Pre-Meal BG less than 140. For Pre-Meal  - 189 give 1 unit. For Pre-Meal  - 239 give 2 units. For Pre-Meal  - 289 give 3 units. For Pre-Meal  - 339 give 4 units. For Pre-Meal - 399 give 5 units. For Pre-Meal -449 give 6 units For Pre-Meal BG greater than or equal to 450 give 7 units. To be given with prandial insulin, and based on pre-meal blood glucose.  Notify provider if glucose greater than or equal to 350 mg/dL after administration of correction dose. If given at mealtime, administer within 30 minutes of start of meal   insulin glargine (LANTUS PEN) 100 UNIT/ML pen   No No   Sig: Inject 15 Units Subcutaneous At Bedtime   metoprolol tartrate (LOPRESSOR) 25 MG tablet   No No   Sig: Take 0.5 tablets (12.5 mg) by mouth 2 times daily Hold for HR <55   simvastatin (ZOCOR) 40 MG tablet   Yes No   Sig: Take 1 tablet (40 mg) by mouth At Bedtime   vitamin B complex with vitamin C (STRESS TAB) tablet  Self Yes No   Sig: Take 1 tablet by mouth every morning      Facility-Administered Medications: None     Allergies   No Known Allergies    Social History   I have reviewed this patient's social history and updated it with pertinent information if needed. Gordon Gann  reports that he has never smoked. He has never used smokeless tobacco. He reports current alcohol use.    Family History   I have reviewed this patient's family history and updated it with pertinent  information if needed.   No family history on file.    Review of Systems   The 10 point Review of Systems is negative other than noted in the HPI or here.     Physical Exam   Temp: 97.9  F (36.6  C) Temp src: Oral BP: (!) 160/100 Pulse: (!) 130   Resp: 18 SpO2: 97 % O2 Device: None (Room air)    Vital Signs with Ranges  Temp:  [97.9  F (36.6  C)] 97.9  F (36.6  C)  Pulse:  [130] 130  Resp:  [18] 18  BP: (160)/(100) 160/100  SpO2:  [97 %] 97 %  225 lbs 0 oz    Gen: AAOX3, NAD, comfortable  HEENT: Moist oral mucosa, no pallor or icterus  Neck: Supple neck, good ROM, no stridor  Resp: CTA B/L, normal WOB; no crackles; no wheezes  CVS-tachycardic, irregular  Abd/GI: Soft, non-tender. BS- normoactive  Skin- Warm, dry, no rashes  MSK: 2-3+ lower extremity edema.  Left upper extremity with bandage over his arm with minimal oozing.  Neuro- CN- intact. Moving X 4; No focal deficits.     Data   Data reviewed today:  I personally reviewed the EKG tracing showing Rapid A. fib/flutter.  Recent Labs   Lab 10/04/21  1731 10/04/21  1633 10/04/21  1443   WBC  --   --  9.8   HGB  --   --  11.6*   MCV  --   --  93   PLT  --   --  364   INR  --   --  1.05   NA  --   --  143   POTASSIUM  --   --  4.4   CHLORIDE  --   --  112*   CO2  --   --  23   BUN  --   --  68*   CR  --   --  4.37*   ANIONGAP  --   --  8   LC  --   --  8.2*   GLC 76 56* 69*   ALBUMIN  --   --  2.2*   PROTTOTAL  --   --  6.2*   BILITOTAL  --   --  0.3   ALKPHOS  --   --  343*   ALT  --   --  72*   AST  --   --  69*   TROPONIN  --   --  0.660*       Recent Results (from the past 24 hour(s))   XR Chest 2 Views    Narrative    EXAM: XR CHEST 2 VW  LOCATION: [facility]  DATE/TIME: 10/4/2021 5:14 PM    INDICATION: tachycardia  COMPARISON: 07/20/2021      Impression    IMPRESSION: Mild opacity at the left base may represent atelectasis or pneumonia. Small left pleural effusion. No pneumothorax. Upper normal heart size.

## 2021-10-04 NOTE — ED TRIAGE NOTES
Pt BIBA from [facility]- assisted living. Pt sent to ED due to elevated BP and elevated pulse rate- 120s-130s. Pt has kidney failure- edema  Noted to BLE and left arm. Patient denies chest pain or SOB, denies hx of blood clots.

## 2021-10-05 ENCOUNTER — APPOINTMENT (OUTPATIENT)
Dept: CARDIOLOGY | Facility: CLINIC | Age: 76
DRG: 308 | End: 2021-10-05
Attending: INTERNAL MEDICINE
Payer: COMMERCIAL

## 2021-10-05 ENCOUNTER — APPOINTMENT (OUTPATIENT)
Dept: ULTRASOUND IMAGING | Facility: CLINIC | Age: 76
DRG: 308 | End: 2021-10-05
Attending: STUDENT IN AN ORGANIZED HEALTH CARE EDUCATION/TRAINING PROGRAM
Payer: COMMERCIAL

## 2021-10-05 LAB
ANION GAP SERPL CALCULATED.3IONS-SCNC: 10 MMOL/L (ref 3–14)
APTT PPP: 121 SECONDS (ref 22–38)
APTT PPP: 64 SECONDS (ref 22–38)
APTT PPP: 96 SECONDS (ref 22–38)
BUN SERPL-MCNC: 65 MG/DL (ref 7–30)
CALCIUM SERPL-MCNC: 7.6 MG/DL (ref 8.5–10.1)
CHLORIDE BLD-SCNC: 110 MMOL/L (ref 94–109)
CO2 SERPL-SCNC: 21 MMOL/L (ref 20–32)
CREAT SERPL-MCNC: 4.25 MG/DL (ref 0.66–1.25)
ERYTHROCYTE [DISTWIDTH] IN BLOOD BY AUTOMATED COUNT: 15.6 % (ref 10–15)
ERYTHROCYTE [DISTWIDTH] IN BLOOD BY AUTOMATED COUNT: 15.7 % (ref 10–15)
FERRITIN SERPL-MCNC: 192 NG/ML (ref 26–388)
GFR SERPL CREATININE-BSD FRML MDRD: 13 ML/MIN/1.73M2
GLUCOSE BLD-MCNC: 258 MG/DL (ref 70–99)
GLUCOSE BLDC GLUCOMTR-MCNC: 169 MG/DL (ref 70–99)
GLUCOSE BLDC GLUCOMTR-MCNC: 223 MG/DL (ref 70–99)
GLUCOSE BLDC GLUCOMTR-MCNC: 234 MG/DL (ref 70–99)
GLUCOSE BLDC GLUCOMTR-MCNC: 235 MG/DL (ref 70–99)
GLUCOSE BLDC GLUCOMTR-MCNC: 242 MG/DL (ref 70–99)
HBA1C MFR BLD: 6.2 % (ref 0–5.6)
HCT VFR BLD AUTO: 32.1 % (ref 40–53)
HCT VFR BLD AUTO: 34.1 % (ref 40–53)
HGB BLD-MCNC: 10 G/DL (ref 13.3–17.7)
HGB BLD-MCNC: 10.8 G/DL (ref 13.3–17.7)
LVEF ECHO: NORMAL
MAGNESIUM SERPL-MCNC: 2.1 MG/DL (ref 1.6–2.3)
MCH RBC QN AUTO: 28.2 PG (ref 26.5–33)
MCH RBC QN AUTO: 28.9 PG (ref 26.5–33)
MCHC RBC AUTO-ENTMCNC: 31.2 G/DL (ref 31.5–36.5)
MCHC RBC AUTO-ENTMCNC: 31.7 G/DL (ref 31.5–36.5)
MCV RBC AUTO: 91 FL (ref 78–100)
MCV RBC AUTO: 91 FL (ref 78–100)
NT-PROBNP SERPL-MCNC: ABNORMAL PG/ML (ref 0–900)
PHOSPHATE SERPL-MCNC: 5 MG/DL (ref 2.5–4.5)
PLATELET # BLD AUTO: 298 10E3/UL (ref 150–450)
PLATELET # BLD AUTO: 324 10E3/UL (ref 150–450)
POTASSIUM BLD-SCNC: 4.1 MMOL/L (ref 3.4–5.3)
RBC # BLD AUTO: 3.54 10E6/UL (ref 4.4–5.9)
RBC # BLD AUTO: 3.74 10E6/UL (ref 4.4–5.9)
SODIUM SERPL-SCNC: 141 MMOL/L (ref 133–144)
TROPONIN I SERPL-MCNC: 0.54 UG/L (ref 0–0.04)
TROPONIN I SERPL-MCNC: 0.65 UG/L (ref 0–0.04)
UFH PPP CHRO-ACNC: 0.39 IU/ML
WBC # BLD AUTO: 9.3 10E3/UL (ref 4–11)
WBC # BLD AUTO: 9.6 10E3/UL (ref 4–11)

## 2021-10-05 PROCEDURE — 84484 ASSAY OF TROPONIN QUANT: CPT | Performed by: INTERNAL MEDICINE

## 2021-10-05 PROCEDURE — 36415 COLL VENOUS BLD VENIPUNCTURE: CPT | Performed by: INTERNAL MEDICINE

## 2021-10-05 PROCEDURE — 86704 HEP B CORE ANTIBODY TOTAL: CPT | Performed by: INTERNAL MEDICINE

## 2021-10-05 PROCEDURE — 83880 ASSAY OF NATRIURETIC PEPTIDE: CPT | Performed by: STUDENT IN AN ORGANIZED HEALTH CARE EDUCATION/TRAINING PROGRAM

## 2021-10-05 PROCEDURE — 99223 1ST HOSP IP/OBS HIGH 75: CPT | Performed by: INTERNAL MEDICINE

## 2021-10-05 PROCEDURE — 250N000012 HC RX MED GY IP 250 OP 636 PS 637: Performed by: INTERNAL MEDICINE

## 2021-10-05 PROCEDURE — 93990 DOPPLER FLOW TESTING: CPT

## 2021-10-05 PROCEDURE — 93308 TTE F-UP OR LMTD: CPT | Mod: 26 | Performed by: INTERNAL MEDICINE

## 2021-10-05 PROCEDURE — 82728 ASSAY OF FERRITIN: CPT | Performed by: INTERNAL MEDICINE

## 2021-10-05 PROCEDURE — 93005 ELECTROCARDIOGRAM TRACING: CPT

## 2021-10-05 PROCEDURE — 99221 1ST HOSP IP/OBS SF/LOW 40: CPT | Mod: 24 | Performed by: SURGERY

## 2021-10-05 PROCEDURE — 85027 COMPLETE CBC AUTOMATED: CPT | Performed by: INTERNAL MEDICINE

## 2021-10-05 PROCEDURE — C8924 2D TTE W OR W/O FOL W/CON,FU: HCPCS

## 2021-10-05 PROCEDURE — G0463 HOSPITAL OUTPT CLINIC VISIT: HCPCS

## 2021-10-05 PROCEDURE — 250N000009 HC RX 250: Performed by: INTERNAL MEDICINE

## 2021-10-05 PROCEDURE — 83036 HEMOGLOBIN GLYCOSYLATED A1C: CPT | Performed by: INTERNAL MEDICINE

## 2021-10-05 PROCEDURE — 83735 ASSAY OF MAGNESIUM: CPT | Performed by: INTERNAL MEDICINE

## 2021-10-05 PROCEDURE — 255N000002 HC RX 255 OP 636: Performed by: INTERNAL MEDICINE

## 2021-10-05 PROCEDURE — 85520 HEPARIN ASSAY: CPT | Performed by: INTERNAL MEDICINE

## 2021-10-05 PROCEDURE — 86706 HEP B SURFACE ANTIBODY: CPT | Performed by: INTERNAL MEDICINE

## 2021-10-05 PROCEDURE — 85730 THROMBOPLASTIN TIME PARTIAL: CPT | Performed by: EMERGENCY MEDICINE

## 2021-10-05 PROCEDURE — 99222 1ST HOSP IP/OBS MODERATE 55: CPT | Mod: 25 | Performed by: INTERNAL MEDICINE

## 2021-10-05 PROCEDURE — 250N000013 HC RX MED GY IP 250 OP 250 PS 637: Performed by: INTERNAL MEDICINE

## 2021-10-05 PROCEDURE — 210N000001 HC R&B IMCU HEART CARE

## 2021-10-05 PROCEDURE — 250N000011 HC RX IP 250 OP 636: Performed by: EMERGENCY MEDICINE

## 2021-10-05 PROCEDURE — 258N000003 HC RX IP 258 OP 636: Performed by: INTERNAL MEDICINE

## 2021-10-05 PROCEDURE — 80048 BASIC METABOLIC PNL TOTAL CA: CPT | Performed by: INTERNAL MEDICINE

## 2021-10-05 PROCEDURE — 85730 THROMBOPLASTIN TIME PARTIAL: CPT | Performed by: INTERNAL MEDICINE

## 2021-10-05 PROCEDURE — 84100 ASSAY OF PHOSPHORUS: CPT | Performed by: INTERNAL MEDICINE

## 2021-10-05 PROCEDURE — 87340 HEPATITIS B SURFACE AG IA: CPT | Performed by: INTERNAL MEDICINE

## 2021-10-05 PROCEDURE — 93325 DOPPLER ECHO COLOR FLOW MAPG: CPT | Mod: 26 | Performed by: INTERNAL MEDICINE

## 2021-10-05 PROCEDURE — 99233 SBSQ HOSP IP/OBS HIGH 50: CPT | Performed by: INTERNAL MEDICINE

## 2021-10-05 PROCEDURE — 93321 DOPPLER ECHO F-UP/LMTD STD: CPT | Mod: 26 | Performed by: INTERNAL MEDICINE

## 2021-10-05 RX ORDER — LIDOCAINE 40 MG/G
CREAM TOPICAL
Status: DISCONTINUED | OUTPATIENT
Start: 2021-10-05 | End: 2021-10-12 | Stop reason: HOSPADM

## 2021-10-05 RX ORDER — DEXTROSE MONOHYDRATE 25 G/50ML
25-50 INJECTION, SOLUTION INTRAVENOUS
Status: DISCONTINUED | OUTPATIENT
Start: 2021-10-05 | End: 2021-10-12 | Stop reason: HOSPADM

## 2021-10-05 RX ORDER — ONDANSETRON 4 MG/1
4 TABLET, ORALLY DISINTEGRATING ORAL EVERY 6 HOURS PRN
Status: DISCONTINUED | OUTPATIENT
Start: 2021-10-05 | End: 2021-10-12 | Stop reason: HOSPADM

## 2021-10-05 RX ORDER — METOPROLOL TARTRATE 50 MG
50 TABLET ORAL 2 TIMES DAILY
Status: DISCONTINUED | OUTPATIENT
Start: 2021-10-05 | End: 2021-10-08

## 2021-10-05 RX ORDER — SODIUM BICARBONATE 325 MG/1
325 TABLET ORAL 2 TIMES DAILY
Status: DISCONTINUED | OUTPATIENT
Start: 2021-10-05 | End: 2021-10-06

## 2021-10-05 RX ORDER — CLONIDINE HYDROCHLORIDE 0.1 MG/1
0.2 TABLET ORAL 2 TIMES DAILY
Status: DISCONTINUED | OUTPATIENT
Start: 2021-10-05 | End: 2021-10-12 | Stop reason: HOSPADM

## 2021-10-05 RX ORDER — HEPARIN SODIUM 10000 [USP'U]/100ML
0-5000 INJECTION, SOLUTION INTRAVENOUS CONTINUOUS
Status: DISCONTINUED | OUTPATIENT
Start: 2021-10-05 | End: 2021-10-05

## 2021-10-05 RX ORDER — WARFARIN SODIUM 4 MG/1
4 TABLET ORAL
Status: DISCONTINUED | OUTPATIENT
Start: 2021-10-05 | End: 2021-10-06

## 2021-10-05 RX ORDER — ONDANSETRON 2 MG/ML
4 INJECTION INTRAMUSCULAR; INTRAVENOUS EVERY 6 HOURS PRN
Status: DISCONTINUED | OUTPATIENT
Start: 2021-10-05 | End: 2021-10-12 | Stop reason: HOSPADM

## 2021-10-05 RX ORDER — EZETIMIBE 10 MG/1
10 TABLET ORAL EVERY MORNING
Status: DISCONTINUED | OUTPATIENT
Start: 2021-10-05 | End: 2021-10-12 | Stop reason: HOSPADM

## 2021-10-05 RX ORDER — NICOTINE POLACRILEX 4 MG
15-30 LOZENGE BUCCAL
Status: DISCONTINUED | OUTPATIENT
Start: 2021-10-05 | End: 2021-10-12 | Stop reason: HOSPADM

## 2021-10-05 RX ORDER — FUROSEMIDE 20 MG
20 TABLET ORAL DAILY
Status: DISCONTINUED | OUTPATIENT
Start: 2021-10-05 | End: 2021-10-06

## 2021-10-05 RX ORDER — SIMVASTATIN 40 MG
40 TABLET ORAL AT BEDTIME
Status: DISCONTINUED | OUTPATIENT
Start: 2021-10-05 | End: 2021-10-12 | Stop reason: HOSPADM

## 2021-10-05 RX ORDER — ALLOPURINOL 100 MG/1
100 TABLET ORAL DAILY
Status: DISCONTINUED | OUTPATIENT
Start: 2021-10-05 | End: 2021-10-12 | Stop reason: HOSPADM

## 2021-10-05 RX ORDER — ZOLPIDEM TARTRATE 5 MG/1
5 TABLET ORAL
Status: DISCONTINUED | OUTPATIENT
Start: 2021-10-05 | End: 2021-10-09

## 2021-10-05 RX ORDER — METOPROLOL TARTRATE 25 MG/1
25 TABLET, FILM COATED ORAL 2 TIMES DAILY
Status: DISCONTINUED | OUTPATIENT
Start: 2021-10-05 | End: 2021-10-05

## 2021-10-05 RX ORDER — FOLIC ACID 1 MG/1
1 TABLET ORAL EVERY MORNING
Status: DISCONTINUED | OUTPATIENT
Start: 2021-10-05 | End: 2021-10-12 | Stop reason: HOSPADM

## 2021-10-05 RX ORDER — VITAMIN B COMPLEX
1000 TABLET ORAL EVERY MORNING
Status: DISCONTINUED | OUTPATIENT
Start: 2021-10-05 | End: 2021-10-12 | Stop reason: HOSPADM

## 2021-10-05 RX ORDER — HYDRALAZINE HYDROCHLORIDE 25 MG/1
25 TABLET, FILM COATED ORAL EVERY 6 HOURS PRN
Status: DISCONTINUED | OUTPATIENT
Start: 2021-10-05 | End: 2021-10-12 | Stop reason: HOSPADM

## 2021-10-05 RX ADMIN — INSULIN ASPART 2 UNITS: 100 INJECTION, SOLUTION INTRAVENOUS; SUBCUTANEOUS at 09:00

## 2021-10-05 RX ADMIN — B-COMPLEX W/ C & FOLIC ACID TAB 1 TABLET: TAB at 12:31

## 2021-10-05 RX ADMIN — CLONIDINE HYDROCHLORIDE 0.2 MG: 0.1 TABLET ORAL at 21:12

## 2021-10-05 RX ADMIN — HUMAN ALBUMIN MICROSPHERES AND PERFLUTREN 3 ML: 10; .22 INJECTION, SOLUTION INTRAVENOUS at 11:24

## 2021-10-05 RX ADMIN — SIMVASTATIN 40 MG: 40 TABLET, FILM COATED ORAL at 21:12

## 2021-10-05 RX ADMIN — FOLIC ACID 1 MG: 1 TABLET ORAL at 10:51

## 2021-10-05 RX ADMIN — INSULIN ASPART 2 UNITS: 100 INJECTION, SOLUTION INTRAVENOUS; SUBCUTANEOUS at 18:40

## 2021-10-05 RX ADMIN — ALLOPURINOL 100 MG: 100 TABLET ORAL at 10:51

## 2021-10-05 RX ADMIN — FUROSEMIDE 20 MG: 20 TABLET ORAL at 12:31

## 2021-10-05 RX ADMIN — METOPROLOL TARTRATE 50 MG: 50 TABLET, FILM COATED ORAL at 21:13

## 2021-10-05 RX ADMIN — Medication 1000 UNITS: at 12:31

## 2021-10-05 RX ADMIN — SODIUM BICARBONATE 325 MG: 325 TABLET ORAL at 21:13

## 2021-10-05 RX ADMIN — INSULIN ASPART 3 UNITS: 100 INJECTION, SOLUTION INTRAVENOUS; SUBCUTANEOUS at 12:30

## 2021-10-05 RX ADMIN — INSULIN ASPART 5 UNITS: 100 INJECTION, SOLUTION INTRAVENOUS; SUBCUTANEOUS at 18:40

## 2021-10-05 RX ADMIN — ZOLPIDEM TARTRATE 5 MG: 5 TABLET ORAL at 22:22

## 2021-10-05 RX ADMIN — Medication 1 MG: at 22:21

## 2021-10-05 RX ADMIN — CLONIDINE HYDROCHLORIDE 0.2 MG: 0.1 TABLET ORAL at 10:51

## 2021-10-05 RX ADMIN — HEPARIN SODIUM 700 UNITS/HR: 10000 INJECTION, SOLUTION INTRAVENOUS at 21:11

## 2021-10-05 RX ADMIN — INSULIN GLARGINE 15 UNITS: 100 INJECTION, SOLUTION SUBCUTANEOUS at 22:22

## 2021-10-05 RX ADMIN — EZETIMIBE 10 MG: 10 TABLET ORAL at 10:51

## 2021-10-05 RX ADMIN — METOPROLOL TARTRATE 25 MG: 25 TABLET, FILM COATED ORAL at 10:51

## 2021-10-05 RX ADMIN — DILTIAZEM HYDROCHLORIDE 5 MG/HR: 5 INJECTION INTRAVENOUS at 05:59

## 2021-10-05 ASSESSMENT — ACTIVITIES OF DAILY LIVING (ADL)
ADLS_ACUITY_SCORE: 8

## 2021-10-05 ASSESSMENT — MIFFLIN-ST. JEOR: SCORE: 1777.72

## 2021-10-05 NOTE — PHARMACY-ADMISSION MEDICATION HISTORY
"Pharmacy Medication History  Admission medication history interview status for the 10/4/2021  admission is complete. See EPIC admission navigator for prior to admission medications     Location of Interview: Patient room  Medication history sources: Patient, Surescripts and Care Everywhere    Significant changes made to the medication list:  Adjusted allopurinol 200 mg daily --> 100 mg daily (per patient)   Added zolpidem, sodium bicarbonate (zolpidem discontinued as of 8/11/21 discharge but fill history present from 8/30/21 and patient states still taking)     In the past week, patient estimated taking medication this percent of the time: greater than 90%    Additional medication history information:   Patient is moderately reliable historian, at one point in interview referenced he \"hoped he was giving the right information\". States taking zolpidem most every night. Patient able to confirm insulin regimen. Patient states he \"should be taking\" calcium + vitamin D but has not picked up/started yet, did not add to PTA med list.   Patient stated doses for the following are not consistent with most recent fill history, with all fills occurring after 8/11/21 discharge (allopurinol only medication with dose changed on PTA med list based on these discrepancies, otherwise PTA med list previously reflected patient-stated doses):   -allopurinol: fill 9/20/21 #90ds indicates dose 200 mg/day, patient states only taking 100 mg daily  -apixaban: fill 9/20/21 #90ds indicates dose 2.5 mg BID, patient states only taking daily  -clonidine: fill 9/13/21 #90ds indicates up to 3 tablets/day, patient states only taking BID   -metoprolol: fill 9/17/21 #30ds indicates 25 mg BID, patient states only taking 12.5 mg BID     Medication reconciliation completed by provider prior to medication history? No    Time spent in this activity: 35 minutes    Prior to Admission medications    Medication Sig Last Dose Taking? Auth Provider "   acetaminophen (TYLENOL) 325 MG tablet Take 1 tablet (325 mg) by mouth every 6 hours as needed for mild pain Past Week at Unknown time Yes Julia Combs MD   allopurinol (ZYLOPRIM) 100 MG tablet Take 2 tablets (200 mg) by mouth daily  Patient taking differently: Take 100 mg by mouth daily  10/4/2021 at AM Yes Julia Combs MD   apixaban ANTICOAGULANT (ELIQUIS) 2.5 MG tablet Take 1 tablet (2.5 mg) by mouth every morning 10/4/2021 at AM Yes Julia Combs MD   aspirin 81 MG EC tablet Take 81 mg by mouth At Bedtime 10/3/2021 at PM Yes Unknown, Entered By History   cloNIDine (CATAPRES) 0.2 MG tablet Take 0.2 mg by mouth 2 times daily 10/4/2021 at AM Yes Unknown, Entered By History   ezetimibe (ZETIA) 10 MG tablet Take 10 mg by mouth every morning 10/4/2021 at AM Yes Unknown, Entered By History   fish oil-omega-3 fatty acids 1000 MG capsule Take 1 g by mouth every morning 10/4/2021 at AM Yes Unknown, Entered By History   folic acid (FOLVITE) 1 MG tablet Take 1 mg by mouth every morning 10/4/2021 at AM Yes Unknown, Entered By History   furosemide (LASIX) 20 MG tablet Take 1 tablet (20 mg) by mouth daily 10/4/2021 at AM Yes Julia Combs MD   hydrALAZINE (APRESOLINE) 25 MG tablet Take 1 tablet (25 mg) by mouth every 6 hours as needed (SBP >180) More than a month at Unknown time Yes Julia Combs MD   insulin aspart (NOVOLOG PEN) 100 UNIT/ML pen Inject 1-20 Units Subcutaneous 3 times daily (before meals) Correction Scale - MEDIUM INSULIN RESISTANCE DOSING   Do Not give Correction Insulin if Pre-Meal BG less than 140. For Pre-Meal  - 189 give 1 unit. For Pre-Meal  - 239 give 2 units. For Pre-Meal  - 289 give 3 units. For Pre-Meal  - 339 give 4 units. For Pre-Meal - 399 give 5 units. For Pre-Meal -449 give 6 units For Pre-Meal BG greater than or equal to 450 give 7 units. To be given with prandial insulin, and based on pre-meal blood glucose.  Notify provider if glucose  greater than or equal to 350 mg/dL after administration of correction dose. If given at mealtime, administer within 30 minutes of start of meal 10/4/2021 at X1 Yes Nyla Pan PA-C   insulin aspart (NOVOLOG PEN) 100 UNIT/ML pen Inject 5 Units Subcutaneous 3 times daily (with meals) 10/4/2021 at X1 Yes Julia Combs MD   insulin glargine (LANTUS PEN) 100 UNIT/ML pen Inject 15 Units Subcutaneous At Bedtime 10/3/2021 at PM Yes Nyla Pan PA-C   metoprolol tartrate (LOPRESSOR) 25 MG tablet Take 0.5 tablets (12.5 mg) by mouth 2 times daily Hold for HR <55 10/4/2021 at AM Yes Nyla Pan PA-C   simvastatin (ZOCOR) 40 MG tablet Take 1 tablet (40 mg) by mouth At Bedtime More than a month at 2 months ago Yes Julia Combs MD   SODIUM BICARBONATE PO Take 1 tablet by mouth 2 times daily 10/4/2021 at AM Yes Unknown, Entered By History   vitamin B complex with vitamin C (STRESS TAB) tablet Take 1 tablet by mouth every morning 10/4/2021 at AM Yes Unknown, Entered By History   Vitamin D, Cholecalciferol, 25 MCG (1000 UT) CAPS Take 1,000 Units by mouth every morning 10/4/2021 at AM Yes Unknown, Entered By History   zolpidem (AMBIEN) 5 MG tablet Take 5 mg by mouth nightly as needed for sleep 10/3/2021 at PM Yes Unknown, Entered By History       The information provided in this note is only as accurate as the sources available at the time of update(s)   Ramya Brandon, EleniD

## 2021-10-05 NOTE — PLAN OF CARE
VSS. Monitor remains Atrial fib with CVR. Pt. Denies pain. Heparin at 700 unit(s)/hr. Cardizem drip stopped per order. [facility] nurse saw pt for left arm wound. 4+ edema of left arm, elevated on pillow. Pt. Has blanchable redness coccyx. Sacral Mepilex applied. Pt. Refused turn and repositioning. Pt. Ambulated to bathroom with 1 assist, gait belt and walker. Refuses to use urinal. Continue to monitor. Plan for Lymphedema consult. Plan for hemodialysis per nephrology.

## 2021-10-05 NOTE — PHARMACY-ANTICOAGULATION SERVICE
Clinical Pharmacy - Warfarin Dosing Consult     Pharmacy has been consulted to manage this patient s warfarin therapy.  Indication: Atrial Fibrillation  Therapy Goal: INR 2-3  Warfarin Prior to Admission: No  Significant drug interactions: Heparin drip    INR   Date Value Ref Range Status   10/04/2021 1.05 0.85 - 1.15 Final     Comment:     Effective 7/11/2021, the reference range for this assay has changed.   08/04/2021 1.12 0.85 - 1.15 Final     Comment:     Effective 7/11/2021, the reference range for this assay has changed.       Recommend warfarin 4 mg today.  Pharmacy will monitor Gordon Gann daily and order warfarin doses to achieve specified goal.      Please contact pharmacy as soon as possible if the warfarin needs to be held for a procedure or if the warfarin goals change.

## 2021-10-05 NOTE — CONSULTS
"[facility]  WO Nurse Inpatient Wound Assessment     Reason for consultation: Evaluate and treat \"Left forearm\"      Assessment  Left forearm wound due to Skin Tear    Treatment Plan  Left forearm wound: Daily     Wound care DAILY     Comments: Location: left forearm   Care: daily care provided by the primary RN   1. Remove dressings and discard   2. Cleanse with microklenz and 4x4\" gauze, then pat dry   3. Apply 3m no sting barrier (wipe or wand applicator) to periwound skin, let dry   4. Cover with adaptic gauze (or oil emulsion gauze, or non-stick gauze, different names for the same product)   5. Cover with dry 4x4\" gauze or 1/2 an ABD pad   6. Wrap with roll gauze or kerlix, secure with tape   7. Elevate arms on pillows to help reduce edema          Orders Written  Recommended provider order: None, at this time  WO Nurse follow-up plan: signing off  Nursing to notify the Provider(s) and re-consult the [facility] Nurse if wound(s) deteriorates or new skin concern.    Patient History  According to provider note(s):  \"76 year old male with history of CKD stage V not on dialysis, atrial fibrillation on chronic Eliquis, insulin requiring diabetes mellitus type 2 presents to the emergency room with tachycardia.\"    Objective Data  Containment of urine/stool: did not discuss continence during consult with patient nor nurse    Active Diet Order:  Orders Placed This Encounter      Consistent Carb 60 grams CHO per Meal Diet    Output:   I/O last 3 completed shifts:  In: 480 [P.O.:480]  Out: 375 [Urine:375]    Risk Assessment:   Sensory Perception: 3-->slightly limited  Moisture: 3-->occasionally moist  Activity: 3-->walks occasionally  Mobility: 3-->slightly limited  Nutrition: 3-->adequate  Friction and Shear: 2-->potential problem  Shalom Score: 17                          Labs: Recent Labs   Lab 10/05/21  0509 10/05/21  0130 10/05/21  0130 10/04/21  1443 10/04/21  1443   ALBUMIN  --   --   --   --  " "2.2*   HGB 10.0*   < > 10.8*   < > 11.6*   INR  --   --   --   --  1.05   WBC 9.3   < > 9.6   < > 9.8   A1C  --   --  6.2*  --   --     < > = values in this interval not displayed.       Physical Exam  Skin inspection: focused Left forearm     10-5  Wound Location:  Left forearm   Date of last photo:  10-5-21  Wound History: patient reports greater than 1 month ago falling \"into my daughters house and hitting the siding\" causing skin trauma / skin tear  Measurements (length x width x depth, in cm):  6.5cm x 8cm x 0.2cm greatest depth located at distal portion of skin tear   Wound Base: 98% dermis 2% adipose tissue, flap of skin not able to be re-approximated   Tunneling: N/A  Undermining: N/A  Palpation of the wound bed: normal   Periwound skin: intact  Color: normal and consistent with surrounding tissue  Temperature: normal   Drainage: moderate  Description of drainage: serosanguinous  Odor: none  Pain: no complaint of pain     Interventions  Current support surface: Standard  Foam mattress  Current off-loading measures: Pillows under calves and Pillows  Visual inspection of wound(s) completed  Wound Care: done per plan of care  Supplies: gathered, placed at the bedside, floor stock and discussed with RN  Education provided: plan of care, Infection prevention  and Moisture management  Discussed plan of care with Patient and Nurse    Magaly SWAN     "

## 2021-10-05 NOTE — PLAN OF CARE
[facility]. Tele: Afib CVR; diltiazem drip infusing at 5mg/hour. Heparin infusing at 900 units/hour, PTT recheck ordered for 0945. HTN, improved some during this shift. Pt has oozing from LUE graft, compression dressing in place. Troponin trending down. Plan for cardiology consult and echo today.

## 2021-10-05 NOTE — PROGRESS NOTES
SW:  D: HENOK received a call from Tara intake nurse ([phone]) at [facility] stating that patient receives RN/PT/OT and feel that patient may be appropriate for hospice services as he keeps declining. Tara stated that if patient does discharge with hospice, they also provide this services. Writer explained that currently no discharge plans in place and will continue to coordinate as patient gets closer for discharge.    P: continue to follow for safe discharge planning.     Era Nieves, ZULEMA, LGSW   Social Work   [facility]

## 2021-10-05 NOTE — PROGRESS NOTES
[facility]    Medicine Progress Note - Hospitalist Service       Date of Admission:  10/4/2021    Assessment & Plan           Gordon Gann is a 76 year old male with history of CKD stage V not on dialysis, atrial fibrillation on chronic Eliquis, insulin requiring diabetes mellitus type 2 presents to the emergency room with tachycardia.     Atrial fibrillation/flutter with RVR  History of chronic atrial fibrillation   -EKG on admission shows rapid atrial flutter with 2 is to 1 conduction versus rapid atrial fibrillation  -Appreciate cardiology review.  Continue on Cardizem drip for now  -Anticoagulation discussed below     Elevated troponin.  History of coronary artery disease s/p PCI in 2006  Hypertension  Hyperlipidemia  -Troponin elevated at 0.66 most likely demand ischemia  -Continue heparin drip that was started in the emergency room(in place of prior to admission Eliquis)  -Echo   -Await cardiology consult.  For now will resume patient back on clonidine and consider resumption of metoprolol (versus diltiazem)  -Continue Cardizem drip as above  -Resume clonidine    CKD stage V, currently not on dialysis  -Follows up with Dr. Saldana  -Left upper extremity graft placed by Dr. Hernandez on 8/5/2021, eventual plan is for hemodialysis, timing unclear at this time.  -Patient still complains of some oozing from left upper extremity wound, was due to see Dr. Hernandez this Friday.  - vascular surgery to assess his wounds      Anemia of chronic disease  -Hemoglobin is actually much better today at 11.6, recent baseline appears to be between 9-10  -Monitor intermittently     Insulin requiring diabetes mellitus type 2  -Continue prior to admission Lantus 15 units daily, 5 units NovoLog scheduled along with sliding scale at home.  -Continue medium intensity sliding scale for now.    Recent Labs   Lab 10/05/21  0811 10/05/21  0509 10/05/21  0042 10/04/21  1731 10/04/21  1633 10/04/21  1443   *  "258* 169* 76 56* 69*     Transaminitis with increase in alkaline phosphatase to 343.  -Monitor for now    History of insomnia: On as needed zolpidem at night  - monitor for any complex sleep behaviors, including sleepwalking, sleep driving, and engaging in other activities while not fully awake  - monitor for any episode of anterograde amnesia     Atelectasis versus pneumonia  -Chest x-ray today shows mild opacity at the left base may represent atelectasis or pneumonia  -Patient denies fever, cough or dyspnea.  He has a normal white cell count.  Clinically I favor atelectasis, we will not start any antibiotics at this time.       Gordon is a LOW SUSPICION PUI.  Follow these instructions:    If COVID test positive -> continue isolation precautions    If COVID test negative -> discontinue COVID-specific isolation precautions          Diet: Consistent Carb 60 grams CHO per Meal Diet    DVT Prophylaxis: DOAC  Flood Catheter: Not present  Central Lines: None  Code Status: Full Code      Disposition Plan   Expected discharge: 10/07/2021   recommended to prior living arrangement once renal function improved, safe disposition plan/ TCU bed available and Adequate control of heart rate and blood pressure.     The patient's care was discussed with the Bedside Nurse, Care Coordinator/ and Patient.    Ki Perez MD, MD  Hospitalist Service  [facility]  Securely message with the Vocera Web Console (learn more here)  Text page via [employer] Paging/Directory    \"This dictation was performed with voice recognition software and may contain errors,  omissions and inadvertent word substitution.\"       Clinically Significant Risk Factors Present on Admission             # Coagulation Defect: home medication list includes an anticoagulant medication  # Platelet Defect: home medication list includes an antiplatelet medication  " "    ______________________________________________________________________    Interval History   History reviewed.  He noted to have high blood pressure and heart rate at home so presented to hospital.  Denies Chest pain/ SOB/ cough, Denies any N&V/ bowel problems  Denies urinary problems , Denies fever/chills/rigors   4 point ROS done and negative unless mentioned     Data reviewed today: I reviewed all medications, new labs and imaging results over the last 24 hours. I personally reviewed the chest x-ray image(s) showing as below.    Physical Exam   BP (!) 183/96   Pulse 114   Temp 98.1  F (36.7  C) (Oral)   Resp 10   Ht 1.727 m (5' 8\")   Wt 106.8 kg (235 lb 8 oz)   SpO2 93%   BMI 35.81 kg/m    Gen- pleasant lying in bed  HEENT- NAD, LATRICIA  Neck- supple, no JVD elevation, no thyromegaly  CVS- I+II+ no m/r/g  RS- CTABS  Abdo- soft, no tenderness . No g/r/r, BS+ no hepatosplenomegaly   Ext- edema ++   LUE- in ACE bandage    CNS- no gross focal deficit     Data    BMPRecent Labs   Lab 10/05/21  0811 10/05/21  0509 10/05/21  0042 10/04/21  1731 10/04/21  1633 10/04/21  1443   NA  --  141  --   --   --  143   POTASSIUM  --  4.1  --   --   --  4.4   CHLORIDE  --  110*  --   --   --  112*   LC  --  7.6*  --   --   --  8.2*   CO2  --  21  --   --   --  23   BUN  --  65*  --   --   --  68*   CR  --  4.25*  --   --   --  4.37*   * 258* 169* 76   < > 69*    < > = values in this interval not displayed.     CBC  Recent Labs   Lab 10/05/21  0509 10/05/21  0130 10/05/21  0130 10/04/21  1443 10/04/21  1443   WBC 9.3  --  9.6  --  9.8   RBC 3.54*  --  3.74*  --  4.06*   HGB 10.0*   < > 10.8*   < > 11.6*   HCT 32.1*  --  34.1*  --  37.8*   MCV 91  --  91  --  93   MCH 28.2  --  28.9  --  28.6   MCHC 31.2*  --  31.7  --  30.7*   RDW 15.6*  --  15.7*  --  15.8*     --  324  --  364    < > = values in this interval not displayed.     INR  Recent Labs   Lab 10/04/21  1443   INR 1.05     LFTs  Recent Labs   Lab " 10/04/21  1443   ALKPHOS 343*   AST 69*   ALT 72*   BILITOTAL 0.3   PROTTOTAL 6.2*   ALBUMIN 2.2*      PANCNo lab results found in last 7 days.    Recent Results (from the past 24 hour(s))   XR Chest 2 Views    Narrative    EXAM: XR CHEST 2 VW  LOCATION: [facility]  DATE/TIME: 10/4/2021 5:14 PM    INDICATION: tachycardia  COMPARISON: 07/20/2021      Impression    IMPRESSION: Mild opacity at the left base may represent atelectasis or pneumonia. Small left pleural effusion. No pneumothorax. Upper normal heart size.

## 2021-10-05 NOTE — PROGRESS NOTES
RECEIVING UNIT ED HANDOFF REVIEW    ED Nurse Handoff Report was reviewed by: Mitra Luna RN on October 4, 2021 at 11:44 PM

## 2021-10-05 NOTE — CONSULTS
CARDIOLOGY CONSULT    REASON FOR CONSULT: Paroxysmal atrial fibrillation/atrial flutter    PRIMARY CARE PHYSICIAN:  Belen Prieto    HISTORY OF PRESENT ILLNESS:  Mr. Gann is a 77 y/o gentleman with PMH significant for coronary artery disease with remote PCI in 2006 (details unknown), CKD stage V (AV fistula in place), anemia of chronic disease, paroxysmal atrial fibrillation who presents for evaluation of rapid heart rate noted by recent visiting nurse.    In brief review, Gordon recently moved to [place] from [place] to be closer to his daughter.  He has had two previous hospitalizations this summer.  He was hospitalized 7/28-8/6/21 with DKA and ESRD s/p AV fisutula that admission.  He was admitted again 8/7/21-8/11/21 for failure to thrive and inability to take care of himself.  During that hospitalization it is documented that his BB was progressively decreased due to HR's in the 50's and fatigue.  His metoprolol was decreased from 100 mg PO BID down to 12.5 mg  PO BID.  Reportedly, his visiting nurse noted elevated heart rates in the 130's during a recent visit.  He initially declined to come to the ED however yesterday he was noted to be tachycardic again and was brought to the ED.  He was noted to be in atrial flutter with 2:1 conduction.  He is now rate controlled on a diltiazem gtt. His low dose metoprolol was held.  He denies any exertional chest pain, chest discomfort or shortness of breath.  He notes chronic fatigue and weakness in the legs.  He has been using a walker over the past six months due to this weakness.            PAST MEDICAL HISTORY:  1.  Paroxysmal atrial fibrillation:  Maintained on eliquis  2.  Diabetes type II  3.  CKD stave V  5.  Coronary artery disease  6.  Diabetes type II  7.  Anemia of chronic disease  8.  Hypertension  9.  Hyperlipidemia      MEDICATIONS:  Current Facility-Administered Medications   Medication     allopurinol (ZYLOPRIM) tablet 100 mg     cloNIDine (CATAPRES) tablet 0.2 mg      Continuing ACE inhibitor/ARB/ARNI from home medication list OR ACE inhibitor/ARB order already placed during this visit     Continuing aspirin from home medication list OR daily aspirin order already placed during this visit     Continuing beta blocker from home medication list OR beta blocker order already placed during this visit     Continuing statin from home medication list OR statin order already placed during this visit     glucose gel 15-30 g    Or     dextrose 50 % injection 25-50 mL    Or     glucagon injection 1 mg     diltiazem (CARDIZEM) 125 mg in sodium chloride 0.9 % 125 mL infusion     ezetimibe (ZETIA) tablet 10 mg     folic acid (FOLVITE) tablet 1 mg     heparin 25,000 units in 0.45% NaCl 250 mL ANTICOAGULANT infusion     HOLD: nitroGLYcerin IF     insulin aspart (NovoLOG) injection (RAPID ACTING)     insulin aspart (NovoLOG) injection (RAPID ACTING)     lidocaine (LMX4) cream     lidocaine 1 % 0.1-1 mL     medication instruction     melatonin tablet 1 mg     ondansetron (ZOFRAN-ODT) ODT tab 4 mg    Or     ondansetron (ZOFRAN) injection 4 mg     Patient is already receiving anticoagulation with heparin, enoxaparin (LOVENOX), warfarin (COUMADIN)  or other anticoagulant medication     sodium chloride (PF) 0.9% PF flush 3 mL     sodium chloride (PF) 0.9% PF flush 3 mL       ALLERGIES:  No Known Allergies    SOCIAL HISTORY:  I have reviewed this patient's social history and updated it with pertinent information if needed. Gordon Gann  reports that he has never smoked. He has never used smokeless tobacco. He reports current alcohol use.    FAMILY HISTORY:  I have reviewed this patient's family history and updated it with pertinent information if needed.   No family history on file.    REVIEW OF SYSTEMS:  A complete ROS was obtained and the pertinent positives are outlined in the history of present illness above. The remainder of systems is negative.      PHYSICAL EXAM:  Temp: 98.1  F (36.7  C)  Temp src: Oral BP: (!) 183/96 Pulse: 114   Resp: 10 SpO2: 93 % O2 Device: None (Room air)    Vital Signs with Ranges  Temp:  [97.8  F (36.6  C)-98.1  F (36.7  C)] 98.1  F (36.7  C)  Pulse:  [] 114  Resp:  [10-33] 10  BP: (112-183)/() 183/96  SpO2:  [93 %-98 %] 93 %  235 lbs 8 oz    Constitutional: awake, alert, no distress  Eyes: PERRL, sclera nonicteric  ENT: trachea midline  Respiratory: CTAB  Cardiovascular:  Irregular irregular   GI: nondistended, nontender, bowel sounds present  Lymph/Hematologic: no lymphadenopathy  Skin: dry, no rash  Musculoskeletal: good muscle tone, strength 5/5 in upper and lower extremities  Neurologic: no focal deficits  Neuropsychiatric: appropriate affact    DATA:  Labs:   Troponin 0.652-0.538    EKG: Serial ECGs reviewed; demonstrates atrial flutter with 2:1 conduction,improved rate control to 97 bpm noted by ECG this morning.          ASSESSMENT:  1.  Paroxysmal atrial fibrillation/atrial flutter:  Improved rate control on diltiazem gtt.  Patient has documented frequent PAF and has previously been maintained on metoprolol 100 mg twice daily. This was serially reduced during recent hospitalizations over concerns of heart rates in the low 50's and fatigue and on admission was only taking 12.5 mg twice daily.  He is on apixaban 2.5 mg just once daily (reports hx of rash with higher doses)  2.   Coronary artery disease:  Remote hx of PCI in 2006 (details unknown).  He had been maintained on ASA/plavix (along with apixaban) and his plavix was recently stopped.   No symptoms concerning for obstructive CAD  3.   Elevated troponin:  Likely demand ischemia in the setting of hemodynamic stress.  No evidence for ACS  4.  Stage V CKD:  Follows closely with nephrology.  AV fistula in place but has not required dialysis so far.    5.  Hypertension  6.  Hyperlipidemia  7.  Fatigue:  Multifactorial; do not suspect related to beta-blocker      RECOMMENDATIONS:  1. Suboptimal rate control  in the outpatient setting with serial decrease in metoprolol during prior hospitalizations.  Recommend resuming metoprolol 50 mg  PO BID and titrate as needed, stop diltiazem gtt.    2.  He is currently on eliquis 2.5 mg just once daily which is not adequate dosing for thromboembolic risk reduction.  He has a [id number] score 4 and anticoagulation is indicated.  Limited date on DOACs in advanced/ESRD however, limited date does support use of eliquis 2.5 mg twice daily.  Reported history of rash with twice daily dosing of apixiban, would then recommend coumadin.   3.  Cardiology will sign off.  Please call with questions.      Aiyana Gilliam MD [facility]  Cardiology - [facility]  October 5, 2021

## 2021-10-05 NOTE — PROVIDER NOTIFICATION
MD Notification    Notified Person: MD    Notified Person Name:     Notification Date/Time: 10-5-2021 4711    Notification Interaction: Text paged    Purpose of Notification: /96    Orders Received:    Comments:

## 2021-10-05 NOTE — CONSULTS
[facility] PATIENT CONSULTATION NOTE    HPI:  Gordon Gann is a 76 year old year old male who has a PMH significant for chronic Afib on Eliquis, DM2 on insulin, CKD5,  CAD w/ prior PCI sent from home for tachycardia found by his home health nurse. Found to be in Aflutter w/ RVR started on low-dose Cardizem drip with elevated troponin 0.66. Started on heparin gtt. Troponins trending down and Cardiology consulted for further mgmt. Noted to have a LUE AVG placed by Dr. Hernandez 8/5/21 w/ follow-up imaging on 10/8/21. He is not currently undergoing dialysis. He does report that he developed a left forearm wound (not near surgical site) from a fall a few days after surgery. This initially began healing once post-surgical swelling subsided however he developed subsequent swelling and weeping/breakdown from the saeed wound. His wound care nurse has been dressing this for him and applying compression to his forearm. Swelling L>R and also has significant bilateral lower extremity swelling. He also states he has motor weakness with inability to tie his shoes since the surgery.    Review Of Systems:   General: Denies F/C  Respiratory: Denies SOB  Cardio: Denies CP  Gastrointestinal: Denies N/V  Genitourinary: Denies recent change in urination  Musculoskeletal: See HPI  Neurologic: Denies HA  Psychiatric: Denies confusion  Hematology/immunology: no unexpected bruising    PAST MEDICAL HISTORY:  Past Medical History:   Diagnosis Date     Alcohol abuse      Atrial fibrillation (H)      CKD (chronic kidney disease) stage 4, GFR 15-29 ml/min (H)      Diabetes (H)      Gout      Hypokalemia      Hypophosphatemia      Insomnia      STEMI (ST elevation myocardial infarction) (H)        PAST SURGICAL HISTORY:  Past Surgical History:   Procedure Laterality Date     CREATE GRAFT LOOP ARTERIOVENOUS UPPER EXTREMITY Left 8/5/2021    Procedure: left arm arteriovenous graft fistula.;  Surgeon: Noemí Hernandez MD;   Location:  OR       FAMILY HISTORY:  No family history on file.    SOCIAL HISTORY:   Social History     Tobacco Use     Smoking status: Never Smoker     Smokeless tobacco: Never Used   Substance Use Topics     Alcohol use: Yes        HOME MEDICATIONS:  Prior to Admission medications    Medication Sig Start Date End Date Taking? Authorizing Provider   acetaminophen (TYLENOL) 325 MG tablet Take 1 tablet (325 mg) by mouth every 6 hours as needed for mild pain 8/6/21  Yes Julia Combs MD   allopurinol (ZYLOPRIM) 100 MG tablet Take 2 tablets (200 mg) by mouth daily  Patient taking differently: Take 100 mg by mouth daily  8/7/21  Yes Julia Combs MD   apixaban ANTICOAGULANT (ELIQUIS) 2.5 MG tablet Take 1 tablet (2.5 mg) by mouth every morning 8/7/21  Yes Julia Combs MD   aspirin 81 MG EC tablet Take 81 mg by mouth At Bedtime   Yes Unknown, Entered By History   cloNIDine (CATAPRES) 0.2 MG tablet Take 0.2 mg by mouth 2 times daily   Yes Unknown, Entered By History   ezetimibe (ZETIA) 10 MG tablet Take 10 mg by mouth every morning   Yes Unknown, Entered By History   fish oil-omega-3 fatty acids 1000 MG capsule Take 1 g by mouth every morning   Yes Unknown, Entered By History   folic acid (FOLVITE) 1 MG tablet Take 1 mg by mouth every morning   Yes Unknown, Entered By History   furosemide (LASIX) 20 MG tablet Take 1 tablet (20 mg) by mouth daily 8/7/21  Yes Julia Combs MD   hydrALAZINE (APRESOLINE) 25 MG tablet Take 1 tablet (25 mg) by mouth every 6 hours as needed (SBP >180) 8/6/21  Yes Julia Combs MD   insulin aspart (NOVOLOG PEN) 100 UNIT/ML pen Inject 1-20 Units Subcutaneous 3 times daily (before meals) Correction Scale - MEDIUM INSULIN RESISTANCE DOSING   Do Not give Correction Insulin if Pre-Meal BG less than 140. For Pre-Meal  - 189 give 1 unit. For Pre-Meal  - 239 give 2 units. For Pre-Meal  - 289 give 3 units. For Pre-Meal  - 339 give 4 units. For Pre-Meal - 399  "give 5 units. For Pre-Meal -449 give 6 units For Pre-Meal BG greater than or equal to 450 give 7 units. To be given with prandial insulin, and based on pre-meal blood glucose.  Notify provider if glucose greater than or equal to 350 mg/dL after administration of correction dose. If given at mealtime, administer within 30 minutes of start of meal 8/11/21  Yes Nyla Pan PA-C   insulin aspart (NOVOLOG PEN) 100 UNIT/ML pen Inject 5 Units Subcutaneous 3 times daily (with meals) 8/6/21  Yes Julia Combs MD   insulin glargine (LANTUS PEN) 100 UNIT/ML pen Inject 15 Units Subcutaneous At Bedtime 8/11/21  Yes Nyla Pan PA-C   metoprolol tartrate (LOPRESSOR) 25 MG tablet Take 0.5 tablets (12.5 mg) by mouth 2 times daily Hold for HR <55 8/11/21  Yes Nyla Pan PA-C   simvastatin (ZOCOR) 40 MG tablet Take 1 tablet (40 mg) by mouth At Bedtime 8/6/21  Yes Julia Combs MD   SODIUM BICARBONATE PO Take 1 tablet by mouth 2 times daily   Yes Unknown, Entered By History   vitamin B complex with vitamin C (STRESS TAB) tablet Take 1 tablet by mouth every morning   Yes Unknown, Entered By History   Vitamin D, Cholecalciferol, 25 MCG (1000 UT) CAPS Take 1,000 Units by mouth every morning   Yes Unknown, Entered By History   zolpidem (AMBIEN) 5 MG tablet Take 5 mg by mouth nightly as needed for sleep   Yes Unknown, Entered By History       VITAL SIGNS:  BP (!) 183/96   Pulse 114   Temp 98.1  F (36.7  C) (Oral)   Resp 10   Ht 1.727 m (5' 8\")   Wt 106.8 kg (235 lb 8 oz)   SpO2 93%   BMI 35.81 kg/m      Intake/Output Summary (Last 24 hours) at 10/5/2021 0851  Last data filed at 10/5/2021 0827  Gross per 24 hour   Intake 240 ml   Output 375 ml   Net -135 ml       Labs:  ROUTINE IP LABS (Last four results)  BMP  Recent Labs   Lab 10/05/21  0811 10/05/21  0509 10/05/21  0042 10/04/21  1731 10/04/21  1633 10/04/21  1443   NA  --  141  --   --   --  143   POTASSIUM  --  4.1  --   --   --  4.4 "   CHLORIDE  --  110*  --   --   --  112*   LC  --  7.6*  --   --   --  8.2*   CO2  --  21  --   --   --  23   BUN  --  65*  --   --   --  68*   CR  --  4.25*  --   --   --  4.37*   * 258* 169* 76   < > 69*    < > = values in this interval not displayed.     CBC  Recent Labs   Lab 10/05/21  0509 10/05/21  0130 10/04/21  1443   WBC 9.3 9.6 9.8   RBC 3.54* 3.74* 4.06*   HGB 10.0* 10.8* 11.6*   HCT 32.1* 34.1* 37.8*   MCV 91 91 93   MCH 28.2 28.9 28.6   MCHC 31.2* 31.7 30.7*   RDW 15.6* 15.7* 15.8*    324 364     INR  Recent Labs   Lab 10/04/21  1443   INR 1.05       PHYSICAL EXAM:  Constitutional: healthy, alert, no acute distress and cooperative   Cardiovascular: RRR  Respiratory: CTAB anteriorly, breathing unlabored without secondary muscle use  Psychiatric: mentation appears normal and affect normal/bright  Neck: no asymmetry  GI/Abdomen: +BS, abdomen soft, non-tender. No masses, no CVAT  MSK: able to move all extremities without weakness or ataxia  Extremities: well-healed antecubital and axillary incisions, palpable graft pulse, no erythema or cellulitis to suggest graft infection, forearm abrasion as seen below appears non-infected, palpable radial pulse, sensorimotor deficits of the left hand but good capillary refill.  Hematology: no bruising on visible skin                Patient Active Problem List   Diagnosis     DKA (diabetic ketoacidoses)     End stage renal disease (H)     Hyperglycemia     Chronic kidney disease, unspecified CKD stage     Unable to care for self     Fistula, arteriovenous, acquired (H)     Elevated troponin     Atrial flutter with rapid ventricular response (H)       ASSESSMENT:  a 76 year old year old male who has a PMH significant for chronic Afib on Eliquis, DM2 on insulin, CKD5 (Cr 4.25, GFR 13),  CAD w/ prior PCI w/ Aflutter w/ RVR and troponemia. Also s/p L brachioaxillary graft creation on 8/5/21 for which we are consulted for wound check and evaluation for use of  AVG.    Duplex shows patent mature graft with flow rate 970mL/min, axillary vein outflow stenosis/kinking w/ elevated velocities.  CKD5 (Cr 4.25, GFR 13), BUN 65.  Clinically fluid overload  Troponemia improving, currently in Aflutter w/ RVR.    PLAN:  Ok to use AVG if needed for dialysis if indicated; he will have prolonged bleeding and will require prolonged compression at dialysis access site to prevent infiltration  No plans for fistulogram at this time  WO nurse for forearm wound mgmt  Continue compression for L forearm swelling  Nephrology consult placed to evaluate need for dialysis  Cardiology consult, Echo pending     Discussed w/ Dr. Hernandez.    Grace Meyer MD  Vascular Surgery Fellow  Pager: [phone]

## 2021-10-05 NOTE — CONSULTS
[facility]    Nephrology Consultation     Date of Admission:  10/4/2021    Assessment & Plan     Gordon Gann is a 76 year old male who was admitted on 10/4/2021.     1) PAFIB/aflutter with RVR - better.    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  He has been approaching the need for dialysis.      3) Massive Fluid Overload.  This is all related to CKD 5 and reduced sodium clearance.  He likely has 30+# of fluid on board.  He denies SOB but he really looks SOB.  I think this is a reason to get started on chronic HD.  If we could even diurese him down to a dry weight his GFR would be much lower.      4) Anemia:  HGB 10.  No need for FENG at this point.      5) MBD:  PTH was 86 in Aug. Need to recheck.      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  Dr. Hernandez has cleareed us to use the graft. If we see high venous pressures and/or prolonged bleeding post HD then he may need a fistulogram.      Plan:    Begin HD tomorrow.    Consider fistulogram if high venous pressures.     He will need placement in an outpt HD unit.      Loi Pratt MD  [facility] - Nephrology  [phone]    Reason for Consult     I was asked to see the patient for CKD 5 and volume overload.      Primary Care Physician     Belen Prieto    Chief Complaint     tachycardia    History is obtained from the patient and chart review.      History of Present Illness     Gordon Gann is a 76 year old male who presents with PAFIB/Aflutter with RVR.    He was admitted via ED after presenting with tachycardia.  His home health RN noted the tachycardia.  He was advised to go in for evaluation.    He did after three days as it did not get better.    He notes increased LE swelling.  He does not admit to SOB, CP, PND, or decline in appetite.       He has been placed on IV heparin and IV diltiazem.  Metoprolol was increased once again to 50 mg BID after it had been reduced gradually from 100 mg BID down  to 12.5 mg BID.  His HR is now controlled.      He has CKD 5 due to DM/HTN.    He had a Gortex graft placed in [facility] on 8/5/21 by Dr. Hernandez in preparation for HD.    He appears to have a narrowing at the venous anastomosis as seen on US.      Past Medical History   I have reviewed this patient's medical history and updated it with pertinent information if needed.   Past Medical History:   Diagnosis Date     Alcohol abuse      Atrial fibrillation (H)      CKD (chronic kidney disease) stage 4, GFR 15-29 ml/min (H)      Diabetes (H)      Gout      Hypokalemia      Hypophosphatemia      Insomnia      STEMI (ST elevation myocardial infarction) (H)        Past Surgical History   I have reviewed this patient's surgical history and updated it with pertinent information if needed.  Past Surgical History:   Procedure Laterality Date     CREATE GRAFT LOOP ARTERIOVENOUS UPPER EXTREMITY Left 8/5/2021    Procedure: left arm arteriovenous graft fistula.;  Surgeon: Noemí Hernandez MD;  Location:  OR       Prior to Admission Medications   Prior to Admission Medications   Prescriptions Last Dose Informant Patient Reported? Taking?   SODIUM BICARBONATE PO 10/4/2021 at AM Self Yes Yes   Sig: Take 1 tablet by mouth 2 times daily   Vitamin D, Cholecalciferol, 25 MCG (1000 UT) CAPS 10/4/2021 at AM Self Yes Yes   Sig: Take 1,000 Units by mouth every morning   acetaminophen (TYLENOL) 325 MG tablet Past Week at Unknown time Self Yes Yes   Sig: Take 1 tablet (325 mg) by mouth every 6 hours as needed for mild pain   allopurinol (ZYLOPRIM) 100 MG tablet 10/4/2021 at AM Self No Yes   Sig: Take 2 tablets (200 mg) by mouth daily   Patient taking differently: Take 100 mg by mouth daily    apixaban ANTICOAGULANT (ELIQUIS) 2.5 MG tablet 10/4/2021 at AM Self No Yes   Sig: Take 1 tablet (2.5 mg) by mouth every morning   aspirin 81 MG EC tablet 10/3/2021 at PM Self Yes Yes   Sig: Take 81 mg by mouth At Bedtime   cloNIDine (CATAPRES) 0.2 MG  tablet 10/4/2021 at AM Self Yes Yes   Sig: Take 0.2 mg by mouth 2 times daily   ezetimibe (ZETIA) 10 MG tablet 10/4/2021 at AM Self Yes Yes   Sig: Take 10 mg by mouth every morning   fish oil-omega-3 fatty acids 1000 MG capsule 10/4/2021 at AM Self Yes Yes   Sig: Take 1 g by mouth every morning   folic acid (FOLVITE) 1 MG tablet 10/4/2021 at AM Self Yes Yes   Sig: Take 1 mg by mouth every morning   furosemide (LASIX) 20 MG tablet 10/4/2021 at AM Self No Yes   Sig: Take 1 tablet (20 mg) by mouth daily   hydrALAZINE (APRESOLINE) 25 MG tablet More than a month at Unknown time Self No Yes   Sig: Take 1 tablet (25 mg) by mouth every 6 hours as needed (SBP >180)   insulin aspart (NOVOLOG PEN) 100 UNIT/ML pen 10/4/2021 at X1 Self No Yes   Sig: Inject 5 Units Subcutaneous 3 times daily (with meals)   insulin aspart (NOVOLOG PEN) 100 UNIT/ML pen 10/4/2021 at X1 Self No Yes   Sig: Inject 1-20 Units Subcutaneous 3 times daily (before meals) Correction Scale - MEDIUM INSULIN RESISTANCE DOSING   Do Not give Correction Insulin if Pre-Meal BG less than 140. For Pre-Meal  - 189 give 1 unit. For Pre-Meal  - 239 give 2 units. For Pre-Meal  - 289 give 3 units. For Pre-Meal  - 339 give 4 units. For Pre-Meal - 399 give 5 units. For Pre-Meal -449 give 6 units For Pre-Meal BG greater than or equal to 450 give 7 units. To be given with prandial insulin, and based on pre-meal blood glucose.  Notify provider if glucose greater than or equal to 350 mg/dL after administration of correction dose. If given at mealtime, administer within 30 minutes of start of meal   insulin glargine (LANTUS PEN) 100 UNIT/ML pen 10/3/2021 at PM Self No Yes   Sig: Inject 15 Units Subcutaneous At Bedtime   metoprolol tartrate (LOPRESSOR) 25 MG tablet 10/4/2021 at AM Self No Yes   Sig: Take 0.5 tablets (12.5 mg) by mouth 2 times daily Hold for HR <55   simvastatin (ZOCOR) 40 MG tablet More than a month at 2 months ago Self Yes Yes    Sig: Take 1 tablet (40 mg) by mouth At Bedtime   vitamin B complex with vitamin C (STRESS TAB) tablet 10/4/2021 at AM Self Yes Yes   Sig: Take 1 tablet by mouth every morning   zolpidem (AMBIEN) 5 MG tablet 10/3/2021 at PM Self Yes Yes   Sig: Take 5 mg by mouth nightly as needed for sleep      Facility-Administered Medications: None     Allergies   No Known Allergies    Social History   I have reviewed this patient's social history and updated it with pertinent information if needed. Gordon Gann  reports that he has never smoked. He has never used smokeless tobacco. He reports current alcohol use.    Family History   I have reviewed this patient's family history and updated it with pertinent information if needed.   No family history on file.    Review of Systems   The 10 point Review of Systems is negative other than noted in the HPI.     Physical Exam   Temp: 98  F (36.7  C) Temp src: Oral BP: (!) 150/77 Pulse: 112   Resp: 10 SpO2: 91 % O2 Device: None (Room air)    Vital Signs with Ranges  Temp:  [97.8  F (36.6  C)-98.1  F (36.7  C)] 98  F (36.7  C)  Pulse:  [] 112  Resp:  [10-33] 10  BP: (112-183)/() 150/77  SpO2:  [91 %-98 %] 91 %  235 lbs 8 oz    GENERAL: lying in bed, looks SOB  HEENT:  Normocephalic. Facial edema.  Pupils equal.  MMM.  Dentition is fair.   CV: regular, no M, 3+ generalized edema, JVP to ear lobes  RESP: reduced BS bases  GI: Abdomen soft/nt/nd, BS normal. No masses, organomegaly  MUSCULOSKELETAL: L UA Gortex access - L brachioaxillary   SKIN: no suspicious lesions or rashes, dry to touch  NEURO:  Strength normal and symmetric.   PSYCH: mood and affect appropriate  LYMPH: No palpable ant/post cervical and supraclavicular adenopathy    Data   BMP  Recent Labs   Lab 10/05/21  1228 10/05/21  0811 10/05/21  0509 10/05/21  0042 10/04/21  1633 10/04/21  1443   NA  --   --  141  --   --  143   POTASSIUM  --   --  4.1  --   --  4.4   CHLORIDE  --   --  110*  --   --  112*   LC  --    --  7.6*  --   --  8.2*   CO2  --   --  21  --   --  23   BUN  --   --  65*  --   --  68*   CR  --   --  4.25*  --   --  4.37*   * 234* 258* 169*   < > 69*    < > = values in this interval not displayed.     [email or web address](phos:[email or web address])  CBC)  Recent Labs   Lab 10/05/21  0509 10/05/21  0130 10/04/21  1443   WBC 9.3 9.6 9.8   HGB 10.0* 10.8* 11.6*   HCT 32.1* 34.1* 37.8*   MCV 91 91 93    324 364     Recent Labs   Lab 10/04/21  1443   AST 69*   ALT 72*   ALKPHOS 343*   BILITOTAL 0.3     Recent Labs   Lab 10/04/21  1443   INR 1.05

## 2021-10-06 ENCOUNTER — APPOINTMENT (OUTPATIENT)
Dept: OCCUPATIONAL THERAPY | Facility: CLINIC | Age: 76
DRG: 308 | End: 2021-10-06
Attending: INTERNAL MEDICINE
Payer: COMMERCIAL

## 2021-10-06 LAB
ALBUMIN SERPL-MCNC: 2.1 G/DL (ref 3.4–5)
ALP SERPL-CCNC: 301 U/L (ref 40–150)
ALT SERPL W P-5'-P-CCNC: 52 U/L (ref 0–70)
ANION GAP SERPL CALCULATED.3IONS-SCNC: 7 MMOL/L (ref 3–14)
APTT PPP: 48 SECONDS (ref 22–38)
APTT PPP: 49 SECONDS (ref 22–38)
APTT PPP: 89 SECONDS (ref 22–38)
AST SERPL W P-5'-P-CCNC: 36 U/L (ref 0–45)
BILIRUB SERPL-MCNC: 0.3 MG/DL (ref 0.2–1.3)
BUN SERPL-MCNC: 64 MG/DL (ref 7–30)
CALCIUM SERPL-MCNC: 8 MG/DL (ref 8.5–10.1)
CHLORIDE BLD-SCNC: 110 MMOL/L (ref 94–109)
CO2 SERPL-SCNC: 24 MMOL/L (ref 20–32)
CREAT SERPL-MCNC: 4.38 MG/DL (ref 0.66–1.25)
DEPRECATED CALCIDIOL+CALCIFEROL SERPL-MC: 22 UG/L (ref 20–75)
ERYTHROCYTE [DISTWIDTH] IN BLOOD BY AUTOMATED COUNT: 15.6 % (ref 10–15)
GFR SERPL CREATININE-BSD FRML MDRD: 12 ML/MIN/1.73M2
GLUCOSE BLD-MCNC: 86 MG/DL (ref 70–99)
GLUCOSE BLDC GLUCOMTR-MCNC: 112 MG/DL (ref 70–99)
GLUCOSE BLDC GLUCOMTR-MCNC: 151 MG/DL (ref 70–99)
GLUCOSE BLDC GLUCOMTR-MCNC: 89 MG/DL (ref 70–99)
HBV CORE AB SERPL QL IA: NONREACTIVE
HBV SURFACE AB SERPL IA-ACNC: 0.48 M[IU]/ML
HBV SURFACE AG SERPL QL IA: NONREACTIVE
HCT VFR BLD AUTO: 35.4 % (ref 40–53)
HGB BLD-MCNC: 11.1 G/DL (ref 13.3–17.7)
INR PPP: 1.02 (ref 0.85–1.15)
MAGNESIUM SERPL-MCNC: 2.2 MG/DL (ref 1.6–2.3)
MCH RBC QN AUTO: 28.7 PG (ref 26.5–33)
MCHC RBC AUTO-ENTMCNC: 31.4 G/DL (ref 31.5–36.5)
MCV RBC AUTO: 92 FL (ref 78–100)
PLATELET # BLD AUTO: 348 10E3/UL (ref 150–450)
POTASSIUM BLD-SCNC: 4.2 MMOL/L (ref 3.4–5.3)
PROT SERPL-MCNC: 6.2 G/DL (ref 6.8–8.8)
PTH-INTACT SERPL-MCNC: 122 PG/ML (ref 18–80)
RBC # BLD AUTO: 3.87 10E6/UL (ref 4.4–5.9)
SODIUM SERPL-SCNC: 141 MMOL/L (ref 133–144)
WBC # BLD AUTO: 12 10E3/UL (ref 4–11)

## 2021-10-06 PROCEDURE — 85027 COMPLETE CBC AUTOMATED: CPT | Performed by: INTERNAL MEDICINE

## 2021-10-06 PROCEDURE — 250N000013 HC RX MED GY IP 250 OP 250 PS 637: Performed by: INTERNAL MEDICINE

## 2021-10-06 PROCEDURE — 83970 ASSAY OF PARATHORMONE: CPT | Performed by: INTERNAL MEDICINE

## 2021-10-06 PROCEDURE — 36415 COLL VENOUS BLD VENIPUNCTURE: CPT | Performed by: INTERNAL MEDICINE

## 2021-10-06 PROCEDURE — 97166 OT EVAL MOD COMPLEX 45 MIN: CPT | Mod: GO | Performed by: OCCUPATIONAL THERAPIST

## 2021-10-06 PROCEDURE — 99233 SBSQ HOSP IP/OBS HIGH 50: CPT | Performed by: INTERNAL MEDICINE

## 2021-10-06 PROCEDURE — 5A1D70Z PERFORMANCE OF URINARY FILTRATION, INTERMITTENT, LESS THAN 6 HOURS PER DAY: ICD-10-PCS | Performed by: INTERNAL MEDICINE

## 2021-10-06 PROCEDURE — 85610 PROTHROMBIN TIME: CPT | Performed by: INTERNAL MEDICINE

## 2021-10-06 PROCEDURE — 97535 SELF CARE MNGMENT TRAINING: CPT | Mod: GO | Performed by: OCCUPATIONAL THERAPIST

## 2021-10-06 PROCEDURE — 82306 VITAMIN D 25 HYDROXY: CPT | Performed by: INTERNAL MEDICINE

## 2021-10-06 PROCEDURE — 85730 THROMBOPLASTIN TIME PARTIAL: CPT | Performed by: INTERNAL MEDICINE

## 2021-10-06 PROCEDURE — 80053 COMPREHEN METABOLIC PANEL: CPT | Performed by: INTERNAL MEDICINE

## 2021-10-06 PROCEDURE — 250N000009 HC RX 250: Performed by: INTERNAL MEDICINE

## 2021-10-06 PROCEDURE — 90937 HEMODIALYSIS REPEATED EVAL: CPT

## 2021-10-06 PROCEDURE — 97530 THERAPEUTIC ACTIVITIES: CPT | Mod: GO | Performed by: OCCUPATIONAL THERAPIST

## 2021-10-06 PROCEDURE — 210N000001 HC R&B IMCU HEART CARE

## 2021-10-06 PROCEDURE — 258N000003 HC RX IP 258 OP 636: Performed by: INTERNAL MEDICINE

## 2021-10-06 PROCEDURE — 250N000012 HC RX MED GY IP 250 OP 636 PS 637: Performed by: INTERNAL MEDICINE

## 2021-10-06 PROCEDURE — 83735 ASSAY OF MAGNESIUM: CPT | Performed by: INTERNAL MEDICINE

## 2021-10-06 RX ORDER — WARFARIN SODIUM 5 MG/1
5 TABLET ORAL
Status: COMPLETED | OUTPATIENT
Start: 2021-10-06 | End: 2021-10-06

## 2021-10-06 RX ORDER — FUROSEMIDE 40 MG
80 TABLET ORAL DAILY
Status: DISCONTINUED | OUTPATIENT
Start: 2021-10-07 | End: 2021-10-12 | Stop reason: HOSPADM

## 2021-10-06 RX ADMIN — METOPROLOL TARTRATE 50 MG: 50 TABLET, FILM COATED ORAL at 10:05

## 2021-10-06 RX ADMIN — ALLOPURINOL 100 MG: 100 TABLET ORAL at 18:35

## 2021-10-06 RX ADMIN — SIMVASTATIN 40 MG: 40 TABLET, FILM COATED ORAL at 21:50

## 2021-10-06 RX ADMIN — METOPROLOL TARTRATE 50 MG: 50 TABLET, FILM COATED ORAL at 21:50

## 2021-10-06 RX ADMIN — ZOLPIDEM TARTRATE 5 MG: 5 TABLET ORAL at 22:58

## 2021-10-06 RX ADMIN — CLONIDINE HYDROCHLORIDE 0.2 MG: 0.1 TABLET ORAL at 10:04

## 2021-10-06 RX ADMIN — Medication: at 16:21

## 2021-10-06 RX ADMIN — Medication 1 MG: at 22:58

## 2021-10-06 RX ADMIN — SODIUM BICARBONATE 325 MG: 325 TABLET ORAL at 10:06

## 2021-10-06 RX ADMIN — LIDOCAINE HYDROCHLORIDE 0.5 ML: 10 INJECTION, SOLUTION INFILTRATION; PERINEURAL at 16:21

## 2021-10-06 RX ADMIN — INSULIN ASPART 5 UNITS: 100 INJECTION, SOLUTION INTRAVENOUS; SUBCUTANEOUS at 10:05

## 2021-10-06 RX ADMIN — WARFARIN SODIUM 5 MG: 5 TABLET ORAL at 18:35

## 2021-10-06 RX ADMIN — CLONIDINE HYDROCHLORIDE 0.2 MG: 0.1 TABLET ORAL at 21:50

## 2021-10-06 RX ADMIN — INSULIN GLARGINE 15 UNITS: 100 INJECTION, SOLUTION SUBCUTANEOUS at 22:58

## 2021-10-06 RX ADMIN — SODIUM CHLORIDE 300 ML: 9 INJECTION, SOLUTION INTRAVENOUS at 16:20

## 2021-10-06 RX ADMIN — SODIUM CHLORIDE 250 ML: 9 INJECTION, SOLUTION INTRAVENOUS at 16:20

## 2021-10-06 ASSESSMENT — ACTIVITIES OF DAILY LIVING (ADL)
ADLS_ACUITY_SCORE: 8
ADLS_ACUITY_SCORE: 8
ADLS_ACUITY_SCORE: 12
PREVIOUS_RESPONSIBILITIES: MEAL PREP;HOUSEKEEPING;LAUNDRY
ADLS_ACUITY_SCORE: 8
ADLS_ACUITY_SCORE: 10
ADLS_ACUITY_SCORE: 10

## 2021-10-06 ASSESSMENT — MIFFLIN-ST. JEOR
SCORE: 1784.52
SCORE: 1784.5

## 2021-10-06 NOTE — PROGRESS NOTES
10/06/21 0851   Quick Adds   Type of Visit Initial Occupational Therapy Evaluation   Living Environment   People in home alone   Current Living Arrangements independent living facility   Home Accessibility no concerns   Transportation Anticipated family or friend will provide;car, drives self   Self-Care   Equipment Currently Used at Home walker, rolling;grab bar, toilet;grab bar, tub/shower   Activity/Exercise/Self-Care Comment walk in shower   Instrumental Activities of Daily Living (IADL)   Previous Responsibilities meal prep;housekeeping;laundry   IADL Comments nursing A's with med mgmt, pts dtr A's with shopping and bring to appts and will both A with cleaning, laundry and cooking. pt's dtr lives in [place]. pt does drive sometimes.    Disability/Function   Hearing Difficulty or Deaf no   Wear Glasses or Blind yes   Fall history within last six months yes   Number of times patient has fallen within last six months 2   General Information   Onset of Illness/Injury or Date of Surgery 10/04/21   Referring Physician Grace Meyer MD   Patient/Family Therapy Goal Statement (OT) home or TCU if needed   Additional Occupational Profile Info/Pertinent History of Current Problem Gordon Gann is a 76 year old male with history of CKD stage V not on dialysis, atrial fibrillation on chronic Eliquis, insulin requiring diabetes mellitus type 2 presents to the emergency room with tachycardia. Atrial fibrillation/flutter with RVR. Troponin elevated at 0.66 most likely demand ischemia   Existing Precautions/Restrictions fall;cardiac   Cognitive Status Examination   Orientation Status orientation to person, place and time   Affect/Mental Status (Cognitive) WFL   Follows Commands WFL   Visual Perception   Visual Impairment/Limitations corrective lenses for reading   Sensory   Sensory Quick Adds No deficits were identified   Pain Assessment   Patient Currently in Pain No   Range of Motion Comprehensive   Comment, General  Range of Motion B UE AROM WFL   Strength Comprehensive (MMT)   Comment, General Manual Muscle Testing (MMT) Assessment overall weakness   Bed Mobility   Scooting/Bridging [place] (Bed Mobility) supervision   Supine-Sit [place] (Bed Mobility) minimum assist (75% patient effort)   Sit-Supine [place] (Bed Mobility) minimum assist (75% patient effort)   Sit-Stand Transfer   Sit-Stand [place] (Transfers) contact guard   Assistive Device (Sit-Stand Transfers) walker, front-wheeled   Sit/Stand Transfer Comments ambulates to BR and back with CGA and FWW   Lower Body Dressing Assessment   [place] Level (Lower Body Dressing) maximum assist (25% patient effort)   Clinical Impression   Criteria for Skilled Therapeutic Interventions Met (OT) yes   OT Diagnosis impaired independence with ADL's and functional mobility   OT Problem List-Impairments impacting ADL problems related to;activity tolerance impaired;balance;strength   Assessment of Occupational Performance 3-5 Performance Deficits   Identified Performance Deficits impaired independence with dressing, toileting, bathing, cooking, cleaning, laundry, etc   Planned Therapy Interventions (OT) ADL retraining;cognition;transfer training;home program guidelines;progressive activity/exercise   Clinical Decision Making Complexity (OT) moderate complexity   Therapy Frequency (OT) Daily   Predicted Duration of Therapy 4 days   Risk & Benefits of therapy have been explained evaluation/treatment results reviewed;care plan/treatment goals reviewed;risks/benefits reviewed;current/potential barriers reviewed;participants voiced agreement with care plan;participants included;patient   OT Discharge Planning    OT Discharge Recommendation (DC Rec) Transitional Care Facility   OT Rationale for DC Rec pt limited due to impaired activity tolerance, strength, edema, balance and recommend TCU to increase ADL and functional mobiltiy independence and safety to PLOF. pending  on progress and if pt returns home recommend A with shower transfer, IADL's ie cleaning, cooking , laundry, etc and may need A with LE dress and home RN, OT and PT.    OT Brief overview of current status  Pt's BP hypetensive and HR A-fib ranging 100's-120/130's. supine <> Sit with MIN A, sit <> stand with CGA and FWW and ambulates with FWW with CGA, LE ADL's max A.    Total Evaluation Time (Minutes)   Total Evaluation Time (Minutes) 10

## 2021-10-06 NOTE — PROGRESS NOTES
He has a venous pressure in access of 230 while at at BFR of 250 mL/min.  This is quite high.  This confirms that he needs a fistulogram.    I will discuss with the vascular surgery team.    Loi Pratt MD

## 2021-10-06 NOTE — PLAN OF CARE
VSS ex HTN. HR Afib CVR- RVR. Pt very SOB in AM. Placed on 2L per NC for comfort. Pt in HD through out much of afternoon. Very lethargic when returned but easily awoken. Denies pain.

## 2021-10-06 NOTE — PROGRESS NOTES
[facility]    Medicine Progress Note - Hospitalist Service       Date of Admission:  10/4/2021    Assessment & Plan           Gordon Gann is a 76 year old male with history of CKD stage V not on dialysis, atrial fibrillation on chronic Eliquis, insulin requiring diabetes mellitus type 2 presents to the emergency room with tachycardia.     Atrial fibrillation/flutter with RVR  History of chronic atrial fibrillation   -EKG on admission shows rapid atrial flutter with 2 is to 1 conduction versus rapid atrial fibrillation  -Appreciate cardiology review.  Status post diltiazem drip.  Now on increased dose of metoprolol 50 mg twice daily.  -Anticoagulation : Unclear why at home was on apixaban 2.5 mg daily.  Discussed with the cardiology and Pharm.D (2.5 mg daily is an adequate dose for him and now him being starting on dialysis, Coumadin is recommended instead.  Patient was advised of the same and he agreed)     Elevated troponin.  History of coronary artery disease s/p PCI in 2006  Hypertension  Hyperlipidemia  -Troponin elevated at 0.66 most likely demand ischemia  - heparin drip that was started in the emergency room(in place of prior to admission Eliquis)  -Continue clonidine, metoprolol    SOB: From volume overload  CKD stage V  -Follows up with Dr. Saldana  -Left upper extremity graft placed by Dr. Hernandez on 8/5/2021.  Appreciate vascular surgery review of graft on 10/05  -Appreciate nephrology following patient.  Plan to start dialysis 10/06 and likely removal of fluid will help his breathing     Anemia of chronic disease  -Recent baseline appears to be between 9-10  -Monitor intermittently     Insulin requiring diabetes mellitus type 2  -Continue prior to admission Lantus 15 units daily, 5 units NovoLog scheduled along with sliding scale at home.  -Continue medium intensity sliding scale for now.    Recent Labs   Lab 10/06/21  0634 10/05/21  2107 10/05/21  1653 10/05/21  1228  "10/05/21  0811 10/05/21  0509   GLC 86 223* 235* 242* 234* 258*     Transaminitis with increase in alkaline phosphatase to 343.  -Monitor for now    History of insomnia: On as needed zolpidem at night  - monitor for any complex sleep behaviors, including sleepwalking, sleep driving, and engaging in other activities while not fully awake  - monitor for any episode of anterograde amnesia     Atelectasis versus pneumonia  -Chest x-ray today shows mild opacity at the left base may represent atelectasis or pneumonia  -Patient denies fever, cough or dyspnea.  He has a normal white cell count.  Clinically I favor atelectasis, we will not start any antibiotics at this time.       Gordon is a LOW SUSPICION PUI.  Follow these instructions:    If COVID test positive -> continue isolation precautions    If COVID test negative -> discontinue COVID-specific isolation precautions          Diet: Consistent Carb 60 grams CHO per Meal Diet    DVT Prophylaxis: DOAC  Flood Catheter: Not present  Central Lines: None  Code Status: Full Code      Disposition Plan   Expected discharge: 10/07/2021   recommended to prior living arrangement once renal function improved, safe disposition plan/ TCU bed available and Adequate control of heart rate and blood pressure.     The patient's care was discussed with the Bedside Nurse, Care Coordinator/ and Patient.    Ki Perez MD, MD  Hospitalist Service  [facility]  Securely message with the Vocera Web Console (learn more here)  Text page via [employer] Paging/Directory    \"This dictation was performed with voice recognition software and may contain errors,  omissions and inadvertent word substitution.\"       Clinically Significant Risk Factors Present on Admission                ______________________________________________________________________    Interval History   Feels more short of breath.  Denies any chest pain/palpitations  Still makes some urine but " "inadequate  No new fever/chills  4 point ROS done and negative unless mentioned     Data reviewed today: I reviewed all medications, new labs and imaging results over the last 24 hours. I personally reviewed the Echo image(s) showing As below.    Physical Exam   BP (!) 187/117   Pulse 110   Temp 97.8  F (36.6  C) (Oral)   Resp 24   Ht 1.727 m (5' 8\")   Wt 107.5 kg (237 lb)   SpO2 93%   BMI 36.04 kg/m    Gen- pleasant lying in bed  HEENT- NAD, LATRICIA  Neck- supple, no JVD elevation, no thyromegaly  CVS- I+II+ no m/r/g  RS- CTAB, crackles at base (appears short of breath)  Abdo- soft, no tenderness . No g/r/r, BS+ no hepatosplenomegaly   Ext- edema ++   LUE- in ACE bandage    CNS- no gross focal deficit     Vitals:    10/04/21 1434 10/05/21 0039 10/06/21 0300   Weight: 102.1 kg (225 lb) 106.8 kg (235 lb 8 oz) 107.5 kg (237 lb)       Data    BMP  Recent Labs   Lab 10/06/21  0634 10/05/21  2107 10/05/21  1653 10/05/21  1228 10/05/21  0811 10/05/21  0509 10/04/21  1633 10/04/21  1443     --   --   --   --  141  --  143   POTASSIUM 4.2  --   --   --   --  4.1  --  4.4   CHLORIDE 110*  --   --   --   --  110*  --  112*   LC 8.0*  --   --   --   --  7.6*  --  8.2*   CO2 24  --   --   --   --  21  --  23   BUN 64*  --   --   --   --  65*  --  68*   CR 4.38*  --   --   --   --  4.25*  --  4.37*   GLC 86 223* 235* 242*   < > 258*   < > 69*    < > = values in this interval not displayed.     CBC  Recent Labs   Lab 10/06/21  0634 10/05/21  0509 10/05/21  0509 10/05/21  0130 10/05/21  0130 10/04/21  1443 10/04/21  1443   WBC 12.0*  --  9.3  --  9.6  --  9.8   RBC 3.87*  --  3.54*  --  3.74*  --  4.06*   HGB 11.1*   < > 10.0*   < > 10.8*   < > 11.6*   HCT 35.4*  --  32.1*  --  34.1*  --  37.8*   MCV 92  --  91  --  91  --  93   MCH 28.7  --  28.2  --  28.9  --  28.6   MCHC 31.4*  --  31.2*  --  31.7  --  30.7*   RDW 15.6*  --  15.6*  --  15.7*  --  15.8*     --  298  --  324  --  364    < > = values in this " interval not displayed.     INR  Recent Labs   Lab 10/06/21  0634 10/04/21  1443   INR 1.02 1.05     LFTs  Recent Labs   Lab 10/06/21  0634 10/04/21  1443   ALKPHOS 301* 343*   AST 36 69*   ALT 52 72*   BILITOTAL 0.3 0.3   PROTTOTAL 6.2* 6.2*   ALBUMIN 2.1* 2.2*      PANCNo lab results found in last 7 days.    Recent Results (from the past 24 hour(s))   Echo Limited   Result Value    LVEF  55-60%    Narrative    [id number]  [id number]  [id number]  [id number]^VIN^LOBO     [facility]  [facility]  [address]  [place] [address]  Phone [phone]  Fax [phone]     Name: JULIO RIZVI  MRN: [id number]  : 1945  Study Date: 10/05/2021 11:10 AM  Age: 76 yrs  Gender: Male  Patient Location: [facility]  Reason For Study: Chest Pain, Chest Tightness, Chest Pressure  Ordering Physician: LOBO BANUELOS  Referring Physician: LOBO BANUELOS  Performed By: PATI Burgos     BSA: 2.2 m2  Height: 68 in  Weight: 235 lb  HR: 96  BP: 183/96 mmHg  ______________________________________________________________________________  Procedure  Limited Portable Echo Adult. Optison (NDC #0402-9524) given intravenously.     ______________________________________________________________________________  Interpretation Summary     Left ventricular systolic function is normal.The visual ejection fraction is  55-60%.  Mildly decreased right ventricular systolic function  Aortic valve sclerosis noted.     On direct comparison to echo images dated 2021 no significant changes.  ______________________________________________________________________________  Left Ventricle  The left ventricle is mildly dilated. There is mild eccentric left ventricular  hypertrophy. Left ventricular systolic function is normal. The visual ejection  fraction is 55-60%. No regional wall motion abnormalities noted.     Right Ventricle  The right ventricle is  normal size. Mildly decreased right ventricular  systolic function.     Atria  Normal left atrial size. Right atrial size is normal.     Mitral Valve  There is trace mitral regurgitation.     Tricuspid Valve  There is trace tricuspid regurgitation. Right ventricular systolic pressure  could not be approximated due to inadequate tricuspid regurgitation.     Aortic Valve  The aortic valve is not well visualized. Aortic valve sclerosis noted. No  aortic regurgitation is present. No aortic stenosis is present.     Pulmonic Valve  The pulmonic valve is not well visualized.     Vessels  The aortic root is normal size.     Pericardium  There is no pericardial effusion. Large left pleural effusion.     ______________________________________________________________________________  MMode/2D Measurements & Calculations  IVSd: 1.3 cm  LVIDd: 5.7 cm  LVIDs: 3.6 cm  LVPWd: 1.1 cm  FS: 36.9 %  LV mass(C)d: 293.7 grams  LV mass(C)dI: 134.2 grams/m2  Ao root diam: 3.5 cm  asc Aorta Diam: 3.3 cm  LVOT diam: 2.3 cm  LVOT area: 4.3 cm2  RVOT diam: 3.7 cm     RWT: 0.38     Doppler Measurements & Calculations  PA acc time: 0.10 sec     ______________________________________________________________________________  Report approved by: Luisa Davila 10/05/2021 01:07 PM

## 2021-10-06 NOTE — PROGRESS NOTES
Renal Medicine Inpatient Dialysis Note                                Gordon Gann MRN# [id number]   Age: 76 year old YOB: 1945   Date of Admission: 10/4/2021 Hospital LOS: 2          Assessment/Plan:     76 year old male who was admitted on 10/4/2021.      1) PAFIB/aflutter with RVR - better.     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  He has been approaching the need for dialysis.       3) Massive Fluid Overload.  This is all related to CKD 5 and reduced sodium clearance.  He likely has 30+# of fluid on board.  He denies SOB but he really looks SOB.  I think this is a reason to get started on chronic HD.  If we could even diurese him down to a dry weight his GFR would be much lower.       4) Anemia:  HGB 10.  No need for FENG at this point.       5) MBD:  PTH was 86 in Aug. Need to recheck.       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  Dr. Hernandez has cleareed us to use the graft. If we see high venous pressures and/or prolonged bleeding post HD then he may need a fistulogram    7) Hypoalbuminemia   -add nepro bid     8) HTN   -should improve with ongoing UF       30 minute discussion with patient regarding regarding current renal issues.  Reviewed with him rationale for initiating at this time  His major concern and appropriately so is quality of life    Consented to proceed with run today    Next 10/07/21    Interval History:     Dialysis run parameters reviewed with dialysis RN at patient bedside.  Seen on run    2.5 hours  AVG with 17 g needles  3K  2 liter UF    Graft cannulated OK  Modest increase venous pressure       ROS     GENERAL: NAD, No fever,chills  R: NEGATIVE for significant cough or SOB  CV: NEGATIVE for chest pain, palpitations  EXT: no change edema  ROS otherwise negative    Dialysis Parameters:     Vitals were reviewed  Patient Vitals for the past 8 hrs:   BP Temp Temp src Pulse Resp SpO2   10/06/21 1205 (!) 144/104 97.8  F (36.6  C) Axillary 89 22 96 %  "  10/06/21 1058 (!) 156/100 -- -- 103 -- --   10/06/21 1000 -- -- -- -- -- 93 %   10/06/21 0911 (!) 187/117 -- -- 110 -- 94 %   10/06/21 0854 (!) 181/124 -- -- (!) 126 -- 96 %   10/06/21 0848 (!) 164/125 -- -- (!) 125 -- --     I/O last 3 completed shifts:  In: 480 [P.O.:480]  Out: -     Vitals:    10/04/21 1434 10/05/21 0039 10/06/21 0300   Weight: 102.1 kg (225 lb) 106.8 kg (235 lb 8 oz) 107.5 kg (237 lb)       Current Weight: 107.5  Dry Weight: 90 range?  Dialysis Temp: 36.5  C  Access Device: AVG  Access Site: left upper  Dialyzer: Revaclear  Dialysis Bath: 3  Sodium Profile: n  UF Goal: 2  Blood Flow Rate (mL/min): 250  Total Treatment Time (hrs): 2.5  Heparin: Low dose as required      EPO dose: n  Zemplar: n  IV Fe: n      Medications and Allergies:     Reviewed      Physical Exam:     Seen and examined during course of dialysis run    BP (!) 144/104 (BP Location: Right arm)   Pulse 89   Temp 97.8  F (36.6  C) (Axillary)   Resp 22   Ht 1.727 m (5' 8\")   Wt 107.5 kg (237 lb)   SpO2 96%   BMI 36.04 kg/m      GENERAL: awake, alert, follows, anasarca   HEENT: NC/AT, PERRLA, EOMI, non icteric, pharynx moist without lesion  RESP: clear  CV: RRR, normal S1 S2  ABDOMEN: S/NT, BS present  EXT: access canulated without issue  NEURO: speech normal and cranial nerves 2-12 intact  PSYCH: affect flat    Data:       Recent Labs   Lab 10/06/21  1319 10/06/21  0634 10/05/21  2107   NA  --  141  --    POTASSIUM  --  4.2  --    CHLORIDE  --  110*  --    CO2  --  24  --    ANIONGAP  --  7  --    * 86   < >   BUN  --  64*  --    CR  --  4.38*  --    GFRESTIMATED  --  12*  --    LC  --  8.0*  --     < > = values in this interval not displayed.     Recent Labs   Lab 10/06/21  0634 10/04/21  1443   ALBUMIN 2.1* 2.2*     Recent Labs   Lab 10/06/21  0634 10/05/21  0509 10/05/21  0130 10/04/21  1443   PHOS  --  5.0*  --   --    HGB 11.1* 10.0* 10.8* 11.6*         G Garret Perla MD    [facility] - " Nephrology  [phone]

## 2021-10-06 NOTE — PLAN OF CARE
Patient alert, calls for SBA with walker and gait belt. Uses BR, refuses to use urinal for measurement. Denies pain or SOB but pt appears dyspneic even at rest. Sat's on RA wnl. BP elevated 150-160's systolic, pt declined PTT for heparin drip, needs encouragement to allow blood draw. Heparin drip at 700 units/hr at this time. Pt to start dialysis today.

## 2021-10-06 NOTE — PROGRESS NOTES
Potassium   Date Value Ref Range Status   10/06/2021 4.2 3.4 - 5.3 mmol/L Final     Hemoglobin   Date Value Ref Range Status   10/06/2021 11.1 (L) 13.3 - 17.7 g/dL Final     Creatinine   Date Value Ref Range Status   10/06/2021 4.38 (H) 0.66 - 1.25 mg/dL Final     Urea Nitrogen   Date Value Ref Range Status   10/06/2021 64 (H) 7 - 30 mg/dL Final     Sodium   Date Value Ref Range Status   10/06/2021 141 133 - 144 mmol/L Final     INR   Date Value Ref Range Status   10/06/2021 1.02 0.85 - 1.15 Final     Comment:     Effective 7/11/2021, the reference range for this assay has changed.       DIALYSIS PROCEDURE NOTE  Hepatitis status of previous patient on machine log was checked and verified ok to use with this patients hepatitis status.  Patient dialyzed for 2.5 hrs. on a K3 bath with a net fluid removal of  2L.  A BFR of 250 ml/min was obtained via a LUAG using 17gauge needles.      The treatment plan was discussed with Dr. Pratt and Dr Johnson during the treatment.    Total heparin received during the treatment: 0 units.   Needle cannulation sites held x 5 min.     Meds  given: none   Complications: high venous pressures - Dr Pratt notified.   Patient very apprehensive pre run and refused to sign the consents for dialysis until he spoke to a doctor.  Dr. Pratt notified.  Dr Johnson came to talk to patient.  Dr Johnson and writer discussed dialysis with patient for 30-40 minutes.  Patient did consent to trying dialysis. First use of graft in NAYELI - very swollen, good bruit and thrill.    Pt will dialyze again in am    Person educated: patient. Knowledge base none. Barriers to learning: nothing. Educated on dialysis process via verbal mode. Patient verbalized understanding.and needs reinforcement Pt prefers verbal education style.     ICEBOAT? Timeout performed pre-treatment  I: Patient was identified using 2 identifiers  C:  Consent Signed Yes  E: Equipment preventative maintenance is current and dialysis delivery  system OK to use  B: Hepatitis B Surface Antigen: negative; Draw Date: 8/2/21      Hepatitis B Surface Antibody: susceptible; Draw Date: 8/2/21  O: Dialysis orders present and complete prior to treatment  A: Vascular access verified and assessed prior to treatment  T: Treatment was performed at a clinically appropriate time  ?: Patient was allowed to ask questions and address concerns prior to treatment  See flowsheet in EPIC for further details and post assessment.  Machine water alarm in place and functioning. Transducer pods intact and checked every 15min.   Pt returned via bed.  Chlorine/Chloramine water system checked every 4 hours.  Outpatient Dialysis TBD    Please remove patient dressing on AVF and AVG needle sites 24 hours after dialysis. If leaking occurs please apply a Band-Aid.

## 2021-10-07 ENCOUNTER — APPOINTMENT (OUTPATIENT)
Dept: GENERAL RADIOLOGY | Facility: CLINIC | Age: 76
DRG: 308 | End: 2021-10-07
Attending: INTERNAL MEDICINE
Payer: COMMERCIAL

## 2021-10-07 LAB
APTT PPP: 56 SECONDS (ref 22–38)
APTT PPP: 69 SECONDS (ref 22–38)
ATRIAL RATE - MUSE: 256 BPM
DIASTOLIC BLOOD PRESSURE - MUSE: NORMAL MMHG
ERYTHROCYTE [DISTWIDTH] IN BLOOD BY AUTOMATED COUNT: 15.4 % (ref 10–15)
GLUCOSE BLDC GLUCOMTR-MCNC: 136 MG/DL (ref 70–99)
GLUCOSE BLDC GLUCOMTR-MCNC: 158 MG/DL (ref 70–99)
GLUCOSE BLDC GLUCOMTR-MCNC: 250 MG/DL (ref 70–99)
GLUCOSE BLDC GLUCOMTR-MCNC: 96 MG/DL (ref 70–99)
HCT VFR BLD AUTO: 32.9 % (ref 40–53)
HGB BLD-MCNC: 10.4 G/DL (ref 13.3–17.7)
INR PPP: 1.02 (ref 0.85–1.15)
INTERPRETATION ECG - MUSE: NORMAL
MCH RBC QN AUTO: 29.1 PG (ref 26.5–33)
MCHC RBC AUTO-ENTMCNC: 31.6 G/DL (ref 31.5–36.5)
MCV RBC AUTO: 92 FL (ref 78–100)
P AXIS - MUSE: -76 DEGREES
PLATELET # BLD AUTO: 274 10E3/UL (ref 150–450)
POTASSIUM BLD-SCNC: 4.4 MMOL/L (ref 3.4–5.3)
PR INTERVAL - MUSE: NORMAL MS
QRS DURATION - MUSE: 88 MS
QT - MUSE: 406 MS
QTC - MUSE: 515 MS
R AXIS - MUSE: 63 DEGREES
RBC # BLD AUTO: 3.58 10E6/UL (ref 4.4–5.9)
SYSTOLIC BLOOD PRESSURE - MUSE: NORMAL MMHG
T AXIS - MUSE: 79 DEGREES
VENTRICULAR RATE- MUSE: 97 BPM
WBC # BLD AUTO: 9.1 10E3/UL (ref 4–11)

## 2021-10-07 PROCEDURE — 85730 THROMBOPLASTIN TIME PARTIAL: CPT | Performed by: INTERNAL MEDICINE

## 2021-10-07 PROCEDURE — 85610 PROTHROMBIN TIME: CPT | Performed by: INTERNAL MEDICINE

## 2021-10-07 PROCEDURE — 250N000012 HC RX MED GY IP 250 OP 636 PS 637: Performed by: INTERNAL MEDICINE

## 2021-10-07 PROCEDURE — 85027 COMPLETE CBC AUTOMATED: CPT | Performed by: EMERGENCY MEDICINE

## 2021-10-07 PROCEDURE — 250N000011 HC RX IP 250 OP 636: Performed by: EMERGENCY MEDICINE

## 2021-10-07 PROCEDURE — 36415 COLL VENOUS BLD VENIPUNCTURE: CPT | Performed by: INTERNAL MEDICINE

## 2021-10-07 PROCEDURE — 99233 SBSQ HOSP IP/OBS HIGH 50: CPT | Performed by: INTERNAL MEDICINE

## 2021-10-07 PROCEDURE — 210N000001 HC R&B IMCU HEART CARE

## 2021-10-07 PROCEDURE — 250N000009 HC RX 250: Performed by: INTERNAL MEDICINE

## 2021-10-07 PROCEDURE — 258N000003 HC RX IP 258 OP 636: Performed by: INTERNAL MEDICINE

## 2021-10-07 PROCEDURE — 250N000013 HC RX MED GY IP 250 OP 250 PS 637: Performed by: INTERNAL MEDICINE

## 2021-10-07 PROCEDURE — 71046 X-RAY EXAM CHEST 2 VIEWS: CPT

## 2021-10-07 PROCEDURE — 84132 ASSAY OF SERUM POTASSIUM: CPT | Performed by: INTERNAL MEDICINE

## 2021-10-07 PROCEDURE — 90937 HEMODIALYSIS REPEATED EVAL: CPT

## 2021-10-07 PROCEDURE — 36415 COLL VENOUS BLD VENIPUNCTURE: CPT | Performed by: EMERGENCY MEDICINE

## 2021-10-07 RX ORDER — WARFARIN SODIUM 5 MG/1
5 TABLET ORAL
Status: COMPLETED | OUTPATIENT
Start: 2021-10-07 | End: 2021-10-07

## 2021-10-07 RX ADMIN — ALLOPURINOL 100 MG: 100 TABLET ORAL at 15:25

## 2021-10-07 RX ADMIN — INSULIN GLARGINE 15 UNITS: 100 INJECTION, SOLUTION SUBCUTANEOUS at 21:33

## 2021-10-07 RX ADMIN — SODIUM CHLORIDE 250 ML: 9 INJECTION, SOLUTION INTRAVENOUS at 11:20

## 2021-10-07 RX ADMIN — FOLIC ACID 1 MG: 1 TABLET ORAL at 15:22

## 2021-10-07 RX ADMIN — LIDOCAINE HYDROCHLORIDE 0.5 ML: 10 INJECTION, SOLUTION INFILTRATION; PERINEURAL at 11:21

## 2021-10-07 RX ADMIN — WARFARIN SODIUM 5 MG: 5 TABLET ORAL at 18:06

## 2021-10-07 RX ADMIN — Medication 1 MG: at 22:28

## 2021-10-07 RX ADMIN — INSULIN ASPART 5 UNITS: 100 INJECTION, SOLUTION INTRAVENOUS; SUBCUTANEOUS at 08:00

## 2021-10-07 RX ADMIN — METOPROLOL TARTRATE 50 MG: 50 TABLET, FILM COATED ORAL at 21:30

## 2021-10-07 RX ADMIN — B-COMPLEX W/ C & FOLIC ACID TAB 1 TABLET: TAB at 15:25

## 2021-10-07 RX ADMIN — SODIUM CHLORIDE 300 ML: 9 INJECTION, SOLUTION INTRAVENOUS at 11:20

## 2021-10-07 RX ADMIN — INSULIN ASPART 5 UNITS: 100 INJECTION, SOLUTION INTRAVENOUS; SUBCUTANEOUS at 15:33

## 2021-10-07 RX ADMIN — EZETIMIBE 10 MG: 10 TABLET ORAL at 15:23

## 2021-10-07 RX ADMIN — Medication: at 11:21

## 2021-10-07 RX ADMIN — CLONIDINE HYDROCHLORIDE 0.2 MG: 0.1 TABLET ORAL at 21:30

## 2021-10-07 RX ADMIN — SIMVASTATIN 40 MG: 40 TABLET, FILM COATED ORAL at 21:30

## 2021-10-07 RX ADMIN — Medication 1000 UNITS: at 15:22

## 2021-10-07 RX ADMIN — CLONIDINE HYDROCHLORIDE 0.2 MG: 0.1 TABLET ORAL at 15:26

## 2021-10-07 RX ADMIN — HEPARIN SODIUM 800 UNITS/HR: 10000 INJECTION, SOLUTION INTRAVENOUS at 02:38

## 2021-10-07 RX ADMIN — METOPROLOL TARTRATE 50 MG: 50 TABLET, FILM COATED ORAL at 15:25

## 2021-10-07 RX ADMIN — ZOLPIDEM TARTRATE 5 MG: 5 TABLET ORAL at 22:28

## 2021-10-07 ASSESSMENT — ACTIVITIES OF DAILY LIVING (ADL)
DEPENDENT_IADLS:: MEDICATION MANAGEMENT;COOKING
ADLS_ACUITY_SCORE: 13
ADLS_ACUITY_SCORE: 12

## 2021-10-07 ASSESSMENT — MIFFLIN-ST. JEOR
SCORE: 1764.5
SCORE: 1764.57

## 2021-10-07 NOTE — PROGRESS NOTES
[facility]    Medicine Progress Note - Hospitalist Service       Date of Admission:  10/4/2021    Assessment & Plan             Gordon Gann is a 76 year old male with history of CKD stage V not on dialysis, atrial fibrillation on chronic Eliquis, insulin requiring diabetes mellitus type 2 presents to the emergency room with tachycardia.     Atrial fibrillation/flutter with RVR  History of chronic atrial fibrillation   -EKG on admission shows rapid atrial flutter with 2 is to 1 conduction versus rapid atrial fibrillation  -Appreciate cardiology review.  Status post diltiazem drip.  Now on increased dose of metoprolol 50 mg twice daily.  -Anticoagulation : Unclear why at home was on apixaban 2.5 mg daily.  Discussed with the cardiology and Pharm.D (2.5 mg daily is an adequate dose for him and now him being starting on dialysis, Coumadin is recommended instead.  Patient was advised of the same and he agreed).  Continue Coumadin with goal INR 2-3 (on heparin bridge)    Elevated troponin.  History of coronary artery disease s/p PCI in 2006  Hypertension  Hyperlipidemia  -Troponin elevated at 0.66 most likely demand ischemia  - heparin drip that was started in the emergency room(in place of prior to admission Eliquis).    -Eliquis discussed with Pharm.D.  -Continue clonidine, metoprolol    SOB: From volume overload  CKD stage V  -Follows up with Dr. Saldana  -Left upper extremity graft placed by Dr. Hernandez on 8/5/2021.  Appreciate vascular surgery review of graft on 10/05  -Appreciate nephrology following patient.  started dialysis 10/06 and continue removal of fluid will help his breathing.  Discussed with case coordinator for outpatient dialysis center     Anemia of chronic disease  -Recent baseline appears to be between 9-10  -Monitor intermittently     Insulin requiring diabetes mellitus type 2  -Continue prior to admission Lantus 15 units daily, 5 units NovoLog scheduled along with  "sliding scale at home.  -Continue medium intensity sliding scale for now.    Recent Labs   Lab 10/07/21  1504 10/07/21  0727 10/07/21  0220 10/06/21  2228 10/06/21  1759 10/06/21  1319   GLC 96 136* 158* 151* 89 112*     Transaminitis with increase in alkaline phosphatase to 343.  -Monitor for now    History of insomnia: On as needed zolpidem at night  - monitor for any complex sleep behaviors, including sleepwalking, sleep driving, and engaging in other activities while not fully awake  - monitor for any episode of anterograde amnesia     Atelectasis versus pneumonia  -Chest x-ray today shows mild opacity at the left base may represent atelectasis or pneumonia  -Patient denies fever, cough or dyspnea.  He has a normal white cell count.  Clinically I favor atelectasis, we will not start any antibiotics at this time.       Gordon is a LOW SUSPICION PUI.  Follow these instructions:    If COVID test positive -> continue isolation precautions    If COVID test negative -> discontinue COVID-specific isolation precautions          Diet: Consistent Carb 60 grams CHO per Meal Diet    DVT Prophylaxis: DOAC  Flood Catheter: Not present  Central Lines: None  Code Status: Full Code      Disposition Plan   Expected discharge: 10/08/2021   recommended to prior living arrangement once renal function improved, safe disposition plan/ TCU bed available and Adequate control of heart rate and blood pressure.     The patient's care was discussed with the Bedside Nurse, Care Coordinator/ and Patient.    Ki Perez MD, MD  Hospitalist Service  [facility]  Securely message with the Vocera Web Console (learn more here)  Text page via Path[employer] Paging/Directory    \"This dictation was performed with voice recognition software and may contain errors,  omissions and inadvertent word substitution.\"       Clinically Significant Risk Factors Present on Admission            " "    ______________________________________________________________________    Interval History   Feels better compared to yesterday.  Keen on going home soon   Denies any chest pain/palpitations  Still makes some urine but inadequate    Tolerating dialysis so far    No new fever/chills  4 point ROS done and negative unless mentioned     Data reviewed today: I reviewed all medications, new labs and imaging results over the last 24 hours. I personally reviewed the Echo image(s) showing As below.    Physical Exam   BP (!) 161/105   Pulse (!) 129   Temp 97.8  F (36.6  C) (Oral)   Resp 20   Ht 1.727 m (5' 8\")   Wt 105.5 kg (232 lb 9.4 oz)   SpO2 98%   BMI 35.36 kg/m    Gen- pleasant lying in bed  HEENT- NAD, LATRICIA  Neck- supple, no JVD elevation, no thyromegaly  CVS- I+II+ no m/r/g  RS- CTAB, crackles at base   Abdo- soft, no tenderness . No g/r/r, BS+ no hepatosplenomegaly   Ext- edema ++   LUE- in ACE bandage    CNS- no gross focal deficit     Vitals:    10/06/21 1400 10/07/21 0344 10/07/21 0800   Weight: 107.5 kg (236 lb 15.9 oz) 105.5 kg (232 lb 9.6 oz) 105.5 kg (232 lb 9.4 oz)       Data    BMP  Recent Labs   Lab 10/07/21  1504 10/07/21  0834 10/07/21  0727 10/07/21  0220 10/06/21  2228 10/06/21  1319 10/06/21  0634 10/05/21  0811 10/05/21  0509 10/04/21  1633 10/04/21  1443   NA  --   --   --   --   --   --  141  --  141  --  143   POTASSIUM  --  4.4  --   --   --   --  4.2  --  4.1  --  4.4   CHLORIDE  --   --   --   --   --   --  110*  --  110*  --  112*   LC  --   --   --   --   --   --  8.0*  --  7.6*  --  8.2*   CO2  --   --   --   --   --   --  24  --  21  --  23   BUN  --   --   --   --   --   --  64*  --  65*  --  68*   CR  --   --   --   --   --   --  4.38*  --  4.25*  --  4.37*   GLC 96  --  136* 158* 151*   < > 86   < > 258*   < > 69*    < > = values in this interval not displayed.     CBC  Recent Labs   Lab 10/07/21  0529 10/06/21  0634 10/06/21  0634 10/05/21  0509 10/05/21  0509 10/05/21  0130 " 10/05/21  0130   WBC 9.1  --  12.0*  --  9.3  --  9.6   RBC 3.58*  --  3.87*  --  3.54*  --  3.74*   HGB 10.4*   < > 11.1*   < > 10.0*   < > 10.8*   HCT 32.9*  --  35.4*  --  32.1*  --  34.1*   MCV 92  --  92  --  91  --  91   MCH 29.1  --  28.7  --  28.2  --  28.9   MCHC 31.6  --  31.4*  --  31.2*  --  31.7   RDW 15.4*  --  15.6*  --  15.6*  --  15.7*     --  348  --  298  --  324    < > = values in this interval not displayed.     INR  Recent Labs   Lab 10/07/21  0529 10/06/21  0634 10/04/21  1443   INR 1.02 1.02 1.05     LFTs  Recent Labs   Lab 10/06/21  0634 10/04/21  1443   ALKPHOS 301* 343*   AST 36 69*   ALT 52 72*   BILITOTAL 0.3 0.3   PROTTOTAL 6.2* 6.2*   ALBUMIN 2.1* 2.2*      PANCNo lab results found in last 7 days.    Recent Results (from the past 24 hour(s))   XR Chest 2 Views    Narrative    XR CHEST 2 VW 10/7/2021 2:07 PM    HISTORY: volume overload    COMPARISON: 10/4/2021      Impression    IMPRESSION: Low lung volumes. Slight increase in bibasilar  atelectasis. Mild pulmonary vascular congestion. Small amount of fluid  in the right major fissure. No significant layering pleural effusion  or pneumothorax.    BRIDGETT WARNER MD         SYSTEM ID:  [id number]

## 2021-10-07 NOTE — PROGRESS NOTES
"VASCULAR SURGERY PROGRESS NOTE    Subjective:  Underwent dialysis yesterday 2.5L removed. Needle cannulation sites held x5min, no infiltration of the site seen this am. No complaints this morning and breathing improved.    Objective:  Intake/Output Summary (Last 24 hours) at 10/7/2021 0910  Last data filed at 10/7/2021 0800  Gross per 24 hour   Intake 720 ml   Output 2000 ml   Net -1280 ml     Labs:  ROUTINE IP LABS (Last four results)  BMP  Recent Labs   Lab 10/07/21  0834 10/07/21  0727 10/07/21  0220 10/06/21  2228 10/06/21  1759 10/06/21  1319 10/06/21  0634 10/05/21  0811 10/05/21  0509 10/04/21  1633 10/04/21  1443   NA  --   --   --   --   --   --  141  --  141  --  143   POTASSIUM 4.4  --   --   --   --   --  4.2  --  4.1  --  4.4   CHLORIDE  --   --   --   --   --   --  110*  --  110*  --  112*   LC  --   --   --   --   --   --  8.0*  --  7.6*  --  8.2*   CO2  --   --   --   --   --   --  24  --  21  --  23   BUN  --   --   --   --   --   --  64*  --  65*  --  68*   CR  --   --   --   --   --   --  4.38*  --  4.25*  --  4.37*   GLC  --  136* 158* 151* 89   < > 86   < > 258*   < > 69*    < > = values in this interval not displayed.     CBC  Recent Labs   Lab 10/07/21  0529 10/06/21  0634 10/05/21  0509 10/05/21  0130   WBC 9.1 12.0* 9.3 9.6   RBC 3.58* 3.87* 3.54* 3.74*   HGB 10.4* 11.1* 10.0* 10.8*   HCT 32.9* 35.4* 32.1* 34.1*   MCV 92 92 91 91   MCH 29.1 28.7 28.2 28.9   MCHC 31.6 31.4* 31.2* 31.7   RDW 15.4* 15.6* 15.6* 15.7*    348 298 324     INR  Recent Labs   Lab 10/07/21  0529 10/06/21  0634 10/04/21  1443   INR 1.02 1.02 1.05     PHYSICAL EXAM:  BP (!) 161/105   Pulse 112   Temp 98  F (36.7  C) (Oral)   Resp 22   Ht 1.727 m (5' 8\")   Wt 105.5 kg (232 lb 9.4 oz)   SpO2 95%   BMI 35.36 kg/m    Constitutional: healthy, alert, no acute distress and cooperative   Cardiovascular: RRR  Respiratory: CTAB anteriorly, breathing unlabored without secondary muscle use  Psychiatric: mentation " appears normal and affect normal/bright  Neck: no asymmetry  GI/Abdomen: +BS, abdomen soft, non-tender. No masses, no CVAT  MSK: able to move all extremities without weakness or ataxia  Extremities: no hematoma/ecchymosis overlying the graft, well-healed antecubital and axillary incisions, palpable graft pulse, no erythema or cellulitis to suggest graft infection, forearm abrasion as seen below appears non-infected, palpable radial pulse, sensorimotor deficits of the left hand but good capillary refill.    Imaging:   No new imaging.    ASSESSMENT:  76 year old year old male who has a PMH significant for chronic Afib on Eliquis, DM2 on insulin, CKD5 (Cr 4.25, GFR 13),  CAD w/ prior PCI w/ Aflutter w/ RVR and troponemia. Also s/p L brachioaxillary graft creation on 8/5/21 for which we are consulted for wound check and evaluation for use of AVG.     Duplex shows patent mature graft with flow rate 970mL/min, axillary vein outflow stenosis/kinking w/ elevated velocities.  CKD5 (Cr 4.38, GFR 12), BUN 64.  Clinically fluid overload, dialyzed yesterday successfully, plans for dialysis again today   Troponemia improving, currently in Aflutter w/ RVR managed with Metoprolol    PLAN:  Continue dialysis with AVG; he may have prolonged bleeding and will require prolonged compression at dialysis access site to prevent infiltration  No plans for fistulogram at this time  WO nurse for forearm wound mgmt  Continue compression for L forearm swelling  Appreciate Nephrology reccs     Grace Meyer MD  Vascular Surgery Fellow  Pager: [phone]

## 2021-10-07 NOTE — PROGRESS NOTES
Inpatient Dialysis Progress Note            Assessment and Plan:     76 year old male who was admitted on 10/4/2021.      1) PAFIB/aflutter with RVR - This wa better but now is back.       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  HD #2 today.        3) Massive Fluid Overload.  This is all related to CKD 5 and reduced sodium clearance.  He likely has 30+# of fluid on board.  He denies SOB but he really looks SOB.  I think this is a reason to get started on chronic HD.       4) Anemia:  HGB 10.  No need for FENG at this point.       5) MBD:  .   Vit D 22.  Phos 5.0.  No Hectorol.      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  Dr. Hernandez has cleareed us to use the graft. If we see high venous pressures and/or prolonged bleeding post HD and/or poor clearance then he may need a fistulogram     7) Hypoalbuminemia              -add nepro bid      8) HTN              -should improve with ongoing UF       Plan:    HD #3 tomorrow.  He still needs placement in outpt HD  Hep B serols done  I will send for a CXR                     Interval History:     Second HD run  First went well.  Very high venous pressures.  No prolonged bleeding post HD.      Today one needle clotted.  Blood had to re circulate in system while new needle was placed.    Then system clotted even though he is on IV heparin.    A new system is being set up presently.    He is frustrated with how things are going.  I reassured him that his dialysis routine will become settled.    Venous pressures better today, though still high.  At  his  is 265.            Dialysis Parameters:     Wt Readings from Last 4 Encounters:   10/07/21 105.5 kg (232 lb 9.4 oz)   08/04/21 89.8 kg (197 lb 15.6 oz)     I/O last 3 completed shifts:  In: 480 [P.O.:480]  Out: 2000 [Other:2000]  BP Readings from Last 3 Encounters:   10/07/21 (!) 161/105   08/11/21 (!) 180/71   08/06/21 123/60       Routine, ONE TIME, Starting today For 1 Occurrences  Weight  Loss (kg): 2-3  Dialysis Temp: 36.5  C  Access Device: AVG  Access Site: L arm  Dialyzer: Revaclear  Dialysis Bath: K 3  Blood Flow Rate (mL/min): 350  Total Treatment Time (hrs): 3  Heparin: no additional         Medications and Allergies:   Reviewed in EPIC      - MEDICATION INSTRUCTIONS for Dialysis Patients -   Does not apply See Admin Instructions     sodium chloride 0.9%  250 mL Intravenous Once in dialysis/CRRT     sodium chloride 0.9%  300 mL Hemodialysis Machine Once     allopurinol  100 mg Oral Daily     cloNIDine  0.2 mg Oral BID     ezetimibe  10 mg Oral QAM     folic acid  1 mg Oral QAM     furosemide  80 mg Oral Daily     insulin aspart  1-7 Units Subcutaneous TID AC     insulin aspart  1-5 Units Subcutaneous At Bedtime     insulin aspart  5 Units Subcutaneous TID w/meals     insulin glargine  15 Units Subcutaneous At Bedtime     metoprolol tartrate  50 mg Oral BID     - MEDICATION INSTRUCTIONS -   Does not apply Once     simvastatin  40 mg Oral At Bedtime     sodium chloride (PF)  3 mL Intracatheter Q8H     vitamin B complex with vitamin C  1 tablet Oral QAM     Vitamin D3  1,000 Units Oral QAM     sodium chloride 0.9%, Continuing ACE inhibitor/ARB/ARNI from home medication list OR ACE inhibitor/ARB order already placed during this visit, - MEDICATION INSTRUCTIONS -, - MEDICATION INSTRUCTIONS -, - MEDICATION INSTRUCTIONS -, glucose **OR** dextrose **OR** glucagon, hydrALAZINE, lidocaine 4%, lidocaine (buffered or not buffered), lidocaine (buffered or not buffered), lidocaine (buffered or not buffered), - MEDICATION INSTRUCTIONS -, melatonin, ondansetron **OR** ondansetron, - MEDICATION INSTRUCTIONS -, sodium chloride (PF), - MEDICATION INSTRUCTIONS -, Warfarin Therapy Reminder, zolpidem   No Known Allergies           Labs:     [facility]  Recent Labs   Lab 10/07/21  0834 10/07/21  0727 10/07/21  0220 10/06/21  2228 10/06/21  1759 10/06/21  1319 10/06/21  0634 10/05/21  0811 10/05/21  0509 10/04/21  1633  10/04/21  1443   NA  --   --   --   --   --   --  141  --  141  --  143   POTASSIUM 4.4  --   --   --   --   --  4.2  --  4.1  --  4.4   CHLORIDE  --   --   --   --   --   --  110*  --  110*  --  112*   LC  --   --   --   --   --   --  8.0*  --  7.6*  --  8.2*   CO2  --   --   --   --   --   --  24  --  21  --  23   BUN  --   --   --   --   --   --  64*  --  65*  --  68*   CR  --   --   --   --   --   --  4.38*  --  4.25*  --  4.37*   GLC  --  136* 158* 151* 89   < > 86   < > 258*   < > 69*    < > = values in this interval not displayed.     CBC  Recent Labs   Lab 10/07/21  0529 10/06/21  0634 10/05/21  0509 10/05/21  0130   WBC 9.1 12.0* 9.3 9.6   HGB 10.4* 11.1* 10.0* 10.8*   HCT 32.9* 35.4* 32.1* 34.1*   MCV 92 92 91 91    348 298 324     Lab Results   Component Value Date    AST 36 10/06/2021    ALT 52 10/06/2021    ALKPHOS 301 (H) 10/06/2021    BILITOTAL 0.3 10/06/2021            Physical Exam:   Vitals were reviewed in [facility]    Wt Readings from Last 3 Encounters:   10/07/21 105.5 kg (232 lb 9.4 oz)   08/04/21 89.8 kg (197 lb 15.6 oz)       Intake/Output Summary (Last 24 hours) at 10/7/2021 0949  Last data filed at 10/7/2021 0800  Gross per 24 hour   Intake 720 ml   Output 2000 ml   Net -1280 ml       GENERAL APPEARANCE: pleasant, no distress, a & o  HEENT:  Facial edema.   RESP: lungs reduced BS bases  CV: irrreg irreg rapid  ABDOMEN: soft, nontender, bowel sounds normal  EXTREMITIES/SKIN: 2-3 + generalized edema      Pt seen on dialysis.  Stable run.  Good BFR.      Attestation:  I have reviewed today's vital signs, notes, medications, labs and imaging.     Loi Pratt MD  [facility] - Nephrology  [phone]

## 2021-10-07 NOTE — PLAN OF CARE
Patient alert, calls for SBA with walker to BR, Declines to use urinal for accurate measurement. BP remains elevated 160/110. Afib  0cc up to 120's. Dyspnea at rest and with amb, pt states it improved since dialysis. Another dialysis for today. Pt c/o itching on shins which has a mild pink rash and itching under wound dressing on left forearm. Heparin drip continues, no change this am, recheck PTT at 1230. Review POC with patient.

## 2021-10-07 NOTE — PROGRESS NOTES
Potassium   Date Value Ref Range Status   10/07/2021 4.4 3.4 - 5.3 mmol/L Final     Hemoglobin   Date Value Ref Range Status   10/07/2021 10.4 (L) 13.3 - 17.7 g/dL Final     Creatinine   Date Value Ref Range Status   10/06/2021 4.38 (H) 0.66 - 1.25 mg/dL Final     Urea Nitrogen   Date Value Ref Range Status   10/06/2021 64 (H) 7 - 30 mg/dL Final     Sodium   Date Value Ref Range Status   10/06/2021 141 133 - 144 mmol/L Final     INR   Date Value Ref Range Status   10/07/2021 1.02 0.85 - 1.15 Final     Comment:     Effective 7/11/2021, the reference range for this assay has changed.       DIALYSIS PROCEDURE NOTE  Hepatitis status of previous patient on machine log was checked and verified ok to use with this patients hepatitis status.  Patient dialyzed for 3hrs. on a K3 bath with a net fluid removal of  3L.  A BFR of 350 ml/min was obtained via a GLUA using 16gauge needles.      The treatment plan was discussed with Dr. Pratt during the treatment.    Total heparin received during the treatment: 0 units.   Needle cannulation sites held x 5 min.       Meds  given: none   Complications: needles clotted requiring inserting a second set of needles.  System recirculated and then clotted.  New system set up and patient dialyzed for 3 hours without issues.      Person educated: patient. Knowledge base minimal. Barriers to learning: anxiety. Educated on procedure, machine and access care, via verbal mode. patient asks many appropriate questions and is still very skeptical about the entire dialysis process. Pt prefers verbal education style.     ICEBOAT? Timeout performed pre-treatment  I: Patient was identified using 2 identifiers  C:  Consent Signed Yes  E: Equipment preventative maintenance is current and dialysis delivery system OK to use  B:Results for BELKYSJULIO (MRN [id number]) as of 10/7/2021 09:46   Ref. Range 10/5/2021 23:17   Hep B Surface Agn Latest Ref Range: Nonreactive  Nonreactive   Hepatitis B Core Doan  Latest Ref Range: Nonreactive  Nonreactive   Hepatitis B Surface Antibody Latest Ref Range: <8.00 m[IU]/mL 0.48     O: Dialysis orders present and complete prior to treatment  A: Vascular access verified and assessed prior to treatment  T: Treatment was performed at a clinically appropriate time  ?: Patient was allowed to ask questions and address concerns prior to treatment  See flowsheet in EPIC for further details and post assessment.  Machine water alarm in place and functioning. Transducer pods intact and checked every 15min.   Pt returned via bed.  Chlorine/Chloramine water system checked every 4 hours.  Outpatient Dialysis TBD      Please remove patient dressing on AVF and AVG needle sites 24 hours after dialysis. If leaking occurs please apply a Band-Aid.

## 2021-10-08 ENCOUNTER — APPOINTMENT (OUTPATIENT)
Dept: OCCUPATIONAL THERAPY | Facility: CLINIC | Age: 76
DRG: 308 | End: 2021-10-08
Payer: COMMERCIAL

## 2021-10-08 LAB
ANION GAP SERPL CALCULATED.3IONS-SCNC: 5 MMOL/L (ref 3–14)
APTT PPP: 51 SECONDS (ref 22–38)
APTT PPP: 52 SECONDS (ref 22–38)
APTT PPP: 97 SECONDS (ref 22–38)
BUN SERPL-MCNC: 39 MG/DL (ref 7–30)
CALCIUM SERPL-MCNC: 7.8 MG/DL (ref 8.5–10.1)
CHLORIDE BLD-SCNC: 108 MMOL/L (ref 94–109)
CO2 SERPL-SCNC: 27 MMOL/L (ref 20–32)
CREAT SERPL-MCNC: 3.25 MG/DL (ref 0.66–1.25)
ERYTHROCYTE [DISTWIDTH] IN BLOOD BY AUTOMATED COUNT: 15.2 % (ref 10–15)
GFR SERPL CREATININE-BSD FRML MDRD: 18 ML/MIN/1.73M2
GLUCOSE BLD-MCNC: 79 MG/DL (ref 70–99)
GLUCOSE BLDC GLUCOMTR-MCNC: 164 MG/DL (ref 70–99)
GLUCOSE BLDC GLUCOMTR-MCNC: 171 MG/DL (ref 70–99)
GLUCOSE BLDC GLUCOMTR-MCNC: 211 MG/DL (ref 70–99)
GLUCOSE BLDC GLUCOMTR-MCNC: 65 MG/DL (ref 70–99)
GLUCOSE BLDC GLUCOMTR-MCNC: 85 MG/DL (ref 70–99)
HCT VFR BLD AUTO: 31.6 % (ref 40–53)
HGB BLD-MCNC: 9.7 G/DL (ref 13.3–17.7)
INR PPP: 1.07 (ref 0.85–1.15)
MCH RBC QN AUTO: 28.2 PG (ref 26.5–33)
MCHC RBC AUTO-ENTMCNC: 30.7 G/DL (ref 31.5–36.5)
MCV RBC AUTO: 92 FL (ref 78–100)
PLATELET # BLD AUTO: 227 10E3/UL (ref 150–450)
POTASSIUM BLD-SCNC: 4 MMOL/L (ref 3.4–5.3)
RBC # BLD AUTO: 3.44 10E6/UL (ref 4.4–5.9)
SODIUM SERPL-SCNC: 140 MMOL/L (ref 133–144)
WBC # BLD AUTO: 7.2 10E3/UL (ref 4–11)

## 2021-10-08 PROCEDURE — 99233 SBSQ HOSP IP/OBS HIGH 50: CPT | Performed by: INTERNAL MEDICINE

## 2021-10-08 PROCEDURE — 250N000013 HC RX MED GY IP 250 OP 250 PS 637: Performed by: INTERNAL MEDICINE

## 2021-10-08 PROCEDURE — 90937 HEMODIALYSIS REPEATED EVAL: CPT

## 2021-10-08 PROCEDURE — 85014 HEMATOCRIT: CPT | Performed by: INTERNAL MEDICINE

## 2021-10-08 PROCEDURE — 85610 PROTHROMBIN TIME: CPT | Performed by: INTERNAL MEDICINE

## 2021-10-08 PROCEDURE — 258N000003 HC RX IP 258 OP 636: Performed by: INTERNAL MEDICINE

## 2021-10-08 PROCEDURE — 80048 BASIC METABOLIC PNL TOTAL CA: CPT | Performed by: INTERNAL MEDICINE

## 2021-10-08 PROCEDURE — 250N000011 HC RX IP 250 OP 636: Performed by: EMERGENCY MEDICINE

## 2021-10-08 PROCEDURE — 97140 MANUAL THERAPY 1/> REGIONS: CPT | Mod: GO | Performed by: OCCUPATIONAL THERAPIST

## 2021-10-08 PROCEDURE — 250N000012 HC RX MED GY IP 250 OP 636 PS 637: Performed by: INTERNAL MEDICINE

## 2021-10-08 PROCEDURE — 36415 COLL VENOUS BLD VENIPUNCTURE: CPT | Performed by: INTERNAL MEDICINE

## 2021-10-08 PROCEDURE — 85730 THROMBOPLASTIN TIME PARTIAL: CPT | Performed by: INTERNAL MEDICINE

## 2021-10-08 PROCEDURE — 210N000001 HC R&B IMCU HEART CARE

## 2021-10-08 RX ORDER — WARFARIN SODIUM 7.5 MG/1
7.5 TABLET ORAL ONCE
Status: COMPLETED | OUTPATIENT
Start: 2021-10-08 | End: 2021-10-08

## 2021-10-08 RX ORDER — METOPROLOL TARTRATE 100 MG
100 TABLET ORAL 2 TIMES DAILY
Status: DISCONTINUED | OUTPATIENT
Start: 2021-10-08 | End: 2021-10-10

## 2021-10-08 RX ORDER — METOPROLOL TARTRATE 100 MG
100 TABLET ORAL 2 TIMES DAILY
Status: DISCONTINUED | OUTPATIENT
Start: 2021-10-08 | End: 2021-10-08

## 2021-10-08 RX ADMIN — INSULIN GLARGINE 15 UNITS: 100 INJECTION, SOLUTION SUBCUTANEOUS at 22:04

## 2021-10-08 RX ADMIN — HEPARIN SODIUM 950 UNITS/HR: 10000 INJECTION, SOLUTION INTRAVENOUS at 08:15

## 2021-10-08 RX ADMIN — METOPROLOL TARTRATE 100 MG: 100 TABLET, FILM COATED ORAL at 13:09

## 2021-10-08 RX ADMIN — INSULIN ASPART 5 UNITS: 100 INJECTION, SOLUTION INTRAVENOUS; SUBCUTANEOUS at 14:59

## 2021-10-08 RX ADMIN — WARFARIN SODIUM 7.5 MG: 7.5 TABLET ORAL at 13:09

## 2021-10-08 RX ADMIN — CLONIDINE HYDROCHLORIDE 0.2 MG: 0.1 TABLET ORAL at 20:42

## 2021-10-08 RX ADMIN — SODIUM CHLORIDE 250 ML: 9 INJECTION, SOLUTION INTRAVENOUS at 09:44

## 2021-10-08 RX ADMIN — ZOLPIDEM TARTRATE 5 MG: 5 TABLET ORAL at 22:05

## 2021-10-08 RX ADMIN — SODIUM CHLORIDE 300 ML: 9 INJECTION, SOLUTION INTRAVENOUS at 09:43

## 2021-10-08 RX ADMIN — SIMVASTATIN 40 MG: 40 TABLET, FILM COATED ORAL at 22:05

## 2021-10-08 RX ADMIN — METOPROLOL TARTRATE 100 MG: 100 TABLET, FILM COATED ORAL at 20:42

## 2021-10-08 ASSESSMENT — ACTIVITIES OF DAILY LIVING (ADL)
ADLS_ACUITY_SCORE: 13

## 2021-10-08 ASSESSMENT — MIFFLIN-ST. JEOR: SCORE: 1769.56

## 2021-10-08 NOTE — PROGRESS NOTES
"VASCULAR SURGERY PROGRESS NOTE    Subjective:  Underwent dialysis again yesterday 3L removed. Needles clotted requiring inserting a second set of needles. Needle cannulation sites held x5min, no infiltration of the site seen this am. No complaints this morning and breathing improved. Plans for HD today again. SW working on outpt dialysis site.    Objective:  Intake/Output Summary (Last 24 hours) at 10/7/2021 0910  Last data filed at 10/7/2021 0800  Gross per 24 hour   Intake 720 ml   Output 2000 ml   Net -1280 ml     Labs:  ROUTINE IP LABS (Last four results)  BMP  Recent Labs   Lab 10/08/21  0600 10/08/21  0206 10/07/21  2130 10/07/21  1504 10/07/21  0834 10/07/21  0727 10/06/21  1319 10/06/21  0634 10/05/21  0811 10/05/21  0509 10/04/21  1633 10/04/21  1443     --   --   --   --   --   --  141  --  141  --  143   POTASSIUM 4.0  --   --   --  4.4  --   --  4.2  --  4.1   < > 4.4   CHLORIDE 108  --   --   --   --   --   --  110*  --  110*  --  112*   LC 7.8*  --   --   --   --   --   --  8.0*  --  7.6*  --  8.2*   CO2 27  --   --   --   --   --   --  24  --  21  --  23   BUN 39*  --   --   --   --   --   --  64*  --  65*  --  68*   CR 3.25*  --   --   --   --   --   --  4.38*  --  4.25*  --  4.37*   GLC 79 164* 250* 96  --    < >   < > 86   < > 258*   < > 69*    < > = values in this interval not displayed.     CBC  Recent Labs   Lab 10/08/21  0600 10/07/21  0529 10/06/21  0634 10/05/21  0509   WBC 7.2 9.1 12.0* 9.3   RBC 3.44* 3.58* 3.87* 3.54*   HGB 9.7* 10.4* 11.1* 10.0*   HCT 31.6* 32.9* 35.4* 32.1*   MCV 92 92 92 91   MCH 28.2 29.1 28.7 28.2   MCHC 30.7* 31.6 31.4* 31.2*   RDW 15.2* 15.4* 15.6* 15.6*    274 348 298     INR  Recent Labs   Lab 10/08/21  0600 10/07/21  0529 10/06/21  0634 10/04/21  1443   INR 1.07 1.02 1.02 1.05     PHYSICAL EXAM:  BP (!) 141/98 (BP Location: Right arm)   Pulse 101   Temp 97.6  F (36.4  C) (Oral)   Resp 20   Ht 1.727 m (5' 8\")   Wt 106 kg (233 lb 11.2 oz)   SpO2 " 96%   BMI 35.53 kg/m    Constitutional: healthy, alert, no acute distress and cooperative   Cardiovascular: RRR  Respiratory: CTAB anteriorly, breathing unlabored without secondary muscle use  Psychiatric: mentation appears normal and affect normal/bright  Neck: no asymmetry  GI/Abdomen: +BS, abdomen soft, non-tender. No masses, no CVAT  MSK: able to move all extremities without weakness or ataxia  Extremities: no hematoma/ecchymosis overlying the graft, well-healed antecubital and axillary incisions, palpable graft pulse, no erythema or cellulitis to suggest graft infection, forearm abrasion as seen below appears non-infected, palpable radial pulse, sensorimotor deficits of the left hand but good capillary refill.    Imaging:   No new imaging.    ASSESSMENT:  76 year old year old male who has a PMH significant for chronic Afib on Eliquis, DM2 on insulin, CKD5 (Cr 4.25, GFR 13),  CAD w/ prior PCI w/ Aflutter w/ RVR and troponemia. Also s/p L brachioaxillary graft creation on 8/5/21 for which we are consulted for wound check and evaluation for use of AVG.     Duplex shows patent mature graft with flow rate 970mL/min, axillary vein outflow stenosis/kinking w/ elevated velocities.  CKD5, clinically fluid overload, dialyzed x2 successfully, plans for dialysis again today     PLAN:  Continue dialysis with AVG; he may have prolonged bleeding and will require prolonged compression at dialysis access site to prevent infiltration  No plans for fistulogram at this time  [facility] nurse for forearm wound mgmt  Continue compression for L forearm swelling  Appreciate Nephrology reccs     Grace Meyer MD  Vascular Surgery Fellow  Pager: [phone]

## 2021-10-08 NOTE — PROGRESS NOTES
Care Management Follow Up    Length of Stay (days): 4  Expected Discharge Date: 10/08/2021  Concerns to be Addressed: discharge planning     Patient plan of care discussed at interdisciplinary rounds: Yes  Anticipated Discharge Disposition: Home Care, Home, Outpatient Dialysis  Anticipated Discharge Services: Transportation Services  Anticipated Discharge DME: None  Patient/family educated on Medicare website which has current facility and service quality ratings: no  Education Provided on the Discharge Plan:    Patient/Family in Agreement with the Plan: yes  Referrals Placed by CM/SW:  [facility]  Private pay costs discussed: TBD    Additional Information:    Received update from [facility] intake: patient has chair time confirmed:     Outpatient Dialysis Location: [facility]   Address: [facility] [address] [address], [place], [place], [address]  Schedule: Tuesday / Thursday / Saturday at 10:45 AM   First run: Tues Oct 12, 2021 please arrive at 9:45 AM   Phone: [phone] / Fax [phone]      Called [facility] to page Nephrologist with this information.  Dr. Pratt confirmed he has received the information.     MD can call or fax orders to the unit.  CM-RN can fax inpatient run information to the unit on day of discharge.    Damaris Butterfield RN, BSN, PHN  [facility]  Inpatient Care Management - FLOAT  Mobile: [phone] 10/08/21 until 4pm  (after today's date, please call the patient's unit)

## 2021-10-08 NOTE — PLAN OF CARE
Tele: A fib, HRs 90s-110s. BORDEN, denies SOB at rest, states his breathing has improved since admission. Heparin infuisng per MAR instructions. Up SBA with walker. Plan for dialysis run today.

## 2021-10-08 NOTE — PROGRESS NOTES
Potassium   Date Value Ref Range Status   10/08/2021 4.0 3.4 - 5.3 mmol/L Final     Hemoglobin   Date Value Ref Range Status   10/08/2021 9.7 (L) 13.3 - 17.7 g/dL Final     Creatinine   Date Value Ref Range Status   10/08/2021 3.25 (H) 0.66 - 1.25 mg/dL Final     Urea Nitrogen   Date Value Ref Range Status   10/08/2021 39 (H) 7 - 30 mg/dL Final     Sodium   Date Value Ref Range Status   10/08/2021 140 133 - 144 mmol/L Final     INR   Date Value Ref Range Status   10/08/2021 1.07 0.85 - 1.15 Final     Comment:     Effective 7/11/2021, the reference range for this assay has changed.       DIALYSIS PROCEDURE NOTE  Hepatitis status of previous patient on machine log was checked and verified ok to use with this patients hepatitis status.  Patient dialyzed for 3.5 hrs. on a K3 bath with a net fluid removal of  4L.  A BFR of 450 ml/min was obtained via a LUE AVG using 15 gauge needles.      The treatment plan was discussed with Dr. Pratt during the treatment.    Total heparin received during the treatment: 0 units.   Needle cannulation sites held x 10 min.       Meds  given: none   Complications: none      Person educated: patient. Knowledge base none. Barriers to learning: none. Educated on procedure via verbal mode. Patient verbalized understanding. Pt prefers verbal education style.     ICEBOAT? Timeout performed pre-treatment  I: Patient was identified using 2 identifiers  C:  Consent Signed Yes  E: Equipment preventative maintenance is current and dialysis delivery system OK to use  B: Hepatitis B Surface Antigen: NEGATIVE; Draw Date: 10/5/2021      Hepatitis B Surface Antibody: SUSCEPTIBLE; Draw Date: 10/5/2021  O: Dialysis orders present and complete prior to treatment  A: Vascular access verified and assessed prior to treatment  T: Treatment was performed at a clinically appropriate time  ?: Patient was allowed to ask questions and address concerns prior to treatment  See flowsheet in EPIC for further details and  post assessment.  Machine water alarm in place and functioning. Transducer pods intact and checked every 15min.   Pt returned via bed.  Chlorine/Chloramine water system checked every 4 hours.  Outpatient Dialysis at [facility]    Please remove patient dressing on AVF and AVG needle sites 24 hours after dialysis. If leaking occurs please apply a Band-Aid.

## 2021-10-08 NOTE — PROGRESS NOTES
10/08/21 1345   Rehab Discipline   Discipline OT   Type of Visit   Type of visit Initial Edema Evaluation   General Information   Start of care 10/08/21   Referring physician Grace Meyer MD   Orders Evaluate and treat as indicated  (left arm swelling from AVG, compression sleeve 15-20mmHg)   Order date 10/05/21   Medical diagnosis 76 year old year old male who has a PMH significant for chronic Afib on Eliquis, DM2 on insulin, CKD5 (Cr 4.25, GFR 13),  CAD w/ prior PCI w/ Aflutter w/ RVR and troponemia. Also s/p L brachioaxillary graft creation on 8/5/21 for which we are consulted for wound check and evaluation for use of AVG. pt reports L UE swelling after fistula.    Onset of illness / date of surgery 10/04/21   Edema onset   (per pt, edema started when fistula in for dialysis)   Affected body parts LUE   Edema etiology Infection  (L UE fistula and wound on L UE dorsal mid forearm. )   Prior level of functional mobility PLOF: pt I with ambulation with FWW. see OT eval for details regarding pt's living environment and PLOF and current functional mobiltiy and ADL level.    Pain   Patient currently in pain No   Cognitive Status   Orientation Orientation to person, place and time   Level of consciousness Alert   Follows commands and answers questions 100% of the time   Edema Exam / Assessment   Skin condition Pitting;Dryness   Skin condition comments 2-3+ edema increased edema around L elbow. wound that nursing is completing wound care daily on L dorsal mid forearm.    Pitting 2+;3+   Pitting location see above   Range of Motion   ROM comments B UE AROM WFL   Strength   Strength comments Overall weakness   Transfers   Transfers see OT eval for details   Planned Edema Interventions   Planned edema interventions Manual lymph drainage;Gradient compression bandaging;Precautions to prevent infection / exacerbation;Education;Manual therapy;ADL training   Clinical Impression   Criteria for skilled therapeutic intervention  met Yes   Therapy diagnosis L UE edema interfering with ADL's   Influenced by the following impairments / conditions Stage 2;Wounds / Ulcers  (fistula)   Assessment of Occupational Performance 3-5 Performance Deficits   Identified Performance Deficits imapired actiivty tolerance, strength, edema, balance   Clinical Decision Making (Complexity) Moderate complexity   Treatment Frequency Daily   Treatment duration 4 days   Patient / family and/or staff in agreement with plan of care Yes   Risks and benefits of therapy have been explained Yes

## 2021-10-08 NOTE — PROGRESS NOTES
[facility]    Medicine Progress Note - Hospitalist Service       Date of Admission:  10/4/2021    Assessment & Plan             Gordon Gann is a 76 year old male with history of CKD stage V not on dialysis, atrial fibrillation on chronic Eliquis, insulin requiring diabetes mellitus type 2 presents to the emergency room with tachycardia.     Atrial fibrillation/flutter with RVR  History of chronic atrial fibrillation   -EKG on admission shows rapid atrial flutter with 2 is to 1 conduction versus rapid atrial fibrillation  -Appreciate cardiology review.  Status post diltiazem drip.  Now on increased dose of metoprolol 50 mg twice daily.  -Anticoagulation : Unclear why at home was on apixaban 2.5 mg daily.  Discussed with the cardiology and Pharm.D (2.5 mg daily is an adequate dose for him and now him being starting on dialysis, Coumadin is recommended instead.  Patient was advised of the same and he agreed).  Continue Coumadin with goal INR 2-3 (on heparin bridge)  - inc Metoprolol to 100mg bid 10/8    Elevated troponin.  History of coronary artery disease s/p PCI in 2006  Hypertension  Hyperlipidemia  -Troponin elevated at 0.66 most likely demand ischemia  - heparin drip that was started in the emergency room(in place of prior to admission Eliquis).    -Eliquis discussed with Pharm.D.  -Continue clonidine, metoprolol    SOB: From volume overload  CKD stage V  -Follows up with Dr. Saldana  -Left upper extremity graft placed by Dr. Hernandez on 8/5/2021.  Appreciate vascular surgery review of graft on 10/05  -Appreciate nephrology following patient.  started dialysis 10/06 and continue removal of fluid will help his breathing.  Discussed with case coordinator for outpatient dialysis center     Anemia of chronic disease  -Recent baseline appears to be between 9-10  -Monitor intermittently     Insulin requiring diabetes mellitus type 2  -Continue prior to admission Lantus 15 units daily, 5  "units NovoLog scheduled along with sliding scale at home.  -Continue medium intensity sliding scale for now.    Recent Labs   Lab 10/08/21  1305 10/08/21  0712 10/08/21  0600 10/08/21  0206 10/07/21  2130 10/07/21  1504   GLC 85 65* 79 164* 250* 96     Transaminitis with increase in alkaline phosphatase to 343.  -Monitor for now    History of insomnia: On as needed zolpidem at night  - monitor for any complex sleep behaviors, including sleepwalking, sleep driving, and engaging in other activities while not fully awake  - monitor for any episode of anterograde amnesia     Atelectasis versus pneumonia  -Chest x-ray today shows mild opacity at the left base may represent atelectasis or pneumonia  -Patient denies fever, cough or dyspnea.  He has a normal white cell count.  Clinically I favor atelectasis, we will not start any antibiotics at this time.       Gordon is a LOW SUSPICION PUI.  Follow these instructions:    If COVID test positive -> continue isolation precautions    If COVID test negative -> discontinue COVID-specific isolation precautions          Diet: Consistent Carb 60 grams CHO per Meal Diet    DVT Prophylaxis: Coumadin  Flood Catheter: Not present  Central Lines: None  Code Status: Full Code      Disposition Plan   Expected discharge: 10/08/2021   recommended to prior living arrangement once renal function improved, safe disposition plan/ TCU bed available and Adequate control of heart rate and blood pressure.     The patient's care was discussed with the Bedside Nurse, Care Coordinator/ and Patient.    Ki Perez MD, MD  Hospitalist Service  [facility]  Securely message with the Vocera Web Console (learn more here)  Text page via [employer] Paging/Directory    \"This dictation was performed with voice recognition software and may contain errors,  omissions and inadvertent word substitution.\"       Clinically Significant Risk Factors Present on Admission            " "    ______________________________________________________________________    Interval History    better compared to yesterday.  Keen on going home soon  Frustrated by not able to sleep (in hospital or home)   Denies any chest pain/palpitations  Tolerating dialysis so far    No new fever/chills  4 point ROS done and negative unless mentioned     Data reviewed today: I reviewed all medications, new labs and imaging results over the last 24 hours. I personally reviewed the Echo image(s) showing As below.    Physical Exam   BP (!) 145/97   Pulse (!) 129   Temp 97.6  F (36.4  C) (Axillary)   Resp 18   Ht 1.727 m (5' 8\")   Wt 106 kg (233 lb 11.2 oz)   SpO2 97%   BMI 35.53 kg/m    Gen- pleasant lying in bed  HEENT- NAD, LATRICIA  Neck- supple, no JVD elevation, no thyromegaly  CVS- I+II+ no m/r/g  RS- CTAB, crackles at base   Abdo- soft, no tenderness . No g/r/r, BS+ no hepatosplenomegaly   Ext- edema ++   LUE- in ACE bandage    CNS- no gross focal deficit     Vitals:    10/07/21 0344 10/07/21 0800 10/08/21 0615   Weight: 105.5 kg (232 lb 9.6 oz) 105.5 kg (232 lb 9.4 oz) 106 kg (233 lb 11.2 oz)       Data    BMP  Recent Labs   Lab 10/08/21  1305 10/08/21  0712 10/08/21  0600 10/08/21  0206 10/07/21  1504 10/07/21  0834 10/06/21  1319 10/06/21  0634 10/05/21  0811 10/05/21  0509 10/04/21  1633 10/04/21  1443   NA  --   --  140  --   --   --   --  141  --  141  --  143   POTASSIUM  --   --  4.0  --   --  4.4  --  4.2  --  4.1   < > 4.4   CHLORIDE  --   --  108  --   --   --   --  110*  --  110*  --  112*   LC  --   --  7.8*  --   --   --   --  8.0*  --  7.6*  --  8.2*   CO2  --   --  27  --   --   --   --  24  --  21  --  23   BUN  --   --  39*  --   --   --   --  64*  --  65*  --  68*   CR  --   --  3.25*  --   --   --   --  4.38*  --  4.25*  --  4.37*   GLC 85 65* 79 164*   < >  --    < > 86   < > 258*   < > 69*    < > = values in this interval not displayed.     CBC  Recent Labs   Lab 10/08/21  0600 10/07/21  4119 " 10/07/21  0529 10/06/21  0634 10/06/21  0634 10/05/21  0509 10/05/21  0509   WBC 7.2  --  9.1  --  12.0*  --  9.3   RBC 3.44*  --  3.58*  --  3.87*  --  3.54*   HGB 9.7*   < > 10.4*   < > 11.1*   < > 10.0*   HCT 31.6*  --  32.9*  --  35.4*  --  32.1*   MCV 92  --  92  --  92  --  91   MCH 28.2  --  29.1  --  28.7  --  28.2   MCHC 30.7*  --  31.6  --  31.4*  --  31.2*   RDW 15.2*  --  15.4*  --  15.6*  --  15.6*     --  274  --  348  --  298    < > = values in this interval not displayed.     INR  Recent Labs   Lab 10/08/21  0600 10/07/21  0529 10/06/21  0634 10/04/21  1443   INR 1.07 1.02 1.02 1.05     LFTs  Recent Labs   Lab 10/06/21  0634 10/04/21  1443   ALKPHOS 301* 343*   AST 36 69*   ALT 52 72*   BILITOTAL 0.3 0.3   PROTTOTAL 6.2* 6.2*   ALBUMIN 2.1* 2.2*      PANCNo lab results found in last 7 days.    Recent Results (from the past 24 hour(s))   XR Chest 2 Views    Narrative    XR CHEST 2 VW 10/7/2021 2:07 PM    HISTORY: volume overload    COMPARISON: 10/4/2021      Impression    IMPRESSION: Low lung volumes. Slight increase in bibasilar  atelectasis. Mild pulmonary vascular congestion. Small amount of fluid  in the right major fissure. No significant layering pleural effusion  or pneumothorax.    BRIDGETT WARNER MD         SYSTEM ID:  [id number]

## 2021-10-08 NOTE — PROGRESS NOTES
Inpatient Dialysis Progress Note            Assessment and Plan:       76 year old male who was admitted on 10/4/2021.      1) PAFIB/aflutter with RVR - Still needs better rate control.  Cardiology has signed off.  I will increase metoprolol to 100 mg BID.      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  HD #3 today.        3) Massive Fluid Overload.  This is all related to CKD 5 and reduced sodium clearance.  He likely has 30+# of fluid on board.  He denies SOB but he really looks SOB.  I think this is a reason to get started on chronic HD.       4) Anemia:  HGB 9.7.  If falls further will need FENG.  No sign of blood loss except what he lost in one dialysis circuit.       5) MBD:  .   Vit D 22.  Phos 5.0.  No Hectorol.      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  Dr. Hernandez has cleared us to use the graft. If we see high venous pressures and/or prolonged bleeding post HD and/or poor clearance then he may need a fistulogram     7) Hypoalbuminemia              -add nepro bid      8) HTN               -should improve with ongoing UF      Plan:     Increase metoprolol to 100 mg BID  Placement in outpt HD.      Addendum:     He has a outpt HD spot at [facility] starting 10/12 at 9:45 AM  Subsequent treatments at 10:45.    I will have him under go HD tomorrow as well.  If rate control better on higher dose metoprolol, then he could be discharged after HD tomorrow.    I called [facility] with orders.          Interval History:     Wants to go home.  Otherwise no complaint.    Dialysis seems to be going well so far.  No access troubles.  Venous pressures 250 at .    Eating ok.    Denies n/v/f/c.    No dizziness/lightheadedness/cramping.    No abd pain/cp/sob.        Dialysis Parameters:     Wt Readings from Last 4 Encounters:   10/08/21 106 kg (233 lb 11.2 oz)   08/04/21 89.8 kg (197 lb 15.6 oz)     I/O last 3 completed shifts:  In: 480 [P.O.:480]  Out: 3000 [Other:3000]  BP Readings  from Last 3 Encounters:   10/08/21 (!) 155/105   08/11/21 (!) 180/71   08/06/21 123/60       Routine, ONE TIME, Starting today For 1 Occurrences  Weight Loss (kg): 4  Dialysis Temp: 36.5  C  Access Device: AVG  Access Site: L arm  Dialyzer: Revaclear  Dialysis Bath: K 3  Blood Flow Rate (mL/min): 450  Total Treatment Time (hrs): 3.5  Heparin: no additional         Medications and Allergies:   Reviewed in EPIC      - MEDICATION INSTRUCTIONS for Dialysis Patients -   Does not apply See Admin Instructions     allopurinol  100 mg Oral Daily     cloNIDine  0.2 mg Oral BID     ezetimibe  10 mg Oral QAM     folic acid  1 mg Oral QAM     furosemide  80 mg Oral Daily     insulin aspart  1-7 Units Subcutaneous TID AC     insulin aspart  1-5 Units Subcutaneous At Bedtime     insulin aspart  5 Units Subcutaneous TID w/meals     insulin glargine  15 Units Subcutaneous At Bedtime     metoprolol tartrate  100 mg Oral BID     - MEDICATION INSTRUCTIONS -   Does not apply Once     simvastatin  40 mg Oral At Bedtime     sodium chloride (PF)  3 mL Intracatheter Q8H     vitamin B complex with vitamin C  1 tablet Oral QAM     Vitamin D3  1,000 Units Oral QAM     warfarin ANTICOAGULANT  7.5 mg Oral Once     sodium chloride 0.9%, Continuing ACE inhibitor/ARB/ARNI from home medication list OR ACE inhibitor/ARB order already placed during this visit, - MEDICATION INSTRUCTIONS -, - MEDICATION INSTRUCTIONS -, - MEDICATION INSTRUCTIONS -, glucose **OR** dextrose **OR** glucagon, hydrALAZINE, lidocaine 4%, lidocaine (buffered or not buffered), lidocaine (buffered or not buffered), lidocaine (buffered or not buffered), - MEDICATION INSTRUCTIONS -, melatonin, ondansetron **OR** ondansetron, - MEDICATION INSTRUCTIONS -, sodium chloride (PF), - MEDICATION INSTRUCTIONS -, Warfarin Therapy Reminder, zolpidem   No Known Allergies           Labs:     [facility]  Recent Labs   Lab 10/08/21  0712 10/08/21  0600 10/08/21  0206 10/07/21  2130 10/07/21  1504  10/07/21  0834 10/06/21  1319 10/06/21  0634 10/05/21  0811 10/05/21  0509 10/04/21  1633 10/04/21  1443   NA  --  140  --   --   --   --   --  141  --  141  --  143   POTASSIUM  --  4.0  --   --   --  4.4  --  4.2  --  4.1   < > 4.4   CHLORIDE  --  108  --   --   --   --   --  110*  --  110*  --  112*   LC  --  7.8*  --   --   --   --   --  8.0*  --  7.6*  --  8.2*   CO2  --  27  --   --   --   --   --  24  --  21  --  23   BUN  --  39*  --   --   --   --   --  64*  --  65*  --  68*   CR  --  3.25*  --   --   --   --   --  4.38*  --  4.25*  --  4.37*   GLC 65* 79 164* 250*   < >  --    < > 86   < > 258*   < > 69*    < > = values in this interval not displayed.     CBC  Recent Labs   Lab 10/08/21  0600 10/07/21  0529 10/06/21  0634 10/05/21  0509   WBC 7.2 9.1 12.0* 9.3   HGB 9.7* 10.4* 11.1* 10.0*   HCT 31.6* 32.9* 35.4* 32.1*   MCV 92 92 92 91    274 348 298     Lab Results   Component Value Date    AST 36 10/06/2021    ALT 52 10/06/2021    ALKPHOS 301 (H) 10/06/2021    BILITOTAL 0.3 10/06/2021            Physical Exam:   Vitals were reviewed in EPIC    Wt Readings from Last 3 Encounters:   10/08/21 106 kg (233 lb 11.2 oz)   08/04/21 89.8 kg (197 lb 15.6 oz)       Intake/Output Summary (Last 24 hours) at 10/8/2021 1006  Last data filed at 10/8/2021 0829  Gross per 24 hour   Intake 560 ml   Output 3000 ml   Net -2440 ml       GENERAL APPEARANCE: pleasant, no distress, a & o  HEENT:  Eyes/ears/nose grossly normal, neck supple  RESP: lungs clear to auscultation with good efforts, no crackles, rhonchi or wheezes  CV: irreg irreg rapid  ABDOMEN: soft, nontender, bowel sounds normal  EXTREMITIES/SKIN: 2-3+ edema, no rashes or lesions     Pt seen on dialysis. Good BFR.      Attestation:  I have reviewed today's vital signs, notes, medications, labs and imaging.     Loi Pratt MD  [facility] - Nephrology  [phone]

## 2021-10-08 NOTE — PROGRESS NOTES
[facility] Intake called at 16:15 about options for outpatient dialysis. The patients first choice ([facility]) and second choice ([place]) is at capacity.     Patient's options are:   -[facility] (7.5 mi) from his home on Tuesday, Thursday, Saturday  -[facility] (8 mi) from his home on Monday, Wednesday, Friday    Patient was not happy with these options and wants to talk with his daughter. [facility] [place] [facility] on the [facility] for [facility] and will call back Friday to see if patient has decided on a location.

## 2021-10-08 NOTE — PLAN OF CARE
Shift: [id number]    Updates:  -Dialysis run today 3 liters removed  -Blood pressure medications given after dialysis due to /100 and HR 130s  -See note about outpatient dialysis options  -PTT recheck from heparin in the morning    Neuro/Orientation: alert and oriented x4    Tele/Cardiac: Afib RVR    Pertinent Labs:   -INR 1.02, PTT 56  -K 4.4    Vitals: Temp: 97.6  F (36.4  C) Temp src: Oral BP: (!) 135/91 Pulse: 99   Resp: 20 SpO2: 100 %     Pain: denies    Access/Drips:   -R PIV, left arm fistula  -Heparin @ 800    Respiratory/Oxygen: lungs clear - room air this evening. Pt reports SOB improving    GI/: voids infrequently  -Diet:     Skin:   -Left arm wound - WOC following  -Mild rash on shins  -Edema (+3)    Mobility: stand by assist with walker    Discharge Planning: TBD, need to set up outpatient dialysis    Aggression tool: Green

## 2021-10-09 ENCOUNTER — APPOINTMENT (OUTPATIENT)
Dept: PHYSICAL THERAPY | Facility: CLINIC | Age: 76
DRG: 308 | End: 2021-10-09
Attending: INTERNAL MEDICINE
Payer: COMMERCIAL

## 2021-10-09 LAB
APTT PPP: 102 SECONDS (ref 22–38)
APTT PPP: 46 SECONDS (ref 22–38)
APTT PPP: 54 SECONDS (ref 22–38)
GLUCOSE BLDC GLUCOMTR-MCNC: 137 MG/DL (ref 70–99)
GLUCOSE BLDC GLUCOMTR-MCNC: 150 MG/DL (ref 70–99)
GLUCOSE BLDC GLUCOMTR-MCNC: 159 MG/DL (ref 70–99)
GLUCOSE BLDC GLUCOMTR-MCNC: 199 MG/DL (ref 70–99)
GLUCOSE BLDC GLUCOMTR-MCNC: 96 MG/DL (ref 70–99)
HOLD SPECIMEN: NORMAL
HOLD SPECIMEN: NORMAL
INR PPP: 1.25 (ref 0.85–1.15)

## 2021-10-09 PROCEDURE — 258N000003 HC RX IP 258 OP 636: Performed by: INTERNAL MEDICINE

## 2021-10-09 PROCEDURE — 97161 PT EVAL LOW COMPLEX 20 MIN: CPT | Mod: GP | Performed by: PHYSICAL THERAPIST

## 2021-10-09 PROCEDURE — 250N000012 HC RX MED GY IP 250 OP 636 PS 637: Performed by: INTERNAL MEDICINE

## 2021-10-09 PROCEDURE — 85610 PROTHROMBIN TIME: CPT | Performed by: INTERNAL MEDICINE

## 2021-10-09 PROCEDURE — 210N000001 HC R&B IMCU HEART CARE

## 2021-10-09 PROCEDURE — 85730 THROMBOPLASTIN TIME PARTIAL: CPT | Performed by: INTERNAL MEDICINE

## 2021-10-09 PROCEDURE — 36415 COLL VENOUS BLD VENIPUNCTURE: CPT | Performed by: INTERNAL MEDICINE

## 2021-10-09 PROCEDURE — 250N000009 HC RX 250: Performed by: INTERNAL MEDICINE

## 2021-10-09 PROCEDURE — 90937 HEMODIALYSIS REPEATED EVAL: CPT

## 2021-10-09 PROCEDURE — 250N000013 HC RX MED GY IP 250 OP 250 PS 637: Performed by: INTERNAL MEDICINE

## 2021-10-09 PROCEDURE — 97116 GAIT TRAINING THERAPY: CPT | Mod: GP | Performed by: PHYSICAL THERAPIST

## 2021-10-09 PROCEDURE — 99233 SBSQ HOSP IP/OBS HIGH 50: CPT | Performed by: INTERNAL MEDICINE

## 2021-10-09 PROCEDURE — 97530 THERAPEUTIC ACTIVITIES: CPT | Mod: GP | Performed by: PHYSICAL THERAPIST

## 2021-10-09 PROCEDURE — 99207 PR NO CHARGE LOS: CPT | Performed by: INTERNAL MEDICINE

## 2021-10-09 RX ORDER — LANOLIN ALCOHOL/MO/W.PET/CERES
3 CREAM (GRAM) TOPICAL AT BEDTIME
Status: DISCONTINUED | OUTPATIENT
Start: 2021-10-09 | End: 2021-10-12 | Stop reason: HOSPADM

## 2021-10-09 RX ORDER — WARFARIN SODIUM 7.5 MG/1
7.5 TABLET ORAL
Status: COMPLETED | OUTPATIENT
Start: 2021-10-09 | End: 2021-10-09

## 2021-10-09 RX ORDER — ESZOPICLONE 1 MG/1
1 TABLET, FILM COATED ORAL AT BEDTIME
Status: DISCONTINUED | OUTPATIENT
Start: 2021-10-09 | End: 2021-10-09

## 2021-10-09 RX ORDER — MIRTAZAPINE 15 MG/1
15 TABLET, FILM COATED ORAL AT BEDTIME
Status: DISCONTINUED | OUTPATIENT
Start: 2021-10-09 | End: 2021-10-12 | Stop reason: HOSPADM

## 2021-10-09 RX ADMIN — INSULIN GLARGINE 15 UNITS: 100 INJECTION, SOLUTION SUBCUTANEOUS at 22:34

## 2021-10-09 RX ADMIN — INSULIN ASPART 1 UNITS: 100 INJECTION, SOLUTION INTRAVENOUS; SUBCUTANEOUS at 18:03

## 2021-10-09 RX ADMIN — LIDOCAINE HYDROCHLORIDE 0.5 ML: 10 INJECTION, SOLUTION INFILTRATION; PERINEURAL at 13:44

## 2021-10-09 RX ADMIN — CLONIDINE HYDROCHLORIDE 0.2 MG: 0.1 TABLET ORAL at 20:41

## 2021-10-09 RX ADMIN — EZETIMIBE 10 MG: 10 TABLET ORAL at 08:45

## 2021-10-09 RX ADMIN — SIMVASTATIN 40 MG: 40 TABLET, FILM COATED ORAL at 22:31

## 2021-10-09 RX ADMIN — INSULIN ASPART 5 UNITS: 100 INJECTION, SOLUTION INTRAVENOUS; SUBCUTANEOUS at 18:29

## 2021-10-09 RX ADMIN — ALLOPURINOL 100 MG: 100 TABLET ORAL at 08:45

## 2021-10-09 RX ADMIN — SODIUM CHLORIDE 250 ML: 9 INJECTION, SOLUTION INTRAVENOUS at 13:40

## 2021-10-09 RX ADMIN — FOLIC ACID 1 MG: 1 TABLET ORAL at 08:44

## 2021-10-09 RX ADMIN — MELATONIN TAB 3 MG 3 MG: 3 TAB at 22:31

## 2021-10-09 RX ADMIN — METOPROLOL TARTRATE 100 MG: 100 TABLET, FILM COATED ORAL at 08:45

## 2021-10-09 RX ADMIN — WARFARIN SODIUM 7.5 MG: 7.5 TABLET ORAL at 18:29

## 2021-10-09 RX ADMIN — SODIUM CHLORIDE 300 ML: 9 INJECTION, SOLUTION INTRAVENOUS at 13:40

## 2021-10-09 RX ADMIN — MIRTAZAPINE 15 MG: 15 TABLET, FILM COATED ORAL at 22:31

## 2021-10-09 RX ADMIN — METOPROLOL TARTRATE 100 MG: 100 TABLET, FILM COATED ORAL at 20:41

## 2021-10-09 RX ADMIN — B-COMPLEX W/ C & FOLIC ACID TAB 1 TABLET: TAB at 08:45

## 2021-10-09 RX ADMIN — FUROSEMIDE 80 MG: 40 TABLET ORAL at 08:45

## 2021-10-09 RX ADMIN — Medication 1000 UNITS: at 08:45

## 2021-10-09 RX ADMIN — CLONIDINE HYDROCHLORIDE 0.2 MG: 0.1 TABLET ORAL at 08:45

## 2021-10-09 ASSESSMENT — ACTIVITIES OF DAILY LIVING (ADL)
ADLS_ACUITY_SCORE: 12
ADLS_ACUITY_SCORE: 10
ADLS_ACUITY_SCORE: 12
ADLS_ACUITY_SCORE: 12
ADLS_ACUITY_SCORE: 10
ADLS_ACUITY_SCORE: 12

## 2021-10-09 ASSESSMENT — MIFFLIN-ST. JEOR: SCORE: 1703.33

## 2021-10-09 NOTE — PROGRESS NOTES
Care Management Follow Up    Length of Stay (days): 5    Expected Discharge Date: 10/08/2021     Concerns to be Addressed: discharge planning     Patient plan of care discussed at interdisciplinary rounds: Yes    Anticipated Discharge Disposition: Home Care, Home, Outpatient Dialysis     Anticipated Discharge Services: Transportation Services  Anticipated Discharge DME: None    Patient/family educated on Medicare website which has current facility and service quality ratings: no  Education Provided on the Discharge Plan:    Patient/Family in Agreement with the Plan: yes    Referrals Placed by CM/SW:    Private pay costs discussed: Not applicable    Additional Information:  Writer saw PT recommendation for TCU. Writer met with patient to discuss needs for TCU, patient agreeable to go to [facility] TCU as he lives at the facility. Patient identified that he was weak when walking with PT. Writer stated she would send a referral and inquire. Writer spoke to Mildred in admissions at [facility]. Mildred stated that they cannot accept any more admissions over the weekend but confirmed that they could accept patient on Monday and he would have priority due to being a resident and they could discuss further then.     Era Nieves, ZULEMA, LGSW   Social Work   [facility]

## 2021-10-09 NOTE — PROGRESS NOTES
Potassium   Date Value Ref Range Status   10/08/2021 4.0 3.4 - 5.3 mmol/L Final     Hemoglobin   Date Value Ref Range Status   10/08/2021 9.7 (L) 13.3 - 17.7 g/dL Final     Creatinine   Date Value Ref Range Status   10/08/2021 3.25 (H) 0.66 - 1.25 mg/dL Final     Urea Nitrogen   Date Value Ref Range Status   10/08/2021 39 (H) 7 - 30 mg/dL Final     Sodium   Date Value Ref Range Status   10/08/2021 140 133 - 144 mmol/L Final     INR   Date Value Ref Range Status   10/09/2021 1.25 (H) 0.85 - 1.15 Final     Comment:     Effective 7/11/2021, the reference range for this assay has changed.       DIALYSIS PROCEDURE NOTE  Hepatitis status of previous patient on machine log was checked and verified ok to use with this patients hepatitis status.  Patient dialyzed for 3.5 hrs. on a K3 bath with a net fluid removal of  4L.  A BFR of 425-450 ml/min was obtained via a LUE AVG using 15 gauge needles.      The treatment plan was discussed with Dr. Perla during the treatment.    Total heparin received during the treatment: 0 units.   Needle cannulation sites held x 10 min.     Meds  given: none   Complications: none      Person educated: patient. Knowledge base minimal. Barriers to learning: none. Educated on procedure via verbal mode. Patient requires follow-up. Pt prefers verbal education style.     ICEBOAT? Timeout performed pre-treatment  I: Patient was identified using 2 identifiers  C:  Consent Signed Yes  E: Equipment preventative maintenance is current and dialysis delivery system OK to use  B: Hepatitis B Surface Antigen: NEGATIVE; Draw Date: 10/5/2021      Hepatitis B Surface Antibody: SUSCEPTIBLE; Draw Date: 10/5/2021  O: Dialysis orders present and complete prior to treatment  A: Vascular access verified and assessed prior to treatment  T: Treatment was performed at a clinically appropriate time  ?: Patient was allowed to ask questions and address concerns prior to treatment  See flowsheet in EPIC for further details  and post assessment.  Machine water alarm in place and functioning. Transducer pods intact and checked every 15min.   Pt returned via bed.  Chlorine/Chloramine water system checked every 4 hours.  Outpatient Dialysis at [facility]    Please remove patient dressing on AVF and AVG needle sites 24 hours after dialysis. If leaking occurs please apply a Band-Aid.

## 2021-10-09 NOTE — PLAN OF CARE
Pt to HD at 1300 after working with therapy.  Pt expressed to Dr. Perez and myself that he wanted to die.  Didn't want to keep having to do HD.  Didn't want to have to go to a nursing home.  But later he was talking to the  and myself and seemed resolved to going to a TCU if he can get [facility].  Heparin gtt remains.

## 2021-10-09 NOTE — PROGRESS NOTES
[facility]    Medicine Progress Note - Hospitalist Service       Date of Admission:  10/4/2021    Assessment & Plan             Gordon Gann is a 76 year old male with history of CKD stage V not on dialysis, atrial fibrillation on chronic Eliquis, insulin requiring diabetes mellitus type 2 presents to the emergency room with tachycardia.     Atrial fibrillation/flutter with RVR  History of chronic atrial fibrillation   -EKG on admission shows rapid atrial flutter with 2 is to 1 conduction versus rapid atrial fibrillation  -Appreciate cardiology review.  Status post diltiazem drip.  Now on increased dose of metoprolol 50 mg twice daily.  -Anticoagulation : Unclear why at home was on apixaban 2.5 mg daily.  Discussed with the cardiology and Pharm.D (2.5 mg daily is an adequate dose for him and now him being starting on dialysis, Coumadin is recommended instead.  Patient was advised of the same and he agreed).  Continue Coumadin with goal INR 2-3 (on heparin bridge)  - inc Metoprolol to 100mg bid 10/8 with better control     Elevated troponin.  History of coronary artery disease s/p PCI in 2006  Hypertension  Hyperlipidemia  -Troponin elevated at 0.66 most likely demand ischemia  - heparin drip that was started in the emergency room(in place of prior to admission Eliquis).    -Eliquis discussed with Pharm.D and now on coumadin   -Continue clonidine, metoprolol    SOB: From volume overload  CKD stage V  -Follows up with Dr. Saldana  -Left upper extremity graft placed by Dr. Hernandez on 8/5/2021.  Appreciate vascular surgery review of graft on 10/05  -Appreciate nephrology following patient.  started dialysis 10/06 and continue removal of fluid will help his breathing.   outpatient dialysis center has been arranged but patient concerned for transport.      Anemia of chronic disease  -Recent baseline appears to be between 9-10  -Monitor intermittently     Insulin requiring diabetes mellitus type  "2  -Continue prior to admission Lantus 15 units daily, 5 units NovoLog scheduled along with sliding scale at home.  -Continue medium intensity sliding scale for now.    Recent Labs   Lab 10/09/21  0806 10/09/21  0334 10/09/21  0324 10/08/21  2155 10/08/21  1707 10/08/21  1305   GLC 96 137* 150* 211* 171* 85     Transaminitis with increase in alkaline phosphatase to 343.  -Monitor for now    History of insomnia: On as needed zolpidem at night  - monitor for any complex sleep behaviors, including sleepwalking, sleep driving, and engaging in other activities while not fully awake  - monitor for any episode of anterograde amnesia     10/9: D/W Patient; will HOLD Zolpidem and start trial of Melatonin 3 mg and remeron 15 mg   Patient does not want to talk to psychiatry     Atelectasis versus pneumonia  -Chest x-ray today shows mild opacity at the left base may represent atelectasis or pneumonia  -Patient denies fever, cough or dyspnea.  He has a normal white cell count.  Clinically I favor atelectasis, we will not start any antibiotics at this time.       Dispo: PT/OT- reommending TCU but patient does not want to go to rehab. Discussed again. I feel unsafe at home for now and need 24 hr support as per rehab documentation. Ct to work for safe dispo planning       Diet: Consistent Carb 60 grams CHO per Meal Diet    DVT Prophylaxis: Coumadin  Flood Catheter: Not present  Central Lines: None  Code Status: Full Code      Disposition Plan   Expected discharge:    recommended to prior living arrangement once renal function improved, safe disposition plan/ TCU bed available and Adequate control of heart rate and blood pressure.     The patient's care was discussed with the Bedside Nurse, Care Coordinator/ and Patient.    Ki Perez MD, MD  Hospitalist Service  [facility]  Securely message with the Vocera Web Console (learn more here)  Text page via [employer] Paging/Directory    \"This " "dictation was performed with voice recognition software and may contain errors,  omissions and inadvertent word substitution.\"       Clinically Significant Risk Factors Present on Admission                 ______________________________________________________________________    Interval History   Continues to be frustrated by not being able to sleep (in hospital or home). Says was awake whole night.      Denies any chest pain/palpitations  Tolerating dialysis so far    No new fever/chills.   4 point ROS done and negative unless mentioned     Data reviewed today: I reviewed all medications, new labs and imaging results over the last 24 hours. I personally reviewed no images or EKG's today.    Physical Exam   BP (!) 133/114 (BP Location: Right arm)   Pulse 114   Temp 97.4  F (36.3  C) (Oral)   Resp 16   Ht 1.727 m (5' 8\")   Wt 99.4 kg (219 lb 1.6 oz)   SpO2 95%   BMI 33.31 kg/m    Gen- pleasant lying in bed  HEENT- NAD, LATRICIA  Neck- supple, no JVD elevation, no thyromegaly  CVS- I+II+ no m/r/g  RS- CTAB, crackles at base   Abdo- soft, no tenderness . No g/r/r, BS+ no hepatosplenomegaly   Ext- edema ++   LUE- in ACE bandage    CNS- no gross focal deficit     Vitals:    10/07/21 0800 10/08/21 0615 10/09/21 0500   Weight: 105.5 kg (232 lb 9.4 oz) 106 kg (233 lb 11.2 oz) 99.4 kg (219 lb 1.6 oz)     Data    BMP  Recent Labs   Lab 10/09/21  0806 10/09/21  0334 10/09/21  0324 10/08/21  2155 10/08/21  0712 10/08/21  0600 10/07/21  1504 10/07/21  0834 10/06/21  1319 10/06/21  0634 10/05/21  0811 10/05/21  0509 10/04/21  1633 10/04/21  1443   NA  --   --   --   --   --  140  --   --   --  141  --  141  --  143   POTASSIUM  --   --   --   --   --  4.0  --  4.4  --  4.2  --  4.1   < > 4.4   CHLORIDE  --   --   --   --   --  108  --   --   --  110*  --  110*  --  112*   LC  --   --   --   --   --  7.8*  --   --   --  8.0*  --  7.6*  --  8.2*   CO2  --   --   --   --   --  27  --   --   --  24  --  21  --  23   BUN  --   " --   --   --   --  39*  --   --   --  64*  --  65*  --  68*   CR  --   --   --   --   --  3.25*  --   --   --  4.38*  --  4.25*  --  4.37*   GLC 96 137* 150* 211*   < > 79   < >  --    < > 86   < > 258*   < > 69*    < > = values in this interval not displayed.     CBC  Recent Labs   Lab 10/08/21  0600 10/07/21  0529 10/07/21  0529 10/06/21  0634 10/06/21  0634 10/05/21  0509 10/05/21  0509   WBC 7.2  --  9.1  --  12.0*  --  9.3   RBC 3.44*  --  3.58*  --  3.87*  --  3.54*   HGB 9.7*   < > 10.4*   < > 11.1*   < > 10.0*   HCT 31.6*  --  32.9*  --  35.4*  --  32.1*   MCV 92  --  92  --  92  --  91   MCH 28.2  --  29.1  --  28.7  --  28.2   MCHC 30.7*  --  31.6  --  31.4*  --  31.2*   RDW 15.2*  --  15.4*  --  15.6*  --  15.6*     --  274  --  348  --  298    < > = values in this interval not displayed.     INR  Recent Labs   Lab 10/09/21  0325 10/08/21  0600 10/07/21  0529 10/06/21  0634   INR 1.25* 1.07 1.02 1.02     LFTs  Recent Labs   Lab 10/06/21  0634 10/04/21  1443   ALKPHOS 301* 343*   AST 36 69*   ALT 52 72*   BILITOTAL 0.3 0.3   PROTTOTAL 6.2* 6.2*   ALBUMIN 2.1* 2.2*

## 2021-10-09 NOTE — PROGRESS NOTES
Renal Medicine       Planning dialysis today  Continued UF    Next scheduled run 10/12/21 outpatient  [facility]    Today's parameters reviewed with dialysis RN         Recent Labs   Lab 10/09/21  0806 10/08/21  0712 10/08/21  0600   NA  --   --  140   POTASSIUM  --   --  4.0   CHLORIDE  --   --  108   CO2  --   --  27   ANIONGAP  --   --  5   GLC 96   < > 79   BUN  --   --  39*   CR  --   --  3.25*   GFRESTIMATED  --   --  18*   LC  --   --  7.8*    < > = values in this interval not displayed.           JIGNESH Perla    [facility]  [phone]

## 2021-10-09 NOTE — PROGRESS NOTES
Talked to Sergio calle in pharmacy about heparin drip and how pts heparin PTT keeps jumping all over. I explained we are having to increase the heparin drip every other blood draw and then having to decrease the heparin drip the next time and how we are not getting it into range. Sergio said to decrease it only 150 units instead of 300 units like the order says and have day shift doctor to look at it.

## 2021-10-09 NOTE — PROGRESS NOTES
10/09/21 1227   Quick Adds   Type of Visit Initial PT Evaluation   Living Environment   People in home alone   Current Living Arrangements independent living facility   Home Accessibility no concerns   Transportation Anticipated family or friend will provide   Living Environment Comments   (elevator)   Self-Care   Usual Activity Tolerance moderate   Current Activity Tolerance fair   Regular Exercise No   Equipment Currently Used at Home walker, rolling  (4WW)   Activity/Exercise/Self-Care Comment   (Has assist with med man. & nsg. cares 2/xweek)   Disability/Function   Fall history within last six months yes   Number of times patient has fallen within last six months   (2-3)   Change in Functional Status Since Onset of Current Illness/Injury yes   General Information   Onset of Illness/Injury or Date of Surgery 10/04/21   Referring Physician Ki Perez MD   Patient/Family Therapy Goals Statement (PT) Patient stated at beginning of session that he wanted to go home at D/C.  Was discouraged after walking that he was so week and acknowledged he may need TCU.  Prefers [facility] as [place].    Pertinent History of Current Problem (include personal factors and/or comorbidities that impact the POC) Gordon Gann is a 76 year old male with history of CKD stage V not on dialysis, atrial fibrillation on chronic Eliquis, insulin requiring diabetes mellitus type 2 presents to the emergency room with tachycardia. Began dialysis on 10/5/21   Existing Precautions/Restrictions fall   General Observations Lying in bed; appears fatigued.  Agreeable to participate.    Cognition   Orientation Status (Cognition) oriented x 3   Affect/Mental Status (Cognition) WNL   Follows Commands (Cognition) WFL   Pain Assessment   Patient Currently in Pain No   Integumentary/Edema   Integumentary/Edema other (describe)   Integumentary/Edema Comments throughout L/E's and U/E's; reports he has lost 15 lbs. of water weight  since beginning dialysis on Tuesday.    Posture    Posture Forward head position   Range of Motion (ROM)   ROM Quick Adds ROM WFL   Strength   Strength Comments antigravity movement of legs noted; appears 4-/5 throughout extremeties   Bed Mobility   Comment (Bed Mobility) Supine to sit with CGA and increased effort by patient.  HOB raised to 30 degrees and use of rails.    Transfers   Transfer Safety Comments Sit to stand with CGA into WW; stand to sit with CGA as well.    Gait/Stairs (Locomotion)   Comment (Gait/Stairs) Patient ambulated 5' for eval with WW and CGA.    Balance   Balance Comments No gross LOB noted; fatigue increased throughout session however.    Sensory Examination   Sensory Perception patient reports no sensory changes   Coordination   Coordination no deficits were identified   Muscle Tone   Muscle Tone no deficits were identified   Clinical Impression   Criteria for Skilled Therapeutic Intervention yes, treatment indicated   PT Diagnosis (PT) impaired functional mobility   Influenced by the following impairments generalized weakness and deconditioning, edema   Functional limitations due to impairments decreased independence and endurance in functional mobiility   Clinical Presentation Evolving/Changing   Clinical Presentation Rationale per clinical judgment   Clinical Decision Making (Complexity) low complexity   Therapy Frequency (PT) Daily   Predicted Duration of Therapy Intervention (days/wks) 5 days   Planned Therapy Interventions (PT) bed mobility training;gait training;home exercise program;patient/family education;stair training;strengthening;transfer training;progressive activity/exercise   Anticipated Equipment Needs at Discharge (PT)   (has 4WW)   Risk & Benefits of therapy have been explained evaluation/treatment results reviewed;care plan/treatment goals reviewed;risks/benefits reviewed;current/potential barriers reviewed;participants voiced agreement with care plan;participants  included;patient   PT Discharge Planning    PT Discharge Recommendation (DC Rec) home with assist;home with home care physical therapy;Transitional Care Facility   PT Rationale for DC Rec Patient ambulated a total of 50' with WW and CGA for both evaluation and treatment with significant and increasing fatigue/SOB throughout that time.  Very deconditioned.  Required CGA physically but notable weakness that requires assist of 1 for safety.  If discharges to home would need 24/7 supervision/assist plus home PT.  Recommend discharge to TCU to increase strength and endurance prior to discharge to home.  O2 sats at 98% after walking.  /105 which was similar to prior to walking.    Total Evaluation Time   Total Evaluation Time (Minutes) 10

## 2021-10-09 NOTE — PLAN OF CARE
A&Ox4. VS heart rate was in the 90s overnight after giving scheduled Metoprolol and clonidine, BP was 140/89. No complaints of pain. Ax1 Gb/W to BR. Voiding adequately. Heparin infusing at 650 units/hour, recheck at 1100, see previous note about PTT and heparin discussed with pharmacist. Possible dialysis today and discharge pending possibly today per nephrology. Continue to monitor.

## 2021-10-10 ENCOUNTER — APPOINTMENT (OUTPATIENT)
Dept: OCCUPATIONAL THERAPY | Facility: CLINIC | Age: 76
DRG: 308 | End: 2021-10-10
Payer: COMMERCIAL

## 2021-10-10 LAB
APTT PPP: 121 SECONDS (ref 22–38)
APTT PPP: 57 SECONDS (ref 22–38)
ERYTHROCYTE [DISTWIDTH] IN BLOOD BY AUTOMATED COUNT: 14.9 % (ref 10–15)
GLUCOSE BLDC GLUCOMTR-MCNC: 121 MG/DL (ref 70–99)
GLUCOSE BLDC GLUCOMTR-MCNC: 155 MG/DL (ref 70–99)
GLUCOSE BLDC GLUCOMTR-MCNC: 166 MG/DL (ref 70–99)
GLUCOSE BLDC GLUCOMTR-MCNC: 182 MG/DL (ref 70–99)
GLUCOSE BLDC GLUCOMTR-MCNC: 228 MG/DL (ref 70–99)
HCT VFR BLD AUTO: 33.9 % (ref 40–53)
HGB BLD-MCNC: 10.3 G/DL (ref 13.3–17.7)
INR PPP: 1.45 (ref 0.85–1.15)
MCH RBC QN AUTO: 28.1 PG (ref 26.5–33)
MCHC RBC AUTO-ENTMCNC: 30.4 G/DL (ref 31.5–36.5)
MCV RBC AUTO: 93 FL (ref 78–100)
PLATELET # BLD AUTO: 202 10E3/UL (ref 150–450)
RBC # BLD AUTO: 3.66 10E6/UL (ref 4.4–5.9)
WBC # BLD AUTO: 8.3 10E3/UL (ref 4–11)

## 2021-10-10 PROCEDURE — 36415 COLL VENOUS BLD VENIPUNCTURE: CPT | Performed by: INTERNAL MEDICINE

## 2021-10-10 PROCEDURE — 97535 SELF CARE MNGMENT TRAINING: CPT | Mod: GO | Performed by: OCCUPATIONAL THERAPIST

## 2021-10-10 PROCEDURE — 250N000013 HC RX MED GY IP 250 OP 250 PS 637: Performed by: INTERNAL MEDICINE

## 2021-10-10 PROCEDURE — 85610 PROTHROMBIN TIME: CPT | Performed by: INTERNAL MEDICINE

## 2021-10-10 PROCEDURE — 99233 SBSQ HOSP IP/OBS HIGH 50: CPT | Performed by: INTERNAL MEDICINE

## 2021-10-10 PROCEDURE — 85730 THROMBOPLASTIN TIME PARTIAL: CPT | Performed by: INTERNAL MEDICINE

## 2021-10-10 PROCEDURE — 250N000012 HC RX MED GY IP 250 OP 636 PS 637: Performed by: INTERNAL MEDICINE

## 2021-10-10 PROCEDURE — 99207 PR NO CHARGE LOS: CPT | Performed by: INTERNAL MEDICINE

## 2021-10-10 PROCEDURE — 36415 COLL VENOUS BLD VENIPUNCTURE: CPT | Performed by: EMERGENCY MEDICINE

## 2021-10-10 PROCEDURE — 210N000002 HC R&B HEART CARE

## 2021-10-10 PROCEDURE — 85014 HEMATOCRIT: CPT | Performed by: EMERGENCY MEDICINE

## 2021-10-10 RX ORDER — WARFARIN SODIUM 7.5 MG/1
7.5 TABLET ORAL
Status: COMPLETED | OUTPATIENT
Start: 2021-10-10 | End: 2021-10-10

## 2021-10-10 RX ADMIN — CLONIDINE HYDROCHLORIDE 0.2 MG: 0.1 TABLET ORAL at 21:03

## 2021-10-10 RX ADMIN — B-COMPLEX W/ C & FOLIC ACID TAB 1 TABLET: TAB at 09:22

## 2021-10-10 RX ADMIN — INSULIN GLARGINE 15 UNITS: 100 INJECTION, SOLUTION SUBCUTANEOUS at 21:04

## 2021-10-10 RX ADMIN — EZETIMIBE 10 MG: 10 TABLET ORAL at 09:22

## 2021-10-10 RX ADMIN — INSULIN ASPART 5 UNITS: 100 INJECTION, SOLUTION INTRAVENOUS; SUBCUTANEOUS at 09:27

## 2021-10-10 RX ADMIN — FUROSEMIDE 80 MG: 40 TABLET ORAL at 09:21

## 2021-10-10 RX ADMIN — ALLOPURINOL 100 MG: 100 TABLET ORAL at 09:22

## 2021-10-10 RX ADMIN — INSULIN ASPART 5 UNITS: 100 INJECTION, SOLUTION INTRAVENOUS; SUBCUTANEOUS at 14:22

## 2021-10-10 RX ADMIN — CLONIDINE HYDROCHLORIDE 0.2 MG: 0.1 TABLET ORAL at 09:22

## 2021-10-10 RX ADMIN — FOLIC ACID 1 MG: 1 TABLET ORAL at 09:22

## 2021-10-10 RX ADMIN — METOPROLOL TARTRATE 125 MG: 100 TABLET, FILM COATED ORAL at 09:21

## 2021-10-10 RX ADMIN — MIRTAZAPINE 15 MG: 15 TABLET, FILM COATED ORAL at 21:03

## 2021-10-10 RX ADMIN — METOPROLOL TARTRATE 125 MG: 100 TABLET, FILM COATED ORAL at 21:03

## 2021-10-10 RX ADMIN — SIMVASTATIN 40 MG: 40 TABLET, FILM COATED ORAL at 21:04

## 2021-10-10 RX ADMIN — WARFARIN SODIUM 7.5 MG: 7.5 TABLET ORAL at 18:52

## 2021-10-10 RX ADMIN — MELATONIN TAB 3 MG 3 MG: 3 TAB at 21:02

## 2021-10-10 RX ADMIN — INSULIN ASPART 1 UNITS: 100 INJECTION, SOLUTION INTRAVENOUS; SUBCUTANEOUS at 14:22

## 2021-10-10 RX ADMIN — Medication 1000 UNITS: at 09:21

## 2021-10-10 ASSESSMENT — MIFFLIN-ST. JEOR: SCORE: 1693.81

## 2021-10-10 ASSESSMENT — ACTIVITIES OF DAILY LIVING (ADL)
ADLS_ACUITY_SCORE: 10

## 2021-10-10 NOTE — PROGRESS NOTES
Care Management Follow Up    Length of Stay (days): 6    Expected Discharge Date: 10/11/2021     Concerns to be Addressed: discharge planning     Patient plan of care discussed at interdisciplinary rounds: Yes    Anticipated Discharge Disposition: Home Care, Home, Outpatient Dialysis     Anticipated Discharge Services: Transportation Services  Anticipated Discharge DME: None    Patient/family educated on Medicare website which has current facility and service quality ratings: no  Education Provided on the Discharge Plan:    Patient/Family in Agreement with the Plan: yes    Referrals Placed by CM/SW:    Private pay costs discussed: Not applicable    Additional Information:  Writer left a VM for [facility] to try and coordinate a time they could accept patient on Monday. Admissions at [facility] is closed today. Writer will attempt in the morning.       ZULEMA Chang, LGSW   Social Work   [facility]

## 2021-10-10 NOTE — PLAN OF CARE
Monitor remains Atrial fib with RVR. Pt. Denies pain. Heparin drip continues at 500 unit(s)/hr. Left arm wound cares done per order. Continue to monitor. Plan for TCU on discharge.

## 2021-10-10 NOTE — PROGRESS NOTES
[facility]    Medicine Progress Note - Hospitalist Service       Date of Admission:  10/4/2021    Assessment & Plan             Gordon Gann is a 76 year old male with history of CKD stage V not on dialysis, atrial fibrillation on chronic Eliquis, insulin requiring diabetes mellitus type 2 presents to the emergency room with tachycardia.     Atrial fibrillation/flutter with RVR  History of chronic atrial fibrillation   -EKG on admission shows rapid atrial flutter with 2 is to 1 conduction versus rapid atrial fibrillation  -Appreciate cardiology review.  Status post diltiazem drip.  Now on increased dose of metoprolol 50 mg twice daily.  -Anticoagulation : Unclear why at home was on apixaban 2.5 mg daily.  Discussed with the cardiology and Pharm.D (2.5 mg daily is an adequate dose for him and now him being starting on dialysis, Coumadin is recommended instead.  Patient was advised of the same and he agreed).  Continue Coumadin with goal INR 2-3 (on heparin bridge)  - inc Metoprolol to 125mg bid 10/10     Elevated troponin.  History of coronary artery disease s/p PCI in 2006  Hypertension  Hyperlipidemia  -Troponin elevated at 0.66 most likely demand ischemia  - heparin drip that was started in the emergency room(in place of prior to admission Eliquis).    -Eliquis discussed with Pharm.D and now on coumadin   -Continue clonidine, metoprolol    SOB: From volume overload  CKD stage V  -Follows up with Dr. Saldana  -Left upper extremity graft placed by Dr. Hernandez on 8/5/2021.  Appreciate vascular surgery review of graft on 10/05  -Appreciate nephrology following patient.  started dialysis 10/06 and continue removal of fluid will help his breathing.   outpatient dialysis center has been arranged but patient concerned for transport.      Anemia of chronic disease  -Recent baseline appears to be between 9-10  -Monitor intermittently     Insulin requiring diabetes mellitus type 2  -Continue prior  "to admission Lantus 15 units daily, 5 units NovoLog scheduled along with sliding scale at home.  -Continue medium intensity sliding scale for now.    Recent Labs   Lab 10/10/21  0835 10/10/21  0208 10/09/21  2133 10/09/21  1756 10/09/21  0806 10/09/21  0334   * 166* 199* 159* 96 137*     Transaminitis with increase in alkaline phosphatase to 343.  -Monitor for now    History of insomnia: On as needed zolpidem at night  - monitor for any complex sleep behaviors, including sleepwalking, sleep driving, and engaging in other activities while not fully awake  - monitor for any episode of anterograde amnesia     10/9: D/W Patient; will HOLD Zolpidem and started trial of Melatonin 3 mg and remeron 15 mg . Continue  Patient does not want to talk to psychiatry     Atelectasis versus pneumonia  -Chest x-ray today shows mild opacity at the left base may represent atelectasis or pneumonia  -Patient denies fever, cough or dyspnea.  He has a normal white cell count.  Clinically I favor atelectasis, we will not start any antibiotics at this time.     Dispo: PT/OT-  TCU. Patient agrees now. Appreciate SW for safe dispo planning       Diet: Consistent Carb 60 grams CHO per Meal Diet    DVT Prophylaxis: Coumadin  Flood Catheter: Not present  Central Lines: None  Code Status: Full Code      Disposition Plan   Expected discharge: 10/11/2021   recommended to prior living arrangement once renal function improved, safe disposition plan/ TCU bed available and Adequate control of heart rate and blood pressure.     The patient's care was discussed with the Bedside Nurse, Care Coordinator/ and Patient.    Ki Perez MD, MD  Hospitalist Service  [facility]  Securely message with the Vocera Web Console (learn more here)  Text page via Dishable Paging/Directory    \"This dictation was performed with voice recognition software and may contain errors,  omissions and inadvertent word substitution.\"   " "    Clinically Significant Risk Factors Present on Admission                 ______________________________________________________________________    Interval History     Breathing better  Calm today. Says slept better last night.    Denies any chest pain/palpitations  Tolerating dialysis so far    No new fever/chills.   4 point ROS done and negative unless mentioned     Data reviewed today: I reviewed all medications, new labs and imaging results over the last 24 hours. I personally reviewed no images or EKG's today.    Physical Exam   BP (!) 142/111 (BP Location: Right arm)   Pulse 118   Temp 98.5  F (36.9  C) (Oral)   Resp 16   Ht 1.727 m (5' 8\")   Wt 98.4 kg (217 lb)   SpO2 96%   BMI 32.99 kg/m    Gen- pleasan  HEENT- NAD, LATRICIA  Neck- supple  CVS- I+II+ no m/r/g  RS- CTAB  Abdo- soft, no tenderness . No g/r/r  Ext- edema +  LUE- in ACE bandage    CNS- no gross focal deficit     Vitals:    10/08/21 0615 10/09/21 0500 10/10/21 0533   Weight: 106 kg (233 lb 11.2 oz) 99.4 kg (219 lb 1.6 oz) 98.4 kg (217 lb)     Data    BMP  Recent Labs   Lab 10/10/21  0835 10/10/21  0208 10/09/21  2133 10/09/21  1756 10/08/21  0712 10/08/21  0600 10/07/21  1504 10/07/21  0834 10/06/21  1319 10/06/21  0634 10/05/21  0811 10/05/21  0509 10/04/21  1633 10/04/21  1443   NA  --   --   --   --   --  140  --   --   --  141  --  141  --  143   POTASSIUM  --   --   --   --   --  4.0  --  4.4  --  4.2  --  4.1   < > 4.4   CHLORIDE  --   --   --   --   --  108  --   --   --  110*  --  110*  --  112*   LC  --   --   --   --   --  7.8*  --   --   --  8.0*  --  7.6*  --  8.2*   CO2  --   --   --   --   --  27  --   --   --  24  --  21  --  23   BUN  --   --   --   --   --  39*  --   --   --  64*  --  65*  --  68*   CR  --   --   --   --   --  3.25*  --   --   --  4.38*  --  4.25*  --  4.37*   * 166* 199* 159*   < > 79   < >  --    < > 86   < > 258*   < > 69*    < > = values in this interval not displayed.     CBC  Recent Labs "   Lab 10/10/21  0639 10/08/21  0600 10/08/21  0600 10/07/21  0529 10/07/21  0529 10/06/21  0634 10/06/21  0634   WBC 8.3  --  7.2  --  9.1  --  12.0*   RBC 3.66*  --  3.44*  --  3.58*  --  3.87*   HGB 10.3*   < > 9.7*   < > 10.4*   < > 11.1*   HCT 33.9*  --  31.6*  --  32.9*  --  35.4*   MCV 93  --  92  --  92  --  92   MCH 28.1  --  28.2  --  29.1  --  28.7   MCHC 30.4*  --  30.7*  --  31.6  --  31.4*   RDW 14.9  --  15.2*  --  15.4*  --  15.6*     --  227  --  274  --  348    < > = values in this interval not displayed.     INR  Recent Labs   Lab 10/10/21  0909 10/09/21  0325 10/08/21  0600 10/07/21  0529   INR 1.45* 1.25* 1.07 1.02     LFTs  Recent Labs   Lab 10/06/21  0634 10/04/21  1443   ALKPHOS 301* 343*   AST 36 69*   ALT 52 72*   BILITOTAL 0.3 0.3   PROTTOTAL 6.2* 6.2*   ALBUMIN 2.1* 2.2*

## 2021-10-10 NOTE — PROGRESS NOTES
Renal Medicine       UF yesterday 4 liter  Weight down from 107.5 kg ---> 98.4    Next run 11/12/21  Presumed outpatient at [facility]   Lab 10/10/21  0835 10/08/21  0712 10/08/21  0600   NA  --   --  140   POTASSIUM  --   --  4.0   CHLORIDE  --   --  108   CO2  --   --  27   ANIONGAP  --   --  5   *   < > 79   BUN  --   --  39*   CR  --   --  3.25*   GFRESTIMATED  --   --  18*   LC  --   --  7.8*    < > = values in this interval not displayed.           JIGNESH Perla    [facility]  [phone]

## 2021-10-10 NOTE — PROGRESS NOTES
Pt here with atrial fibrillation/atrial flutter c RVR, elevated trop's.  CKD5 . A&O x4. VS tachycardic, RA. Tele a fib/a flutter c RVR.   Pt on hemodialysis, fistula bruit present.  Consistent carb diet, thin liquids. Takes pills whole by mouth. Up with SBA GBW. Denies pain. Pt scoring green on the Aggression Stop Light Tool. Plan for heparin bridge to oral coumadin after PTT in range.  Plan to discharge back to [facility] in rehab unit ([facility]).

## 2021-10-10 NOTE — PROGRESS NOTES
"Pt here with afib/aflutter c RVR, CKD on HD. On heparin drip at 500U/hr, PTT recheck due at 0900. A/O x4, RA, Tachy, -120s.  Tele afib c RVR. Diabetic diet, SBA/GB/W. Denies pain, headache, N/V and SOB. Plan to transition to coumadin and discharge to TCU when medically cleared. SW following.      /88   Pulse 112   Temp 97.7  F (36.5  C)   Resp 18   Ht 1.727 m (5' 8\")   Wt 99.4 kg (219 lb 1.6 oz)   SpO2 96%   BMI 33.31 kg/m      Luis Daniel John RN    "

## 2021-10-11 ENCOUNTER — APPOINTMENT (OUTPATIENT)
Dept: PHYSICAL THERAPY | Facility: CLINIC | Age: 76
DRG: 308 | End: 2021-10-11
Payer: COMMERCIAL

## 2021-10-11 ENCOUNTER — APPOINTMENT (OUTPATIENT)
Dept: OCCUPATIONAL THERAPY | Facility: CLINIC | Age: 76
DRG: 308 | End: 2021-10-11
Payer: COMMERCIAL

## 2021-10-11 LAB
APTT PPP: 47 SECONDS (ref 22–38)
APTT PPP: 53 SECONDS (ref 22–38)
APTT PPP: 55 SECONDS (ref 22–38)
GLUCOSE BLDC GLUCOMTR-MCNC: 122 MG/DL (ref 70–99)
GLUCOSE BLDC GLUCOMTR-MCNC: 136 MG/DL (ref 70–99)
GLUCOSE BLDC GLUCOMTR-MCNC: 148 MG/DL (ref 70–99)
GLUCOSE BLDC GLUCOMTR-MCNC: 148 MG/DL (ref 70–99)
GLUCOSE BLDC GLUCOMTR-MCNC: 174 MG/DL (ref 70–99)
GLUCOSE BLDC GLUCOMTR-MCNC: 219 MG/DL (ref 70–99)
GLUCOSE BLDC GLUCOMTR-MCNC: 65 MG/DL (ref 70–99)
INR PPP: 1.79 (ref 0.85–1.15)

## 2021-10-11 PROCEDURE — 85730 THROMBOPLASTIN TIME PARTIAL: CPT | Performed by: INTERNAL MEDICINE

## 2021-10-11 PROCEDURE — 250N000012 HC RX MED GY IP 250 OP 636 PS 637: Performed by: INTERNAL MEDICINE

## 2021-10-11 PROCEDURE — 36415 COLL VENOUS BLD VENIPUNCTURE: CPT | Performed by: INTERNAL MEDICINE

## 2021-10-11 PROCEDURE — 97530 THERAPEUTIC ACTIVITIES: CPT | Mod: GO | Performed by: OCCUPATIONAL THERAPIST

## 2021-10-11 PROCEDURE — 99233 SBSQ HOSP IP/OBS HIGH 50: CPT | Performed by: INTERNAL MEDICINE

## 2021-10-11 PROCEDURE — 97110 THERAPEUTIC EXERCISES: CPT | Mod: GP | Performed by: PHYSICAL THERAPIST

## 2021-10-11 PROCEDURE — 250N000013 HC RX MED GY IP 250 OP 250 PS 637: Performed by: INTERNAL MEDICINE

## 2021-10-11 PROCEDURE — 99231 SBSQ HOSP IP/OBS SF/LOW 25: CPT | Performed by: INTERNAL MEDICINE

## 2021-10-11 PROCEDURE — 85610 PROTHROMBIN TIME: CPT | Performed by: INTERNAL MEDICINE

## 2021-10-11 PROCEDURE — 250N000011 HC RX IP 250 OP 636: Performed by: EMERGENCY MEDICINE

## 2021-10-11 PROCEDURE — 210N000002 HC R&B HEART CARE

## 2021-10-11 PROCEDURE — 97110 THERAPEUTIC EXERCISES: CPT | Mod: GO | Performed by: OCCUPATIONAL THERAPIST

## 2021-10-11 RX ORDER — WARFARIN SODIUM 5 MG/1
5 TABLET ORAL
Status: COMPLETED | OUTPATIENT
Start: 2021-10-11 | End: 2021-10-11

## 2021-10-11 RX ORDER — TRAZODONE HYDROCHLORIDE 50 MG/1
50 TABLET, FILM COATED ORAL AT BEDTIME
Status: DISCONTINUED | OUTPATIENT
Start: 2021-10-11 | End: 2021-10-12 | Stop reason: HOSPADM

## 2021-10-11 RX ADMIN — Medication 1000 UNITS: at 08:09

## 2021-10-11 RX ADMIN — INSULIN GLARGINE 15 UNITS: 100 INJECTION, SOLUTION SUBCUTANEOUS at 21:30

## 2021-10-11 RX ADMIN — METOPROLOL TARTRATE 125 MG: 100 TABLET, FILM COATED ORAL at 20:26

## 2021-10-11 RX ADMIN — INSULIN ASPART 2 UNITS: 100 INJECTION, SOLUTION INTRAVENOUS; SUBCUTANEOUS at 17:23

## 2021-10-11 RX ADMIN — EZETIMIBE 10 MG: 10 TABLET ORAL at 08:09

## 2021-10-11 RX ADMIN — TRAZODONE HYDROCHLORIDE 50 MG: 50 TABLET ORAL at 21:30

## 2021-10-11 RX ADMIN — FUROSEMIDE 80 MG: 40 TABLET ORAL at 08:09

## 2021-10-11 RX ADMIN — B-COMPLEX W/ C & FOLIC ACID TAB 1 TABLET: TAB at 08:09

## 2021-10-11 RX ADMIN — SIMVASTATIN 40 MG: 40 TABLET, FILM COATED ORAL at 21:30

## 2021-10-11 RX ADMIN — INSULIN ASPART 5 UNITS: 100 INJECTION, SOLUTION INTRAVENOUS; SUBCUTANEOUS at 17:24

## 2021-10-11 RX ADMIN — MELATONIN TAB 3 MG 3 MG: 3 TAB at 20:27

## 2021-10-11 RX ADMIN — WARFARIN SODIUM 5 MG: 5 TABLET ORAL at 17:24

## 2021-10-11 RX ADMIN — FOLIC ACID 1 MG: 1 TABLET ORAL at 08:10

## 2021-10-11 RX ADMIN — CLONIDINE HYDROCHLORIDE 0.2 MG: 0.1 TABLET ORAL at 20:28

## 2021-10-11 RX ADMIN — INSULIN ASPART 1 UNITS: 100 INJECTION, SOLUTION INTRAVENOUS; SUBCUTANEOUS at 13:14

## 2021-10-11 RX ADMIN — HEPARIN SODIUM 6.5 UNITS/HR: 10000 INJECTION, SOLUTION INTRAVENOUS at 09:50

## 2021-10-11 RX ADMIN — HEPARIN SODIUM 650 UNITS/HR: 10000 INJECTION, SOLUTION INTRAVENOUS at 06:49

## 2021-10-11 RX ADMIN — INSULIN ASPART 5 UNITS: 100 INJECTION, SOLUTION INTRAVENOUS; SUBCUTANEOUS at 13:13

## 2021-10-11 RX ADMIN — CLONIDINE HYDROCHLORIDE 0.2 MG: 0.1 TABLET ORAL at 08:09

## 2021-10-11 RX ADMIN — METOPROLOL TARTRATE 125 MG: 100 TABLET, FILM COATED ORAL at 08:09

## 2021-10-11 RX ADMIN — ALLOPURINOL 100 MG: 100 TABLET ORAL at 08:10

## 2021-10-11 ASSESSMENT — ACTIVITIES OF DAILY LIVING (ADL)
ADLS_ACUITY_SCORE: 10

## 2021-10-11 ASSESSMENT — MIFFLIN-ST. JEOR: SCORE: 1679.5

## 2021-10-11 NOTE — PROGRESS NOTES
Renal Medicine       Comfortable  Flat    OK for discharge from renal standpoint     Next run 11/12/21  Outpatient at [facility]    Unit aware of arrival 10/12    Recent Labs   Lab 10/11/21  1008 10/08/21  0712 10/08/21  0600   NA  --   --  140   POTASSIUM  --   --  4.0   CHLORIDE  --   --  108   CO2  --   --  27   ANIONGAP  --   --  5   *   < > 79   BUN  --   --  39*   CR  --   --  3.25*   GFRESTIMATED  --   --  18*   LC  --   --  7.8*    < > = values in this interval not displayed.           JIGNESH Perla    [facility]  [phone]

## 2021-10-11 NOTE — PROGRESS NOTES
Care Management Follow Up    Length of Stay (days): 7    Expected Discharge Date: 10/11/2021     Concerns to be Addressed: discharge planning     Patient plan of care discussed at interdisciplinary rounds: Yes    Anticipated Discharge Disposition: Home Care, Home, Outpatient Dialysis     Anticipated Discharge Services: Transportation Services  Anticipated Discharge DME: None    Patient/family educated on Medicare website which has current facility and service quality ratings: no  Education Provided on the Discharge Plan:    Patient/Family in Agreement with the Plan: yes    Referrals Placed by CM/SW:    Private pay costs discussed: transportation costs  Discussed cost with daughter of $97 round trip/per day for w/c transport to dialysis  Also discussed  [email or web address] Transport of $78.65 base + $5.06/mile to TCU today.  Daughter in agreement with costs.    Additional Information:   following patient for  discharge to TCU.  Pt needs OP dialysis.   Following arranged per previous   Outpatient Dialysis Location: [facility]   Address: [facility] [address] [address], [place], [place], [address]  Schedule: Tuesday / Thursday / Saturday at 10:45 AM   First run: Tues Oct 12, 2021 please arrive at 9:45 AM   Phone: [phone] / Fax [phone]  Dr. Ferro updated and will have Dr. Courtney talk to facility.  They are aware that patient will be coming tomorrow.  Will fax orders upon receipt.     Pt needs ride to Dialysis.  CC talked with patient's dtr Omayra and she has not been able to find a ride.  She called [facility] and vladislav but had not followed up with other agencies.  Plan had been to have Gordon take an uber.  Explained that patient is 1/assist and at this point may need more assistance than uber could provide.  Agreed to have CC find transportation and aware of potential costs (see above).     [facility] Transportation [phone]  Has been set up to  at [facility] Tuesday 10/12, Thursday 10/13 Saturday 10/16    Tuesday  is 9am, Return at 3pm  Thursday/Saturday  is 10am, Return 3pm  Transportation past this week will be coordinated by daughter.    Above information added to AVS.    SW arranging ride for TCU.     Addendum 1351: Updated that patient is not discharging today.  [facility] transportation cancelled for tomorrow.. following times adjusted  Thursday:  9am, Return at 4pm  Saturday  at 10am Return at 4pm    Daughter Natalie Avery updated.                Pt needs license and insurance card for 1st dialysis. Daughter to bring.         Raisa James RN

## 2021-10-11 NOTE — PLAN OF CARE
[id number] A&O x 3-4, occasionally forgetful. Patient denies pain. VSS, on RA. Up with Ax1 gb and walker. Tele: A fib CVR. Heparin gtt @ 500/hr - PTT recheck in AM. R arm wound - wound care done by day RN. Continue to Monitor.

## 2021-10-11 NOTE — PROGRESS NOTES
Care Management Follow Up    Length of Stay (days): 7    Expected Discharge Date: 10/11/2021     Concerns to be Addressed: discharge planning     Patient plan of care discussed at interdisciplinary rounds: Yes    Anticipated Discharge Disposition: Home Care, Home, Outpatient Dialysis     Anticipated Discharge Services: Transportation Services  Anticipated Discharge DME: None    Patient/family educated on Medicare website which has current facility and service quality ratings: no  Education Provided on the Discharge Plan:    Patient/Family in Agreement with the Plan: yes    Referrals Placed by CM/SW:    Private pay costs discussed: Not applicable    Additional Information:    Sw left [facility] admissions a vm; waiting on return call back.    Update:  [facility] has clinically accepted pt and can admit pt today as long as family has transport arrangements for dialysis arranged/coordinated.  Sw reached out to pt and pt was unaware of any dialysis plan and was agreeable to Sw reaching out to dtr, Omayra.  Per Omayra she was planning on arranging uber for pt until she had looked into and scheduled other transport arrangements; Omayra was given [facility] resource for transport services previously.  Per RN notes pt is SBA/GB/W, Ax1.  CC called dialysis [facility] to see if they are able to come out of building to help assist pt out of uber vehicle; per Bennie they are not able to do this.  Sw called dtr again to suggest that uber may not be a safe transport plan given pt's mobility needs; left vm.  Sw to provide dtr with transport companies to call/coordinate rides but will require payment source at time of coordination.    Update:  CC has coordinated transport with dtr for the first week while at [facility].  Per dtr, pt needs w/c transport and is aware that it is private pay.  Sw arranged w/c transport for pt today at 1500 to [facility]; [facility] updated.  Sw to work on obtaining auth today.    PAS-RR    D: Per DHS regulation, SW completed and  submitted PAS-RR to [place] [employer] via the [facility] [employer].  PAS-RR confirmation # is : [id number]    I: SW spoke with pt and they are aware a PAS-RR has been submitted.  SW reviewed with pt that they may be contacted for a follow up appointment within 10 days of hospital discharge if their SNF stay is < 30 days.  Contact information for [facility] was also provided.    A: pt verbalized understanding.    P: Further questions may be directed to [facility] Line at #[phone], option #4 for PAS-RR staff.      Update:  Silvia connected with [facility] (ph:  [phone]).  Per rep, [place] is on the 'waivered list' for TCU auth, which does not require review of clinicals.  Rep asked that FS be faxed to:  [phone]; Sw faxed.  Per rep, Auth ID# is [id number] and is valid today, 10/11/21 for 3 days with review date being 10/13/21.  Rep stated that a continued stay rep will be assigned to pt's case and will reach out to [facility] TCU; their contact number is:  [phone].  Sw provided [facility] with this info.  Silvia web paged MD to update on discharge date/time.    Update:  Per MD pt is not ready for discharge until tomorrow, could discharge at 1100.  Silvia rescheduled HE w/c ride for tomorrow, 10/12, to [facility] TCU at 1130.  Silvia updated [facility].      Update:  Pt has dialysis, 10/12.  Silvia rescheduled HE w/c ride at 1600 tomorrow to [facility] TCU.      Carrie Bach, [facility] Hospital[facility]

## 2021-10-11 NOTE — PROGRESS NOTES
[facility]    Medicine Progress Note - Hospitalist Service       Date of Admission:  10/4/2021    Assessment & Plan             Gordon Gann is a 76 year old male with history of CKD stage V not on dialysis, atrial fibrillation on chronic Eliquis, insulin requiring diabetes mellitus type 2 presents to the emergency room with tachycardia.     Atrial fibrillation/flutter with RVR now rate control.   History of chronic atrial fibrillation   -EKG on admission shows rapid atrial flutter with 2 is to 1 conduction versus rapid atrial fibrillation  -Appreciate cardiology review.  Status post diltiazem drip.  Now on increased dose of metoprolol 125 mg twice daily with good control of his Heart rate.     Give ESRD and on HD, cardiology recommend Coumadin, he was on Eliquis 2.5 mg daily dose, now he is on Coumadin, INR sub therapeutic, was started on IV heparin for bridging.   For Afib we do not need bridging. His INR is improving today is 1.79, Pharmacy is dosing his coumadin.       Elevated troponin.  History of coronary artery disease s/p PCI in 2006  Hypertension  Hyperlipidemia  -Troponin elevated at 0.66 most likely demand ischemia  - heparin drip that was started in the emergency room, will stop IV Heparin later today     -Eliquis switch to coumadin   -Continue clonidine, metoprolol at higher doses     Acute on chronic Congestive Heart failure secondary to fluid overload.   CKD stage V/ESRD now started on HD  -Follows up with Dr. Saldana  -Left upper extremity graft placed by Dr. Hernandez on 8/5/2021.  Appreciate vascular surgery review of graft on 10/05  -Appreciate nephrology following patient.  started dialysis 10/06 and continue removal of fluid will help his breathing.   outpatient dialysis center has been arranged. He is tolerating dialysis well now.      Anemia of chronic disease  -Recent baseline appears to be between 9-10  -Monitor intermittently, remain stable.      Diabetes mellitus  "type 2  -Continue prior to admission Lantus 15 units daily, 5 units NovoLog scheduled along with sliding scale at home.  -Continue medium intensity sliding scale for now.    Recent Labs   Lab 10/11/21  1302 10/11/21  1008 10/11/21  0816 10/11/21  0202 10/10/21  2057 10/10/21  1656   *  148* 122* 65* 136* 228* 182*     Transaminitis with increase in alkaline phosphatase to 343.  -AST/ALT mildly elevated on admission, repeat on 10/06 normal ALT and AST  But his ALP was mildly elevated, need out patient follow up.     History of insomnia:   Has insomnia, and has multiple medication without much benefit, he looks some what depressed as well, start him on Trazodone 50 mg at night and continue with Melatonin as well and Remeron.       Atelectasis versus pneumonia  -Chest x-ray today shows mild opacity at the left base may represent atelectasis or pneumonia  -Patient denies fever, cough or dyspnea.  He has a normal white cell count.  Clinically I favor atelectasis, we will not start any antibiotics at this time.    Overall improve now, INR subtherapeutic, pharmacy to dose coumadin, hopefully therapeutic INR tomorrow.   Stop IV Heparin later today   Discharge to TCU in morning tomorrow.        Diet: Consistent Carb 60 grams CHO per Meal Diet    DVT Prophylaxis: Coumadin  Flood Catheter: Not present  Central Lines: None  Code Status: Full Code      Disposition Plan   Expected discharge: 10/12/2021   recommended to prior living arrangement once renal function improved, safe disposition plan/ TCU bed available and Adequate control of heart rate and blood pressure.     The patient's care was discussed with the Bedside Nurse, Care Coordinator/ and Patient.    Mitesh Nicholson MD  Hospitalist Service  [facility]  Securely message with the Vocera Web Console (learn more here)  Text page via [employer] Paging/Directory    \"This dictation was performed with voice recognition software and may " "contain errors,  omissions and inadvertent word substitution.\"       Clinically Significant Risk Factors Present on Admission                 ______________________________________________________________________    Interval History   Patient seen and evaluated in his room, told me that feeling tired as having trouble sleeping in night, this has been going on for some time and has tried Ambien, melatonin, Remeron without any benefit. He said his breathing is comfortable, denies any chest pain,  SOB, fever ,chills, nausea or vomiting at this time.     No other significant event overnight.       Data reviewed today: I reviewed all medications, new labs and imaging results over the last 24 hours. I personally reviewed no images or EKG's today.    Physical Exam   /81 (BP Location: Right arm)   Pulse 91   Temp 98.2  F (36.8  C) (Oral)   Resp 16   Ht 1.727 m (5' 8\")   Wt 97 kg (213 lb 13.5 oz)   SpO2 94%   BMI 32.52 kg/m    Gen- pleasan  HEENT- NAD, LATRICIA  Neck- supple  CVS- I+II+ no m/r/g  RS- CTAB  Abdo- soft, no tenderness . No g/r/r  Ext- edema +  LUE- in ACE bandage    CNS- no gross focal deficit     Vitals:    10/09/21 0500 10/10/21 0533 10/11/21 0635   Weight: 99.4 kg (219 lb 1.6 oz) 98.4 kg (217 lb) 97 kg (213 lb 13.5 oz)     Data    BMP  Recent Labs   Lab 10/11/21  1302 10/11/21  1008 10/11/21  0816 10/11/21  0202 10/08/21  0712 10/08/21  0600 10/07/21  1504 10/07/21  0834 10/06/21  1319 10/06/21  0634 10/05/21  0811 10/05/21  0509   NA  --   --   --   --   --  140  --   --   --  141  --  141   POTASSIUM  --   --   --   --   --  4.0  --  4.4  --  4.2  --  4.1   CHLORIDE  --   --   --   --   --  108  --   --   --  110*  --  110*   LC  --   --   --   --   --  7.8*  --   --   --  8.0*  --  7.6*   CO2  --   --   --   --   --  27  --   --   --  24  --  21   BUN  --   --   --   --   --  39*  --   --   --  64*  --  65*   CR  --   --   --   --   --  3.25*  --   --   --  4.38*  --  4.25*   *  148* " 122* 65* 136*   < > 79   < >  --    < > 86   < > 258*    < > = values in this interval not displayed.     CBC  Recent Labs   Lab 10/10/21  0639 10/08/21  0600 10/08/21  0600 10/07/21  0529 10/07/21  0529 10/06/21  0634 10/06/21  0634   WBC 8.3  --  7.2  --  9.1  --  12.0*   RBC 3.66*  --  3.44*  --  3.58*  --  3.87*   HGB 10.3*   < > 9.7*   < > 10.4*   < > 11.1*   HCT 33.9*  --  31.6*  --  32.9*  --  35.4*   MCV 93  --  92  --  92  --  92   MCH 28.1  --  28.2  --  29.1  --  28.7   MCHC 30.4*  --  30.7*  --  31.6  --  31.4*   RDW 14.9  --  15.2*  --  15.4*  --  15.6*     --  227  --  274  --  348    < > = values in this interval not displayed.     INR  Recent Labs   Lab 10/11/21  0555 10/10/21  0909 10/09/21  0325 10/08/21  0600   INR 1.79* 1.45* 1.25* 1.07     LFTs  Recent Labs   Lab 10/06/21  0634   ALKPHOS 301*   AST 36   ALT 52   BILITOTAL 0.3   PROTTOTAL 6.2*   ALBUMIN 2.1*

## 2021-10-11 NOTE — PLAN OF CARE
Pt A&Ox4, VSS on RA. Ax1 gb/walker. MOD CHO diet. Tele: A flutter. Hemodialysis fistula: bruit present L arm. PIV SL; heparin infusion D/C'ed @1800, pt on warfarin.  R forearm wound- wrapped in kerlix. BG 65, 148, 119. Pt scheduled to get dialysis tomorrow and then discharge to TCU in the afternoon.

## 2021-10-11 NOTE — DISCHARGE INSTRUCTIONS
Outpatient Dialysis Location: [facility]   Address: [address], [place], [place], [address]  Schedule: Tuesday / Thursday / Saturday at 10:45 AM   First run: Tues Oct 12, 2021 please arrive at 9:45 AM   Phone: [phone] / Fax [phone]     [facility] [phone]  Thursday  9am, return at 4pm  Saturday  10am, return at 4pm  Have your License and Insurance card for 1st run.

## 2021-10-11 NOTE — PROGRESS NOTES
Uneventful evening.     A/Ox4 - forgetful at times. Call light appropriate    Tele A flutter    Hep drip 500- Recheck AM     VSS on RA    Hemodialysis fistula L bicep

## 2021-10-12 VITALS
SYSTOLIC BLOOD PRESSURE: 143 MMHG | OXYGEN SATURATION: 97 % | DIASTOLIC BLOOD PRESSURE: 88 MMHG | RESPIRATION RATE: 16 BRPM | TEMPERATURE: 98.2 F | WEIGHT: 212.96 LBS | BODY MASS INDEX: 32.28 KG/M2 | HEIGHT: 68 IN | HEART RATE: 99 BPM

## 2021-10-12 LAB
GLUCOSE BLDC GLUCOMTR-MCNC: 117 MG/DL (ref 70–99)
GLUCOSE BLDC GLUCOMTR-MCNC: 128 MG/DL (ref 70–99)
GLUCOSE BLDC GLUCOMTR-MCNC: 132 MG/DL (ref 70–99)
GLUCOSE BLDC GLUCOMTR-MCNC: 144 MG/DL (ref 70–99)
INR PPP: 2.21 (ref 0.85–1.15)

## 2021-10-12 PROCEDURE — 90937 HEMODIALYSIS REPEATED EVAL: CPT

## 2021-10-12 PROCEDURE — 85610 PROTHROMBIN TIME: CPT | Performed by: INTERNAL MEDICINE

## 2021-10-12 PROCEDURE — 99239 HOSP IP/OBS DSCHRG MGMT >30: CPT | Performed by: INTERNAL MEDICINE

## 2021-10-12 PROCEDURE — 36415 COLL VENOUS BLD VENIPUNCTURE: CPT | Performed by: INTERNAL MEDICINE

## 2021-10-12 PROCEDURE — 90935 HEMODIALYSIS ONE EVALUATION: CPT | Performed by: INTERNAL MEDICINE

## 2021-10-12 PROCEDURE — 250N000013 HC RX MED GY IP 250 OP 250 PS 637: Performed by: INTERNAL MEDICINE

## 2021-10-12 RX ORDER — METOPROLOL TARTRATE 25 MG/1
125 TABLET, FILM COATED ORAL 2 TIMES DAILY
Status: ON HOLD | DISCHARGE
Start: 2021-10-12 | End: 2021-12-04

## 2021-10-12 RX ORDER — WARFARIN SODIUM 5 MG/1
5 TABLET ORAL DAILY
Status: ON HOLD | DISCHARGE
Start: 2021-10-12 | End: 2021-12-04

## 2021-10-12 RX ORDER — ALLOPURINOL 100 MG/1
100 TABLET ORAL DAILY
Status: ON HOLD | DISCHARGE
Start: 2021-10-12 | End: 2021-12-04

## 2021-10-12 RX ORDER — WARFARIN SODIUM 5 MG/1
5 TABLET ORAL
Status: DISCONTINUED | OUTPATIENT
Start: 2021-10-12 | End: 2021-10-12 | Stop reason: HOSPADM

## 2021-10-12 RX ORDER — LANOLIN ALCOHOL/MO/W.PET/CERES
3 CREAM (GRAM) TOPICAL AT BEDTIME
Status: ON HOLD | DISCHARGE
Start: 2021-10-12 | End: 2023-01-01

## 2021-10-12 RX ORDER — MIRTAZAPINE 15 MG/1
15 TABLET, FILM COATED ORAL AT BEDTIME
Qty: 10 TABLET | Refills: 0 | Status: ON HOLD | OUTPATIENT
Start: 2021-10-12 | End: 2023-01-01

## 2021-10-12 RX ORDER — FUROSEMIDE 20 MG
80 TABLET ORAL DAILY
Qty: 30 TABLET | Refills: 0 | DISCHARGE
Start: 2021-10-12 | End: 2021-10-12

## 2021-10-12 RX ORDER — ALBUMIN, HUMAN INJ 5% 5 %
50-250 SOLUTION INTRAVENOUS
Status: DISCONTINUED | OUTPATIENT
Start: 2021-10-12 | End: 2021-10-12

## 2021-10-12 RX ORDER — TRAZODONE HYDROCHLORIDE 50 MG/1
50 TABLET, FILM COATED ORAL AT BEDTIME
DISCHARGE
Start: 2021-10-12 | End: 2022-01-01

## 2021-10-12 RX ORDER — ALBUMIN (HUMAN) 12.5 G/50ML
50 SOLUTION INTRAVENOUS
Status: DISCONTINUED | OUTPATIENT
Start: 2021-10-12 | End: 2021-10-12

## 2021-10-12 RX ORDER — FUROSEMIDE 20 MG
80 TABLET ORAL DAILY
Qty: 30 TABLET | Refills: 0 | Status: ON HOLD | DISCHARGE
Start: 2021-10-12 | End: 2021-12-04

## 2021-10-12 RX ADMIN — Medication 1000 UNITS: at 08:28

## 2021-10-12 RX ADMIN — INSULIN ASPART 5 UNITS: 100 INJECTION, SOLUTION INTRAVENOUS; SUBCUTANEOUS at 08:36

## 2021-10-12 RX ADMIN — FOLIC ACID 1 MG: 1 TABLET ORAL at 14:38

## 2021-10-12 RX ADMIN — B-COMPLEX W/ C & FOLIC ACID TAB 1 TABLET: TAB at 08:28

## 2021-10-12 RX ADMIN — CLONIDINE HYDROCHLORIDE 0.2 MG: 0.1 TABLET ORAL at 14:42

## 2021-10-12 RX ADMIN — EZETIMIBE 10 MG: 10 TABLET ORAL at 08:28

## 2021-10-12 RX ADMIN — INSULIN ASPART 5 UNITS: 100 INJECTION, SOLUTION INTRAVENOUS; SUBCUTANEOUS at 14:38

## 2021-10-12 RX ADMIN — METOPROLOL TARTRATE 125 MG: 100 TABLET, FILM COATED ORAL at 08:28

## 2021-10-12 RX ADMIN — ALLOPURINOL 100 MG: 100 TABLET ORAL at 14:38

## 2021-10-12 ASSESSMENT — ACTIVITIES OF DAILY LIVING (ADL)
ADLS_ACUITY_SCORE: 10

## 2021-10-12 ASSESSMENT — MIFFLIN-ST. JEOR
SCORE: 1675.5
SCORE: 1676.12

## 2021-10-12 NOTE — PROGRESS NOTES
[facility]     Renal Progress Note       SHORTHAND KEY FOR MY NOTES:  c = with, s = without, p = after, a = before, x = except, asx = asymptomatic, tx = transplant or treatment, sx = symptoms or symptomatic, cx = canceled or culture, rxn = reaction, yday = yesterday, nl = normal, abx = antibiotics, fxn = function, dx = diagnosis, dz = disease, m/h = melena/hematochezia, c/d/l/ha = cramping/dizziness/lightheadedness/headache, d/c = discharge or diarrhea/constipation, f/c/n/v = fevers/chills/nausea/vomiting, cp/sob = chest pain/shortness of breath, tbv = total body volume, rxn = reaction, tdc = tunneled dialysis catheter, pta = prior to admission, hd = hemodialysis, pd = peritoneal dialysis, hhd = home hemodialysis, edw = estimated dry wt         Assessment/Plan:     1.  ESKD.  Pt is due to discharge today.  Orders have been called to [place] and he will start there on Thursday.  A.  Next run on Thursday at [facility].          Interval History:     Pt feels ok c HD today.  He had some discomfort when the needles were placed, but things are going well on the run so far.  No cp/sob/abd pain.  He has persistent swelling.          Medications and Allergies:       - MEDICATION INSTRUCTIONS for Dialysis Patients -   Does not apply See Admin Instructions     sodium chloride 0.9%  250 mL Intravenous Once in dialysis/CRRT     sodium chloride 0.9%  300 mL Hemodialysis Machine Once     allopurinol  100 mg Oral Daily     cloNIDine  0.2 mg Oral BID     ezetimibe  10 mg Oral QAM     folic acid  1 mg Oral QAM     furosemide  80 mg Oral Daily     insulin aspart  1-7 Units Subcutaneous TID AC     insulin aspart  1-5 Units Subcutaneous At Bedtime     insulin aspart  5 Units Subcutaneous TID w/meals     insulin glargine  15 Units Subcutaneous At Bedtime     melatonin  3 mg Oral At Bedtime     metoprolol tartrate  125 mg Oral BID     mirtazapine  15 mg Oral At Bedtime     - MEDICATION INSTRUCTIONS -    "Does not apply Once     simvastatin  40 mg Oral At Bedtime     sodium chloride (PF)  3 mL Intracatheter Q8H     traZODone  50 mg Oral At Bedtime     vitamin B complex with vitamin C  1 tablet Oral QAM     Vitamin D3  1,000 Units Oral QAM     warfarin ANTICOAGULANT  5 mg Oral ONCE at 18:00     No Known Allergies       Physical Exam:     Vitals were reviewed     , Blood pressure (!) 132/99, pulse 74, temperature 98.2  F (36.8  C), temperature source Oral, resp. rate 16, height 1.727 m (5' 8\"), weight 96.6 kg (212 lb 15.4 oz), SpO2 95 %.  Wt Readings from Last 3 Encounters:   10/12/21 96.6 kg (212 lb 15.4 oz)   08/04/21 89.8 kg (197 lb 15.6 oz)     Intake/Output Summary (Last 24 hours) at 10/12/2021 1036  Last data filed at 10/12/2021 0945  Gross per 24 hour   Intake 960 ml   Output --   Net 960 ml     GENERAL APPEARANCE: pleasant, NAD, alert  HEENT:  eyes/ears/nose/neck grossly nl  RESP: CTA B c good efforts  CV: RRR, nl S1/S2   ABDOMEN: o/s/nt/nd  EXTREMITIES/SKIN: 2+ ble edema    Pt seen on HD.  Stable run expected.   via LAG.           Data:     CBC RESULTS:     Recent Labs   Lab 10/10/21  0639 10/08/21  0600 10/07/21  0529 10/06/21  0634   WBC 8.3 7.2 9.1 12.0*   RBC 3.66* 3.44* 3.58* 3.87*   HGB 10.3* 9.7* 10.4* 11.1*   HCT 33.9* 31.6* 32.9* 35.4*    227 274 348     Basic Metabolic Panel:  Recent Labs   Lab 10/12/21  0800 10/12/21  0556 10/12/21  0154 10/11/21  2044 10/11/21  1719 10/11/21  1302 10/08/21  0712 10/08/21  0600 10/07/21  1504 10/07/21  0834 10/06/21  1319 10/06/21  0634   NA  --   --   --   --   --   --   --  140  --   --   --  141   POTASSIUM  --   --   --   --   --   --   --  4.0  --  4.4  --  4.2   CHLORIDE  --   --   --   --   --   --   --  108  --   --   --  110*   CO2  --   --   --   --   --   --   --  27  --   --   --  24   BUN  --   --   --   --   --   --   --  39*  --   --   --  64*   CR  --   --   --   --   --   --   --  3.25*  --   --   --  4.38*   * 132* 144* 174* " 219* 148*  148*   < > 79   < >  --    < > 86   LC  --   --   --   --   --   --   --  7.8*  --   --   --  8.0*    < > = values in this interval not displayed.     INR  Recent Labs   Lab 10/12/21  0614 10/11/21  0555 10/10/21  0909 10/09/21  0325   INR 2.21* 1.79* 1.45* 1.25*      Attestation:   I have reviewed today's relevant vital signs, notes, medications, labs and imaging.    Macario Ferro MD  [facility] - Nephrology  [phone]

## 2021-10-12 NOTE — PROGRESS NOTES
Potassium   Date Value Ref Range Status   10/08/2021 4.0 3.4 - 5.3 mmol/L Final     Hemoglobin   Date Value Ref Range Status   10/10/2021 10.3 (L) 13.3 - 17.7 g/dL Final     Creatinine   Date Value Ref Range Status   10/08/2021 3.25 (H) 0.66 - 1.25 mg/dL Final     Urea Nitrogen   Date Value Ref Range Status   10/08/2021 39 (H) 7 - 30 mg/dL Final     Sodium   Date Value Ref Range Status   10/08/2021 140 133 - 144 mmol/L Final     INR   Date Value Ref Range Status   10/12/2021 2.21 (H) 0.85 - 1.15 Final       DIALYSIS PROCEDURE NOTE  Hepatitis status of previous patient on machine log was checked and verified ok to use with this patients hepatitis status.  Patient dialyzed for 3.5 hrs. on a K3 bath with a net fluid removal of  4L.  A BFR of 425 ml/min was obtained via a LAVG using 15gauge needles.      The treatment plan was discussed with Dr. dawkins during the treatment.    Total heparin received during the treatment: 0 units.   Needle cannulation sites held x 10 min.   Meds  given: None    Complications: None      Person educated: Pt . Knowledge base minimal . Barriers to learning: None . Educated on procedure  via oral  mode. Patient verbalized understanding. Pt prefers oral  education style.     ICEBOAT? Timeout performed pre-treatment  I: Patient was identified using 2 identifiers  C:  Consent Signed Yes  E: Equipment preventative maintenance is current and dialysis delivery system OK to use  B: Hepatitis B Surface Antigen: neg ; Draw Date: 10.5.21      Hepatitis B Surface Antibody: Susceptible ; Draw Date: 10  .5.21  O: Dialysis orders present and complete prior to treatment  A: Vascular access verified and assessed prior to treatment  T: Treatment was performed at a clinically appropriate time  ?: Patient was allowed to ask questions and address concerns prior to treatment  See flowsheet in EPIC for further details and post assessment.  Machine water alarm in place and functioning. Transducer pods intact and  checked every 15min.   Pt returned via Bed .  Chlorine/Chloramine water system checked every 4 hours.  Outpatient Dialysis at [facility]  Pending discharge this afternoon     Please remove patient dressing on AVF and AVG needle sites 24 hours after dialysis. If leaking occurs please apply a Band-Aid.

## 2021-10-12 NOTE — PLAN OF CARE
Gordon is here for tachycardia (Afib RVR).  A&Ox4, calm and cooperative. Able to make needs known.  VSS on RA ex Tachy at times. Tele: Afib CVR (90s-100s).  Fistula to L Bicep: Bruit heard.  Wound to L forearm: Dressing changed. Up with A1 GB/W. Tolerating Mod Carb, Diet.  BGM: [id number].  Plans for Dialysis on 10/12 and discharge to [facility] TCU at 1600 after dialysis.

## 2021-10-12 NOTE — PROGRESS NOTES
Care Management Discharge Note    Discharge Date: 10/12/2021  Expected Time of Departure: 16:00    Discharge Disposition: Transitional Care    Discharge Services: Other (see comment) (therapy)    Discharge DME: None    Discharge Transportation: agency ([facility] wheelchair at 16:00)    Private pay costs discussed: Not applicable    PAS Confirmation Code: [id number]  Patient/family educated on Medicare website which has current facility and service quality ratings: no    Education Provided on the Discharge Plan:  yes  Persons Notified of Discharge Plans: patient  Patient/Family in Agreement with the Plan: yes    Handoff Referral Completed: No    Additional Information:  Received discharge orders for patient.  Bed available at [facility] for today.   Health wheelchair ride set up for 16:00 today.  Patient informed of the plan and in  agreement to the plan.  Call placed to update [facility] and faxed the orders.      ZULEMA Palma, [facility]    [phone]  [facility]

## 2021-10-12 NOTE — DISCHARGE SUMMARY
[facility]    Discharge Summary  Hospitalist    Date of Admission:  10/4/2021  Date of Discharge:  10/12/2021  Discharging Provider: Mitesh Nicholson MD  Date of Service (when I saw the patient): 10/12/21    Discharge Diagnoses   Please refer below     History of Present Illness   Gordon Gann is an 76 year old male who presented with tachycardia     Hospital Course   Gordon Gann is a 76 year old male with history of CKD stage V not on dialysis, atrial fibrillation on chronic Eliquis, insulin requiring diabetes mellitus type 2 presents to the emergency room with tachycardia.    Final Discharge Diagnosis and Hospital Course      Atrial fibrillation/flutter with RVR now rate control.   History of chronic atrial fibrillation   -EKG on admission shows rapid atrial flutter with 2 is to 1 conduction versus rapid atrial fibrillation  -Appreciate cardiology review.  Status post diltiazem drip.  Now on increased dose of metoprolol 125 mg twice daily with good control of his Heart rate.      Give ESRD and now on HD, cardiology recommend Coumadin, he was on Eliquis 2.5 mg daily dose, now he is on Coumadin, INR is therapeutic, was started on IV heparin for bridging.   His INR is improving today is now therapeutic.       Elevated troponin.  History of coronary artery disease s/p PCI in 2006  Hypertension  Hyperlipidemia  -Troponin elevated at 0.66 most likely demand ischemia  - heparin drip that was started in the emergency room, now discontinued.     -Eliquis switch to coumadin   -Continue clonidine, metoprolol at higher doses      Acute on chronic diastolic Congestive Heart failure secondary to fluid overload.   CKD stage V/ESRD now started on HD  -Follows up with Dr. Saldana  -Left upper extremity graft placed by Dr. Hernandez on 8/5/2021.  Appreciate vascular surgery review of graft on 10/05  -Appreciate nephrology following patient.  started dialysis 10/06 and continue removal of fluid will help  his breathing.   outpatient dialysis center has been arranged. He is tolerating dialysis well now.   Still have significant LE edema, his lasix increase to 80 mg daily, and continue dialysis as out patient.      Anemia of chronic disease  -Recent baseline appears to be between 9-10  -Monitor intermittently, remain stable.      Diabetes mellitus type 2  -Continue prior to admission Lantus 15 units daily, 5 units NovoLog scheduled along with sliding scale at home.  -Continue medium intensity sliding scale for now.             Recent Labs   Lab 10/11/21  1302 10/11/21  1008 10/11/21  0816 10/11/21  0202 10/10/21  2057 10/10/21  1656   *  148* 122* 65* 136* 228* 182*      Transaminitis with increase in alkaline phosphatase to 343.  -AST/ALT mildly elevated on admission, repeat on 10/06 normal ALT and AST  But his ALP was mildly elevated, need out patient follow up.      History of insomnia:   Has insomnia, and has multiple medication without much benefit, he looks some what depressed as well, started him on Trazodone 50 mg at night and continue with Melatonin as well and Remeron,  He slept well overnight. So will keep him on this combination for now.          Atelectasis versus pneumonia: Atelectasis   -Chest x-ray today shows mild opacity at the left base may represent atelectasis or pneumonia  -Patient denies fever, cough or dyspnea.  He has a normal white cell count.  Clinically I favor atelectasis, we will not start any antibiotics at this time.     Patient is feeling much better, no fever, chills, chest pain, SOB, nausea, vomiting, headache or dizziness, his HR is in good control, tolerating HD well, slept well overnight. He will be discharge to TCU in stable condition today with out patient dialysis as schedule by Nephrology.     Diet: Consistent Carb 60 grams CHO per Meal Diet    DVT Prophylaxis: Coumadin  Flood Catheter: Not present  Central Lines: None  Code Status: Full Code         Mitesh Nicholson MD,  MD    Significant Results and Procedures       Pending Results   These results will be followed up by PCP  Unresulted Labs Ordered in the Past 30 Days of this Admission     No orders found from 9/4/2021 to 10/5/2021.          Code Status   Full Code       Primary Care Physician   Belen Prieto    Physical Exam   Temp: 98.2  F (36.8  C) Temp src: Oral BP: (!) 140/84 Pulse: 80   Resp: 16 SpO2: 95 % O2 Device: None (Room air)    Vitals:    10/11/21 0635 10/12/21 0446 10/12/21 0825   Weight: 97 kg (213 lb 13.5 oz) 96.7 kg (213 lb 1.6 oz) 96.6 kg (212 lb 15.4 oz)     Vital Signs with Ranges  Temp:  [97.5  F (36.4  C)-98.2  F (36.8  C)] 98.2  F (36.8  C)  Pulse:  [] 80  Resp:  [16] 16  BP: ()/() 140/84  SpO2:  [94 %-95 %] 95 %  I/O last 3 completed shifts:  In: 740 [P.O.:740]  Out: -     Constitutional: awake, alert, cooperative, no apparent distress, and appears stated age  Eyes: Lids and lashes normal, pupils equal, round and reactive to light, extra ocular muscles intact, sclera clear, conjunctiva normal  Respiratory: No increased work of breathing, good air exchange, clear to auscultation bilaterally, no crackles or wheezing  Cardiovascular: Normal apical impulse, regular rate and rhythm, normal S1 and S2, no S3 or S4, and no murmur noted  GI: No scars, normal bowel sounds, soft, non-distended, non-tender, no masses palpated, no hepatosplenomegally  Musculoskeletal: 2-3+ lower extremity pitting edema present  Neurologic: no focal deficit.     Discharge Disposition   Discharged to short-term care facility  Condition at discharge: Stable    Consultations This Hospital Stay   PHARMACY IP CONSULT  PHARMACY IP CONSULT  CARDIAC REHAB IP CONSULT  CARDIOLOGY IP CONSULT  PHARMACY IP CONSULT  PHARMACY IP CONSULT  VASCULAR SURGERY IP CONSULT  WOUND OSTOMY CONTINENCE NURSE  IP CONSULT  PHARMACY TO DOSE WARFARIN  NEPHROLOGY IP CONSULT  LYMPHEDEMA THERAPY IP CONSULT  CARE MANAGEMENT / SOCIAL WORK IP  CONSULT  OCCUPATIONAL THERAPY ADULT IP CONSULT  PHYSICAL THERAPY ADULT IP CONSULT  PHYSICAL THERAPY ADULT IP CONSULT  OCCUPATIONAL THERAPY ADULT IP CONSULT  SMOKING CESSATION PROGRAM IP CONSULT    Time Spent on this Encounter   IMitesh MD, personally saw the patient today and spent greater than 30 minutes discharging this patient.    Discharge Orders      Medication Therapy Management Referral      Follow-up and recommended labs and tests     Outpatient Dialysis Location: [facility]   Address: [facility] [address] [address], [place], [place], [address]  Schedule: Tuesday / Thursday / Saturday at 10:45 AM   **First run: Tues Oct 12, 2021 please arrive at 9:45 AM**  Phone: [phone] / Fax [phone]     General info for SNF    Length of Stay Estimate: Short Term Care: Estimated # of Days <30  Condition at Discharge: Stable  Level of care:skilled   Rehabilitation Potential: Good  Admission H&P remains valid and up-to-date: Yes  Recent Chemotherapy: N/A  Use Nursing Home Standing Orders: Yes     Mantoux instructions    Give two-step Mantoux (PPD) Per Facility Policy Yes     Follow Up and recommended labs and tests    Follow up with [facility] physician.  The following labs/tests are recommended: INR.     Reason for your hospital stay    ESRD, CHF, Afib     Glucose monitor nursing POCT    Before meals and at bedtime     Intake and output    Every shift     Daily weights    Call Provider for weight gain of more than 2 pounds per day or 5 pounds per week.     Activity - Up with assistive device     Activity - Up with nursing assistance     Full Code     Physical Therapy Adult Consult    Evaluate and treat as clinically indicated.    Reason:  weakness     Occupational Therapy Adult Consult    Evaluate and treat as clinically indicated.    Reason:  weakness     Fall precautions     Advance Diet as Tolerated    Follow this diet upon discharge: Orders Placed This Encounter      Consistent Carb 60 grams CHO  per Meal Diet     Discharge Medications   Current Discharge Medication List      START taking these medications    Details   mirtazapine (REMERON) 15 MG tablet Take 1 tablet (15 mg) by mouth At Bedtime  Qty: 10 tablet, Refills: 0    Associated Diagnoses: Insomnia, unspecified type      traZODone (DESYREL) 50 MG tablet Take 1 tablet (50 mg) by mouth At Bedtime    Associated Diagnoses: Insomnia, unspecified type      warfarin ANTICOAGULANT (COUMADIN) 5 MG tablet Take 1 tablet (5 mg) by mouth daily    Associated Diagnoses: Atrial flutter with rapid ventricular response (H)         CONTINUE these medications which have CHANGED    Details   allopurinol (ZYLOPRIM) 100 MG tablet Take 1 tablet (100 mg) by mouth daily    Associated Diagnoses: Diabetic ketoacidosis without coma associated with type 2 diabetes mellitus (H)      furosemide (LASIX) 20 MG tablet Take 4 tablets (80 mg) by mouth daily  Qty: 30 tablet, Refills: 0    Associated Diagnoses: Diabetic ketoacidosis without coma associated with type 2 diabetes mellitus (H)      metoprolol tartrate (LOPRESSOR) 25 MG tablet Take 5 tablets (125 mg) by mouth 2 times daily    Associated Diagnoses: Atrial flutter with rapid ventricular response (H)         CONTINUE these medications which have NOT CHANGED    Details   acetaminophen (TYLENOL) 325 MG tablet Take 1 tablet (325 mg) by mouth every 6 hours as needed for mild pain      aspirin 81 MG EC tablet Take 81 mg by mouth At Bedtime      cloNIDine (CATAPRES) 0.2 MG tablet Take 0.2 mg by mouth 2 times daily      ezetimibe (ZETIA) 10 MG tablet Take 10 mg by mouth every morning      fish oil-omega-3 fatty acids 1000 MG capsule Take 1 g by mouth every morning      folic acid (FOLVITE) 1 MG tablet Take 1 mg by mouth every morning      hydrALAZINE (APRESOLINE) 25 MG tablet Take 1 tablet (25 mg) by mouth every 6 hours as needed (SBP >180)  Qty: 30 tablet, Refills: 0    Associated Diagnoses: Diabetic ketoacidosis without coma associated  with type 2 diabetes mellitus (H)      !! insulin aspart (NOVOLOG PEN) 100 UNIT/ML pen Inject 1-20 Units Subcutaneous 3 times daily (before meals) Correction Scale - MEDIUM INSULIN RESISTANCE DOSING   Do Not give Correction Insulin if Pre-Meal BG less than 140. For Pre-Meal  - 189 give 1 unit. For Pre-Meal  - 239 give 2 units. For Pre-Meal  - 289 give 3 units. For Pre-Meal  - 339 give 4 units. For Pre-Meal - 399 give 5 units. For Pre-Meal -449 give 6 units For Pre-Meal BG greater than or equal to 450 give 7 units. To be given with prandial insulin, and based on pre-meal blood glucose.  Notify provider if glucose greater than or equal to 350 mg/dL after administration of correction dose. If given at mealtime, administer within 30 minutes of start of meal  Qty: 30 mL, Refills: 0    Associated Diagnoses: Diabetic ketoacidosis without coma associated with type 2 diabetes mellitus (H)      !! insulin aspart (NOVOLOG PEN) 100 UNIT/ML pen Inject 5 Units Subcutaneous 3 times daily (with meals)  Qty: 15 mL, Refills: 0    Associated Diagnoses: Diabetic ketoacidosis without coma associated with type 2 diabetes mellitus (H)      insulin glargine (LANTUS PEN) 100 UNIT/ML pen Inject 15 Units Subcutaneous At Bedtime  Qty: 15 mL, Refills: 0    Comments: If Lantus is not covered by insurance, may substitute Basaglar at same dose and frequency.    Associated Diagnoses: Diabetic ketoacidosis without coma associated with type 2 diabetes mellitus (H)      melatonin 3 MG tablet Take 1 tablet (3 mg) by mouth At Bedtime    Associated Diagnoses: Insomnia, unspecified type      simvastatin (ZOCOR) 40 MG tablet Take 1 tablet (40 mg) by mouth At Bedtime    Comments: Hold until liver function test is close to baseline.cmp ordered in 5-6 days      SODIUM BICARBONATE PO Take 1 tablet by mouth 2 times daily      vitamin B complex with vitamin C (STRESS TAB) tablet Take 1 tablet by mouth every morning       Vitamin D, Cholecalciferol, 25 MCG (1000 UT) CAPS Take 1,000 Units by mouth every morning      zolpidem (AMBIEN) 5 MG tablet Take 5 mg by mouth nightly as needed for sleep       !! - Potential duplicate medications found. Please discuss with provider.      STOP taking these medications       apixaban ANTICOAGULANT (ELIQUIS) 2.5 MG tablet Comments:   Reason for Stopping:             Allergies   No Known Allergies  Data   Most Recent 3 CBC's:Recent Labs   Lab Test 10/10/21  0639 10/08/21  0600 10/07/21  0529   WBC 8.3 7.2 9.1   HGB 10.3* 9.7* 10.4*   MCV 93 92 92    227 274      Most Recent 3 BMP's:  Recent Labs   Lab Test 10/12/21  0800 10/12/21  0556 10/12/21  0154 10/08/21  0712 10/08/21  0600 10/07/21  1504 10/07/21  0834 10/06/21  1319 10/06/21  0634 10/05/21  0811 10/05/21  0509   NA  --   --   --   --  140  --   --   --  141  --  141   POTASSIUM  --   --   --   --  4.0  --  4.4  --  4.2   < > 4.1   CHLORIDE  --   --   --   --  108  --   --   --  110*  --  110*   CO2  --   --   --   --  27  --   --   --  24  --  21   BUN  --   --   --   --  39*  --   --   --  64*  --  65*   CR  --   --   --   --  3.25*  --   --   --  4.38*  --  4.25*   ANIONGAP  --   --   --   --  5  --   --   --  7  --  10   LC  --   --   --   --  7.8*  --   --   --  8.0*  --  7.6*   * 132* 144*   < > 79   < >  --    < > 86   < > 258*    < > = values in this interval not displayed.     Most Recent 2 LFT's:  Recent Labs   Lab Test 10/06/21  0634 10/04/21  1443   AST 36 69*   ALT 52 72*   ALKPHOS 301* 343*   BILITOTAL 0.3 0.3     Most Recent INR's and Anticoagulation Dosing History:  Anticoagulation Dose History     Recent Dosing and Labs Latest Ref Rng & Units 10/6/2021 10/7/2021 10/8/2021 10/9/2021 10/10/2021 10/11/2021 10/12/2021    Warfarin 5 mg - 5 mg 5 mg - - - 5 mg -    Warfarin 7.5 mg - - - 7.5 mg 7.5 mg 7.5 mg - -    INR 0.85 - 1.15 1.02 1.02 1.07 1.25(H) 1.45(H) 1.79(H) 2.21(H)        Most Recent 3 Troponin's:  Recent  Labs   Lab Test 10/05/21  0509 10/05/21  0130 10/04/21  1443   TROPONIN 0.538* 0.652* 0.660*     Most Recent Cholesterol Panel:No lab results found.  Most Recent 6 Bacteria Isolates From Any Culture (See EPIC Reports for Culture Details):No lab results found.  Most Recent TSH, T4 and A1c Labs:  Recent Labs   Lab Test 10/05/21  0130 07/31/21  1445 07/28/21  1237   TSH  --   --  2.58   A1C 6.2*   < > 11.7*    < > = values in this interval not displayed.     Results for orders placed or performed during the hospital encounter of 10/04/21   XR Chest 2 Views    Narrative    EXAM: XR CHEST 2 VW  LOCATION: [facility]  DATE/TIME: 10/4/2021 5:14 PM    INDICATION: tachycardia  COMPARISON: 07/20/2021      Impression    IMPRESSION: Mild opacity at the left base may represent atelectasis or pneumonia. Small left pleural effusion. No pneumothorax. Upper normal heart size.   US Ext Arterial Venous Dialys Acs Graft    Narrative    US EXTREMITY ARTERIAL VENOUS DIALYSIS ACCESS GRAFT  10/5/2021 10:35 AM    CLINICAL HISTORY/INDICATION: Status post LUE AVG placement.    COMPARISON: None relevant    TECHNIQUE:   Grayscale, color-flow, and spectral waveform analysis with velocities  were performed of the dialysis access circuit.    FINDINGS:    The arterial inflow is patent with diameters ranging between 4.2 and  5.2 millimeters. The arterial anastomosis is patent. Focal stenosis is  noted in the axillary outflow vein with a diameter 3.2 mm and a peak  systolic velocity of 542 cm/s. Fluid collection is noted around the  graft at the mid/distal humerus as well as two additional fluid  collections near the anastomosis measuring up to 2.4 x 1.5 x 1.1 cm.    Total outflow volume is 970 mL/min.      Impression    IMPRESSION:   1. 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  2. Fluid collections near  the arterial and venous anastomosis, likely  postoperative seromas.   3. Small amount of fluid around the graft at the level of the  mid/distal humerus.    DOUG BENITEZ DO         SYSTEM ID:  [id number]   XR Chest 2 Views    Narrative    XR CHEST 2 VW 10/7/2021 2:07 PM    HISTORY: volume overload    COMPARISON: 10/4/2021      Impression    IMPRESSION: Low lung volumes. Slight increase in bibasilar  atelectasis. Mild pulmonary vascular congestion. Small amount of fluid  in the right major fissure. No significant layering pleural effusion  or pneumothorax.    BRIDGETT WARNER MD         SYSTEM ID:  [id number]   Echo Limited     Value    LVEF  55-60%    Narrative    [id number]  [id number]  [id number]  [id number]^VIN^LOBO     [facility]  Echocardiography Laboratory  [address]  [place] [address]  Phone [phone]  Fax [phone]     Name: JULIO RIZVI  MRN: [id number]  : 1945  Study Date: 10/05/2021 11:10 AM  Age: 76 yrs  Gender: Male  Patient Location: [facility]  Reason For Study: Chest Pain, Chest Tightness, Chest Pressure  Ordering Physician: LOBO BANUELOS  Referring Physician: LOBO BANUELOS  Performed By: PATI Burgos     BSA: 2.2 m2  Height: 68 in  Weight: 235 lb  HR: 96  BP: 183/96 mmHg  ______________________________________________________________________________  Procedure  Limited Portable Echo Adult. Optison (NDC #4789-1074) given intravenously.     ______________________________________________________________________________  Interpretation Summary     Left ventricular systolic function is normal.The visual ejection fraction is  55-60%.  Mildly decreased right ventricular systolic function  Aortic valve sclerosis noted.     On direct comparison to echo images dated 2021 no significant changes.  ______________________________________________________________________________  Left Ventricle  The left  ventricle is mildly dilated. There is mild eccentric left ventricular  hypertrophy. Left ventricular systolic function is normal. The visual ejection  fraction is 55-60%. No regional wall motion abnormalities noted.     Right Ventricle  The right ventricle is normal size. Mildly decreased right ventricular  systolic function.     Atria  Normal left atrial size. Right atrial size is normal.     Mitral Valve  There is trace mitral regurgitation.     Tricuspid Valve  There is trace tricuspid regurgitation. Right ventricular systolic pressure  could not be approximated due to inadequate tricuspid regurgitation.     Aortic Valve  The aortic valve is not well visualized. Aortic valve sclerosis noted. No  aortic regurgitation is present. No aortic stenosis is present.     Pulmonic Valve  The pulmonic valve is not well visualized.     Vessels  The aortic root is normal size.     Pericardium  There is no pericardial effusion. Large left pleural effusion.     ______________________________________________________________________________  MMode/2D Measurements & Calculations  IVSd: 1.3 cm  LVIDd: 5.7 cm  LVIDs: 3.6 cm  LVPWd: 1.1 cm  FS: 36.9 %  LV mass(C)d: 293.7 grams  LV mass(C)dI: 134.2 grams/m2  Ao root diam: 3.5 cm  asc Aorta Diam: 3.3 cm  LVOT diam: 2.3 cm  LVOT area: 4.3 cm2  RVOT diam: 3.7 cm     RWT: 0.38     Doppler Measurements & Calculations  PA acc time: 0.10 sec     ______________________________________________________________________________  Report approved by: Luisa Davila 10/05/2021 01:07 PM             Most Recent 3 CBC's:Recent Labs   Lab Test 10/10/21  0639 10/08/21  0600 10/07/21  0529   WBC 8.3 7.2 9.1   HGB 10.3* 9.7* 10.4*   MCV 93 92 92    227 274     Most Recent 3 BMP's:Recent Labs   Lab Test 10/12/21  0800 10/12/21  0556 10/12/21  0154 10/08/21  0712 10/08/21  0600 10/07/21  1504 10/07/21  0834 10/06/21  1319 10/06/21  0634 10/05/21  0811 10/05/21  0509   NA  --   --   --   --  140   --   --   --  141  --  141   POTASSIUM  --   --   --   --  4.0  --  4.4  --  4.2   < > 4.1   CHLORIDE  --   --   --   --  108  --   --   --  110*  --  110*   CO2  --   --   --   --  27  --   --   --  24  --  21   BUN  --   --   --   --  39*  --   --   --  64*  --  65*   CR  --   --   --   --  3.25*  --   --   --  4.38*  --  4.25*   ANIONGAP  --   --   --   --  5  --   --   --  7  --  10   LC  --   --   --   --  7.8*  --   --   --  8.0*  --  7.6*   * 132* 144*   < > 79   < >  --    < > 86   < > 258*    < > = values in this interval not displayed.     Most Recent 2 LFT's:Recent Labs   Lab Test 10/06/21  0634 10/04/21  1443   AST 36 69*   ALT 52 72*   ALKPHOS 301* 343*   BILITOTAL 0.3 0.3

## 2021-10-12 NOTE — PLAN OF CARE
Occupational Therapy Discharge Summary    Reason for therapy discharge:    Discharged to transitional care facility.    Progress towards therapy goal(s). See goals on Care Plan in [facility] electronic health record for goal details.  Goals partially met.  Barriers to achieving goals:   discharge from facility.    Therapy recommendation(s):    Continued therapy is recommended.  Rationale/Recommendations:  OT at TCU to increase ADL and functional mobility [place] and safety to PLOF, pt discharging to TCU after dialysis.

## 2021-10-13 NOTE — PLAN OF CARE
Physical Therapy Discharge Summary    Reason for therapy discharge:    Discharged to transitional care facility.    Progress towards therapy goal(s). See goals on Care Plan in [facility] electronic health record for goal details.  Goals not met.  Barriers to achieving goals:   discharge from facility.    Therapy recommendation(s):    Continued therapy is recommended.  Rationale/Recommendations:  To progress independence and safety with functional mobility.

## 2021-12-03 ENCOUNTER — HOSPITAL ENCOUNTER (INPATIENT)
Facility: CLINIC | Age: 76
LOS: 3 days | Discharge: NURSING, REHAB OR LONG-TERM CARE | DRG: 981 | End: 2021-12-09
Attending: EMERGENCY MEDICINE | Admitting: INTERNAL MEDICINE
Payer: COMMERCIAL

## 2021-12-03 ENCOUNTER — APPOINTMENT (OUTPATIENT)
Dept: GENERAL RADIOLOGY | Facility: CLINIC | Age: 76
DRG: 981 | End: 2021-12-03
Attending: INTERNAL MEDICINE
Payer: COMMERCIAL

## 2021-12-03 DIAGNOSIS — M25.00 HEMARTHROSIS: ICD-10-CM

## 2021-12-03 DIAGNOSIS — E11.10 DIABETIC KETOACIDOSIS WITHOUT COMA ASSOCIATED WITH TYPE 2 DIABETES MELLITUS (H): ICD-10-CM

## 2021-12-03 DIAGNOSIS — I48.92 ATRIAL FLUTTER WITH RAPID VENTRICULAR RESPONSE (H): Primary | ICD-10-CM

## 2021-12-03 DIAGNOSIS — I10 HYPERTENSION, UNSPECIFIED TYPE: ICD-10-CM

## 2021-12-03 LAB
ANION GAP SERPL CALCULATED.3IONS-SCNC: 9 MMOL/L (ref 3–14)
APPEARANCE FLD: ABNORMAL
BASOPHILS # BLD AUTO: 0.1 10E3/UL (ref 0–0.2)
BASOPHILS NFR BLD AUTO: 1 %
BUN SERPL-MCNC: 36 MG/DL (ref 7–30)
CALCIUM SERPL-MCNC: 9.1 MG/DL (ref 8.5–10.1)
CHLORIDE BLD-SCNC: 102 MMOL/L (ref 94–109)
CO2 SERPL-SCNC: 27 MMOL/L (ref 20–32)
COLOR FLD: ABNORMAL
CREAT SERPL-MCNC: 3.61 MG/DL (ref 0.66–1.25)
CRP SERPL-MCNC: 11.2 MG/L (ref 0–8)
CRYSTALS SNV MICRO: NORMAL
EOSINOPHIL # BLD AUTO: 0 10E3/UL (ref 0–0.7)
EOSINOPHIL NFR BLD AUTO: 0 %
ERYTHROCYTE [DISTWIDTH] IN BLOOD BY AUTOMATED COUNT: 14.8 % (ref 10–15)
GFR SERPL CREATININE-BSD FRML MDRD: 16 ML/MIN/1.73M2
GLUCOSE BLD-MCNC: 252 MG/DL (ref 70–99)
GLUCOSE BLDC GLUCOMTR-MCNC: 206 MG/DL (ref 70–99)
GLUCOSE BLDC GLUCOMTR-MCNC: 221 MG/DL (ref 70–99)
GLUCOSE FLD-MCNC: 64 MG/DL
GRAM STAIN RESULT: NORMAL
GRAM STAIN RESULT: NORMAL
HCT VFR BLD AUTO: 37.6 % (ref 40–53)
HGB BLD-MCNC: 11.7 G/DL (ref 13.3–17.7)
HOLD SPECIMEN: NORMAL
IMM GRANULOCYTES # BLD: 0 10E3/UL
IMM GRANULOCYTES NFR BLD: 0 %
INR PPP: 2.32 (ref 0.85–1.15)
LYMPHOCYTES # BLD AUTO: 2.2 10E3/UL (ref 0.8–5.3)
LYMPHOCYTES NFR BLD AUTO: 23 %
LYMPHOCYTES NFR FLD MANUAL: 5 %
MAGNESIUM SERPL-MCNC: 2.1 MG/DL (ref 1.6–2.3)
MCH RBC QN AUTO: 27.4 PG (ref 26.5–33)
MCHC RBC AUTO-ENTMCNC: 31.1 G/DL (ref 31.5–36.5)
MCV RBC AUTO: 88 FL (ref 78–100)
MONOCYTES # BLD AUTO: 0.7 10E3/UL (ref 0–1.3)
MONOCYTES NFR BLD AUTO: 8 %
MONOS+MACROS NFR FLD MANUAL: 12 %
NEUTROPHILS # BLD AUTO: 6.4 10E3/UL (ref 1.6–8.3)
NEUTROPHILS NFR BLD AUTO: 68 %
NEUTS BAND NFR FLD MANUAL: 83 %
NRBC # BLD AUTO: 0 10E3/UL
NRBC BLD AUTO-RTO: 0 /100
PLATELET # BLD AUTO: 279 10E3/UL (ref 150–450)
POTASSIUM BLD-SCNC: 3.9 MMOL/L (ref 3.4–5.3)
POTASSIUM BLD-SCNC: 4.4 MMOL/L (ref 3.4–5.3)
PROT FLD-MCNC: 4.3 G/DL
RBC # BLD AUTO: 4.27 10E6/UL (ref 4.4–5.9)
SARS-COV-2 RNA RESP QL NAA+PROBE: NEGATIVE
SODIUM SERPL-SCNC: 138 MMOL/L (ref 133–144)
WBC # BLD AUTO: 9.4 10E3/UL (ref 4–11)
WBC # FLD AUTO: 4507 /UL

## 2021-12-03 PROCEDURE — 89060 EXAM SYNOVIAL FLUID CRYSTALS: CPT | Mod: TC | Performed by: EMERGENCY MEDICINE

## 2021-12-03 PROCEDURE — 96376 TX/PRO/DX INJ SAME DRUG ADON: CPT

## 2021-12-03 PROCEDURE — 84132 ASSAY OF SERUM POTASSIUM: CPT | Performed by: NURSE PRACTITIONER

## 2021-12-03 PROCEDURE — 87205 SMEAR GRAM STAIN: CPT | Performed by: EMERGENCY MEDICINE

## 2021-12-03 PROCEDURE — 85610 PROTHROMBIN TIME: CPT | Performed by: EMERGENCY MEDICINE

## 2021-12-03 PROCEDURE — 36415 COLL VENOUS BLD VENIPUNCTURE: CPT | Performed by: NURSE PRACTITIONER

## 2021-12-03 PROCEDURE — 36415 COLL VENOUS BLD VENIPUNCTURE: CPT | Performed by: EMERGENCY MEDICINE

## 2021-12-03 PROCEDURE — 85025 COMPLETE CBC W/AUTO DIFF WBC: CPT | Performed by: EMERGENCY MEDICINE

## 2021-12-03 PROCEDURE — 84157 ASSAY OF PROTEIN OTHER: CPT | Performed by: EMERGENCY MEDICINE

## 2021-12-03 PROCEDURE — 82945 GLUCOSE OTHER FLUID: CPT | Performed by: EMERGENCY MEDICINE

## 2021-12-03 PROCEDURE — 0S9C3ZZ DRAINAGE OF RIGHT KNEE JOINT, PERCUTANEOUS APPROACH: ICD-10-PCS | Performed by: EMERGENCY MEDICINE

## 2021-12-03 PROCEDURE — 250N000011 HC RX IP 250 OP 636: Performed by: INTERNAL MEDICINE

## 2021-12-03 PROCEDURE — 73560 X-RAY EXAM OF KNEE 1 OR 2: CPT | Mod: RT

## 2021-12-03 PROCEDURE — C9803 HOPD COVID-19 SPEC COLLECT: HCPCS

## 2021-12-03 PROCEDURE — 96374 THER/PROPH/DIAG INJ IV PUSH: CPT

## 2021-12-03 PROCEDURE — 96372 THER/PROPH/DIAG INJ SC/IM: CPT | Performed by: INTERNAL MEDICINE

## 2021-12-03 PROCEDURE — 89050 BODY FLUID CELL COUNT: CPT | Performed by: EMERGENCY MEDICINE

## 2021-12-03 PROCEDURE — 89051 BODY FLUID CELL COUNT: CPT | Performed by: EMERGENCY MEDICINE

## 2021-12-03 PROCEDURE — 99223 1ST HOSP IP/OBS HIGH 75: CPT | Mod: AI | Performed by: INTERNAL MEDICINE

## 2021-12-03 PROCEDURE — 20610 DRAIN/INJ JOINT/BURSA W/O US: CPT | Mod: RT

## 2021-12-03 PROCEDURE — 87070 CULTURE OTHR SPECIMN AEROBIC: CPT | Performed by: EMERGENCY MEDICINE

## 2021-12-03 PROCEDURE — 82962 GLUCOSE BLOOD TEST: CPT

## 2021-12-03 PROCEDURE — 250N000011 HC RX IP 250 OP 636: Performed by: EMERGENCY MEDICINE

## 2021-12-03 PROCEDURE — 250N000009 HC RX 250: Performed by: INTERNAL MEDICINE

## 2021-12-03 PROCEDURE — 86140 C-REACTIVE PROTEIN: CPT | Performed by: EMERGENCY MEDICINE

## 2021-12-03 PROCEDURE — 99285 EMERGENCY DEPT VISIT HI MDM: CPT | Mod: 25

## 2021-12-03 PROCEDURE — 250N000012 HC RX MED GY IP 250 OP 636 PS 637: Performed by: INTERNAL MEDICINE

## 2021-12-03 PROCEDURE — G0378 HOSPITAL OBSERVATION PER HR: HCPCS

## 2021-12-03 PROCEDURE — 96375 TX/PRO/DX INJ NEW DRUG ADDON: CPT

## 2021-12-03 PROCEDURE — 87040 BLOOD CULTURE FOR BACTERIA: CPT | Performed by: EMERGENCY MEDICINE

## 2021-12-03 PROCEDURE — 250N000013 HC RX MED GY IP 250 OP 250 PS 637: Performed by: INTERNAL MEDICINE

## 2021-12-03 PROCEDURE — 87635 SARS-COV-2 COVID-19 AMP PRB: CPT | Performed by: EMERGENCY MEDICINE

## 2021-12-03 PROCEDURE — 80048 BASIC METABOLIC PNL TOTAL CA: CPT | Performed by: EMERGENCY MEDICINE

## 2021-12-03 PROCEDURE — 83735 ASSAY OF MAGNESIUM: CPT | Performed by: NURSE PRACTITIONER

## 2021-12-03 RX ORDER — AMOXICILLIN 250 MG
1 CAPSULE ORAL 2 TIMES DAILY PRN
Status: DISCONTINUED | OUTPATIENT
Start: 2021-12-03 | End: 2021-12-09 | Stop reason: HOSPADM

## 2021-12-03 RX ORDER — HYDROMORPHONE HYDROCHLORIDE 1 MG/ML
0.5 INJECTION, SOLUTION INTRAMUSCULAR; INTRAVENOUS; SUBCUTANEOUS ONCE
Status: COMPLETED | OUTPATIENT
Start: 2021-12-03 | End: 2021-12-03

## 2021-12-03 RX ORDER — NALOXONE HYDROCHLORIDE 0.4 MG/ML
0.2 INJECTION, SOLUTION INTRAMUSCULAR; INTRAVENOUS; SUBCUTANEOUS
Status: DISCONTINUED | OUTPATIENT
Start: 2021-12-03 | End: 2021-12-09 | Stop reason: HOSPADM

## 2021-12-03 RX ORDER — DEXTROSE MONOHYDRATE 25 G/50ML
25-50 INJECTION, SOLUTION INTRAVENOUS
Status: DISCONTINUED | OUTPATIENT
Start: 2021-12-03 | End: 2021-12-09 | Stop reason: HOSPADM

## 2021-12-03 RX ORDER — NICOTINE POLACRILEX 4 MG
15-30 LOZENGE BUCCAL
Status: DISCONTINUED | OUTPATIENT
Start: 2021-12-03 | End: 2021-12-09 | Stop reason: HOSPADM

## 2021-12-03 RX ORDER — FENTANYL CITRATE 50 UG/ML
50 INJECTION, SOLUTION INTRAMUSCULAR; INTRAVENOUS ONCE
Status: COMPLETED | OUTPATIENT
Start: 2021-12-03 | End: 2021-12-03

## 2021-12-03 RX ORDER — ACETAMINOPHEN 650 MG/1
650 SUPPOSITORY RECTAL EVERY 6 HOURS PRN
Status: DISCONTINUED | OUTPATIENT
Start: 2021-12-03 | End: 2021-12-04

## 2021-12-03 RX ORDER — HYDRALAZINE HYDROCHLORIDE 20 MG/ML
10 INJECTION INTRAMUSCULAR; INTRAVENOUS EVERY 4 HOURS PRN
Status: DISCONTINUED | OUTPATIENT
Start: 2021-12-03 | End: 2021-12-09 | Stop reason: HOSPADM

## 2021-12-03 RX ORDER — ACETAMINOPHEN 325 MG/1
650 TABLET ORAL EVERY 6 HOURS PRN
Status: DISCONTINUED | OUTPATIENT
Start: 2021-12-03 | End: 2021-12-04

## 2021-12-03 RX ORDER — ONDANSETRON 2 MG/ML
4 INJECTION INTRAMUSCULAR; INTRAVENOUS EVERY 6 HOURS PRN
Status: DISCONTINUED | OUTPATIENT
Start: 2021-12-03 | End: 2021-12-09 | Stop reason: HOSPADM

## 2021-12-03 RX ORDER — HYDROMORPHONE HYDROCHLORIDE 1 MG/ML
0.5 INJECTION, SOLUTION INTRAMUSCULAR; INTRAVENOUS; SUBCUTANEOUS
Status: COMPLETED | OUTPATIENT
Start: 2021-12-03 | End: 2021-12-03

## 2021-12-03 RX ORDER — HYDROMORPHONE HCL IN WATER/PF 6 MG/30 ML
.2-.5 PATIENT CONTROLLED ANALGESIA SYRINGE INTRAVENOUS
Status: DISCONTINUED | OUTPATIENT
Start: 2021-12-03 | End: 2021-12-04

## 2021-12-03 RX ORDER — OXYCODONE HYDROCHLORIDE 5 MG/1
5 TABLET ORAL EVERY 4 HOURS PRN
Status: DISCONTINUED | OUTPATIENT
Start: 2021-12-03 | End: 2021-12-04

## 2021-12-03 RX ORDER — ONDANSETRON 4 MG/1
4 TABLET, ORALLY DISINTEGRATING ORAL EVERY 6 HOURS PRN
Status: DISCONTINUED | OUTPATIENT
Start: 2021-12-03 | End: 2021-12-09 | Stop reason: HOSPADM

## 2021-12-03 RX ORDER — HYDRALAZINE HYDROCHLORIDE 20 MG/ML
10 INJECTION INTRAMUSCULAR; INTRAVENOUS EVERY 4 HOURS PRN
Status: DISCONTINUED | OUTPATIENT
Start: 2021-12-03 | End: 2021-12-04

## 2021-12-03 RX ORDER — AMOXICILLIN 250 MG
2 CAPSULE ORAL 2 TIMES DAILY PRN
Status: DISCONTINUED | OUTPATIENT
Start: 2021-12-03 | End: 2021-12-09 | Stop reason: HOSPADM

## 2021-12-03 RX ORDER — NALOXONE HYDROCHLORIDE 0.4 MG/ML
0.4 INJECTION, SOLUTION INTRAMUSCULAR; INTRAVENOUS; SUBCUTANEOUS
Status: DISCONTINUED | OUTPATIENT
Start: 2021-12-03 | End: 2021-12-09 | Stop reason: HOSPADM

## 2021-12-03 RX ORDER — METOPROLOL TARTRATE 1 MG/ML
2.5 INJECTION, SOLUTION INTRAVENOUS EVERY 4 HOURS PRN
Status: DISCONTINUED | OUTPATIENT
Start: 2021-12-03 | End: 2021-12-09

## 2021-12-03 RX ADMIN — INSULIN ASPART 2 UNITS: 100 INJECTION, SOLUTION INTRAVENOUS; SUBCUTANEOUS at 20:11

## 2021-12-03 RX ADMIN — HYDROMORPHONE HYDROCHLORIDE 0.5 MG: 1 INJECTION, SOLUTION INTRAMUSCULAR; INTRAVENOUS; SUBCUTANEOUS at 12:11

## 2021-12-03 RX ADMIN — HYDROMORPHONE HYDROCHLORIDE 0.4 MG: 0.2 INJECTION, SOLUTION INTRAMUSCULAR; INTRAVENOUS; SUBCUTANEOUS at 21:55

## 2021-12-03 RX ADMIN — HYDRALAZINE HYDROCHLORIDE 10 MG: 20 INJECTION INTRAMUSCULAR; INTRAVENOUS at 18:41

## 2021-12-03 RX ADMIN — HYDROMORPHONE HYDROCHLORIDE 0.4 MG: 0.2 INJECTION, SOLUTION INTRAMUSCULAR; INTRAVENOUS; SUBCUTANEOUS at 18:41

## 2021-12-03 RX ADMIN — FENTANYL CITRATE 50 MCG: 50 INJECTION, SOLUTION INTRAMUSCULAR; INTRAVENOUS at 14:26

## 2021-12-03 RX ADMIN — OXYCODONE HYDROCHLORIDE 5 MG: 5 TABLET ORAL at 19:50

## 2021-12-03 RX ADMIN — HYDROMORPHONE HYDROCHLORIDE 0.5 MG: 1 INJECTION, SOLUTION INTRAMUSCULAR; INTRAVENOUS; SUBCUTANEOUS at 13:59

## 2021-12-03 RX ADMIN — METOPROLOL TARTRATE 2.5 MG: 5 INJECTION INTRAVENOUS at 23:49

## 2021-12-03 RX ADMIN — HYDROMORPHONE HYDROCHLORIDE 1 MG: 1 INJECTION, SOLUTION INTRAMUSCULAR; INTRAVENOUS; SUBCUTANEOUS at 16:15

## 2021-12-03 ASSESSMENT — ENCOUNTER SYMPTOMS
FEVER: 0
ARTHRALGIAS: 1
SHORTNESS OF BREATH: 0
CHILLS: 0
JOINT SWELLING: 1
DIAPHORESIS: 0

## 2021-12-03 ASSESSMENT — MIFFLIN-ST. JEOR: SCORE: 1616.69

## 2021-12-03 NOTE — H&P
[facility]    History and Physical - Hospitalist Service       Date of Admission:  12/3/2021    Assessment & Plan      Gordon Gann is a 76 year old male with a history of atrial fibrillation anticoagulated on Coumadin, DM type II, CAD, CHF and CKD stage IV who presented to the ED on 12/3/2021 with right knee pain and swelling.    Right knee hemarthrosis   Yesterday evening patient began to have right knee pain and swelling which gradually worsened to the point he needed to call EMS to bring him in.  He is unable to adequately describe the pain but rates it as a 10/10 at its worst.  Currently the pain is a 7-8/10 after pain medications.  He notes the pain is worsened with movement, bending or bearing weight.  No real alleviating factors.  No reports of trauma but did report significant walking yesterday before the pain began. In the ED the joint was aspirated with bloody fluid.  Gram stain negative. Hgb is 11.7 (up from 10.3 on 10/10).  Of note, patient reports his right knee was scoped 40 years ago to evaluate his meniscus but had no repair.  No other knee surgeries.    - Admission to observation   - Repeat CBC in AM   - X-ray of the right knee ordered   - PRN analgesics with Tylenol and Oxycodone for now   - PT evaluation   - Orthopedic surgery consulted     H/o CAD   H/o HFpEF, not decompensated   Paroxysmal atrial fibrillation   No issues currently.  Normal anticoagulated with Coumadin.  INR 2.32 here.    - Resume PTA medications once confirmed by pharmacy  - Will hold coumadin for now.  At this time will not be reversing     DM type II   Last HgbA1c from 10/5/21 and 6.2  - Moderate CHO diet   - Will resume Lantus once dosing confirmed, likely at a reduced dose.    - Medium intensity SSI     CKD stage IV   Cr 3.61 today.  Baseline Cr appears to be 4-4.3  - Avoid nephrotoxins     H/o Gout  Does not feel similar       Diet:   Moderate CHO diet   DVT Prophylaxis: Warfarin  Remigio  Catheter: Not present  Central Lines: None  Code Status:   DNR/DNI.  This was confirmed with patient.  He had previously been full code     Clinically Significant Risk Factors Present on Admission             # Coagulation Defect: home medication list includes an anticoagulant medication  # Platelet Defect: home medication list includes an antiplatelet medication      Disposition Plan   Expected discharge: Hopefully tomorrow if pain controlled and orthopedic surgery complete     The patient's care was discussed with the Patient and ED provider.    Tan Villa DO  [facility]  Securely message with the Vocera Web Console (learn more here)  Text page via [employer] Paging/Directory        ______________________________________________________________________    Chief Complaint   Right knee pain    History is obtained from the patient    History of Present Illness   Gordon Gann is a 76 year old male with a history of atrial fibrillation anticoagulated on Coumadin, DM type II, CAD, CHF and CKD stage IV who presented to the ED on 12/3/2021 with right knee pain and swelling.  Yesterday evening patient began to have right knee pain and swelling which gradually worsened to the point he needed to call EMS to bring him in.  He is unable to adequately describe the pain but rates it as a 10/10 at its worst.  Currently the pain is a 7-8/10 after pain medications.  He notes the pain is worsened with movement, bending or bearing weight.  No real alleviating factors.  No reports of trauma but did report significant walking yesterday before the pain began. Of note, patient reports his right knee was scoped 40 years ago to evaluate his meniscus but had no repair.  No other knee surgeries.  No other complaints at this time.  Denies any fevers, chills, cough, sore throat, chest pain, SOB, abdominal pain, N/V/D or problems with urination.  No new numbness or tingling.     Review of Systems    The 10 point  Review of Systems is negative other than noted in the HPI    Past Medical History    I have reviewed this patient's medical history and updated it with pertinent information if needed.   Past Medical History:   Diagnosis Date     Alcohol abuse      Atrial fibrillation (H)      CKD (chronic kidney disease) stage 4, GFR 15-29 ml/min (H)      Diabetes (H)      Gout      Hypokalemia      Hypophosphatemia      Insomnia      STEMI (ST elevation myocardial infarction) (H)        Past Surgical History   I have reviewed this patient's surgical history and updated it with pertinent information if needed.  Past Surgical History:   Procedure Laterality Date     CREATE GRAFT LOOP ARTERIOVENOUS UPPER EXTREMITY Left 8/5/2021    Procedure: left arm arteriovenous graft fistula.;  Surgeon: Noemí Hernandez MD;  Location:  OR       Social History   I have reviewed this patient's social history and updated it with pertinent information if needed.  Social History     Tobacco Use     Smoking status: Never Smoker     Smokeless tobacco: Never Used   Substance Use Topics     Alcohol use: Yes     Drug use: Not on file       Family History     No family history of sudden cardiac death     Prior to Admission Medications   Prior to Admission Medications   Prescriptions Last Dose Informant Patient Reported? Taking?   SODIUM BICARBONATE PO  Self Yes No   Sig: Take 1 tablet by mouth 2 times daily   Vitamin D, Cholecalciferol, 25 MCG (1000 UT) CAPS  Self Yes No   Sig: Take 1,000 Units by mouth every morning   acetaminophen (TYLENOL) 325 MG tablet  Self Yes No   Sig: Take 1 tablet (325 mg) by mouth every 6 hours as needed for mild pain   allopurinol (ZYLOPRIM) 100 MG tablet   No No   Sig: Take 1 tablet (100 mg) by mouth daily   aspirin 81 MG EC tablet  Self Yes No   Sig: Take 81 mg by mouth At Bedtime   cloNIDine (CATAPRES) 0.2 MG tablet  Self Yes No   Sig: Take 0.2 mg by mouth 2 times daily   ezetimibe (ZETIA) 10 MG tablet  Self Yes No    Sig: Take 10 mg by mouth every morning   fish oil-omega-3 fatty acids 1000 MG capsule  Self Yes No   Sig: Take 1 g by mouth every morning   folic acid (FOLVITE) 1 MG tablet  Self Yes No   Sig: Take 1 mg by mouth every morning   furosemide (LASIX) 20 MG tablet   No No   Sig: Take 4 tablets (80 mg) by mouth daily   hydrALAZINE (APRESOLINE) 25 MG tablet  Self No No   Sig: Take 1 tablet (25 mg) by mouth every 6 hours as needed (SBP >180)   insulin aspart (NOVOLOG PEN) 100 UNIT/ML pen  Self No No   Sig: Inject 5 Units Subcutaneous 3 times daily (with meals)   insulin aspart (NOVOLOG PEN) 100 UNIT/ML pen  Self No No   Sig: Inject 1-20 Units Subcutaneous 3 times daily (before meals) Correction Scale - MEDIUM INSULIN RESISTANCE DOSING   Do Not give Correction Insulin if Pre-Meal BG less than 140. For Pre-Meal  - 189 give 1 unit. For Pre-Meal  - 239 give 2 units. For Pre-Meal  - 289 give 3 units. For Pre-Meal  - 339 give 4 units. For Pre-Meal - 399 give 5 units. For Pre-Meal -449 give 6 units For Pre-Meal BG greater than or equal to 450 give 7 units. To be given with prandial insulin, and based on pre-meal blood glucose.  Notify provider if glucose greater than or equal to 350 mg/dL after administration of correction dose. If given at mealtime, administer within 30 minutes of start of meal   insulin glargine (LANTUS PEN) 100 UNIT/ML pen  Self No No   Sig: Inject 15 Units Subcutaneous At Bedtime   melatonin 3 MG tablet   No No   Sig: Take 1 tablet (3 mg) by mouth At Bedtime   metoprolol tartrate (LOPRESSOR) 25 MG tablet   No No   Sig: Take 5 tablets (125 mg) by mouth 2 times daily   mirtazapine (REMERON) 15 MG tablet   No No   Sig: Take 1 tablet (15 mg) by mouth At Bedtime   simvastatin (ZOCOR) 40 MG tablet  Self Yes No   Sig: Take 1 tablet (40 mg) by mouth At Bedtime   traZODone (DESYREL) 50 MG tablet   No No   Sig: Take 1 tablet (50 mg) by mouth At Bedtime   vitamin B complex with  vitamin C (STRESS TAB) tablet  Self Yes No   Sig: Take 1 tablet by mouth every morning   warfarin ANTICOAGULANT (COUMADIN) 5 MG tablet   No No   Sig: Take 1 tablet (5 mg) by mouth daily      Facility-Administered Medications: None     Allergies   No Known Allergies    Physical Exam   Vital Signs: Temp: 98  F (36.7  C) Temp src: Oral BP: (!) 190/113 Pulse: (!) 124   Resp: 16 SpO2: 98 %      Weight: 200 lbs 0 oz    General Appearance: Resting comfortably.  NAD   Eyes: EOMI.  Normal conjuctiva  HEENT: NC/AT.  Moist mucous membranes  Respiratory: Clear to auscultation.  No respiratory distress  Cardiovascular: Tachycardiac but sounds regular.  No obvious murmurs  GI: Soft.  Non-distended   Skin: No obvious rashes or cyanosis.  Betadine noted to right knee  Musculoskeletal: Right knee is swollen and tender but not hot to the touch.  No calf swelling.  No issues with the left knee  Neurologic: CN appear intact.  Moving all extremities grossly   Psychiatric: Alert and oriented    Data   Data reviewed today: I reviewed all medications, new labs and imaging results over the last 24 hours. I personally reviewed no images or EKG's today.    Recent Labs   Lab 12/03/21  1151   WBC 9.4   HGB 11.7*   MCV 88      INR 2.32*      POTASSIUM 3.9   CHLORIDE 102   CO2 27   BUN 36*   CR 3.61*   ANIONGAP 9   LC 9.1   *     No results found for this or any previous visit (from the past 24 hour(s)).

## 2021-12-03 NOTE — PROGRESS NOTES
RECEIVING UNIT ED HANDOFF REVIEW    ED Nurse Handoff Report was reviewed by: Olinda Dominguez RN on December 3, 2021 at 5:59 PM

## 2021-12-03 NOTE — ED NOTES
"[facility]  ED Nurse Handoff Report    ED Chief complaint: Knee Pain      ED Diagnosis:   Final diagnoses:   None       Code Status: not addressed by ED MD.    Allergies: No Known Allergies    Patient Story: Patient brought in by EMS from assisted living facility. Patient reports torn meniscus in right knee several years ago. Denies any surgeries on his knee. Complains of right knee pain and swelling.   Focused Assessment:  Patient has swelling and pain in right knee. States was fine yesterday after his walk. As the evening progressed swelling and pain worsened. Today patient was unable to walk or bend his knee due to pain.     Treatments and/or interventions provided: Pain meds, arthrocentesis  Patient's response to treatments and/or interventions: Some relief of pain    To be done/followed up on inpatient unit:  Continue to monitor    Does this patient have any cognitive concerns?: alert and oriented times four    Activity level - Baseline/Home:  Independent  Activity Level - Current:   Total Care    Patient's Preferred language: English   Needed?: No    Isolation: None  Infection: Not Applicable  Patient tested for COVID 19 prior to admission: YES  Bariatric?: No    Vital Signs:   Vitals:    12/03/21 1138   BP: (!) 190/113   Pulse: (!) 124   Resp: 16   Temp: 98  F (36.7  C)   TempSrc: Oral   SpO2: 98%   Weight: 90.7 kg (200 lb)   Height: 1.727 m (5' 8\")       Cardiac Rhythm:     Was the PSS-3 completed:   Yes  What interventions are required if any?               Family Comments: not in attendance  OBS brochure/video discussed/provided to patient/family: No              Name of person given brochure if not patient: N/A              Relationship to patient: N/A    For the majority of the shift this patient's behavior was Green.   Behavioral interventions performed were none.    ED NURSE PHONE NUMBER: [phone]         "

## 2021-12-03 NOTE — ED NOTES
Bed: ED30  Expected date:   Expected time:   Means of arrival:   Comments:  534  76 M old knee injury/redness and swelling/walked a lot yesterday  1121

## 2021-12-03 NOTE — ED TRIAGE NOTES
Patient brought in from home. Patient resides in assisted living apartment. Patient had gone on a walk yesterday without difficulty. States was seated for a long time and developed swelling and pain that increased through the night. Patient was unable to walk due to pain. Patient received 100 mcg of fentanyl intranasally prior to arriving in the ED.

## 2021-12-04 LAB
ERYTHROCYTE [DISTWIDTH] IN BLOOD BY AUTOMATED COUNT: 15.5 % (ref 10–15)
GLUCOSE BLDC GLUCOMTR-MCNC: 200 MG/DL (ref 70–99)
GLUCOSE BLDC GLUCOMTR-MCNC: 255 MG/DL (ref 70–99)
GLUCOSE BLDC GLUCOMTR-MCNC: 284 MG/DL (ref 70–99)
GLUCOSE BLDC GLUCOMTR-MCNC: 285 MG/DL (ref 70–99)
HCT VFR BLD AUTO: 34 % (ref 40–53)
HGB BLD-MCNC: 11 G/DL (ref 13.3–17.7)
MCH RBC QN AUTO: 28.2 PG (ref 26.5–33)
MCHC RBC AUTO-ENTMCNC: 32.4 G/DL (ref 31.5–36.5)
MCV RBC AUTO: 87 FL (ref 78–100)
PLATELET # BLD AUTO: 285 10E3/UL (ref 150–450)
RBC # BLD AUTO: 3.9 10E6/UL (ref 4.4–5.9)
WBC # BLD AUTO: 16.1 10E3/UL (ref 4–11)

## 2021-12-04 PROCEDURE — 96372 THER/PROPH/DIAG INJ SC/IM: CPT

## 2021-12-04 PROCEDURE — 85027 COMPLETE CBC AUTOMATED: CPT | Performed by: INTERNAL MEDICINE

## 2021-12-04 PROCEDURE — 250N000013 HC RX MED GY IP 250 OP 250 PS 637: Performed by: INTERNAL MEDICINE

## 2021-12-04 PROCEDURE — 96372 THER/PROPH/DIAG INJ SC/IM: CPT | Performed by: INTERNAL MEDICINE

## 2021-12-04 PROCEDURE — 36415 COLL VENOUS BLD VENIPUNCTURE: CPT | Performed by: INTERNAL MEDICINE

## 2021-12-04 PROCEDURE — 250N000009 HC RX 250: Performed by: INTERNAL MEDICINE

## 2021-12-04 PROCEDURE — 90937 HEMODIALYSIS REPEATED EVAL: CPT

## 2021-12-04 PROCEDURE — 99203 OFFICE O/P NEW LOW 30 MIN: CPT | Performed by: INTERNAL MEDICINE

## 2021-12-04 PROCEDURE — 96376 TX/PRO/DX INJ SAME DRUG ADON: CPT

## 2021-12-04 PROCEDURE — 5A1D70Z PERFORMANCE OF URINARY FILTRATION, INTERMITTENT, LESS THAN 6 HOURS PER DAY: ICD-10-PCS | Performed by: INTERNAL MEDICINE

## 2021-12-04 PROCEDURE — 634N000001 HC RX 634: Performed by: INTERNAL MEDICINE

## 2021-12-04 PROCEDURE — 258N000003 HC RX IP 258 OP 636: Performed by: INTERNAL MEDICINE

## 2021-12-04 PROCEDURE — G0378 HOSPITAL OBSERVATION PER HR: HCPCS

## 2021-12-04 PROCEDURE — 93005 ELECTROCARDIOGRAM TRACING: CPT

## 2021-12-04 PROCEDURE — 250N000011 HC RX IP 250 OP 636: Performed by: NURSE PRACTITIONER

## 2021-12-04 PROCEDURE — 99226 PR SUBSEQUENT OBSERVATION CARE,LEVEL III: CPT | Performed by: INTERNAL MEDICINE

## 2021-12-04 PROCEDURE — G0257 UNSCHED DIALYSIS ESRD PT HOS: HCPCS

## 2021-12-04 PROCEDURE — 82962 GLUCOSE BLOOD TEST: CPT

## 2021-12-04 PROCEDURE — 250N000012 HC RX MED GY IP 250 OP 636 PS 637: Performed by: INTERNAL MEDICINE

## 2021-12-04 RX ORDER — METOPROLOL TARTRATE 50 MG
50 TABLET ORAL 2 TIMES DAILY
Status: DISCONTINUED | OUTPATIENT
Start: 2021-12-04 | End: 2021-12-04

## 2021-12-04 RX ORDER — HYDROMORPHONE HYDROCHLORIDE 1 MG/ML
.5-1 INJECTION, SOLUTION INTRAMUSCULAR; INTRAVENOUS; SUBCUTANEOUS
Status: DISCONTINUED | OUTPATIENT
Start: 2021-12-04 | End: 2021-12-09 | Stop reason: HOSPADM

## 2021-12-04 RX ORDER — METHYLPREDNISOLONE 4 MG/1
4 TABLET ORAL
Status: COMPLETED | OUTPATIENT
Start: 2021-12-05 | End: 2021-12-06

## 2021-12-04 RX ORDER — CLONIDINE HYDROCHLORIDE 0.2 MG/1
0.2 TABLET ORAL 2 TIMES DAILY
Status: DISCONTINUED | OUTPATIENT
Start: 2021-12-04 | End: 2021-12-04

## 2021-12-04 RX ORDER — MIRTAZAPINE 15 MG/1
15 TABLET, FILM COATED ORAL AT BEDTIME
Status: DISCONTINUED | OUTPATIENT
Start: 2021-12-04 | End: 2021-12-09 | Stop reason: HOSPADM

## 2021-12-04 RX ORDER — METHYLPREDNISOLONE 4 MG/1
4 TABLET ORAL
Status: COMPLETED | OUTPATIENT
Start: 2021-12-05 | End: 2021-12-09

## 2021-12-04 RX ORDER — METHYLPREDNISOLONE 4 MG/1
4 TABLET ORAL AT BEDTIME
Status: COMPLETED | OUTPATIENT
Start: 2021-12-06 | End: 2021-12-08

## 2021-12-04 RX ORDER — METHYLPREDNISOLONE 4 MG/1
4 TABLET ORAL
Status: COMPLETED | OUTPATIENT
Start: 2021-12-05 | End: 2021-12-07

## 2021-12-04 RX ORDER — METHYLPREDNISOLONE 4 MG/1
8 TABLET ORAL ONCE
Status: COMPLETED | OUTPATIENT
Start: 2021-12-04 | End: 2021-12-04

## 2021-12-04 RX ORDER — ALBUMIN (HUMAN) 12.5 G/50ML
50 SOLUTION INTRAVENOUS
Status: DISCONTINUED | OUTPATIENT
Start: 2021-12-04 | End: 2021-12-04

## 2021-12-04 RX ORDER — METHYLPREDNISOLONE 4 MG/1
4 TABLET ORAL ONCE
Status: COMPLETED | OUTPATIENT
Start: 2021-12-04 | End: 2021-12-04

## 2021-12-04 RX ORDER — WARFARIN SODIUM 5 MG/1
5 TABLET ORAL DAILY
Status: ON HOLD | COMMUNITY
End: 2021-12-09

## 2021-12-04 RX ORDER — METOPROLOL TARTRATE 25 MG/1
25 TABLET, FILM COATED ORAL 2 TIMES DAILY
Status: ON HOLD | COMMUNITY
End: 2021-12-09

## 2021-12-04 RX ORDER — BUMETANIDE 2 MG/1
2 TABLET ORAL
Status: DISCONTINUED | OUTPATIENT
Start: 2021-12-05 | End: 2021-12-09 | Stop reason: HOSPADM

## 2021-12-04 RX ORDER — ACETAMINOPHEN 500 MG
1000 TABLET ORAL
Status: DISCONTINUED | OUTPATIENT
Start: 2021-12-04 | End: 2021-12-09 | Stop reason: HOSPADM

## 2021-12-04 RX ORDER — HYDROMORPHONE HYDROCHLORIDE 2 MG/1
2-4 TABLET ORAL
Status: DISCONTINUED | OUTPATIENT
Start: 2021-12-04 | End: 2021-12-09 | Stop reason: HOSPADM

## 2021-12-04 RX ORDER — METHYLPREDNISOLONE 4 MG/1
8 TABLET ORAL AT BEDTIME
Status: COMPLETED | OUTPATIENT
Start: 2021-12-04 | End: 2021-12-05

## 2021-12-04 RX ORDER — SODIUM BICARBONATE 650 MG/1
650 TABLET ORAL 2 TIMES DAILY
Status: DISCONTINUED | OUTPATIENT
Start: 2021-12-04 | End: 2021-12-04

## 2021-12-04 RX ORDER — FUROSEMIDE 20 MG
20 TABLET ORAL DAILY
Status: ON HOLD | COMMUNITY
End: 2021-12-09

## 2021-12-04 RX ORDER — CLONIDINE HYDROCHLORIDE 0.1 MG/1
0.1 TABLET ORAL 2 TIMES DAILY
Status: DISCONTINUED | OUTPATIENT
Start: 2021-12-04 | End: 2021-12-09 | Stop reason: HOSPADM

## 2021-12-04 RX ORDER — ALBUMIN, HUMAN INJ 5% 5 %
50-250 SOLUTION INTRAVENOUS
Status: DISCONTINUED | OUTPATIENT
Start: 2021-12-04 | End: 2021-12-04

## 2021-12-04 RX ORDER — WARFARIN SODIUM 7.5 MG/1
7.5 TABLET ORAL
Status: ON HOLD | COMMUNITY
End: 2021-12-09

## 2021-12-04 RX ORDER — TRAZODONE HYDROCHLORIDE 50 MG/1
50 TABLET, FILM COATED ORAL AT BEDTIME
Status: DISCONTINUED | OUTPATIENT
Start: 2021-12-04 | End: 2021-12-09 | Stop reason: HOSPADM

## 2021-12-04 RX ORDER — METOPROLOL TARTRATE 50 MG
100 TABLET ORAL 2 TIMES DAILY
Status: DISCONTINUED | OUTPATIENT
Start: 2021-12-04 | End: 2021-12-09 | Stop reason: HOSPADM

## 2021-12-04 RX ADMIN — ACETAMINOPHEN 1000 MG: 500 TABLET, FILM COATED ORAL at 17:12

## 2021-12-04 RX ADMIN — MIRTAZAPINE 15 MG: 15 TABLET, FILM COATED ORAL at 21:46

## 2021-12-04 RX ADMIN — CLONIDINE HYDROCHLORIDE 0.1 MG: 0.1 TABLET ORAL at 20:29

## 2021-12-04 RX ADMIN — METOPROLOL TARTRATE 125 MG: 50 TABLET, FILM COATED ORAL at 08:24

## 2021-12-04 RX ADMIN — SODIUM CHLORIDE 300 ML: 9 INJECTION, SOLUTION INTRAVENOUS at 14:00

## 2021-12-04 RX ADMIN — METOPROLOL TARTRATE 100 MG: 50 TABLET, FILM COATED ORAL at 20:29

## 2021-12-04 RX ADMIN — ACETAMINOPHEN 650 MG: 325 TABLET, FILM COATED ORAL at 08:24

## 2021-12-04 RX ADMIN — METHYLPREDNISOLONE 4 MG: 4 TABLET ORAL at 12:41

## 2021-12-04 RX ADMIN — TRAZODONE HYDROCHLORIDE 50 MG: 50 TABLET ORAL at 21:46

## 2021-12-04 RX ADMIN — INSULIN GLARGINE 8 UNITS: 100 INJECTION, SOLUTION SUBCUTANEOUS at 22:00

## 2021-12-04 RX ADMIN — OXYCODONE HYDROCHLORIDE 5 MG: 5 TABLET ORAL at 08:24

## 2021-12-04 RX ADMIN — HYDRALAZINE HYDROCHLORIDE 10 MG: 20 INJECTION INTRAMUSCULAR; INTRAVENOUS at 02:22

## 2021-12-04 RX ADMIN — METHYLPREDNISOLONE 8 MG: 4 TABLET ORAL at 12:41

## 2021-12-04 RX ADMIN — EPOETIN ALFA-EPBX 2000 UNITS: 2000 INJECTION, SOLUTION INTRAVENOUS; SUBCUTANEOUS at 16:01

## 2021-12-04 RX ADMIN — HYDROMORPHONE HYDROCHLORIDE 2 MG: 2 TABLET ORAL at 12:34

## 2021-12-04 RX ADMIN — METHYLPREDNISOLONE 8 MG: 4 TABLET ORAL at 21:46

## 2021-12-04 RX ADMIN — ACETAMINOPHEN 1000 MG: 500 TABLET, FILM COATED ORAL at 12:34

## 2021-12-04 RX ADMIN — SODIUM CHLORIDE 250 ML: 9 INJECTION, SOLUTION INTRAVENOUS at 14:40

## 2021-12-04 RX ADMIN — METOPROLOL TARTRATE 2.5 MG: 5 INJECTION INTRAVENOUS at 04:39

## 2021-12-04 RX ADMIN — METHYLPREDNISOLONE 4 MG: 4 TABLET ORAL at 18:16

## 2021-12-04 RX ADMIN — CLONIDINE HYDROCHLORIDE 0.2 MG: 0.2 TABLET ORAL at 09:12

## 2021-12-04 RX ADMIN — SODIUM BICARBONATE 650 MG TABLET 650 MG: at 12:33

## 2021-12-04 RX ADMIN — HYDROMORPHONE HYDROCHLORIDE 2 MG: 2 TABLET ORAL at 18:16

## 2021-12-04 RX ADMIN — INSULIN ASPART 2 UNITS: 100 INJECTION, SOLUTION INTRAVENOUS; SUBCUTANEOUS at 17:12

## 2021-12-04 RX ADMIN — INSULIN ASPART 3 UNITS: 100 INJECTION, SOLUTION INTRAVENOUS; SUBCUTANEOUS at 08:23

## 2021-12-04 NOTE — PLAN OF CARE
"PRIMARY DIAGNOSIS: \"GENERIC\" NURSING  OUTPATIENT/OBSERVATION GOALS TO BE MET BEFORE DISCHARGE:  ADLs back to baseline: No    Activity and level of assistance: Patient did not get out of bed    Pain status: Improved but still requiring IV narcotics.    Return to near baseline physical activity: No     Discharge Planner Nurse   Safe discharge environment identified: Yes  Barriers to discharge: Yes       Entered by: Vipul Love 12/04/2021 4:04 AM     Please review provider order for any additional goals.   Nurse to notify provider when observation goals have been met and patient is ready for discharge.  "

## 2021-12-04 NOTE — PROVIDER NOTIFICATION
MD Notification    Notified Person: MD erika arcos NP    Notified Person Name: Adam CASEY    Notification Date/Time:12/4/21    Notification Interaction:    Purpose of Notification: Room 5524 high heart rate and blood pressure. please advice. [phone]. Vipul    Orders Received:    Comments:

## 2021-12-04 NOTE — PROGRESS NOTES
[facility]    Medicine Progress Note - Hospitalist Service       Date of Admission:  12/3/2021    Assessment & Plan           Gordon Gann is a 76 year old male with a history of atrial fibrillation anticoagulated on Coumadin, DM type II, CAD, CHF and CKD stage IV who presented to the ED on 12/3/2021 with right knee pain and swelling.     Spontaneous right knee hemarthrosis  SIRS, ongoing: based on HR > 90 bpm and WBC > 12, without known infection.  Leukocytosis likely from acute stress, tachycardia likely from pain, monitor off antibiotics  A day prior to presentation, patient began to have right knee pain and swelling which gradually worsened to the point he needed to call EMS to bring him in.   -No reports of trauma.   - In the ED the joint was aspirated with bloody fluid.  Gram stain negative.  Culture negative to date.  -X-ray of the knee shows joint effusion and moderate to severe tricompartmental degenerative arthritis changes  -Appreciate orthopedics input, recommended scheduled oral steroids for anti-inflammatory effects, will start on Medrol Dosepak  -Patient continues to complain of pain, will change as needed oxycodone to Dilaudid given renal dysfunction  -Schedule Tylenol  -As needed IV Dilaudid if oral Dilaudid does not help  -Continue to hold anticoagulation, appropriate timing to resume per orthopedics     H/o CAD   H/o HFpEF, not decompensated   Paroxysmal atrial fibrillation   Hypertension  No issues currently.  Normal anticoagulated with Coumadin.  INR 2.32 here.    -Patient has been tachycardic and hypertensive, likely contributed by him missing his metoprolol/clonidine/hydralazine at home  -Resume PTA metoprolol (but dose increased from 25 mg twice daily PTA to 50 mg twice daily) and clonidine with holding parameters  -As needed metoprolol IV  -Monitor in telemetry  - Will hold coumadin for now.    Appropriate timing for resumption per orthopedic surgery     DM type  II   Last HgbA1c from 10/5/21 and 6.2  - Moderate CHO diet   -PTA on Lantus 15 units, premeal insulin 5 units 3 times daily with meals and sliding scale insulin.  -Started on Lantus 8 units with Premeal insulin and medium dose sliding scale insulin  -Monitor and adjust medication as needed     End-stage renal disease on hemodialysis  -Patient was started on hemodialysis Tuesday Thursday and Saturday November of this year, at [facility]  -Nephrology consulted to continue with dialysis.     H/o Gout  Does not feel similar      Diet: Moderate Consistent Carb (60 g CHO per Meal) Diet    DVT Prophylaxis: PTA on warfarin, currently therapeutic  Flood Catheter: Not present  Central Lines: None  Code Status: No CPR- Do NOT Intubate      Disposition Plan   Expected discharge: 1 to 2 days, await PT recommendation for safe discharge planning pending better pain control on oral pain regimen     The patient's care was discussed with the Care Coordinator/, Patient and Patient's Family.    Hina Hung MD  Hospitalist Service  [facility]  Securely message with the Vocera Web Console (learn more here)  Text page via [employer] Paging/Directory        Clinically Significant Risk Factors Present on Admission             # Coagulation Defect: home medication list includes an anticoagulant medication  # Platelet Defect: home medication list includes an antiplatelet medication      ______________________________________________________________________    Interval History   Patient reports significant pain with any mobility of his right lower extremity.  Pain medication only helps a little.  No other nursing concerns.    Data reviewed today: I reviewed all medications, new labs and imaging results over the last 24 hours.    Physical Exam   Vital Signs: Temp: 98  F (36.7  C) Temp src: Oral BP: (!) 164/104 Pulse: (!) 123   Resp: 18 SpO2: 97 % O2 Device: None (Room air)    Weight: 200 lbs 0  oz  Exam:  Constitutional: Awake, alert and no distress. Appears comfortable  Head: Normocephalic. No masses, lesions, tenderness or abnormalities  ENT: ENT exam normal, no neck nodes or sinus tenderness  Cardiovascular: Tachycardic, irregular, no rubs or JVD  Respiratory: Normal WOB,b/l equal air entry, no wheezes or crackles   Gastrointestinal: Abdomen soft, non-tender. BS normal. No masses, organomegaly  : Deferred  Extremities : Swelling on right knee, with limited range of motion, tender to palpate   Neurologic: Cranial nerves II-XII grossly intact ,Sensation grossly WNL.      Data   Recent Labs   Lab 12/04/21  0615 12/03/21  2329 12/03/21  2148 12/03/21  1832 12/03/21  1151   WBC 16.1*  --   --   --  9.4   HGB 11.0*  --   --   --  11.7*   MCV 87  --   --   --  88     --   --   --  279   INR  --   --   --   --  2.32*   NA  --   --   --   --  138   POTASSIUM  --  4.4  --   --  3.9   CHLORIDE  --   --   --   --  102   CO2  --   --   --   --  27   BUN  --   --   --   --  36*   CR  --   --   --   --  3.61*   ANIONGAP  --   --   --   --  9   LC  --   --   --   --  9.1   GLC  --   --  221* 206* 252*     Recent Results (from the past 24 hour(s))   XR Knee Right 1/2 Views    Narrative    EXAM: XR KNEE RT 1 /2 VW  LOCATION: [facility]  DATE/TIME: 12/3/2021 6:50 PM    INDICATION: Pain and swelling  COMPARISON: None.      Impression    IMPRESSION: Large knee joint effusion. Moderate-severe tricompartmental degenerative arthritic changes. No displaced fracture. No definite nondisplaced fracture is visualized, although sensitivity for nondisplaced fractures is limited by   demineralization. CT could further evaluate if there is a high clinical index of suspicion. The joint is normally aligned.     Medications       insulin aspart  1-7 Units Subcutaneous TID AC     insulin aspart  1-5 Units Subcutaneous At Bedtime     sodium chloride (PF)  3 mL Intracatheter Q8H

## 2021-12-04 NOTE — CONSULTS
Formal consult note to follow.  In brief, 76 year old male with right knee OA who presented with acute onset severe right knee pain and swelling.  Aspiration done in ED which showed no bacteria, low WBC and neutrophils, no crystals.  He is on coumadin and could have a spontaneous hemarthrosis, or this could be an acute exacerbation of his OA.  In any event, this is not anything that needs acute surgical intervention.  I would recommend pain management, consider a short course of oral steroids for anti inflammatory effect.  I would recommend follow up with an outpatient orthopedic surgeon to manage his OA.

## 2021-12-04 NOTE — PHARMACY-ADMISSION MEDICATION HISTORY
Pharmacy Medication History  Admission medication history interview status for the 12/3/2021  admission is complete. See EPIC admission navigator for prior to admission medications     Location of Interview: Patient room  Medication history sources: Patient    Significant changes made to the medication list:  Removed Simvastatin, allopurinol, and duplicate novolog    Changed warfarin dose, furosemide dose, metoprolol dose       In the past week, patient estimated taking medication this percent of the time: greater than 90%    Additional medication history information:   Patient is a moderate historian. He reports he has not been taking any medication at home since Thursday due to severe knee pain. He states allopurinol is discontinued a month ago and simvastatin was changed replaced by ezetimibe around the same time.     Medication reconciliation completed by provider prior to medication history? No    Time spent in this activity: 15 minutes     Prior to Admission medications    Medication Sig Last Dose Taking? Auth Provider   acetaminophen (TYLENOL) 325 MG tablet Take 1 tablet (325 mg) by mouth every 6 hours as needed for mild pain  at PRN Yes Julia Combs MD   aspirin 81 MG EC tablet Take 81 mg by mouth At Bedtime 12/2/2021 Yes Unknown, Entered By History   cloNIDine (CATAPRES) 0.2 MG tablet Take 0.2 mg by mouth 2 times daily 12/2/2021 Yes Unknown, Entered By History   ezetimibe (ZETIA) 10 MG tablet Take 10 mg by mouth every morning 12/2/2021 Yes Unknown, Entered By History   fish oil-omega-3 fatty acids 1000 MG capsule Take 1 g by mouth every morning 12/2/2021 Yes Unknown, Entered By History   folic acid (FOLVITE) 1 MG tablet Take 1 mg by mouth every morning 12/2/2021 Yes Unknown, Entered By History   furosemide (LASIX) 20 MG tablet Take 20 mg by mouth daily Past Week at Unknown time Yes Unknown, Entered By History   hydrALAZINE (APRESOLINE) 25 MG tablet Take 1 tablet (25 mg) by mouth every 6 hours as needed  (SBP >180)  at PRN Yes Julia Combs MD   insulin aspart (NOVOLOG PEN) 100 UNIT/ML pen Inject 5 Units Subcutaneous 3 times daily (with meals) 12/2/2021 Yes Julia Combs MD   insulin glargine (LANTUS PEN) 100 UNIT/ML pen Inject 15 Units Subcutaneous At Bedtime 12/2/2021 Yes Nyla Pan PA-C   melatonin 3 MG tablet Take 1 tablet (3 mg) by mouth At Bedtime 12/2/2021 Yes Mitesh Nicholson MD   metoprolol tartrate (LOPRESSOR) 25 MG tablet Take 1 tablet by mouth 2 times daily 12/2/2021 Yes Mitesh Nicholson MD   mirtazapine (REMERON) 15 MG tablet Take 1 tablet (15 mg) by mouth At Bedtime 12/2/2021 Yes Mitesh Nicholson MD   SODIUM BICARBONATE PO Take 1 tablet by mouth 2 times daily 12/2/2021 Yes Unknown, Entered By History   traZODone (DESYREL) 50 MG tablet Take 1 tablet (50 mg) by mouth At Bedtime 12/2/2021 Yes Mitesh Nicholson MD   vitamin B complex with vitamin C (STRESS TAB) tablet Take 1 tablet by mouth every morning 12/2/2021 Yes Unknown, Entered By History   Vitamin D, Cholecalciferol, 25 MCG (1000 UT) CAPS Take 1,000 Units by mouth every morning 12/2/2021 Yes Unknown, Entered By History   warfarin ANTICOAGULANT (COUMADIN) 5 MG tablet Take 5 mg by mouth daily except Fridays 12/2/2021 at am Yes Unknown, Entered By History   warfarin ANTICOAGULANT (COUMADIN) 7.5 MG tablet Take 7.5 mg by mouth every Friday 11/26/2021 Yes Unknown, Entered By History       The information provided in this note is only as accurate as the sources available at the time of update(s)   Marla Said   Student Pharmacist

## 2021-12-04 NOTE — UTILIZATION REVIEW
"[facility]     Admission Status; Secondary Review Determination     Admission Date: 12/3/2021 11:38 AM      Under the authority of the Utilization Management Committee, the utilization review process indicated a secondary review on the above patient.  The review outcome is based on review of the medical records, discussions with staff, and applying clinical experience noted on the date of the review.          (x) Observation Status Appropriate - This patient does not meet hospital inpatient criteria and is placed in observation status. If this patient's primary payer is Medicare and was admitted as an inpatient, Condition Code 44 should be used and patient status changed to \"observation\".     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  In the ED the joint was aspirated with bloody fluid extracted.  Synovial fluid studies are not consistent with infection.  Hemoglobin is stable.  Orthopedics has been consulted and the recommendation is for physical therapy and pain management with no surgical intervention.  The remainder of his chronic medical issues appear stable.  At the time of this review there is no medical necessity for inpatient hospitalization and observation status remains appropriate.    The severity of illness, intensity of service provided, expected LOS and risk for adverse outcome make the care appropriate for further observation; however, doesn't meet criteria for hospital inpatient admission.          The information on this document is developed by the utilization review team in order for the business office to ensure compliance.  This only denotes the appropriateness of proper admission status and does not reflect the quality of care rendered.         The definitions of Inpatient Status and Observation " Status used in making the determination above are those provided in the CMS Coverage Manual, Chapter 1 and Chapter 6, section 70.4.      Sincerely,     Omar Benavidez DO MPH   Physician Advisor  Utilization Review  [facility]

## 2021-12-04 NOTE — PROGRESS NOTES
Pt arrived to floor in severe pain, was transferred to bed via hover mat. Within minutes of arriving to floor and releasing orders, xray called and requested pt be brought down for imaging. BP and HR were found to be significantly elevated. MD paged, see note. BG checked, dinner ordered, hydralazine given, and dilaudid given prior to transferring back to cart via hover mat and sent to xray. Will reevaluate vital signs and pain upon return to floor.    Admission flow sheet, including belongings, not yet done; will pass along to next shift.

## 2021-12-04 NOTE — PLAN OF CARE
"PRIMARY DIAGNOSIS: \"GENERIC\" NURSING  OUTPATIENT/OBSERVATION GOALS TO BE MET BEFORE DISCHARGE:  ADLs back to baseline: No    Activity and level of assistance: Assist of 2    Pain status: Improved but still requiring IV narcotics.    Return to near baseline physical activity: No     Discharge Planner Nurse   Safe discharge environment identified: Yes  Barriers to discharge: No       Entered by: Vipul Love 12/03/2021 10:38 PM     Please review provider order for any additional goals.   Nurse to notify provider when observation goals have been met and patient is ready for discharge.    "

## 2021-12-04 NOTE — PLAN OF CARE
PRIMARY DIAGNOSIS: ACUTE PAIN  OUTPATIENT/OBSERVATION GOALS TO BE MET BEFORE DISCHARGE:  1. Pain Status: Improved-controlled with oral pain medications.    2. Return to near baseline physical activity: No    3. Cleared for discharge by consultants (if involved): No    Discharge Planner Nurse   Safe discharge environment identified: No  Barriers to discharge: Yes       Entered by: Olinda Dominguez 12/04/2021 9:56 AM     Please review provider order for any additional goals.   Nurse to notify provider when observation goals have been met and patient is ready for discharge.

## 2021-12-04 NOTE — PLAN OF CARE
Alert and oriented x4. Primofit in place. Right knee xray was done yesterday and negative for fracture. Dialysis patient with Tuesday, Thursday and Saturday schedule. Complained of pain on the right knee, pain has been managed with oral oxycodone and IV dilaudid. Heart rate and blood pressure has been elevated.  Patient has prn hydralazine if SBP greater than 180. New order given to administered prn metoprolol if heart rate more than 120.  Also new order for RN managed potassium and Magnesium. Both potassium and magnesium did not need to be replaced, RN ordered lab for this morning. Patient stated pain is much under control  and does not want anything for pain at this time. Patient is on tele monitoring with atrial fibrillation as rhythm. Patient has a prior history of a-fib and on coumadin therapy. Iv site on the right arm, clean dry intact and saline lock.

## 2021-12-04 NOTE — PLAN OF CARE
PRIMARY DIAGNOSIS: ACUTE PAIN  OUTPATIENT/OBSERVATION GOALS TO BE MET BEFORE DISCHARGE:  1. Pain Status: Improved-controlled with oral pain medications.     2. Return to near baseline physical activity: No     3. Cleared for discharge by consultants (if involved): No     Discharge Planner Nurse   Safe discharge environment identified: No  Barriers to discharge: Yes       Entered by: Olinda Dominguez 12/04/2021 2:13 PM   Please review provider order for any additional goals.   Nurse to notify provider when observation goals have been met and patient is ready for discharge.

## 2021-12-04 NOTE — PROVIDER NOTIFICATION
"MD Notification    Notified Person: MD    Notified Person Name: Allen    Notification Date/Time: 12/03/21 6:33 PM     Notification Interaction: text page    Purpose of Notification: \"Can pt have something stronger and faster-acting for pain? Also fyi BP and HR high, likely d/t pain.\"    Orders Received: Hydralazine and Dilaudid ordered.    Comments:      "

## 2021-12-04 NOTE — PROGRESS NOTES
Potassium   Date Value Ref Range Status   12/03/2021 4.4 3.4 - 5.3 mmol/L Final     Hemoglobin   Date Value Ref Range Status   12/04/2021 11.0 (L) 13.3 - 17.7 g/dL Final     Creatinine   Date Value Ref Range Status   12/03/2021 3.61 (H) 0.66 - 1.25 mg/dL Final     Urea Nitrogen   Date Value Ref Range Status   12/03/2021 36 (H) 7 - 30 mg/dL Final     Sodium   Date Value Ref Range Status   12/03/2021 138 133 - 144 mmol/L Final     INR   Date Value Ref Range Status   12/03/2021 2.32 (H) 0.85 - 1.15 Final       DIALYSIS PROCEDURE NOTE  Hepatitis status of previous patient on machine log was checked and verified ok to use with this patients hepatitis status.  Patient dialyzed for 3 hrs. on a K3 bath with a net fluid removal of  3L.  A BFR of 300-350 ml/min was obtained via a LAVG using 15 gauge needles.  Unable to establish blood flow above 350 despite interventions.  The treatment plan was discussed with Dr. Watson during the treatment.    Total heparin received during the treatment: 0 units.   Needle cannulation sites held x 5 min.     Meds  given: epogen 2,000 units   Complications: none      Person educated: patient. Knowledge base minimal. Barriers to learning: none. Educated on procedure via verbal mode. Patient verbalized understanding. Pt prefers oral education style.     ICEBOAT? Timeout performed pre-treatment  I: Patient was identified using 2 identifiers  C:  Consent Signed Yes  E: Equipment preventative maintenance is current and dialysis delivery system OK to use  B: Hepatitis B Surface Antigen: neg; Draw Date: 11.9.21      Hepatitis B Surface Antibody: susc; Draw Date: 10.19.21  O: Dialysis orders present and complete prior to treatment  A: Vascular access verified and assessed prior to treatment  T: Treatment was performed at a clinically appropriate time  ?: Patient was allowed to ask questions and address concerns prior to treatment  See flowsheet in EPIC for further details and post assessment.  Machine  water alarm in place and functioning. Transducer pods intact and checked every 15min.   Pt returned via bed to room 524.  Chlorine/Chloramine water system checked every 4 hours.  Outpatient Dialysis at [facility] on TTS.    Dressing may be removed from AVG needle sites 24 hours after dialysis. If leaking occurs please apply a Band-Aid.

## 2021-12-04 NOTE — CONSULTS
RENAL CONSULTATION NOTE    REFERRING MD:  Hina Hung MD    REASON FOR CONSULTATION:  ESRD on HD    HPI:  76 y.o [place] man with ESRD 2/2 DM/HTN, who was admitted on 12/3/2021 for right knee hemarthrosis.  He developed acute onset of right knee pain and swelling the night PTA.  Right knee was aspirated int he ER-->blood, no crystal and negative gram stain  He says knee is better.  He was started on hemodialysis in October.   He gets IHD on TTS at [facility].   Last treatment was on Thursday.   He denies shortness of breath, chest and abdominal pain.   He continues to urinate.    ROS:  A complete review of systems was performed and is negative except as noted above.    PMH:    Past Medical History:   Diagnosis Date     Alcohol abuse      Atrial fibrillation (H)      CKD (chronic kidney disease) stage 4, GFR 15-29 ml/min (H)      Diabetes (H)      Gout      Hypokalemia      Hypophosphatemia      Insomnia      STEMI (ST elevation myocardial infarction) (H)        PSH:    Past Surgical History:   Procedure Laterality Date     CREATE GRAFT LOOP ARTERIOVENOUS UPPER EXTREMITY Left 8/5/2021    Procedure: left arm arteriovenous graft fistula.;  Surgeon: Noemí Hernandez MD;  Location:  OR       MEDICATIONS:      acetaminophen  1,000 mg Oral TID     cloNIDine  0.2 mg Oral BID     insulin aspart  5 Units Subcutaneous TID w/meals     insulin aspart  1-7 Units Subcutaneous TID AC     insulin aspart  1-5 Units Subcutaneous At Bedtime     insulin glargine  8 Units Subcutaneous At Bedtime     methylPREDNISolone  4 mg Oral Once    And     methylPREDNISolone  8 mg Oral At Bedtime    And     [START ON 12/5/2021] methylPREDNISolone  4 mg Oral QAM AC    And     [START ON 12/5/2021] methylPREDNISolone  4 mg Oral Daily with lunch    And     [START ON 12/5/2021] methylPREDNISolone  4 mg Oral Daily with supper    And     [START ON 12/6/2021] methylPREDNISolone  4 mg Oral At Bedtime     metoprolol tartrate  50 mg Oral BID  "    mirtazapine  15 mg Oral At Bedtime     sodium bicarbonate  650 mg Oral BID     sodium chloride (PF)  3 mL Intracatheter Q8H     traZODone  50 mg Oral At Bedtime       ALLERGIES:    Allergies as of 12/03/2021     (No Known Allergies)       FH:  No family history on file.    SH:    Social History     Socioeconomic History     Marital status:      Spouse name: Not on file     Number of children: Not on file     Years of education: Not on file     Highest education level: Not on file   Occupational History     Not on file   Tobacco Use     Smoking status: Never Smoker     Smokeless tobacco: Never Used   Substance and Sexual Activity     Alcohol use: Yes     Drug use: Not on file     Sexual activity: Not on file   Other Topics Concern     Parent/sibling w/ CABG, MI or angioplasty before 65F 55M? Not Asked   Social History Narrative     Not on file     Social Determinants of Health     Financial Resource Strain: Not on file   Food Insecurity: Not on file   Transportation Needs: Not on file   Physical Activity: Not on file   Stress: Not on file   Social Connections: Not on file   Intimate Partner Violence: Not on file   Housing Stability: Not on file       PHYSICAL EXAM:    /82 (BP Location: Right arm)   Pulse 118   Temp 97.7  F (36.5  C) (Oral)   Resp 18   Ht 1.727 m (5' 8\")   Wt 90.7 kg (200 lb)   SpO2 93%   BMI 30.41 kg/m    GENERAL: NAD  HEENT:  Normocephalic. No gross abnormalities.  Pupils equal.  MMM.    CV: RRR, no murmurs, no clicks, gallops, or rubs, ++edema, more edema RLE.  RESP: Clear bilaterally with good efforts. No wheezes or crackles.   GI: Abdomen obese, soft, NT.  MUSCULOSKELETAL: R knee swollen, + tender. 2+ edema.  SKIN: no suspicious lesions or rashes, dry to touch  NEURO:  Generalized weakness. Awake and alert. Answering question appropriately.  PSYCH: mood good, affect appropriate  LYMPH: No palpable ant/post cervical   L AVG + bruit.    LABS:      CBC RESULTS:     Recent " Labs   Lab 12/04/21  0615 12/03/21  1151   WBC 16.1* 9.4   RBC 3.90* 4.27*   HGB 11.0* 11.7*   HCT 34.0* 37.6*    279       BMP RESULTS:  Recent Labs   Lab 12/04/21  1115 12/04/21  0822 12/03/21  2329 12/03/21  2148 12/03/21  1832 12/03/21  1151   NA  --   --   --   --   --  138   POTASSIUM  --   --  4.4  --   --  3.9   CHLORIDE  --   --   --   --   --  102   CO2  --   --   --   --   --  27   BUN  --   --   --   --   --  36*   CR  --   --   --   --   --  3.61*   * 284*  --  221* 206* 252*   LC  --   --   --   --   --  9.1       INR  Recent Labs   Lab 12/03/21  1151   INR 2.32*        DIAGNOSTICS:  Reviewed    A/P:  76 y.o man with ESRD, admitted for R knee hemarthrosis     # ESRD:   -started IHD 10/2021   -TTS   -90 kg EDW   -3.5 hrs   -LAVG, 15 g.    -no heparin   -[place] with Dr. Kasper    # Anemia: Gets 1400 units Epo with HD    # Hypervolemia: 1-2+ edema.     # Hypertension:    -clonidine 0.2 mg bid   -metoprolol 50 mg bid    # R hemarthrosis s/p aspiration:   -neg gram stain and crystal   -on steroid   -no surgical invention per ortho    Plan:  # 3 hrs HD with 2-3 liters UF  # Increase metoprolol to 100 mg bid and decrease clonidine to 0.1 mg bid  # Start Bumex 2 mg bid on Non-HD days  # Stop sodium bicarb      Henry Watson MD  [facility]phrology  Office Phone: [phone]  Pager: [phone]

## 2021-12-04 NOTE — CONSULTS
Consult Date: 12/04/2021    CHIEF COMPLAINT:  Right knee effusion, possible hemarthrosis.    CONSULTATION REQUESTED BY:  Tan Villa.    HISTORY OF PRESENT ILLNESS:  Mr. Gann is a very pleasant 76-year-old who is on Coumadin for atrial fibrillation.  He has chronic kidney disease and diabetes.  He has had a history of gout.  He presents via ambulance with right knee pain starting last night when he began to have right knee pain and swelling.  He has had a previous meniscus surgery 4 years ago, and since then has had intermittent knee pain.  Before yesterday, he was walking a fair amount, but denies any real injury.  He has had no other constitutional symptoms of fever, chills, diaphoresis, or shortness of breath.  He is on Coumadin and his INR is checked weekly.  He was found to have a swollen right knee in the emergency room.  This was aspirated and he was admitted for pain management consultation.  REVIEW OF SYSTEMS:  A 10-point review of systems is really only symptomatic for right knee pain and right knee swelling.  He has no chills, shortness of breath, or other constitutional symptoms.    ALLERGIES:  He has no known drug allergies.    MEDICATIONS ON ADMISSION:  He is on:  1.  Allopurinol.  2.  Aspirin.  3.  Clonidine.  4.  Ezetimibe  5.  Fish oil.  6.  Folic acid.  7.  Furosemide.  8.  Hydralazine.  9.  Insulin.  10.  Melatonin.  11.  Metoprolol.  12.  Mirtazapine.  13.  Simvastatin.  14.  Sodium bicarbonate.  15.  Trazodone.  16.  Vitamin B12.  17.  Vitamin D.  18.  Zolpidem.  19.  Clopidogrel.  20.  Warfarin.    PAST MEDICAL HISTORY:  Significant for alcohol abuse, atrial fibrillation, chronic kidney disease, diabetes, gout, hypertension, hypokalemia, hypophosphatemia, insomnia, ST-elevation myocardial infarction, diabetic ketoacidosis, hyperglycemia, placement of an arteriovenous fistula, atrial flutter, hyperparathyroidism, proteinuria, hyperlipidemia, essential primary hypertension, coronary artery  disease, depression, and cancer.    PAST SURGICAL HISTORY:  He has had a graft loop AV fistula, coronary stent placement, Mohs surgery, parathyroidectomy, a TURP as well as his right knee arthroscopy.    FAMILY HISTORY:  Noncontributory.    SOCIAL HISTORY:  He was active up until date of admission.    PHYSICAL EXAMINATION:  He is somewhat hypertensive with blood pressure 180s/110s.  Afebrile.  Vital signs are otherwise stable.  His right and left upper extremities are within normal limits.  Skin is clean, dry, and intact.  Palpable radial pulses.  Radial, ulnar, median nerve sensation and function is intact bilaterally.  No tenderness, palpation, or crepitus.  He has no obvious head trauma.  Cranial nerves all function well.  Right and left lower extremities:  The right lower extremity is slightly swollen and is very tender to the touch, minimally warm to palpation.  You can see the poke hole from his previous knee aspiration.  He has some pain with range of motion, but is exquisitely tender to touch, although less than I would have expected for an acute right knee injury.  Left knee is stable.  Skin is clean, dry, and intact.  Full range of motion.  No obvious trauma.  Sensation in both lower extremities:   Sensation of the superficial peroneal, deep peroneus, saphenous, tibial, and sural nerves is intact bilaterally.  Palpable dorsalis pedis pulses bilaterally.  EHL, FHL, tibialis anterior, gastroc soleus fire.    LABORATORY DATA:  This morning, white blood cell count 16.1, hemoglobin 11.0.  INR at 11:00 last night was 2.1.  Glucose has been to 80 and 255.  Knee aspirate shows cultures, which are still pending, show no bacteria.  His aspiration was cloudy and red.  It shows 4500 white blood cells, only 83% neutrophils.  No crystals were identified.  INR on admission in the emergency room was 2.32.    IMAGING:  X-rays of his right knee show moderate to severe tricompartmental degenerative changes in the right  knee.    IMPRESSION, REPORT AND PLAN:  I had a long discussion with Mr. Gann regarding his right knee pain.  At this point, really nothing that clinically looks like an infection nor a fracture.  Really no obvious trauma and no portal for infection either.  Likely, this is either an exacerbation of the underlying arthritis or some amount of an inflammatory condition like gout that we just did not see the crystals and we have a falsely negative crystal analysis.  Either way, I think that the treatment would be conservative with pain management and pain-relieving modalities and gentle return to function.  I would recommend consideration of using steroids as an anti-inflammatory, as he is on dialysis and Coumadin, and is unable to use a nonsteroidal drugs due to their bleeding risk.  Using pain medication as necessary.  He understands, happy with his plan of care.    Augie Rojo MD        D: 2021   T: 2021   MT: CINTIA    Name:     JULIO GANN  MRN:      [id number]        Account:      [id number]   :      1945           Consult Date: 2021     Document: [id number]

## 2021-12-05 ENCOUNTER — APPOINTMENT (OUTPATIENT)
Dept: PHYSICAL THERAPY | Facility: CLINIC | Age: 76
DRG: 981 | End: 2021-12-05
Attending: INTERNAL MEDICINE
Payer: COMMERCIAL

## 2021-12-05 LAB
ANION GAP SERPL CALCULATED.3IONS-SCNC: 9 MMOL/L (ref 3–14)
BUN SERPL-MCNC: 48 MG/DL (ref 7–30)
CALCIUM SERPL-MCNC: 8.6 MG/DL (ref 8.5–10.1)
CHLORIDE BLD-SCNC: 102 MMOL/L (ref 94–109)
CO2 SERPL-SCNC: 25 MMOL/L (ref 20–32)
CREAT SERPL-MCNC: 4.15 MG/DL (ref 0.66–1.25)
ERYTHROCYTE [DISTWIDTH] IN BLOOD BY AUTOMATED COUNT: 15.5 % (ref 10–15)
GFR SERPL CREATININE-BSD FRML MDRD: 13 ML/MIN/1.73M2
GLUCOSE BLD-MCNC: 324 MG/DL (ref 70–99)
GLUCOSE BLDC GLUCOMTR-MCNC: 292 MG/DL (ref 70–99)
GLUCOSE BLDC GLUCOMTR-MCNC: 318 MG/DL (ref 70–99)
GLUCOSE BLDC GLUCOMTR-MCNC: 332 MG/DL (ref 70–99)
HCT VFR BLD AUTO: 34.1 % (ref 40–53)
HGB BLD-MCNC: 10.5 G/DL (ref 13.3–17.7)
INR PPP: 1.59 (ref 0.85–1.15)
MCH RBC QN AUTO: 27.6 PG (ref 26.5–33)
MCHC RBC AUTO-ENTMCNC: 30.8 G/DL (ref 31.5–36.5)
MCV RBC AUTO: 90 FL (ref 78–100)
PLATELET # BLD AUTO: 254 10E3/UL (ref 150–450)
POTASSIUM BLD-SCNC: 4.8 MMOL/L (ref 3.4–5.3)
RBC # BLD AUTO: 3.8 10E6/UL (ref 4.4–5.9)
SODIUM SERPL-SCNC: 136 MMOL/L (ref 133–144)
WBC # BLD AUTO: 11.5 10E3/UL (ref 4–11)

## 2021-12-05 PROCEDURE — 250N000009 HC RX 250: Performed by: INTERNAL MEDICINE

## 2021-12-05 PROCEDURE — 250N000013 HC RX MED GY IP 250 OP 250 PS 637: Performed by: INTERNAL MEDICINE

## 2021-12-05 PROCEDURE — 250N000012 HC RX MED GY IP 250 OP 636 PS 637: Performed by: INTERNAL MEDICINE

## 2021-12-05 PROCEDURE — 80048 BASIC METABOLIC PNL TOTAL CA: CPT | Performed by: INTERNAL MEDICINE

## 2021-12-05 PROCEDURE — G0378 HOSPITAL OBSERVATION PER HR: HCPCS

## 2021-12-05 PROCEDURE — 85027 COMPLETE CBC AUTOMATED: CPT | Performed by: INTERNAL MEDICINE

## 2021-12-05 PROCEDURE — 36415 COLL VENOUS BLD VENIPUNCTURE: CPT | Performed by: INTERNAL MEDICINE

## 2021-12-05 PROCEDURE — 96372 THER/PROPH/DIAG INJ SC/IM: CPT | Performed by: INTERNAL MEDICINE

## 2021-12-05 PROCEDURE — 82962 GLUCOSE BLOOD TEST: CPT

## 2021-12-05 PROCEDURE — 97530 THERAPEUTIC ACTIVITIES: CPT | Mod: GP

## 2021-12-05 PROCEDURE — 96376 TX/PRO/DX INJ SAME DRUG ADON: CPT

## 2021-12-05 PROCEDURE — 97161 PT EVAL LOW COMPLEX 20 MIN: CPT | Mod: GP

## 2021-12-05 PROCEDURE — 96372 THER/PROPH/DIAG INJ SC/IM: CPT

## 2021-12-05 PROCEDURE — 99226 PR SUBSEQUENT OBSERVATION CARE,LEVEL III: CPT | Performed by: INTERNAL MEDICINE

## 2021-12-05 PROCEDURE — 85610 PROTHROMBIN TIME: CPT | Performed by: INTERNAL MEDICINE

## 2021-12-05 RX ORDER — CYCLOBENZAPRINE HCL 10 MG
10 TABLET ORAL 3 TIMES DAILY
Status: DISCONTINUED | OUTPATIENT
Start: 2021-12-05 | End: 2021-12-09 | Stop reason: HOSPADM

## 2021-12-05 RX ADMIN — INSULIN ASPART 4 UNITS: 100 INJECTION, SOLUTION INTRAVENOUS; SUBCUTANEOUS at 12:09

## 2021-12-05 RX ADMIN — ACETAMINOPHEN 1000 MG: 500 TABLET, FILM COATED ORAL at 12:07

## 2021-12-05 RX ADMIN — METHYLPREDNISOLONE 4 MG: 4 TABLET ORAL at 16:12

## 2021-12-05 RX ADMIN — HYDROMORPHONE HYDROCHLORIDE 4 MG: 2 TABLET ORAL at 12:08

## 2021-12-05 RX ADMIN — HYDROMORPHONE HYDROCHLORIDE 2 MG: 2 TABLET ORAL at 08:03

## 2021-12-05 RX ADMIN — CYCLOBENZAPRINE 10 MG: 10 TABLET, FILM COATED ORAL at 21:39

## 2021-12-05 RX ADMIN — METHYLPREDNISOLONE 4 MG: 4 TABLET ORAL at 12:07

## 2021-12-05 RX ADMIN — SENNOSIDES AND DOCUSATE SODIUM 1 TABLET: 50; 8.6 TABLET ORAL at 17:29

## 2021-12-05 RX ADMIN — METOPROLOL TARTRATE 2.5 MG: 5 INJECTION INTRAVENOUS at 03:19

## 2021-12-05 RX ADMIN — CYCLOBENZAPRINE 10 MG: 10 TABLET, FILM COATED ORAL at 16:12

## 2021-12-05 RX ADMIN — BUMETANIDE 2 MG: 2 TABLET ORAL at 08:03

## 2021-12-05 RX ADMIN — BUMETANIDE 2 MG: 2 TABLET ORAL at 16:12

## 2021-12-05 RX ADMIN — MIRTAZAPINE 15 MG: 15 TABLET, FILM COATED ORAL at 21:39

## 2021-12-05 RX ADMIN — METHYLPREDNISOLONE 4 MG: 4 TABLET ORAL at 08:03

## 2021-12-05 RX ADMIN — HYDROMORPHONE HYDROCHLORIDE 2 MG: 2 TABLET ORAL at 08:48

## 2021-12-05 RX ADMIN — METOPROLOL TARTRATE 100 MG: 50 TABLET, FILM COATED ORAL at 08:03

## 2021-12-05 RX ADMIN — CLONIDINE HYDROCHLORIDE 0.1 MG: 0.1 TABLET ORAL at 21:40

## 2021-12-05 RX ADMIN — ACETAMINOPHEN 1000 MG: 500 TABLET, FILM COATED ORAL at 08:03

## 2021-12-05 RX ADMIN — ACETAMINOPHEN 1000 MG: 500 TABLET, FILM COATED ORAL at 17:29

## 2021-12-05 RX ADMIN — CLONIDINE HYDROCHLORIDE 0.1 MG: 0.1 TABLET ORAL at 08:03

## 2021-12-05 RX ADMIN — METOPROLOL TARTRATE 100 MG: 50 TABLET, FILM COATED ORAL at 21:39

## 2021-12-05 RX ADMIN — INSULIN GLARGINE 10 UNITS: 100 INJECTION, SOLUTION SUBCUTANEOUS at 09:30

## 2021-12-05 RX ADMIN — METHYLPREDNISOLONE 8 MG: 4 TABLET ORAL at 21:38

## 2021-12-05 RX ADMIN — HYDROMORPHONE HYDROCHLORIDE 4 MG: 2 TABLET ORAL at 16:13

## 2021-12-05 RX ADMIN — HYDROMORPHONE HYDROCHLORIDE 2 MG: 2 TABLET ORAL at 04:50

## 2021-12-05 RX ADMIN — INSULIN ASPART 4 UNITS: 100 INJECTION, SOLUTION INTRAVENOUS; SUBCUTANEOUS at 08:02

## 2021-12-05 RX ADMIN — TRAZODONE HYDROCHLORIDE 50 MG: 50 TABLET ORAL at 21:39

## 2021-12-05 NOTE — PLAN OF CARE
PRIMARY DIAGNOSIS: ACUTE PAIN  OUTPATIENT/OBSERVATION GOALS TO BE MET BEFORE DISCHARGE:  1. Pain Status: Improved-controlled with oral pain medications.     2. Return to near baseline physical activity: No     3. Cleared for discharge by consultants (if involved): No     Discharge Planner Nurse   Safe discharge environment identified: No  Barriers to discharge: Yes       Entered by: Olinda Dominguez 12/05/2021 2:11 PM   Please review provider order for any additional goals.   Nurse to notify provider when observation goals have been met and patient is ready for discharge.

## 2021-12-05 NOTE — PROGRESS NOTES
[facility]    Medicine Progress Note - Hospitalist Service       Date of Admission:  12/3/2021    Assessment & Plan             Gordon Gann is a 76 year old male with a history of atrial fibrillation anticoagulated on Coumadin, DM type II, CAD, CHF and CKD stage IV who presented to the ED on 12/3/2021 with right knee pain and swelling.     Spontaneous right knee hemarthrosis  SIRS, ongoing: based on HR > 90 bpm and WBC > 12, without known infection.  Leukocytosis likely from acute stress, tachycardia likely from pain, monitor off antibiotics  A day prior to presentation, patient began to have right knee pain and swelling which gradually worsened to the point he needed to call EMS to bring him in.   -No reports of trauma.   - In the ED the joint was aspirated with bloody fluid.  Gram stain negative.  Culture negative to date.  -X-ray of the knee shows joint effusion and moderate to severe tricompartmental degenerative arthritis changes  -Appreciate orthopedics input, recommended scheduled oral steroids for anti-inflammatory effects, started on Medrol Dosepak  -Patient continues to complain of pain, continue oral Dilaudid and Schedule Tylenol  -Will also add scheduled Flexeril  -As needed IV Dilaudid if oral Dilaudid does not help  -Continue to hold anticoagulation, appropriate timing to resume per orthopedics     H/o CAD   H/o HFpEF, not decompensated   Paroxysmal atrial fibrillation   Hypertension  No issues currently.  Normal anticoagulated with Coumadin.  INR 2.32 here.    -Patient has been tachycardic and hypertensive, likely contributed by him missing his metoprolol/clonidine/hydralazine at home  -Continue PTA metoprolol (currently getting 100 mg twice daily increased from 25 mg twice daily PTA ) and clonidine(dose decreased from 0.2 mg twice daily PTA dose to 0.1 milligrams twice daily as per nephrology)  -As needed metoprolol IV  -Monitor in telemetry  - Will hold coumadin for now.     Appropriate timing for resumption per orthopedic surgery     DM type II   Last HgbA1c from 10/5/21 and 6.2  - Moderate CHO diet   -PTA on Lantus 15 units, premeal insulin 5 units 3 times daily with meals and sliding scale insulin.  -Currently on Lantus 8 units with Premeal insulin and medium dose sliding scale insulin  -Blood sugar elevated over the last 24 hours, we will increase Lantus to 10 units in the morning, 15 units at night and increase NovoLog sliding scale to high resistance dose, continue premeal insulin     End-stage renal disease on hemodialysis  -Patient [facility] Tuesday Thursday [facility]     H/o Gout  Does not feel similar      Diet: Moderate Consistent Carb (60 g CHO per Meal) Diet    DVT Prophylaxis: PTA on warfarin, currently on hold due to hemarthrosis as above  Flood Catheter: Not present  Central Lines: None  Code Status: No CPR- Do NOT Intubate      Disposition Plan   Expected discharge: 1 to 2 days, to TCU once placement found and better pain controlled  The patient's care was discussed with the Care Coordinator/, Patient and Patient's Family.    Hina Hung MD  Hospitalist Service  [facility]  Securely message with the Vocera Web Console (learn more here)  Text page via [employer] Paging/Directory        Clinically Significant Risk Factors Present on Admission             # Coagulation Defect: home medication list includes an anticoagulant medication  # Platelet Defect: home medication list includes an antiplatelet medication      ______________________________________________________________________    Interval History   Patient reported minimal improvement of the pain.  Still unable to put weight on the leg.  No other nursing concerns.    Data reviewed today: I reviewed all medications, new labs and imaging results over the last 24  hours.    Physical Exam   Vital Signs: Temp: 97.8  F (36.6  C) Temp src: Oral BP: (!) 156/108 Pulse: (!) 124   Resp: 18 SpO2: 92 % O2 Device: None (Room air)    Weight: 200 lbs 0 oz  Exam:  Constitutional: Awake, alert and no distress. Appears comfortable  Head: Normocephalic. No masses, lesions, tenderness or abnormalities  ENT: ENT exam normal, no neck nodes or sinus tenderness  Cardiovascular: Tachycardic, irregular, no rubs or JVD  Respiratory: Normal WOB,b/l equal air entry, no wheezes or crackles   Gastrointestinal: Abdomen soft, non-tender. BS normal. No masses, organomegaly  : Deferred  Extremities : Swelling on right knee, with limited range of motion, tender to palpate   Neurologic: Cranial nerves II-XII grossly intact ,Sensation grossly WNL.      Data   Recent Labs   Lab 12/05/21  0608 12/04/21  2127 12/04/21  1711 12/04/21  0822 12/04/21  0615 12/03/21  2329 12/03/21  1832 12/03/21  1151   WBC 11.5*  --   --   --  16.1*  --   --  9.4   HGB 10.5*  --   --   --  11.0*  --   --  11.7*   MCV 90  --   --   --  87  --   --  88     --   --   --  285  --   --  279   INR  --   --   --   --   --   --   --  2.32*     --   --   --   --   --   --  138   POTASSIUM 4.8  --   --   --   --  4.4  --  3.9   CHLORIDE 102  --   --   --   --   --   --  102   CO2 25  --   --   --   --   --   --  27   BUN 48*  --   --   --   --   --   --  36*   CR 4.15*  --   --   --   --   --   --  3.61*   ANIONGAP 9  --   --   --   --   --   --  9   LC 8.6  --   --   --   --   --   --  9.1   * 285* 200*   < >  --   --    < > 252*    < > = values in this interval not displayed.     No results found for this or any previous visit (from the past 24 hour(s)).  Medications       acetaminophen  1,000 mg Oral TID     bumetanide  2 mg Oral 2 times per day on Sun Mon Wed Fri     cloNIDine  0.1 mg Oral BID     insulin aspart  5 Units Subcutaneous TID w/meals     insulin aspart  1-7 Units Subcutaneous TID AC     insulin aspart   1-5 Units Subcutaneous At Bedtime     insulin glargine  10 Units Subcutaneous QAM AC     insulin glargine  15 Units Subcutaneous At Bedtime     methylPREDNISolone  8 mg Oral At Bedtime    And     methylPREDNISolone  4 mg Oral QAM AC    And     methylPREDNISolone  4 mg Oral Daily with lunch    And     methylPREDNISolone  4 mg Oral Daily with supper    And     [START ON 12/6/2021] methylPREDNISolone  4 mg Oral At Bedtime     metoprolol tartrate  100 mg Oral BID     mirtazapine  15 mg Oral At Bedtime     sodium chloride (PF)  3 mL Intracatheter Q8H     traZODone  50 mg Oral At Bedtime

## 2021-12-05 NOTE — PLAN OF CARE
OBSERVATION GOALS PRIOR TO DISCHARGE     Diagnostic tests and consults completed and resulted  NOT MET     Vital signs normal or at patient baseline MET     Adequate pain control on oral analgesics MET      Nurse to notify provider when observation goals have been met and patient is ready for discharge.

## 2021-12-05 NOTE — PLAN OF CARE
PRIMARY DIAGNOSIS: ACUTE PAIN  OUTPATIENT/OBSERVATION GOALS TO BE MET BEFORE DISCHARGE:  1. Pain Status: Improved-controlled with oral pain medications.    2. Return to near baseline physical activity: No    3. Cleared for discharge by consultants (if involved): No    Discharge Planner Nurse   Safe discharge environment identified: No  Barriers to discharge: Yes       Entered by: Olinda Dominguez 12/05/2021 5:44 PM     Please review provider order for any additional goals.   Nurse to notify provider when observation goals have been met and patient is ready for discharge.    A&Ox4. Hypertensive and tachycardic; tele afib RVR. Pain managed with prn po dilaudid and scheduled tylenol and flexeril. Right knee remains very tender and swollen. Refusing OOB activity and minimally repositions self despite education on preventing bed sores. Regular diet, good appetite. External catheter in place. IV SL. Plan for TCU at discharge.

## 2021-12-05 NOTE — PLAN OF CARE
PRIMARY DIAGNOSIS: ACUTE PAIN  OUTPATIENT/OBSERVATION GOALS TO BE MET BEFORE DISCHARGE:  1. Pain Status: Improved-controlled with oral pain medications.     2. Return to near baseline physical activity: No     3. Cleared for discharge by consultants (if involved): No     Discharge Planner Nurse   Safe discharge environment identified: No  Barriers to discharge: Yes       Entered by: Olinda Dominguez 12/04/2021 6:28 PM   Please review provider order for any additional goals.   Nurse to notify provider when observation goals have been met and patient is ready for discharge.    A&Ox4. Hypertensive and tachycardic; prn meds available. Pain managed with prn po dilaudid and scheduled tylenol. Right knee remains very tender and swollen. Consistently rates pain 8/10 despite meds. Refusing OOB activity. Even the slightest touch to right leg causes pt to recoil in pain. Medrol started today. Dialysis done today. Regular diet, appetite much improved today. External catheter in place. IV SL. Frequently encouraged to reposition self in bed. Plan to attempt PT eval again tomorrow.

## 2021-12-05 NOTE — CONSULTS
Care Management Initial Consult    General Information  Assessment completed with:  Patient       Primary Care Provider verified and updated as needed:     Readmission within the last 30 days:           Advance Care Planning:            Communication Assessment  Patient's communication style: spoken language (English or Bilingual)    Hearing Difficulty or Deaf: no        Cognitive  Cognitive/Neuro/Behavioral: WDL                      Living Environment:   People in home: lives alone   Current living Arrangements: apartment,independent living facility      Able to return to prior arrangements:  Therapy recommending TCU        Family/Social Support:  Care provided by:  n/a  Provides care for:  n/a                Description of Support System:    Dtr is supportive [facility] in [place].        Current Resources:   Patient receiving home care services:  Weekly RN and HHA visit via [facility].      Community Resources:  n/a  Equipment currently used at home: walker, rolling  Supplies currently used at home: n/a    Employment/Financial:  Employment Status:     n/a     Financial Concerns:   n/a          Lifestyle & Psychosocial Needs:  Social Determinants of Health     Tobacco Use: Low Risk      Smoking Tobacco Use: Never Smoker     Smokeless Tobacco Use: Never Used   Alcohol Use: Not on file   Financial Resource Strain: Not on file   Food Insecurity: Not on file   Transportation Needs: Not on file   Physical Activity: Not on file   Stress: Not on file   Social Connections: Not on file   Intimate Partner Violence: Not on file   Depression: Not on file   Housing Stability: Not on file       Functional Status:  Prior to admission patient needed assistance:       Patient reports using a walker at baseline, independent in mot ADL's and iADL's lives in independent living at [facility].        Mental Health Status:      Not assessed.     Chemical Dependency Status:      n/a          Values/Beliefs:  Spiritual,  Cultural Beliefs, [employer] Practices, Values that affect care:               n/a  Additional Information:  Writer introduced self, role. Therapy recommending TCU at discharge, patient is hesitant and would like to return home vs. TCU. Patient monica not have any additional family or friends to stay and assist him with cares and currently only gets weekly visit from RN & HHA. Patient is aware of his current needs and was ok with a referral being sent to [facility] since he lives in the impendent living there. Patient is ok with shared room. Patient may need transport setup a discharge. Writer sent referral via [facility].     LIANA Platt

## 2021-12-05 NOTE — PLAN OF CARE
PRIMARY DIAGNOSIS: ACUTE PAIN  OUTPATIENT/OBSERVATION GOALS TO BE MET BEFORE DISCHARGE:  1. Pain Status: Improved-controlled with oral pain medications.    2. Return to near baseline physical activity: No    3. Cleared for discharge by consultants (if involved): No    Discharge Planner Nurse   Safe discharge environment identified: No  Barriers to discharge: Yes       Entered by: Olinda Dominguez 12/05/2021 9:15 AM     Please review provider order for any additional goals.   Nurse to notify provider when observation goals have been met and patient is ready for discharge.

## 2021-12-05 NOTE — PLAN OF CARE
Alert and orientex x 4.  Metoprolol administered for elevated heart rate at 0319 and Dilaudid at 0450 for right knee pain. IV site clean dry intact and saline lock. Dialysis access site intact.  On tele monitoring with atrial fibrillation.  BMP, CBC and Potasium this morning.

## 2021-12-05 NOTE — PROGRESS NOTES
12/05/21 1007   Quick Adds   Type of Visit Initial PT Evaluation   Living Environment   People in home alone   Current Living Arrangements apartment;independent living facility   Home Accessibility no concerns   Self-Care   Usual Activity Tolerance moderate   Current Activity Tolerance poor   Equipment Currently Used at Home walker, rolling   Disability/Function   Fall history within last six months no   General Information   Onset of Illness/Injury or Date of Surgery 12/03/21   Referring Physician Tan Villa MD   Patient/Family Therapy Goals Statement (PT) Unsure as he cannot get out of bed   Pertinent History of Current Problem (include personal factors and/or comorbidities that impact the POC) Pt admitted under observation status with R knee edema, hemarthrosis. PMH: afib, DM2, CHF, CKD4 with dialysis   Existing Precautions/Restrictions fall   Weight-Bearing Status - LLE full weight-bearing   Weight-Bearing Status - RLE weight-bearing as tolerated   Cognition   Orientation Status (Cognition) oriented x 4   Affect/Mental Status (Cognition) WFL   Follows Commands (Cognition) WFL   Pain Assessment   Patient Currently in Pain Yes, see Vital Sign flowsheet  (moderate with no movement, 10 with movement)   Integumentary/Edema   Integumentary/Edema Comments R knee swollen   Posture    Posture Forward head position;Protracted shoulders   Range of Motion (ROM)   ROM Comment L LE WFL, R knee with significantly limited flexion   Strength   Strength Comments L LE WFL, R LE limited due to pain   Bed Mobility   Comment (Bed Mobility) Attempted to sit EOB. Pt able to move 1/3 of the way to the EOB and declined going further. Pt fearful of high pain and did not want to go that far.   Transfers   Transfer Safety Comments NT   Gait/Stairs (Locomotion)   Comment (Gait/Stairs) NT   Clinical Impression   Criteria for Skilled Therapeutic Intervention yes, treatment indicated   PT Diagnosis (PT) Difficulty ambulating    Influenced by the following impairments Pain, dec strength, ROM, activity tolerance   Functional limitations due to impairments Difficulty ambulating and transferring   Clinical Presentation Evolving/Changing   Clinical Presentation Rationale pain improving slowly   Clinical Decision Making (Complexity) low complexity   Therapy Frequency (PT) 2x/day   Predicted Duration of Therapy Intervention (days/wks) 3 days   Planned Therapy Interventions (PT) bed mobility training;gait training;ROM (range of motion);patient/family education;strengthening;transfer training   Risk & Benefits of therapy have been explained evaluation/treatment results reviewed;care plan/treatment goals reviewed;risks/benefits reviewed;current/potential barriers reviewed;participants voiced agreement with care plan   PT Discharge Planning    PT Discharge Recommendation (DC Rec) Transitional Care Facility   PT Rationale for DC Rec Pt is unable to get to the EOB, stand or walk at this time and will need to go to TCU at discharge to improve strength, balance, mobility, R knee ROM and activity tolerance before returning home alone. If pt must return home, then recommend use of lift, wc and home PT.   Total Evaluation Time   Total Evaluation Time (Minutes) 15

## 2021-12-06 ENCOUNTER — APPOINTMENT (OUTPATIENT)
Dept: PHYSICAL THERAPY | Facility: CLINIC | Age: 76
DRG: 981 | End: 2021-12-06
Payer: COMMERCIAL

## 2021-12-06 LAB
GLUCOSE BLDC GLUCOMTR-MCNC: 267 MG/DL (ref 70–99)
GLUCOSE BLDC GLUCOMTR-MCNC: 286 MG/DL (ref 70–99)
GLUCOSE BLDC GLUCOMTR-MCNC: 289 MG/DL (ref 70–99)
GLUCOSE BLDC GLUCOMTR-MCNC: 357 MG/DL (ref 70–99)
GLUCOSE BLDC GLUCOMTR-MCNC: 395 MG/DL (ref 70–99)
MAGNESIUM SERPL-MCNC: 2.4 MG/DL (ref 1.6–2.3)
POTASSIUM BLD-SCNC: 4.8 MMOL/L (ref 3.4–5.3)

## 2021-12-06 PROCEDURE — G0378 HOSPITAL OBSERVATION PER HR: HCPCS

## 2021-12-06 PROCEDURE — 97530 THERAPEUTIC ACTIVITIES: CPT | Mod: GP

## 2021-12-06 PROCEDURE — 99231 SBSQ HOSP IP/OBS SF/LOW 25: CPT | Performed by: INTERNAL MEDICINE

## 2021-12-06 PROCEDURE — 96372 THER/PROPH/DIAG INJ SC/IM: CPT

## 2021-12-06 PROCEDURE — 250N000012 HC RX MED GY IP 250 OP 636 PS 637: Performed by: INTERNAL MEDICINE

## 2021-12-06 PROCEDURE — 250N000013 HC RX MED GY IP 250 OP 250 PS 637: Performed by: INTERNAL MEDICINE

## 2021-12-06 PROCEDURE — 83735 ASSAY OF MAGNESIUM: CPT | Performed by: INTERNAL MEDICINE

## 2021-12-06 PROCEDURE — 120N000004 HC R&B MS OVERFLOW

## 2021-12-06 PROCEDURE — 82962 GLUCOSE BLOOD TEST: CPT

## 2021-12-06 PROCEDURE — 97530 THERAPEUTIC ACTIVITIES: CPT | Mod: GP | Performed by: PHYSICAL THERAPIST

## 2021-12-06 PROCEDURE — 96376 TX/PRO/DX INJ SAME DRUG ADON: CPT

## 2021-12-06 PROCEDURE — 36415 COLL VENOUS BLD VENIPUNCTURE: CPT | Performed by: INTERNAL MEDICINE

## 2021-12-06 PROCEDURE — 250N000009 HC RX 250: Performed by: INTERNAL MEDICINE

## 2021-12-06 PROCEDURE — 97116 GAIT TRAINING THERAPY: CPT | Mod: GP | Performed by: PHYSICAL THERAPIST

## 2021-12-06 PROCEDURE — 84132 ASSAY OF SERUM POTASSIUM: CPT | Performed by: INTERNAL MEDICINE

## 2021-12-06 PROCEDURE — 99207 PR CDG-CHARGE REQUIRED MANUAL ENTRY: CPT | Performed by: INTERNAL MEDICINE

## 2021-12-06 PROCEDURE — 97110 THERAPEUTIC EXERCISES: CPT | Mod: GP

## 2021-12-06 PROCEDURE — 99233 SBSQ HOSP IP/OBS HIGH 50: CPT | Performed by: INTERNAL MEDICINE

## 2021-12-06 RX ORDER — AMLODIPINE BESYLATE 10 MG/1
10 TABLET ORAL DAILY
Status: DISCONTINUED | OUTPATIENT
Start: 2021-12-07 | End: 2021-12-06

## 2021-12-06 RX ORDER — LISINOPRIL 10 MG/1
10 TABLET ORAL DAILY
Status: DISCONTINUED | OUTPATIENT
Start: 2021-12-06 | End: 2021-12-09

## 2021-12-06 RX ORDER — AMLODIPINE BESYLATE 5 MG/1
5 TABLET ORAL DAILY
Status: DISCONTINUED | OUTPATIENT
Start: 2021-12-06 | End: 2021-12-06

## 2021-12-06 RX ADMIN — HYDROMORPHONE HYDROCHLORIDE 4 MG: 2 TABLET ORAL at 11:09

## 2021-12-06 RX ADMIN — ACETAMINOPHEN 1000 MG: 500 TABLET, FILM COATED ORAL at 08:52

## 2021-12-06 RX ADMIN — METHYLPREDNISOLONE 4 MG: 4 TABLET ORAL at 08:53

## 2021-12-06 RX ADMIN — LISINOPRIL 10 MG: 10 TABLET ORAL at 16:13

## 2021-12-06 RX ADMIN — CYCLOBENZAPRINE 10 MG: 10 TABLET, FILM COATED ORAL at 08:53

## 2021-12-06 RX ADMIN — CLONIDINE HYDROCHLORIDE 0.1 MG: 0.1 TABLET ORAL at 19:11

## 2021-12-06 RX ADMIN — ACETAMINOPHEN 1000 MG: 500 TABLET, FILM COATED ORAL at 11:09

## 2021-12-06 RX ADMIN — BUMETANIDE 2 MG: 2 TABLET ORAL at 15:49

## 2021-12-06 RX ADMIN — AMLODIPINE BESYLATE 5 MG: 5 TABLET ORAL at 08:52

## 2021-12-06 RX ADMIN — SENNOSIDES AND DOCUSATE SODIUM 2 TABLET: 50; 8.6 TABLET ORAL at 14:43

## 2021-12-06 RX ADMIN — ACETAMINOPHEN 1000 MG: 500 TABLET, FILM COATED ORAL at 18:12

## 2021-12-06 RX ADMIN — TRAZODONE HYDROCHLORIDE 50 MG: 50 TABLET ORAL at 21:20

## 2021-12-06 RX ADMIN — CYCLOBENZAPRINE 10 MG: 10 TABLET, FILM COATED ORAL at 19:11

## 2021-12-06 RX ADMIN — METHYLPREDNISOLONE 4 MG: 4 TABLET ORAL at 21:22

## 2021-12-06 RX ADMIN — METOPROLOL TARTRATE 100 MG: 50 TABLET, FILM COATED ORAL at 08:53

## 2021-12-06 RX ADMIN — CLONIDINE HYDROCHLORIDE 0.1 MG: 0.1 TABLET ORAL at 08:52

## 2021-12-06 RX ADMIN — HYDROMORPHONE HYDROCHLORIDE 4 MG: 2 TABLET ORAL at 14:12

## 2021-12-06 RX ADMIN — BUMETANIDE 2 MG: 2 TABLET ORAL at 08:53

## 2021-12-06 RX ADMIN — METHYLPREDNISOLONE 4 MG: 4 TABLET ORAL at 11:09

## 2021-12-06 RX ADMIN — METOPROLOL TARTRATE 100 MG: 50 TABLET, FILM COATED ORAL at 19:11

## 2021-12-06 RX ADMIN — CYCLOBENZAPRINE 10 MG: 10 TABLET, FILM COATED ORAL at 14:06

## 2021-12-06 RX ADMIN — METOPROLOL TARTRATE 2.5 MG: 5 INJECTION INTRAVENOUS at 21:21

## 2021-12-06 RX ADMIN — METOPROLOL TARTRATE 2.5 MG: 5 INJECTION INTRAVENOUS at 11:10

## 2021-12-06 RX ADMIN — MIRTAZAPINE 15 MG: 15 TABLET, FILM COATED ORAL at 21:21

## 2021-12-06 RX ADMIN — METHYLPREDNISOLONE 4 MG: 4 TABLET ORAL at 16:13

## 2021-12-06 ASSESSMENT — ACTIVITIES OF DAILY LIVING (ADL)
ADLS_ACUITY_SCORE: 16
ADLS_ACUITY_SCORE: 15
ADLS_ACUITY_SCORE: 16
ADLS_ACUITY_SCORE: 16

## 2021-12-06 NOTE — PROGRESS NOTES
[facility]    Medicine Progress Note - Hospitalist Service       Date of Admission:  12/3/2021    Assessment & Plan             Gordon Gann is a 76 year old male with a history of atrial fibrillation anticoagulated on Coumadin, DM type II, CAD, CHF and CKD stage IV who presented to the ED on 12/3/2021 with right knee pain and swelling.     Spontaneous right knee hemarthrosis  SIRS, ongoing: based on HR > 90 bpm and WBC > 12, without known infection.  Leukocytosis likely from acute stress, tachycardia likely from pain, monitor off antibiotics  A day prior to presentation, patient began to have right knee pain and swelling which gradually worsened to the point he needed to call EMS to bring him in.   -No reports of trauma.   - In the ED the joint was aspirated with bloody fluid.  Gram stain negative.  Culture negative to date.  -X-ray of the knee shows joint effusion and moderate to severe tricompartmental degenerative arthritis changes  -Appreciate orthopedics input, recommended scheduled oral steroids for anti-inflammatory effects, started on Medrol Dosepak  -Patient continues to complain of pain, continue oral Dilaudid and Schedule Tylenol and scheduled Flexeril  -We will try some Voltaren gel  -As needed IV Dilaudid if oral Dilaudid does not help  -Continue to hold anticoagulation, appropriate timing to resume per orthopedics     H/o CAD   H/o HFpEF, not decompensated   Paroxysmal atrial fibrillation with RVR  Hypertensive crisis  No issues currently.  Normal anticoagulated with Coumadin.  INR 2.32 here.    -Patient has been tachycardic and hypertensive, likely contributed by him missing his metoprolol/clonidine/hydralazine at home  -Continue PTA metoprolol (currently getting 100 mg twice daily increased from 25 mg twice daily PTA ) and clonidine(dose decreased from 0.2 mg twice daily PTA dose to 0.1 milligrams twice daily as per nephrology)  -Blood pressure still elevated, heart rate  also high, likely related to pain  -Pain to be better controlled  -Start Norvasc  -As needed metoprolol IV  -Monitor in telemetry  - Will hold coumadin for now.    Appropriate timing for resumption per orthopedic surgery     DM type II   Last HgbA1c from 10/5/21 and 6.2  - Moderate CHO diet   -PTA on Lantus 15 units, premeal insulin 5 units 3 times daily with meals and sliding scale insulin.  -Currently on Lantus 10 units in the morning with 15 units at night with Premeal insulin and high-dose sliding scale insulin  -Blood sugar over the last 24 hours [id number]  -Blood sugar likely elevated from oral steroid but he is getting and lot of stress/pain  -Increase Lantus to 20 units in the morning and 20 units at night, needs adjustment while on steroids  -Monitor and adjust medication as needed     End-stage renal disease on hemodialysis  -Patient [facility] Tuesday Thursday [facility]     H/o Gout      Diet: Moderate Consistent Carb (60 g CHO per Meal) Diet    DVT Prophylaxis: PTA on warfarin, currently on hold due to hemarthrosis as above  Flood Catheter: Not present  Central Lines: None  Code Status: No CPR- Do NOT Intubate      Disposition Plan   Expected discharge: 1 to 2 days, to TCU once placement found and better pain controlled, heart rate better controlled  The patient's care was discussed with the Care Coordinator/, Patient and Patient's Family.    Hina Hung MD  Hospitalist Service  [facility]  Securely message with the Vocera Web Console (learn more here)  Text page via [employer] Paging/Directory        Clinically Significant Risk Factors Present on Admission             # Coagulation Defect: home medication list includes an anticoagulant medication  # Platelet Defect: home medication list includes an antiplatelet medication       ______________________________________________________________________    Interval History   Patient continues to complain of knee pain.  Unable to move the leg much, no other nursing concerns.    Data reviewed today: I reviewed all medications, new labs and imaging results over the last 24 hours.    Physical Exam   Vital Signs: Temp: 97.7  F (36.5  C) Temp src: Oral BP: (!) 159/100 Pulse: 115   Resp: 16 SpO2: 97 % O2 Device: None (Room air)    Weight: 200 lbs 0 oz  Exam:  Constitutional: Awake, alert and no distress. Appears comfortable  Head: Normocephalic. No masses, lesions, tenderness or abnormalities  ENT: ENT exam normal, no neck nodes or sinus tenderness  Cardiovascular: Tachycardic, irregular, no rubs or JVD  Respiratory: Normal WOB,b/l equal air entry, no wheezes or crackles   Gastrointestinal: Abdomen soft, non-tender. BS normal. No masses, organomegaly  : Deferred  Extremities : Swelling on right knee, with limited range of motion, tender to palpate   Neurologic: Cranial nerves II-XII grossly intact ,Sensation grossly WNL.      Data   Recent Labs   Lab 12/06/21  0210 12/05/21  2122 12/05/21  1658 12/05/21  1203 12/05/21  1200 12/05/21  0608 12/04/21  0822 12/04/21  0615 12/03/21  2329 12/03/21  1832 12/03/21  1151   WBC  --   --   --   --   --  11.5*  --  16.1*  --   --  9.4   HGB  --   --   --   --   --  10.5*  --  11.0*  --   --  11.7*   MCV  --   --   --   --   --  90  --  87  --   --  88   PLT  --   --   --   --   --  254  --  285  --   --  279   INR  --   --   --   --  1.59*  --   --   --   --   --  2.32*   NA  --   --   --   --   --  136  --   --   --   --  138   POTASSIUM  --   --   --   --   --  4.8  --   --  4.4  --  3.9   CHLORIDE  --   --   --   --   --  102  --   --   --   --  102   CO2  --   --   --   --   --  25  --   --   --   --  27   BUN  --   --   --   --   --  48*  --   --   --   --  36*   CR  --   --   --   --   --  4.15*  --   --   --   --  3.61*   ANIONGAP  --   --   --   --    --  9  --   --   --   --  9   LC  --   --   --   --   --  8.6  --   --   --   --  9.1   * 318* 292*   < >  --  324*   < >  --   --    < > 252*    < > = values in this interval not displayed.     No results found for this or any previous visit (from the past 24 hour(s)).  Medications       acetaminophen  1,000 mg Oral TID     bumetanide  2 mg Oral 2 times per day on Sun Mon Wed Fri     cloNIDine  0.1 mg Oral BID     cyclobenzaprine  10 mg Oral TID     insulin aspart  1-10 Units Subcutaneous TID AC     insulin aspart  1-7 Units Subcutaneous At Bedtime     insulin aspart  5 Units Subcutaneous TID w/meals     insulin glargine  10 Units Subcutaneous QAM AC     insulin glargine  15 Units Subcutaneous At Bedtime     methylPREDNISolone  4 mg Oral QAM AC    And     methylPREDNISolone  4 mg Oral Daily with lunch    And     methylPREDNISolone  4 mg Oral Daily with supper    And     methylPREDNISolone  4 mg Oral At Bedtime     metoprolol tartrate  100 mg Oral BID     mirtazapine  15 mg Oral At Bedtime     sodium chloride (PF)  3 mL Intracatheter Q8H     traZODone  50 mg Oral At Bedtime

## 2021-12-06 NOTE — PLAN OF CARE
Observation goals  PRIOR TO DISCHARGE         Diagnostic tests and consults completed and resulted MET    Vital signs normal or at patient baseline NOT MET    Adequate pain control on oral analgesics NOT MET    Nurse to notify provider when observation goals have been met and patient is ready for discharge.

## 2021-12-06 NOTE — PROGRESS NOTES
Renal Medicine Chart Check      Following for ESRD management     Dialyzed 12/04/21  UF 3 liters    Comfortable in bed  Knee feels better    No O2 requirement  BP high    Pain control  Increase amlodipine to 10 mg    No additional dialysis necessary  Dialysis 12/07/21        Recent Labs   Lab 12/06/21  1219 12/06/21  1118 12/05/21  1203 12/05/21  0608   NA  --   --   --  136   POTASSIUM  --  4.8  --  4.8   CHLORIDE  --   --   --  102   CO2  --   --   --  25   ANIONGAP  --   --   --  9   *  --    < > 324*   BUN  --   --   --  48*   CR  --   --   --  4.15*   GFRESTIMATED  --   --   --  13*   LC  --   --   --  8.6    < > = values in this interval not displayed.           JIGNESH Perla    [facility]  [phone]

## 2021-12-06 NOTE — PROGRESS NOTES
Care Management Follow Up    Length of Stay (days): 0    Expected Discharge Date: 12/06/2021     Concerns to be Addressed:       Patient plan of care discussed at interdisciplinary rounds: Yes    Anticipated Discharge Disposition:       Anticipated Discharge Services:    Anticipated Discharge DME:      Patient/family educated on Medicare website which has current facility and service quality ratings:    Education Provided on the Discharge Plan:    Patient/Family in Agreement with the Plan:      Referrals Placed by CM/SW:    Private pay costs discussed: Not applicable    Additional Information:  Patient was accepted at [facility].SW to follow up with admissions tomorrow to see if they are able to accommodate tomorrow discharge otherwise patient can for sure go on Wednesday. Admissions needs to know dialysis information and how patient gets to/from dialysis and if he sets his own rides up. Will get back to admissions tomorrow morning with information and plan for discharge.       LIANA Kim

## 2021-12-06 NOTE — PROGRESS NOTES
discharge goals: PRIOR TO DISCHARGE  -diagnostic tests and consults completed and resulted\- MET  -vital signs normal or at patient baseline-NOT MET  -adequate pain control on oral analgesics- NOT MET

## 2021-12-06 NOTE — PROGRESS NOTES
R knee joint fluid negative to date.  Continue oral steroids for anti-inflammatory effects.  PRN oral dilaudid and scheduled tylenol. Continues to by Hypertensive and Tachycardic.  1 dose Prn metoprolol given .  Got up to chair this afternoon with 2 assist with PT.  Continue to encourage activity as tolerated.

## 2021-12-06 NOTE — PLAN OF CARE
Alert and oriented x 4. Dialysis on Tuesday, Thursday and Saturday. External male catheter in place. Dialysis access site on the left arm. Blood pressures on the right arm. Continent of bowel. Refused turn and repositioning, patient stated it hurt to move around. Denied pain,refused pain medication when offered. Blood sugar checks.  IV access site clean, dry intact and saline lock. Tele monitoring with a-fib rhythm.

## 2021-12-06 NOTE — UTILIZATION REVIEW
Admission Status; Secondary Review Determination    Under the authority of the Utilization Management Committee, the utilization review process indicated a secondary review on the above patient. The review outcome is based on review of the medical records, discussions with staff, and applying clinical experience noted on the date of the review.    (x) Inpatient Status Appropriate - This patient's medical care is consistent with medical management for inpatient care and reasonable inpatient medical practice.    RATIONALE FOR DETERMINATION: 76-year-old male with end-stage renal disease on hemodialysis, atrial fibrillation anticoagulated with warfarin, type 2 diabetes, coronary artery disease, history of heart failure who presents to the hospital with acute onset of progressively severe right knee pain to the point of 10/10 the worst pain he is ever had.  Patient was afebrile but had leukocytosis and standard imaging revealed large knee joint effusion with moderately severe tricompartmental degenerative arthritic changes.  Due to the severity of patient's pain with probable hemarthrosis in addition to exacerbation of underlying severe degenerative disease patient unable to self transfer from bed to edge of bed and has no out of bed mobility.  Due to the need for adequate pain control prior to safe discharge, patient required multiple nights in the hospital appropriate for inpatient care.    At the time of admission with the information available to the attending physician more than 2 nights Hospital complex care was anticipated, based on patient risk of adverse outcome if treated as outpatient and complex care required. Inpatient admission is appropriate based on the Medicare guidelines.    This document was produced using voice recognition software    The information on this document is developed by the utilization review team in order for the business office to ensure compliance. This only denotes the  appropriateness of proper admission status and does not reflect the quality of care rendered.    The definitions of Inpatient Status and Observation Status used in making the determination above are those provided in the CMS Coverage Manual, Chapter 1 and Chapter 6, section 70.4.    Sincerely,    Minor Gomez MD  Utilization Review  Physician Advisor  Health system.

## 2021-12-07 ENCOUNTER — APPOINTMENT (OUTPATIENT)
Dept: PHYSICAL THERAPY | Facility: CLINIC | Age: 76
DRG: 981 | End: 2021-12-07
Payer: COMMERCIAL

## 2021-12-07 LAB
ATRIAL RATE - MUSE: 250 BPM
DIASTOLIC BLOOD PRESSURE - MUSE: NORMAL MMHG
GLUCOSE BLDC GLUCOMTR-MCNC: 239 MG/DL (ref 70–99)
GLUCOSE BLDC GLUCOMTR-MCNC: 265 MG/DL (ref 70–99)
GLUCOSE BLDC GLUCOMTR-MCNC: 300 MG/DL (ref 70–99)
GLUCOSE BLDC GLUCOMTR-MCNC: 315 MG/DL (ref 70–99)
GLUCOSE BLDC GLUCOMTR-MCNC: 328 MG/DL (ref 70–99)
INTERPRETATION ECG - MUSE: NORMAL
P AXIS - MUSE: NORMAL DEGREES
PR INTERVAL - MUSE: NORMAL MS
QRS DURATION - MUSE: 88 MS
QT - MUSE: 362 MS
QTC - MUSE: 494 MS
R AXIS - MUSE: 22 DEGREES
SYSTOLIC BLOOD PRESSURE - MUSE: NORMAL MMHG
T AXIS - MUSE: 120 DEGREES
VENTRICULAR RATE- MUSE: 112 BPM

## 2021-12-07 PROCEDURE — 250N000013 HC RX MED GY IP 250 OP 250 PS 637: Performed by: INTERNAL MEDICINE

## 2021-12-07 PROCEDURE — 97530 THERAPEUTIC ACTIVITIES: CPT | Mod: GP | Performed by: PHYSICAL THERAPIST

## 2021-12-07 PROCEDURE — 250N000012 HC RX MED GY IP 250 OP 636 PS 637: Performed by: INTERNAL MEDICINE

## 2021-12-07 PROCEDURE — 250N000009 HC RX 250: Performed by: INTERNAL MEDICINE

## 2021-12-07 PROCEDURE — 97116 GAIT TRAINING THERAPY: CPT | Mod: GP | Performed by: PHYSICAL THERAPIST

## 2021-12-07 PROCEDURE — 120N000004 HC R&B MS OVERFLOW

## 2021-12-07 PROCEDURE — 99233 SBSQ HOSP IP/OBS HIGH 50: CPT | Performed by: HOSPITALIST

## 2021-12-07 PROCEDURE — 99231 SBSQ HOSP IP/OBS SF/LOW 25: CPT | Performed by: INTERNAL MEDICINE

## 2021-12-07 RX ADMIN — CYCLOBENZAPRINE 10 MG: 10 TABLET, FILM COATED ORAL at 13:52

## 2021-12-07 RX ADMIN — METOPROLOL TARTRATE 100 MG: 50 TABLET, FILM COATED ORAL at 07:57

## 2021-12-07 RX ADMIN — ACETAMINOPHEN 1000 MG: 500 TABLET, FILM COATED ORAL at 19:36

## 2021-12-07 RX ADMIN — MIRTAZAPINE 15 MG: 15 TABLET, FILM COATED ORAL at 21:58

## 2021-12-07 RX ADMIN — METHYLPREDNISOLONE 4 MG: 4 TABLET ORAL at 12:25

## 2021-12-07 RX ADMIN — CYCLOBENZAPRINE 10 MG: 10 TABLET, FILM COATED ORAL at 19:36

## 2021-12-07 RX ADMIN — METOPROLOL TARTRATE 2.5 MG: 5 INJECTION INTRAVENOUS at 15:45

## 2021-12-07 RX ADMIN — LISINOPRIL 10 MG: 10 TABLET ORAL at 07:58

## 2021-12-07 RX ADMIN — METOPROLOL TARTRATE 100 MG: 50 TABLET, FILM COATED ORAL at 19:36

## 2021-12-07 RX ADMIN — CLONIDINE HYDROCHLORIDE 0.1 MG: 0.1 TABLET ORAL at 07:57

## 2021-12-07 RX ADMIN — METHYLPREDNISOLONE 4 MG: 4 TABLET ORAL at 07:15

## 2021-12-07 RX ADMIN — TRAZODONE HYDROCHLORIDE 50 MG: 50 TABLET ORAL at 21:58

## 2021-12-07 RX ADMIN — CLONIDINE HYDROCHLORIDE 0.1 MG: 0.1 TABLET ORAL at 19:36

## 2021-12-07 RX ADMIN — ACETAMINOPHEN 1000 MG: 500 TABLET, FILM COATED ORAL at 07:57

## 2021-12-07 RX ADMIN — METHYLPREDNISOLONE 4 MG: 4 TABLET ORAL at 21:58

## 2021-12-07 RX ADMIN — ACETAMINOPHEN 1000 MG: 500 TABLET, FILM COATED ORAL at 12:25

## 2021-12-07 RX ADMIN — CYCLOBENZAPRINE 10 MG: 10 TABLET, FILM COATED ORAL at 07:57

## 2021-12-07 ASSESSMENT — ACTIVITIES OF DAILY LIVING (ADL)
ADLS_ACUITY_SCORE: 16
ADLS_ACUITY_SCORE: 16
ADLS_ACUITY_SCORE: 20
ADLS_ACUITY_SCORE: 16
ADLS_ACUITY_SCORE: 20
ADLS_ACUITY_SCORE: 16
ADLS_ACUITY_SCORE: 16
ADLS_ACUITY_SCORE: 20
ADLS_ACUITY_SCORE: 16
ADLS_ACUITY_SCORE: 20
ADLS_ACUITY_SCORE: 16
ADLS_ACUITY_SCORE: 16
ADLS_ACUITY_SCORE: 20
ADLS_ACUITY_SCORE: 16
ADLS_ACUITY_SCORE: 20
ADLS_ACUITY_SCORE: 20
ADLS_ACUITY_SCORE: 16
ADLS_ACUITY_SCORE: 20
ADLS_ACUITY_SCORE: 20
ADLS_ACUITY_SCORE: 16

## 2021-12-07 NOTE — PROGRESS NOTES
Dialysis graft evaluation finds no thrill or bruit. Attempted cannulation without any success. Dr Perla concurs and will endeavor to arrange IR for tomorrow.    Vishal Burgess RN

## 2021-12-07 NOTE — PROGRESS NOTES
A&Ox4, +apprehension to move pollo on RLE. R knee Acewrap on, cdi. +tenderness and swelling.Distal CMS intact. Declined meds.  On tele: Afib in RVR, irregularly irregular. Tx regimen: continue steroids and analgesia. Pt on hemodialysis TThS, next sched on 12/7/2021. AVF at E. +thrill and Bruit- Pressure dressing intact w/o evidence of bleeding. Nephrology following. Will continue to monitor.

## 2021-12-07 NOTE — PROGRESS NOTES
"Orthopedic Surgery   Right knee hemarthrosis: advanced medial and patellofemoral OA     Patient states his knee pain is gradually improving  He is able to move it slightly more today    BP (!) 165/110 (BP Location: Right arm)   Pulse (!) 125   Temp 98.2  F (36.8  C) (Oral)   Resp 16   Ht 1.727 m (5' 8\")   Wt 90.7 kg (200 lb)   SpO2 96%   BMI 30.41 kg/m      On exam: moderate right knee effusion remains  ROM seated in 70-90, guarding extension  No erythema or ecchymosis    Lab: Cultures remain negative.     Plan:   Continue compressive ace wrap  Resume coumadin end of the week   WBAT Right LE  Follow-up with ortho knee surgeon to discuss TKA if not improving    Demi Burrell PA-C on 12/7/2021 at 1:18 PM    "

## 2021-12-07 NOTE — PLAN OF CARE
Pt is A+O x 4. Tachycardia 120s, Hypertensive, asymptomatic, other VSS on RA. PRN IV Metoprolol and scheduled antihypertensives administered with control. Tele is Afib RVR (120s)/ CVR (90s). Acewrap is intact on right knee. Pj LE edema, extremities elevated on pillows. Right knee pain 2/10, scheduled Acetaminophen given. BG elevated 357, 395, on Medrol. Covered with Novolog and Lantus, see MD GUCCI notified. Tolerated mod carb diet. External catheter is patent. Left fistula site is intact. IV SL. Nephrology is following, see note. SW is assisting with placement.

## 2021-12-07 NOTE — PROGRESS NOTES
Renal Medicine Chart Check      Following for ESRD management     Dialyzed 12/04/21  UF 3 liters    BP moderate   Lisinopril at 10    AVG clotted today  Attempted to place needles  No return    No palpable thrill  No bruit     Fistulogram 12/08/21  Dialysis to follow       Recent Labs   Lab 12/07/21  1200 12/06/21  1219 12/06/21  1118 12/05/21  1203 12/05/21  0608   NA  --   --   --   --  136   POTASSIUM  --   --  4.8  --  4.8   CHLORIDE  --   --   --   --  102   CO2  --   --   --   --  25   ANIONGAP  --   --   --   --  9   *   < >  --    < > 324*   BUN  --   --   --   --  48*   CR  --   --   --   --  4.15*   GFRESTIMATED  --   --   --   --  13*   LC  --   --   --   --  8.6    < > = values in this interval not displayed.           JIGNESH Perla    [facility]  [phone]

## 2021-12-07 NOTE — PROGRESS NOTES
Care Management Follow Up    Length of Stay (days): 1    Expected Discharge Date: 12/07/2021     Concerns to be Addressed:  Discharge planning     Patient plan of care discussed at interdisciplinary rounds: Yes    Anticipated Discharge Disposition:  [facility]     Anticipated Discharge Services:  Dialysis (see below for specifics)  Anticipated Discharge DME:  none    Patient/family educated on Medicare website which has current facility and service quality ratings:  N/A  Education Provided on the Discharge Plan:  yes  Patient/Family in Agreement with the Plan:  yes    Referrals Placed by CM/SW:    Private pay costs discussed: Not applicable    Additional Information:  Writer has provided Outpatient Dialysis Information as requested by [facility]:    [address] [address]   [phone]    Dialysis Schedule: Tues./Thurs./Sat.  Chair Time: 10:45 AM (with arrival time at 10:30 AM)  Dialysis ends at 2:15 PM    Transportation:  Prior to this hospitalization, patient had been independently taking an Uber to his Dialysis. (when he was mobile).  At this time patient would not be able to take an Uber. He would need to take [employer]. Daughter works and unable to transport him.  Patient has already filled out an application for [employer] and has given it to the  (Vonda) at [facility].   Anna at [facility] has left a message with Vonda informing [employer] is currently needed.      Ruthy Umana, RN  Care Coordinator  [phone]

## 2021-12-07 NOTE — PROGRESS NOTES
[facility]    Medicine Progress Note - Hospitalist Service       Date of Admission:  12/3/2021    Assessment & Plan         Gordon Gann is a 76 year old male with a history of atrial fibrillation anticoagulated on Coumadin, DM type II, CAD, CHF and CKD stage IV who presented to the ED on 12/3/2021 with right knee pain and swelling.     Spontaneous right knee hemarthrosis  History of gout  SIRS  Based on HR > 90 bpm and WBC > 12, without known infection.  Leukocytosis likely from acute stress, tachycardia likely from pain, monitor off antibiotics  A day prior to presentation, patient began to have right knee pain and swelling which gradually worsened to the point he needed to call EMS to bring him in.   No reported trauma.   In the ED the joint was aspirated with bloody fluid.  Gram stain negative.  Culture negative to date.  X-ray of the knee showed a joint effusion and moderate to severe tricompartmental degenerative arthritis changes.  Appreciate orthopedics input, recommended scheduled oral steroids for anti-inflammatory effects, started on Medrol Dosepak.    Continue methylprednisolone     Continue oral Dilaudid and Schedule Tylenol and scheduled Flexeril    Continue diclofenac gel    As needed IV Dilaudid if oral Dilaudid does not help    Continue to hold anticoagulation, appropriate timing to resume per orthopedics    Needs transitional care unit at discharge      H/o CAD   H/o HFpEF, not decompensated   Paroxysmal atrial fibrillation with RVR  Hypertension  No issues currently.  Normal anticoagulated with Coumadin.  INR 2.32 here.    Patient has been tachycardic and hypertensive.    Continue PTA metoprolol (currently getting 100 mg twice daily increased from 25 mg twice daily PTA ) and clonidine(dose decreased from 0.2 mg twice daily PTA dose to 0.1 milligrams twice daily as per nephrology)     Aspirin and anticoagulation on hold      Type 2 diabetes mellitus   Last HgbA1c from  10/5/21 6.2%  PTA on Lantus 15 units, premeal insulin 5 units 3 times daily with meals and sliding scale insulin. Now on Lantus 20 units bid but having hyperglycemia from steroid.    Increase Lantus     Continue correction scale     End-stage renal disease on hemodialysis  Patient was started on hemodialysis Tuesday Thursday and Saturday November of this year, at [facility]    Nephrologist following for continuation of dialysis     Diet: Moderate Consistent Carb (60 g CHO per Meal) Diet    DVT Prophylaxis: Pneumatic Compression Devices  Flood Catheter: Not present  Central Lines: None  Code Status: No CPR- Do NOT Intubate      Disposition Plan   Expected discharge: 12/07/2021   recommended to prior living arrangement once knee pain and bleeding are improved.     The patient's care was discussed with the Patient.    Fili Ireland MD  Hospitalist Service  [facility]  Securely message with the Vocera Web Console (learn more here)  Text page via [employer] Paging/Directory        Clinically Significant Risk Factors Present on Admission             # Coagulation Defect: home medication list includes an anticoagulant medication  # Platelet Defect: home medication list includes an antiplatelet medication      ______________________________________________________________________    Interval History   Having expected pain.      Data reviewed today: I reviewed all medications, new labs and imaging results over the last 24 hours. I personally reviewed no images or EKG's today.    Physical Exam   Vital Signs: Temp: 98.4  F (36.9  C) Temp src: Oral BP: (!) 160/106 Pulse: 105   Resp: 16 SpO2: 95 % O2 Device: None (Room air)    Weight: 200 lbs 0 oz  Constitutional: awake, alert, cooperative, no apparent distress  Respiratory: No increased work of breathing, good air exchange, clear to auscultation bilaterally, no crackles or wheezing  Cardiovascular:  regular rate and rhythm, normal S1 and S2, no S3 or  S4, and no murmur noted  GI: normal bowel sounds, soft, non-distended, non-tender  Skin: no rashes  Musculoskeletal: right lower extremity is ace wrapped  Neuropsychiatric: General: normal, calm and normal eye contact    Data   Recent Labs   Lab 12/07/21  0748 12/07/21  0213 12/06/21  2106 12/06/21  1219 12/06/21  1118 12/05/21  1203 12/05/21  1200 12/05/21  0608 12/04/21  0822 12/04/21  0615 12/03/21  2329 12/03/21  1832 12/03/21  1151   WBC  --   --   --   --   --   --   --  11.5*  --  16.1*  --   --  9.4   HGB  --   --   --   --   --   --   --  10.5*  --  11.0*  --   --  11.7*   MCV  --   --   --   --   --   --   --  90  --  87  --   --  88   PLT  --   --   --   --   --   --   --  254  --  285  --   --  279   INR  --   --   --   --   --   --  1.59*  --   --   --   --   --  2.32*   NA  --   --   --   --   --   --   --  136  --   --   --   --  138   POTASSIUM  --   --   --   --  4.8  --   --  4.8  --   --  4.4  --  3.9   CHLORIDE  --   --   --   --   --   --   --  102  --   --   --   --  102   CO2  --   --   --   --   --   --   --  25  --   --   --   --  27   BUN  --   --   --   --   --   --   --  48*  --   --   --   --  36*   CR  --   --   --   --   --   --   --  4.15*  --   --   --   --  3.61*   ANIONGAP  --   --   --   --   --   --   --  9  --   --   --   --  9   LC  --   --   --   --   --   --   --  8.6  --   --   --   --  9.1   * 315* 395*   < >  --    < >  --  324*   < >  --   --    < > 252*    < > = values in this interval not displayed.     No results found for this or any previous visit (from the past 24 hour(s)).  Medications       acetaminophen  1,000 mg Oral TID     bumetanide  2 mg Oral 2 times per day on Sun Mon Wed Fri     cloNIDine  0.1 mg Oral BID     cyclobenzaprine  10 mg Oral TID     insulin aspart  1-10 Units Subcutaneous TID AC     insulin aspart  1-7 Units Subcutaneous At Bedtime     insulin aspart  5 Units Subcutaneous TID w/meals     insulin glargine  20 Units Subcutaneous QAM AC      insulin glargine  20 Units Subcutaneous At Bedtime     lisinopril  10 mg Oral Daily     methylPREDNISolone  4 mg Oral QAM AC    And     methylPREDNISolone  4 mg Oral Daily with lunch    And     methylPREDNISolone  4 mg Oral At Bedtime     metoprolol tartrate  100 mg Oral BID     mirtazapine  15 mg Oral At Bedtime     sodium chloride (PF)  3 mL Intracatheter Q8H     traZODone  50 mg Oral At Bedtime

## 2021-12-07 NOTE — PROVIDER NOTIFICATION
MD Notification    Notified Person: MD    Notified Person Name:    Notification Date/Time:    Notification Interaction:    Purpose of Notification: Hypertensive whole day 150-160s/110s. Tachycardia to 120s. Gave prn iv Metoprolol.   Still tachy to 120s and /107.     FYI:  unable to run dialysis today. Need IR fistulogram tomorrow       Orders Received: Awaiting return call    Comments:    Prn Hydralazine is only for SBP >180

## 2021-12-07 NOTE — PROGRESS NOTES
MD Notification    Notified Person: MD    Notified Person Name: Dr. Cook    Notification Date/Time: 12/6/21 at 2210    Notification Interaction: Page     Purpose of Notification: . Gave units of Novolog and 20 units of Lantus. On Medrol. Other interventions?     Orders Received:    Comments: Awaiting response

## 2021-12-07 NOTE — PROGRESS NOTES
Care Management Follow Up    Length of Stay (days): 1    Expected Discharge Date: 12/08/2021     Concerns to be Addressed:       Patient plan of care discussed at interdisciplinary rounds: Yes    Anticipated Discharge Disposition:       Anticipated Discharge Services:    Anticipated Discharge DME:      Patient/family educated on Medicare website which has current facility and service quality ratings:    Education Provided on the Discharge Plan:    Patient/Family in Agreement with the Plan:      Referrals Placed by CM/SW:    Private pay costs discussed: Not applicable    Additional Information:  Patient can discharge tomorrow to [facility] dependent on Insurance Authorization coming back. Mildred in admissions is submitting it and will reach out if there is any issues. Mildred states patient could go most likely in the afternoon tomorrow. SW to call tomorrow to confirm.       LIANA Kim

## 2021-12-07 NOTE — PROVIDER NOTIFICATION
MD Notification    Notified Person: MD    Notified Person Name: Trentalba Salazar MD     Notification Date/Time: 12/7/2021 at 0210am    Notification Interaction: [employer] web     Purpose of Notification: FYI, capillary blood glucose: 315mg/dL.     Orders Received: Novolog 4u SQ    Comments:

## 2021-12-08 ENCOUNTER — APPOINTMENT (OUTPATIENT)
Dept: PHYSICAL THERAPY | Facility: CLINIC | Age: 76
DRG: 981 | End: 2021-12-08
Payer: COMMERCIAL

## 2021-12-08 ENCOUNTER — APPOINTMENT (OUTPATIENT)
Dept: INTERVENTIONAL RADIOLOGY/VASCULAR | Facility: CLINIC | Age: 76
DRG: 981 | End: 2021-12-08
Attending: INTERNAL MEDICINE
Payer: COMMERCIAL

## 2021-12-08 LAB
BACTERIA BLD CULT: NO GROWTH
BACTERIA BLD CULT: NO GROWTH
BACTERIA SNV CULT: NO GROWTH
GLUCOSE BLDC GLUCOMTR-MCNC: 134 MG/DL (ref 70–99)
GLUCOSE BLDC GLUCOMTR-MCNC: 176 MG/DL (ref 70–99)
GLUCOSE BLDC GLUCOMTR-MCNC: 257 MG/DL (ref 70–99)
GLUCOSE BLDC GLUCOMTR-MCNC: 266 MG/DL (ref 70–99)
GLUCOSE BLDC GLUCOMTR-MCNC: 266 MG/DL (ref 70–99)
GLUCOSE BLDC GLUCOMTR-MCNC: 291 MG/DL (ref 70–99)

## 2021-12-08 PROCEDURE — B51W1ZZ FLUOROSCOPY OF DIALYSIS SHUNT/FISTULA USING LOW OSMOLAR CONTRAST: ICD-10-PCS | Performed by: RADIOLOGY

## 2021-12-08 PROCEDURE — 272N000302 HC DEVICE INFLATION CR5

## 2021-12-08 PROCEDURE — 90937 HEMODIALYSIS REPEATED EVAL: CPT

## 2021-12-08 PROCEDURE — 272N000196 HC ACCESSORY CR5

## 2021-12-08 PROCEDURE — 250N000011 HC RX IP 250 OP 636: Performed by: PHYSICIAN ASSISTANT

## 2021-12-08 PROCEDURE — 97530 THERAPEUTIC ACTIVITIES: CPT | Mod: GP

## 2021-12-08 PROCEDURE — C1769 GUIDE WIRE: HCPCS

## 2021-12-08 PROCEDURE — 90935 HEMODIALYSIS ONE EVALUATION: CPT | Performed by: INTERNAL MEDICINE

## 2021-12-08 PROCEDURE — 250N000009 HC RX 250: Performed by: INTERNAL MEDICINE

## 2021-12-08 PROCEDURE — 99152 MOD SED SAME PHYS/QHP 5/>YRS: CPT

## 2021-12-08 PROCEDURE — 250N000013 HC RX MED GY IP 250 OP 250 PS 637: Performed by: INTERNAL MEDICINE

## 2021-12-08 PROCEDURE — 272N000187 HC ACCESSORY CR11

## 2021-12-08 PROCEDURE — 634N000001 HC RX 634: Performed by: INTERNAL MEDICINE

## 2021-12-08 PROCEDURE — 99232 SBSQ HOSP IP/OBS MODERATE 35: CPT | Performed by: HOSPITALIST

## 2021-12-08 PROCEDURE — 057Y3ZZ DILATION OF UPPER VEIN, PERCUTANEOUS APPROACH: ICD-10-PCS | Performed by: RADIOLOGY

## 2021-12-08 PROCEDURE — 250N000012 HC RX MED GY IP 250 OP 636 PS 637: Performed by: INTERNAL MEDICINE

## 2021-12-08 PROCEDURE — 120N000004 HC R&B MS OVERFLOW

## 2021-12-08 PROCEDURE — 97110 THERAPEUTIC EXERCISES: CPT | Mod: GP

## 2021-12-08 PROCEDURE — 86706 HEP B SURFACE ANTIBODY: CPT | Performed by: INTERNAL MEDICINE

## 2021-12-08 PROCEDURE — C1725 CATH, TRANSLUMIN NON-LASER: HCPCS

## 2021-12-08 PROCEDURE — 03WY3JZ REVISION OF SYNTHETIC SUBSTITUTE IN UPPER ARTERY, PERCUTANEOUS APPROACH: ICD-10-PCS | Performed by: RADIOLOGY

## 2021-12-08 PROCEDURE — 250N000009 HC RX 250: Performed by: PHYSICIAN ASSISTANT

## 2021-12-08 PROCEDURE — 255N000002 HC RX 255 OP 636: Performed by: RADIOLOGY

## 2021-12-08 PROCEDURE — C1757 CATH, THROMBECTOMY/EMBOLECT: HCPCS

## 2021-12-08 PROCEDURE — 3E05317 INTRODUCTION OF OTHER THROMBOLYTIC INTO PERIPHERAL ARTERY, PERCUTANEOUS APPROACH: ICD-10-PCS | Performed by: RADIOLOGY

## 2021-12-08 PROCEDURE — 272N000116 HC CATH CR1

## 2021-12-08 PROCEDURE — 87340 HEPATITIS B SURFACE AG IA: CPT | Performed by: INTERNAL MEDICINE

## 2021-12-08 PROCEDURE — 250N000011 HC RX IP 250 OP 636: Performed by: RADIOLOGY

## 2021-12-08 PROCEDURE — 272N000209 HC BALLOON (NON-PTA) CR6

## 2021-12-08 PROCEDURE — 36415 COLL VENOUS BLD VENIPUNCTURE: CPT | Performed by: INTERNAL MEDICINE

## 2021-12-08 PROCEDURE — 272N000564 HC SHEATH CR2

## 2021-12-08 RX ORDER — FLUMAZENIL 0.1 MG/ML
0.2 INJECTION, SOLUTION INTRAVENOUS
Status: DISCONTINUED | OUTPATIENT
Start: 2021-12-08 | End: 2021-12-08 | Stop reason: HOSPADM

## 2021-12-08 RX ORDER — NALOXONE HYDROCHLORIDE 0.4 MG/ML
0.4 INJECTION, SOLUTION INTRAMUSCULAR; INTRAVENOUS; SUBCUTANEOUS
Status: DISCONTINUED | OUTPATIENT
Start: 2021-12-08 | End: 2021-12-08 | Stop reason: HOSPADM

## 2021-12-08 RX ORDER — HEPARIN SODIUM 200 [USP'U]/100ML
1 INJECTION, SOLUTION INTRAVENOUS CONTINUOUS PRN
Status: DISCONTINUED | OUTPATIENT
Start: 2021-12-08 | End: 2021-12-08 | Stop reason: HOSPADM

## 2021-12-08 RX ORDER — NALOXONE HYDROCHLORIDE 0.4 MG/ML
0.2 INJECTION, SOLUTION INTRAMUSCULAR; INTRAVENOUS; SUBCUTANEOUS
Status: DISCONTINUED | OUTPATIENT
Start: 2021-12-08 | End: 2021-12-08 | Stop reason: HOSPADM

## 2021-12-08 RX ORDER — LIDOCAINE 40 MG/G
CREAM TOPICAL
Status: DISCONTINUED | OUTPATIENT
Start: 2021-12-08 | End: 2021-12-08 | Stop reason: HOSPADM

## 2021-12-08 RX ORDER — FENTANYL CITRATE 50 UG/ML
25-50 INJECTION, SOLUTION INTRAMUSCULAR; INTRAVENOUS EVERY 5 MIN PRN
Status: DISCONTINUED | OUTPATIENT
Start: 2021-12-08 | End: 2021-12-08 | Stop reason: HOSPADM

## 2021-12-08 RX ORDER — HEPARIN SODIUM 1000 [USP'U]/ML
3000 INJECTION, SOLUTION INTRAVENOUS; SUBCUTANEOUS ONCE
Status: COMPLETED | OUTPATIENT
Start: 2021-12-08 | End: 2021-12-08

## 2021-12-08 RX ORDER — IOPAMIDOL 612 MG/ML
100 INJECTION, SOLUTION INTRAVASCULAR ONCE
Status: COMPLETED | OUTPATIENT
Start: 2021-12-08 | End: 2021-12-08

## 2021-12-08 RX ADMIN — IOPAMIDOL 60 ML: 612 INJECTION, SOLUTION INTRAVENOUS at 09:32

## 2021-12-08 RX ADMIN — METOPROLOL TARTRATE 2.5 MG: 5 INJECTION INTRAVENOUS at 03:16

## 2021-12-08 RX ADMIN — FENTANYL CITRATE 25 MCG: 50 INJECTION INTRAMUSCULAR; INTRAVENOUS at 08:41

## 2021-12-08 RX ADMIN — TRAZODONE HYDROCHLORIDE 50 MG: 50 TABLET ORAL at 23:11

## 2021-12-08 RX ADMIN — MIDAZOLAM HYDROCHLORIDE 0.5 MG: 1 INJECTION, SOLUTION INTRAMUSCULAR; INTRAVENOUS at 09:03

## 2021-12-08 RX ADMIN — MIRTAZAPINE 15 MG: 15 TABLET, FILM COATED ORAL at 23:11

## 2021-12-08 RX ADMIN — ACETAMINOPHEN 1000 MG: 500 TABLET, FILM COATED ORAL at 18:25

## 2021-12-08 RX ADMIN — EPOETIN ALFA-EPBX 2000 UNITS: 2000 INJECTION, SOLUTION INTRAVENOUS; SUBCUTANEOUS at 16:18

## 2021-12-08 RX ADMIN — ACETAMINOPHEN 1000 MG: 500 TABLET, FILM COATED ORAL at 08:13

## 2021-12-08 RX ADMIN — CLONIDINE HYDROCHLORIDE 0.1 MG: 0.1 TABLET ORAL at 20:42

## 2021-12-08 RX ADMIN — METHYLPREDNISOLONE 4 MG: 4 TABLET ORAL at 08:13

## 2021-12-08 RX ADMIN — MIDAZOLAM HYDROCHLORIDE 0.5 MG: 1 INJECTION, SOLUTION INTRAMUSCULAR; INTRAVENOUS at 08:41

## 2021-12-08 RX ADMIN — FENTANYL CITRATE 25 MCG: 50 INJECTION INTRAMUSCULAR; INTRAVENOUS at 09:03

## 2021-12-08 RX ADMIN — CYCLOBENZAPRINE 10 MG: 10 TABLET, FILM COATED ORAL at 13:57

## 2021-12-08 RX ADMIN — METOPROLOL TARTRATE 100 MG: 50 TABLET, FILM COATED ORAL at 11:10

## 2021-12-08 RX ADMIN — BUMETANIDE 2 MG: 2 TABLET ORAL at 18:25

## 2021-12-08 RX ADMIN — HEPARIN SODIUM 2000 UNITS: 200 INJECTION, SOLUTION INTRAVENOUS at 09:26

## 2021-12-08 RX ADMIN — ACETAMINOPHEN 1000 MG: 500 TABLET, FILM COATED ORAL at 11:10

## 2021-12-08 RX ADMIN — CYCLOBENZAPRINE 10 MG: 10 TABLET, FILM COATED ORAL at 20:41

## 2021-12-08 RX ADMIN — HEPARIN SODIUM 3000 UNITS: 1000 INJECTION INTRAVENOUS; SUBCUTANEOUS at 08:51

## 2021-12-08 RX ADMIN — METOPROLOL TARTRATE 100 MG: 50 TABLET, FILM COATED ORAL at 20:41

## 2021-12-08 RX ADMIN — METHYLPREDNISOLONE 4 MG: 4 TABLET ORAL at 23:11

## 2021-12-08 RX ADMIN — CYCLOBENZAPRINE 10 MG: 10 TABLET, FILM COATED ORAL at 08:13

## 2021-12-08 RX ADMIN — LIDOCAINE HYDROCHLORIDE 2 ML: 10 INJECTION, SOLUTION INFILTRATION; PERINEURAL at 09:10

## 2021-12-08 RX ADMIN — ALTEPLASE 2 MG: 2.2 INJECTION, POWDER, LYOPHILIZED, FOR SOLUTION INTRAVENOUS at 08:56

## 2021-12-08 ASSESSMENT — ACTIVITIES OF DAILY LIVING (ADL)
ADLS_ACUITY_SCORE: 16

## 2021-12-08 NOTE — PROVIDER NOTIFICATION
MD Notification    Notified Person: MD    Notified Person Name: Dr. Sanchez    Notification Date/Time: 12-8 0443    Notification Interaction:page    Purpose of Notification:   /116,   Metoprolol IV given at 0316 with minimal relief, then BP and HR increased again. Pt asleep, appears asymptomatic.     Orders Received: yes, no orders.    Comments:

## 2021-12-08 NOTE — PROGRESS NOTES
Renal Medicine Inpatient Dialysis Note                                Gordon Gann MRN# [id number]   Age: 76 year old YOB: 1945   Date of Admission: 12/3/2021 Hospital LOS: 2          Assessment/Plan:     76 y.o man with ESRD, admitted for R knee hemarthrosis      # ESRD:               -started IHD 10/2021               -TTS               -90 kg EDW               -3.5 hrs               -LAVG, 15 g.                -no heparin               -Primo with Dr. Kasper     # Anemia: Gets 1400 units Epo with HD     # Hypervolemia: 1-2+ edema.      # Hypertension:                -clonidine 0.2 mg bid               -metoprolol 50 mg bid     # R hemarthrosis s/p aspiration:               -neg gram stain and crystal               -on steroid               -no surgical invention per ortho      Sp declot today for clotted L AVG    TTS schedule  Next 12/09/21    Interval History:     Dialysis run parameters reviewed with dialysis RN at patient bedside.    Initial setup clotted  Heparin used for second set    4 needles required    Plan 2 hours today  2 hours tomorrow to re establish TTS        ROS     GENERAL: NAD, No fever,chills  R: NEGATIVE for significant cough or SOB  CV: NEGATIVE for chest pain, palpitations  EXT: no change edema  ROS otherwise negative    Dialysis Parameters:     Vitals were reviewed  Patient Vitals for the past 8 hrs:   BP Temp Temp src Pulse Resp SpO2   12/08/21 1027 (!) 155/96 97.6  F (36.4  C) Axillary 85 16 96 %   12/08/21 0940 (!) 141/95 -- -- 92 17 94 %   12/08/21 0935 (!) 135/99 -- -- 87 (!) 44 93 %   12/08/21 0930 (!) 142/93 -- -- 90 24 99 %   12/08/21 0925 139/84 -- -- 94 19 98 %   12/08/21 0920 (!) 128/97 -- -- 88 18 98 %   12/08/21 0915 (!) 138/93 -- -- 92 18 100 %   12/08/21 0910 (!) 129/93 -- -- 88 17 100 %   12/08/21 0905 (!) 142/88 -- -- 88 17 99 %   12/08/21 0900 (!) 138/90 -- -- 89 19 100 %   12/08/21 0855 (!) 138/91 -- -- 89 14 100 %   12/08/21 0850 -- -- -- 89 16 100 %  "  12/08/21 0845 -- -- -- 88 27 96 %   12/08/21 0840 -- -- -- (!) 122 20 96 %     I/O last 3 completed shifts:  In: 240 [P.O.:240]  Out: 900 [Urine:900]    Vitals:    12/03/21 1138   Weight: 90.7 kg (200 lb)       Current Weight: 90.7  Dry Weight: 90  Dialysis Temp: 36.5  C  Access Device: AVG   Access Site: left  Dialyzer: Revaclear  Dialysis Bath: 2  Sodium Profile: n  UF Goal: 2  Blood Flow Rate (mL/min): 400  Total Treatment Time (hrs): 2.5  Heparin: Low dose as required      EPO dose: y  Zemplar: n  IV Fe: n      Medications and Allergies:     Reviewed      Physical Exam:     Seen and examined during course of dialysis run    BP (!) 155/96 (BP Location: Left arm)   Pulse 85   Temp 97.6  F (36.4  C) (Axillary)   Resp 16   Ht 1.727 m (5' 8\")   Wt 90.7 kg (200 lb)   SpO2 96%   BMI 30.41 kg/m      GENERAL: awake, alert, follows  HEENT: NC/AT, PERRLA, EOMI, non icteric, pharynx moist without lesion  CV: RRR, normal S1 S2  ABDOMEN: S/NT, BS present  MS: 1 plus edema  NEURO: speech normal and cranial nerves 2-12 intact  PSYCH: affect flat    Data:       Recent Labs   Lab 12/08/21  1250 12/06/21  1219 12/06/21  1118 12/05/21  1203 12/05/21  0608   NA  --   --   --   --  136   POTASSIUM  --   --  4.8  --  4.8   CHLORIDE  --   --   --   --  102   CO2  --   --   --   --  25   ANIONGAP  --   --   --   --  9   *   < >  --    < > 324*   BUN  --   --   --   --  48*   CR  --   --   --   --  4.15*   GFRESTIMATED  --   --   --   --  13*   LC  --   --   --   --  8.6    < > = values in this interval not displayed.         MIGDALIA Perla MD    [facility] - Nephrology  [phone]          "

## 2021-12-08 NOTE — PROGRESS NOTES
A&O. A2 gb/w. Tele Afib CVR, but Pt has been tachy at baseline and Diastolic has been elevated, Metoprolol IV x1 at 0300, HR and BP unresolved and provider is aware. RLE ace wrap, CMS intact. Fistula L arm, no thrill no bruit, Fistulagram today. External cath, minimal output rt dialysis.  at 0200.  Mod carb diet. Denies pain. LEANDRA PÉREZ.

## 2021-12-08 NOTE — PLAN OF CARE
A&OX4, VSS on RA. C/o minimal pain in the R knee at rest, on scheduled pain meds. Has not needed any PRNs. Up AX2 with GB and walker. S/p fistulogram with L AVF graft with thrills and bruit present. Tele has been Aflutter with CVR. Currently in dialysis. PIV Sled. On combination diabetic and cardiac diet. BG checks in 200s this shift. Purewick in place. Discharge to [facility] tomorrow at 0900 AM.  Continue to monitor.

## 2021-12-08 NOTE — PLAN OF CARE
A&Ox4. Hypertensive and Tachycardia to 120s. Gave prn Metoprolol x1. Recheck BP 140s/90 and HR 90s. Other VSS on RA. Ace wrap to RLE-elevated in bed. CMS intact. WBAT to RLE. Up Ax2 walker+GB. Blood glucose in 300s. MD aware. Lantus younger increased to 25 units. Dialysis fistula to LUE-no thrill or bruit present. Unable to run dialysis today. Plan is IR fistulogram tomorrow. Pt is producing good amt urine, voided 550 in 4hours. Continue to monitor.

## 2021-12-08 NOTE — IR NOTE
Interventional Radiology Intra-procedural Nursing Note    Patient Name: Gordon Gann  Medical Record Number: [id number]  Today's Date: December 8, 2021    Start Time: 0845  End of procedure time: 0925  Procedure: Left upper extremity fistulogram, thrombectomy  Report given to: ANA King  Time pt departs:  0950  : n/a    Other Notes:     Patient arrives to Ir [address]. Identification confirmed and consent verified. Patient was then assisted onto procedure table, positioned safely and connected to monitoring equipment. Left arm was prepped and patient draped under sterile technique.     Versed 1 mg IV  Fentanyl 50 mcg IV  Heparin 3000 units IV  tPA 2mg intracatheter, given by Dr. Harrington    Patient tolerated procedure well. VSS. Patient alert, respirations regular and unlabored, no c/o pain at this time.     Left arm fistula access sites (2) are c/d/i ,manual pressure was held until hemostasis achieved    Patient transferred back to [address] in stable condition accompanied by radiology transport.    Allison Starks RN on 12/8/2021 at 9:48 AM

## 2021-12-08 NOTE — CONSULTS
"  Interventional Radiology - Pre-Procedure Note:  12/8/2021    Procedure Requested: Left AVG fistulogram  Requested by: MIGDALIA Perla MD    Brief HPI: Gordon Gann is a 76 year old male with a past medical history significant for history of atrial fibrillation anticoagulated on Coumadin, DM type II, CAD, CHF and CKD stage IV who presented to the ED on 12/3/2021 with right knee pain and swelling. Pt has ESRD on dialysis and pt gets IHD on TThS at [facility]. Pt was able to dialyze on Saturday 12/4/21, but was not able to dialyze on Tuesday 12/7/21 and was noted to have no palpable thrill and no bruit in the left AVG. The left AVG was placed 8/5/2021 by Dr. Hernandez of vascular surgery. Nephrology has requested fistulogram with declot by IR     NPO: confirmed  ANTICOAGULANTS: none  ANTIBIOTICS: none for procedure    ALLERGIES  No Known Allergies    LABS:  INR   Date Value Ref Range Status   12/05/2021 1.59 (H) 0.85 - 1.15 Final      Hemoglobin   Date Value Ref Range Status   12/05/2021 10.5 (L) 13.3 - 17.7 g/dL Final   ]  Platelet Count   Date Value Ref Range Status   12/05/2021 254 150 - 450 10e3/uL Final     Creatinine   Date Value Ref Range Status   12/05/2021 4.15 (H) 0.66 - 1.25 mg/dL Final     Potassium   Date Value Ref Range Status   12/06/2021 4.8 3.4 - 5.3 mmol/L Final     EXAM:  BP (!) 151/115   Pulse (!) 122   Temp 97.4  F (36.3  C) (Axillary)   Resp 24   Ht 1.727 m (5' 8\")   Wt 90.7 kg (200 lb)   SpO2 96%   BMI 30.41 kg/m    General:  Stable.  In no acute distress.    Neuro:  A&O x 3. Moves all extremities equally.  Resp:  Lungs clear to auscultation bilaterally, breathing unlabored on room air.  Cardio:  S1S2 and reg, without murmur, clicks or rubs  Abdomen:  Soft, non-distended, non-tender, positive bowel sounds.  Vascular:  No bruit in left arm  Skin:  Without excoriations, ecchymosis, or open sores on left upper extremity, there is a dry lesion on the left forearm appears like eczema " or psoriasis  MSK:  No gross motor weakness.  Sensation intact.  Proprioception intact.    Pre-Sedation Assessment:  Mallampati Airway Classification:  III - Only soft palate is visible. Intubation is predicted to be difficult  Previous reaction to anesthesia/sedation:  No  Sedation plan based on assessment: Moderate (conscious) sedation  ASA Classification: Class 3 - SEVERE SYSTEMIC DISEASE, DEFINITE FUNCTIONAL LIMITATIONS.   Code Status: DNR / DNI intra procedure, per discussion with pt today.     ASSESSMENT/PLAN:   76 year old male with a past medical history significant for history of atrial fibrillation anticoagulated on Coumadin, DM type II, CAD, CHF and CKD stage IV who presented to the ED on 12/3/2021 with right knee pain and swelling. Pt was not able to dialyze on Tuesday 12/7/21 and was noted to have no palpable thrill and no bruit in the left AVG.  -Proceed with left fistulogram    Procedure, risks/benefits, details, alternatives, and sedation reviewed with patient, who verbalized understanding. All questions answered. OK to proceed with above radiology procedure.     Hemalatha Peralta PA-C  Interventional Radiology  [facility] PA desk phone *[phone]

## 2021-12-08 NOTE — PROGRESS NOTES
Care Management Follow Up    Length of Stay (days): 2    Expected Discharge Date: 12/09/21   Concerns to be Addressed:       Patient plan of care discussed at interdisciplinary rounds: Yes    Anticipated Discharge Disposition:  [facility]     Anticipated Discharge Services:    Anticipated Discharge DME:      Patient/family educated on Medicare website which has current facility and service quality ratings:    Education Provided on the Discharge Plan:  yes  Patient/Family in Agreement with the Plan:  yes    Referrals Placed by CM/SW:  TCU  Private pay costs discussed: transportation costs    Additional Information:  Patient accepted at [facility] pending [employer] ins auth.  Patient informed this writer that he does NOT feel he is ready for discharge today. SW epaged pt concern to MD.    ADDENDUM:  MD updated this writer that he will keep patient one more night.  Transport rescheduled for 12/9/21 scheduled via [facility] at 0900.    ADDENDUM: Per TCU request, SW delayed discharge to 1545 as ins auth still not obtained.    SW to continue to follow and assist with discharge planning.    LIANA Meier  Daytime (8:00am-4:30pm): [phone]  After-Hours SW Pager (4:30pm-11:30pm): [phone]           LIANA Duque

## 2021-12-09 VITALS
HEIGHT: 68 IN | RESPIRATION RATE: 1 BRPM | SYSTOLIC BLOOD PRESSURE: 145 MMHG | OXYGEN SATURATION: 97 % | HEART RATE: 105 BPM | TEMPERATURE: 98.7 F | BODY MASS INDEX: 30.31 KG/M2 | WEIGHT: 200 LBS | DIASTOLIC BLOOD PRESSURE: 98 MMHG

## 2021-12-09 LAB
GLUCOSE BLDC GLUCOMTR-MCNC: 130 MG/DL (ref 70–99)
GLUCOSE BLDC GLUCOMTR-MCNC: 74 MG/DL (ref 70–99)
GLUCOSE BLDC GLUCOMTR-MCNC: 87 MG/DL (ref 70–99)
HBV SURFACE AB SERPL IA-ACNC: 0.45 M[IU]/ML
HBV SURFACE AG SERPL QL IA: NONREACTIVE

## 2021-12-09 PROCEDURE — 258N000003 HC RX IP 258 OP 636: Performed by: INTERNAL MEDICINE

## 2021-12-09 PROCEDURE — 250N000013 HC RX MED GY IP 250 OP 250 PS 637: Performed by: INTERNAL MEDICINE

## 2021-12-09 PROCEDURE — 634N000001 HC RX 634: Performed by: INTERNAL MEDICINE

## 2021-12-09 PROCEDURE — 90935 HEMODIALYSIS ONE EVALUATION: CPT | Performed by: INTERNAL MEDICINE

## 2021-12-09 PROCEDURE — 250N000009 HC RX 250: Performed by: INTERNAL MEDICINE

## 2021-12-09 PROCEDURE — 99239 HOSP IP/OBS DSCHRG MGMT >30: CPT | Performed by: HOSPITALIST

## 2021-12-09 PROCEDURE — 250N000011 HC RX IP 250 OP 636: Performed by: INTERNAL MEDICINE

## 2021-12-09 PROCEDURE — 90937 HEMODIALYSIS REPEATED EVAL: CPT

## 2021-12-09 PROCEDURE — 250N000012 HC RX MED GY IP 250 OP 636 PS 637: Performed by: INTERNAL MEDICINE

## 2021-12-09 RX ORDER — METOPROLOL TARTRATE 100 MG
100 TABLET ORAL 2 TIMES DAILY
Status: ON HOLD | DISCHARGE
Start: 2021-12-09 | End: 2023-01-01

## 2021-12-09 RX ORDER — ASPIRIN 81 MG/1
81 TABLET ORAL AT BEDTIME
Status: ON HOLD | DISCHARGE
Start: 2021-12-12 | End: 2023-01-01

## 2021-12-09 RX ORDER — BUMETANIDE 2 MG/1
2 TABLET ORAL 2 TIMES DAILY
Status: ON HOLD | DISCHARGE
Start: 2021-12-09 | End: 2023-01-01

## 2021-12-09 RX ORDER — CYCLOBENZAPRINE HCL 10 MG
10 TABLET ORAL 3 TIMES DAILY
DISCHARGE
Start: 2021-12-09 | End: 2023-01-01

## 2021-12-09 RX ORDER — AMOXICILLIN 250 MG
1 CAPSULE ORAL DAILY
DISCHARGE
Start: 2021-12-09 | End: 2022-01-01

## 2021-12-09 RX ORDER — LISINOPRIL 20 MG/1
20 TABLET ORAL DAILY
Status: ON HOLD | DISCHARGE
Start: 2021-12-10 | End: 2023-01-01

## 2021-12-09 RX ORDER — ACETAMINOPHEN 500 MG
1000 TABLET ORAL
DISCHARGE
Start: 2021-12-09 | End: 2022-01-01

## 2021-12-09 RX ORDER — WARFARIN SODIUM 7.5 MG/1
7.5 TABLET ORAL WEEKLY
DISCHARGE
Start: 2021-12-17 | End: 2022-01-01

## 2021-12-09 RX ORDER — HYDROMORPHONE HYDROCHLORIDE 2 MG/1
2 TABLET ORAL
Qty: 30 TABLET | Refills: 0 | Status: SHIPPED | OUTPATIENT
Start: 2021-12-09 | End: 2022-01-01

## 2021-12-09 RX ORDER — WARFARIN SODIUM 5 MG/1
5 TABLET ORAL DAILY
DISCHARGE
Start: 2021-12-12 | End: 2022-01-01

## 2021-12-09 RX ORDER — HEPARIN SODIUM 1000 [USP'U]/ML
500 INJECTION, SOLUTION INTRAVENOUS; SUBCUTANEOUS CONTINUOUS
Status: DISCONTINUED | OUTPATIENT
Start: 2021-12-09 | End: 2021-12-09

## 2021-12-09 RX ORDER — CLONIDINE HYDROCHLORIDE 0.1 MG/1
0.1 TABLET ORAL 2 TIMES DAILY
Status: ON HOLD | DISCHARGE
Start: 2021-12-09 | End: 2023-01-01

## 2021-12-09 RX ORDER — LISINOPRIL 20 MG/1
20 TABLET ORAL DAILY
Status: DISCONTINUED | OUTPATIENT
Start: 2021-12-10 | End: 2021-12-09 | Stop reason: HOSPADM

## 2021-12-09 RX ORDER — METOPROLOL TARTRATE 1 MG/ML
5 INJECTION, SOLUTION INTRAVENOUS EVERY 4 HOURS PRN
Status: DISCONTINUED | OUTPATIENT
Start: 2021-12-09 | End: 2021-12-09 | Stop reason: HOSPADM

## 2021-12-09 RX ORDER — LISINOPRIL 10 MG/1
10 TABLET ORAL DAILY
DISCHARGE
Start: 2021-12-09 | End: 2021-12-09

## 2021-12-09 RX ADMIN — HEPARIN SODIUM 500 UNITS/HR: 1000 INJECTION INTRAVENOUS; SUBCUTANEOUS at 09:44

## 2021-12-09 RX ADMIN — CLONIDINE HYDROCHLORIDE 0.1 MG: 0.1 TABLET ORAL at 12:39

## 2021-12-09 RX ADMIN — EPOETIN ALFA-EPBX 2000 UNITS: 2000 INJECTION, SOLUTION INTRAVENOUS; SUBCUTANEOUS at 11:06

## 2021-12-09 RX ADMIN — METOPROLOL TARTRATE 5 MG: 5 INJECTION INTRAVENOUS at 02:07

## 2021-12-09 RX ADMIN — METOPROLOL TARTRATE 100 MG: 50 TABLET, FILM COATED ORAL at 12:39

## 2021-12-09 RX ADMIN — METHYLPREDNISOLONE 4 MG: 4 TABLET ORAL at 07:03

## 2021-12-09 RX ADMIN — ACETAMINOPHEN 1000 MG: 500 TABLET, FILM COATED ORAL at 12:38

## 2021-12-09 RX ADMIN — METOPROLOL TARTRATE 2.5 MG: 5 INJECTION INTRAVENOUS at 00:11

## 2021-12-09 RX ADMIN — HYDROMORPHONE HYDROCHLORIDE 4 MG: 2 TABLET ORAL at 01:18

## 2021-12-09 RX ADMIN — SODIUM CHLORIDE 250 ML: 9 INJECTION, SOLUTION INTRAVENOUS at 09:44

## 2021-12-09 RX ADMIN — CYCLOBENZAPRINE 10 MG: 10 TABLET, FILM COATED ORAL at 12:39

## 2021-12-09 RX ADMIN — HEPARIN SODIUM 500 UNITS: 1000 INJECTION INTRAVENOUS; SUBCUTANEOUS at 09:44

## 2021-12-09 RX ADMIN — SODIUM CHLORIDE 300 ML: 9 INJECTION, SOLUTION INTRAVENOUS at 09:43

## 2021-12-09 ASSESSMENT — ACTIVITIES OF DAILY LIVING (ADL)
ADLS_ACUITY_SCORE: 16
ADLS_ACUITY_SCORE: 15
ADLS_ACUITY_SCORE: 16
ADLS_ACUITY_SCORE: 15
ADLS_ACUITY_SCORE: 15
ADLS_ACUITY_SCORE: 16

## 2021-12-09 NOTE — PROVIDER NOTIFICATION
MD Notification    Notified Person: MD    Notified Person Name:  Concepciónshy    Notification Date/Time: 12-9 0515    Notification Interaction:page    Purpose of Notification:   Pt , /101   newly increased metoprolol IV given at 0207, vitals normalized then increased.  Fyi new numb tingling in L arm. Pt had fistulagram and dialysis today.    Orders Received: Yes. Pt stated to provider numbness and tingling is not new. No new orders for HR and BP    Comments:

## 2021-12-09 NOTE — PLAN OF CARE
A&O, VSS on RA, no pain at rest, on condom catheter,dialysis session today, 2 A2 GB and W in short transfers, on dialysis/cardiac diet, tolerating, plan for another dialysis session tomorrow, prior to discharge at 3.45 pm,  continue to monitor.

## 2021-12-09 NOTE — PROGRESS NOTES
A&O, confusion, garbled speech. A2 gb/w. Tele Aflutter.  at 0200. Mod carb. RA. External Cath. Fistua on L arm, Fistulagram and dialysis yesterday. Dialysis today. Provider aware of ongoing intermittent numbness and tingling in L arm since fistula was placed, not new as stated by pt. Pain tx tylenol and dilaudid PO. Tachy and elevated diastolic, provider aware, tx IV metoprolol 2x, dose increased from 2.5 mg to 5 mg, resolved and then increased again. R leg wrapped in Ace. [facility] for discharge. Cont to monitor.

## 2021-12-09 NOTE — PLAN OF CARE
Nursing note  Pt a/o x 3, disoriented to time and forgetful. Pt denies any n/v. Pt denies any SOB/BORDEN. Condom cath in place. Pt c/o mild pain 2/10, received scheduled tylenol. Dressing on the left upper arm fistula site c/d/I. Pt was dialyzed this morning, doing well. Will be discharging back to [facility] around 1545 this evening. VSS, except elevated BP of 169/106 and HR of 125. Pt received his scheduled BP meds. Skin dry/flaky, cleansed with soap and water and applied Aquaphor cream. Ace wrap to the RLE intact. BLE's 2+ edema . Pt BGM this morning was 87, MD was notified if he can make some adjustment on pt schedule insulin's which MD did.. Will continue to monitor.Gary Medrano RN

## 2021-12-09 NOTE — PROGRESS NOTES
Potassium   Date Value Ref Range Status   12/06/2021 4.8 3.4 - 5.3 mmol/L Final     Hemoglobin   Date Value Ref Range Status   12/05/2021 10.5 (L) 13.3 - 17.7 g/dL Final     Creatinine   Date Value Ref Range Status   12/05/2021 4.15 (H) 0.66 - 1.25 mg/dL Final     Urea Nitrogen   Date Value Ref Range Status   12/05/2021 48 (H) 7 - 30 mg/dL Final     Sodium   Date Value Ref Range Status   12/05/2021 136 133 - 144 mmol/L Final     INR   Date Value Ref Range Status   12/05/2021 1.59 (H) 0.85 - 1.15 Final       DIALYSIS PROCEDURE NOTE  Hepatitis status of previous patient on machine log was checked and verified ok to use with this patients hepatitis status.  Patient dialyzed for 2.5 hrs. on a K2 bath with a net fluid removal of  2L.  A BFR of 250 ml/min was obtained via a L AVF using 15gauge needles.      The treatment plan was discussed with Dr. Ferro during the treatment.    Total heparin received during the treatment: 3200 units.   Needle cannulation sites held x 5 min.       Meds  given: Epo   Complications: Clotted circuit at 1 hr. New circuit and Heparin started per MD. Good result. Run shortened from original plan. Will run again tomorrow.      Person educated: pt. Knowledge base limited. Barriers to learning: lethargy. ICEBOAT? Timeout performed pre-treatment  I: Patient was identified using 2 identifiers  C:  Consent Signed Yes  E: Equipment preventative maintenance is current and dialysis delivery system OK to use  B: Hepatitis B Surface Antigen: neg; Draw Date: 11/9/21      Hepatitis B Surface Antibody: lalita; Draw Date: 10/19/21  O: Dialysis orders present and complete prior to treatment  A: Vascular access verified and assessed prior to treatment  T: Treatment was performed at a clinically appropriate time  ?: Patient was allowed to ask questions and address concerns prior to treatment  See flowsheet in EPIC for further details and post assessment.  Machine water alarm in place and functioning. Transducer pods  intact and checked every 15min.   Pt returned via bed.  Chlorine/Chloramine water system checked every 4 hours.      Please remove patient dressing on AVF and AVG needle sites 24 hours after dialysis. If leaking occurs please apply a Band-Aid.

## 2021-12-09 NOTE — PLAN OF CARE
Physical Therapy Discharge Summary    Reason for therapy discharge:    Discharged to transitional care facility.    Progress towards therapy goal(s). See goals on Care Plan in [facility] electronic health record for goal details.  Goals partially met.  Barriers to achieving goals:   discharge from facility.    Therapy recommendation(s):    Continued therapy is recommended.  Rationale/Recommendations:  Pt will benefit from continued therapy to improve functional mobility and independence.

## 2021-12-09 NOTE — PROGRESS NOTES
PAS-RR    D: Per DHS regulation, SW completed and submitted PAS-RR to [place] [employer] Direct Connect via the Senior [employer] Line.  PAS-RR confirmation # is : [id number]    I: SW spoke with pt and they are aware a PAS-RR has been submitted.  SW reviewed with pt that they may be contacted for a follow up appointment within 10 days of hospital discharge if their SNF stay is < 30 days.  Contact information for Senior [employer] Line was also provided.    A: pt verbalized understanding.    P: Further questions may be directed to Senior [employer] Line at #[phone], option #4 for PAS-RR staff.    LIANA Brady

## 2021-12-09 NOTE — PROGRESS NOTES
Potassium   Date Value Ref Range Status   12/06/2021 4.8 3.4 - 5.3 mmol/L Final     Hemoglobin   Date Value Ref Range Status   12/05/2021 10.5 (L) 13.3 - 17.7 g/dL Final     Creatinine   Date Value Ref Range Status   12/05/2021 4.15 (H) 0.66 - 1.25 mg/dL Final     Urea Nitrogen   Date Value Ref Range Status   12/05/2021 48 (H) 7 - 30 mg/dL Final     Sodium   Date Value Ref Range Status   12/05/2021 136 133 - 144 mmol/L Final     INR   Date Value Ref Range Status   12/05/2021 1.59 (H) 0.85 - 1.15 Final       DIALYSIS PROCEDURE NOTE  Hepatitis status of previous patient on machine log was checked and verified ok to use with this patients hepatitis status.  Patient dialyzed for 2.5 hrs. on a K2 bath with a net fluid removal of  2L.  A BFR of 350 ml/min was obtained via a LUE AVG using 16 gauge needles.      The treatment plan was discussed with Dr. Ferro and Pan during the treatment.    Total heparin received during the treatment: 1500 units.   Needle cannulation sites held x 8 min.     Meds  given: Epogen, see MAR   Complications: some trouble with cannulation. Resolved with a new needle. MD aware     Person educated: patient. Knowledge base minimal. Barriers to learning: some confusion. Educated on procedure and access care via verbal mode. patient verbalized understanding. Pt prefers verbal education style.     ICEBOAT? Timeout performed pre-treatment  I: Patient was identified using 2 identifiers  C:  Consent Signed Yes  E: Equipment preventative maintenance is current and dialysis delivery system OK to use  B: Hepatitis B Surface Antigen: NEGATIVE; Draw Date: 12/8/2021      Hepatitis B Surface Antibody: SUSCEPTIBLE; Draw Date: 12/8/2021  O: Dialysis orders present and complete prior to treatment  A: Vascular access verified and assessed prior to treatment  T: Treatment was performed at a clinically appropriate time  ?: Patient was allowed to ask questions and address concerns prior to treatment  See flowsheet  in EPIC for further details and post assessment.  Machine water alarm in place and functioning. Transducer pods intact and checked every 15min.   Pt returned via bed.  Chlorine/Chloramine water system checked every 4 hours.  Outpatient Dialysis at [facility] T[facility]    Please remove patient dressing on AVG needle sites 24 hours after dialysis. If leaking occurs please apply a Band-Aid.

## 2021-12-09 NOTE — PROVIDER NOTIFICATION
MD Notification    Notified Person: MD    Notified Person Name:  Marie    Notification Date/Time: 12-9 0125    Notification Interaction:page    Purpose of Notification:  Pt , /101. Metoprolol IV prn given with no relief. Afterwards , /103.     Orders Received:    Comments:

## 2021-12-09 NOTE — PROGRESS NOTES
Nursing note  Pt just arrived back on the floor from dialysis via bed. Pt denies any pain. Pt denies any dizziness or lightheadedness. Will continue to monitor.

## 2021-12-09 NOTE — PROGRESS NOTES
[id number]: Pt discharged from floor via w/c in stable condition, accompanied by [facility] transport. Destination TCU- [facility]. Discharge instructions were reviewed with pt by previous RN and a copy of AVS given. Belongings returned to pt. Discharge paperwork/envelope for TCU sent with pt.

## 2021-12-09 NOTE — PROGRESS NOTES
Renal Medicine Inpatient Dialysis Note                                Gordon Gann MRN# [id number]   Age: 76 year old YOB: 1945   Date of Admission: 12/3/2021 Hospital LOS: 3          Assessment/Plan:     76 y.o man with ESRD, admitted for R knee hemarthrosis      # ESRD:               -started IHD 10/2021               -TTS               -90 kg EDW               -3.5 hrs               -LAVG, 15 g.                -no heparin               -Primo with Dr. Kasper     # Anemia: Gets 1400 units Epo with HD     # Hypervolemia: 1-2+ edema.      # Hypertension:                -clonidine 0.2 mg bid               -metoprolol 50 mg bid     # R hemarthrosis s/p aspiration:               -neg gram stain and crystal               -on steroid               -no surgical invention per ortho      Sp declot today for clotted L AVG    TTS schedule  Next 12/11/21    Lisinopril to 20 mg     OK for discharge from renal standpoint     Interval History:     Dialysis run parameters reviewed with dialysis RN at patient bedside.  Patient seen on run    3 needles required    2.5 hours  2.5 liters UF    BP moderate      ROS     GENERAL: NAD, No fever,chills  R: NEGATIVE for significant cough or SOB  CV: NEGATIVE for chest pain, palpitations  EXT: no change edema  ROS otherwise negative    Dialysis Parameters:     Vitals were reviewed  Patient Vitals for the past 8 hrs:   BP Temp Temp src Pulse Resp SpO2   12/09/21 0930 (!) 135/105 -- -- (!) 124 -- --   12/09/21 0915 (!) 141/84 -- -- (!) 124 -- --   12/09/21 0900 120/78 -- -- (!) 123 -- --   12/09/21 0845 (!) 135/95 -- -- (!) 123 -- --   12/09/21 0830 (!) 142/98 -- -- (!) 122 -- --   12/09/21 0815 (!) 146/100 98  F (36.7  C) Oral (!) 123 16 98 %   12/09/21 0736 (!) 146/102 97.8  F (36.6  C) Oral (!) 123 14 98 %   12/09/21 0443 (!) 144/101 97.5  F (36.4  C) Oral (!) 122 14 95 %   12/09/21 0238 (!) 142/90 -- -- 98 -- 98 %   12/09/21 0205 (!) 140/101 -- -- (!) 122 -- 97 %  "    I/O last 3 completed shifts:  In: 240 [P.O.:240]  Out: 2000 [Other:2000]    Vitals:    12/03/21 1138   Weight: 90.7 kg (200 lb)       Current Weight: 90.7  Dry Weight: 90  Dialysis Temp: 36.5  C  Access Device: AVG   Access Site: left  Dialyzer: Revaclear  Dialysis Bath: 2  Sodium Profile: n  UF Goal: 2.5  Blood Flow Rate (mL/min): 400  Total Treatment Time (hrs): 2.5  Heparin: Low dose as required      EPO dose: y  Zemplar: n  IV Fe: n      Medications and Allergies:     Reviewed      Physical Exam:     Seen and examined during course of dialysis run    BP (!) 135/105   Pulse (!) 124   Temp 98  F (36.7  C) (Oral)   Resp 16   Ht 1.727 m (5' 8\")   Wt 90.7 kg (200 lb)   SpO2 98%   BMI 30.41 kg/m      GENERAL: awake, alert, follows  HEENT: NC/AT, PERRLA, EOMI, non icteric, pharynx moist without lesion  CV: RRR, normal S1 S2  ABDOMEN: S/NT, BS present  MS: 1 plus edema  NEURO: speech normal and cranial nerves 2-12 intact  PSYCH: affect flat    Data:       Recent Labs   Lab 12/09/21  0749 12/06/21  1219 12/06/21  1118 12/05/21  1203 12/05/21  0608   NA  --   --   --   --  136   POTASSIUM  --   --  4.8  --  4.8   CHLORIDE  --   --   --   --  102   CO2  --   --   --   --  25   ANIONGAP  --   --   --   --  9   GLC 87   < >  --    < > 324*   BUN  --   --   --   --  48*   CR  --   --   --   --  4.15*   GFRESTIMATED  --   --   --   --  13*   LC  --   --   --   --  8.6    < > = values in this interval not displayed.         MIGDALIA Perla MD    [facility] - Nephrology  [phone]          "

## 2021-12-09 NOTE — DISCHARGE SUMMARY
[facility]  Hospitalist Discharge Summary      Date of Admission:  12/3/2021  Date of Discharge:  12/9/2021  Discharging Provider: Fili Ireland MD    Discharge Diagnoses   1. Spontaneous right knee hemarthrosis  2. Systemic inflammatory response syndrome due to the above   3. Uncontrolled type 2 diabetes mellitus due to steroid  4. Malfunctioning AV fistula     History of     ESKD on hemodialysis     Gout    Coronary artery disease     HFpEF    Paroxysmal atrial fibrillation    Hypertension    Type 2 diabetes mellitus     Follow-ups Needed After Discharge   Follow-up Appointments     Follow Up and recommended labs and tests      Follow up with long term physician.  The following labs/tests are   recommended: INR, CBC and BMP on 12/11.             Unresulted Labs Ordered in the Past 30 Days of this Admission     No orders found from 11/3/2021 to 12/4/2021.      These results will be followed up by primary care provider     Discharge Disposition   Discharged to short-term care facility  Condition at discharge: Stable    Hospital Course    Gordon Gann is a 76 year old male with a history of atrial fibrillation anticoagulated on Coumadin, DM type II, CAD, CHF and CKD stage IV who presented to the ED on 12/3/2021 with right knee pain and swelling.     Spontaneous right knee hemarthrosis  History of gout  SIRS  Based on HR > 90 bpm and WBC > 12, without known infection.  Leukocytosis likely from acute stress, tachycardia likely from pain, monitor off antibiotics  A day prior to presentation, patient began to have right knee pain and swelling which gradually worsened to the point he needed to call EMS to bring him in.   No reported trauma.   In the ED the joint was aspirated with bloody fluid.  Gram stain negative.  Culture negative to date.  X-ray of the knee showed a joint effusion and moderate to severe tricompartmental degenerative arthritis changes.  Appreciate orthopedics  input, recommended scheduled oral steroids for anti-inflammatory effects, started on Medrol Dosepak.    Completed methylprednisolone taper     Continue oral Dilaudid,Tylenol and Flexeril    Continue diclofenac gel    Needs transitional care unit at discharge     Follow up in orthopedic clinic      H/o CAD   H/o HFpEF, not decompensated   Paroxysmal atrial fibrillation with RVR  Hypertension  No issues currently.  Normal anticoagulated with Coumadin.  INR 2.32 here.    Patient has been tachycardic and hypertensive.    Continue PTA metoprolol (currently getting 100 mg twice daily increased from 25 mg twice daily PTA ) and clonidine(dose decreased from 0.2 mg twice daily PTA dose to 0.1 milligrams twice daily as per nephrology)     Aspirin and anticoagulation on hold - can resume Sunday per orthopedic service     Type 2 diabetes mellitus   Hyperglycemia from steroid  Last HgbA1c from 10/5/21 6.2%  PTA on Lantus 15 units, premeal insulin 5 units 3 times daily with meals and sliding scale insulin.  Insulin needed to be increased here because he was on steroids.    He is no longer very hyperglycemic    Discharge on home dosing of insulin     Correction scale at transitional care unit     Carbohydrate controlled diet     End-stage renal disease on hemodialysis  Obstructed fistula  Patient was started on hemodialysis Tuesday Thursday and Saturday November of this year, at [facility].  Fistulogram performed as fistula was malfunctioning.  It is now flowing.      Continue with outpatient hemodialysis     Hemodialysis diet     Consultations This Hospital Stay   CARE MANAGEMENT / SOCIAL WORK IP CONSULT  PHYSICAL THERAPY ADULT IP CONSULT  ORTHOPEDIC SURGERY IP CONSULT  NEPHROLOGY IP CONSULT  PHYSICAL THERAPY ADULT IP CONSULT  OCCUPATIONAL THERAPY ADULT IP CONSULT    Code Status   No CPR- Do NOT Intubate    Time Spent on this Encounter   I, Fili rIeland MD, personally saw the patient today and spent greater than 30 minutes  discharging this patient.       Fili Ireland MD  [facility] EXTENDED RECOVERY AND SHORT STAY  [address] [facility] [address]  Phone: [phone]  ______________________________________________________________________    Physical Exam   Vital Signs:                  Weight: 200 lbs 0 oz  Constitutional: awake, alert, cooperative, no apparent distress       Primary Care Physician   Belen Prieto    Discharge Orders      General info for SNF    Length of Stay Estimate: Short Term Care: Estimated # of Days <30  Condition at Discharge: Improving  Level of care:skilled   Rehabilitation Potential: Good  Admission H&P remains valid and up-to-date: Yes  Recent Chemotherapy: N/A  Use Nursing Home Standing Orders: Yes     Mantoux instructions    Give two-step Mantoux (PPD) Per Facility Policy Yes     Follow Up and recommended labs and tests    Follow up with [facility] physician.  The following labs/tests are recommended: INR, CBC and BMP on 12/11.     Activity - Up with nursing assistance     Reason for your hospital stay    Knee pain and swelling     No CPR- Do NOT Intubate     Physical Therapy Adult Consult    Evaluate and treat as clinically indicated.    Reason:  knee pain     Occupational Therapy Adult Consult    Evaluate and treat as clinically indicated.    Reason:  deconditioning     Fall precautions     Diet    Follow this diet upon discharge: hemodialysis/ 60g carbohydrate       Significant Results and Procedures   Most Recent 3 CBC's:  Recent Labs   Lab Test 12/05/21  0608 12/04/21  0615 12/03/21  1151   WBC 11.5* 16.1* 9.4   HGB 10.5* 11.0* 11.7*   MCV 90 87 88    285 279     Most Recent 3 BMP's:  Recent Labs   Lab Test 12/09/21  1300 12/09/21  0749 12/09/21  0208 12/06/21  1219 12/06/21  1118 12/05/21  1203 12/05/21  0608 12/04/21  0822 12/03/21  2329 12/03/21  1832 12/03/21  1151 10/08/21  0712 10/08/21  0600   NA  --   --   --   --   --   --  136  --   --   --   138  --  140   POTASSIUM  --   --   --   --  4.8  --  4.8  --  4.4  --  3.9  --  4.0   CHLORIDE  --   --   --   --   --   --  102  --   --   --  102  --  108   CO2  --   --   --   --   --   --  25  --   --   --  27  --  27   BUN  --   --   --   --   --   --  48*  --   --   --  36*  --  39*   CR  --   --   --   --   --   --  4.15*  --   --   --  3.61*  --  3.25*   ANIONGAP  --   --   --   --   --   --  9  --   --   --  9  --  5   LC  --   --   --   --   --   --  8.6  --   --   --  9.1  --  7.8*   GLC 74 87 130*   < >  --    < > 324*   < >  --    < > 252*   < > 79    < > = values in this interval not displayed.     Most Recent 2 LFT's:  Recent Labs   Lab Test 10/06/21  0634 10/04/21  1443   AST 36 69*   ALT 52 72*   ALKPHOS 301* 343*   BILITOTAL 0.3 0.3     Most Recent 3 INR's:  Recent Labs   Lab Test 12/05/21  1200 12/03/21  1151 10/12/21  0614   INR 1.59* 2.32* 2.21*     Most Recent Hemoglobin A1c:  Recent Labs   Lab Test 10/05/21  0130   A1C 6.2*     Most Recent 6 glucoses:  Recent Labs   Lab Test 12/09/21  1300 12/09/21  0749 12/09/21  0208 12/08/21  2157 12/08/21  1827 12/08/21  1250   GLC 74 87 130* 176* 134* 266*   ,   Results for orders placed or performed during the hospital encounter of 12/03/21   XR Knee Right 1/2 Views    Narrative    EXAM: XR KNEE RT 1 /2 VW  LOCATION: [facility]  DATE/TIME: 12/3/2021 6:50 PM    INDICATION: Pain and swelling  COMPARISON: None.      Impression    IMPRESSION: Large knee joint effusion. Moderate-severe tricompartmental degenerative arthritic changes. No displaced fracture. No definite nondisplaced fracture is visualized, although sensitivity for nondisplaced fractures is limited by   demineralization. CT could further evaluate if there is a high clinical index of suspicion. The joint is normally aligned.   IR Dialysis Fistulogram Left    Narrative    INTERVENTIONAL RADIOLOGY DIALYSIS FISTULOGRAM LEFT  December 8, 2021  9:27 AM     HISTORY: Clotted  left brachial artery to axillary vein PTFE dialysis  graft.    COMPARISON: Ultrasound dated 10/5/2021.    FINDINGS: After obtaining informed consent, the patient was placed in  a supine position on the fluoroscopy table. The left arm was prepped  and draped in the usual sterile manner. 1% lidocaine was injected for  local anesthesia. Preliminary ultrasound showed a clotted left arm  dialysis graft. Under sterile ultrasound guidance, access into the  graft was obtained at the distal humerus level and mid humerus level.  6 [place] vascular sheaths were placed. 2 mg TPA was injected into the  graft. An angiogram was performed. This confirmed occlusion of the  graft. The outflow vein at the proximal humerus level was diffusely  atretic    The atretic segment of the outflow vein was angioplastied with a 6 mm  and 7 mm standard; compliant balloons and an 8 mm high-pressure  balloon. Follow-up angiogram showed improvement in luminal diameter.  Thrombus within the graft was then macerated using a Trerotola device  as well as an over-the-wire wedge balloon. Flow within the graft was  then reestablished. Final angiography showed adequate flow through the  graft with no significant residual thrombus. The central veins were  patent without significant stenoses.    Both sheaths were pulled. Pressure was held at the puncture sites for  10 minutes with good hemostasis.    I determined this patient to be an appropriate candidate for the  planned sedation and procedure and reassessed the patient immediately  prior to sedation and procedure. Moderate intravenous conscious  sedation was supervised by me. The patient was independently monitored  by a registered nurse assigned to the Department of radiology using  automated blood pressure, EKG and pulse oximetry. The patient  tolerated the procedure well. There were no immediate postprocedure  complications. The patient's vital signs were monitored by radiology  nursing staff under my  supervision and remained stable throughout the  study. Radiation dose for this scan was reduced using automated  exposure control, adjustment of the mA and/or kV according to patient  size, or iterative reconstruction technique.    MEDICATIONS: 1 mg Versed, 50 mcg fentanyl, 3000 units heparin, 2 mg  TPA.    Sedation time: 12.3 minutes.    Total fluoroscopy dose: 23 mGy Air Kerma.    Contrast: 60 cc Isovue.      Impression    IMPRESSION: Dialysis graft declot performed as above. Significant  stenoses in the outflow vein just past the venous anastomosis dilated  as above.    BOBY KAUR MD         SYSTEM ID:  [id number]       Discharge Medications   Discharge Medication List as of 12/9/2021  3:07 PM      START taking these medications    Details   bumetanide (BUMEX) 2 MG tablet Take 1 tablet (2 mg) by mouth 2 times daily On non dialysis days, Transitional      cyclobenzaprine (FLEXERIL) 10 MG tablet Take 1 tablet (10 mg) by mouth 3 times daily, Transitional      diclofenac (VOLTAREN) 1 % topical gel Apply 2 g topically 4 times daily as needed for moderate pain, Transitional      HYDROmorphone (DILAUDID) 2 MG tablet Take 1 tablet (2 mg) by mouth every 3 hours as needed for moderate to severe pain, Disp-30 tablet, R-0, Local Print      senna-docusate (SENOKOT-S/PERICOLACE) 8.6-50 MG tablet Take 1 tablet by mouth daily, Transitional         CONTINUE these medications which have CHANGED    Details   acetaminophen (TYLENOL) 500 MG tablet Take 2 tablets (1,000 mg) by mouth 3 times daily, Transitional      aspirin 81 MG EC tablet Take 1 tablet (81 mg) by mouth At Bedtime Starting 12/12, Transitional      cloNIDine (CATAPRES) 0.1 MG tablet Take 1 tablet (0.1 mg) by mouth 2 times daily, Transitional      insulin glargine (LANTUS PEN) 100 UNIT/ML pen Inject 15 Units Subcutaneous At Bedtime, Disp-15 mL, R-0, TransitionalIf Lantus is not covered by insurance, may substitute Basaglar at same dose and frequency.         lisinopril (ZESTRIL) 20 MG tablet Take 1 tablet (20 mg) by mouth daily, Transitional      metoprolol tartrate (LOPRESSOR) 100 MG tablet Take 1 tablet (100 mg) by mouth 2 times daily, Transitional      !! warfarin ANTICOAGULANT (COUMADIN) 5 MG tablet Take 1 tablet (5 mg) by mouth daily except Fridays - start on 12/12, Transitional      !! warfarin ANTICOAGULANT (COUMADIN) 7.5 MG tablet Take 1 tablet (7.5 mg) by mouth once a week every Friday Take 7.5 mg by mouth every Friday starting 12/17, Transitional       !! - Potential duplicate medications found. Please discuss with provider.      CONTINUE these medications which have NOT CHANGED    Details   ezetimibe (ZETIA) 10 MG tablet Take 10 mg by mouth every morning, Historical      fish oil-omega-3 fatty acids 1000 MG capsule Take 1 g by mouth every morning, Historical      folic acid (FOLVITE) 1 MG tablet Take 1 mg by mouth every morning, Historical      hydrALAZINE (APRESOLINE) 25 MG tablet Take 1 tablet (25 mg) by mouth every 6 hours as needed (SBP >180), Disp-30 tablet, R-0, E-Prescribe      insulin aspart (NOVOLOG PEN) 100 UNIT/ML pen Inject 5 Units Subcutaneous 3 times daily (with meals), Disp-15 mL, R-0, E-Prescribe      melatonin 3 MG tablet Take 1 tablet (3 mg) by mouth At Bedtime, Transitional      mirtazapine (REMERON) 15 MG tablet Take 1 tablet (15 mg) by mouth At Bedtime, Disp-10 tablet, R-0, Local Print      SODIUM BICARBONATE PO Take 1 tablet by mouth 2 times daily, Historical      traZODone (DESYREL) 50 MG tablet Take 1 tablet (50 mg) by mouth At Bedtime, Transitional      vitamin B complex with vitamin C (STRESS TAB) tablet Take 1 tablet by mouth every morning, Historical      Vitamin D, Cholecalciferol, 25 MCG (1000 UT) CAPS Take 1,000 Units by mouth every morning, Historical         STOP taking these medications       furosemide (LASIX) 20 MG tablet Comments:   Reason for Stopping:             Allergies   No Known Allergies

## 2021-12-09 NOTE — PROGRESS NOTES
"Nursing note  The writer was on break and delegated to the NA(Lisa) to remove PIV on the pt, because pt will be discharging to TCU at 1545. NA called the writer and stated \" You have to come and see this\". The writer run to the pt room, NA stated \" I was trying to remove the PIV and peel the skin along with the tape\". The writer applied mepilex on the skin tear and documented it on the Avatar.  "

## 2021-12-09 NOTE — PROGRESS NOTES
Hospitalist service cross-cover note:     Paged regarding numbness in left arm. Evaluated patient. He reports numbness in his left hand that is now resolved. He reports this has happened intermittently since his fistula was placed. On exam strength is normal bilaterally. Continue to monitor.     Trent Salazar MD   Hospitalist  [phone]

## 2021-12-10 ENCOUNTER — PATIENT OUTREACH (OUTPATIENT)
Dept: CARE COORDINATION | Facility: CLINIC | Age: 76
End: 2021-12-10
Payer: COMMERCIAL

## 2021-12-10 DIAGNOSIS — Z71.89 OTHER SPECIFIED COUNSELING: ICD-10-CM

## 2021-12-10 NOTE — PROGRESS NOTES
Clinic Care Coordination Contact    Background: Care Coordination referral placed from S discharge report for reason of patient meeting criteria for a TCM outreach call by [facility] team.    Assessment: Upon chart review, [facility] Team member will cancel/close the referral for TCM outreach due to reason below:    Patient is not established within [facility]. Upon chart review, [facility] Team member noted patient discharged to TCU    Plan: Care Coordination referral for TCM outreach canceled.    Tiesha Yanez  Community Health Worker  [facility] [place], [facility]  [phone]

## 2021-12-14 ENCOUNTER — LAB REQUISITION (OUTPATIENT)
Dept: LAB | Facility: CLINIC | Age: 76
End: 2021-12-14
Payer: COMMERCIAL

## 2021-12-14 DIAGNOSIS — D64.9 ANEMIA, UNSPECIFIED: ICD-10-CM

## 2021-12-14 DIAGNOSIS — N18.9 CHRONIC KIDNEY DISEASE, UNSPECIFIED: ICD-10-CM

## 2021-12-15 ENCOUNTER — LAB REQUISITION (OUTPATIENT)
Dept: LAB | Facility: CLINIC | Age: 76
End: 2021-12-15
Payer: COMMERCIAL

## 2021-12-15 DIAGNOSIS — D64.9 ANEMIA, UNSPECIFIED: ICD-10-CM

## 2021-12-15 DIAGNOSIS — N18.9 CHRONIC KIDNEY DISEASE, UNSPECIFIED: ICD-10-CM

## 2021-12-15 LAB
ANION GAP SERPL CALCULATED.3IONS-SCNC: 14 MMOL/L (ref 5–18)
BUN SERPL-MCNC: 47 MG/DL (ref 8–28)
CALCIUM SERPL-MCNC: 7.4 MG/DL (ref 8.5–10.5)
CHLORIDE BLD-SCNC: 99 MMOL/L (ref 98–107)
CO2 SERPL-SCNC: 25 MMOL/L (ref 22–31)
CREAT SERPL-MCNC: 4.24 MG/DL (ref 0.7–1.3)
ERYTHROCYTE [DISTWIDTH] IN BLOOD BY AUTOMATED COUNT: 14.9 % (ref 10–15)
GFR SERPL CREATININE-BSD FRML MDRD: 13 ML/MIN/1.73M2
GLUCOSE BLD-MCNC: 103 MG/DL (ref 70–125)
HCT VFR BLD AUTO: 28.5 % (ref 40–53)
HGB BLD-MCNC: 9.2 G/DL (ref 13.3–17.7)
MCH RBC QN AUTO: 28.4 PG (ref 26.5–33)
MCHC RBC AUTO-ENTMCNC: 32.3 G/DL (ref 31.5–36.5)
MCV RBC AUTO: 88 FL (ref 78–100)
PLATELET # BLD AUTO: 242 10E3/UL (ref 150–450)
POTASSIUM BLD-SCNC: 4.2 MMOL/L (ref 3.5–5)
RBC # BLD AUTO: 3.24 10E6/UL (ref 4.4–5.9)
SODIUM SERPL-SCNC: 138 MMOL/L (ref 136–145)
WBC # BLD AUTO: 7.4 10E3/UL (ref 4–11)

## 2021-12-15 PROCEDURE — 85027 COMPLETE CBC AUTOMATED: CPT | Mod: ORL | Performed by: INTERNAL MEDICINE

## 2021-12-15 PROCEDURE — 36415 COLL VENOUS BLD VENIPUNCTURE: CPT | Mod: ORL | Performed by: INTERNAL MEDICINE

## 2021-12-15 PROCEDURE — 80048 BASIC METABOLIC PNL TOTAL CA: CPT | Mod: ORL | Performed by: INTERNAL MEDICINE

## 2021-12-18 ENCOUNTER — LAB REQUISITION (OUTPATIENT)
Dept: LAB | Facility: CLINIC | Age: 76
End: 2021-12-18
Payer: COMMERCIAL

## 2021-12-18 DIAGNOSIS — D64.9 ANEMIA, UNSPECIFIED: ICD-10-CM

## 2021-12-20 LAB — HGB BLD-MCNC: 11 G/DL (ref 13.3–17.7)

## 2021-12-20 PROCEDURE — 85018 HEMOGLOBIN: CPT | Mod: ORL | Performed by: INTERNAL MEDICINE

## 2021-12-24 ENCOUNTER — HOSPITAL ENCOUNTER (EMERGENCY)
Facility: CLINIC | Age: 76
Discharge: HOME | End: 2021-12-24
Attending: EMERGENCY MEDICINE | Admitting: EMERGENCY MEDICINE
Payer: COMMERCIAL

## 2021-12-24 VITALS
RESPIRATION RATE: 18 BRPM | HEART RATE: 117 BPM | WEIGHT: 200 LBS | SYSTOLIC BLOOD PRESSURE: 145 MMHG | BODY MASS INDEX: 30.41 KG/M2 | OXYGEN SATURATION: 98 % | TEMPERATURE: 98.5 F | DIASTOLIC BLOOD PRESSURE: 96 MMHG

## 2021-12-24 DIAGNOSIS — S01.502A OPEN WOUND OF MOUTH, INITIAL ENCOUNTER: ICD-10-CM

## 2021-12-24 DIAGNOSIS — R58 HEMORRHAGE: ICD-10-CM

## 2021-12-24 LAB
ANION GAP SERPL CALCULATED.3IONS-SCNC: 7 MMOL/L (ref 3–14)
BUN SERPL-MCNC: 40 MG/DL (ref 7–30)
CALCIUM SERPL-MCNC: 8 MG/DL (ref 8.5–10.1)
CHLORIDE BLD-SCNC: 100 MMOL/L (ref 94–109)
CO2 SERPL-SCNC: 30 MMOL/L (ref 20–32)
CREAT SERPL-MCNC: 3.81 MG/DL (ref 0.66–1.25)
ERYTHROCYTE [DISTWIDTH] IN BLOOD BY AUTOMATED COUNT: 15.1 % (ref 10–15)
GFR SERPL CREATININE-BSD FRML MDRD: 16 ML/MIN/1.73M2
GLUCOSE BLD-MCNC: 93 MG/DL (ref 70–99)
HCT VFR BLD AUTO: 34.2 % (ref 40–53)
HGB BLD-MCNC: 10.4 G/DL (ref 13.3–17.7)
INR PPP: 2.81 (ref 0.85–1.15)
MCH RBC QN AUTO: 27.5 PG (ref 26.5–33)
MCHC RBC AUTO-ENTMCNC: 30.4 G/DL (ref 31.5–36.5)
MCV RBC AUTO: 91 FL (ref 78–100)
PLATELET # BLD AUTO: 264 10E3/UL (ref 150–450)
POTASSIUM BLD-SCNC: 3.7 MMOL/L (ref 3.4–5.3)
RBC # BLD AUTO: 3.78 10E6/UL (ref 4.4–5.9)
SODIUM SERPL-SCNC: 137 MMOL/L (ref 133–144)
WBC # BLD AUTO: 7.5 10E3/UL (ref 4–11)

## 2021-12-24 PROCEDURE — 80048 BASIC METABOLIC PNL TOTAL CA: CPT | Performed by: EMERGENCY MEDICINE

## 2021-12-24 PROCEDURE — 30901 CONTROL OF NOSEBLEED: CPT

## 2021-12-24 PROCEDURE — 99283 EMERGENCY DEPT VISIT LOW MDM: CPT

## 2021-12-24 PROCEDURE — 85027 COMPLETE CBC AUTOMATED: CPT | Performed by: EMERGENCY MEDICINE

## 2021-12-24 PROCEDURE — 12011 RPR F/E/E/N/L/M 2.5 CM/<: CPT

## 2021-12-24 PROCEDURE — 85610 PROTHROMBIN TIME: CPT | Performed by: EMERGENCY MEDICINE

## 2021-12-24 PROCEDURE — 36415 COLL VENOUS BLD VENIPUNCTURE: CPT | Performed by: EMERGENCY MEDICINE

## 2021-12-24 ASSESSMENT — ENCOUNTER SYMPTOMS
BLOOD IN STOOL: 0
WOUND: 1

## 2021-12-24 NOTE — ED PROVIDER NOTES
History   Chief Complaint:  Coagulation Disorder     The history is provided by the patient and the EMS personnel.      Gordon Gann is a type 2 diabetic 76 year old male on Coumadin for A fib/flutter with other history of alcohol abuse, hypertension, and STEMI who presents via EMS from [facility] TCU with active bleeding from the mouth. The patient reports eating an apple around midnight. He feels that some of the apple became caught in his throat and he walked to the bathroom to throw up. He was able to expel the apple but has had persisting bleeding from his mouth since this time. Patient reported symptoms to his nurse at TCU who recommended that he be evaluated. He thinks that the blood is coming from his gums but is ultimately unsure as to the source. He has not noticed any other active bleeding. No blood in stool. Patient is on long-term anticoagulation with Coumadin. Most recent INR was 3.4 on 12/22/2021, he follows with anticoagulation clinic with plan to recheck INR on 12/27/2021 for possible dose adjustment. He also recently started dialysis for CKD within the last 2 weeks or so. No other concerns to report today.    Review of Systems   Gastrointestinal: Negative for blood in stool.   Skin: Positive for wound (bleeding from mouth).   All other systems reviewed and are negative.    Allergies:  No Known Drug Allergies    Medications:  Aspirin 81 mg  Bumetanide  Clonidine  Ezetimibe  Hydralazine  Allopurinol  Furosemide  Lisinopril  Metoprolol tartrate  Mirtazapine  Senna-docusate  Trazodone  Coumadin    Past Medical History:     Alcohol abuse  Atrial fibrillation   Atrial flutter  CKD  Hypothyroidism  Type 2 diabetes mellitus  STEMI  Depression  Hypertension  Hyperlipidemia    Past Surgical History:    Arteriovenous creation of graft loop in LUE  Left fistulogram  Coronary stent placement  Turp  MOHS  Parotidectomy    Family History:    Father - heart disease  Mother - cerebral aneurysm    Social  History:  The patient was accompanied to the ED by EMS.  Marital status:   The patient currently resides in [facility].    Physical Exam     Patient Vitals for the past 24 hrs:   BP Temp Temp src Pulse Resp SpO2 Weight   12/24/21 0816 (!) 145/96 98.5  F (36.9  C) Oral 117 18 98 % 90.7 kg (200 lb)       Physical Exam  General: Alert, No distress. Nontoxic appearance  Head: No signs of trauma.   Mouth/Throat: Oropharynx moist. Just lateral to his lower left posterior molar he has a small 1 mm area of bleeding that appears to be from him biting the inside of his cheek.  Eyes: Conjunctivae are normal. Pupils are equal..   Neck: Normal range of motion.    CV: Appears well perfused.  Resp:No respiratory distress.   MSK: Normal range of motion. No obvious deformity.   Neuro: The patient is alert and interactive. KOTHARI. Speech normal. GCS 15  Skin: No lesion or sign of trauma noted.   Psych: normal mood and affect. behavior is normal.     Emergency Department Course     Laboratory:  CBC: WBC 7.5, HGB 10.4 (L),   BMP: Urea nitrogen 40 (H), Calcium 8.0 (L), GFR estimate 16 (L), Creatinine 3.81 (H), o/w WNL     INR: 2.81 (H)    Procedures       Silver Nitrate Oral Cautery     PROCEDURE NOTE: The patient's L lower cheek was prepped with injection of 0.5% bupivacaine with epinephrine. The location of the patient's bleeding was identified  just lateral to his left lower posterior molar and was cauterized with silver nitrate.  The patient tolerated the procedure well and there were no complications.  He was observed in the emergency department following the procedure and had no recurrence of his bleeding.     Emergency Department Course:    Reviewed:  I reviewed nursing notes, vitals, past medical history and MIIC    Assessments:  0820 I obtained history and examined the patient in ED room 02 as noted above.   0832 I cauterized the patient's bleed as noted above.  0914 I rechecked the patient. He is no  longer bleeding.    Disposition:  The patient was discharged to home.     Impression & Plan       Medical Decision Making:  Patient is presenting with a small laceration on the inside of his left cheek.  Bleeding was controlled with the above intervention.  No signs of significant anemia.  His INR is therapeutic.  Patient was observed in the ED and he did not have further bleeding.    Diagnosis:    ICD-10-CM    1. Open wound of mouth, initial encounter  S01.502A    2. Hemorrhage  R58        Discharge Medications:  None.      Scribe Disclosure:  Jina DA SILVA, am serving as a scribe at 8:20 AM on 12/24/2021 to document services personally performed by Carmine Pacheco MD based on my observations and the provider's statements to me.     This note was completed in part using Dragon voice recognition software. Although reviewed after completion, some word and grammatical errors may occur.            Carmine Pacheco MD  12/24/21 4047

## 2021-12-24 NOTE — ED NOTES
Bed: ED02  Expected date: 12/24/21  Expected time: 7:59 AM  Means of arrival: Ambulance  Comments:  514 55m throat bleed ETA 0838

## 2021-12-27 ENCOUNTER — LAB REQUISITION (OUTPATIENT)
Dept: LAB | Facility: CLINIC | Age: 76
End: 2021-12-27
Payer: COMMERCIAL

## 2021-12-27 DIAGNOSIS — I48.92 UNSPECIFIED ATRIAL FLUTTER (H): ICD-10-CM

## 2021-12-29 LAB — INR PPP: 2.31 (ref 0.85–1.15)

## 2021-12-29 PROCEDURE — 85610 PROTHROMBIN TIME: CPT | Mod: ORL | Performed by: INTERNAL MEDICINE

## 2021-12-29 PROCEDURE — P9603 ONE-WAY ALLOW PRORATED MILES: HCPCS | Mod: ORL | Performed by: INTERNAL MEDICINE

## 2021-12-29 PROCEDURE — 36415 COLL VENOUS BLD VENIPUNCTURE: CPT | Mod: ORL | Performed by: INTERNAL MEDICINE

## 2022-01-01 ENCOUNTER — APPOINTMENT (OUTPATIENT)
Dept: ULTRASOUND IMAGING | Facility: CLINIC | Age: 77
DRG: 682 | End: 2022-01-01
Attending: HOSPITALIST
Payer: COMMERCIAL

## 2022-01-01 ENCOUNTER — APPOINTMENT (OUTPATIENT)
Dept: PHYSICAL THERAPY | Facility: CLINIC | Age: 77
DRG: 177 | End: 2022-01-01
Attending: HOSPITALIST
Payer: COMMERCIAL

## 2022-01-01 ENCOUNTER — HOSPITAL ENCOUNTER (INPATIENT)
Facility: CLINIC | Age: 77
LOS: 3 days | Discharge: HOME | DRG: 177 | End: 2022-08-19
Attending: EMERGENCY MEDICINE | Admitting: HOSPITALIST
Payer: COMMERCIAL

## 2022-01-01 ENCOUNTER — APPOINTMENT (OUTPATIENT)
Dept: GENERAL RADIOLOGY | Facility: CLINIC | Age: 77
DRG: 682 | End: 2022-01-01
Attending: EMERGENCY MEDICINE
Payer: COMMERCIAL

## 2022-01-01 ENCOUNTER — APPOINTMENT (OUTPATIENT)
Dept: GENERAL RADIOLOGY | Facility: CLINIC | Age: 77
DRG: 682 | End: 2022-01-01
Attending: PHYSICIAN ASSISTANT
Payer: COMMERCIAL

## 2022-01-01 ENCOUNTER — APPOINTMENT (OUTPATIENT)
Dept: CT IMAGING | Facility: CLINIC | Age: 77
DRG: 177 | End: 2022-01-01
Attending: EMERGENCY MEDICINE
Payer: COMMERCIAL

## 2022-01-01 ENCOUNTER — APPOINTMENT (OUTPATIENT)
Dept: PHYSICAL THERAPY | Facility: CLINIC | Age: 77
DRG: 177 | End: 2022-01-01
Payer: COMMERCIAL

## 2022-01-01 ENCOUNTER — APPOINTMENT (OUTPATIENT)
Dept: ULTRASOUND IMAGING | Facility: CLINIC | Age: 77
DRG: 177 | End: 2022-01-01
Attending: HOSPITALIST
Payer: COMMERCIAL

## 2022-01-01 ENCOUNTER — APPOINTMENT (OUTPATIENT)
Dept: GENERAL RADIOLOGY | Facility: CLINIC | Age: 77
DRG: 177 | End: 2022-01-01
Attending: EMERGENCY MEDICINE
Payer: COMMERCIAL

## 2022-01-01 ENCOUNTER — HOSPITAL ENCOUNTER (INPATIENT)
Facility: CLINIC | Age: 77
LOS: 2 days | Discharge: HOME | DRG: 682 | End: 2022-08-30
Attending: EMERGENCY MEDICINE | Admitting: HOSPITALIST
Payer: COMMERCIAL

## 2022-01-01 ENCOUNTER — HOSPITAL ENCOUNTER (EMERGENCY)
Facility: CLINIC | Age: 77
Discharge: HOME | End: 2022-07-30
Attending: EMERGENCY MEDICINE | Admitting: EMERGENCY MEDICINE
Payer: COMMERCIAL

## 2022-01-01 ENCOUNTER — PATIENT OUTREACH (OUTPATIENT)
Dept: CARE COORDINATION | Facility: CLINIC | Age: 77
End: 2022-01-01

## 2022-01-01 VITALS
HEART RATE: 113 BPM | OXYGEN SATURATION: 97 % | DIASTOLIC BLOOD PRESSURE: 117 MMHG | RESPIRATION RATE: 17 BRPM | SYSTOLIC BLOOD PRESSURE: 183 MMHG | TEMPERATURE: 97.9 F

## 2022-01-01 VITALS
TEMPERATURE: 97.7 F | SYSTOLIC BLOOD PRESSURE: 115 MMHG | OXYGEN SATURATION: 92 % | DIASTOLIC BLOOD PRESSURE: 70 MMHG | HEIGHT: 68 IN | HEART RATE: 77 BPM | RESPIRATION RATE: 16 BRPM | WEIGHT: 192.9 LBS | BODY MASS INDEX: 29.24 KG/M2

## 2022-01-01 VITALS
TEMPERATURE: 98.9 F | BODY MASS INDEX: 29.65 KG/M2 | WEIGHT: 195 LBS | HEART RATE: 85 BPM | DIASTOLIC BLOOD PRESSURE: 72 MMHG | SYSTOLIC BLOOD PRESSURE: 148 MMHG | RESPIRATION RATE: 18 BRPM | OXYGEN SATURATION: 91 %

## 2022-01-01 DIAGNOSIS — N18.6 ESRD (END STAGE RENAL DISEASE) ON DIALYSIS (H): ICD-10-CM

## 2022-01-01 DIAGNOSIS — R79.1 SUBTHERAPEUTIC INTERNATIONAL NORMALIZED RATIO (INR): ICD-10-CM

## 2022-01-01 DIAGNOSIS — R06.00 DYSPNEA, UNSPECIFIED TYPE: ICD-10-CM

## 2022-01-01 DIAGNOSIS — J18.9 PNEUMONIA DUE TO INFECTIOUS ORGANISM, UNSPECIFIED LATERALITY, UNSPECIFIED PART OF LUNG: ICD-10-CM

## 2022-01-01 DIAGNOSIS — R04.0 EPISTAXIS: ICD-10-CM

## 2022-01-01 DIAGNOSIS — J90 PLEURAL EFFUSION: ICD-10-CM

## 2022-01-01 DIAGNOSIS — Z99.2 ESRD (END STAGE RENAL DISEASE) ON DIALYSIS (H): ICD-10-CM

## 2022-01-01 DIAGNOSIS — E11.10 DIABETIC KETOACIDOSIS WITHOUT COMA ASSOCIATED WITH TYPE 2 DIABETES MELLITUS (H): ICD-10-CM

## 2022-01-01 LAB
% LINING CELLS, BODY FLUID: 16 %
% LINING CELLS, BODY FLUID: 6 %
ALBUMIN SERPL-MCNC: 1.9 G/DL (ref 3.4–5)
ALBUMIN SERPL-MCNC: 2.1 G/DL (ref 3.4–5)
ALBUMIN SERPL-MCNC: 2.1 G/DL (ref 3.4–5)
ALBUMIN SERPL-MCNC: 2.2 G/DL (ref 3.4–5)
ALBUMIN SERPL-MCNC: 2.5 G/DL (ref 3.4–5)
ALP SERPL-CCNC: 213 U/L (ref 40–150)
ALP SERPL-CCNC: 238 U/L (ref 40–150)
ALP SERPL-CCNC: 259 U/L (ref 40–150)
ALP SERPL-CCNC: 366 U/L (ref 40–150)
ALT SERPL W P-5'-P-CCNC: 32 U/L (ref 0–70)
ALT SERPL W P-5'-P-CCNC: 39 U/L (ref 0–70)
ALT SERPL W P-5'-P-CCNC: 44 U/L (ref 0–70)
ALT SERPL W P-5'-P-CCNC: 53 U/L (ref 0–70)
ANION GAP SERPL CALCULATED.3IONS-SCNC: 10 MMOL/L (ref 3–14)
ANION GAP SERPL CALCULATED.3IONS-SCNC: 6 MMOL/L (ref 3–14)
ANION GAP SERPL CALCULATED.3IONS-SCNC: 7 MMOL/L (ref 3–14)
ANION GAP SERPL CALCULATED.3IONS-SCNC: 9 MMOL/L (ref 3–14)
ANION GAP SERPL CALCULATED.3IONS-SCNC: 9 MMOL/L (ref 3–14)
APPEARANCE FLD: ABNORMAL
APPEARANCE FLD: CLEAR
AST SERPL W P-5'-P-CCNC: 22 U/L (ref 0–45)
AST SERPL W P-5'-P-CCNC: 25 U/L (ref 0–45)
AST SERPL W P-5'-P-CCNC: 33 U/L (ref 0–45)
AST SERPL W P-5'-P-CCNC: 34 U/L (ref 0–45)
ATRIAL RATE - MUSE: 100 BPM
BACTERIA BLD CULT: NO GROWTH
BACTERIA BLD CULT: NO GROWTH
BACTERIA PLR CULT: NO GROWTH
BACTERIA PLR CULT: NO GROWTH
BACTERIA PLR CULT: NORMAL
BASOPHILS # BLD AUTO: 0 10E3/UL (ref 0–0.2)
BASOPHILS NFR BLD AUTO: 0 %
BASOPHILS NFR FLD MANUAL: 0 %
BILIRUB SERPL-MCNC: 0.5 MG/DL (ref 0.2–1.3)
BILIRUB SERPL-MCNC: 0.5 MG/DL (ref 0.2–1.3)
BILIRUB SERPL-MCNC: 0.6 MG/DL (ref 0.2–1.3)
BILIRUB SERPL-MCNC: 0.8 MG/DL (ref 0.2–1.3)
BUN SERPL-MCNC: 18 MG/DL (ref 7–30)
BUN SERPL-MCNC: 35 MG/DL (ref 7–30)
BUN SERPL-MCNC: 47 MG/DL (ref 7–30)
BUN SERPL-MCNC: 49 MG/DL (ref 7–30)
BUN SERPL-MCNC: 51 MG/DL (ref 7–30)
BUN SERPL-MCNC: 55 MG/DL (ref 7–30)
BUN SERPL-MCNC: 91 MG/DL (ref 7–30)
CALCIUM SERPL-MCNC: 7 MG/DL (ref 8.5–10.1)
CALCIUM SERPL-MCNC: 7.3 MG/DL (ref 8.5–10.1)
CALCIUM SERPL-MCNC: 7.3 MG/DL (ref 8.5–10.1)
CALCIUM SERPL-MCNC: 7.4 MG/DL (ref 8.5–10.1)
CALCIUM SERPL-MCNC: 7.5 MG/DL (ref 8.5–10.1)
CALCIUM SERPL-MCNC: 7.8 MG/DL (ref 8.5–10.1)
CALCIUM SERPL-MCNC: 8.1 MG/DL (ref 8.5–10.1)
CELL COUNT BODY FLUID SOURCE: ABNORMAL
CELL COUNT BODY FLUID SOURCE: NORMAL
CHLORIDE BLD-SCNC: 101 MMOL/L (ref 94–109)
CHLORIDE BLD-SCNC: 102 MMOL/L (ref 94–109)
CHLORIDE BLD-SCNC: 103 MMOL/L (ref 94–109)
CHLORIDE BLD-SCNC: 107 MMOL/L (ref 94–109)
CHLORIDE BLD-SCNC: 98 MMOL/L (ref 94–109)
CO2 SERPL-SCNC: 25 MMOL/L (ref 20–32)
CO2 SERPL-SCNC: 26 MMOL/L (ref 20–32)
CO2 SERPL-SCNC: 27 MMOL/L (ref 20–32)
CO2 SERPL-SCNC: 28 MMOL/L (ref 20–32)
CO2 SERPL-SCNC: 29 MMOL/L (ref 20–32)
CO2 SERPL-SCNC: 30 MMOL/L (ref 20–32)
CO2 SERPL-SCNC: 35 MMOL/L (ref 20–32)
COLOR FLD: ABNORMAL
COLOR FLD: YELLOW
CREAT SERPL-MCNC: 2.33 MG/DL (ref 0.66–1.25)
CREAT SERPL-MCNC: 3.67 MG/DL (ref 0.66–1.25)
CREAT SERPL-MCNC: 3.73 MG/DL (ref 0.66–1.25)
CREAT SERPL-MCNC: 4.68 MG/DL (ref 0.66–1.25)
CREAT SERPL-MCNC: 5.14 MG/DL (ref 0.66–1.25)
CREAT SERPL-MCNC: 5.54 MG/DL (ref 0.66–1.25)
CREAT SERPL-MCNC: 5.78 MG/DL (ref 0.66–1.25)
DIASTOLIC BLOOD PRESSURE - MUSE: NORMAL MMHG
EOSINOPHIL # BLD AUTO: 0.1 10E3/UL (ref 0–0.7)
EOSINOPHIL # BLD AUTO: 0.1 10E3/UL (ref 0–0.7)
EOSINOPHIL # BLD AUTO: 0.2 10E3/UL (ref 0–0.7)
EOSINOPHIL NFR BLD AUTO: 0 %
EOSINOPHIL NFR BLD AUTO: 1 %
EOSINOPHIL NFR BLD AUTO: 2 %
ERYTHROCYTE [DISTWIDTH] IN BLOOD BY AUTOMATED COUNT: 15.1 % (ref 10–15)
ERYTHROCYTE [DISTWIDTH] IN BLOOD BY AUTOMATED COUNT: 15.2 % (ref 10–15)
ERYTHROCYTE [DISTWIDTH] IN BLOOD BY AUTOMATED COUNT: 15.3 % (ref 10–15)
ERYTHROCYTE [DISTWIDTH] IN BLOOD BY AUTOMATED COUNT: 15.5 % (ref 10–15)
ERYTHROCYTE [DISTWIDTH] IN BLOOD BY AUTOMATED COUNT: 15.5 % (ref 10–15)
ERYTHROCYTE [DISTWIDTH] IN BLOOD BY AUTOMATED COUNT: 15.6 % (ref 10–15)
ERYTHROCYTE [DISTWIDTH] IN BLOOD BY AUTOMATED COUNT: 15.8 % (ref 10–15)
ERYTHROCYTE [DISTWIDTH] IN BLOOD BY AUTOMATED COUNT: 15.9 % (ref 10–15)
GFR SERPL CREATININE-BSD FRML MDRD: 10 ML/MIN/1.73M2
GFR SERPL CREATININE-BSD FRML MDRD: 10 ML/MIN/1.73M2
GFR SERPL CREATININE-BSD FRML MDRD: 11 ML/MIN/1.73M2
GFR SERPL CREATININE-BSD FRML MDRD: 12 ML/MIN/1.73M2
GFR SERPL CREATININE-BSD FRML MDRD: 16 ML/MIN/1.73M2
GFR SERPL CREATININE-BSD FRML MDRD: 16 ML/MIN/1.73M2
GFR SERPL CREATININE-BSD FRML MDRD: 28 ML/MIN/1.73M2
GLUCOSE BLD-MCNC: 117 MG/DL (ref 70–99)
GLUCOSE BLD-MCNC: 183 MG/DL (ref 70–99)
GLUCOSE BLD-MCNC: 218 MG/DL (ref 70–99)
GLUCOSE BLD-MCNC: 227 MG/DL (ref 70–99)
GLUCOSE BLD-MCNC: 280 MG/DL (ref 70–99)
GLUCOSE BLD-MCNC: 305 MG/DL (ref 70–99)
GLUCOSE BLD-MCNC: 47 MG/DL (ref 70–99)
GLUCOSE BLDC GLUCOMTR-MCNC: 120 MG/DL (ref 70–99)
GLUCOSE BLDC GLUCOMTR-MCNC: 121 MG/DL (ref 70–99)
GLUCOSE BLDC GLUCOMTR-MCNC: 136 MG/DL (ref 70–99)
GLUCOSE BLDC GLUCOMTR-MCNC: 137 MG/DL (ref 70–99)
GLUCOSE BLDC GLUCOMTR-MCNC: 153 MG/DL (ref 70–99)
GLUCOSE BLDC GLUCOMTR-MCNC: 154 MG/DL (ref 70–99)
GLUCOSE BLDC GLUCOMTR-MCNC: 162 MG/DL (ref 70–99)
GLUCOSE BLDC GLUCOMTR-MCNC: 187 MG/DL (ref 70–99)
GLUCOSE BLDC GLUCOMTR-MCNC: 188 MG/DL (ref 70–99)
GLUCOSE BLDC GLUCOMTR-MCNC: 190 MG/DL (ref 70–99)
GLUCOSE BLDC GLUCOMTR-MCNC: 199 MG/DL (ref 70–99)
GLUCOSE BLDC GLUCOMTR-MCNC: 209 MG/DL (ref 70–99)
GLUCOSE BLDC GLUCOMTR-MCNC: 217 MG/DL (ref 70–99)
GLUCOSE BLDC GLUCOMTR-MCNC: 222 MG/DL (ref 70–99)
GLUCOSE BLDC GLUCOMTR-MCNC: 237 MG/DL (ref 70–99)
GLUCOSE BLDC GLUCOMTR-MCNC: 242 MG/DL (ref 70–99)
GLUCOSE BLDC GLUCOMTR-MCNC: 242 MG/DL (ref 70–99)
GLUCOSE BLDC GLUCOMTR-MCNC: 256 MG/DL (ref 70–99)
GLUCOSE BLDC GLUCOMTR-MCNC: 276 MG/DL (ref 70–99)
GLUCOSE BLDC GLUCOMTR-MCNC: 284 MG/DL (ref 70–99)
GLUCOSE BLDC GLUCOMTR-MCNC: 292 MG/DL (ref 70–99)
GLUCOSE BLDC GLUCOMTR-MCNC: 318 MG/DL (ref 70–99)
GLUCOSE BLDC GLUCOMTR-MCNC: 323 MG/DL (ref 70–99)
GLUCOSE BLDC GLUCOMTR-MCNC: 43 MG/DL (ref 70–99)
GLUCOSE BLDC GLUCOMTR-MCNC: 67 MG/DL (ref 70–99)
GLUCOSE BLDC GLUCOMTR-MCNC: 78 MG/DL (ref 70–99)
GLUCOSE BODY FLUID SOURCE: NORMAL
GLUCOSE BODY FLUID SOURCE: NORMAL
GLUCOSE FLD-MCNC: 211 MG/DL
GLUCOSE FLD-MCNC: 249 MG/DL
GRAM STAIN RESULT: NORMAL
HBA1C MFR BLD: 7.4 % (ref 0–5.6)
HBV SURFACE AB SERPL IA-ACNC: 3.17 M[IU]/ML
HBV SURFACE AB SERPL IA-ACNC: NONREACTIVE M[IU]/ML
HBV SURFACE AG SERPL QL IA: NONREACTIVE
HCT VFR BLD AUTO: 28 % (ref 40–53)
HCT VFR BLD AUTO: 28.7 % (ref 40–53)
HCT VFR BLD AUTO: 29.8 % (ref 40–53)
HCT VFR BLD AUTO: 30.7 % (ref 40–53)
HCT VFR BLD AUTO: 31.4 % (ref 40–53)
HCT VFR BLD AUTO: 31.9 % (ref 40–53)
HCT VFR BLD AUTO: 32.4 % (ref 40–53)
HCT VFR BLD AUTO: 34 % (ref 40–53)
HGB BLD-MCNC: 10.3 G/DL (ref 13.3–17.7)
HGB BLD-MCNC: 10.4 G/DL (ref 13.3–17.7)
HGB BLD-MCNC: 10.5 G/DL (ref 13.3–17.7)
HGB BLD-MCNC: 10.8 G/DL (ref 13.3–17.7)
HGB BLD-MCNC: 9.2 G/DL (ref 13.3–17.7)
HGB BLD-MCNC: 9.4 G/DL (ref 13.3–17.7)
HGB BLD-MCNC: 9.8 G/DL (ref 13.3–17.7)
HGB BLD-MCNC: 9.8 G/DL (ref 13.3–17.7)
HOLD SPECIMEN: NORMAL
HOLD SPECIMEN: NORMAL
IMM GRANULOCYTES # BLD: 0 10E3/UL
IMM GRANULOCYTES # BLD: 0 10E3/UL
IMM GRANULOCYTES # BLD: 0.1 10E3/UL
IMM GRANULOCYTES NFR BLD: 0 %
INR PPP: 1.08 (ref 0.85–1.15)
INR PPP: 1.08 (ref 0.85–1.15)
INR PPP: 1.11 (ref 0.85–1.15)
INR PPP: 1.14 (ref 0.85–1.15)
INR PPP: 1.5 (ref 0.85–1.15)
INR PPP: 1.81 (ref 0.85–1.15)
INR PPP: 1.85 (ref 0.85–1.15)
INTERPRETATION ECG - MUSE: NORMAL
LACTATE SERPL-SCNC: 1 MMOL/L (ref 0.7–2)
LACTATE SERPL-SCNC: 1.1 MMOL/L (ref 0.7–2)
LACTATE SERPL-SCNC: 1.3 MMOL/L (ref 0.7–2)
LD BODY BODY FLUID SOURCE: NORMAL
LD BODY BODY FLUID SOURCE: NORMAL
LDH FLD L TO P-CCNC: 144 U/L
LDH FLD L TO P-CCNC: 145 U/L
LDH SERPL L TO P-CCNC: 213 U/L (ref 85–227)
LDH SERPL L TO P-CCNC: 295 U/L (ref 85–227)
LYMPHOCYTES # BLD AUTO: 1.5 10E3/UL (ref 0.8–5.3)
LYMPHOCYTES # BLD AUTO: 1.6 10E3/UL (ref 0.8–5.3)
LYMPHOCYTES # BLD AUTO: 2.9 10E3/UL (ref 0.8–5.3)
LYMPHOCYTES NFR BLD AUTO: 12 %
LYMPHOCYTES NFR BLD AUTO: 18 %
LYMPHOCYTES NFR BLD AUTO: 26 %
LYMPHOCYTES NFR FLD MANUAL: 25 %
LYMPHOCYTES NFR FLD MANUAL: 40 %
MCH RBC QN AUTO: 31.6 PG (ref 26.5–33)
MCH RBC QN AUTO: 31.7 PG (ref 26.5–33)
MCH RBC QN AUTO: 31.8 PG (ref 26.5–33)
MCH RBC QN AUTO: 31.8 PG (ref 26.5–33)
MCH RBC QN AUTO: 31.9 PG (ref 26.5–33)
MCH RBC QN AUTO: 32 PG (ref 26.5–33)
MCHC RBC AUTO-ENTMCNC: 31.8 G/DL (ref 31.5–36.5)
MCHC RBC AUTO-ENTMCNC: 31.9 G/DL (ref 31.5–36.5)
MCHC RBC AUTO-ENTMCNC: 32.1 G/DL (ref 31.5–36.5)
MCHC RBC AUTO-ENTMCNC: 32.4 G/DL (ref 31.5–36.5)
MCHC RBC AUTO-ENTMCNC: 32.6 G/DL (ref 31.5–36.5)
MCHC RBC AUTO-ENTMCNC: 32.8 G/DL (ref 31.5–36.5)
MCHC RBC AUTO-ENTMCNC: 32.9 G/DL (ref 31.5–36.5)
MCHC RBC AUTO-ENTMCNC: 33.6 G/DL (ref 31.5–36.5)
MCV RBC AUTO: 100 FL (ref 78–100)
MCV RBC AUTO: 100 FL (ref 78–100)
MCV RBC AUTO: 95 FL (ref 78–100)
MCV RBC AUTO: 96 FL (ref 78–100)
MCV RBC AUTO: 96 FL (ref 78–100)
MCV RBC AUTO: 98 FL (ref 78–100)
MCV RBC AUTO: 99 FL (ref 78–100)
MCV RBC AUTO: 99 FL (ref 78–100)
MONOCYTES # BLD AUTO: 0.7 10E3/UL (ref 0–1.3)
MONOCYTES # BLD AUTO: 1.1 10E3/UL (ref 0–1.3)
MONOCYTES # BLD AUTO: 1.1 10E3/UL (ref 0–1.3)
MONOCYTES NFR BLD AUTO: 10 %
MONOCYTES NFR BLD AUTO: 8 %
MONOCYTES NFR BLD AUTO: 8 %
MONOS+MACROS NFR FLD MANUAL: 31 %
MONOS+MACROS NFR FLD MANUAL: 33 %
MRSA DNA SPEC QL NAA+PROBE: NEGATIVE
NEUTROPHILS # BLD AUTO: 10.4 10E3/UL (ref 1.6–8.3)
NEUTROPHILS # BLD AUTO: 6.1 10E3/UL (ref 1.6–8.3)
NEUTROPHILS # BLD AUTO: 7 10E3/UL (ref 1.6–8.3)
NEUTROPHILS NFR BLD AUTO: 64 %
NEUTROPHILS NFR BLD AUTO: 72 %
NEUTROPHILS NFR BLD AUTO: 79 %
NEUTS BAND NFR FLD MANUAL: 11 %
NEUTS BAND NFR FLD MANUAL: 38 %
NRBC # BLD AUTO: 0 10E3/UL
NRBC BLD AUTO-RTO: 0 /100
P AXIS - MUSE: 46 DEGREES
PH BODY FLUID SOURCE: NORMAL
PH FLD: 6.1 PH
PHOSPHATE SERPL-MCNC: 4.6 MG/DL (ref 2.5–4.5)
PLATELET # BLD AUTO: 225 10E3/UL (ref 150–450)
PLATELET # BLD AUTO: 243 10E3/UL (ref 150–450)
PLATELET # BLD AUTO: 268 10E3/UL (ref 150–450)
PLATELET # BLD AUTO: 276 10E3/UL (ref 150–450)
PLATELET # BLD AUTO: 293 10E3/UL (ref 150–450)
PLATELET # BLD AUTO: 299 10E3/UL (ref 150–450)
PLATELET # BLD AUTO: 334 10E3/UL (ref 150–450)
PLATELET # BLD AUTO: 367 10E3/UL (ref 150–450)
POTASSIUM BLD-SCNC: 3.4 MMOL/L (ref 3.4–5.3)
POTASSIUM BLD-SCNC: 3.5 MMOL/L (ref 3.4–5.3)
POTASSIUM BLD-SCNC: 3.6 MMOL/L (ref 3.4–5.3)
POTASSIUM BLD-SCNC: 3.7 MMOL/L (ref 3.4–5.3)
POTASSIUM BLD-SCNC: 4.6 MMOL/L (ref 3.4–5.3)
PR INTERVAL - MUSE: 224 MS
PROCALCITONIN SERPL-MCNC: 0.36 NG/ML
PROT FLD-MCNC: 1.8 G/DL
PROT FLD-MCNC: 2 G/DL
PROT SERPL-MCNC: 5.3 G/DL (ref 6.8–8.8)
PROT SERPL-MCNC: 5.4 G/DL (ref 6.8–8.8)
PROT SERPL-MCNC: 5.8 G/DL (ref 6.8–8.8)
PROT SERPL-MCNC: 5.8 G/DL (ref 6.8–8.8)
PROT SERPL-MCNC: 6.2 G/DL (ref 6.8–8.8)
PROT SERPL-MCNC: 6.6 G/DL (ref 6.8–8.8)
PROTEIN BODY FLUID SOURCE: NORMAL
PROTEIN BODY FLUID SOURCE: NORMAL
QRS DURATION - MUSE: 104 MS
QT - MUSE: 398 MS
QTC - MUSE: 513 MS
R AXIS - MUSE: -10 DEGREES
RBC # BLD AUTO: 2.91 10E6/UL (ref 4.4–5.9)
RBC # BLD AUTO: 2.96 10E6/UL (ref 4.4–5.9)
RBC # BLD AUTO: 3.08 10E6/UL (ref 4.4–5.9)
RBC # BLD AUTO: 3.1 10E6/UL (ref 4.4–5.9)
RBC # BLD AUTO: 3.25 10E6/UL (ref 4.4–5.9)
RBC # BLD AUTO: 3.26 10E6/UL (ref 4.4–5.9)
RBC # BLD AUTO: 3.29 10E6/UL (ref 4.4–5.9)
RBC # BLD AUTO: 3.41 10E6/UL (ref 4.4–5.9)
SA TARGET DNA: NEGATIVE
SARS-COV-2 RNA RESP QL NAA+PROBE: NEGATIVE
SARS-COV-2 RNA RESP QL NAA+PROBE: NEGATIVE
SODIUM SERPL-SCNC: 135 MMOL/L (ref 133–144)
SODIUM SERPL-SCNC: 136 MMOL/L (ref 133–144)
SODIUM SERPL-SCNC: 139 MMOL/L (ref 133–144)
SODIUM SERPL-SCNC: 143 MMOL/L (ref 133–144)
SYSTOLIC BLOOD PRESSURE - MUSE: NORMAL MMHG
T AXIS - MUSE: 128 DEGREES
TROPONIN I SERPL HS-MCNC: 38 NG/L
TROPONIN I SERPL HS-MCNC: 57 NG/L
TROPONIN I SERPL HS-MCNC: 58 NG/L
VANCOMYCIN SERPL-MCNC: 14.3 MG/L
VANCOMYCIN SERPL-MCNC: 18.3 MG/L
VENTRICULAR RATE- MUSE: 100 BPM
WBC # BLD AUTO: 11.1 10E3/UL (ref 4–11)
WBC # BLD AUTO: 13.1 10E3/UL (ref 4–11)
WBC # BLD AUTO: 6.5 10E3/UL (ref 4–11)
WBC # BLD AUTO: 7.1 10E3/UL (ref 4–11)
WBC # BLD AUTO: 7.4 10E3/UL (ref 4–11)
WBC # BLD AUTO: 7.5 10E3/UL (ref 4–11)
WBC # BLD AUTO: 7.7 10E3/UL (ref 4–11)
WBC # BLD AUTO: 8.5 10E3/UL (ref 4–11)
WBC # FLD AUTO: 141 /UL
WBC # FLD AUTO: 186 /UL

## 2022-01-01 PROCEDURE — 80202 ASSAY OF VANCOMYCIN: CPT

## 2022-01-01 PROCEDURE — 83036 HEMOGLOBIN GLYCOSYLATED A1C: CPT | Performed by: HOSPITALIST

## 2022-01-01 PROCEDURE — 85610 PROTHROMBIN TIME: CPT | Performed by: HOSPITALIST

## 2022-01-01 PROCEDURE — 71046 X-RAY EXAM CHEST 2 VIEWS: CPT

## 2022-01-01 PROCEDURE — 99232 SBSQ HOSP IP/OBS MODERATE 35: CPT | Performed by: HOSPITALIST

## 2022-01-01 PROCEDURE — 99285 EMERGENCY DEPT VISIT HI MDM: CPT | Mod: 25

## 2022-01-01 PROCEDURE — 83605 ASSAY OF LACTIC ACID: CPT | Performed by: INTERNAL MEDICINE

## 2022-01-01 PROCEDURE — 36415 COLL VENOUS BLD VENIPUNCTURE: CPT | Performed by: EMERGENCY MEDICINE

## 2022-01-01 PROCEDURE — 83615 LACTATE (LD) (LDH) ENZYME: CPT | Performed by: HOSPITALIST

## 2022-01-01 PROCEDURE — 85610 PROTHROMBIN TIME: CPT | Performed by: EMERGENCY MEDICINE

## 2022-01-01 PROCEDURE — 99254 IP/OBS CNSLTJ NEW/EST MOD 60: CPT | Performed by: INTERNAL MEDICINE

## 2022-01-01 PROCEDURE — 90937 HEMODIALYSIS REPEATED EVAL: CPT

## 2022-01-01 PROCEDURE — 250N000013 HC RX MED GY IP 250 OP 250 PS 637: Performed by: HOSPITALIST

## 2022-01-01 PROCEDURE — 85025 COMPLETE CBC W/AUTO DIFF WBC: CPT | Performed by: EMERGENCY MEDICINE

## 2022-01-01 PROCEDURE — 85027 COMPLETE CBC AUTOMATED: CPT | Performed by: HOSPITALIST

## 2022-01-01 PROCEDURE — 96365 THER/PROPH/DIAG IV INF INIT: CPT

## 2022-01-01 PROCEDURE — 99239 HOSP IP/OBS DSCHRG MGMT >30: CPT | Performed by: HOSPITALIST

## 2022-01-01 PROCEDURE — 87075 CULTR BACTERIA EXCEPT BLOOD: CPT | Performed by: HOSPITALIST

## 2022-01-01 PROCEDURE — 84145 PROCALCITONIN (PCT): CPT | Performed by: INTERNAL MEDICINE

## 2022-01-01 PROCEDURE — 99223 1ST HOSP IP/OBS HIGH 75: CPT | Mod: AI | Performed by: HOSPITALIST

## 2022-01-01 PROCEDURE — 0W993ZZ DRAINAGE OF RIGHT PLEURAL CAVITY, PERCUTANEOUS APPROACH: ICD-10-PCS | Performed by: RADIOLOGY

## 2022-01-01 PROCEDURE — 83986 ASSAY PH BODY FLUID NOS: CPT | Performed by: HOSPITALIST

## 2022-01-01 PROCEDURE — 250N000011 HC RX IP 250 OP 636: Performed by: HOSPITALIST

## 2022-01-01 PROCEDURE — C9803 HOPD COVID-19 SPEC COLLECT: HCPCS

## 2022-01-01 PROCEDURE — 99207 PR MOONLIGHTING INDICATOR: CPT | Performed by: HOSPITALIST

## 2022-01-01 PROCEDURE — 634N000001 HC RX 634: Performed by: INTERNAL MEDICINE

## 2022-01-01 PROCEDURE — 120N000001 HC R&B MED SURG/OB

## 2022-01-01 PROCEDURE — 30903 CONTROL OF NOSEBLEED: CPT | Mod: LT

## 2022-01-01 PROCEDURE — 258N000003 HC RX IP 258 OP 636: Performed by: INTERNAL MEDICINE

## 2022-01-01 PROCEDURE — 87340 HEPATITIS B SURFACE AG IA: CPT | Performed by: INTERNAL MEDICINE

## 2022-01-01 PROCEDURE — 99222 1ST HOSP IP/OBS MODERATE 55: CPT | Performed by: INTERNAL MEDICINE

## 2022-01-01 PROCEDURE — 84484 ASSAY OF TROPONIN QUANT: CPT | Performed by: EMERGENCY MEDICINE

## 2022-01-01 PROCEDURE — 80053 COMPREHEN METABOLIC PANEL: CPT | Performed by: HOSPITALIST

## 2022-01-01 PROCEDURE — U0005 INFEC AGEN DETEC AMPLI PROBE: HCPCS | Performed by: EMERGENCY MEDICINE

## 2022-01-01 PROCEDURE — 36415 COLL VENOUS BLD VENIPUNCTURE: CPT | Performed by: HOSPITALIST

## 2022-01-01 PROCEDURE — 89051 BODY FLUID CELL COUNT: CPT | Performed by: HOSPITALIST

## 2022-01-01 PROCEDURE — 80053 COMPREHEN METABOLIC PANEL: CPT | Performed by: EMERGENCY MEDICINE

## 2022-01-01 PROCEDURE — 250N000013 HC RX MED GY IP 250 OP 250 PS 637: Performed by: INTERNAL MEDICINE

## 2022-01-01 PROCEDURE — 80202 ASSAY OF VANCOMYCIN: CPT | Performed by: HOSPITALIST

## 2022-01-01 PROCEDURE — 82945 GLUCOSE OTHER FLUID: CPT | Performed by: HOSPITALIST

## 2022-01-01 PROCEDURE — 83605 ASSAY OF LACTIC ACID: CPT | Performed by: EMERGENCY MEDICINE

## 2022-01-01 PROCEDURE — 84155 ASSAY OF PROTEIN SERUM: CPT | Performed by: HOSPITALIST

## 2022-01-01 PROCEDURE — 36415 COLL VENOUS BLD VENIPUNCTURE: CPT | Performed by: INTERNAL MEDICINE

## 2022-01-01 PROCEDURE — 80048 BASIC METABOLIC PNL TOTAL CA: CPT | Performed by: HOSPITALIST

## 2022-01-01 PROCEDURE — 99233 SBSQ HOSP IP/OBS HIGH 50: CPT | Performed by: INTERNAL MEDICINE

## 2022-01-01 PROCEDURE — 80048 BASIC METABOLIC PNL TOTAL CA: CPT | Performed by: EMERGENCY MEDICINE

## 2022-01-01 PROCEDURE — 99284 EMERGENCY DEPT VISIT MOD MDM: CPT | Mod: 25

## 2022-01-01 PROCEDURE — 87641 MR-STAPH DNA AMP PROBE: CPT | Performed by: INTERNAL MEDICINE

## 2022-01-01 PROCEDURE — 250N000012 HC RX MED GY IP 250 OP 636 PS 637: Performed by: HOSPITALIST

## 2022-01-01 PROCEDURE — 272N000706 US THORACENTESIS

## 2022-01-01 PROCEDURE — 84157 ASSAY OF PROTEIN OTHER: CPT | Performed by: HOSPITALIST

## 2022-01-01 PROCEDURE — 99232 SBSQ HOSP IP/OBS MODERATE 35: CPT | Performed by: INTERNAL MEDICINE

## 2022-01-01 PROCEDURE — 97530 THERAPEUTIC ACTIVITIES: CPT | Mod: GP

## 2022-01-01 PROCEDURE — 93005 ELECTROCARDIOGRAM TRACING: CPT

## 2022-01-01 PROCEDURE — 97161 PT EVAL LOW COMPLEX 20 MIN: CPT | Mod: GP

## 2022-01-01 PROCEDURE — 87070 CULTURE OTHR SPECIMN AEROBIC: CPT | Performed by: HOSPITALIST

## 2022-01-01 PROCEDURE — 80069 RENAL FUNCTION PANEL: CPT | Performed by: HOSPITALIST

## 2022-01-01 PROCEDURE — 97116 GAIT TRAINING THERAPY: CPT | Mod: GP

## 2022-01-01 PROCEDURE — 5A1D70Z PERFORMANCE OF URINARY FILTRATION, INTERMITTENT, LESS THAN 6 HOURS PER DAY: ICD-10-PCS | Performed by: INTERNAL MEDICINE

## 2022-01-01 PROCEDURE — 87205 SMEAR GRAM STAIN: CPT | Performed by: HOSPITALIST

## 2022-01-01 PROCEDURE — 86706 HEP B SURFACE ANTIBODY: CPT | Performed by: INTERNAL MEDICINE

## 2022-01-01 PROCEDURE — 250N000009 HC RX 250: Performed by: RADIOLOGY

## 2022-01-01 PROCEDURE — 96375 TX/PRO/DX INJ NEW DRUG ADDON: CPT

## 2022-01-01 PROCEDURE — 85014 HEMATOCRIT: CPT | Performed by: HOSPITALIST

## 2022-01-01 PROCEDURE — 250N000011 HC RX IP 250 OP 636: Performed by: EMERGENCY MEDICINE

## 2022-01-01 PROCEDURE — 250N000011 HC RX IP 250 OP 636: Performed by: INTERNAL MEDICINE

## 2022-01-01 PROCEDURE — 87040 BLOOD CULTURE FOR BACTERIA: CPT | Performed by: EMERGENCY MEDICINE

## 2022-01-01 PROCEDURE — 99239 HOSP IP/OBS DSCHRG MGMT >30: CPT | Performed by: INTERNAL MEDICINE

## 2022-01-01 PROCEDURE — 258N000003 HC RX IP 258 OP 636: Performed by: EMERGENCY MEDICINE

## 2022-01-01 PROCEDURE — 83605 ASSAY OF LACTIC ACID: CPT | Performed by: HOSPITALIST

## 2022-01-01 PROCEDURE — 71250 CT THORAX DX C-: CPT

## 2022-01-01 RX ORDER — AMOXICILLIN 250 MG
2 CAPSULE ORAL 2 TIMES DAILY PRN
Status: DISCONTINUED | OUTPATIENT
Start: 2022-01-01 | End: 2022-01-01 | Stop reason: HOSPADM

## 2022-01-01 RX ORDER — AMOXICILLIN 250 MG
1 CAPSULE ORAL 2 TIMES DAILY PRN
Status: DISCONTINUED | OUTPATIENT
Start: 2022-01-01 | End: 2022-01-01 | Stop reason: HOSPADM

## 2022-01-01 RX ORDER — WARFARIN SODIUM 5 MG/1
7.5 TABLET ORAL DAILY
Status: ON HOLD | COMMUNITY
End: 2023-01-01

## 2022-01-01 RX ORDER — TRAZODONE HYDROCHLORIDE 150 MG/1
150 TABLET ORAL AT BEDTIME
Status: ON HOLD | COMMUNITY
End: 2023-01-01

## 2022-01-01 RX ORDER — PIPERACILLIN SODIUM, TAZOBACTAM SODIUM 2; .25 G/10ML; G/10ML
2.25 INJECTION, POWDER, LYOPHILIZED, FOR SOLUTION INTRAVENOUS EVERY 8 HOURS
Status: DISCONTINUED | OUTPATIENT
Start: 2022-01-01 | End: 2022-01-01

## 2022-01-01 RX ORDER — PIPERACILLIN SODIUM, TAZOBACTAM SODIUM 2; .25 G/10ML; G/10ML
2.25 INJECTION, POWDER, LYOPHILIZED, FOR SOLUTION INTRAVENOUS ONCE
Status: COMPLETED | OUTPATIENT
Start: 2022-01-01 | End: 2022-01-01

## 2022-01-01 RX ORDER — DEXTROSE MONOHYDRATE 25 G/50ML
25-50 INJECTION, SOLUTION INTRAVENOUS
Status: DISCONTINUED | OUTPATIENT
Start: 2022-01-01 | End: 2022-01-01 | Stop reason: HOSPADM

## 2022-01-01 RX ORDER — WARFARIN SODIUM 7.5 MG/1
7.5 TABLET ORAL
Status: COMPLETED | OUTPATIENT
Start: 2022-01-01 | End: 2022-01-01

## 2022-01-01 RX ORDER — CLONIDINE HYDROCHLORIDE 0.1 MG/1
0.1 TABLET ORAL 2 TIMES DAILY
Status: DISCONTINUED | OUTPATIENT
Start: 2022-01-01 | End: 2022-01-01 | Stop reason: HOSPADM

## 2022-01-01 RX ORDER — MIRTAZAPINE 15 MG/1
15 TABLET, FILM COATED ORAL AT BEDTIME
Status: DISCONTINUED | OUTPATIENT
Start: 2022-01-01 | End: 2022-01-01 | Stop reason: HOSPADM

## 2022-01-01 RX ORDER — FOLIC ACID 1 MG/1
1 TABLET ORAL EVERY MORNING
Status: DISCONTINUED | OUTPATIENT
Start: 2022-01-01 | End: 2022-01-01 | Stop reason: HOSPADM

## 2022-01-01 RX ORDER — NICOTINE POLACRILEX 4 MG
15-30 LOZENGE BUCCAL
Status: DISCONTINUED | OUTPATIENT
Start: 2022-01-01 | End: 2022-01-01 | Stop reason: HOSPADM

## 2022-01-01 RX ORDER — HYDRALAZINE HYDROCHLORIDE 25 MG/1
25 TABLET, FILM COATED ORAL EVERY 6 HOURS PRN
Status: DISCONTINUED | OUTPATIENT
Start: 2022-01-01 | End: 2022-01-01 | Stop reason: HOSPADM

## 2022-01-01 RX ORDER — ACETAMINOPHEN 325 MG/1
650 TABLET ORAL EVERY 6 HOURS PRN
Status: DISCONTINUED | OUTPATIENT
Start: 2022-01-01 | End: 2022-01-01 | Stop reason: HOSPADM

## 2022-01-01 RX ORDER — ONDANSETRON 4 MG/1
4 TABLET, ORALLY DISINTEGRATING ORAL EVERY 6 HOURS PRN
Status: DISCONTINUED | OUTPATIENT
Start: 2022-01-01 | End: 2022-01-01 | Stop reason: HOSPADM

## 2022-01-01 RX ORDER — AMOXICILLIN AND CLAVULANATE POTASSIUM 500; 125 MG/1; MG/1
1 TABLET, FILM COATED ORAL EVERY 24 HOURS
Qty: 5 TABLET | Refills: 0 | Status: SHIPPED | OUTPATIENT
Start: 2022-01-01 | End: 2022-01-01

## 2022-01-01 RX ORDER — METOPROLOL TARTRATE 1 MG/ML
2.5 INJECTION, SOLUTION INTRAVENOUS EVERY 4 HOURS PRN
Status: DISCONTINUED | OUTPATIENT
Start: 2022-01-01 | End: 2022-01-01 | Stop reason: HOSPADM

## 2022-01-01 RX ORDER — METOPROLOL TARTRATE 100 MG
100 TABLET ORAL 2 TIMES DAILY
Status: DISCONTINUED | OUTPATIENT
Start: 2022-01-01 | End: 2022-01-01 | Stop reason: HOSPADM

## 2022-01-01 RX ORDER — ALLOPURINOL 100 MG/1
200 TABLET ORAL DAILY
Status: DISCONTINUED | OUTPATIENT
Start: 2022-01-01 | End: 2022-01-01 | Stop reason: HOSPADM

## 2022-01-01 RX ORDER — SODIUM CHLORIDE 9 MG/ML
INJECTION, SOLUTION INTRAVENOUS CONTINUOUS
Status: DISCONTINUED | OUTPATIENT
Start: 2022-01-01 | End: 2022-01-01

## 2022-01-01 RX ORDER — LISINOPRIL 20 MG/1
20 TABLET ORAL DAILY
Status: DISCONTINUED | OUTPATIENT
Start: 2022-01-01 | End: 2022-01-01 | Stop reason: HOSPADM

## 2022-01-01 RX ORDER — ONDANSETRON 2 MG/ML
4 INJECTION INTRAMUSCULAR; INTRAVENOUS EVERY 30 MIN PRN
Status: DISCONTINUED | OUTPATIENT
Start: 2022-01-01 | End: 2022-01-01

## 2022-01-01 RX ORDER — ASPIRIN 81 MG/1
81 TABLET ORAL AT BEDTIME
Status: DISCONTINUED | OUTPATIENT
Start: 2022-01-01 | End: 2022-01-01 | Stop reason: HOSPADM

## 2022-01-01 RX ORDER — CYCLOBENZAPRINE HCL 10 MG
10 TABLET ORAL 2 TIMES DAILY
Status: DISCONTINUED | OUTPATIENT
Start: 2022-01-01 | End: 2022-01-01 | Stop reason: HOSPADM

## 2022-01-01 RX ORDER — LIDOCAINE HYDROCHLORIDE 10 MG/ML
10 INJECTION, SOLUTION EPIDURAL; INFILTRATION; INTRACAUDAL; PERINEURAL ONCE
Status: COMPLETED | OUTPATIENT
Start: 2022-01-01 | End: 2022-01-01

## 2022-01-01 RX ORDER — ACETAMINOPHEN 500 MG
1000 TABLET ORAL EVERY 8 HOURS PRN
Status: DISCONTINUED | OUTPATIENT
Start: 2022-01-01 | End: 2022-01-01 | Stop reason: HOSPADM

## 2022-01-01 RX ORDER — BUMETANIDE 1 MG/1
2 TABLET ORAL
Status: DISCONTINUED | OUTPATIENT
Start: 2022-01-01 | End: 2022-01-01 | Stop reason: HOSPADM

## 2022-01-01 RX ORDER — ACETAMINOPHEN 325 MG/1
975 TABLET ORAL EVERY 8 HOURS
Status: DISCONTINUED | OUTPATIENT
Start: 2022-01-01 | End: 2022-01-01 | Stop reason: HOSPADM

## 2022-01-01 RX ORDER — SODIUM BICARBONATE 650 MG/1
1300 TABLET ORAL 2 TIMES DAILY
Status: DISCONTINUED | OUTPATIENT
Start: 2022-01-01 | End: 2022-01-01

## 2022-01-01 RX ORDER — SODIUM BICARBONATE
POWDER (GRAM) MISCELLANEOUS 2 TIMES DAILY
Status: DISCONTINUED | OUTPATIENT
Start: 2022-01-01 | End: 2022-01-01

## 2022-01-01 RX ORDER — ONDANSETRON 4 MG/1
4 TABLET, ORALLY DISINTEGRATING ORAL EVERY 6 HOURS PRN
Status: DISCONTINUED | OUTPATIENT
Start: 2022-01-01 | End: 2022-01-01

## 2022-01-01 RX ORDER — WARFARIN SODIUM 5 MG/1
10 TABLET ORAL
Status: DISCONTINUED | OUTPATIENT
Start: 2022-01-01 | End: 2022-01-01 | Stop reason: HOSPADM

## 2022-01-01 RX ORDER — BUMETANIDE 1 MG/1
2 TABLET ORAL 2 TIMES DAILY
Status: DISCONTINUED | OUTPATIENT
Start: 2022-01-01 | End: 2022-01-01

## 2022-01-01 RX ORDER — PROCHLORPERAZINE MALEATE 5 MG
5 TABLET ORAL EVERY 6 HOURS PRN
Status: DISCONTINUED | OUTPATIENT
Start: 2022-01-01 | End: 2022-01-01 | Stop reason: HOSPADM

## 2022-01-01 RX ORDER — ONDANSETRON 2 MG/ML
4 INJECTION INTRAMUSCULAR; INTRAVENOUS EVERY 6 HOURS PRN
Status: DISCONTINUED | OUTPATIENT
Start: 2022-01-01 | End: 2022-01-01 | Stop reason: HOSPADM

## 2022-01-01 RX ORDER — LIDOCAINE 40 MG/G
CREAM TOPICAL
Status: DISCONTINUED | OUTPATIENT
Start: 2022-01-01 | End: 2022-01-01 | Stop reason: HOSPADM

## 2022-01-01 RX ORDER — VITAMIN B COMPLEX
25 TABLET ORAL EVERY MORNING
Status: DISCONTINUED | OUTPATIENT
Start: 2022-01-01 | End: 2022-01-01 | Stop reason: HOSPADM

## 2022-01-01 RX ORDER — PROCHLORPERAZINE 25 MG
12.5 SUPPOSITORY, RECTAL RECTAL EVERY 12 HOURS PRN
Status: DISCONTINUED | OUTPATIENT
Start: 2022-01-01 | End: 2022-01-01 | Stop reason: HOSPADM

## 2022-01-01 RX ORDER — ALLOPURINOL 100 MG/1
200 TABLET ORAL DAILY
COMMUNITY
End: 2023-01-01

## 2022-01-01 RX ORDER — WARFARIN SODIUM 5 MG/1
10 TABLET ORAL DAILY
Status: ON HOLD | COMMUNITY
End: 2023-01-01

## 2022-01-01 RX ORDER — METOPROLOL TARTRATE 50 MG
100 TABLET ORAL 2 TIMES DAILY
Status: DISCONTINUED | OUTPATIENT
Start: 2022-01-01 | End: 2022-01-01 | Stop reason: HOSPADM

## 2022-01-01 RX ORDER — AMOXICILLIN AND CLAVULANATE POTASSIUM 500; 125 MG/1; MG/1
1 TABLET, FILM COATED ORAL EVERY 24 HOURS
Status: DISCONTINUED | OUTPATIENT
Start: 2022-01-01 | End: 2022-01-01 | Stop reason: HOSPADM

## 2022-01-01 RX ORDER — PANTOPRAZOLE SODIUM 40 MG/1
40 TABLET, DELAYED RELEASE ORAL
Status: DISCONTINUED | OUTPATIENT
Start: 2022-01-01 | End: 2022-01-01 | Stop reason: HOSPADM

## 2022-01-01 RX ORDER — ACETAMINOPHEN 500 MG
1000 TABLET ORAL EVERY 8 HOURS PRN
Status: ON HOLD | COMMUNITY
End: 2023-01-01

## 2022-01-01 RX ORDER — BUMETANIDE 1 MG/1
2 TABLET ORAL
Status: DISCONTINUED | OUTPATIENT
Start: 2022-01-01 | End: 2022-01-01

## 2022-01-01 RX ORDER — ACETAMINOPHEN 650 MG/1
650 SUPPOSITORY RECTAL EVERY 6 HOURS PRN
Status: DISCONTINUED | OUTPATIENT
Start: 2022-01-01 | End: 2022-01-01 | Stop reason: HOSPADM

## 2022-01-01 RX ORDER — EZETIMIBE 10 MG/1
10 TABLET ORAL EVERY MORNING
Status: DISCONTINUED | OUTPATIENT
Start: 2022-01-01 | End: 2022-01-01 | Stop reason: HOSPADM

## 2022-01-01 RX ORDER — ONDANSETRON 2 MG/ML
4 INJECTION INTRAMUSCULAR; INTRAVENOUS EVERY 6 HOURS PRN
Status: DISCONTINUED | OUTPATIENT
Start: 2022-01-01 | End: 2022-01-01

## 2022-01-01 RX ADMIN — SODIUM CHLORIDE 250 ML: 9 INJECTION, SOLUTION INTRAVENOUS at 16:39

## 2022-01-01 RX ADMIN — METOPROLOL TARTRATE 100 MG: 100 TABLET, FILM COATED ORAL at 22:58

## 2022-01-01 RX ADMIN — CLONIDINE HYDROCHLORIDE 0.1 MG: 0.1 TABLET ORAL at 08:27

## 2022-01-01 RX ADMIN — VANCOMYCIN HYDROCHLORIDE 750 MG: 1 INJECTION, POWDER, LYOPHILIZED, FOR SOLUTION INTRAVENOUS at 10:00

## 2022-01-01 RX ADMIN — ASPIRIN 81 MG: 81 TABLET, COATED ORAL at 21:18

## 2022-01-01 RX ADMIN — CYCLOBENZAPRINE 10 MG: 10 TABLET, FILM COATED ORAL at 20:11

## 2022-01-01 RX ADMIN — TRAZODONE HYDROCHLORIDE 150 MG: 50 TABLET ORAL at 22:58

## 2022-01-01 RX ADMIN — METOPROLOL TARTRATE 100 MG: 50 TABLET, FILM COATED ORAL at 08:36

## 2022-01-01 RX ADMIN — EZETIMIBE 10 MG: 10 TABLET ORAL at 08:32

## 2022-01-01 RX ADMIN — PANTOPRAZOLE SODIUM 40 MG: 40 TABLET, DELAYED RELEASE ORAL at 08:36

## 2022-01-01 RX ADMIN — MIRTAZAPINE 15 MG: 15 TABLET, FILM COATED ORAL at 21:28

## 2022-01-01 RX ADMIN — METOPROLOL TARTRATE 100 MG: 100 TABLET, FILM COATED ORAL at 13:32

## 2022-01-01 RX ADMIN — SODIUM CHLORIDE 300 ML: 9 INJECTION, SOLUTION INTRAVENOUS at 08:00

## 2022-01-01 RX ADMIN — ASPIRIN 81 MG: 81 TABLET, COATED ORAL at 01:27

## 2022-01-01 RX ADMIN — PIPERACILLIN SODIUM AND TAZOBACTAM SODIUM 2.25 G: 2; .25 INJECTION, POWDER, LYOPHILIZED, FOR SOLUTION INTRAVENOUS at 00:20

## 2022-01-01 RX ADMIN — INSULIN ASPART 2 UNITS: 100 INJECTION, SOLUTION INTRAVENOUS; SUBCUTANEOUS at 14:50

## 2022-01-01 RX ADMIN — PANTOPRAZOLE SODIUM 40 MG: 40 TABLET, DELAYED RELEASE ORAL at 06:32

## 2022-01-01 RX ADMIN — MIRTAZAPINE 15 MG: 15 TABLET, FILM COATED ORAL at 21:18

## 2022-01-01 RX ADMIN — B-COMPLEX W/ C & FOLIC ACID TAB 1 TABLET: TAB at 11:50

## 2022-01-01 RX ADMIN — INSULIN ASPART 3 UNITS: 100 INJECTION, SOLUTION INTRAVENOUS; SUBCUTANEOUS at 12:59

## 2022-01-01 RX ADMIN — EZETIMIBE 10 MG: 10 TABLET ORAL at 08:36

## 2022-01-01 RX ADMIN — SODIUM CHLORIDE 300 ML: 9 INJECTION, SOLUTION INTRAVENOUS at 14:09

## 2022-01-01 RX ADMIN — METOPROLOL TARTRATE 100 MG: 100 TABLET, FILM COATED ORAL at 11:50

## 2022-01-01 RX ADMIN — SODIUM CHLORIDE 300 ML: 9 INJECTION, SOLUTION INTRAVENOUS at 22:11

## 2022-01-01 RX ADMIN — CYCLOBENZAPRINE 10 MG: 10 TABLET, FILM COATED ORAL at 11:50

## 2022-01-01 RX ADMIN — FOLIC ACID 1 MG: 1 TABLET ORAL at 08:36

## 2022-01-01 RX ADMIN — PIPERACILLIN SODIUM AND TAZOBACTAM SODIUM 2.25 G: 2; .25 INJECTION, POWDER, LYOPHILIZED, FOR SOLUTION INTRAVENOUS at 08:37

## 2022-01-01 RX ADMIN — ASPIRIN 81 MG: 81 TABLET, COATED ORAL at 22:59

## 2022-01-01 RX ADMIN — ALLOPURINOL 200 MG: 100 TABLET ORAL at 13:31

## 2022-01-01 RX ADMIN — INSULIN ASPART 3 UNITS: 100 INJECTION, SOLUTION INTRAVENOUS; SUBCUTANEOUS at 23:39

## 2022-01-01 RX ADMIN — SODIUM CHLORIDE 250 ML: 9 INJECTION, SOLUTION INTRAVENOUS at 22:11

## 2022-01-01 RX ADMIN — EZETIMIBE 10 MG: 10 TABLET ORAL at 13:32

## 2022-01-01 RX ADMIN — FOLIC ACID 1 MG: 1 TABLET ORAL at 13:31

## 2022-01-01 RX ADMIN — LISINOPRIL 20 MG: 20 TABLET ORAL at 13:32

## 2022-01-01 RX ADMIN — MIRTAZAPINE 15 MG: 15 TABLET, FILM COATED ORAL at 01:27

## 2022-01-01 RX ADMIN — ASPIRIN 81 MG: 81 TABLET, COATED ORAL at 21:46

## 2022-01-01 RX ADMIN — FOLIC ACID 1 MG: 1 TABLET ORAL at 11:50

## 2022-01-01 RX ADMIN — ALLOPURINOL 200 MG: 100 TABLET ORAL at 11:50

## 2022-01-01 RX ADMIN — CLONIDINE HYDROCHLORIDE 0.1 MG: 0.1 TABLET ORAL at 21:47

## 2022-01-01 RX ADMIN — INSULIN GLARGINE 8 UNITS: 100 INJECTION, SOLUTION SUBCUTANEOUS at 21:48

## 2022-01-01 RX ADMIN — LIDOCAINE HYDROCHLORIDE 10 ML: 10 INJECTION, SOLUTION EPIDURAL; INFILTRATION; INTRACAUDAL; PERINEURAL at 13:22

## 2022-01-01 RX ADMIN — CLONIDINE HYDROCHLORIDE 0.1 MG: 0.1 TABLET ORAL at 11:50

## 2022-01-01 RX ADMIN — MIRTAZAPINE 15 MG: 15 TABLET, FILM COATED ORAL at 22:59

## 2022-01-01 RX ADMIN — MIRTAZAPINE 15 MG: 15 TABLET, FILM COATED ORAL at 21:47

## 2022-01-01 RX ADMIN — INSULIN GLARGINE 15 UNITS: 100 INJECTION, SOLUTION SUBCUTANEOUS at 21:18

## 2022-01-01 RX ADMIN — Medication 25 MCG: at 13:32

## 2022-01-01 RX ADMIN — BUMETANIDE 2 MG: 1 TABLET ORAL at 22:58

## 2022-01-01 RX ADMIN — CLONIDINE HYDROCHLORIDE 0.1 MG: 0.1 TABLET ORAL at 08:36

## 2022-01-01 RX ADMIN — TRAZODONE HYDROCHLORIDE 150 MG: 50 TABLET ORAL at 01:27

## 2022-01-01 RX ADMIN — LISINOPRIL 20 MG: 20 TABLET ORAL at 08:27

## 2022-01-01 RX ADMIN — AMOXICILLIN AND CLAVULANATE POTASSIUM 1 TABLET: 500; 125 TABLET, FILM COATED ORAL at 08:27

## 2022-01-01 RX ADMIN — SODIUM CHLORIDE 250 ML: 9 INJECTION, SOLUTION INTRAVENOUS at 09:05

## 2022-01-01 RX ADMIN — CYCLOBENZAPRINE 10 MG: 10 TABLET, FILM COATED ORAL at 08:33

## 2022-01-01 RX ADMIN — Medication 25 MCG: at 11:50

## 2022-01-01 RX ADMIN — CLONIDINE HYDROCHLORIDE 0.1 MG: 0.1 TABLET ORAL at 13:34

## 2022-01-01 RX ADMIN — METOPROLOL TARTRATE 100 MG: 50 TABLET, FILM COATED ORAL at 20:27

## 2022-01-01 RX ADMIN — METOPROLOL TARTRATE 100 MG: 50 TABLET, FILM COATED ORAL at 20:11

## 2022-01-01 RX ADMIN — PIPERACILLIN SODIUM AND TAZOBACTAM SODIUM 2.25 G: 2; .25 INJECTION, POWDER, LYOPHILIZED, FOR SOLUTION INTRAVENOUS at 01:28

## 2022-01-01 RX ADMIN — FOLIC ACID 1 MG: 1 TABLET ORAL at 08:27

## 2022-01-01 RX ADMIN — ASPIRIN 81 MG: 81 TABLET, COATED ORAL at 21:28

## 2022-01-01 RX ADMIN — HYDRALAZINE HYDROCHLORIDE 25 MG: 25 TABLET ORAL at 17:27

## 2022-01-01 RX ADMIN — PIPERACILLIN SODIUM AND TAZOBACTAM SODIUM 2.25 G: 2; .25 INJECTION, POWDER, LYOPHILIZED, FOR SOLUTION INTRAVENOUS at 11:23

## 2022-01-01 RX ADMIN — TRAZODONE HYDROCHLORIDE 150 MG: 50 TABLET ORAL at 21:28

## 2022-01-01 RX ADMIN — METOPROLOL TARTRATE 100 MG: 50 TABLET, FILM COATED ORAL at 08:32

## 2022-01-01 RX ADMIN — CLONIDINE HYDROCHLORIDE 0.1 MG: 0.1 TABLET ORAL at 20:11

## 2022-01-01 RX ADMIN — INSULIN GLARGINE 8 UNITS: 100 INJECTION, SOLUTION SUBCUTANEOUS at 23:40

## 2022-01-01 RX ADMIN — EZETIMIBE 10 MG: 10 TABLET ORAL at 08:27

## 2022-01-01 RX ADMIN — LISINOPRIL 20 MG: 20 TABLET ORAL at 22:59

## 2022-01-01 RX ADMIN — METOPROLOL TARTRATE 100 MG: 50 TABLET, FILM COATED ORAL at 08:27

## 2022-01-01 RX ADMIN — WARFARIN SODIUM 7.5 MG: 7.5 TABLET ORAL at 17:10

## 2022-01-01 RX ADMIN — LISINOPRIL 20 MG: 20 TABLET ORAL at 08:36

## 2022-01-01 RX ADMIN — PIPERACILLIN SODIUM AND TAZOBACTAM SODIUM 2.25 G: 2; .25 INJECTION, POWDER, LYOPHILIZED, FOR SOLUTION INTRAVENOUS at 17:27

## 2022-01-01 RX ADMIN — BUMETANIDE 2 MG: 1 TABLET ORAL at 08:36

## 2022-01-01 RX ADMIN — INSULIN ASPART 1 UNITS: 100 INJECTION, SOLUTION INTRAVENOUS; SUBCUTANEOUS at 12:44

## 2022-01-01 RX ADMIN — PANTOPRAZOLE SODIUM 40 MG: 40 TABLET, DELAYED RELEASE ORAL at 16:58

## 2022-01-01 RX ADMIN — LIDOCAINE HYDROCHLORIDE 8 ML: 10 INJECTION, SOLUTION EPIDURAL; INFILTRATION; INTRACAUDAL; PERINEURAL at 11:11

## 2022-01-01 RX ADMIN — CYCLOBENZAPRINE 10 MG: 10 TABLET, FILM COATED ORAL at 08:36

## 2022-01-01 RX ADMIN — METOPROLOL TARTRATE 100 MG: 100 TABLET, FILM COATED ORAL at 21:47

## 2022-01-01 RX ADMIN — PIPERACILLIN SODIUM AND TAZOBACTAM SODIUM 2.25 G: 2; .25 INJECTION, POWDER, LYOPHILIZED, FOR SOLUTION INTRAVENOUS at 10:04

## 2022-01-01 RX ADMIN — CYCLOBENZAPRINE 10 MG: 10 TABLET, FILM COATED ORAL at 20:27

## 2022-01-01 RX ADMIN — TRAZODONE HYDROCHLORIDE 150 MG: 50 TABLET ORAL at 21:18

## 2022-01-01 RX ADMIN — CYCLOBENZAPRINE 10 MG: 10 TABLET, FILM COATED ORAL at 08:27

## 2022-01-01 RX ADMIN — LISINOPRIL 20 MG: 20 TABLET ORAL at 11:50

## 2022-01-01 RX ADMIN — LISINOPRIL 20 MG: 20 TABLET ORAL at 08:32

## 2022-01-01 RX ADMIN — INSULIN ASPART 4 UNITS: 100 INJECTION, SOLUTION INTRAVENOUS; SUBCUTANEOUS at 16:55

## 2022-01-01 RX ADMIN — PANTOPRAZOLE SODIUM 40 MG: 40 TABLET, DELAYED RELEASE ORAL at 17:10

## 2022-01-01 RX ADMIN — PANTOPRAZOLE SODIUM 40 MG: 40 TABLET, DELAYED RELEASE ORAL at 17:27

## 2022-01-01 RX ADMIN — CLONIDINE HYDROCHLORIDE 0.1 MG: 0.1 TABLET ORAL at 22:58

## 2022-01-01 RX ADMIN — CYCLOBENZAPRINE 10 MG: 10 TABLET, FILM COATED ORAL at 21:46

## 2022-01-01 RX ADMIN — BUMETANIDE 2 MG: 1 TABLET ORAL at 08:27

## 2022-01-01 RX ADMIN — PIPERACILLIN SODIUM AND TAZOBACTAM SODIUM 2.25 G: 2; .25 INJECTION, POWDER, LYOPHILIZED, FOR SOLUTION INTRAVENOUS at 16:58

## 2022-01-01 RX ADMIN — EPOETIN ALFA-EPBX 2000 UNITS: 2000 INJECTION, SOLUTION INTRAVENOUS; SUBCUTANEOUS at 22:11

## 2022-01-01 RX ADMIN — INSULIN ASPART 3 UNITS: 100 INJECTION, SOLUTION INTRAVENOUS; SUBCUTANEOUS at 18:02

## 2022-01-01 RX ADMIN — FOLIC ACID 1 MG: 1 TABLET ORAL at 08:33

## 2022-01-01 RX ADMIN — BUMETANIDE 2 MG: 1 TABLET ORAL at 20:11

## 2022-01-01 RX ADMIN — EZETIMIBE 10 MG: 10 TABLET ORAL at 11:50

## 2022-01-01 RX ADMIN — SODIUM CHLORIDE 250 ML: 9 INJECTION, SOLUTION INTRAVENOUS at 14:09

## 2022-01-01 RX ADMIN — VANCOMYCIN HYDROCHLORIDE 2000 MG: 5 INJECTION, POWDER, LYOPHILIZED, FOR SOLUTION INTRAVENOUS at 12:34

## 2022-01-01 RX ADMIN — CLONIDINE HYDROCHLORIDE 0.1 MG: 0.1 TABLET ORAL at 20:27

## 2022-01-01 RX ADMIN — CYCLOBENZAPRINE 10 MG: 10 TABLET, FILM COATED ORAL at 22:59

## 2022-01-01 RX ADMIN — TRAZODONE HYDROCHLORIDE 150 MG: 50 TABLET ORAL at 21:46

## 2022-01-01 RX ADMIN — SODIUM CHLORIDE 300 ML: 9 INJECTION, SOLUTION INTRAVENOUS at 16:38

## 2022-01-01 RX ADMIN — B-COMPLEX W/ C & FOLIC ACID TAB 1 TABLET: TAB at 13:31

## 2022-01-01 ASSESSMENT — ACTIVITIES OF DAILY LIVING (ADL)
ADLS_ACUITY_SCORE: 35
DIFFICULTY_EATING/SWALLOWING: NO
ADLS_ACUITY_SCORE: 30
ADLS_ACUITY_SCORE: 30
ADLS_ACUITY_SCORE: 31
ADLS_ACUITY_SCORE: 39
ADLS_ACUITY_SCORE: 26
ADLS_ACUITY_SCORE: 35
ADLS_ACUITY_SCORE: 40
ADLS_ACUITY_SCORE: 40
ADLS_ACUITY_SCORE: 31
TOILETING_ISSUES: NO
ADLS_ACUITY_SCORE: 31
ADLS_ACUITY_SCORE: 39
TRANSFERRING: 0-->ASSISTANCE NEEDED (DEVELOPMENTALLY APPROPRIATE)
ADLS_ACUITY_SCORE: 37
ADLS_ACUITY_SCORE: 33
CHANGE_IN_FUNCTIONAL_STATUS_SINCE_ONSET_OF_CURRENT_ILLNESS/INJURY: YES
ADLS_ACUITY_SCORE: 30
ADLS_ACUITY_SCORE: 37
ADLS_ACUITY_SCORE: 30
ADLS_ACUITY_SCORE: 35
ADLS_ACUITY_SCORE: 35
ADLS_ACUITY_SCORE: 40
ADLS_ACUITY_SCORE: 39
ADLS_ACUITY_SCORE: 31
CONCENTRATING,_REMEMBERING_OR_MAKING_DECISIONS_DIFFICULTY: NO
TRANSFERRING: 1-->ASSISTANCE (EQUIPMENT/PERSON) NEEDED
ADLS_ACUITY_SCORE: 43
ADLS_ACUITY_SCORE: 31
ADLS_ACUITY_SCORE: 30
ADLS_ACUITY_SCORE: 31
ADLS_ACUITY_SCORE: 30
ADLS_ACUITY_SCORE: 31
ADLS_ACUITY_SCORE: 31
ADLS_ACUITY_SCORE: 39
ADLS_ACUITY_SCORE: 39
ADLS_ACUITY_SCORE: 26
WEAR_GLASSES_OR_BLIND: NO
DOING_ERRANDS_INDEPENDENTLY_DIFFICULTY: YES
ADLS_ACUITY_SCORE: 43
ADLS_ACUITY_SCORE: 39
ADLS_ACUITY_SCORE: 37
ADLS_ACUITY_SCORE: 35
ADLS_ACUITY_SCORE: 26
ADLS_ACUITY_SCORE: 30
ADLS_ACUITY_SCORE: 35
FALL_HISTORY_WITHIN_LAST_SIX_MONTHS: NO
ADLS_ACUITY_SCORE: 30
DEPENDENT_IADLS:: TRANSPORTATION
ADLS_ACUITY_SCORE: 37
ADLS_ACUITY_SCORE: 30
EQUIPMENT_CURRENTLY_USED_AT_HOME: WALKER, ROLLING
ADLS_ACUITY_SCORE: 30
ADLS_ACUITY_SCORE: 40
ADLS_ACUITY_SCORE: 39
ADLS_ACUITY_SCORE: 31
ADLS_ACUITY_SCORE: 43
ADLS_ACUITY_SCORE: 31
WALKING_OR_CLIMBING_STAIRS: AMBULATION DIFFICULTY, REQUIRES EQUIPMENT
ADLS_ACUITY_SCORE: 40
ADLS_ACUITY_SCORE: 30
ADLS_ACUITY_SCORE: 40
ADLS_ACUITY_SCORE: 35
ADLS_ACUITY_SCORE: 37
ADLS_ACUITY_SCORE: 31
DRESSING/BATHING_DIFFICULTY: NO
ADLS_ACUITY_SCORE: 30
WALKING_OR_CLIMBING_STAIRS_DIFFICULTY: YES
ADLS_ACUITY_SCORE: 26
ADLS_ACUITY_SCORE: 31
ADLS_ACUITY_SCORE: 31
ADLS_ACUITY_SCORE: 35
ADLS_ACUITY_SCORE: 31
ADLS_ACUITY_SCORE: 26

## 2022-01-01 ASSESSMENT — ENCOUNTER SYMPTOMS
COUGH: 0
ABDOMINAL PAIN: 0
CHEST TIGHTNESS: 1
NAUSEA: 1
VOMITING: 1
SHORTNESS OF BREATH: 1
WEAKNESS: 1
DIARRHEA: 1
ABDOMINAL PAIN: 0
FEVER: 0
NAUSEA: 0
VOMITING: 0
DIARRHEA: 0

## 2022-02-17 PROBLEM — E11.10 DKA (DIABETIC KETOACIDOSIS) (H): Status: ACTIVE | Noted: 2021-07-28

## 2022-07-31 NOTE — ED PROVIDER NOTES
History     Chief Complaint:  Epistaxis    The history is provided by the patient.      Gordon Gann is a 77 year old male with atrial fibrillation on Coumadin who presents with epistaxis. Gordon' last INR was 1.7 on 07/21/22. He recently held a few doses of Coumadin due to extended bleeding after decannulation of his fistula.  Today, over 5 hours prior to arrival he developed left sided epistaxis he could not get to stop at home. He denies recent illness, trauma, or other concerns.    Review of Systems   Constitutional: Negative for fever.   HENT: Positive for nosebleeds. Negative for congestion.    Respiratory: Negative for cough.    All other systems reviewed and are negative.    Allergies:  The patient has no known allergies.     Medications:    acetaminophen (TYLENOL) 500 MG tablet  aspirin 81 MG EC tablet  bumetanide (BUMEX) 2 MG tablet  cloNIDine (CATAPRES) 0.1 MG tablet  cyclobenzaprine (FLEXERIL) 10 MG tablet  diclofenac (VOLTAREN) 1 % topical gel  ezetimibe (ZETIA) 10 MG tablet  fish oil-omega-3 fatty acids 1000 MG capsule  folic acid (FOLVITE) 1 MG tablet  hydrALAZINE (APRESOLINE) 25 MG tablet  HYDROmorphone (DILAUDID) 2 MG tablet  lisinopril (ZESTRIL) 20 MG tablet  melatonin 3 MG tablet  metoprolol tartrate (LOPRESSOR) 100 MG tablet  mirtazapine (REMERON) 15 MG tablet  senna-docusate (SENOKOT-S/PERICOLACE) 8.6-50 MG tablet  Sodium bicarbonate PO  traZODone (DESYREL) 50 MG tablet  vitamin B complex with vitamin C (STRESS TAB) tablet  Vitamin D, Cholecalciferol, 25 MCG (1000 UT) CAPS  warfarin ANTICOAGULANT (COUMADIN) 5 MG tablet    Past Medical History:    Alcohol abuse   Atrial fibrillation   CKD (chronic kidney disease) stage 4  Diabetes mellitus, type two   Depression   Gout   Hypokalemia   Hypophosphatemia  Hypothyroidism   Hypertension   Hyperlipidemia    Insomnia   STEMI (ST elevation myocardial infarction)      Past Surgical History:    Arteriovenous creation of graft loop in LUE  Left  fistulogram  Coronary stent placement  Turp  MOHS  Parotidectomy    Social History:  Primary care provider: Belen Prieto  The patient presents to the ED with his daughter.     Physical Exam     Patient Vitals for the past 24 hrs:   BP Temp Pulse Resp SpO2   07/30/22 2100 (!) 183/117 97.9  F (36.6  C) 113 17 97 %   07/30/22 2000 (!) 173/122 97.9  F (36.6  C) 110 20 97 %     Physical Exam  General: WD/WN; well appearing elderly  man spitting blood into an emesis bag with nose clamp in place; cooperative  Head:  Atraumatic  Eyes:  Conjunctivae, lids, and sclerae are normal  ENT:    Initially there is a large clot in the posterior oropharynx that we cleared. Smaller blood clots in the nose were cleared. Then able to see a tiny pulsatile area of bleeding from the left anterior septal mucosa.  Neck:  Supple; normal ROM  Resp:  No respiratory distress  GI:  Nondistended    MS:  Normal ROM  Skin:  Warm; non-diaphoretic; no pallor  Neuro:  Awake; A&Ox3  Psych: Normal mood and affect; normal speech  Vitals reviewed.    Emergency Department Course     Laboratory:  Labs Ordered and Resulted from Time of ED Arrival to Time of ED Departure   INR - Abnormal       Result Value    INR 1.81 (*)    CBC WITH PLATELETS AND DIFFERENTIAL - Abnormal    WBC Count 11.1 (*)     RBC Count 3.41 (*)     Hemoglobin 10.8 (*)     Hematocrit 34.0 (*)           MCH 31.7      MCHC 31.8      RDW 15.1 (*)     Platelet Count 276      % Neutrophils 64      % Lymphocytes 26      % Monocytes 10      % Eosinophils 0      % Basophils 0      % Immature Granulocytes 0      NRBCs per 100 WBC 0      Absolute Neutrophils 7.0      Absolute Lymphocytes 2.9      Absolute Monocytes 1.1      Absolute Eosinophils 0.1      Absolute Basophils 0.0      Absolute Immature Granulocytes 0.1      Absolute NRBCs 0.0       Procedures:    Procedure:  Epistaxis management    Performed by: Jessie Medellin MD   Bleeding noted in left side nare    Treatment:   Direct  compression: Yes, but bleeding did not subside  Cautery:No  Topical Afrin:Yes    Packing:  Length 5.5  Rapid RhinoYes    Reassessment:  Bleeding controlled: Yes  Patient was discharged in stable condition    Emergency Department Course:  Reviewed:  I reviewed nursing notes, vitals, past medical history, and Care Everywhere.    Assessments:  1955 I obtained history and examined the patient as noted above.   2050 I rechecked Gordon. He is not actively bleeding and he is starting to feel better.   2113  I rechecked Gordon. He ambulated with a steady gait without recurrence of bleeding. Notably, he did not take his medications this morning.     Disposition:  The patient was discharged home.     Impression & Plan    Medical Decision Making:  Gordon is a 77 year old man presenting with hours of left-sided epistaxis.  He is on Coumadin for history of atrial fibrillation although his recent INR was subtherapeutic.  On exam he appears well although he actively bleeds from the nose without nose clamp and when the nose clamp is applied he has the blood run down his posterior oropharynx. There is no vomiting. On initial examination he has a large clot in the posterior oropharynx.  We cleared this and all the clots from his nose.  I was then able to visualize a single tiny pulsatile area of bleeding on the left Hesselbach's plexus.  This would not be amenable to cautery.  Given the duration of bleeding I would not expect TXA to be helpful either.  As such, I did use Afrin and then placed a rapid Rhino.  This resulted in cessation of bleeding, even when he was ambulatory.  INR remains subtherapeutic at 1.81.  Hemoglobin is stable at 10.8.    Gordon was instructed to follow-up with ENT for packing removal.  I encouraged him to take all of his medications as prescribed.  He had recently held Coumadin because of some bleeding after dialysis.  He also did not take his medications yet today and is hypertensive.  We did discuss indications  for return including, but not limited to, return of bleeding, lightheadedness, chest pain, shortness of breath.  All of his questions were answered and he verbalized understanding.  Amenable to discharge with ENT follow-up.    Diagnosis:    ICD-10-CM    1. Epistaxis  R04.0    2. Subtherapeutic international normalized ratio (INR)  R79.1      Discharge Medications:  Discharge Medication List as of 7/30/2022  9:12 PM        Scribe Disclosure:  I, Swetamontse Lopez, am serving as a scribe at 7:55 PM on 7/30/2022 to document services personally performed by Jessie Medellin MD based on my observations and the provider's statements to me.      Jessie Medellin MD  08/04/22 3991

## 2022-07-31 NOTE — ED TRIAGE NOTES
Nosebleed since 1400 - pt on warfarin. Nose clamp applied in triage.      Triage Assessment     Row Name 07/30/22 1948       Respiratory WDL    Respiratory WDL WDL       Cardiac WDL    Cardiac WDL WDL       Cognitive/Neuro/Behavioral WDL    Cognitive/Neuro/Behavioral WDL WDL

## 2022-07-31 NOTE — DISCHARGE INSTRUCTIONS
Follow-up with ENT.  They typically remove the packing in 3 to 7 days.  Return with any new concerns including, but not limited to, return of bleeding, lightheadedness, shortness of breath, or chest pain.  Make sure you are taking all of your medications including Coumadin.  You may need to have dose adjustments because your INR is low today.

## 2022-07-31 NOTE — ED NOTES
Bed: ED10  Expected date: 7/30/22  Expected time: 7:47 PM  Means of arrival:   Comments:  Triage: nosebleed

## 2022-08-16 PROBLEM — J90 PLEURAL EFFUSION: Status: ACTIVE | Noted: 2022-01-01

## 2022-08-16 PROBLEM — J18.9 PNEUMONIA DUE TO INFECTIOUS ORGANISM, UNSPECIFIED LATERALITY, UNSPECIFIED PART OF LUNG: Status: ACTIVE | Noted: 2022-01-01

## 2022-08-16 NOTE — CONSULTS
GI aware of the consult and will see pt tmr am. In summary:    77 year old male on Coumadin with history of end stage renal disease, atrial fibrillation, CAD, STEMI, HTN, and type II diabetes who presents with nausea and vomiting that began this morning after drinking some orange juice. Patient says he began coughing directly after drinking the orange juice and this is when his nausea began. CT showing Possible bowel wall thickening in the rectosigmoid colon, difficult to be certain of pathologic wall thickening given its decompressed state. GI consulted for the same.   -can advance diet as needed and NPO after MN, if continues to have N/V may need EGD  -recommend stool collection if any diarrhea, otherwise can have flexible sigmoidoscopy inpt vs outpatient depends on last colonoscopy too  -PO PPI BID    Britta Castano MD (Richard)  Provider's Cell: [phone]     [facility] consultant PA  [phone]

## 2022-08-16 NOTE — PROVIDER NOTIFICATION
Paged dr. Murdock to verify if still want the pt in continuous IVF NaCl 0.9% at 125 ml/hr. Update awaiting.

## 2022-08-16 NOTE — PROGRESS NOTES
Reason for admission: Pneumonia with an associated large right-sided pleural effusion.  Mental Status: x4  Activity: Ax1 Gb and walker  Diet: NPO post midnight  Pain: Denies  Urination: Uses bathroom. On hemodialysis  Tele/Restraints/Iso: NA  LDA: PIV SL   Expected D/C Date: Pending clinical progresss  Other Info: Completed dose of Vanco. On Zosyn q8. Had US guided thoracentesis prior to transfer to floor. Total of 1.85 liters of clear fluid were removed and sent to lab. See results for info. Pt has hx of atrial fibrillation and normally on anticoagulation, presented with an INR of 1. Per attending will hold for now AC until after his IR thoracentesis tomorrow.  Pt has Type 2 diabetes,  onm sliding scale insulin and BG check. Complained of nausea, unclear etiology, CT showing Possible bowel wall thickening in the rectosigmoid colon, GI was consulted. Per GI if continues to have N/V may need EGD and  PO PPI BID. Pt also has end-stage renal disease, on dialysis Tuesday Thursday and Saturday. On schedule tonight per dialysis.

## 2022-08-16 NOTE — H&P
[facility]    History and Physical - Hospitalist Service       Date of Admission:  8/16/2022    Assessment & Plan      Gordon Gann is a 77 year old male admitted on 8/16/2022. He has a past medical history most remarkable for end-stage renal disease, type 2 diabetes, who presents with nausea and vomiting, and was admitted to the hospitalist service for pneumonia with an associated large right-sided pleural effusion.    1.  Community-acquired pneumonia with right-sided large pleural effusion.  No significant hypoxia.  - Admit to inpatient for further assessment and planning  - Continue vancomycin and Zosyn which was started in the ER for infection  - We will make patient n.p.o. at midnight for thoracentesis with IR in the morning.  Prior to getting procedure went to get his nausea under control and he needs dialysis today.  I have ordered thoracentesis labs.  - Patient has not been taking his Coumadin due to nausea, his INR today is 1.0.  Will not continue this tonight in anticipation of thoracentesis tomorrow, and defer to rounding team to restart this after his procedure.    2.  Nausea, unclear etiology potentially due to uremia versus rectosigmoid colon pathology.  - Antibiotics from above should cover any potential  intra-abdominal infection  - GI consult for evaluation of potential rectosigmoid colon thickening    3.  End-stage renal disease, on dialysis Tuesday Thursday and Saturday  - Nephrology consult  - Continue home bumex, oral bicarbonate    Chronic Issues:   1.  Atrial fibrillation.  Normally on anticoagulation but came in with an INR of 1.  We will hold for now and have the rounding team restart this after his thoracentesis. Continue home metoprolol BID  2.  Essential hypertension. Home clonidine, hydralazine, lisinopril  3.  Type 2 diabetes.  Ordered sliding scale insulin  4.  Anemia due to chronic kidney disease  4.  Hyperlipidemia. Home Zetia  5.  Coronary artery disease.  Aspirin, Zetia  6.  Depression. Home Remeron  7.  Insomnia. Home trazodone     Diet:  Advance as tolerated for now, n.p.o. at midnight in anticipation of procedure  DVT Prophylaxis: Pneumatic Compression Devices  Flood Catheter: Not present  Central Lines: None  Cardiac Monitoring: None  Code Status:   DNR / DNI    Clinically Significant Risk Factors Present on Admission             # Hypoalbuminemia: Albumin = 2.5 g/dL (Ref range: 3.4 - 5.0 g/dL) on admission, will monitor as appropriate   # Coagulation Defect: home medication list includes an anticoagulant medication   # Hypertension: home medication list includes antihypertensive(s)          Disposition Plan    patient is currently admitted to the hospital, anticipate he will need 2 to 4 days of inpatient admission prior to discharge.     The patient's care was discussed with the Bedside Nurse, Patient and Nephrology Consultant.    Armando Murdock MD PhD  Hospitalist Service  [facility]  Securely message with the Vocera Web Console (learn more here)  Text page via [employer] Paging/Directory     ______________________________________________________________________    Chief Complaint   Nausea / Vomiting    History is obtained from the patient and chart review    History of Present Illness   Gordon Gann is a 77 year old male admitted on 8/16/2022. He has a past medical history most remarkable for end-stage renal disease, type 2 diabetes, who presents with nausea and vomiting, and was admitted to the hospitalist service for pneumonia with an associated large right-sided pleural effusion.    Patient tells me that this morning he developed nausea. He notes that he has not had this before and that he would intermittently vomit. No abdominal pain but does note his abdomen tenses when he vomits. No changes in his bowel pattern. Does get dialysis but has not missed dialysis--last was Saturday. Has never had uremia associated nausea / vomiting.  Denies history of colitis. No fevers, chills, chest pain, SOB, abdominal pain, or diarrhea.    Review of Systems    The 10 point Review of Systems is negative other than noted in the HPI or here.    Past Medical History    I have reviewed this patient's medical history and updated it with pertinent information if needed.   Past Medical History:   Diagnosis Date     Alcohol abuse      Atrial fibrillation (H)      CKD (chronic kidney disease) stage 4, GFR 15-29 ml/min (H)      Diabetes (H)      Gout      Hypokalemia      Hypophosphatemia      Insomnia      STEMI (ST elevation myocardial infarction) (H)        Past Surgical History   I have reviewed this patient's surgical history and updated it with pertinent information if needed.  Past Surgical History:   Procedure Laterality Date     CREATE GRAFT LOOP ARTERIOVENOUS UPPER EXTREMITY Left 8/5/2021    Procedure: left arm arteriovenous graft fistula.;  Surgeon: Noemí Hernandez MD;  Location: [facility] OR     IR DIALYSIS FISTULOGRAM LEFT  12/8/2021       Social History   I have reviewed this patient's social history and updated it with pertinent information if needed.  Social History     Tobacco Use     Smoking status: Never Smoker     Smokeless tobacco: Never Used   Substance Use Topics     Alcohol use: Yes       Family History     No significant family history, including no history of: Lung Cancer    Prior to Admission Medications   Prior to Admission Medications   Prescriptions Last Dose Informant Patient Reported? Taking?   HYDROmorphone (DILAUDID) 2 MG tablet   No No   Sig: Take 1 tablet (2 mg) by mouth every 3 hours as needed for moderate to severe pain   SODIUM BICARBONATE PO  Self Yes No   Sig: Take 1 tablet by mouth 2 times daily   Vitamin D, Cholecalciferol, 25 MCG (1000 UT) CAPS  Self Yes No   Sig: Take 1,000 Units by mouth every morning   acetaminophen (TYLENOL) 500 MG tablet   No No   Sig: Take 2 tablets (1,000 mg) by mouth 3 times daily   aspirin 81 MG  EC tablet   No No   Sig: Take 1 tablet (81 mg) by mouth At Bedtime Starting 12/12   bumetanide (BUMEX) 2 MG tablet   No No   Sig: Take 1 tablet (2 mg) by mouth 2 times daily On non dialysis days   cloNIDine (CATAPRES) 0.1 MG tablet   No No   Sig: Take 1 tablet (0.1 mg) by mouth 2 times daily   cyclobenzaprine (FLEXERIL) 10 MG tablet   No No   Sig: Take 1 tablet (10 mg) by mouth 3 times daily   diclofenac (VOLTAREN) 1 % topical gel   No No   Sig: Apply 2 g topically 4 times daily as needed for moderate pain   ezetimibe (ZETIA) 10 MG tablet  Self Yes No   Sig: Take 10 mg by mouth every morning   fish oil-omega-3 fatty acids 1000 MG capsule  Self Yes No   Sig: Take 1 g by mouth every morning   folic acid (FOLVITE) 1 MG tablet  Self Yes No   Sig: Take 1 mg by mouth every morning   hydrALAZINE (APRESOLINE) 25 MG tablet  Self No No   Sig: Take 1 tablet (25 mg) by mouth every 6 hours as needed (SBP >180)   insulin aspart (NOVOLOG PEN) 100 UNIT/ML pen  Self No No   Sig: Inject 5 Units Subcutaneous 3 times daily (with meals)   insulin glargine (LANTUS PEN) 100 UNIT/ML pen   No No   Sig: Inject 15 Units Subcutaneous At Bedtime   lisinopril (ZESTRIL) 20 MG tablet   No No   Sig: Take 1 tablet (20 mg) by mouth daily   melatonin 3 MG tablet  Self No No   Sig: Take 1 tablet (3 mg) by mouth At Bedtime   metoprolol tartrate (LOPRESSOR) 100 MG tablet   No No   Sig: Take 1 tablet (100 mg) by mouth 2 times daily   mirtazapine (REMERON) 15 MG tablet  Self No No   Sig: Take 1 tablet (15 mg) by mouth At Bedtime   senna-docusate (SENOKOT-S/PERICOLACE) 8.6-50 MG tablet   No No   Sig: Take 1 tablet by mouth daily   traZODone (DESYREL) 50 MG tablet  Self No No   Sig: Take 1 tablet (50 mg) by mouth At Bedtime   vitamin B complex with vitamin C (STRESS TAB) tablet  Self Yes No   Sig: Take 1 tablet by mouth every morning   warfarin ANTICOAGULANT (COUMADIN) 5 MG tablet   No No   Sig: Take 1 tablet (5 mg) by mouth daily except Fridays - start on  12/12   warfarin ANTICOAGULANT (COUMADIN) 7.5 MG tablet   No No   Sig: Take 1 tablet (7.5 mg) by mouth once a week every Friday Take 7.5 mg by mouth every Friday starting 12/17      Facility-Administered Medications: None     Allergies   No Known Allergies    Physical Exam   Vital Signs: Temp: 98  F (36.7  C) Temp src: Temporal BP: (!) 163/98 Pulse: 103   Resp: 14 SpO2: 97 % O2 Device: Nasal cannula Oxygen Delivery: 2 LPM  Weight: 195 lbs 0 oz    General Appearance: Sick appearing, mild tachypnea  Eyes: TORIBIO, EOMI  HEENT: NC, AT. MMM.   Respiratory: No right sided lung sounds, mildly increased work of breathing  Cardiovascular: Irregular. No murmurs, rubs, gallops  GI: Soft, non-tender, non-distended  Lymph/Hematologic: No asymetric swelling, edema. No bruising.  Genitourinary: Deferred  Skin: No rashes, lesions, wounds.  Musculoskeletal: Warm, well perfused  Neurologic: AOx4, CNII-XII intact.  Psychiatric: Euthymic. Mood appropriate.     Data   Data reviewed today: I reviewed all medications, new labs and imaging results over the last 24 hours. I personally reviewed the chest CT image(s) showing right sided effusion.    Recent Labs   Lab 08/16/22  0910   WBC 13.1*   HGB 10.4*   MCV 98      INR 1.08      POTASSIUM 3.5   CHLORIDE 103   CO2 30   BUN 91*   CR 5.78*   ANIONGAP 10   LC 7.3*   *   ALBUMIN 2.5*   PROTTOTAL 6.6*   BILITOTAL 0.5   ALKPHOS 366*   ALT 53   AST 33

## 2022-08-16 NOTE — ED NOTES
Bed: ED24  Expected date: 8/16/22  Expected time: 8:40 AM  Means of arrival: Ambulance  Comments:  515 77m chest tightness ETA 0835

## 2022-08-16 NOTE — ED TRIAGE NOTES
Patient presents to the ED via EMS complaining of nausea and vomiting.  Hx of diabetes.  Glucose 255.  Also complaining of chest tightness.  Improved after zofran.

## 2022-08-16 NOTE — ED NOTES
[facility]  ED Nurse Handoff Report    ED Chief complaint: Nausea & Vomiting      ED Diagnosis:   Final diagnoses:   ESRD (end stage renal disease) on dialysis (H)   Pneumonia due to infectious organism, unspecified laterality, unspecified part of lung   Pleural effusion       Code Status: DNR / DNI    Allergies: No Known Allergies    Patient Story: Patient presents to the emergency room for N/V/D. He was concerned about a reaction to orange juice. Patient being admitted for pneumonia. Had Thoracentesis while down in ER. Dialysis planned for later this evening.   Focused Assessment:    Neuro:   Cards:   Pulm: Short of breath, + Cough     Treatments and/or interventions provided: meds  Patient's response to treatments and/or interventions:     To be done/followed up on inpatient unit:      Does this patient have any cognitive concerns?: none    Activity level - Baseline/Home:  Independent  Activity Level - Current:   Independent    Patient's Preferred language: English   Needed?: No    Isolation: None  Infection: Not Applicable  Patient tested for COVID 19 prior to admission: YES  Bariatric?: No    Vital Signs:   Vitals:    08/16/22 0915 08/16/22 1015 08/16/22 1030 08/16/22 1100   BP: (!) 164/95  (!) 150/91 (!) 163/98   Pulse: 92  103 103   Resp: 14      Temp:       TempSrc:       SpO2:  (!) 88% 98% 97%   Weight: 88.5 kg (195 lb)          Cardiac Rhythm:     Was the PSS-3 completed:   Yes  What interventions are required if any?               Family Comments:   OBS brochure/video discussed/provided to patient/family: N/A              Name of person given brochure if not patient:               Relationship to patient:     For the majority of the shift this patient's behavior was Green.   Behavioral interventions performed were none.    ED NURSE PHONE NUMBER: *[phone]

## 2022-08-16 NOTE — PROGRESS NOTES
RECEIVING UNIT ED HANDOFF REVIEW    ED Nurse Handoff Report was reviewed by: Uriah Guy RN on August 16, 2022 at 1:30 PM

## 2022-08-16 NOTE — CONSULTS
[facility]    Nephrology Consultation     Date of Admission:  8/16/2022    Assessment & Plan     Gordon Gann is a 77 year old male who was admitted on 8/16/2022.     Assessment:  1) ESRD on chronic maintenance hemodialysis.    Runs TTS at [facility], Dr. Perla   Started dialysis 10/21, AVG placed 8/5/21.    last post weihgt 88.1, Target weight ordered 88.5kg    Epo 600 units qTx, venofer 50mg weekly           Treatment Type  Hemodialysis (procedure)    Dialyzer  : CoderwallRO Series: ELISIO Model: 17H MELANY Factor: 1455    Dialysate  Base Sodium 138 mEq/L    Dialysate  Calcium 2.5 mEq/L    Dialysate  Potassium 3k    Dialysate  BiCarb 35 mEq/L    Dialysate Flow Rate  500 ML/min    Blood Flow Rate  450 ML/min    Treatment Time  210 min    UF Profile PRN  #2    UF Profiling Instructions  Try profile two to see if it helps    Temperature  Standard    Max UF Rate  2 L/Hr    Access  AV Graft Arm (Left upper)    Access Concurrent  No    Arterial Needle Guage/Length/Tip  15 g/ -1 in/Sharp    Venous Needle Guage/Length/Tip  15 g/ -1 in/Sharp    Schedule  3 Times per week    Heparin Load  No Loading Dose    Heparin Hourly Dose  No Hourly Dose           Outpatient bicarb running 26-28, here 30. I don't seen indication to contnue supplemental sodium bicarb.     Anemia: Hgb 10.4, at goal    ckd-mbd: normal corrected calcium,   2) Pleural effusion: with this being unilateral, likely related to underlying lung process and not related to hypervolemia (although clearly hyperovolemic with edema, HTN).     3) Pneumonia    4) HTN: PTA metoprolol, lisinopril and clonidine    Noted malnutrition with hypoalbuminemia    Plan/Recs:  1) HD today, will challenge volume status/target weight.   2) can discontinue oral bicarb supplementatoin    Crescencio Chaparro DO  [facility] -  Nephrology  [phone]  ------------------------------------------------------------------------------------------  Reason for Consult     I was asked to see the patient for     History is obtained from the patient and chart review.      History of Present Illness     Gordon Gann is a 77 year old male who presented today to the emergency room he tells me due to nausea. Initial note reports chest tightness. Was getting ready to go to dialysis and then came to ED instead. Admitted with possible pnejmonia but also large right sided pleural effusion. He is a fair historian, reports dialysis going well. He thinks he has some chronic edema. Wearing oxygen but not done typically.   ED course noted for getting vanco, zosyn and potential plans for thoracentesis.      Past Medical History   I have reviewed this patient's medical history and updated it with pertinent information if needed.   Past Medical History:   Diagnosis Date     Alcohol abuse      Atrial fibrillation (H)      CKD (chronic kidney disease) stage 4, GFR 15-29 ml/min (H)      Diabetes (H)      Gout      Hypokalemia      Hypophosphatemia      Insomnia      STEMI (ST elevation myocardial infarction) (H)        Past Surgical History   I have reviewed this patient's surgical history and updated it with pertinent information if needed.  Past Surgical History:   Procedure Laterality Date     CREATE GRAFT LOOP ARTERIOVENOUS UPPER EXTREMITY Left 8/5/2021    Procedure: left arm arteriovenous graft fistula.;  Surgeon: Noemí Hernandez MD;  Location:  OR     IR DIALYSIS FISTULOGRAM LEFT  12/8/2021       Prior to Admission Medications   Prior to Admission Medications   Prescriptions Last Dose Informant Patient Reported? Taking?   HYDROmorphone (DILAUDID) 2 MG tablet Not Taking at Unknown time  No No   Sig: Take 1 tablet (2 mg) by mouth every 3 hours as needed for moderate to severe pain   Patient not taking: Reported on 8/16/2022   SODIUM BICARBONATE PO   Self Yes Yes   Sig: Take 1 tablet by mouth 2 times daily   Vitamin D, Cholecalciferol, 25 MCG (1000 UT) CAPS 8/15/2022 Self Yes Yes   Sig: Take 1,000 Units by mouth every morning   acetaminophen (TYLENOL) 500 MG tablet 8/15/2022  No Yes   Sig: Take 2 tablets (1,000 mg) by mouth 3 times daily   Patient taking differently: Take 1,000 mg by mouth every 8 hours as needed   aspirin 81 MG EC tablet 8/15/2022  No Yes   Sig: Take 1 tablet (81 mg) by mouth At Bedtime Starting 12/12   bumetanide (BUMEX) 2 MG tablet 8/15/2022  No Yes   Sig: Take 1 tablet (2 mg) by mouth 2 times daily On non dialysis days   Patient taking differently: Take 2 mg by mouth 2 times daily On non dialysis days (M W F Sun)   cloNIDine (CATAPRES) 0.1 MG tablet 8/15/2022  No Yes   Sig: Take 1 tablet (0.1 mg) by mouth 2 times daily   cyclobenzaprine (FLEXERIL) 10 MG tablet 8/15/2022  No Yes   Sig: Take 1 tablet (10 mg) by mouth 3 times daily   Patient taking differently: Take 10 mg by mouth 2 times daily   diclofenac (VOLTAREN) 1 % topical gel Not Taking at Unknown time  No No   Sig: Apply 2 g topically 4 times daily as needed for moderate pain   Patient not taking: Reported on 8/16/2022   ezetimibe (ZETIA) 10 MG tablet 8/15/2022 Self Yes Yes   Sig: Take 10 mg by mouth every morning   fish oil-omega-3 fatty acids 1000 MG capsule 8/15/2022 Self Yes Yes   Sig: Take 1 g by mouth every morning   folic acid (FOLVITE) 1 MG tablet 8/15/2022 Self Yes Yes   Sig: Take 1 mg by mouth every morning   hydrALAZINE (APRESOLINE) 25 MG tablet  Self No Yes   Sig: Take 1 tablet (25 mg) by mouth every 6 hours as needed (SBP >180)   insulin aspart (NOVOLOG PEN) 100 UNIT/ML pen  Self No Yes   Sig: Inject 5 Units Subcutaneous 3 times daily (with meals)   insulin glargine (LANTUS PEN) 100 UNIT/ML pen   No Yes   Sig: Inject 15 Units Subcutaneous At Bedtime   lisinopril (ZESTRIL) 20 MG tablet   No Yes   Sig: Take 1 tablet (20 mg) by mouth daily   melatonin 3 MG tablet  Self No Yes    Sig: Take 1 tablet (3 mg) by mouth At Bedtime   metoprolol tartrate (LOPRESSOR) 100 MG tablet   No Yes   Sig: Take 1 tablet (100 mg) by mouth 2 times daily   mirtazapine (REMERON) 15 MG tablet  Self No Yes   Sig: Take 1 tablet (15 mg) by mouth At Bedtime   senna-docusate (SENOKOT-S/PERICOLACE) 8.6-50 MG tablet Not Taking at Unknown time  No No   Sig: Take 1 tablet by mouth daily   Patient not taking: Reported on 8/16/2022   traZODone (DESYREL) 50 MG tablet 8/15/2022 Self No Yes   Sig: Take 1 tablet (50 mg) by mouth At Bedtime   Patient taking differently: Take 150 mg by mouth At Bedtime   vitamin B complex with vitamin C (STRESS TAB) tablet 8/15/2022 Self Yes Yes   Sig: Take 1 tablet by mouth every morning   warfarin ANTICOAGULANT (COUMADIN) 5 MG tablet 8/15/2022 at 2300  No Yes   Sig: Take 1 tablet (5 mg) by mouth daily except Fridays - start on 12/12   warfarin ANTICOAGULANT (COUMADIN) 7.5 MG tablet 8/12/22  No Yes   Sig: Take 1 tablet (7.5 mg) by mouth once a week every Friday Take 7.5 mg by mouth every Friday starting 12/17      Facility-Administered Medications: None     Allergies   No Known Allergies    Social History   I have reviewed this patient's social history and updated it with pertinent information if needed. Gordon Gann  reports that he has never smoked. He has never used smokeless tobacco. He reports current alcohol use.    Family History   I have reviewed this patient's family history and updated it with pertinent information if needed.   No family history on file.    Review of Systems   The 10 point Review of Systems is negative other than noted in the HPI.     Physical Exam   Temp: 98  F (36.7  C) Temp src: Temporal BP: (!) 163/98 Pulse: 103   Resp: 14 SpO2: 97 % O2 Device: Nasal cannula Oxygen Delivery: 2 LPM  Vital Signs with Ranges  Temp:  [98  F (36.7  C)] 98  F (36.7  C)  Pulse:  [] 103  Resp:  [14-15] 14  BP: (150-164)/(91-98) 163/98  SpO2:  [88 %-98 %] 97 %  195 lbs 0  oz    GENERAL: healthy, alert, NAD  HEENT:  Normocephalic. No gross abnormalities.  Pupils equal.  CV: RRR, no murmurs, no clicks, gallops, or rubs, 2-3+ edema  RESP: decreased lung sounds on right  GI: Abdomen soft/nt/nd, BS normal. No masses, organomegaly  MUSCULOSKELETAL: extremities nl - no gross deformities noted  SKIN: no suspicious lesions or rashes, dry to touch  NEURO:  Strength normal and symmetric.   PSYCH: mood good, affect appropriate    Data   BMP  Recent Labs   Lab 08/16/22  0910      POTASSIUM 3.5   CHLORIDE 103   LC 7.3*   CO2 30   BUN 91*   CR 5.78*   *     Phos@LABRCNTIPR(phos:4)  CBC)  Recent Labs   Lab 08/16/22  0910   WBC 13.1*   HGB 10.4*   HCT 31.9*   MCV 98        Recent Labs   Lab 08/16/22  0910   AST 33   ALT 53   ALKPHOS 366*   BILITOTAL 0.5     Recent Labs   Lab 08/16/22  0910   INR 1.08     No results found for: D2VIT, D3VIT, DTOT  Recent Labs   Lab 08/16/22  0910   HGB 10.4*   HCT 31.9*   MCV 98     No results for input(s): PTHI in the last 168 hours.

## 2022-08-16 NOTE — PHARMACY-ADMISSION MEDICATION HISTORY
Pharmacy Medication History  Admission medication history interview status for the 8/16/2022  admission is complete. See EPIC admission navigator for prior to admission medications     Location of Interview: Phone  Medication history sources: Patient    Significant changes made to the medication list:  Removed diclofenac 1% topical gel  Removed hydromorphone 2 mg tablet  Removed senna-docusate 8.6-50 mg tablet  Changed cyclobenzaprine 10 mg tablet from TID to BID   Changed trazodone from 50 mg to 150 mg tablet    In the past week, patient estimated taking medication this percent of the time: greater than 90%    Medication reconciliation completed by provider prior to medication history? No    Time spent in this activity: 7 minutes    Prior to Admission medications    Medication Sig Last Dose Taking? Auth Provider Long Term End Date   acetaminophen (TYLENOL) 500 MG tablet Take 2 tablets (1,000 mg) by mouth 3 times daily  Patient taking differently: Take 1,000 mg by mouth every 8 hours as needed 8/15/2022 Yes Fili Ireland MD     aspirin 81 MG EC tablet Take 1 tablet (81 mg) by mouth At Bedtime Starting 12/12 8/15/2022 Yes Flii Ireland MD     bumetanide (BUMEX) 2 MG tablet Take 1 tablet (2 mg) by mouth 2 times daily On non dialysis days  Patient taking differently: Take 2 mg by mouth 2 times daily On non dialysis days (M W F Sun) 8/15/2022 Yes Fili Ireland MD Yes    cloNIDine (CATAPRES) 0.1 MG tablet Take 1 tablet (0.1 mg) by mouth 2 times daily 8/15/2022 Yes Fili Ireland MD Yes    cyclobenzaprine (FLEXERIL) 10 MG tablet Take 1 tablet (10 mg) by mouth 3 times daily  Patient taking differently: Take 10 mg by mouth 2 times daily 8/15/2022 Yes Fili Ireland MD     ezetimibe (ZETIA) 10 MG tablet Take 10 mg by mouth every morning 8/15/2022 Yes Unknown, Entered By History Yes    fish oil-omega-3 fatty acids 1000 MG capsule Take 1 g by mouth every morning 8/15/2022 Yes  Unknown, Entered By History     folic acid (FOLVITE) 1 MG tablet Take 1 mg by mouth every morning 8/15/2022 Yes Unknown, Entered By History     hydrALAZINE (APRESOLINE) 25 MG tablet Take 1 tablet (25 mg) by mouth every 6 hours as needed (SBP >180)  Yes Julia Combs MD Yes    insulin aspart (NOVOLOG PEN) 100 UNIT/ML pen Inject 5 Units Subcutaneous 3 times daily (with meals)  Yes Julia Combs MD Yes    insulin glargine (LANTUS PEN) 100 UNIT/ML pen Inject 15 Units Subcutaneous At Bedtime  Yes Fili Ireland MD Yes    lisinopril (ZESTRIL) 20 MG tablet Take 1 tablet (20 mg) by mouth daily  Yes Fili Ireland MD Yes    melatonin 3 MG tablet Take 1 tablet (3 mg) by mouth At Bedtime  Yes Mitesh Nicholson MD     metoprolol tartrate (LOPRESSOR) 100 MG tablet Take 1 tablet (100 mg) by mouth 2 times daily  Yes Fili Ireland MD Yes    mirtazapine (REMERON) 15 MG tablet Take 1 tablet (15 mg) by mouth At Bedtime  Yes Mitesh Nicholson MD Yes    SODIUM BICARBONATE PO Take 1 tablet by mouth 2 times daily  Yes Unknown, Entered By History     traZODone (DESYREL) 50 MG tablet Take 1 tablet (50 mg) by mouth At Bedtime  Patient taking differently: Take 150 mg by mouth At Bedtime 8/15/2022 Yes Mitesh Nicholson MD Yes    vitamin B complex with vitamin C (STRESS TAB) tablet Take 1 tablet by mouth every morning 8/15/2022 Yes Unknown, Entered By History     Vitamin D, Cholecalciferol, 25 MCG (1000 UT) CAPS Take 1,000 Units by mouth every morning 8/15/2022 Yes Unknown, Entered By History     warfarin ANTICOAGULANT (COUMADIN) 5 MG tablet Take 1 tablet (5 mg) by mouth daily except Fridays - start on 12/12  Patient taking differently: Take 7.5 mg by mouth Every tues, thurs, sun 8/15/2022 at 2300 Yes Fili Ireland MD     warfarin ANTICOAGULANT (COUMADIN) 7.5 MG tablet Take 1 tablet (7.5 mg) by mouth once a week every Friday Take 7.5 mg by mouth every Friday starting 12/17  Patient taking  differently: Take 10 mg by mouth Every Mon, Wed, Fri, Sat 8/12/22 Yes Fili Ireland MD     diclofenac (VOLTAREN) 1 % topical gel Apply 2 g topically 4 times daily as needed for moderate pain  Patient not taking: Reported on 8/16/2022 Not Taking at Unknown time  Fili Ireland MD     HYDROmorphone (DILAUDID) 2 MG tablet Take 1 tablet (2 mg) by mouth every 3 hours as needed for moderate to severe pain  Patient not taking: Reported on 8/16/2022 Not Taking at Unknown time  Fili Ireland MD     senna-docusate (SENOKOT-S/PERICOLACE) 8.6-50 MG tablet Take 1 tablet by mouth daily  Patient not taking: Reported on 8/16/2022 Not Taking at Unknown time  Fili Ireland MD         The information provided in this note is only as accurate as the sources available at the time of update(s)

## 2022-08-16 NOTE — ED PROVIDER NOTES
History   Chief Complaint:  Nausea & Vomiting       HPI   Gordon Gann is a 77 year old male on Coumadin with history of end stage renal disease, atrial fibrillation, CAD, STEMI, HTN, and type II diabetes who presents with nausea and vomiting that began this morning after drinking some orange juice. Patient says he began coughing directly after drinking the orange juice and this is when his nausea began. He notes feeling fine prior to drinking the juice. Patient is wondering if he may have had a reaction to the orange juice. He has had 4-5 episodes of vomiting since this time. He also notes mild chest tightness and diarrhea that began this morning as well. Denies any abdominal pain. He is unsure if there are any sick contacts in his home. Patient is scheduled for dialysis today at 0930. He typically has dialysis on Tuesdays, Thursdays, and Saturdays. Patient says his kidney failure is caused by high blood pressure.     Review of Systems   Respiratory: Positive for chest tightness.    Gastrointestinal: Positive for diarrhea, nausea and vomiting. Negative for abdominal pain.   All other systems reviewed and are negative.    Allergies:  No Known Allergies    Medications:  Aspirin  Bumex  Catapres  Flexeril  Zetia  Apresoline  Dilaudid  Novolog Pen  Lantus Pen  Zestril  Lopressor  Remeron  Senokot  Desyrel  Coumadin  Zyloprim  Lasix  Levemir     Past Medical History:     Atrial fibrillation  Atrial flutter with rapid ventricular response  DKA  End stage renal disease  Acquired arteriovenous fistula  Hemarthrosis    HTN  Type II diabetes mellitus   Anemia in CKD  Hyperparathyroidism due to renal insufficiency  Gout   Hyperlipidemia   CAD  Idiopathic insomnia   Depression   Melanoma   STEMI     Past Surgical History:    Create graft loop arteriovenous upper   Dialysis fistulogram left   Coronary stent placement  MOHS surgery  Parotidectomy  TURP    Family History:    Father: Heart disease  Mother: Cerebral aneurysm      Social History:  The patient presents to the ED alone via EMS  PCP: Belen Prieto, Family Medicine    Physical Exam     Patient Vitals for the past 24 hrs:   BP Temp Temp src Pulse Resp SpO2 Weight   08/16/22 1100 (!) 163/98 -- -- 103 -- 97 % --   08/16/22 1030 (!) 150/91 -- -- 103 -- 98 % --   08/16/22 1015 -- -- -- -- -- (!) 88 % --   08/16/22 0915 (!) 164/95 -- -- 92 14 -- 88.5 kg (195 lb)   08/16/22 0904 -- -- -- 102 15 95 % --   08/16/22 0901 -- 98  F (36.7  C) Temporal -- -- -- --       Physical Exam  Vitals: reviewed by me  General: Pt seen on [facility], pleasant, cooperative, and alert to conversation.  Uncomfortable appearing, sick appearing, nontoxic  Eyes: Tracking well, clear conjunctiva BL  ENT: MMM, midline trachea.   Lungs: No tachypnea, no accessory muscle use. No respiratory distress.  Lungs are clear to auscultation bilaterally with slight decreased air movement on the right potentially  CV: Rate as above, normal S1-S2, no additional heart sounds noted  Abd: Soft, non tender, no guarding, no rebound. Non distended  MSK: no joint effusion.  No evidence of trauma  Skin: No rash  Neuro: Clear speech and no facial droop.  Psych: Not RIS, no e/o AH/VH      Emergency Department Course   ECG  ECG taken at 0904, ECG read at 0905  Sonus rhythm with 1st degree AV block  Incomplete RBBB  Nonspecific ST and T wave abnormality  Prolonged QT  Abnormal ECG   Rate 100 bpm. NC interval 224 ms. QRS duration 104 ms. QT/QTc 398/513 ms. P-R-T axes 46 -10 128.     Imaging:  CT Chest Abdomen Pelvis w/o Contrast   Final Result   IMPRESSION:      1. Large right effusion with associated compressive atelectasis and or   infiltrate.   2. Possible bowel wall thickening in the rectosigmoid colon, difficult   to be certain of pathologic wall thickening given its decompressed   state. Clinical correlation needed for symptoms of colitis.      GISELE LOVE MD            SYSTEM ID:  [id number]      XR Chest 2 Views   Final  Result   IMPRESSION: Moderate to large right effusion with associated   compressive atelectasis and/or infiltrate increased from previous.   Left lung grossly clear. The cardiac silhouette is not enlarged.   Pulmonary vasculature is unremarkable.       GISELE LOVE MD            SYSTEM ID:  [id number]      US Thoracentesis    (Results Pending)     Report per radiology    Laboratory:  Labs Ordered and Resulted from Time of ED Arrival to Time of ED Departure   COMPREHENSIVE METABOLIC PANEL - Abnormal       Result Value    Sodium 143      Potassium 3.5      Chloride 103      Carbon Dioxide (CO2) 30      Anion Gap 10      Urea Nitrogen 91 (*)     Creatinine 5.78 (*)     Calcium 7.3 (*)     Glucose 218 (*)     Alkaline Phosphatase 366 (*)     AST 33      ALT 53      Protein Total 6.6 (*)     Albumin 2.5 (*)     Bilirubin Total 0.5      GFR Estimate 10 (*)    CBC WITH PLATELETS AND DIFFERENTIAL - Abnormal    WBC Count 13.1 (*)     RBC Count 3.25 (*)     Hemoglobin 10.4 (*)     Hematocrit 31.9 (*)     MCV 98      MCH 32.0      MCHC 32.6      RDW 15.9 (*)     Platelet Count 367      % Neutrophils 79      % Lymphocytes 12      % Monocytes 8      % Eosinophils 1      % Basophils 0      % Immature Granulocytes 0      NRBCs per 100 WBC 0      Absolute Neutrophils 10.4 (*)     Absolute Lymphocytes 1.5      Absolute Monocytes 1.1      Absolute Eosinophils 0.1      Absolute Basophils 0.0      Absolute Immature Granulocytes 0.0      Absolute NRBCs 0.0     LACTATE DEHYDROGENASE - Abnormal    Lactate Dehydrogenase 295 (*)    PROTEIN TOTAL - Abnormal    Protein Total 6.2 (*)    INR - Normal    INR 1.08     TROPONIN I - Normal    Troponin I High Sensitivity 58     COVID-19 VIRUS (CORONAVIRUS) BY PCR - Normal    SARS CoV2 PCR Negative     LACTIC ACID WHOLE BLOOD - Normal    Lactic Acid 1.0     TROPONIN I - Normal    Troponin I High Sensitivity 57     LACTATE DEHYDROGENASE FLUID   PROTEIN FLUID   PH FLUID   BLOOD CULTURE   BLOOD CULTURE    AEROBIC BACTERIAL CULTURE ROUTINE   GRAM STAIN   CELL COUNT WITH DIFFERENTIAL FLUID        Emergency Department Course:     Reviewed:  I reviewed nursing notes, vitals, past medical history and Care Everywhere    Assessments:  0909 I obtained history and examined the patient as noted above.   1000 I rechecked the patient and explained findings.     Consults:  1045 I spoke with Dr. Murdock, hospitalist, who accepts the patient.     Interventions:  Medications   0.9% sodium chloride BOLUS (1,000 mLs Intravenous Not Given 8/16/22 1124)     Followed by   sodium chloride 0.9% infusion (has no administration in time range)   ondansetron (ZOFRAN) injection 4 mg (has no administration in time range)   vancomycin 2000 mg in 0.9% NaCl 500 ml intermittent infusion 2,000 mg (2,000 mg Intravenous New Bag 8/16/22 1234)   piperacillin-tazobactam (ZOSYN) 2.25 g vial to attach to  ml bag (0 g Intravenous Stopped 8/16/22 1233)   lidocaine (PF) (XYLOCAINE) 1 % injection 10 mL (10 mLs Subcutaneous Given 8/16/22 1322)       Disposition:  The patient was admitted to the hospital under the care of Dr. Murdock.     Impression & Plan     Medical Decision Making:  This is a very pleasant 77-year male presents emergency room with what appears to be nausea and vomiting and generalized weakness.  He does need dialysis today, but I think his main issue is actually the pleural effusion as well as possible pneumonia that may be causing some GI upset.  The CT scan of his chest abdomen pelvis also shows possible colitis, though his abdomen overall is benign and the antibiotics were giving him which include Zosyn and vancomycin should cover for any type of colitis.  I do think needs to be admitted and evaluated for thoracentesis, though he is protecting his airway here and does not require that procedure emergently.  We will continue to rule out an MI his troponin here is above his gender range, which may be due to his dialysis.  We will  provide supportive care, and he will be admitted to the care of Dr. Murdock who is very kindly agreed to accept care of the patient.  Of note, patient is mentating appropriately, and is doing better now that he has some Zofran.      Covid-19  Gordon Gann was evaluated during a global COVID-19 pandemic, which necessitated consideration that the patient might be at risk for infection with the SARS-CoV-2 virus that causes COVID-19.   Applicable protocols for evaluation were followed during the patient's care.   COVID-19 was considered as part of the patient's evaluation. The plan for testing is:  a test was obtained during this visit.    COVID-19 Virus (Coronavirus) by PCR Nasopharyngeal Swab: pending      Diagnosis:    ICD-10-CM    1. ESRD (end stage renal disease) on dialysis (H)  N18.6     Z99.2    2. Pneumonia due to infectious organism, unspecified laterality, unspecified part of lung  J18.9    3. Pleural effusion  J90        Scribe Disclosure:  I, Melisa Cho, am serving as a scribe at 9:08 AM on 8/16/2022 to document services personally performed by Lamont Francis MD based on my observations and the provider's statements to me.            Lamont Francis MD  08/16/22 8032

## 2022-08-17 NOTE — PHARMACY-VANCOMYCIN DOSING SERVICE
"Pharmacy Vancomycin Note  Date of Service 2022  Patient's  1945   77 year old, male    Indication: Community Acquired Pneumonia and Sepsis  Day of Therapy: 2  Current vancomycin regimen:  Intermittent dosing based on levels  Current vancomycin monitoring method: Renal Replacement Therapy  Current vancomycin therapeutic monitoring goal: 15-20 mg/L    Current estimated CrCl = Estimated Creatinine Clearance: 18.5 mL/min (A) (based on SCr of 3.67 mg/dL (H)).    Creatinine for last 3 days  2022:  9:10 AM Creatinine 5.78 mg/dL  2022:  6:36 AM Creatinine 3.67 mg/dL    Recent Vancomycin Levels (past 3 days)  2022:  9:45 PM Vancomycin 18.3 mg/L  2022:  6:36 AM Vancomycin 14.3 mg/L    Vancomycin IV Administrations (past 72 hours)                   vancomycin 2000 mg in 0.9% NaCl 500 ml intermittent infusion 2,000 mg (mg) 2,000 mg New Bag 22 1234                Nephrotoxins and other renal medications (From now, onward)    Start     Dose/Rate Route Frequency Ordered Stop    22 0900  lisinopril (ZESTRIL) tablet 20 mg        Note to Pharmacy: PTA Sig:Take 1 tablet (20 mg) by mouth daily      20 mg Oral DAILY 22 1357      22 0830  vancomycin (VANCOCIN) 750 mg in sodium chloride 0.9 % 250 mL intermittent infusion         750 mg  over 60-90 Minutes Intravenous ONCE 22 0828      22 0800  bumetanide (BUMEX) tablet 2 mg        Note to Pharmacy: PTA Sig:Take 1 tablet (2 mg) by mouth 2 times daily On non dialysis days  Patient taking differently: Take 2 mg by mouth 2 times daily On non dialysis days (M W F Sun)      2 mg Oral 2 times per day on Sun Mon Wed Fri 08/16/22 1357      22  piperacillin-tazobactam (ZOSYN) 2.25 g vial to attach to  ml bag        Note to Pharmacy: For [facility], [facility] and [facility]: For Zosyn-naive patients, use the \"Zosyn initial dose + extended infusion\" order panel.    2.25 g  over 30 Minutes Intravenous EVERY 8 HOURS 22 1429   "    08/16/22 1426  vancomycin place cespedes - receiving intermittent dosing         1 each Intravenous SEE ADMIN INSTRUCTIONS 08/16/22 1427               Contrast Orders - past 72 hours (72h ago, onward)    None          Interpretation of levels and current regimen:  Vancomycin level is reflective of therapeutic level    Has serum creatinine changed greater than 50% in last 72 hours: dialysis    Urine output:  unable to determine    Renal Function: ESRD on Dialysis    Plan:  1. Give 750 mg once and continue intermittent dosing based on levels  2. Vancomycin monitoring method: Renal Replacement Therapy  3. Vancomycin therapeutic monitoring goal: 15-20 mg/L  4. Pharmacy will check vancomycin levels as appropriate in 1-3 Days.    Genie Ferro, [facility]

## 2022-08-17 NOTE — PROGRESS NOTES
Renal Medicine Progress Note            Assessment/Plan:     Gordon Gann is a 77 year old male who was admitted on 8/16/2022.      Assessment:  1) ESRD on chronic maintenance hemodialysis.    Runs TTS at [facility], Dr. Perla   Started dialysis 10/21, AVG placed 8/5/21.    last post weihgt 88.1, Target weight ordered 88.5kg    Epo 600 units qTx, venofer 50mg weekly             Treatment Type  Hemodialysis (procedure)    Dialyzer  : [employer] Series: [employer] Model: 17H MELANY Factor: 1455    Dialysate  Base Sodium 138 mEq/L    Dialysate  Calcium 2.5 mEq/L    Dialysate  Potassium 3k    Dialysate  BiCarb 35 mEq/L    Dialysate Flow Rate  500 ML/min    Blood Flow Rate  450 ML/min    Treatment Time  210 min    UF Profile PRN  #2    UF Profiling Instructions  Try profile two to see if it helps    Temperature  Standard    Max UF Rate  2 L/Hr    Access  AV Graft Arm (Left upper)    Access Concurrent  No    Arterial Needle Guage/Length/Tip  15 g/ -1 in/Sharp    Venous Needle Guage/Length/Tip  15 g/ -1 in/Sharp    Schedule  3 Times per week    Heparin Load  No Loading Dose    Heparin Hourly Dose  No Hourly Dose             Outpatient bicarb running 26-28, here 30 on admission yesterday prior to diaysis, no indication for PO bicarb which I stopped.      Anemia: Hgb 9.8 today.      ckd-mbd: normal corrected calcium,     2) Pleural effusion: with this being unilateral, likely related to underlying lung process and not related to hypervolemia (although clearly hyperovolemic with edema, HTN). S/p thoracentesis with 1.8L removed. Improved lung sounds/aeration     3) Pneumonia, on zosyn, vanco.      4) HTN: PTA metoprolol, lisinopril and clonidine. 131/90 today, improved with volume removal.      Noted malnutrition with hypoalbuminemia     Plan/Recs:  1) HD tomorrow per regular chronic schedule  2) will challenge volume status/target weight. Will need lower new target weight order on discharge.        Crescencio Chaparro,  DO  [facility] - Nephrology  Cell: [phone]  Office: [phone]              Interval History:     Pt s/p HD uneventually yesterday, 2.7Kg total UF off. If  Weight acccurate, would have gotten down to 86kg, below prior target weight. Also s/p thoracentesis, 1.85L removed.    he asks if he can leave hospital soon. PT about  To see patient. Importantly the nausea has resolved which was his main complaint leading to ED evaluation.           Medications and Allergies:       - MEDICATION INSTRUCTIONS for Dialysis Patients -   Does not apply See Admin Instructions     aspirin  81 mg Oral At Bedtime     bumetanide  2 mg Oral 2 times per day on Sun Mon Wed Fri     cloNIDine  0.1 mg Oral BID     cyclobenzaprine  10 mg Oral BID     ezetimibe  10 mg Oral QAM     folic acid  1 mg Oral QAM     insulin aspart  1-7 Units Subcutaneous TID AC     insulin aspart  1-5 Units Subcutaneous At Bedtime     insulin aspart  5 Units Subcutaneous TID w/meals     insulin glargine  15 Units Subcutaneous At Bedtime     lisinopril  20 mg Oral Daily     metoprolol tartrate  100 mg Oral BID     mirtazapine  15 mg Oral At Bedtime     - MEDICATION INSTRUCTIONS -   Does not apply Once     pantoprazole  40 mg Oral BID AC     piperacillin-tazobactam  2.25 g Intravenous Q8H     sodium chloride (PF)  3 mL Intracatheter Q8H     traZODone  150 mg Oral At Bedtime     vancomycin place cespedes - receiving intermittent dosing  1 each Intravenous See Admin Instructions      No Known Allergies         Physical Exam:   Vitals were reviewed  BP (!) 166/92 (BP Location: Right arm)   Pulse 96   Temp 97.5  F (36.4  C) (Oral)   Resp 16   Wt 88.5 kg (195 lb)   SpO2 95%   BMI 29.65 kg/m      Wt Readings from Last 3 Encounters:   08/16/22 88.5 kg (195 lb)   12/24/21 90.7 kg (200 lb)   12/03/21 90.7 kg (200 lb)       Intake/Output Summary (Last 24 hours) at 8/17/2022 1108  Last data filed at 8/17/2022 0030  Gross per 24 hour   Intake 240 ml   Output  2700 ml   Net -2460 ml       GENERAL APPEARANCE: Pt alert, oriented  HEENT:  Eyes/ears/nose/neck grossly normal  RESP: lungs cta b c good efforts, no crackles, rhonchi or wheezes  CV: RRR, nl S1/S2, 2/6 systolic murmer, left sternal border   ABDOMEN: o/s/nt/nd, bs present  EXTREMITIES/SKIN: no c/c/rashes/lesions; 2+ edema  Left AVG with good bruit and thrill             Data:     BMP  Recent Labs   Lab 08/17/22  0843 08/17/22  0636 08/17/22  0148 08/16/22  1758 08/16/22  1415 08/16/22  0910   NA  --  139  --   --   --  143   POTASSIUM  --  3.7  --   --   --  3.5   CHLORIDE  --  103  --   --   --  103   LC  --  7.0*  --   --   --  7.3*   CO2  --  27  --   --   --  30   BUN  --  47*  --   --   --  91*   CR  --  3.67*  --   --   --  5.78*   * 280* 78 256*   < > 218*    < > = values in this interval not displayed.     CBC  Recent Labs   Lab 08/17/22  0636 08/16/22  0910   WBC 7.7 13.1*   HGB 9.8* 10.4*   HCT 30.7* 31.9*    98    367     Lab Results   Component Value Date    AST 34 08/17/2022    ALT 44 08/17/2022    ALKPHOS 259 (H) 08/17/2022    BILITOTAL 0.6 08/17/2022     Lab Results   Component Value Date    INR 1.08 08/17/2022       Attestation:  I have reviewed today's vital signs, notes, medications, labs and imaging.    DO [facility]  Office: [phone]  Cell: [phone]

## 2022-08-17 NOTE — PROGRESS NOTES
Potassium   Date Value Ref Range Status   08/16/2022 3.5 3.4 - 5.3 mmol/L Final     Hemoglobin   Date Value Ref Range Status   08/16/2022 10.4 (L) 13.3 - 17.7 g/dL Final     Creatinine   Date Value Ref Range Status   08/16/2022 5.78 (H) 0.66 - 1.25 mg/dL Final     Urea Nitrogen   Date Value Ref Range Status   08/16/2022 91 (H) 7 - 30 mg/dL Final     Sodium   Date Value Ref Range Status   08/16/2022 143 133 - 144 mmol/L Final     INR   Date Value Ref Range Status   08/16/2022 1.08 0.85 - 1.15 Final       DIALYSIS PROCEDURE NOTE  Hepatitis status of previous patient on machine log was checked and verified ok to use with this patients hepatitis status.  Patient dialyzed for 3.25 hrs. on a K3 bath with a net fluid removal of  2.7 L.  A BFR of 375-450 ml/min was obtained via a LAVG using 15 gauge needles.      The treatment plan was discussed with Dr. Chaparro during the treatment.    Total heparin received during the treatment: 0 units.   Needle cannulation sites held x 5 min.       Meds  given: epo 2,000u IVP    Complications: patient had to be rinsed back 20 minutes early due to clotting    Person educated: patient. Knowledge base substantial. Barriers to learning: none. Educated on procedure via verbal mode. patient/family verbalized understanding. Pt prefers verbal education style.     ICEBOAT? Timeout performed pre-treatment  I: Patient was identified using 2 identifiers  C:  Consent Signed Yes  E: Equipment preventative maintenance is current and dialysis delivery system OK to use  B: Hepatitis B Surface Antigen: Negative; Draw Date: 8/9/22      Hepatitis B Surface Antibody: Susceptible; Draw Date: 4/26/22  O: Dialysis orders present and complete prior to treatment  A: Vascular access verified and assessed prior to treatment  T: Treatment was performed at a clinically appropriate time  ?: Patient was allowed to ask questions and address concerns prior to treatment  See Adult Hemodialysis flowsheet in EPIC for  further details and post assessment.  Machine water alarm in place and functioning. Transducer pods intact and checked every 15min.   Pt returned via bed.  Chlorine/Chloramine water system checked every 4 hours.  Outpatient Dialysis at [facility]      Post treatment report given to Nasreen Vargas RN regarding 3L of fluid removed, last BP of 124/70, and patient pain rating of 0/10.     Please remove patient dressing on AVF and AVG needle sites 24 hours after dialysis. If leaking occurs please apply a Band-Aid.

## 2022-08-17 NOTE — CONSULTS
[facility]  Gastroenterology Consultation         Gordon Gann  [address]   [place] [address]  77 year old male    Admission Date/Time: 8/16/2022  Primary Care Provider: Belen Prieto  Referring / Attending Physician:  Dr. Armando Murdock    We were asked to see the patient in consultation by Dr. Armando Murdock for evaluation of bowel wall thickening.      CC: nausea and vomiting    HPI:  Gordon Gann is a 77 year old male who has a past medical history of  end stage renal disease, atrial fibrillation, CAD, STEMI, HTN, and type II diabetes who presents with nausea and vomiting. Patient states nausea and vomiting has happened on 2 occassions. Initially yesterday a.m. with orange juice and again in the evening with dinner. He states it seemed to occur with a cough. Was able to tolerate liquids and crackers late last night and had no nausea. Denies nausea today.     He underwent a CT scan and was found to have pneumonia and a pleural effusion with incidental finding of rectosigmoid wall thickening. He consequently has been started on antibiotics and underwent a thoracentesis and removed 1.5 L of fluid. He has no SOB, coughing, chest pain, fever or chills.     He has had one episode of diarrhea 2 days ago and none since. Typically has formed stools without blood. He denies abdominal pain. Last colonoscopy was 10 years ago and patient does not recall findings, but believes it was normal. No record is able to be obtained.    ROS: A comprehensive ten point review of systems was negative aside from those in mentioned in the HPI.      PAST MED HX:  I have reviewed this patient's medical history and updated it with pertinent information if needed.   Past Medical History:   Diagnosis Date     Alcohol abuse      Atrial fibrillation (H)      CKD (chronic kidney disease) stage 4, GFR 15-29 ml/min (H)      Diabetes (H)      Gout      Hypokalemia      Hypophosphatemia      Insomnia       STEMI (ST elevation myocardial infarction) (H)        MEDICATIONS:   Prior to Admission Medications   Prescriptions Last Dose Informant Patient Reported? Taking?   HYDROmorphone (DILAUDID) 2 MG tablet Not Taking at Unknown time  No No   Sig: Take 1 tablet (2 mg) by mouth every 3 hours as needed for moderate to severe pain   Patient not taking: Reported on 8/16/2022   SODIUM BICARBONATE PO  Self Yes Yes   Sig: Take 1 tablet by mouth 2 times daily   Vitamin D, Cholecalciferol, 25 MCG (1000 UT) CAPS 8/15/2022 Self Yes Yes   Sig: Take 1,000 Units by mouth every morning   acetaminophen (TYLENOL) 500 MG tablet 8/15/2022  No Yes   Sig: Take 2 tablets (1,000 mg) by mouth 3 times daily   Patient taking differently: Take 1,000 mg by mouth every 8 hours as needed   aspirin 81 MG EC tablet 8/15/2022  No Yes   Sig: Take 1 tablet (81 mg) by mouth At Bedtime Starting 12/12   bumetanide (BUMEX) 2 MG tablet 8/15/2022  No Yes   Sig: Take 1 tablet (2 mg) by mouth 2 times daily On non dialysis days   Patient taking differently: Take 2 mg by mouth 2 times daily On non dialysis days (M W F Sun)   cloNIDine (CATAPRES) 0.1 MG tablet 8/15/2022  No Yes   Sig: Take 1 tablet (0.1 mg) by mouth 2 times daily   cyclobenzaprine (FLEXERIL) 10 MG tablet 8/15/2022  No Yes   Sig: Take 1 tablet (10 mg) by mouth 3 times daily   Patient taking differently: Take 10 mg by mouth 2 times daily   diclofenac (VOLTAREN) 1 % topical gel Not Taking at Unknown time  No No   Sig: Apply 2 g topically 4 times daily as needed for moderate pain   Patient not taking: Reported on 8/16/2022   ezetimibe (ZETIA) 10 MG tablet 8/15/2022 Self Yes Yes   Sig: Take 10 mg by mouth every morning   fish oil-omega-3 fatty acids 1000 MG capsule 8/15/2022 Self Yes Yes   Sig: Take 1 g by mouth every morning   folic acid (FOLVITE) 1 MG tablet 8/15/2022 Self Yes Yes   Sig: Take 1 mg by mouth every morning   hydrALAZINE (APRESOLINE) 25 MG tablet  Self No Yes   Sig: Take 1 tablet (25  mg) by mouth every 6 hours as needed (SBP >180)   insulin aspart (NOVOLOG PEN) 100 UNIT/ML pen  Self No Yes   Sig: Inject 5 Units Subcutaneous 3 times daily (with meals)   insulin glargine (LANTUS PEN) 100 UNIT/ML pen   No Yes   Sig: Inject 15 Units Subcutaneous At Bedtime   lisinopril (ZESTRIL) 20 MG tablet   No Yes   Sig: Take 1 tablet (20 mg) by mouth daily   melatonin 3 MG tablet  Self No Yes   Sig: Take 1 tablet (3 mg) by mouth At Bedtime   metoprolol tartrate (LOPRESSOR) 100 MG tablet   No Yes   Sig: Take 1 tablet (100 mg) by mouth 2 times daily   mirtazapine (REMERON) 15 MG tablet  Self No Yes   Sig: Take 1 tablet (15 mg) by mouth At Bedtime   senna-docusate (SENOKOT-S/PERICOLACE) 8.6-50 MG tablet Not Taking at Unknown time  No No   Sig: Take 1 tablet by mouth daily   Patient not taking: Reported on 8/16/2022   traZODone (DESYREL) 50 MG tablet 8/15/2022 Self No Yes   Sig: Take 1 tablet (50 mg) by mouth At Bedtime   Patient taking differently: Take 150 mg by mouth At Bedtime   vitamin B complex with vitamin C (STRESS TAB) tablet 8/15/2022 Self Yes Yes   Sig: Take 1 tablet by mouth every morning   warfarin ANTICOAGULANT (COUMADIN) 5 MG tablet 8/15/2022 at 2300  No Yes   Sig: Take 1 tablet (5 mg) by mouth daily except Fridays - start on 12/12   warfarin ANTICOAGULANT (COUMADIN) 7.5 MG tablet 8/12/22  No Yes   Sig: Take 1 tablet (7.5 mg) by mouth once a week every Friday Take 7.5 mg by mouth every Friday starting 12/17      Facility-Administered Medications: None       ALLERGIES: No Known Allergies    SOCIAL HISTORY:  Social History     Tobacco Use     Smoking status: Never Smoker     Smokeless tobacco: Never Used   Substance Use Topics     Alcohol use: Yes       FAMILY HISTORY:  No family history on file.    PHYSICAL EXAM:   General  Alert, oriented and comfortable  Vital Signs with Ranges  Temp: 97.5  F (36.4  C) Temp src: Oral BP: (!) 166/92 Pulse: 96   Resp: 16 SpO2: 95 % O2 Device: None (Room air) Oxygen  Delivery: 2 LPM  I/O last 3 completed shifts:  In: 240 [P.O.:240]  Out: 2700 [Other:2700]    Constitutional: healthy, alert and no distress   Cardiovascular: negative, PMI normal. No lifts, heaves, or thrills. RRR. No murmurs, clicks gallops or rub  Respiratory: negative, Percussion normal. Good diaphragmatic excursion. Lungs clear  Abdomen: Abdomen soft, non-tender. BS normal. No masses, organomegaly          ADDITIONAL COMMENTS:   I reviewed the patient's new clinical lab test results.   Recent Labs   Lab Test 08/17/22  0636 08/16/22  0910 07/30/22 2023   WBC 7.7 13.1* 11.1*   HGB 9.8* 10.4* 10.8*    98 100    367 276   INR 1.08 1.08 1.81*     Recent Labs   Lab Test 08/17/22  0636 08/16/22  0910 12/24/21  0834   POTASSIUM 3.7 3.5 3.7   CHLORIDE 103 103 100   CO2 27 30 30   BUN 47* 91* 40*   ANIONGAP 9 10 7     Recent Labs   Lab Test 08/17/22  0636 08/16/22  0910 10/06/21  0634 08/09/21  0850 08/08/21  2250 07/30/21  0730 07/28/21  1354   ALBUMIN 2.2* 2.5* 2.1*   < >  --    < >  --    BILITOTAL 0.6 0.5 0.3   < >  --    < >  --    ALT 44 53 52   < >  --    < >  --    AST 34 33 36   < >  --    < >  --    PROTEIN  --   --   --   --  300 *  --  300 *    < > = values in this interval not displayed.       I reviewed the patient's new imaging results.        CONSULTATION ASSESSMENT AND PLAN:    Troy Gann is a pleasant 77 year old male with PMHx of ESRD on dialysis, atrial fibrillation, CAD on coumadin whom presented with nausea and vomiting and diagnosed with pneumonia and a pleural effussion.    GI Problems:  Nausea and vomiting  Occurred with coughing and now has no nausea or vomiting  Likely due to cough associated with pleural effusion and pneumonia    -- ok to advance diet to dialysis diet  -- Monitor nausea and vomiting and offer prn antiemetics  -- If has recurrent vomiting could consider EGD    Colon wall thickening  Has had one episode of none bloody diarrhea in last week and no stool in last 24  hours  Denies abdominal pain  Last colonoscopy 10 years ago  Findings on CT may represent unlikely colitis vs neoplasm vs hemorrhoid or artifact  -- No plans for colonoscopy at this time given lack of symptoms and concurrently being treated for pneumonia and pleural effusion  -- Would recommend to consider a colonoscopy as an outpatient as past due for screening and would like to r/o neoplastic cause to wall thickening seen on CT.           [facility].  Office: [phone]  Cell : [phone] (Dr. Scott)  Cell: [phone] (Binta Braun PA-C)

## 2022-08-17 NOTE — PROGRESS NOTES
Pt A/Ox4, VSS on RA, went down for hemodialysis at 9:15 last night, returned around 12:30am, 2.6L of fluid removed. Pt up with gb/walker, NPO for thoracentesis today, denies pain, IVSL & IV Abx, did not have coughing/choking episode on this shift, pt swallowing well. Next dialysis on Thursday.

## 2022-08-17 NOTE — PROGRESS NOTES
"   08/17/22 1130   Quick Adds   Type of Visit Initial PT Evaluation   Living Environment   People in Home alone   Current Living Arrangements apartment;independent living facility   Home Accessibility no concerns   Transportation Anticipated family or friend will provide   Living Environment Comments Pt lives in [facility] services are also avaiable at location. Pt reports that daughter is willing to help when available, but she works. Pt has used intermediate care unit at [facility] before, states that may be an option (involves meals, nurse on floor, help with mobility)   Self-Care   Usual Activity Tolerance good   Current Activity Tolerance moderate   Regular Exercise No   Equipment Currently Used at Home walker, rolling   Fall history within last six months no   Activity/Exercise/Self-Care Comment Pt uses 4WW and FWW at baseline with mobility. Pt is IND with all functional mobility. Pt reported having home health nurse prior (~1 year ago) after a hospitilization, but has been independent since those services ended.   General Information   Onset of Illness/Injury or Date of Surgery 08/16/22   Referring Physician Hina Hung MD   Patient/Family Therapy Goals Statement (PT) to get back to living independently   Pertinent History of Current Problem (include personal factors and/or comorbidities that impact the POC) Pt is 76 yo male who, per chart, \"has a past medical history of  end stage renal disease, atrial fibrillation, CAD, STEMI, HTN, and type II diabetes who presents with nausea and vomiting\" \"He underwent a CT scan and was found to have pneumonia and a pleural effusion with incidental finding of rectosigmoid wall thickening. He consequently has been started on antibiotics and underwent a thoracentesis and removed 1.5 L of fluid\"   Existing Precautions/Restrictions fall   Cognition   Affect/Mental Status (Cognition) WFL;flat/blunted affect   Orientation Status (Cognition) oriented x 4   Cognitive Status Comments " soft spoken and can be slow with responses, but doesn't demonstrate any cognitive deficits durnig eval questioning.   Pain Assessment   Patient Currently in Pain No   Integumentary/Edema   Integumentary/Edema Comments swelling/edema in B LEs   Posture    Posture Forward head position   Range of Motion (ROM)   Range of Motion ROM is WFL   Strength (Manual Muscle Testing)   Strength (Manual Muscle Testing) strength is WFL   Bed Mobility   Bed Mobility supine-sit   Comment, (Bed Mobility) supine-sit with mod I.   Transfers   Transfers sit-stand transfer   Comment, (Transfers) STS with FWW and SBA.   Gait/Stairs (Locomotion)   Comment, (Gait/Stairs) Pt ambulated 10' with FWW and CGA, demonstrating step through pattern and decreased gait speed.   Balance   Balance Comments static balance good, dynamic balance fair   Sensory Examination   Sensory Perception patient reports no sensory changes   Clinical Impression   Criteria for Skilled Therapeutic Intervention Yes, treatment indicated   PT Diagnosis (PT) Difficulty with ambulation.   Influenced by the following impairments decreased strength, decreased activity tolerance,   Functional limitations due to impairments difficulty with ambulation with fatigue and SOB   Clinical Presentation (PT Evaluation Complexity) Stable/Uncomplicated   Clinical Presentation Rationale PT's clinical judgement   Clinical Decision Making (Complexity) low complexity   Planned Therapy Interventions (PT) balance training;bed mobility training;gait training;home exercise program;ROM (range of motion);strengthening;stretching;transfer training;wheelchair management/propulsion training;progressive activity/exercise   Risk & Benefits of therapy have been explained evaluation/treatment results reviewed;care plan/treatment goals reviewed;patient   PT Discharge Planning   PT Discharge Recommendation (DC Rec) home;home with home care physical therapy   PT Rationale for DC Rec Pt is mod I to IND at  baseline using FWW or 4WW with mobility. Pt resides in IND living facility with capabilities of additional services if needed. Pt currently is near baseline function, demonstrates decreased strength and activity tolerance and requiring use of FWW and SBA with transfers and ambulation. Pt will benefit from  PT in order to progress endurance and activity tolerance, as well as overall IND and safety with mobility.   PT Brief overview of current status bed mob w/ mod I, STS with FWW and SBA, amb with FWW and SBA.   Total Evaluation Time   Total Evaluation Time (Minutes) 10   Physical Therapy Goals   PT Frequency Daily   PT Predicted Duration/Target Date for Goal Attainment 08/24/22   PT Goals Bed Mobility;Transfers;Gait   PT: Bed Mobility Modified independent;Supine to/from sit;Goal Met   PT: Transfers Supervision/stand-by assist;Sit to/from stand;Assistive device   PT: Gait Supervision/stand-by assist;Greater than 200 feet;Rolling walker

## 2022-08-17 NOTE — PROGRESS NOTES
[facility]    Medicine Progress Note - Hospitalist Service    Date of Admission:  8/16/2022    Assessment & Plan            Gordon Gann is a 77 year old male admitted on 8/16/2022. He has a past medical history most remarkable for end-stage renal disease, type 2 diabetes, who presents with nausea and vomiting, and was admitted to the hospitalist service for pneumonia with an associated large right-sided pleural effusion.     Community-acquired pneumonia with right-sided large pleural effusion.  No significant hypoxia.  -White count 13.1 on admission, improved with antibiotics  -Status post thoracentesis 8/16/2022 with removal of 1.85 L of pleural fluid.  Pleural fluid  suggestive of transudative effusion, fluid culture pending  -Pro-Harman 0.36  - Continue vancomycin and Zosyn which was started in the ER  -Send MRSA swab, if negative will stop vancomycin.  If cultures continue to remain negative may be able to down titrate Zosyn tomorrow     Nausea, unclear etiology potentially due to uremia versus infection as above  -Improved after thoracentesis/with antibiotics  -Continue symptomatic treatment  -Advance diet as tolerated    Rectosigmoid thickening  GI following, plan for possible colonoscopy outpatient      End-stage renal disease, on dialysis Tuesday Thursday and Saturday  - Nephrology following, continuing dialysis while in the hospital  - Continue home bumex, oral bicarbonate     Chronic Issues:   1.  Atrial fibrillation.  Normally on anticoagulation but came in with an INR of 1.  We will hold for now and have the rounding team restart this after his thoracentesis. Continue home metoprolol BID  2.  Essential hypertension. Home clonidine, lisinopril, metoprolol, as needed hydralazine  3.  Type 2 diabetes.  Ordered sliding scale insulin  4.  Anemia due to chronic kidney disease  4.  Hyperlipidemia. Home Zetia  5.  Coronary artery disease. Aspirin, Zetia  6.  Depression. Home Remeron  7.   Insomnia. Home trazodone       Diet: NPO for Medical/Clinical Reasons Except for: Meds, Ice Chips    DVT Prophylaxis: Pneumatic Compression Devices  Flood Catheter: Not present  Central Lines: None  Cardiac Monitoring: None  Code Status: No CPR- Do NOT Intubate      Disposition Plan    1 to 2 days       The patient's care was discussed with the Bedside Nurse and Patient.    Hina Hung MD  Hospitalist Service  [facility]  Securely message with the Vocera Web Console (learn more here)  Text page via [employer] Paging/Directory         Clinically Significant Risk Factors Present on Admission          # Hypocalcemia: corrected calcium <8.5 on admission, will replace as needed    # Hypoalbuminemia: Albumin = 2.2 g/dL (Ref range: 3.4 - 5.0 g/dL) on admission, will monitor as appropriate   # Coagulation Defect: home medication list includes an anticoagulant medication   # Hypertension: home medication list includes antihypertensive(s)          ______________________________________________________________________    Interval History   Patient reports that his nausea is better today.  Denied any new complaints.  No new nursing concerns.    Data reviewed today: I reviewed all medications, new labs and imaging results over the last 24 hours.    Physical Exam   Vital Signs: Temp: 97.5  F (36.4  C) Temp src: Oral BP: (!) 166/92 Pulse: 96   Resp: 16 SpO2: 95 % O2 Device: None (Room air) Oxygen Delivery: 2 LPM  Weight: 195 lbs 0 oz  Exam:  Constitutional: Awake, alert and no distress. Appears comfortable  Head: Normocephalic. No masses, lesions, tenderness or abnormalities  ENT: ENT exam normal, no neck nodes or sinus tenderness  Cardiovascular: RRR.  No murmurs, no rubs or JVD  Respiratory: Normal WOB,b/l equal air entry, no wheezes or crackles   Gastrointestinal: Abdomen soft, non-tender. BS normal. No masses, organomegaly  : Deferred  Extremities : No edema , no clubbing or cyanosis    Neurologic:  Cranial nerves II-XII grossly intact , power symmetrical, Reflexes normal and symmetric. Sensation grossly WNL.      Data   Recent Labs   Lab 08/17/22  0636 08/17/22  0148 08/16/22  1758 08/16/22  1415 08/16/22  1118 08/16/22  0910   WBC 7.7  --   --   --   --  13.1*   HGB 9.8*  --   --   --   --  10.4*     --   --   --   --  98     --   --   --   --  367   INR 1.08  --   --   --   --  1.08     --   --   --   --  143   POTASSIUM 3.7  --   --   --   --  3.5   CHLORIDE 103  --   --   --   --  103   CO2 27  --   --   --   --  30   BUN 47*  --   --   --   --  91*   CR 3.67*  --   --   --   --  5.78*   ANIONGAP 9  --   --   --   --  10   LC 7.0*  --   --   --   --  7.3*   * 78 256*   < >  --  218*   ALBUMIN 2.2*  --   --   --   --  2.5*   PROTTOTAL 5.3*  --   --   --  6.2* 6.6*   BILITOTAL 0.6  --   --   --   --  0.5   ALKPHOS 259*  --   --   --   --  366*   ALT 44  --   --   --   --  53   AST 34  --   --   --   --  33    < > = values in this interval not displayed.     Recent Results (from the past 24 hour(s))   XR Chest 2 Views    Narrative    CHEST TWO VIEWS  8/16/2022 9:28 AM     HISTORY: Chest discomfort.    COMPARISON: October 7, 2021       Impression    IMPRESSION: Moderate to large right effusion with associated  compressive atelectasis and/or infiltrate increased from previous.  Left lung grossly clear. The cardiac silhouette is not enlarged.  Pulmonary vasculature is unremarkable.     GISELE LOVE MD         SYSTEM ID:  [id number]   CT Chest Abdomen Pelvis w/o Contrast    Narrative    CT CHEST, ABDOMEN, PELVIS WITHOUT CONTRAST 8/16/2022 10:08 AM    CLINICAL HISTORY: Possible PNA, vomiting and abdominal pain.    TECHNIQUE: CT scan of the chest, abdomen, and pelvis was performed  without IV contrast. Multiplanar reformats were obtained. Dose  reduction techniques were used.     CONTRAST: None    COMPARISON: None.    FINDINGS:   LUNGS AND PLEURA: Large right effusion with associated  compressive  atelectasis and or infiltrate. Left lung clear. No left effusion.    MEDIASTINUM/AXILLAE: No adenopathy by size criteria demonstrated in  the absence of contrast. 1.5 cm left thyroid nodule. No aneurysm.    CORONARY ARTERY CALCIFICATIONS: Severe.    HEPATOBILIARY: No significant mass or bile duct dilatation. No  calcified gallstones.     PANCREAS: No significant mass, duct dilatation, or inflammatory  change.    SPLEEN: Normal size.    ADRENAL GLANDS: No significant nodules.    KIDNEYS/BLADDER: No significant mass, stones, or hydronephrosis.    BOWEL: Possible bowel wall thickening in the rectosigmoid colon which  could represent an uncomplicated colitis. No obstruction.    PELVIC ORGANS: No pelvic masses.    ADDITIONAL FINDINGS: There are extensive atherosclerotic changes of  the visualized aorta and its branches. There is no evidence of aortic  aneurysm. No free air or free fluid.    MUSCULOSKELETAL: No frankly destructive bony lesions.      Impression    IMPRESSION:    1. Large right effusion with associated compressive atelectasis and or  infiltrate.  2. Possible bowel wall thickening in the rectosigmoid colon, difficult  to be certain of pathologic wall thickening given its decompressed  state. Clinical correlation needed for symptoms of colitis.    GISELE LOVE MD         SYSTEM ID:  [id number]   US Thoracentesis    Narrative    EXAM:  1. RIGHT THORACENTESIS  2. ULTRASOUND GUIDANCE  LOCATION: [facility]  DATE/TIME: 8/16/2022 1:47 PM    INDICATION: Pleural effusion.    PROCEDURE: Informed consent obtained. Time out performed. The chest  was prepped and draped in a sterile fashion. 10 mL of 1% lidocaine was  infused into local soft tissues. A 5 Upper sorbian catheter system was  introduced into the pleural effusion under ultrasound guidance.    1.85 liters of clear fluid were removed and sent to lab if requested.    Patient tolerated procedure well.    Ultrasound images have been permanently  captured for documentation.      Impression    IMPRESSION:  Status post ultrasound-guided right thoracentesis.    JHOAN YUAN MD         SYSTEM ID:  [id number]     Medications       - MEDICATION INSTRUCTIONS for Dialysis Patients -   Does not apply See Admin Instructions     aspirin  81 mg Oral At Bedtime     bumetanide  2 mg Oral 2 times per day on Sun Mon Wed Fri     cloNIDine  0.1 mg Oral BID     cyclobenzaprine  10 mg Oral BID     ezetimibe  10 mg Oral QAM     folic acid  1 mg Oral QAM     insulin aspart  1-7 Units Subcutaneous TID AC     insulin aspart  1-5 Units Subcutaneous At Bedtime     insulin aspart  5 Units Subcutaneous TID w/meals     insulin glargine  15 Units Subcutaneous At Bedtime     lisinopril  20 mg Oral Daily     metoprolol tartrate  100 mg Oral BID     mirtazapine  15 mg Oral At Bedtime     - MEDICATION INSTRUCTIONS -   Does not apply Once     pantoprazole  40 mg Oral BID AC     piperacillin-tazobactam  2.25 g Intravenous Q8H     sodium chloride (PF)  3 mL Intracatheter Q8H     traZODone  150 mg Oral At Bedtime     vancomycin place cespedes - receiving intermittent dosing  1 each Intravenous See Admin Instructions

## 2022-08-17 NOTE — PROGRESS NOTES
[facility]      OUTPATIENT PHYSICAL THERAPY EVALUATION  PLAN OF TREATMENT FOR OUTPATIENT REHABILITATION  (COMPLETE FOR INITIAL CLAIMS ONLY)  Patient's Last Name, First Name, M.I.  YOB: 1945  Gordon Gann                        Provider's Name  [facility] Medical Record No.  [id number]                               Onset Date:  08/16/22   Start of Care Date:  08/17/22      Type:     _X_PT   ___OT   ___SLP Medical Diagnosis:  weakness; deconditioning                        PT Diagnosis:  Difficulty with ambulation.   Visits from SOC:  1   _________________________________________________________________________________  Plan of Treatment/Functional Goals    Planned Interventions: balance training, bed mobility training, gait training, home exercise program, ROM (range of motion), strengthening, stretching, transfer training, wheelchair management/propulsion training, progressive activity/exercise     Goals: See Physical Therapy Goals on Care Plan in [email or web address] electronic health record.    Therapy Frequency: Daily  Predicted Duration of Therapy Intervention: 08/24/22  _________________________________________________________________________________    I CERTIFY THE NEED FOR THESE SERVICES FURNISHED UNDER        THIS PLAN OF TREATMENT AND WHILE UNDER MY CARE     (Physician co-signature of this document indicates review and certification of the therapy plan).              Certification date from: 08/17/22, Certification date to: 08/24/22    Referring Physician: Hina Hung MD            Initial Assessment        See Physical Therapy evaluation dated 08/17/22 in [employer] electronic health record.

## 2022-08-18 NOTE — PROGRESS NOTES
Renal Medicine Progress Note            Assessment/Plan:       Gordon Gann is a 77 year old male who was admitted on 8/16/2022.      Assessment:  1) ESRD on chronic maintenance hemodialysis.    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    last post weight as outpatient 88.1, Target weight ordered 88.5kg    Epo 600 units qTx, venofer 50mg weekly             Treatment Type  Hemodialysis (procedure)    Dialyzer  : PitchbriteRO Series: [employer] Model: 17H MELANY Factor: 1455    Dialysate  Base Sodium 138 mEq/L    Dialysate  Calcium 2.5 mEq/L    Dialysate  Potassium 3k    Dialysate  BiCarb 35 mEq/L    Dialysate Flow Rate  500 ML/min    Blood Flow Rate  450 ML/min    Treatment Time  210 min    UF Profile PRN  #2    UF Profiling Instructions  Try profile two to see if it helps    Temperature  Standard    Max UF Rate  2 L/Hr    Access  AV Graft Arm (Left upper)    Access Concurrent  No    Arterial Needle Guage/Length/Tip  15 g/ -1 in/Sharp    Venous Needle Guage/Length/Tip  15 g/ -1 in/Sharp    Schedule  3 Times per week    Heparin Load  No Loading Dose    Heparin Hourly Dose  No Hourly Dose                 Anemia: goal Hgb, stable.   [id number]: normal corrected calcium,      2) Pleural effusion: s/p thoracentesis on admission. Suggest repeat CXR to see what degree of effusion remains and related right lung post drainage.      3) Pneumonia, on zosyn, vanco.      4) HTN: PTA metoprolol, lisinopril and clonidine.      5)  malnutrition with hypoalbuminemia. This fits with his weight loss.      Plan/Recs:  1) HD today per regular chronic schedule  2) will challenge volume status/target weight with significant lower extremity edema. . Will need lower new target weight order on discharge. this implies he is having ongoing weight loss.   3) suggest post dialysis CXR        [facility] - Nephrology  Cell: [phone]  Office: [phone]                   Interval History:      Pt sitting in bedside, still with cough but otherwise reports he is feeling back to his baseline self and asking to go home. Seen by PT.          Medications and Allergies:       - MEDICATION INSTRUCTIONS for Dialysis Patients -   Does not apply See Admin Instructions     aspirin  81 mg Oral At Bedtime     bumetanide  2 mg Oral 2 times per day on Sun Mon Wed Fri     cloNIDine  0.1 mg Oral BID     cyclobenzaprine  10 mg Oral BID     ezetimibe  10 mg Oral QAM     folic acid  1 mg Oral QAM     insulin aspart  1-7 Units Subcutaneous TID AC     insulin aspart  1-5 Units Subcutaneous At Bedtime     insulin aspart  5 Units Subcutaneous TID w/meals     insulin glargine  15 Units Subcutaneous At Bedtime     lisinopril  20 mg Oral Daily     metoprolol tartrate  100 mg Oral BID     mirtazapine  15 mg Oral At Bedtime     - MEDICATION INSTRUCTIONS -   Does not apply Once     pantoprazole  40 mg Oral BID AC     piperacillin-tazobactam  2.25 g Intravenous Q8H     sodium chloride (PF)  3 mL Intracatheter Q8H     traZODone  150 mg Oral At Bedtime      No Known Allergies         Physical Exam:   Vitals were reviewed  /64   Pulse (!) 126   Temp 97.7  F (36.5  C) (Oral)   Resp 16   Wt 88.5 kg (195 lb)   SpO2 94%   BMI 29.65 kg/m      Wt Readings from Last 3 Encounters:   08/16/22 88.5 kg (195 lb)   12/24/21 90.7 kg (200 lb)   12/03/21 90.7 kg (200 lb)       Intake/Output Summary (Last 24 hours) at 8/18/2022 0859  Last data filed at 8/17/2022 2000  Gross per 24 hour   Intake 480 ml   Output --   Net 480 ml       GENERAL APPEARANCE: alert, oriented  HEENT:  Eyes/ears/nose/neck grossly normal  RESP: lungs with inspiratory crackles, some clear with repeated inspiration, some decreased lung sounds right base  CV: RRR, nl S1/S2, 1/6 systolic  m/r/g   ABDOMEN: o/s/nt/nd, bs present  EXTREMITIES/SKIN: no c/c/rashes/lesions; 2+ b/l leg edema  NEURO:  Grossly non-focal             Data:     BMP  Recent Labs    Lab 08/18/22  0846 08/18/22  0731 08/18/22  0716 08/17/22 2058 08/17/22  0843 08/17/22  0636 08/16/22  1415 08/16/22  0910   NA  --   --  139  --   --  139  --  143   POTASSIUM  --   --  3.6  --   --  3.7  --  3.5   CHLORIDE  --   --  107  --   --  103  --  103   LC  --   --  7.5*  --   --  7.0*  --  7.3*   CO2  --   --  26  --   --  27  --  30   BUN  --   --  51*  --   --  47*  --  91*   CR  --   --  4.68*  --   --  3.67*  --  5.78*   GLC 67* 43* 47* 199*   < > 280*   < > 218*    < > = values in this interval not displayed.     CBC  Recent Labs   Lab 08/18/22  0716 08/17/22  0636 08/16/22  0910   WBC 7.5 7.7 13.1*   HGB 10.5* 9.8* 10.4*   HCT 32.4* 30.7* 31.9*   MCV 99 100 98    299 367     Lab Results   Component Value Date    AST 25 08/18/2022    ALT 39 08/18/2022    ALKPHOS 238 (H) 08/18/2022    BILITOTAL 0.8 08/18/2022     Lab Results   Component Value Date    INR 1.11 08/18/2022       Attestation:  I have reviewed today's vital signs, notes, medications, labs and imaging.    [facility] - Nephrology  Office: [phone]  Cell: [phone]

## 2022-08-18 NOTE — PROGRESS NOTES
Patient's heart rates has been in 110s to 120s asymptomatic, currently at dialysis, Dialysis nurse called and reported rate of 200. Staff paged provider, who called and ordered stat EKG and heart monitor.    At 7:30 am patient blood sugar was 43,no symptoms, he drink orange juice and ate breakfast, blood sugar increased to 67, 137 and currently 154. Insulin not given. Provider notified.

## 2022-08-18 NOTE — PLAN OF CARE
Physical Therapy Discharge Summary    Reason for therapy discharge:    All goals and outcomes met, no further needs identified.    Progress towards therapy goal(s). See goals on Care Plan in [facility] electronic health record for goal details.  Goals met    Therapy recommendation(s):    Continued therapy is recommended.  Rationale/Recommendations:  Outpatient PT for high level balance and endurance training.    Recommend patient ambulates 2-3 times in hallway per day if remains hospitalized to promote increased endurance.

## 2022-08-18 NOTE — PROGRESS NOTES
[facility]    Medicine Progress Note - Hospitalist Service    Date of Admission:  8/16/2022    Assessment & Plan            Gordon Gann is a 77 year old male admitted on 8/16/2022. He has a past medical history most remarkable for end-stage renal disease, type 2 diabetes, who presents with nausea and vomiting, and was admitted for pneumonia with an associated large right-sided pleural effusion.     Community-acquired pneumonia with right-sided large pleural effusion.  No significant hypoxia.  -White count 13.1 on admission, improved with antibiotics  -Status post thoracentesis 8/16/2022 with removal of 1.85 L of pleural fluid.  Pleural fluid  suggestive of transudative effusion, fluid culture pending  -Pro-Harman 0.36  -  Started on vancomycin and Zosyn during admission, MRSA negative, will DC vancomycin  -Continue Zosyn while in the hospital, change to oral at the time of discharge.     Nausea,   -Likely secondary to to uremia versus infection as above versus secondary to cough  -Improved after thoracentesis/with antibiotics  -Continue symptomatic treatment  -Improved now    Rectosigmoid thickening  GI following, plan for possible colonoscopy outpatient      End-stage renal disease, on dialysis Tuesday Thursday and Saturday  - Nephrology following, continuing dialysis while in the hospital  - Continue home bumex, oral bicarbonate     Atrial fibrillation.   -PTA on Coumadin, INR was 1 on admission  -Coumadin resumed, pharmacy consulted for dosing  -Per report patient went into RVR with heart rate in the 200s per nursing while at dialysis  -Stat EKG ordered  -As needed IV metoprolol for RVR  -Monitor in telemetry  -Continue PTA oral metoprolol    Essential hypertension.   Home clonidine, lisinopril, metoprolol, as needed hydralazine  -Blood pressure slightly on the higher side but patient is due for getting dialysis today.  -Monitor.     Type 2 diabetes.   Hypoglycemia  -PTA on Lantus 15  units at bedtime, patient was on his PTA insulin regimen, blood sugar earlier this morning was in the 40s  -Decrease Lantus to 5 units, continue with Premeal insulin and medium dose sliding scale insulin  -Monitor and adjust medication as needed    Anemia due to chronic kidney disease   Hyperlipidemia. Home Zetia  Coronary artery disease. Aspirin, Zetia   Depression. Home Remeron    Insomnia. Home trazodone    Diet: Renal Diet (dialysis)    DVT Prophylaxis: Pneumatic Compression Devices  Flood Catheter: Not present  Central Lines: None  Cardiac Monitoring: None  Code Status: No CPR- Do NOT Intubate      Disposition Plan      Expected Discharge Date: 08/18/2022,  3:00 PM       1 to 2 days       The patient's care was discussed with the Bedside Nurse and Patient.    Hina Hung MD  Hospitalist Service  [facility]  Securely message with the Vocera Web Console (learn more here)  Text page via [employer] Paging/Directory         Clinically Significant Risk Factors Present on Admission                      ______________________________________________________________________    Interval History   Patient reports his nausea is better.  He however is waiting for dialysis before deciding on going home.  Was hypoglycemic earlier this morning.  Later I was called by nursing that patient's heart rate was in the 200s while at dialysis.  Patient apparently was asymptomatic.    Data reviewed today: I reviewed all medications, new labs and imaging results over the last 24 hours.    Physical Exam   Vital Signs: Temp: 97.9  F (36.6  C) Temp src: Oral BP: (!) 133/94 Pulse: 90   Resp: 16 SpO2: 95 % O2 Device: None (Room air)    Weight: 195 lbs 0 oz  Exam:  Constitutional: Awake, alert and no distress. Appears comfortable  Head: Normocephalic. No masses, lesions, tenderness or abnormalities  ENT: ENT exam normal, no neck nodes or sinus tenderness  Cardiovascular: Irregular, tachycardic, 2+ murmurs, no rubs or  JVD  Respiratory: Normal WOB,b/l equal air entry, no wheezes or crackles   Gastrointestinal: Abdomen soft, non-tender. BS normal. No masses, organomegaly  : Deferred  Extremities : No edema , no clubbing or cyanosis    Neurologic: Cranial nerves II-XII grossly intact , power symmetrical, Reflexes normal and symmetric. Sensation grossly WNL.      Data   Recent Labs   Lab 08/17/22  2058 08/17/22  1654 08/17/22  1222 08/17/22  0843 08/17/22  0636 08/16/22  1415 08/16/22  1118 08/16/22  0910   WBC  --   --   --   --  7.7  --   --  13.1*   HGB  --   --   --   --  9.8*  --   --  10.4*   MCV  --   --   --   --  100  --   --  98   PLT  --   --   --   --  299  --   --  367   INR  --   --   --   --  1.08  --   --  1.08   NA  --   --   --   --  139  --   --  143   POTASSIUM  --   --   --   --  3.7  --   --  3.5   CHLORIDE  --   --   --   --  103  --   --  103   CO2  --   --   --   --  27  --   --  30   BUN  --   --   --   --  47*  --   --  91*   CR  --   --   --   --  3.67*  --   --  5.78*   ANIONGAP  --   --   --   --  9  --   --  10   LC  --   --   --   --  7.0*  --   --  7.3*   * 284* 242*   < > 280*   < >  --  218*   ALBUMIN  --   --   --   --  2.2*  --   --  2.5*   PROTTOTAL  --   --   --   --  5.3*  --  6.2* 6.6*   BILITOTAL  --   --   --   --  0.6  --   --  0.5   ALKPHOS  --   --   --   --  259*  --   --  366*   ALT  --   --   --   --  44  --   --  53   AST  --   --   --   --  34  --   --  33    < > = values in this interval not displayed.     No results found for this or any previous visit (from the past 24 hour(s)).  Medications       - MEDICATION INSTRUCTIONS for Dialysis Patients -   Does not apply See Admin Instructions     aspirin  81 mg Oral At Bedtime     bumetanide  2 mg Oral 2 times per day on Sun Mon Wed Fri     cloNIDine  0.1 mg Oral BID     cyclobenzaprine  10 mg Oral BID     ezetimibe  10 mg Oral QAM     folic acid  1 mg Oral QAM     insulin aspart  1-7 Units Subcutaneous TID AC     insulin  aspart  1-5 Units Subcutaneous At Bedtime     insulin aspart  5 Units Subcutaneous TID w/meals     insulin glargine  15 Units Subcutaneous At Bedtime     lisinopril  20 mg Oral Daily     metoprolol tartrate  100 mg Oral BID     mirtazapine  15 mg Oral At Bedtime     - MEDICATION INSTRUCTIONS -   Does not apply Once     pantoprazole  40 mg Oral BID AC     piperacillin-tazobactam  2.25 g Intravenous Q8H     sodium chloride (PF)  3 mL Intracatheter Q8H     traZODone  150 mg Oral At Bedtime     vancomycin place cespedes - receiving intermittent dosing  1 each Intravenous See Admin Instructions

## 2022-08-18 NOTE — PLAN OF CARE
Dialysis patient , no urine output, +1 pitting edema both lower extremities, blood sugar check with meals and bed time, siding scale with coverage. Fistula on Left arm. Negative for MRSA, Possible discharge tomorrow.

## 2022-08-18 NOTE — PLAN OF CARE
A/Ox4. VSS on RA. Up 1-A w/ GB and walker. Mild dyspnea w/ exertion still. Denies pain when asked. Dialysis pt, he was dialyzed for 3.5L today. Now on tele, A-fib w/ CVR. Good appetite. Sacral Mepilex in place, has red blanchable coccyx/sacral area. Sepsis BPA fired this afternoon, protocol done. Has lactic acid of 1.3

## 2022-08-18 NOTE — PROGRESS NOTES
Potassium   Date Value Ref Range Status   08/18/2022 3.6 3.4 - 5.3 mmol/L Final     Hemoglobin   Date Value Ref Range Status   08/18/2022 10.5 (L) 13.3 - 17.7 g/dL Final     Creatinine   Date Value Ref Range Status   08/18/2022 4.68 (H) 0.66 - 1.25 mg/dL Final     Urea Nitrogen   Date Value Ref Range Status   08/18/2022 51 (H) 7 - 30 mg/dL Final     Sodium   Date Value Ref Range Status   08/18/2022 139 133 - 144 mmol/L Final     INR   Date Value Ref Range Status   08/18/2022 1.11 0.85 - 1.15 Final       DIALYSIS PROCEDURE NOTE  Hepatitis status of previous patient on machine log was checked and verified ok to use with this patients hepatitis status.  Patient dialyzed for 3.5 hrs. on a K3 bath with a net fluid removal of  3L.  A BFR of 350 ml/min was obtained via a LAVG using 15 gauge needles.      The treatment plan was discussed with Dr. Chaparro during the treatment.    Total heparin received during the treatment: 0 units.   Needle cannulation sites held x 10 min.     Meds  given: None   Complications: None. Upon arrival to unit Pt. HR on monitor with 3 lead and was noted to be >200bpm. Pt. Stated he was feeling well and denied any discomfort. Pt. Primary nurse notified. New EKG leads placed with 5 lead and pt. HR noted to fluctuate between 80-100bpm after new leads placed.     Person educated: Patient. Knowledge base Moderate. Barriers to learning: None. Educated on Procedure via Verbal mode. Patient verbalized understanding. Pt prefers Verbal education style.     ICEBOAT? Timeout performed pre-treatment  I: Patient was identified using 2 identifiers  C:  Consent Signed Yes  E: Equipment preventative maintenance is current and dialysis delivery system OK to use  B: Hepatitis B Surface Antigen: Negative; Draw Date: 08/09/2022      Hepatitis B Surface Antibody: Unknown, results pending  O: Dialysis orders present and complete prior to treatment  A: Vascular access verified and assessed prior to treatment  T:  Treatment was performed at a clinically appropriate time  ?: Patient was allowed to ask questions and address concerns prior to treatment  See Adult Hemodialysis flowsheet in EPIC for further details and post assessment.  Machine water alarm in place and functioning. Transducer pods intact and checked every 15min.   Pt returned via Bed.  Chlorine/Chloramine water system checked every 4 hours.  Outpatient Dialysis at [facility]      Post treatment report given to CASI Trujillo RN regarding 3L of fluid removed, last BP of 147/87, and patient pain rating of 0/10.     Please remove patient dressing on AVF and AVG needle sites 24 hours after dialysis. If leaking occurs please apply a Band-Aid.

## 2022-08-18 NOTE — PROGRESS NOTES
[facility]  Gastroenterology Progress Note     Gordon Gann MRN# [id number]   YOB: 1945 Age: 77 year old          Assessment and Plan:   Gordon Gann is a pleasant 77 year old male with PMHx of ESRD on dialysis, atrial fibrillation, CAD on coumadin whom presented with nausea and vomiting and diagnosed with pneumonia and a pleural effussion.     Nausea and vomiting  Occurred with coughing and now has no nausea or vomiting- tolerated diet yesterday and today  Likely due to cough associated with pleural effusion and pneumonia     --continue current diet  -- Monitor nausea and vomiting and offer prn antiemetics  -- If has recurrent vomiting could consider EGD     Colon wall thickening  Has had one episode of none bloody diarrhea in last week and no stool in last 24 hours  Denies abdominal pain  Last colonoscopy 10 years ago  Findings on CT may represent unlikely colitis vs neoplasm vs hemorrhoid or artifact  -- No plans for colonoscopy at this time given lack of symptoms and concurrently being treated for pneumonia and pleural effusion  -- Would recommend to consider a colonoscopy as an outpatient as past due for screening and would like to r/o neoplastic cause to wall thickening seen on CT.--GI will sign off  -- GI will sign off and see as an outpatient              Interval History:   no new complaints, doing well and doing well; no cp, sob, n/v/d, or abd pain.              Review of Systems:   C: NEGATIVE for fever, chills, change in weight  E/M: NEGATIVE for ear, mouth and throat problems  R: NEGATIVE for significant cough or SOB  CV: NEGATIVE for chest pain, palpitations or peripheral edema             Medications:   I have reviewed this patient's current medications    - MEDICATION INSTRUCTIONS for Dialysis Patients -   Does not apply See Admin Instructions     sodium chloride 0.9%  250 mL Intravenous Once in dialysis/CRRT     sodium chloride 0.9%  300 mL Hemodialysis  Machine Once     aspirin  81 mg Oral At Bedtime     bumetanide  2 mg Oral 2 times per day on Sun Mon Wed Fri     cloNIDine  0.1 mg Oral BID     cyclobenzaprine  10 mg Oral BID     ezetimibe  10 mg Oral QAM     folic acid  1 mg Oral QAM     insulin aspart  1-7 Units Subcutaneous TID AC     insulin aspart  1-5 Units Subcutaneous At Bedtime     insulin aspart  5 Units Subcutaneous TID w/meals     insulin glargine  15 Units Subcutaneous At Bedtime     lisinopril  20 mg Oral Daily     metoprolol tartrate  100 mg Oral BID     mirtazapine  15 mg Oral At Bedtime     - MEDICATION INSTRUCTIONS -   Does not apply Once     - MEDICATION INSTRUCTIONS -   Does not apply Once     pantoprazole  40 mg Oral BID AC     piperacillin-tazobactam  2.25 g Intravenous Q8H     sodium chloride (PF)  3 mL Intracatheter Q8H     traZODone  150 mg Oral At Bedtime                  Physical Exam:   Vitals were reviewed  Vital Signs with Ranges  Temp:  [97.4  F (36.3  C)-98.3  F (36.8  C)] 97.7  F (36.5  C)  Pulse:  [] 126  Resp:  [16] 16  BP: (129-173)/() 129/64  SpO2:  [94 %-98 %] 94 %  I/O last 3 completed shifts:  In: 480 [P.O.:480]  Out: -   Constitutional: healthy, alert and no distress   Cardiovascular: negative, PMI normal. No lifts, heaves, or thrills. RRR. No murmurs, clicks gallops or rub  Respiratory: negative, Percussion normal. Good diaphragmatic excursion. Lungs clear  Abdomen: Abdomen soft, non-tender. BS normal. No masses, organomegaly           Data:   I reviewed the patient's new clinical lab test results.   Recent Labs   Lab Test 08/18/22  0716 08/17/22  0636 08/16/22  0910   WBC 7.5 7.7 13.1*   HGB 10.5* 9.8* 10.4*   MCV 99 100 98    299 367   INR 1.11 1.08 1.08     Recent Labs   Lab Test 08/18/22  0716 08/17/22  0636 08/16/22  0910   POTASSIUM 3.6 3.7 3.5   CHLORIDE 107 103 103   CO2 26 27 30   BUN 51* 47* 91*   ANIONGAP 6 9 10     Recent Labs   Lab Test 08/18/22  0716 08/17/22  0636 08/16/22  0910  08/09/21  0850 08/08/21  2250 07/30/21  0730 07/28/21  1354   ALBUMIN 2.1* 2.2* 2.5*   < >  --    < >  --    BILITOTAL 0.8 0.6 0.5   < >  --    < >  --    ALT 39 44 53   < >  --    < >  --    AST 25 34 33   < >  --    < >  --    PROTEIN  --   --   --   --  300 *  --  300 *    < > = values in this interval not displayed.       I reviewed the patient's new imaging results.    All laboratory data reviewed  All imaging studies reviewed by me.    Binta Braun PA-C,  8/18/2022  [facility]  Office : [phone]  Cell: [phone] (Dr. Scott)  Cell: [phone] (Binta Braun PA-C)

## 2022-08-18 NOTE — PLAN OF CARE
Goal Outcome Evaluation:    Plan of Care Reviewed With: patient   BP (!) 133/94 (BP Location: Right arm)   Pulse 90   Temp 97.9  F (36.6  C) (Oral)   Resp 16   Wt 88.5 kg (195 lb)   SpO2 95%   BMI 29.65 kg/m      Pt here with pleural effusion, pneumonia & ESRD (end stage renal disease) on dialysis. A&O x4. CMS intact. VS slightly hypertensive on RA, but within parameters SBP<180. Renal (dialysis) diet, takes pills whole with thin liquids. R. PIV SL, fistula on L. arm CDI. Continent of bowel. SBA/GB/W. Denies pain. Discharge pending.

## 2022-08-19 NOTE — PROVIDER NOTIFICATION
MD Notification    Notified Person: MD    Notified Person Name: Negro Corral    Notification Date/Time: 8/19/22 0100    Notification Interaction: Paged     Purpose of Notification: Pt IV went bad, pt refused getting a new one, on TELE, on IV abx  can we switch to oral?    Orders Received: Yes    Comments: switch to PO amoxicillin-clavulanate.

## 2022-08-19 NOTE — PLAN OF CARE
Goal Outcome Evaluation:    Plan of Care Reviewed With: patient   BP (!) 149/90 (BP Location: Right arm)   Pulse 88   Temp 97.8  F (36.6  C) (Oral)   Resp 16   Wt 88.5 kg (195 lb)   SpO2 96%   BMI 29.65 kg/m      Pt here with pleural effusion, pneumonia & ESRD (end stage renal disease) on dialysis. A&O x4. CMS intact. VS slightly hypertensive on RA, but within parameters SBP<180. Tele NSR. Renal (dialysis) diet, takes pills whole with thin liquids. Fistula on L. arm CDI, loss of IV access refused a new one MD notified. Pt refused IV abx, MD switched abx to PO. Continent of bowel. SBA/GB/W. Denies pain. Discharge pending.

## 2022-08-19 NOTE — PLAN OF CARE
Goal Outcome Evaluation:  Pt discharging back to AL facility with family. Discharge medications and instructions reviewed with patient. No IV, patient belongings accounted for. PCP appointments scheduled and reviewed.

## 2022-08-19 NOTE — DISCHARGE SUMMARY
[facility]    Discharge Summary  Hospitalist    Date of Admission:  8/16/2022  Date of Discharge:  8/19/2022  Discharging Provider: Melissa Sanon,     Discharge Diagnoses   HCAP vs possible aspiration pneumonia, organism unspecified  R transudative pleural effusion s/p thoracentesis on 8/16/22  Nausea, dt uremia vs HCAP vs cough: Improved  Rectosigmoid thickening  ESRD on HD T/Th/Sa  Afib, on chronic anticoagulation with warfarin  CAD  Hypertension  Hyperlipidemia  DM II  Chronically elevated Alk Phos  Anemia of chronic disease  Depression  Insomnia    History of Present Illness   Gorodn Gann is a 77 year old male with PMHx of ESRD on HD, DM II, afib on chronic anticoagulation with warfarin and chronic anemia who was admitted on 8/16/2022 with nausea/vomiting and findings of CAP with large R sided pleural effusion.     Hospital Course   Gordon Gann was admitted on 8/16/2022.  The following problems were addressed during his hospitalization:    HCAP vs possible aspiration pneumonia, organism unspecified  R Transudative pleural effusion s/p thoracentesis on 8/16/22  * Presented to ED for evaluation of nausea/vomiting. Denied fever/chills, cp/sob/cough.   * In ED, was afebrile and VSS. Briefly required 2L NC for O2 sats 88% but was quickly weaned back to RA. Labs notable for WBC 13,  Lactate nl. CXR showed a moderate to large R pleural effusion with associated atelectasis/infiltrate. CT chest/abd/pelvis also obtained dt reports of nausea/vomiting and showed a large R effusion with compressive atelectasis/infiltrate. Started on treatment for HCAP with Zosyn and Vanco.   * Underwent R sided thoracentesis on 8/16 with removal of 1.85 pleural fluid. Fluid studies consistent transudative effusion. Pleural fluid cultures was neg.  * Nasal MRSA swab was neg so abx narrowed to Zosyn alone on 8/18.   * Lost IV access on 8/19 so Zosyn changed to Augmentin  * O2 sats remained stable on  RA this stay.  * Recommended repeat CXR on 8/19 for reassessment of effusion after dialysis but patient adamant about discharge home.   * Discharged home on Augmentin.   * Advised to follow up with PCP in the next week.     Nausea, dt uremia vs HCAP vs cough: Improved  * Sx improved with treatment this stay, including treatment of HCAP, thoracentesis and continued diuresis.   * Good appetite on the day of discharge.     Rectosigmoid thickening  * Findings noted on CT.   * Seen by Sonia BLACKMAN this stay. Planning for OP colonoscopy.     ESRD on HD T/Th/Sa  * Nephrology managed diuresis this stay. Continued on T/Th/Sa sched.   * Conts on Bumex 2mg BID on non-dialysis days and oral bicarb as per prior to admission     Afib, on chronic anticoagulation with warfarin  * INR subtherapeutic at 1 on admission.  * Had been in rate controlled afib this stay. Had run of RVR with HRs into 200s while at dialysis on 8/18. EKG ordered but doesn't appear one was ever obtained. HRs subsequently normalized and no recurrence of tachyarrhythmia.  * At discharge, will cont metoprolol 100mg BID  * Warfarin initially held this stay given need for thoracentesis. Ultimately resumed on 8/18.   * INR remained subtherapeutic this stay. Advised to continue warfarin per home dosing and have next INR check on 8/23 (patient coordinates INR checks with dialysis)     CAD  Hypertension  Hyperlipidemia  * Chronic and stable on home meds including ASA, Zetia, clonidine 0.1mg BID, lisinopril 20mg daily, metoprolol 100mg BID and hydralazine (takes 25mg po q6h prn for SBP >180)     DM II  * A1c 7.4 this stay.   * Home insulin regimen includes: Lantus 15U HS and Novolog 5U TID w/meals  * Insulin continued per home regimen on admission but patient became hypoglycemic with glucose to 40s. Lantus subsequently decreased to 5U daily with improvement.   * Appetite improved during stay. At discharge, recommended to increase Lantus to 8U HS and cont Novolog 5U TID  w/meals.   * Continue to monitor BG and uptitrate insulin at PCP followup.     Chronically elevated Alk Phos  * Noted this stay. Not a new issue for patient. Has been elevated in the past.   * Follow up with PCP     Anemia of chronic disease  * Hgb stable at 9-10 this stay. No s/sx of bleeding     Depression  Insomnia  * Chronic and stable on home meds including Trazodone HS and Remeron HS.        Melissa Sanon,     Significant Results and Procedures   R sided thoracentesis on 8/16/22 with removal of 1.85L clear pleural fluid.    Pending Results   These results will be followed up by PCP  Unresulted Labs Ordered in the Past 30 Days of this Admission     Date and Time Order Name Status Description    8/16/2022 12:28 PM Pleural fluid Aerobic Bacterial Culture Routine with Gram Stain Preliminary     8/16/2022 10:35 AM Blood Culture Peripheral Blood Preliminary     8/16/2022 10:35 AM Blood Culture Peripheral Blood Preliminary           Code Status   DNR / DNI       Primary Care Physician   Belen Prieto    Physical Exam   Temp: 98.9  F (37.2  C) Temp src: Oral BP: (!) 148/72 Pulse: 85   Resp: 18 SpO2: 91 % O2 Device: None (Room air)    Vitals:    08/16/22 0915   Weight: 88.5 kg (195 lb)     Vital Signs with Ranges  Temp:  [97.7  F (36.5  C)-98.9  F (37.2  C)] 98.9  F (37.2  C)  Pulse:  [80-99] 85  Resp:  [16-18] 18  BP: (136-156)/(72-95) 148/72  SpO2:  [91 %-99 %] 91 %  I/O last 3 completed shifts:  In: 120 [P.O.:120]  Out: 3000 [Other:3000]    General: Resting comfortably, alert, conversive, NAD  CVS: HR irregular but no MGR, +bilateral LE edema  Respiratory: decreased air exchange at R base but otherwise CTA, no wheeze/rales/rhonchi, no increased work of breathing  GI: S, NT, ND, +BS  Skin: Warm/dry  Neuro: CNs 2-12 intact, no focal motor/sensory deficits    Discharge Disposition   Discharged to home  Condition at discharge: Stable    Consultations This Hospital Stay   NEPHROLOGY IP  CONSULT  GASTROENTEROLOGY IP CONSULT  -----------------------------------------  CARE MANAGEMENT / SOCIAL WORK IP CONSULT  PHYSICAL THERAPY ADULT IP CONSULT  VASCULAR ACCESS ADULT IP CONSULT  PHARMACY TO DOSE WARFARIN    Time Spent on this Encounter   IMelissa DO, personally saw the patient today and spent greater than 30 minutes discharging this patient.    Discharge Orders      Reason for your hospital stay    Management of your pneumonia and pleural f     Follow-up and recommended labs and tests     Continue dialysis per routine on Saturday.   Next INR check on 8/23/22  Follow up with your PCP in the next week for routine hospital followup.     Activity    Your activity upon discharge: activity as tolerated     Diet    Follow this diet upon discharge: Renal Diet (dialysis)     Discharge Medications   Current Discharge Medication List      START taking these medications    Details   amoxicillin-clavulanate (AUGMENTIN) 500-125 MG tablet Take 1 tablet by mouth every 24 hours for 5 days  Qty: 5 tablet, Refills: 0    Associated Diagnoses: Pneumonia due to infectious organism, unspecified laterality, unspecified part of lung         CONTINUE these medications which have CHANGED    Details   insulin glargine (LANTUS PEN) 100 UNIT/ML pen Inject 8 Units Subcutaneous At Bedtime  Qty: 15 mL, Refills: 0    Comments: If Lantus is not covered by insurance, may substitute Basaglar or Semglee or other insulin glargine product per insurance preference at same dose and frequency.    Associated Diagnoses: Diabetic ketoacidosis without coma associated with type 2 diabetes mellitus (H)         CONTINUE these medications which have NOT CHANGED    Details   acetaminophen (TYLENOL) 500 MG tablet Take 2 tablets (1,000 mg) by mouth 3 times daily    Associated Diagnoses: Hemarthrosis      aspirin 81 MG EC tablet Take 1 tablet (81 mg) by mouth At Bedtime Starting 12/12    Associated Diagnoses: Atrial flutter with rapid  ventricular response (H)      bumetanide (BUMEX) 2 MG tablet Take 1 tablet (2 mg) by mouth 2 times daily On non dialysis days    Associated Diagnoses: Hypertension, unspecified type      cloNIDine (CATAPRES) 0.1 MG tablet Take 1 tablet (0.1 mg) by mouth 2 times daily    Associated Diagnoses: Hypertension, unspecified type      cyclobenzaprine (FLEXERIL) 10 MG tablet Take 1 tablet (10 mg) by mouth 3 times daily    Associated Diagnoses: Hemarthrosis      ezetimibe (ZETIA) 10 MG tablet Take 10 mg by mouth every morning      fish oil-omega-3 fatty acids 1000 MG capsule Take 1 g by mouth every morning      folic acid (FOLVITE) 1 MG tablet Take 1 mg by mouth every morning      hydrALAZINE (APRESOLINE) 25 MG tablet Take 1 tablet (25 mg) by mouth every 6 hours as needed (SBP >180)  Qty: 30 tablet, Refills: 0    Associated Diagnoses: Diabetic ketoacidosis without coma associated with type 2 diabetes mellitus (H)      insulin aspart (NOVOLOG PEN) 100 UNIT/ML pen Inject 5 Units Subcutaneous 3 times daily (with meals)  Qty: 15 mL, Refills: 0    Associated Diagnoses: Diabetic ketoacidosis without coma associated with type 2 diabetes mellitus (H)      lisinopril (ZESTRIL) 20 MG tablet Take 1 tablet (20 mg) by mouth daily    Associated Diagnoses: Hypertension, unspecified type      melatonin 3 MG tablet Take 1 tablet (3 mg) by mouth At Bedtime    Associated Diagnoses: Insomnia, unspecified type      metoprolol tartrate (LOPRESSOR) 100 MG tablet Take 1 tablet (100 mg) by mouth 2 times daily    Associated Diagnoses: Hypertension, unspecified type      mirtazapine (REMERON) 15 MG tablet Take 1 tablet (15 mg) by mouth At Bedtime  Qty: 10 tablet, Refills: 0    Associated Diagnoses: Insomnia, unspecified type      SODIUM BICARBONATE PO Take 1 tablet by mouth 2 times daily      traZODone (DESYREL) 50 MG tablet Take 1 tablet (50 mg) by mouth At Bedtime    Associated Diagnoses: Insomnia, unspecified type      vitamin B complex with vitamin  C (STRESS TAB) tablet Take 1 tablet by mouth every morning      Vitamin D, Cholecalciferol, 25 MCG (1000 UT) CAPS Take 1,000 Units by mouth every morning      !! warfarin ANTICOAGULANT (COUMADIN) 5 MG tablet Take 1 tablet (5 mg) by mouth daily except Fridays - start on 12/12    Associated Diagnoses: Atrial flutter with rapid ventricular response (H)      !! warfarin ANTICOAGULANT (COUMADIN) 7.5 MG tablet Take 1 tablet (7.5 mg) by mouth once a week every Friday Take 7.5 mg by mouth every Friday starting 12/17    Associated Diagnoses: Atrial flutter with rapid ventricular response (H)      diclofenac (VOLTAREN) 1 % topical gel Apply 2 g topically 4 times daily as needed for moderate pain    Associated Diagnoses: Hemarthrosis      HYDROmorphone (DILAUDID) 2 MG tablet Take 1 tablet (2 mg) by mouth every 3 hours as needed for moderate to severe pain  Qty: 30 tablet, Refills: 0    Associated Diagnoses: Hemarthrosis      senna-docusate (SENOKOT-S/PERICOLACE) 8.6-50 MG tablet Take 1 tablet by mouth daily    Associated Diagnoses: Hemarthrosis       !! - Potential duplicate medications found. Please discuss with provider.        Allergies   No Known Allergies     Data   Most Recent 3 CBC's:Recent Labs   Lab Test 08/19/22  0636 08/18/22  0716 08/17/22  0636   WBC 6.5 7.5 7.7   HGB 9.8* 10.5* 9.8*   MCV 96 99 100    334 299      Most Recent 3 BMP's:  Recent Labs   Lab Test 08/19/22  1218 08/19/22  0759 08/19/22  0636 08/18/22  0731 08/18/22  0716 08/17/22  0843 08/17/22  0636   NA  --   --  139  --  139  --  139   POTASSIUM  --   --  3.7  --  3.6  --  3.7   CHLORIDE  --   --  103  --  107  --  103   CO2  --   --  29  --  26  --  27   BUN  --   --  35*  --  51*  --  47*   CR  --   --  3.73*  --  4.68*  --  3.67*   ANIONGAP  --   --  7  --  6  --  9   LC  --   --  7.3*  --  7.5*  --  7.0*   * 121* 117*   < > 47*   < > 280*    < > = values in this interval not displayed.     Most Recent 2 LFT's:  Recent Labs   Lab  Test 08/19/22  0636 08/18/22  0716   AST 22 25   ALT 32 39   ALKPHOS 213* 238*   BILITOTAL 0.5 0.8     Most Recent INR's and Anticoagulation Dosing History:  Anticoagulation Dose History     Recent Dosing and Labs Latest Ref Rng & Units 12/24/2021 12/29/2021 7/30/2022 8/16/2022 8/17/2022 8/18/2022 8/19/2022    Warfarin 7.5 mg - - - - - - 7.5 mg -    INR 0.85 - 1.15 2.81(H) 2.31(H) 1.81(H) 1.08 1.08 1.11 1.14        Most Recent TSH, T4 and A1c Labs:  Recent Labs   Lab Test 08/16/22  0910 07/31/21  1445 07/28/21  1237   TSH  --   --  2.58   A1C 7.4*   < > 11.7*    < > = values in this interval not displayed.     Results for orders placed or performed during the hospital encounter of 08/16/22   XR Chest 2 Views    Narrative    CHEST TWO VIEWS  8/16/2022 9:28 AM     HISTORY: Chest discomfort.    COMPARISON: October 7, 2021       Impression    IMPRESSION: Moderate to large right effusion with associated  compressive atelectasis and/or infiltrate increased from previous.  Left lung grossly clear. The cardiac silhouette is not enlarged.  Pulmonary vasculature is unremarkable.     GISELE LOVE MD         SYSTEM ID:  [id number]   CT Chest Abdomen Pelvis w/o Contrast    Narrative    CT CHEST, ABDOMEN, PELVIS WITHOUT CONTRAST 8/16/2022 10:08 AM    CLINICAL HISTORY: Possible PNA, vomiting and abdominal pain.    TECHNIQUE: CT scan of the chest, abdomen, and pelvis was performed  without IV contrast. Multiplanar reformats were obtained. Dose  reduction techniques were used.     CONTRAST: None    COMPARISON: None.    FINDINGS:   LUNGS AND PLEURA: Large right effusion with associated compressive  atelectasis and or infiltrate. Left lung clear. No left effusion.    MEDIASTINUM/AXILLAE: No adenopathy by size criteria demonstrated in  the absence of contrast. 1.5 cm left thyroid nodule. No aneurysm.    CORONARY ARTERY CALCIFICATIONS: Severe.    HEPATOBILIARY: No significant mass or bile duct dilatation. No  calcified gallstones.      PANCREAS: No significant mass, duct dilatation, or inflammatory  change.    SPLEEN: Normal size.    ADRENAL GLANDS: No significant nodules.    KIDNEYS/BLADDER: No significant mass, stones, or hydronephrosis.    BOWEL: Possible bowel wall thickening in the rectosigmoid colon which  could represent an uncomplicated colitis. No obstruction.    PELVIC ORGANS: No pelvic masses.    ADDITIONAL FINDINGS: There are extensive atherosclerotic changes of  the visualized aorta and its branches. There is no evidence of aortic  aneurysm. No free air or free fluid.    MUSCULOSKELETAL: No frankly destructive bony lesions.      Impression    IMPRESSION:    1. Large right effusion with associated compressive atelectasis and or  infiltrate.  2. Possible bowel wall thickening in the rectosigmoid colon, difficult  to be certain of pathologic wall thickening given its decompressed  state. Clinical correlation needed for symptoms of colitis.    GISELE LOVE MD         SYSTEM ID:  [id number]   US Thoracentesis    Narrative    EXAM:  1. RIGHT THORACENTESIS  2. ULTRASOUND GUIDANCE  LOCATION: [facility]  DATE/TIME: 8/16/2022 1:47 PM    INDICATION: Pleural effusion.    PROCEDURE: Informed consent obtained. Time out performed. The chest  was prepped and draped in a sterile fashion. 10 mL of 1% lidocaine was  infused into local soft tissues. A 5 Syriac catheter system was  introduced into the pleural effusion under ultrasound guidance.    1.85 liters of clear fluid were removed and sent to lab if requested.    Patient tolerated procedure well.    Ultrasound images have been permanently captured for documentation.      Impression    IMPRESSION:  Status post ultrasound-guided right thoracentesis.    JHOAN YUAN MD         SYSTEM ID:  [id number]

## 2022-08-19 NOTE — PROGRESS NOTES
Care Management Discharge Note    Discharge Date: 08/19/2022       Discharge Disposition: Home    Discharge Services:      Discharge DME:      Discharge Transportation: family or friend will provide    Private pay costs discussed: Not applicable    PAS Confirmation Code:    Patient/family educated on Medicare website which has current facility and service quality ratings:      Education Provided on the Discharge Plan:    Persons Notified of Discharge Plans: Patient  Patient/Family in Agreement with the Plan: yes    Handoff Referral Completed: Yes    Additional Information:  Discharge plans in progress.  Patient will discharge home today. Son in law to pick him up.  Writer made PCP appointment and coumadin clinic appointment.  Added to AVS.  Writer called [facility] and faxed discontinue order and summary per their request.  [facility]        Babs Molina RN

## 2022-08-19 NOTE — CONSULTS
Care Management Initial Consult    General Information  Assessment completed with: Patient,    Type of CM/SW Visit: Initial Assessment    Primary Care Provider verified and updated as needed: Yes   Readmission within the last 30 days:        Reason for Consult: discharge planning  Advance Care Planning:            Communication Assessment  Patient's communication style: spoken language (English or Bilingual)    Hearing Difficulty or Deaf: no   Wear Glasses or Blind: no    Cognitive  Cognitive/Neuro/Behavioral: WDL                      Living Environment:   People in home:       Current living Arrangements:        Able to return to prior arrangements:         Family/Social Support:  Care provided by:    Provides care for:    Marital Status: Single  Children          Description of Support System: Supportive, Involved    Support Assessment: Adequate family and caregiver support    Current Resources:   Patient receiving home care services:       Community Resources:    Equipment currently used at home: walker, rolling  Supplies currently used at home:      Employment/Financial:  Employment Status: retired        Financial Concerns:             Lifestyle & Psychosocial Needs:  Social Determinants of Health     Tobacco Use: Low Risk      Smoking Tobacco Use: Never Smoker     Smokeless Tobacco Use: Never Used   Alcohol Use: Not on file   Financial Resource Strain: Not on file   Food Insecurity: Not on file   Transportation Needs: Not on file   Physical Activity: Not on file   Stress: Not on file   Social Connections: Not on file   Intimate Partner Violence: Not on file   Depression: Not on file   Housing Stability: Not on file       Functional Status:  Prior to admission patient needed assistance:              Mental Health Status:          Chemical Dependency Status:                Values/Beliefs:  Spiritual, Cultural Beliefs, [employer] Practices, Values that affect care:                 Additional Information:  Writer  met with patient at bedside and introduced self and role in discharge plans.  Patient has HD at [facility].  He goes to the [facility] clinic for his INR draws, the draws are ordered for HD days.  Patient is not currently open to home health services.  Patient will set up his own ride to dialysis for 8/20 ([employer]).  Writer called [facility] ([phone])to let them know patient will need dialysis tomorrow. They would like the discharge order and med list faxed to [phone].       Babs Molina RN

## 2022-08-19 NOTE — PROVIDER NOTIFICATION
Paged Dr. Sanon about patient wanting to discharge and refusing interventions and upset about nothing being done for him.     Dr. Sanon to come and assess patient and see what can be done about him discharging today.

## 2022-08-19 NOTE — PROGRESS NOTES
X-cover    Pt lost IV, refusing new one. On zosyn- note plans to change to oral at discharge. Will discontinue zosyn, start augmentin    Negro Walker MD

## 2022-08-19 NOTE — DISCHARGE INSTRUCTIONS
[facility] appointments:  INR clinic in [place] 8/23 at 10:00 AM  Primary CLinic in [place]: Hospital follow up appointment with Dr. Helm at 4:15 PM on 8/25.

## 2022-08-21 NOTE — PROGRESS NOTES
Clinic Care Coordination Contact  [facility]/Voicemail       Clinical Data: Care Coordinator Outreach  Outreach attempted x 2.  Left message on patient's voicemail with call back information and requested return call.  Plan: Care Coordinator will do no further outreaches at this time.            PATRICE Rothman  [phone]  [facility] Resource [facility]

## 2022-08-28 PROBLEM — I48.92 ATRIAL FLUTTER WITH RAPID VENTRICULAR RESPONSE (H): Status: ACTIVE | Noted: 2021-10-04

## 2022-08-28 PROBLEM — I48.0 PAROXYSMAL ATRIAL FIBRILLATION (H): Status: ACTIVE | Noted: 2022-01-01

## 2022-08-28 NOTE — ED PROVIDER NOTES
History     Chief Complaint:  Shortness of breath     The history is provided by the patient.      Gordon Gann is a 77-year-old male with a history of type two diabetes mellitus (insulin-dependent), a-fib anticoagulated on warfarin, and STEMI who presents to the emergency department for evaluation of shortness of breath. Two weeks ago the patient had a right lung pulmonary effusion. The patient reports he has been having abnormal blood sugar levels for the last few weeks ranging from the 40s to the 500s. He endorses his last low glucose level was a week ago. He has also been struggling with dyspnea and increased weakness since this time. He typically has dialysis on Tuesday, Thursday, and Saturday. However, Lai did not receive his dialysis yesterday. He has a fistula placed in his left arm. Yesterday, he sought medical treatment at an emergency department in [place], [place]. During that encounter, they obtained a chest x-ray showing another nonemergent pleural effusion. He was discharged and instructed to follow up. His symptoms persisted, prompting his visit today. Here, he notes he is not having chest pain, abdominal pain, nausea, vomiting, or diarrhea.     Review of Systems   Respiratory: Positive for shortness of breath.    Cardiovascular: Negative for chest pain.   Gastrointestinal: Negative for abdominal pain, diarrhea, nausea and vomiting.   Neurological: Positive for weakness.   All other systems reviewed and are negative.    Allergies:  The patient has no known allergies on file.     Medications:    acetaminophen (TYLENOL) 500 MG tablet  aspirin 81 MG EC tablet  bumetanide (BUMEX) 2 MG tablet  cloNIDine (CATAPRES) 0.1 MG tablet  cyclobenzaprine (FLEXERIL) 10 MG tablet  diclofenac (VOLTAREN) 1 % topical gel  ezetimibe (ZETIA) 10 MG tablet  fish oil-omega-3 fatty acids 1000 MG capsule  folic acid (FOLVITE) 1 MG tablet  hydrALAZINE (APRESOLINE) 25 MG tablet  HYDROmorphone (DILAUDID) 2 MG  "tablet  insulin aspart (NOVOLOG PEN) 100 UNIT/ML pen  insulin glargine (LANTUS PEN) 100 UNIT/ML pen  lisinopril (ZESTRIL) 20 MG tablet  melatonin 3 MG tablet  metoprolol tartrate (LOPRESSOR) 100 MG tablet  mirtazapine (REMERON) 15 MG tablet  senna-docusate (SENOKOT-S/PERICOLACE) 8.6-50 MG tablet  SODIUM BICARBONATE PO  traZODone (DESYREL) 50 MG tablet  vitamin B complex with vitamin C (STRESS TAB) tablet  Vitamin D, Cholecalciferol, 25 MCG (1000 UT) CAPS  Warfarin    Past Medical History:    Atrial fibrillation  Atrial flutter with rapid ventricular response  DKA  End stage renal disease  Acquired arteriovenous fistula  Hemarthrosis    HTN  Type II diabetes mellitus   Anemia in CKD  Hyperparathyroidism due to renal insufficiency  Gout   Hyperlipidemia   CAD  Idiopathic insomnia   Depression   Melanoma   STEMI    Past Surgical History:    Create graft loop arteriovenous upper   Dialysis fistulogram left   Coronary stent placement  MOHS surgery  Parotidectomy  TURP    Family History:    Heart disease (father)   Cerebral aneurysm (mother)     Social History:  Primary care provider: Belen Prieto    Physical Exam     Patient Vitals for the past 24 hrs:   BP Temp Temp src Pulse Resp SpO2 Height Weight   08/28/22 1753 (!) 161/98 -- -- 94 -- 93 % -- --   08/28/22 1647 (!) 156/93 97.6  F (36.4  C) Temporal 111 22 94 % 1.727 m (5' 8\") 86.2 kg (190 lb)     Physical Exam    General:  Sitting on bed alone.  HENT:  No obvious trauma to head  Right Ear:  External ear normal.   Left Ear:  External ear normal.   Nose:  Nose normal.   Eyes:  Conjunctivae and EOM are normal. Pupils are equal, round, and reactive.   Neck: Normal range of motion. Neck supple. No tracheal deviation present.   CV:  Normal heart sounds. No murmur heard.  Pulm/Chest: Effort normal and breath sounds normal on the left, but diminished lung sounds on the right and positive egophony through the lower two thirds of the right lung.   Abd: Soft. No distension. " There is no tenderness. There is no rigidity, no rebound and no guarding.   M/S: Normal range of motion.   Neuro: Alert. GCS 15.  Skin: Skin is warm and dry. No rash noted. Not diaphoretic.   Psych: Normal mood and affect. Behavior is normal.     Emergency Department Course     Imaging:  XR Chest 2 Views   Preliminary Result   IMPRESSION:    1. There has been interval reaccumulation of the moderate to large size right pleural fluid collection. There is now increased fluid in the minor fissure as compared to the prior study.   2. Left lung remains clear.   3. No pneumothorax.   4. Degenerative changes of the left shoulder again noted.        Report per radiology    Laboratory:  Labs Ordered and Resulted from Time of ED Arrival to Time of ED Departure   BASIC METABOLIC PANEL - Abnormal       Result Value    Sodium 135      Potassium 4.6      Chloride 101      Carbon Dioxide (CO2) 28      Anion Gap 6      Urea Nitrogen 49 (*)     Creatinine 5.14 (*)     Calcium 8.1 (*)     Glucose 227 (*)     GFR Estimate 11 (*)    INR - Abnormal    INR 1.85 (*)    CBC WITH PLATELETS AND DIFFERENTIAL - Abnormal    WBC Count 8.5      RBC Count 3.26 (*)     Hemoglobin 10.3 (*)     Hematocrit 31.4 (*)     MCV 96      MCH 31.6      MCHC 32.8      RDW 15.6 (*)     Platelet Count 293      % Neutrophils 72      % Lymphocytes 18      % Monocytes 8      % Eosinophils 2      % Basophils 0      % Immature Granulocytes 0      NRBCs per 100 WBC 0      Absolute Neutrophils 6.1      Absolute Lymphocytes 1.6      Absolute Monocytes 0.7      Absolute Eosinophils 0.2      Absolute Basophils 0.0      Absolute Immature Granulocytes 0.0      Absolute NRBCs 0.0     TROPONIN I - Normal    Troponin I High Sensitivity 38     COVID-19 VIRUS (CORONAVIRUS) BY PCR     Emergency Department Course:    Reviewed:  I reviewed nursing notes, vitals, past medical history and Care Everywhere    Assessments:  1731 I obtained history and examined the patient as noted above.    1824 I rechecked the patient and explained findings.     Consults:   1812  I spoke with Dr Pathak with the Hospitalist Service on the phone.     Disposition:  The patient was admitted to the hospital under the care of Dr. Pathak.     Impression & Plan    Medical Decision Making:  Gordon Gann is a very pleasant 77 year old year old patient who presents to the emergency department with concern of shortness of breath.  It is mainly with exertion.  He is not hypoxic at rest.  The patient was seen in a [place], [place] emergency department.  He had a x-ray at that time that showed a pleural effusion.  Since then he has had worsening exertional dyspnea.  He is on warfarin for A. fib and slightly subtherapeutic.  Repeat chest x-ray does show a pleural effusion.  I suspect this is the etiology of his exertional dyspnea.  He recently had a thoracentesis a few weeks ago and today's chest x-ray is worse than that.  Additionally, the patient is hemodialysis dependent on Tuesday, Thursday and Saturday and he missed his Saturday dialysis.  Fortunately, his potassium is normal and he does not require emergent dialysis at this time.  He has not had any chest pain to suggest ACS.  The patient mentioned his blood sugars have been both high and low recently as well, but no episodes of hypoglycemia in the past 48 hours.  His blood sugar is okay at this time.  This can continue to be monitored in the hospital.  He will be admitted to the hospital for continued evaluation and treatment and thoracentesis likely tomorrow.  I spoke to the hospitalist, Dr. Pathak, who has agreed to admit the patient for continued evaluation and treatment.    Diagnosis:    ICD-10-CM    1. Dyspnea, unspecified type  R06.00    2. Pleural effusion  J90      Scribe Disclosure:  Sweta DA SILVA, am serving as a scribe at 5:33 PM on 8/28/2022 to document services personally performed by Andrew Manning DO based on my observations and the provider's  statements to me.      Andrew Manning,   08/28/22 1949

## 2022-08-28 NOTE — ED NOTES
Patient reports he gets dialysis on Tuesday, Thursday, Saturday regularly. Reports he did not get dialysis Saturday 8/27, but he did get dialysis on Thursday 8/25/2022.

## 2022-08-28 NOTE — ED NOTES
[facility]  ED Nurse Handoff Report    ED Chief complaint: No chief complaint on file.      ED Diagnosis:   Final diagnoses:   None       Code Status: DNR / DNI    Allergies: No Known Allergies    Patient Story: Pt c/o increasing fatigue for several days, reports BG have been uncontrolled with highs and lows. Pt reports SOB that is worse today. Hx of pleural effusion. Pt gets dialysis on Tue, Thur, and Sat but missed Saturdays appt.   Focused Assessment:  Pt reports SOB, presents with increased work of breathing.     Labs Ordered and Resulted from Time of ED Arrival to Time of ED Departure   BASIC METABOLIC PANEL - Abnormal       Result Value    Sodium 135      Potassium 4.6      Chloride 101      Carbon Dioxide (CO2) 28      Anion Gap 6      Urea Nitrogen 49 (*)     Creatinine 5.14 (*)     Calcium 8.1 (*)     Glucose 227 (*)     GFR Estimate 11 (*)    INR - Abnormal    INR 1.85 (*)    CBC WITH PLATELETS AND DIFFERENTIAL - Abnormal    WBC Count 8.5      RBC Count 3.26 (*)     Hemoglobin 10.3 (*)     Hematocrit 31.4 (*)     MCV 96      MCH 31.6      MCHC 32.8      RDW 15.6 (*)     Platelet Count 293      % Neutrophils 72      % Lymphocytes 18      % Monocytes 8      % Eosinophils 2      % Basophils 0      % Immature Granulocytes 0      NRBCs per 100 WBC 0      Absolute Neutrophils 6.1      Absolute Lymphocytes 1.6      Absolute Monocytes 0.7      Absolute Eosinophils 0.2      Absolute Basophils 0.0      Absolute Immature Granulocytes 0.0      Absolute NRBCs 0.0     TROPONIN I - Normal    Troponin I High Sensitivity 38     COVID-19 VIRUS (CORONAVIRUS) BY PCR     XR Chest 2 Views    (Results Pending)         Treatments and/or interventions provided: XR, labs  Patient's response to treatments and/or interventions: Pt reports feeling SOB while lying down.     To be done/followed up on inpatient unit:  Per admitting     Does this patient have any cognitive concerns?: AxO x4    Activity level -  "Baseline/Home:  Independent  Activity Level - Current:   Independent    Patient's Preferred language: English   Needed?: No    Isolation: None  Infection: Not Applicable  Patient tested for COVID 19 prior to admission: YES  Bariatric?: No    Vital Signs:   Vitals:    08/28/22 1647 08/28/22 1753   BP: (!) 156/93 (!) 161/98   Pulse: 111 94   Resp: 22    Temp: 97.6  F (36.4  C)    TempSrc: Temporal    SpO2: 94% 93%   Weight: 86.2 kg (190 lb)    Height: 1.727 m (5' 8\")        Cardiac Rhythm:     Was the PSS-3 completed:   Yes  What interventions are required if any?               Family Comments: Wife at bedside   OBS brochure/video discussed/provided to patient/family: N/A              Name of person given brochure if not patient: n/a              Relationship to patient: n/a    For the majority of the shift this patient's behavior was Green.   Behavioral interventions performed were Care and rounding .    ED NURSE PHONE NUMBER: *[phone]         "

## 2022-08-28 NOTE — ED TRIAGE NOTES
"Erratic BGs for past several days. C/O fatigue. CXR while in [place], [place] demonstrated pleural effusion. Admits to \"some shortness of breath\" which has worsened today. Denies CP.     Triage Assessment     Row Name 08/28/22 2140       Triage Assessment (Adult)    Airway WDL WDL       Respiratory WDL    Respiratory WDL WDL       Skin Circulation/Temperature WDL    Skin Circulation/Temperature WDL X  ecchymotic       Cardiac WDL    Cardiac WDL X;rhythm    Cardiac Rhythm tachycardic       Peripheral/Neurovascular WDL    Peripheral Neurovascular WDL WDL       Cognitive/Neuro/Behavioral WDL    Cognitive/Neuro/Behavioral WDL WDL              "

## 2022-08-29 NOTE — UTILIZATION REVIEW
"  Admission Status; Secondary Review Determination         Under the authority of the Utilization Management Committee, the utilization review process indicated a secondary review on the above patient.  The review outcome is based on review of the medical records, discussions with staff, and applying clinical experience noted on the date of the review.        (xxx)      Inpatient Status Appropriate - This patient's medical care is consistent with medical management for inpatient care and reasonable inpatient medical practice.      () Observation Status Appropriate - This patient does not meet hospital inpatient criteria and is placed in observation status. If this patient's primary payer is Medicare and was admitted as an inpatient, Condition Code 44 should be used and patient status changed to \"observation\".   () Admission Status NOT Appropriate - This patient's medical care is not consistent with medical management for Inpatient or Observation Status.          RATIONALE FOR DETERMINATION     Gordon Gann is a 77 year old male with ESRD, Type 2 diabetes, A fib, and recent plerual effusion who was admitted on 8/28/2022 with dyspnea and anorexia. He was recently admitted 8/16-8/19 with similar symptoms and had thoracentesis done but no post-drainage imaging. Now with recurrent accumulation of right sided pleural fluid on admission CXR. He missed dialysis two days ago as he spent weekend in [place] at [facility].  He was admitted close clinical monitoring, IR consultation for thoracentesis, nephrology consultation, and further work up.  It is reasonable to anticipate a hospital stay of at least 2 midnights.  IP status is appropriate.    The severity of illness, intensity of service provided, expected LOS and risk for adverse outcome make the care complex, high risk and appropriate for hospital admission.        The information on this document is developed by the utilization review team in order for the business office to " ensure compliance.  This only denotes the appropriateness of proper admission status and does not reflect the quality of care rendered.         The definitions of Inpatient Status and Observation Status used in making the determination above are those provided in the CMS Coverage Manual, Chapter 1 and Chapter 6, section 70.4.      Sincerely,     Lanny Judge MD  Physician Advisor   Utilization Review/ Case Management  [facility].

## 2022-08-29 NOTE — PHARMACY-ADMISSION MEDICATION HISTORY
Pharmacy Medication History  Admission medication history interview status for the 8/28/2022  admission is complete. See EPIC admission navigator for prior to admission medications     Location of Interview: Patient room  Medication history sources: Patient, Surescripts and Care Everywhere    Significant changes made to the medication list:  Added allopurinol (however patient reports not currently taking)  Removed sodium bicarbonate tablets    In the past week, patient estimated taking medication this percent of the time: greater than 90%    Additional medication history information:   Abbreviated med history done initially with patient; asked if there have been any changes since 8/19 discharge summary. Patient reported no changes and stated he took his morning medications today.     Upon further review, allopurinol is in his dispense history and Care Everywhere and sodium bicarbonate hasn't been filled since mid April.   Patient states he has allopurinol at home and should be taking but he hasn't been lately  Confirmed he hasn't been taking sodium bicarbonate    Medication reconciliation completed by provider prior to medication history? Yes    Time spent in this activity: 30 minutes    Prior to Admission medications    Medication Sig Last Dose Taking? Auth Provider Long Term End Date   acetaminophen (TYLENOL) 500 MG tablet Take 1,000 mg by mouth every 8 hours as needed for mild pain  at PRN Yes Unknown, Entered By History     allopurinol (ZYLOPRIM) 100 MG tablet Take 200 mg by mouth daily Past Month at Unknown time Yes Unknown, Entered By History     aspirin 81 MG EC tablet Take 1 tablet (81 mg) by mouth At Bedtime Starting 12/12 8/27/2022 at PM Yes Fili Ireland MD     bumetanide (BUMEX) 2 MG tablet Take 1 tablet (2 mg) by mouth 2 times daily On non dialysis days 8/28/2022 at AM Yes Fili Ireland MD Yes    cloNIDine (CATAPRES) 0.1 MG tablet Take 1 tablet (0.1 mg) by mouth 2 times daily  8/28/2022 at AM Yes Fili Ireland MD Yes    cyclobenzaprine (FLEXERIL) 10 MG tablet Take 1 tablet (10 mg) by mouth 3 times daily  Patient taking differently: Take 10 mg by mouth 2 times daily 8/28/2022 at AM Yes Fili Ireland MD     ezetimibe (ZETIA) 10 MG tablet Take 10 mg by mouth every morning 8/28/2022 at AM Yes Unknown, Entered By History Yes    fish oil-omega-3 fatty acids 1000 MG capsule Take 1 g by mouth every morning 8/28/2022 at AM Yes Unknown, Entered By History     folic acid (FOLVITE) 1 MG tablet Take 1 mg by mouth every morning 8/28/2022 at AM Yes Unknown, Entered By History     hydrALAZINE (APRESOLINE) 25 MG tablet Take 1 tablet (25 mg) by mouth every 6 hours as needed (SBP >180)  at PRN Yes Julia Combs MD Yes    insulin aspart (NOVOLOG PEN) 100 UNIT/ML pen Inject 5 Units Subcutaneous 3 times daily (with meals) 8/28/2022 at Unknown time Yes Julia Combs MD Yes    insulin glargine (LANTUS PEN) 100 UNIT/ML pen Inject 8 Units Subcutaneous At Bedtime 8/27/2022 at Unknown time Yes Melissa Sanon DO Yes    lisinopril (ZESTRIL) 20 MG tablet Take 1 tablet (20 mg) by mouth daily  at Pt unsure if AM med or PM med Yes Fili Ireland MD Yes    melatonin 3 MG tablet Take 1 tablet (3 mg) by mouth At Bedtime 8/27/2022 at HS Yes Mitesh Nicholson MD     metoprolol tartrate (LOPRESSOR) 100 MG tablet Take 1 tablet (100 mg) by mouth 2 times daily 8/28/2022 at AM Yes Fili Ireland MD Yes    mirtazapine (REMERON) 15 MG tablet Take 1 tablet (15 mg) by mouth At Bedtime 8/27/2022 at HS Yes Mitesh Nicholson MD Yes    traZODone (DESYREL) 150 MG tablet Take 150 mg by mouth At Bedtime 8/27/2022 at hs Yes Unknown, Entered By History No    vitamin B complex with vitamin C (STRESS TAB) tablet Take 1 tablet by mouth every morning 8/28/2022 at AM Yes Unknown, Entered By History     Vitamin D, Cholecalciferol, 25 MCG (1000 UT) CAPS Take 1,000 Units by mouth every morning  8/28/2022 at AM Yes Unknown, Entered By History     warfarin ANTICOAGULANT (COUMADIN) 5 MG tablet Take 10 mg by mouth daily on M/W/F/Sat 8/27/2022 at PM Yes Unknown, Entered By History     warfarin ANTICOAGULANT (COUMADIN) 5 MG tablet Take 7.5 mg by mouth daily on Sun/Tu/Thurs Past Week at Unknown time Yes Unknown, Entered By History         The information provided in this note is only as accurate as the sources available at the time of update(s)

## 2022-08-29 NOTE — PROGRESS NOTES
[facility]    Medicine Progress Note - Hospitalist Service    Date of Admission:  8/28/2022    Assessment & Plan        Gordon Gann is a 77 year old male admitted on 8/28/2022 after he presented with dyspnea on exertion after he missed his dialysis yesterday.  Found to have recurrent moderate to large sized right pleural effusion..     Recurrent right pleural effusion  Presented with dyspnea on exertion. Recently discharged from  after being admitted for similar symptoms right-sided large pleural effusion and community-acquired pneumonia requiring a thoracentesis as well. Now has reaccumulation of moderate to large right-sided pleural effusion.  - Admitted to inpatient.  - ID consulted for thoracentesis.  - Coumadin of hold in anticipation of thoracentesis.  - During prior hospitalization, pleural fluid culture was negative and the fluid was transudative.  - Nephrology does not believe pleural effusion is solely due to ESRD and volume management.  - Will ask thoracic surgery to evaluate recurrent pleural effusion, appreciate their assistance.    ESRD on HD (TTS)  Missed dialysis on 8/27/22 due to being out of town.  - Nephrology consulted, appreciate their assistance.  - Plan for dialysis today, then resume PTA schedule starting tomorrow.  - Continue PTA bumex.    Type 2 diabetes mellitus  PTA on insulin aspart 5 TID and lantus 8U at bedtime.  - Continue PTA lantus.  - Monitor blood sugars and use sliding scale NovoLog as indicated.  - Monitor for hypoglycemia.  - Blood sugars fairly well controlled.    Atrial fibrillation  - Rate controlled. Continue PTA metoprolol.  - Chronically anticoagulated with Coumadin, INR 1.85 on admission. Coumadin on hold due to plan for thoracentesis.       Diet: NPO for Medical/Clinical Reasons Except for: Meds, Ice Chips    DVT Prophylaxis: Warfarin  Flood Catheter: Not present  Central Lines: None  Cardiac Monitoring: None  Code Status: Full Code   "    Disposition Plan      Expected Discharge Date: 08/31/2022        Discharge Comments: thoracentsis today        The patient's care was discussed with the Bedside Nurse and Patient.    Dontae Dawson MD  Hospitalist Service  [facility]  Securely message with the Vocera Web Console (learn more here)  Text page via [employer] Paging/Directory         Clinically Significant Risk Factors Present on Admission             # Hypoalbuminemia: Albumin = 2.1 g/dL (Ref range: 3.4 - 5.0 g/dL) on admission, will monitor as appropriate  # Acute Kidney Injury, unspecified: based on a >150% or 0.3 mg/dL increase in creatinine on admission compared to past 90 day average, will monitor renal function  # Coagulation Defect: home medication list includes an anticoagulant medication   # Hypertension: home medication list includes antihypertensive(s)    # Overweight: Estimated body mass index is 29.33 kg/m  as calculated from the following:    Height as of this encounter: 1.727 m (5' 8\").    Weight as of this encounter: 87.5 kg (192 lb 14.4 oz).        ______________________________________________________________________    Interval History   Gordon Gann was seen this morning. He feels about the same. No new complaints/concerns. Shortness of breath similar to yesterday. Denies fevers, chest pain, nausea, abdominal pain, diarrhea. Occasional cough. Plan for thoracentesis at 11:00 this morning.    Data reviewed today: I reviewed all medications, new labs and imaging results over the last 24 hours. I personally reviewed no images or EKG's today.    Physical Exam   Vital Signs: Temp: 98  F (36.7  C) Temp src: Oral BP: (!) 151/74 Pulse: 97   Resp: 20 SpO2: 94 % O2 Device: None (Room air)    Weight: 192 lbs 14.4 oz  Constitutional: awake, alert, cooperative, no apparent distress, laying in the hospital bed  Respiratory: no increased work of breathing, diminished on the right  Cardiovascular: regular rate and " rhythm, normal S1 and S2, no murmur noted  GI: normal bowel sounds, soft, non-distended, non-tender  Skin: warm, dry  Musculoskeletal: 2+ lower extremity pitting edema present  Neurologic: awake, alert, answers questions appropriately, moves all extremities    Data   Recent Labs   Lab 08/29/22  0915 08/29/22  0720 08/29/22  0217 08/28/22  2256 08/28/22  2239 08/28/22  1658   WBC 7.4  --   --   --   --  8.5   HGB 9.4*  --   --   --   --  10.3*   MCV 95  --   --   --   --  96     --   --   --   --  293   INR  --   --   --   --   --  1.85*     --   --   --   --  135   POTASSIUM 3.7  --   --   --   --  4.6   CHLORIDE 102  --   --   --   --  101   CO2 25  --   --   --   --  28   BUN 55*  --   --   --   --  49*   CR 5.54*  --   --   --   --  5.14*   ANIONGAP 9  --   --   --   --  6   LC 7.8*  --   --   --   --  8.1*   * 188* 237*   < >  --  227*   ALBUMIN 2.1*  --   --   --   --   --    PROTTOTAL  --   --   --   --  5.8*  --     < > = values in this interval not displayed.     Medications     - MEDICATION INSTRUCTIONS -         - MEDICATION INSTRUCTIONS for Dialysis Patients -   Does not apply See Admin Instructions     sodium chloride 0.9%  250 mL Intravenous Once in dialysis/CRRT     sodium chloride 0.9%  300 mL Hemodialysis Machine Once     acetaminophen  975 mg Oral Q8H     allopurinol  200 mg Oral Daily     aspirin  81 mg Oral At Bedtime     [START ON 8/31/2022] bumetanide  2 mg Oral Once per day on Sun Mon Wed Fri     bumetanide  2 mg Oral Once per day on Sun Mon Wed Fri     cloNIDine  0.1 mg Oral BID     cyclobenzaprine  10 mg Oral BID     ezetimibe  10 mg Oral QAM     folic acid  1 mg Oral QAM     insulin aspart  1-7 Units Subcutaneous TID AC     insulin aspart  1-5 Units Subcutaneous At Bedtime     insulin glargine  8 Units Subcutaneous At Bedtime     lisinopril  20 mg Oral Daily     metoprolol tartrate  100 mg Oral BID     mirtazapine  15 mg Oral At Bedtime     - MEDICATION INSTRUCTIONS -    Does not apply Once     sodium chloride (PF)  3 mL Intracatheter Q8H     traZODone  150 mg Oral At Bedtime     vitamin B complex with vitamin C  1 tablet Oral QAM     Vitamin D3  25 mcg Oral QAM

## 2022-08-29 NOTE — PLAN OF CARE
RECEIVING UNIT ED HANDOFF REVIEW    ED Nurse Handoff Report was reviewed by: Bebe Castillo RN on August 28, 2022 at 7:22 PM

## 2022-08-29 NOTE — PLAN OF CARE
Cognitive Concerns/ Orientation : Alert and oriented x 4  BEHAVIOR & AGGRESSION TOOL COLOR: Green   ABNL VS/O2: VSS, on room air. Denies SOB. BORDEN noted.   MOBILITY: Assist x1/GB/walker. Up to the chair this morning. [place] tired and wanted to rest. Sleeping between cares.   PAIN MANAGMENT: Denies pain.   DIET: NPO for thoracentesis, than mod carb diet.   BOWEL/BLADDER: continent. No BM this shift.   ABNL LAB/BG: creatinine 5.54. Phosphorus 4.6. Hg 9.4.   DRAIN/DEVICES: Peripheral IV SL right arm. Left arm fistula +B/T  TELEMETRY RHYTHM: N/A  SKIN: Scattered bruises and scabs. L lower arm with old skin tear, WDL.   TESTS/PROCEDURES: Thoracentesis done, site c/d/I, 3000 ml drained.   D/C DAY/GOALS/PLACE: Pending, nephrology and cardiothoracic surgery are following.   OTHER IMPORTANT INFO: Dialysis today.

## 2022-08-29 NOTE — CONSULTS
[facility]    Nephrology Consultation     Date of Admission:  8/28/2022    Assessment & Plan     Gordon Gann is a 77 year old male who was admitted on 8/19/2022.      Assessment:  1) ESRD on chronic maintenance hemodialysis.    Runs TTS at [facility], Dr. Perla   Started dialysis 10/21, AVG placed 8/5/21.       Target weight ordered now at 87kg but last post-weight at 86.6kg.       Epo 600 units qTx, venofer 50mg weekly             Treatment Type  Hemodialysis (procedure)    Dialyzer  : NasseoRO Series: ELISIO Model: 17H MELANY Factor: 1455    Dialysate  Base Sodium 138 mEq/L    Dialysate  Calcium 2.5 mEq/L    Dialysate  Potassium 3k    Dialysate  BiCarb 35 mEq/L    Dialysate Flow Rate  500 ML/min    Blood Flow Rate  450 ML/min    Treatment Time  210 min    UF Profile PRN  #2    UF Profiling Instructions  Try profile two to see if it helps    Temperature  Standard    Max UF Rate  2 L/Hr    Access  AV Graft Arm (Left upper)    Access Concurrent  No    Arterial Needle Guage/Length/Tip  15 g/ -1 in/Sharp    Venous Needle Guage/Length/Tip  15 g/ -1 in/Sharp    Schedule  3 Times per week    Heparin Load  No Loading Dose    Heparin Hourly Dose  No Hourly Dose                 Anemia: goal Hgb, stable.        2) Pleural effusion, recurrent: s/p thoracentesis on last  admission. With one sided, not solely due to ESRD and volume management. With ongoing weight loss, important to get a post-thoracentesis imaging. May need pulm or chest surgery eval (pleurodesis?)     3) HTN: PTA metoprolol, lisinopril and clonidine.      4)  malnutrition with hypoalbuminemia. This fits with his weight loss.      Plan/Recs:  1) HD today  and will also plan tomorrow per regular chronic schedule  2) will challenge volume status/target weight with significant lower extremity edema. Maximino need again to lower target weight on discharge  3) needing post dialysis/thoracentesis lung imaging.       Crescencio jacobo,  DO  [facility] - Nephrology  Cell: [phone]  Office [phone]  -----------------------------------------------------------------------------------------------------------  Reason for Consult     I was asked to see the patient for end stage renal disease/dialysis    History is obtained from the patient and chart review.      History of Present Illness     Gordon Gann is a 77 year old male who presented yesterday with dyspnea. Dialysis on TTS schedule but missed last session 8/27. Admission 8/16-8/19 with similar symptoms and presentation. Had thoracentesis done but no post-drainage imaging. Now with recurrent accumulation on admission CXR. He missed dialysis two days ago as he spent weekend in [place] at cabin with daughter. States did not eat much with his ongoing anorexia. Attempts and historically ate 3 meals a day. No reported issues with dialysis last week. Today wanting some answers but no dyspnea at rest, laying almost supine.     Past Medical History   I have reviewed this patient's medical history and updated it with pertinent information if needed.   Past Medical History:   Diagnosis Date     Alcohol abuse      Atrial fibrillation (H)      CKD (chronic kidney disease) stage 4, GFR 15-29 ml/min (H)      Diabetes (H)      Gout      Hypokalemia      Hypophosphatemia      Insomnia      STEMI (ST elevation myocardial infarction) (H)        Past Surgical History   I have reviewed this patient's surgical history and updated it with pertinent information if needed.  Past Surgical History:   Procedure Laterality Date     CREATE GRAFT LOOP ARTERIOVENOUS UPPER EXTREMITY Left 8/5/2021    Procedure: left arm arteriovenous graft fistula.;  Surgeon: Noemí Hernandez MD;  Location: [facility] OR     IR DIALYSIS FISTULOGRAM LEFT  12/8/2021       Prior to Admission Medications   Prior to Admission Medications   Prescriptions Last Dose Informant Patient Reported? Taking?   Vitamin D, Cholecalciferol, 25 MCG  (1000 UT) CAPS 8/28/2022 at AM Self Yes Yes   Sig: Take 1,000 Units by mouth every morning   acetaminophen (TYLENOL) 500 MG tablet  at PRN  Yes Yes   Sig: Take 1,000 mg by mouth every 8 hours as needed for mild pain   allopurinol (ZYLOPRIM) 100 MG tablet Past Month at Unknown time  Yes Yes   Sig: Take 200 mg by mouth daily   aspirin 81 MG EC tablet 8/27/2022 at PM  No Yes   Sig: Take 1 tablet (81 mg) by mouth At Bedtime Starting 12/12   bumetanide (BUMEX) 2 MG tablet 8/28/2022 at AM  No Yes   Sig: Take 1 tablet (2 mg) by mouth 2 times daily On non dialysis days   cloNIDine (CATAPRES) 0.1 MG tablet 8/28/2022 at AM  No Yes   Sig: Take 1 tablet (0.1 mg) by mouth 2 times daily   cyclobenzaprine (FLEXERIL) 10 MG tablet 8/28/2022 at AM  No Yes   Sig: Take 1 tablet (10 mg) by mouth 3 times daily   Patient taking differently: Take 10 mg by mouth 2 times daily   ezetimibe (ZETIA) 10 MG tablet 8/28/2022 at AM Self Yes Yes   Sig: Take 10 mg by mouth every morning   fish oil-omega-3 fatty acids 1000 MG capsule 8/28/2022 at AM Self Yes Yes   Sig: Take 1 g by mouth every morning   folic acid (FOLVITE) 1 MG tablet 8/28/2022 at AM Self Yes Yes   Sig: Take 1 mg by mouth every morning   hydrALAZINE (APRESOLINE) 25 MG tablet  at PRN Self No Yes   Sig: Take 1 tablet (25 mg) by mouth every 6 hours as needed (SBP >180)   insulin aspart (NOVOLOG PEN) 100 UNIT/ML pen 8/28/2022 at Unknown time Self No Yes   Sig: Inject 5 Units Subcutaneous 3 times daily (with meals)   insulin glargine (LANTUS PEN) 100 UNIT/ML pen 8/27/2022 at Unknown time  No Yes   Sig: Inject 8 Units Subcutaneous At Bedtime   lisinopril (ZESTRIL) 20 MG tablet  at Pt unsure if AM med or PM med  No Yes   Sig: Take 1 tablet (20 mg) by mouth daily   melatonin 3 MG tablet 8/27/2022 at HS Self No Yes   Sig: Take 1 tablet (3 mg) by mouth At Bedtime   metoprolol tartrate (LOPRESSOR) 100 MG tablet 8/28/2022 at AM  No Yes   Sig: Take 1 tablet (100 mg) by mouth 2 times daily    mirtazapine (REMERON) 15 MG tablet 8/27/2022 at HS Self No Yes   Sig: Take 1 tablet (15 mg) by mouth At Bedtime   traZODone (DESYREL) 150 MG tablet 8/27/2022 at hs  Yes Yes   Sig: Take 150 mg by mouth At Bedtime   vitamin B complex with vitamin C (STRESS TAB) tablet 8/28/2022 at AM Self Yes Yes   Sig: Take 1 tablet by mouth every morning   warfarin ANTICOAGULANT (COUMADIN) 5 MG tablet 8/27/2022 at PM  Yes Yes   Sig: Take 10 mg by mouth daily on M/W/F/Sat   warfarin ANTICOAGULANT (COUMADIN) 5 MG tablet Past Week at Unknown time  Yes Yes   Sig: Take 7.5 mg by mouth daily on Sun/Tu/Thurs      Facility-Administered Medications: None     Allergies   No Known Allergies    Social History   I have reviewed this patient's social history and updated it with pertinent information if needed. Gordon Gann  reports that he has never smoked. He has never used smokeless tobacco. He reports current alcohol use.    Family History   I have reviewed this patient's family history and updated it with pertinent information if needed.   No family history on file.    Review of Systems   The 10 point Review of Systems is negative other than noted in the HPI.     Physical Exam   Temp: 98  F (36.7  C) Temp src: Oral BP: (!) 151/74 Pulse: 97   Resp: 20 SpO2: 94 % O2 Device: None (Room air)    Vital Signs with Ranges  Temp:  [97.6  F (36.4  C)-98.1  F (36.7  C)] 98  F (36.7  C)  Pulse:  [] 97  Resp:  [18-22] 20  BP: (151-165)/(74-98) 151/74  SpO2:  [93 %-96 %] 94 %  192 lbs 14.4 oz    GENERAL: healthy, alert, NAD  HEENT:  Normocephalic. No gross abnormalities.  Pupils equal.  MMM.    CV: RRR, 1/6 sysytllic murmurs, no clicks, gallops, or rubs, 2+ edema in legs   left upper arm graft with tape from last session (4 days ago), removed and no wounds or concerns. Has bruit and thrill  RESP: Clear left lung, decreased sounds right  GI: Abdomen soft/nt/nd, BS normal. No masses, organomegaly  MUSCULOSKELETAL: extremities nl - no gross  deformities noted  SKIN: no suspicious lesions or rashes, dry to touch  NEURO:  Strength normal and symmetric.   PSYCH: mood good, affect appropriate      Data   BMP  Recent Labs   Lab 08/29/22  0720 08/29/22  0217 08/28/22  2256 08/28/22  1658   NA  --   --   --  135   POTASSIUM  --   --   --  4.6   CHLORIDE  --   --   --  101   LC  --   --   --  8.1*   CO2  --   --   --  28   BUN  --   --   --  49*   CR  --   --   --  5.14*   * 237* 323* 227*     Phos@[facility](phos:4)  CBC)  Recent Labs   Lab 08/28/22  1658   WBC 8.5   HGB 10.3*   HCT 31.4*   MCV 96        No lab results found in last 7 days.    Invalid input(s): BILIRUBININDIRECT  Recent Labs   Lab 08/28/22  1658   INR 1.85*     No results found for: D2VIT, D3VIT, DTOT  Recent Labs   Lab 08/28/22  1658   HGB 10.3*   HCT 31.4*   MCV 96     No results for input(s): PTHI in the last 168 hours.

## 2022-08-29 NOTE — PLAN OF CARE
Summary: Pt here with shortness of breath, missed his dialysis run on Saturday has a right pleural effusion  DATE & TIME: 8/28/22 7-11pm  Cognitive Concerns/ Orientation : A&Ox4   BEHAVIOR & AGGRESSION TOOL COLOR: green  ABNL VS/O2: VSS, on room air  MOBILITY: Assist x1/GB/walker  PAIN MANAGMENT: denies  DIET: Renal  BOWEL/BLADDER: continent  ABNL LAB/BG: creat 5.14  DRAIN/DEVICES: PIV  TELEMETRY RHYTHM: N/A  SKIN: Scattered bruises and scabs  TESTS/PROCEDURES: thoracentesis planned for tomorrow.   D/C DAY/GOALS/PLACE: pending

## 2022-08-29 NOTE — PLAN OF CARE
Goal Outcome Evaluation:    Cognitive Concerns/ Orientation : Alert and oriented x 4   BEHAVIOR & AGGRESSION TOOL COLOR: Green   ABNL VS/O2: VSS, on room air  MOBILITY: Assist x1/GB/walker  PAIN MANAGMENT: Denies   DIET: NPO except for meds and ice chips   BOWEL/BLADDER: continent  ABNL LAB/BG: creat 5.14  DRAIN/DEVICES: Peripheral IV SL right arm, Left arm fistula +B/T  TELEMETRY RHYTHM: N/A  SKIN: Scattered bruises and scabs  TESTS/PROCEDURES: Thoracentesis planned for today   D/C DAY/GOALS/PLACE: Pending   OTHER IMPORTANT INFO: Dialysis Tues-Thurs-Sat.  +2 lower extremity edema

## 2022-08-29 NOTE — CONSULTS
Thoracic Surgery Consult Note:    Gordon Gann  1945   [id number]    Date of Admission: 8/28/2022  4:59 PM  Date of Consult: 8/29/2022     CC: dyspnea with recurrent right pleural effusion [J90]      Referring Provider:Dr. Dontae Dawson    Consulting Thoracic Surgeon: Dr. Roberto Donahue    ASSESSMENT/PLAN:   1) Dyspnea secondary to recurrent right transudative pleural effusion in setting of ESRD. Patient missed his scheduled dialysis this past Saturday. Gordon underwent his second right US-guided thoracentesis yielding 3000 ml of transudative pleural fluid today. Breathing on room air now. PA and Lat CXR post-thora pending (I placed order).  Labs from previous thoracentesis on 8/16 as well as today's tap are consistent with transudate. Doubt infection, no hx of CHF, remote hx of melanoma treated with surgical excision mid-1990s, no smoking history nor asbestos exposure. Most likely causative factor is ESRD, despite it being one-sided effusion versus bilateral effusions. No current recommendation for surgical intervention (VATS, talc pleurodesis) due to low chance of success in transudative setting. Would recommend maximizing all medical treatments first before considering intervention such as chest tube, PleurX or VATS. We will continue to follow peripherally.     History of the Present Illness: Pete is a 77 year old male admitted for the treatment of Pleural effusion [J90] and Dyspnea, unspecified type [R06.00].  Thoracic Surgery Consult was requested for recommendation for management of recurrent right pleural effusion.     Gordon is a 77-year-old male with past medical history of a fib (on coumadin, currently being held), ESRD on chronic maintenance hemodialysis three times weekly, DM2, htn, malnutrition and previous transudative right pleural effusion admitted yesterday through the ED for fatigue, dyspnea and recurrent right pleural effusion. Imaging in the ED showed a moderate recurrent right pleural  "effusion. Patient was admitted to [facility] 8/16-8/19 and treated for right pleural effusion and CAP vs possible aspiration PNA; discharged home on Augmentin.  He states he is limited in his baseline ambulation and uses a walker but his tolerance to activity worsened a few days ago and his dyspnea worsened so his daughter brought him to the ED. He also [place] on Saturday[place] Denies previous smoking history and asbestos exposure, recent fever/chills, productive cough. Has remote history of melanoma in mid-1990s which was surgically excised (no chemo nor radiation). On room air currently.     ROS: A 12-point review of systems was performed and negative except as noted in the HPI above.     Past Medical History:  Past Medical History:   Diagnosis Date     Alcohol abuse      Atrial fibrillation (H)      CKD (chronic kidney disease) stage 4, GFR 15-29 ml/min (H)      Diabetes (H)      Gout      Hypokalemia      Hypophosphatemia      Insomnia      STEMI (ST elevation myocardial infarction) (H)         Past Surgical History:  Past Surgical History:   Procedure Laterality Date     CREATE GRAFT LOOP ARTERIOVENOUS UPPER EXTREMITY Left 8/5/2021    Procedure: left arm arteriovenous graft fistula.;  Surgeon: Noemí Hernandez MD;  Location: [facility] OR     IR DIALYSIS FISTULOGRAM LEFT  12/8/2021       Family History:  No family history on file.    Medications:  No current outpatient medications on file.     LABS:   8/16 right pleural effusion: 1850 ml transudate, Glucose 249, , pH 6.1, Protein 1.8, cultures no growth, no cytology  8/29: 3000 ml rosina fluid, transudate with Glucose 211, , Protein 2.0. Cultures pending, No cytology  WBC: 7.4  Creat: 5.54  INR:1.85      S: Eating lunch while sitting upright in bed. No acute distress. Answers in full sentences.    O: BP (!) 155/89 (BP Location: Right arm)   Pulse 90   Temp 97.5  F (36.4  C) (Oral)   Resp 20   Ht 1.727 m (5' " "8\")   Wt 87.5 kg (192 lb 14.4 oz)   SpO2 97%   BMI 29.33 kg/m   on room air    Imaging:  CT CHEST, ABDOMEN, PELVIS WITHOUT CONTRAST 8/16/2022 10:08 AM     CLINICAL HISTORY: Possible PNA, vomiting and abdominal pain.     TECHNIQUE: CT scan of the chest, abdomen, and pelvis was performed  without IV contrast. Multiplanar reformats were obtained. Dose  reduction techniques were used.      CONTRAST: None     COMPARISON: None.     FINDINGS:   LUNGS AND PLEURA: Large right effusion with associated compressive  atelectasis and or infiltrate. Left lung clear. No left effusion.     MEDIASTINUM/AXILLAE: No adenopathy by size criteria demonstrated in  the absence of contrast. 1.5 cm left thyroid nodule. No aneurysm.     CORONARY ARTERY CALCIFICATIONS: Severe.     HEPATOBILIARY: No significant mass or bile duct dilatation. No  calcified gallstones.      PANCREAS: No significant mass, duct dilatation, or inflammatory  change.     SPLEEN: Normal size.     ADRENAL GLANDS: No significant nodules.     KIDNEYS/BLADDER: No significant mass, stones, or hydronephrosis.     BOWEL: Possible bowel wall thickening in the rectosigmoid colon which  could represent an uncomplicated colitis. No obstruction.     PELVIC ORGANS: No pelvic masses.     ADDITIONAL FINDINGS: There are extensive atherosclerotic changes of  the visualized aorta and its branches. There is no evidence of aortic  aneurysm. No free air or free fluid.     MUSCULOSKELETAL: No frankly destructive bony lesions.                                                                      IMPRESSION:     1. Large right effusion with associated compressive atelectasis and or  infiltrate.  2. Possible bowel wall thickening in the rectosigmoid colon, difficult  to be certain of pathologic wall thickening given its decompressed  state. Clinical correlation needed for symptoms of colitis.     GISELE LOVE MD          Recent Results (from the past 48 hour(s))   XR Chest 2 Views    " Narrative    EXAM: XR CHEST 2 VIEWS  LOCATION: [facility]  DATE/TIME: 8/28/2022 6:10 PM    INDICATION: Dyspnea, concern for pleural effusion on right again  COMPARISON: Prethoracentesis chest x-rays dated 8/16/2022. 1850 mL of fluid was removed from the right pleural space after the prior chest x-ray.      Impression    IMPRESSION:   1. There has been interval reaccumulation of the moderate to large size right pleural fluid collection. There is now increased fluid in the minor fissure as compared to the prior study.  2. Left lung remains clear.  3. No pneumothorax.  4. Degenerative changes of the left shoulder again noted.   US Thoracentesis    Narrative    ULTRASOUND GUIDED THORACENTESIS  8/29/2022 11:39 AM     HISTORY: Recurrent right pleural effusion    FINDINGS: Ultrasound was used to evaluate for the presence and best  approach for drainage of a pleural effusion. Written and oral informed  consent was obtained. A pause for the cause procedure to verify the  correct patient and correct procedure. The skin overlying the right  chest posteriorly was prepped and draped in the usual sterile fashion.  The subcutaneous tissues were anesthetized with 8 ml 1% lidocaine. A  catheter was advanced into the pleural space and 3000 mL of  rosina  colored fluid was drained. Patient was monitored by nurse under my  direct supervision throughout the exam. Ultrasound images were  permanently stored.  There were no immediate complications. Patient  left the ultrasound suite in satisfactory condition.      Impression    IMPRESSION: Technically successful thoracentesis without immediate  complications.       RADIOLOGY REVIEW: all imaging this hospitalization and previous 8/16/2022 hospitalization personally reviewed.     Discussed the above plan of care with patient and will call dtr at his request. Orders left for PA and Lat CXR.  We will continue to follow peripherally with you.  Thank you for allowing us  to participate in the care of this patient and please call if there are further questions or concerns.    Time dedicated to Consultation: 30  minutes were spent at bedside, floor and unit with greater than 50% of the time spent on patient counseling and education, radiology review and coordination of care.    Genia Ferrari PA-C with Dr. Roberto Donahue  [facility]  Cell [phone]

## 2022-08-30 NOTE — PROGRESS NOTES
Patient A/O X4, occasional irritability. Up to chair, frustrated about alarms. Denies pain and discomfort at this time. Fragile skin with multiple bruises especially on forearms. Order to discharge, family ride on the way, and staff preparing patient. Discharge papers reviewed with patient.

## 2022-08-30 NOTE — PROGRESS NOTES
Care Management Discharge Note    Discharge Date: 08/30/2022       Discharge Disposition: Home    Discharge Services:      Discharge DME:      Discharge Transportation:  (son in law)    Private pay costs discussed: Not applicable    PAS Confirmation Code:    Patient/family educated on Medicare website which has current facility and service quality ratings:      Education Provided on the Discharge Plan:  yes  Persons Notified of Discharge Plans: patient  Patient/Family in Agreement with the Plan:      Handoff Referral Completed: Yes    Additional Information:  See previous SW note.        Carrie Simpson, GLENNASW

## 2022-08-30 NOTE — PROGRESS NOTES
"Thoracic Surgery:    /79   Pulse 80   Temp 97.9  F (36.6  C) (Oral)   Resp 18   Ht 1.727 m (5' 8\")   Wt 87.5 kg (192 lb 14.4 oz)   SpO2 95%   BMI 29.33 kg/m    Room air    Chart check and CXR review only    CXR: minimal residual fluid on right, minimal compressive atelectasis with improved aeration on right, lungs expanded bilaterally, no left effusion    Right pleural fluid: Gram stain: no organisms seen, Cultures NGTD    Recommend maximizing medical treatment with hopes of minimizing recurrence of right transudative pleural effusion    Was able to reach daughter, Omayra, yesterday to update her on our recommendations    Genia Ferrari PA-C with Dr. Roberto Donahue  [facility]  Cell [phone]      "

## 2022-08-30 NOTE — PROGRESS NOTES
Potassium   Date Value Ref Range Status   08/29/2022 3.7 3.4 - 5.3 mmol/L Final     Hemoglobin   Date Value Ref Range Status   08/29/2022 9.4 (L) 13.3 - 17.7 g/dL Final     Creatinine   Date Value Ref Range Status   08/29/2022 5.54 (H) 0.66 - 1.25 mg/dL Final     Urea Nitrogen   Date Value Ref Range Status   08/29/2022 55 (H) 7 - 30 mg/dL Final     Sodium   Date Value Ref Range Status   08/29/2022 136 133 - 144 mmol/L Final     INR   Date Value Ref Range Status   08/28/2022 1.85 (H) 0.85 - 1.15 Final       DIALYSIS PROCEDURE NOTE  Hepatitis status of previous patient on machine log was checked and verified ok to use with this patients hepatitis status.  Patient dialyzed for 3.5 hours on a K3 bath with a net fluid removal of  2L.  A BFR of 350 ml/min was obtained via a LAVF using 15 gauge needles.      The treatment plan was discussed with Dr. Roger during the treatment.    Total heparin received during the treatment: 0 units.   Needle cannulation sites held x 5 min.     Meds  given: none ordered   Complications: none      At start of the run, patient only comfortable pulling 2kg. After patient seen by nephrologist goal was increased to 2.3kg last 45 min of run. Patient tolerated without issue.    Person educated: patient. Knowledge base substantial. Barriers to learning: none. Educated on procedure via verbal mode. Patient verbalized understanding. Pt prefers oral education style.     ICEBOAT? Timeout performed pre-treatment  I: Patient was identified using 2 identifiers  C:  Consent Signed Yes  E: Equipment preventative maintenance is current and dialysis delivery system OK to use  B: Hepatitis B Surface Antigen: NEG; Draw Date: 8/9/2022      Hepatitis B Surface Antibody: UNK  O: Dialysis orders present and complete prior to treatment  A: Vascular access verified and assessed prior to treatment  T: Treatment was performed at a clinically appropriate time  ?: Patient was allowed to ask questions and address  concerns prior to treatment  See Adult Hemodialysis flowsheet in [facility] for further details and post assessment.  Machine water alarm in place and functioning. Transducer pods intact and checked every 15min.   Pt returned via bed to 610.  Chlorine/Chloramine water system checked every 4 hours.  Outpatient Dialysis at [facility] on TTS.      Dressingon AVF needle sites may be removed 24 hours after dialysis. If leaking occurs please apply a Band-Aid.

## 2022-08-30 NOTE — PROGRESS NOTES
Care Management Initial Consult    General Information  Assessment completed with: Patient,    Type of CM/SW Visit: Initial Assessment    Primary Care Provider verified and updated as needed:     Readmission within the last 30 days:        Reason for Consult: discharge planning  Advance Care Planning:            Communication Assessment  Patient's communication style: spoken language (English or Bilingual)             Cognitive  Cognitive/Neuro/Behavioral: WDL  Level of Consciousness: alert  Arousal Level: opens eyes spontaneously  Orientation: oriented x 4  Mood/Behavior: calm, cooperative  Best Language: 0 - No aphasia  Speech: clear, logical    Living Environment:   People in home: alone     Current living Arrangements:  ([facility], [facility])      Able to return to prior arrangements: yes (per patient report)       Family/Social Support:  Care provided by: self  Provides care for:                  Description of Support System:           Current Resources:   Patient receiving home care services: No     Community Resources:    Equipment currently used at home:    Supplies currently used at home:      Employment/Financial:  Employment Status:          Financial Concerns:             Lifestyle & Psychosocial Needs:  Social Determinants of Health     Tobacco Use: Low Risk      Smoking Tobacco Use: Never Smoker     Smokeless Tobacco Use: Never Used   Alcohol Use: Not on file   Financial Resource Strain: Not on file   Food Insecurity: Not on file   Transportation Needs: Not on file   Physical Activity: Not on file   Stress: Not on file   Social Connections: Not on file   Intimate Partner Violence: Not on file   Depression: Not on file   Housing Stability: Not on file       Functional Status:  Prior to admission patient needed assistance:   Dependent ADLs:: Ambulation-walker  Dependent IADLs:: Transportation (does go to the dining room for some meals and uses [employer] for  "dialysis transport)       Mental Health Status:  Mental Health Status: No Current Concerns       Chemical Dependency Status:  Chemical Dependency Status: No Current Concerns             Values/Beliefs:  Spiritual, Cultural Beliefs, Mu-ism Practices, Values that affect care:                 Additional Information:  Per MD note, patient feels at his baseline and ready to discharge home.  Met with patient to assess for discharge needs as we have just learned of his discharge.  To summarize flow sheet data. patient reports he lives at [facility] in an independent apartment. He does not have any home care services. He goes to the facility dining room for some meals. He walks with a walker   He uses [employer] for transportation to and from dialysis.  Writer stated \"it looks like you got in trouble from missing your dialysis as you were out of town\".  Patient commented he will not miss dialysis again.  Day RN reports patient has ambulated with is walker here and is steady on his feet. RN flow sheet indicates patient is a/ox4.     Patient has called his son in law and left a message asking if he can transport patient home.  Writer stated if his son in law doesn't call back or cannot pick him up, we can arrange a taxi or medivan to take him home this evening.  Care Coordinator RN notified of patient's discharge.  No needs assessed. If needs arise after 1630, please contact the on call SW thru the Charge Nurse.       MELANY Abdalla      "

## 2022-08-30 NOTE — PLAN OF CARE
DATE & TIME: 8/29/22, 1830 - 8355   Cognitive Concerns/ Orientation : A&O x 4, forgetful   BEHAVIOR & AGGRESSION TOOL COLOR: Green   ABNL VS/O2: Denied  MOBILITY: Assist x 1, GB and walker  PAIN MANAGMENT: Denied  DIET: Mod CHO  BOWEL/BLADDER: Continent B/B  ABNL LAB/BG:   DRAIN/DEVICES: PIV SL. Dialysis to left arm, bruit/thrill present  TELEMETRY RHYTHM: NA  SKIN: Scattered bruises and scabs. Old skin tear to left lower arm, WDL. Dressing to thoracentesis site, right mid back remain CDI  TESTS/PROCEDURES: Hemodialysis today  D/C DATE: Pending  OTHER IMPORTANT INFO: Patient noted to be retching with c/o nausea. Antiemetic offered but patient declined stating he would call for it if nausea/retching worsened. No other c/o nausea/retching. Nephrology and Cardiothoracic following. Hemodialysis on Tues-Thurs-Sat, per MD notes    Goal Outcome Evaluation:    Plan of Care Reviewed With: patient     Overall Patient Progress: no change

## 2022-08-30 NOTE — PLAN OF CARE
Goal Outcome Evaluation:    Plan of Care Reviewed With: patient     Summary: Presented with BORDEN after missing dialysis. Found to have recurrent R. Pleural effusion   Cognitive Concerns/ Orientation : Alert and oriented x 4, forgetful   BEHAVIOR & AGGRESSION TOOL COLOR: Green   ABNL VS/O2: VSS, RA. Denies SOB. BORDEN noted.   MOBILITY: Assist x1/GB/walker  PAIN MANAGMENT: Denies pain.   DIET: Mod carb diet, tolerating  BOWEL/BLADDER: continent. No BM this shift. NUSRAT-hemodialysis patient  ABNL LAB/BG: Cr 5.54. Phosphorus 4.6. Hg 9.4. Albumin 2.1. , 190   DRAIN/DEVICES: Peripheral IV SL right arm. Left arm fistula +B/T-limb alert  TELEMETRY RHYTHM: N/A  SKIN: Scattered bruises and scabs. L lower arm with old skin tear, WDL. Thoracentesis sit R. Middle back/CDI  TESTS/PROCEDURES: Thoracentesis done, site c/d/I, 3000 ml drained. Dialysis completed- 3 L removed, tolerated. Per dialysis RN, pt will have another cycle scheduled in the am of 8/30.   D/C DAY/GOALS/PLACE: Pending, nephrology and cardiothoracic surgery are following.   OTHER IMPORTANT INFO: ESRD on HD (normally runs Tues, Thurs, Sat per MD note). Hx afib, coumadin on hold today due to thoracentesis.

## 2022-08-30 NOTE — DISCHARGE SUMMARY
[facility]  Hospitalist Discharge Summary      Date of Admission:  8/28/2022  Date of Discharge:  8/30/2022  Discharging Provider: Dontae Dawson MD  Discharge Service: Hospitalist Service    Discharge Diagnoses   Recurrent right pleural effusion  ESRD on HD (TTS)  Type 2 diabetes mellitus  Atrial fibrillation    Follow-ups Needed After Discharge   Follow-up Appointments     Follow-up and recommended labs and tests       Follow up with primary care provider, Belen Prieto, within 7 days for   hospital follow- up.  Recommend checking INR at that appointment.  Resume outpatient dialysis as previously arranged.             Unresulted Labs Ordered in the Past 30 Days of this Admission     Date and Time Order Name Status Description    8/29/2022  1:12 PM Anaerobic Bacterial Culture Routine In process     8/28/2022  8:50 PM Pleural fluid Aerobic Bacterial Culture Routine with Gram Stain Preliminary       These results will be followed up by PCP.    Discharge Disposition   Discharged to home  Condition at discharge: Stable    Hospital Course   Gordon Gann is a 77 year old male admitted on 8/28/2022 after he presented with dyspnea on exertion after he missed his dialysis yesterday.  Found to have recurrent moderate to large sized right pleural effusion..     Recurrent right pleural effusion  Presented with dyspnea on exertion. Recently discharged from  after being admitted for similar symptoms right-sided large pleural effusion and community-acquired pneumonia requiring a thoracentesis as well. Now has reaccumulation of moderate to large right-sided pleural effusion.  - Admitted to inpatient.  - IR consulted for thoracentesis. S/p thoracentesis on 8/29/22 with removal of 3000 ml of fluid. Transudate. Culture negative to date.  - During prior hospitalization, pleural fluid culture was negative and the fluid was transudative.  - Nephrology does not believe pleural effusion is solely due to  ESRD and volume management.  - Thoracic surgery consulted, suspects pleural effusion is related to ESRD. Recommend maximizing medical treatments prior to considering any surgical intervention.  - He feels much better today, basically back to baseline. Feels ready for discharge home.  - Discharge home today.  - Follow-up with PCP and nephrology.  - Can follow-up with thoracic surgery if ongoing issues despite maximizing medical therapy.  - Discussed reasons to seek medical attention prior to follow-up appointment.    ESRD on HD (TTS)  Missed dialysis on 8/27/22 due to being out of town.  - Nephrology consulted, appreciate their assistance.  - He had dialysis on 8/29 and 8/30 while in the hospital to make up for the missed dialysis and get back on his normal schedule.  - Continue PTA bumex.  - Resume outpatient dialysis as previously arranged.    Type 2 diabetes mellitus  PTA on insulin aspart 5 TID and lantus 8U at bedtime.  - Resume PTA insulin regimen upon discharge.    Atrial fibrillation  - Rate controlled. Continue PTA metoprolol.  - Chronically anticoagulated with Coumadin, INR 1.85 on admission. Coumadin was held in the hospital for thoracentesis. Resume Coumadin upon discharge, check INR at follow-up appointment with PCP.    Consultations This Hospital Stay   NEPHROLOGY IP CONSULT  THORACIC SURGERY IP CONSULT    Code Status   Full Code    Time Spent on this Encounter   I, Dontae Dawson MD, personally saw the patient today and spent greater than 30 minutes discharging this patient.       Dontae Dawson MD  [place] [address] [facility]  [facility] [place] [address]  Phone: [phone]  ______________________________________________________________________    Physical Exam   Vital Signs: Temp: 98.2  F (36.8  C) Temp src: Oral BP: (!) 140/72 Pulse: 93   Resp: 18 SpO2: 97 % O2 Device: None (Room air)    Weight: 192 lbs 14.4 oz  Constitutional: awake, alert, cooperative,  no apparent distress, sitting up in a chair  Respiratory: clear to auscultation bilaterally, no crackles or wheezing  Cardiovascular: regular rate and rhythm, normal S1 and S2, no murmur noted  GI: normal bowel sounds, soft, non-distended, non-tender  Skin: warm, dry  Musculoskeletal: no lower extremity pitting edema present  Neurologic: awake, alert, answers questions appropriately, moves all extremities       Primary Care Physician   Belen Prieto    Discharge Orders      Reason for your hospital stay    Large recurrent right pleural effusion     Follow-up and recommended labs and tests     Follow up with primary care provider, Belen Prieto, within 7 days for hospital follow- up.  Recommend checking INR at that appointment.  Resume outpatient dialysis as previously arranged.     Activity    Your activity upon discharge: activity as tolerated     Diet    Follow this diet upon discharge: Diabetic/Dialysis diet     Significant Results and Procedures   Most Recent 3 CBC's:Recent Labs   Lab Test 08/29/22  0915 08/28/22  1658 08/19/22  0636   WBC 7.4 8.5 6.5   HGB 9.4* 10.3* 9.8*   MCV 95 96 96    293 268     Most Recent 3 BMP's:Recent Labs   Lab Test 08/30/22  1319 08/30/22  0736 08/30/22  0202 08/29/22  1142 08/29/22  0915 08/28/22  2256 08/28/22  1658 08/19/22  0759 08/19/22  0636   NA  --   --   --   --  136  --  135  --  139   POTASSIUM  --   --   --   --  3.7  --  4.6  --  3.7   CHLORIDE  --   --   --   --  102  --  101  --  103   CO2  --   --   --   --  25  --  28  --  29   BUN  --   --   --   --  55*  --  49*  --  35*   CR  --   --   --   --  5.54*  --  5.14*  --  3.73*   ANIONGAP  --   --   --   --  9  --  6  --  7   LC  --   --   --   --  7.8*  --  8.1*  --  7.3*   * 187* 217*   < > 183*   < > 227*   < > 117*    < > = values in this interval not displayed.     Most Recent 3 INR's:Recent Labs   Lab Test 08/28/22  1658 08/19/22  0636 08/18/22  0716   INR 1.85* 1.14 1.11     Results for orders  placed or performed during the hospital encounter of 08/28/22   XR Chest 2 Views    Narrative    EXAM: XR CHEST 2 VIEWS  LOCATION: [facility]  DATE/TIME: 8/28/2022 6:10 PM    INDICATION: Dyspnea, concern for pleural effusion on right again  COMPARISON: Prethoracentesis chest x-rays dated 8/16/2022. 1850 mL of fluid was removed from the right pleural space after the prior chest x-ray.      Impression    IMPRESSION:   1. There has been interval reaccumulation of the moderate to large size right pleural fluid collection. There is now increased fluid in the minor fissure as compared to the prior study.  2. Left lung remains clear.  3. No pneumothorax.  4. Degenerative changes of the left shoulder again noted.   US Thoracentesis    Narrative    ULTRASOUND GUIDED THORACENTESIS  8/29/2022 11:39 AM     HISTORY: Recurrent right pleural effusion    FINDINGS: Ultrasound was used to evaluate for the presence and best  approach for drainage of a pleural effusion. Written and oral informed  consent was obtained. A pause for the cause procedure to verify the  correct patient and correct procedure. The skin overlying the right  chest posteriorly was prepped and draped in the usual sterile fashion.  The subcutaneous tissues were anesthetized with 8 ml 1% lidocaine. A  catheter was advanced into the pleural space and 3000 mL of  rosina  colored fluid was drained. Patient was monitored by nurse under my  direct supervision throughout the exam. Ultrasound images were  permanently stored.  There were no immediate complications. Patient  left the ultrasound suite in satisfactory condition.      Impression    IMPRESSION: Technically successful thoracentesis without immediate  complications.    GISELE LOVE MD         SYSTEM ID:  [id number]   XR Chest 2 Views    Narrative    CHEST TWO VIEWS 8/30/2022 8:17 AM     HISTORY: Status post right thoracentesis-- evaluate for lung  re-expansion.    COMPARISON: August 28,  2022       Impression    IMPRESSION: Minimal residual fluid on the right. Minimal associated  probable compressive atelectasis, improved aeration on the right. No  pneumothorax. Left lung clear. No definite left effusion. The cardiac  silhouette is not enlarged. Pulmonary vasculature is unremarkable.    GISELE LOVE MD         SYSTEM ID:  [id number]     Discharge Medications   Current Discharge Medication List      CONTINUE these medications which have NOT CHANGED    Details   acetaminophen (TYLENOL) 500 MG tablet Take 1,000 mg by mouth every 8 hours as needed for mild pain      allopurinol (ZYLOPRIM) 100 MG tablet Take 200 mg by mouth daily      aspirin 81 MG EC tablet Take 1 tablet (81 mg) by mouth At Bedtime Starting 12/12    Associated Diagnoses: Atrial flutter with rapid ventricular response (H)      bumetanide (BUMEX) 2 MG tablet Take 1 tablet (2 mg) by mouth 2 times daily On non dialysis days    Associated Diagnoses: Hypertension, unspecified type      cloNIDine (CATAPRES) 0.1 MG tablet Take 1 tablet (0.1 mg) by mouth 2 times daily    Associated Diagnoses: Hypertension, unspecified type      cyclobenzaprine (FLEXERIL) 10 MG tablet Take 1 tablet (10 mg) by mouth 3 times daily    Associated Diagnoses: Hemarthrosis      ezetimibe (ZETIA) 10 MG tablet Take 10 mg by mouth every morning      fish oil-omega-3 fatty acids 1000 MG capsule Take 1 g by mouth every morning      folic acid (FOLVITE) 1 MG tablet Take 1 mg by mouth every morning      hydrALAZINE (APRESOLINE) 25 MG tablet Take 1 tablet (25 mg) by mouth every 6 hours as needed (SBP >180)  Qty: 30 tablet, Refills: 0    Associated Diagnoses: Diabetic ketoacidosis without coma associated with type 2 diabetes mellitus (H)      insulin aspart (NOVOLOG PEN) 100 UNIT/ML pen Inject 5 Units Subcutaneous 3 times daily (with meals)  Qty: 15 mL, Refills: 0    Associated Diagnoses: Diabetic ketoacidosis without coma associated with type 2 diabetes mellitus (H)      insulin  glargine (LANTUS PEN) 100 UNIT/ML pen Inject 8 Units Subcutaneous At Bedtime  Qty: 15 mL, Refills: 0    Comments: If Lantus is not covered by insurance, may substitute Basaglar or Semglee or other insulin glargine product per insurance preference at same dose and frequency.    Associated Diagnoses: Diabetic ketoacidosis without coma associated with type 2 diabetes mellitus (H)      lisinopril (ZESTRIL) 20 MG tablet Take 1 tablet (20 mg) by mouth daily    Associated Diagnoses: Hypertension, unspecified type      melatonin 3 MG tablet Take 1 tablet (3 mg) by mouth At Bedtime    Associated Diagnoses: Insomnia, unspecified type      metoprolol tartrate (LOPRESSOR) 100 MG tablet Take 1 tablet (100 mg) by mouth 2 times daily    Associated Diagnoses: Hypertension, unspecified type      mirtazapine (REMERON) 15 MG tablet Take 1 tablet (15 mg) by mouth At Bedtime  Qty: 10 tablet, Refills: 0    Associated Diagnoses: Insomnia, unspecified type      traZODone (DESYREL) 150 MG tablet Take 150 mg by mouth At Bedtime      vitamin B complex with vitamin C (STRESS TAB) tablet Take 1 tablet by mouth every morning      Vitamin D, Cholecalciferol, 25 MCG (1000 UT) CAPS Take 1,000 Units by mouth every morning      !! warfarin ANTICOAGULANT (COUMADIN) 5 MG tablet Take 10 mg by mouth daily on M/W/F/Sat      !! warfarin ANTICOAGULANT (COUMADIN) 5 MG tablet Take 7.5 mg by mouth daily on Sun/Tu/Thurs       !! - Potential duplicate medications found. Please discuss with provider.        Allergies   No Known Allergies

## 2022-08-30 NOTE — PROGRESS NOTES
Potassium   Date Value Ref Range Status   08/29/2022 3.7 3.4 - 5.3 mmol/L Final            Hemoglobin   Date Value Ref Range Status   08/29/2022 9.4 (L) 13.3 - 17.7 g/dL Final            Creatinine   Date Value Ref Range Status   08/29/2022 5.54 (H) 0.66 - 1.25 mg/dL Final            Urea Nitrogen   Date Value Ref Range Status   08/29/2022 55 (H) 7 - 30 mg/dL Final            Sodium   Date Value Ref Range Status   08/29/2022 136 133 - 144 mmol/L Final            INR   Date Value Ref Range Status   08/28/2022 1.85 (H) 0.85 - 1.15 Final         DIALYSIS PROCEDURE NOTE  Hepatitis status of previous patient on machine log was checked and verified ok to use with this patients hepatitis status.  Patient dialyzed for 3.5 hrs. on a K3 bath with a net fluid removal of  3L.  A BFR of 350 ml/min was obtained  using 15 gauge needles (reduced to 350 due to high venous pressure at 400ml/min).      The treatment plan was discussed with Dr. Roger during the treatment.    Total heparin received during the treatment: 0 units.      Needle cannulation sites held x 20 min.         Meds  given: NONE  Complications: Patient bled for 20 minutes post treatment; BFR unable to be raised higher than 350 due to venous pressures      Person educated: verbally. Knowledge base basic. Barriers to learning: None Educated on procedure via oral mode. Patient verbalized understanding. Pt prefers verbal education style.      ICEBOAT? Timeout performed pre-treatment  I: Patient was identified using 2 identifiers  C:  Consent Signed Yes  E: Equipment preventative maintenance is current and dialysis delivery system OK to use  B: Hepatitis B Surface Antigen: Negative; Draw Date: Aug.9, 2022      Hepatitis B Surface Antibody: Unknown; Draw Date: Aug. 29,2022  O: Dialysis orders present and complete prior to treatment  A: Vascular access verified and assessed prior to treatment  T: Treatment was performed at a clinically appropriate time  ?: Patient was  allowed to ask questions and address concerns prior to treatment  See Adult Hemodialysis flowsheet in EPIC for further details and post assessment.  Machine water alarm in place and functioning. Transducer pods intact and checked every 15min.   Pt returned via bed transport.  Chlorine/Chloramine water system checked every 4 hours.  Outpatient Dialysis TBD        Post treatment report given to TAWANNA Currie RN regarding 3L of fluid removed, last BP of 145/84, and patient pain rating of 0/10.      Please remove patient dressing on AVF and AVG needle sites 24 hours after dialysis. If leaking occurs please apply a Band-Aid.

## 2022-08-30 NOTE — PLAN OF CARE
Cognitive Concerns/ Orientation : Alert and oriented x 4  BEHAVIOR & AGGRESSION TOOL COLOR: Green   ABNL VS/O2: VSS, on room air. Denies SOB. BORDEN noted.   MOBILITY: SBA with GB. Up to the chair for meals.   PAIN MANAGMENT: Denies pain.   DIET: mod carb diet, good appetite.   BOWEL/BLADDER: continent. No BM this shift.   ABNL LAB/BG: /209. Chest xray done today, improved aeration on the right noted, min residual of fluid on right  DRAIN/DEVICES: Left arm fistula +B/T  TELEMETRY RHYTHM: N/A  SKIN: Scattered bruises and scabs. L lower arm with old skin tear, WDL. Dry/flaky skin,   TESTS/PROCEDURES: Thoracentesis site, c/d/I.   D/C DAY/GOALS/PLACE: Pending nephrology input; rounding MD will contact nephrology to see if pt is appropriate for discharge tonight.   OTHER IMPORTANT INFO: Dialysis done today.

## 2022-08-30 NOTE — PROGRESS NOTES
[facility]     Renal Progress Note       SHORTHAND KEY FOR MY NOTES:  c = with, s = without, p = after, a = before, x = except, asx = asymptomatic, tx = transplant or treatment, sx = symptoms or symptomatic, cx = canceled or culture, rxn = reaction, yday = yesterday, nl = normal, abx = antibiotics, fxn = function, dx = diagnosis, dz = disease, m/h = melena/hematochezia, c/d/l/ha = cramping/dizziness/lightheadedness/headache, d/c = discharge or diarrhea/constipation, f/c/n/v = fevers/chills/nausea/vomiting, cp/sob = chest pain/shortness of breath, tbv = total body volume, rxn = reaction, tdc = tunneled dialysis catheter, pta = prior to admission, hd = hemodialysis, pd = peritoneal dialysis, hhd = home hemodialysis, edw = estimated dry wt         Assessment/Plan:     1.  ESKD.  Pt is running today to get back on schedule.  We are pushing his EDW a bit today.  A.  Next HD on Thursday as an outpt.  B.  Will call his HD unit c a new EDW.    2.  SOB.  Improved c fluid removal.  A.  Follow clinically.          Interval History:     Pt feels ok today and has no complaints.  No cp/sob.  His breathing feels better than on arrival.  He may go home today.          Medications and Allergies:       - MEDICATION INSTRUCTIONS for Dialysis Patients -   Does not apply See Admin Instructions     acetaminophen  975 mg Oral Q8H     allopurinol  200 mg Oral Daily     aspirin  81 mg Oral At Bedtime     [START ON 8/31/2022] bumetanide  2 mg Oral Once per day on Sun Mon Wed Fri     bumetanide  2 mg Oral Once per day on Sun Mon Wed Fri     cloNIDine  0.1 mg Oral BID     cyclobenzaprine  10 mg Oral BID     ezetimibe  10 mg Oral QAM     folic acid  1 mg Oral QAM     insulin aspart  1-7 Units Subcutaneous TID AC     insulin aspart  1-5 Units Subcutaneous At Bedtime     insulin glargine  8 Units Subcutaneous At Bedtime     lisinopril  20 mg Oral Daily     metoprolol tartrate  100 mg Oral BID     mirtazapine  15 mg Oral  "At Bedtime     - MEDICATION INSTRUCTIONS -   Does not apply Once     sodium chloride (PF)  3 mL Intracatheter Q8H     traZODone  150 mg Oral At Bedtime     vitamin B complex with vitamin C  1 tablet Oral QAM     Vitamin D3  25 mcg Oral QAM     No Known Allergies       Physical Exam:     Vitals were reviewed     , Blood pressure (!) 145/89, pulse 88, temperature 97.9  F (36.6  C), temperature source Oral, resp. rate 18, height 1.727 m (5' 8\"), weight 87.5 kg (192 lb 14.4 oz), SpO2 95 %.  Wt Readings from Last 3 Encounters:   08/29/22 87.5 kg (192 lb 14.4 oz)   08/16/22 88.5 kg (195 lb)   12/24/21 90.7 kg (200 lb)       Intake/Output Summary (Last 24 hours) at 8/30/2022 1215  Last data filed at 8/30/2022 0839  Gross per 24 hour   Intake 716 ml   Output 3000 ml   Net -2284 ml     GENERAL APPEARANCE: pleasant, NAD, alert  HEENT:  eyes/ears/nose/neck grossly nl  RESP: breathing ok  CV: RRR, nl S1/S2   ABDOMEN: o/s/nt/nd, bs present  EXTREMITIES/SKIN: no ble edema    Pt seen on HD.  Stable run.  Good BFR via LAF.  -2.5L UF goal.         Data:     CBC RESULTS:     Recent Labs   Lab 08/29/22  0915 08/28/22  1658   WBC 7.4 8.5   RBC 2.96* 3.26*   HGB 9.4* 10.3*   HCT 28.0* 31.4*    293     Basic Metabolic Panel:  Recent Labs   Lab 08/30/22  0736 08/30/22  0202 08/29/22  2125 08/29/22  1937 08/29/22  1142 08/29/22  0915 08/28/22  2256 08/28/22  1658   NA  --   --   --   --   --  136  --  135   POTASSIUM  --   --   --   --   --  3.7  --  4.6   CHLORIDE  --   --   --   --   --  102  --  101   CO2  --   --   --   --   --  25  --  28   BUN  --   --   --   --   --  55*  --  49*   CR  --   --   --   --   --  5.54*  --  5.14*   * 217* 190* 153* 136* 183*   < > 227*   LC  --   --   --   --   --  7.8*  --  8.1*    < > = values in this interval not displayed.     INR  Recent Labs   Lab 08/28/22  1658   INR 1.85*      Attestation:   I have reviewed today's relevant vital signs, notes, medications, labs and " imaging.    Macario Ferro MD  [facility] - Nephrology  [phone]

## 2023-01-01 ENCOUNTER — APPOINTMENT (OUTPATIENT)
Dept: OCCUPATIONAL THERAPY | Facility: CLINIC | Age: 78
DRG: 682 | End: 2023-01-01
Attending: STUDENT IN AN ORGANIZED HEALTH CARE EDUCATION/TRAINING PROGRAM
Payer: COMMERCIAL

## 2023-01-01 ENCOUNTER — APPOINTMENT (OUTPATIENT)
Dept: INTERVENTIONAL RADIOLOGY/VASCULAR | Facility: CLINIC | Age: 78
DRG: 252 | End: 2023-01-01
Attending: STUDENT IN AN ORGANIZED HEALTH CARE EDUCATION/TRAINING PROGRAM
Payer: COMMERCIAL

## 2023-01-01 ENCOUNTER — HOSPITAL ENCOUNTER (INPATIENT)
Facility: CLINIC | Age: 78
LOS: 8 days | Discharge: NURSING, REHAB OR LONG-TERM CARE | DRG: 682 | End: 2023-04-12
Attending: EMERGENCY MEDICINE | Admitting: STUDENT IN AN ORGANIZED HEALTH CARE EDUCATION/TRAINING PROGRAM
Payer: COMMERCIAL

## 2023-01-01 ENCOUNTER — APPOINTMENT (OUTPATIENT)
Dept: ULTRASOUND IMAGING | Facility: CLINIC | Age: 78
DRG: 682 | End: 2023-01-01
Payer: COMMERCIAL

## 2023-01-01 ENCOUNTER — APPOINTMENT (OUTPATIENT)
Dept: OCCUPATIONAL THERAPY | Facility: CLINIC | Age: 78
DRG: 682 | End: 2023-01-01
Payer: COMMERCIAL

## 2023-01-01 ENCOUNTER — HOSPITAL ENCOUNTER (INPATIENT)
Facility: CLINIC | Age: 78
LOS: 5 days | Discharge: HOSPICE | DRG: 299 | End: 2023-04-25
Attending: EMERGENCY MEDICINE | Admitting: HOSPITALIST
Payer: COMMERCIAL

## 2023-01-01 ENCOUNTER — HOSPITAL ENCOUNTER (INPATIENT)
Facility: CLINIC | Age: 78
LOS: 1 days | Discharge: HOME HEALTH | DRG: 193 | End: 2023-02-19
Attending: EMERGENCY MEDICINE | Admitting: INTERNAL MEDICINE
Payer: COMMERCIAL

## 2023-01-01 ENCOUNTER — APPOINTMENT (OUTPATIENT)
Dept: MRI IMAGING | Facility: CLINIC | Age: 78
DRG: 682 | End: 2023-01-01
Attending: PHYSICIAN ASSISTANT
Payer: COMMERCIAL

## 2023-01-01 ENCOUNTER — APPOINTMENT (OUTPATIENT)
Dept: ULTRASOUND IMAGING | Facility: CLINIC | Age: 78
DRG: 252 | End: 2023-01-01
Attending: PHYSICIAN ASSISTANT
Payer: COMMERCIAL

## 2023-01-01 ENCOUNTER — APPOINTMENT (OUTPATIENT)
Dept: CT IMAGING | Facility: CLINIC | Age: 78
DRG: 193 | End: 2023-01-01
Attending: EMERGENCY MEDICINE
Payer: COMMERCIAL

## 2023-01-01 ENCOUNTER — APPOINTMENT (OUTPATIENT)
Dept: ULTRASOUND IMAGING | Facility: CLINIC | Age: 78
DRG: 193 | End: 2023-01-01
Attending: INTERNAL MEDICINE
Payer: COMMERCIAL

## 2023-01-01 ENCOUNTER — APPOINTMENT (OUTPATIENT)
Dept: GENERAL RADIOLOGY | Facility: CLINIC | Age: 78
DRG: 252 | End: 2023-01-01
Attending: PHYSICIAN ASSISTANT
Payer: COMMERCIAL

## 2023-01-01 ENCOUNTER — APPOINTMENT (OUTPATIENT)
Dept: CARDIOLOGY | Facility: CLINIC | Age: 78
DRG: 252 | End: 2023-01-01
Attending: INTERNAL MEDICINE
Payer: COMMERCIAL

## 2023-01-01 ENCOUNTER — APPOINTMENT (OUTPATIENT)
Dept: GENERAL RADIOLOGY | Facility: CLINIC | Age: 78
DRG: 193 | End: 2023-01-01
Attending: EMERGENCY MEDICINE
Payer: COMMERCIAL

## 2023-01-01 ENCOUNTER — APPOINTMENT (OUTPATIENT)
Dept: ULTRASOUND IMAGING | Facility: CLINIC | Age: 78
DRG: 252 | End: 2023-01-01
Attending: INTERNAL MEDICINE
Payer: COMMERCIAL

## 2023-01-01 ENCOUNTER — PATIENT OUTREACH (OUTPATIENT)
Dept: CARE COORDINATION | Facility: CLINIC | Age: 78
End: 2023-01-01
Payer: COMMERCIAL

## 2023-01-01 ENCOUNTER — APPOINTMENT (OUTPATIENT)
Dept: PHYSICAL THERAPY | Facility: CLINIC | Age: 78
DRG: 682 | End: 2023-01-01
Payer: COMMERCIAL

## 2023-01-01 ENCOUNTER — TRANSFERRED RECORDS (OUTPATIENT)
Dept: HEALTH INFORMATION MANAGEMENT | Facility: CLINIC | Age: 78
End: 2023-01-01

## 2023-01-01 ENCOUNTER — APPOINTMENT (OUTPATIENT)
Dept: PHYSICAL THERAPY | Facility: CLINIC | Age: 78
DRG: 193 | End: 2023-01-01
Attending: INTERNAL MEDICINE
Payer: COMMERCIAL

## 2023-01-01 ENCOUNTER — APPOINTMENT (OUTPATIENT)
Dept: GENERAL RADIOLOGY | Facility: CLINIC | Age: 78
DRG: 252 | End: 2023-01-01
Attending: EMERGENCY MEDICINE
Payer: COMMERCIAL

## 2023-01-01 ENCOUNTER — APPOINTMENT (OUTPATIENT)
Dept: PHYSICAL THERAPY | Facility: CLINIC | Age: 78
DRG: 299 | End: 2023-01-01
Attending: HOSPITALIST
Payer: COMMERCIAL

## 2023-01-01 ENCOUNTER — HOSPITAL ENCOUNTER (INPATIENT)
Facility: CLINIC | Age: 78
LOS: 2 days | Discharge: HOME HEALTH | DRG: 252 | End: 2023-02-12
Attending: EMERGENCY MEDICINE | Admitting: INTERNAL MEDICINE
Payer: COMMERCIAL

## 2023-01-01 ENCOUNTER — APPOINTMENT (OUTPATIENT)
Dept: PHYSICAL THERAPY | Facility: CLINIC | Age: 78
DRG: 682 | End: 2023-01-01
Attending: STUDENT IN AN ORGANIZED HEALTH CARE EDUCATION/TRAINING PROGRAM
Payer: COMMERCIAL

## 2023-01-01 ENCOUNTER — LAB REQUISITION (OUTPATIENT)
Dept: LAB | Facility: CLINIC | Age: 78
End: 2023-01-01
Payer: COMMERCIAL

## 2023-01-01 ENCOUNTER — APPOINTMENT (OUTPATIENT)
Dept: ULTRASOUND IMAGING | Facility: CLINIC | Age: 78
DRG: 682 | End: 2023-01-01
Attending: STUDENT IN AN ORGANIZED HEALTH CARE EDUCATION/TRAINING PROGRAM
Payer: COMMERCIAL

## 2023-01-01 ENCOUNTER — MEDICAL CORRESPONDENCE (OUTPATIENT)
Dept: HEALTH INFORMATION MANAGEMENT | Facility: CLINIC | Age: 78
End: 2023-01-01

## 2023-01-01 ENCOUNTER — MEDICAL CORRESPONDENCE (OUTPATIENT)
Dept: HEALTH INFORMATION MANAGEMENT | Facility: CLINIC | Age: 78
End: 2023-01-01
Payer: COMMERCIAL

## 2023-01-01 ENCOUNTER — APPOINTMENT (OUTPATIENT)
Dept: GENERAL RADIOLOGY | Facility: CLINIC | Age: 78
DRG: 682 | End: 2023-01-01
Attending: EMERGENCY MEDICINE
Payer: COMMERCIAL

## 2023-01-01 ENCOUNTER — APPOINTMENT (OUTPATIENT)
Dept: PHYSICAL THERAPY | Facility: CLINIC | Age: 78
DRG: 193 | End: 2023-01-01
Payer: COMMERCIAL

## 2023-01-01 ENCOUNTER — APPOINTMENT (OUTPATIENT)
Dept: GENERAL RADIOLOGY | Facility: CLINIC | Age: 78
DRG: 299 | End: 2023-01-01
Attending: EMERGENCY MEDICINE
Payer: COMMERCIAL

## 2023-01-01 ENCOUNTER — TELEPHONE (OUTPATIENT)
Dept: OTHER | Facility: CLINIC | Age: 78
End: 2023-01-01

## 2023-01-01 ENCOUNTER — HEALTH MAINTENANCE LETTER (OUTPATIENT)
Age: 78
End: 2023-01-01

## 2023-01-01 ENCOUNTER — APPOINTMENT (OUTPATIENT)
Dept: CT IMAGING | Facility: CLINIC | Age: 78
DRG: 252 | End: 2023-01-01
Attending: EMERGENCY MEDICINE
Payer: COMMERCIAL

## 2023-01-01 ENCOUNTER — APPOINTMENT (OUTPATIENT)
Dept: PHYSICAL THERAPY | Facility: CLINIC | Age: 78
DRG: 252 | End: 2023-01-01
Attending: INTERNAL MEDICINE
Payer: COMMERCIAL

## 2023-01-01 VITALS
SYSTOLIC BLOOD PRESSURE: 118 MMHG | RESPIRATION RATE: 18 BRPM | TEMPERATURE: 99.3 F | BODY MASS INDEX: 26.12 KG/M2 | HEIGHT: 67 IN | HEART RATE: 87 BPM | WEIGHT: 166.4 LBS | OXYGEN SATURATION: 100 % | DIASTOLIC BLOOD PRESSURE: 81 MMHG

## 2023-01-01 VITALS
SYSTOLIC BLOOD PRESSURE: 103 MMHG | WEIGHT: 163.2 LBS | RESPIRATION RATE: 18 BRPM | BODY MASS INDEX: 25.62 KG/M2 | TEMPERATURE: 98.3 F | DIASTOLIC BLOOD PRESSURE: 68 MMHG | OXYGEN SATURATION: 97 % | HEART RATE: 108 BPM | HEIGHT: 67 IN

## 2023-01-01 VITALS
BODY MASS INDEX: 24.67 KG/M2 | HEART RATE: 96 BPM | HEIGHT: 68 IN | TEMPERATURE: 97.6 F | WEIGHT: 162.8 LBS | SYSTOLIC BLOOD PRESSURE: 117 MMHG | OXYGEN SATURATION: 99 % | DIASTOLIC BLOOD PRESSURE: 68 MMHG | RESPIRATION RATE: 18 BRPM

## 2023-01-01 VITALS
BODY MASS INDEX: 25.45 KG/M2 | HEART RATE: 81 BPM | DIASTOLIC BLOOD PRESSURE: 65 MMHG | SYSTOLIC BLOOD PRESSURE: 100 MMHG | RESPIRATION RATE: 17 BRPM | OXYGEN SATURATION: 98 % | WEIGHT: 162.5 LBS | TEMPERATURE: 98.2 F

## 2023-01-01 DIAGNOSIS — I48.0 PAROXYSMAL ATRIAL FIBRILLATION (H): ICD-10-CM

## 2023-01-01 DIAGNOSIS — Z79.4 TYPE 2 DIABETES MELLITUS WITH OTHER SPECIFIED COMPLICATION, WITH LONG-TERM CURRENT USE OF INSULIN (H): ICD-10-CM

## 2023-01-01 DIAGNOSIS — S91.002A OPEN WOUND OF KNEE, LEG, AND ANKLE, LEFT, INITIAL ENCOUNTER: ICD-10-CM

## 2023-01-01 DIAGNOSIS — R06.00 DYSPNEA, UNSPECIFIED TYPE: ICD-10-CM

## 2023-01-01 DIAGNOSIS — D72.829 LEUKOCYTOSIS, UNSPECIFIED TYPE: ICD-10-CM

## 2023-01-01 DIAGNOSIS — Z99.2 ESRD (END STAGE RENAL DISEASE) ON DIALYSIS (H): ICD-10-CM

## 2023-01-01 DIAGNOSIS — M62.81 GENERALIZED MUSCLE WEAKNESS: ICD-10-CM

## 2023-01-01 DIAGNOSIS — S81.801A OPEN WOUND OF BOTH LOWER EXTREMITIES WITH COMPLICATION, INITIAL ENCOUNTER: ICD-10-CM

## 2023-01-01 DIAGNOSIS — Z51.5 HOSPICE CARE PATIENT: Primary | ICD-10-CM

## 2023-01-01 DIAGNOSIS — N18.6 ESRD (END STAGE RENAL DISEASE) ON DIALYSIS (H): ICD-10-CM

## 2023-01-01 DIAGNOSIS — E87.70 HYPERVOLEMIA, UNSPECIFIED HYPERVOLEMIA TYPE: ICD-10-CM

## 2023-01-01 DIAGNOSIS — R00.0 TACHYCARDIA: ICD-10-CM

## 2023-01-01 DIAGNOSIS — E87.5 HYPERKALEMIA: ICD-10-CM

## 2023-01-01 DIAGNOSIS — S81.802A OPEN WOUND OF KNEE, LEG, AND ANKLE, LEFT, INITIAL ENCOUNTER: ICD-10-CM

## 2023-01-01 DIAGNOSIS — N39.0 URINARY TRACT INFECTION WITHOUT HEMATURIA, SITE UNSPECIFIED: ICD-10-CM

## 2023-01-01 DIAGNOSIS — I48.92 ATRIAL FLUTTER WITH RAPID VENTRICULAR RESPONSE (H): Primary | ICD-10-CM

## 2023-01-01 DIAGNOSIS — E11.10 DIABETIC KETOACIDOSIS WITHOUT COMA ASSOCIATED WITH TYPE 2 DIABETES MELLITUS (H): ICD-10-CM

## 2023-01-01 DIAGNOSIS — I10 HYPERTENSION, UNSPECIFIED TYPE: ICD-10-CM

## 2023-01-01 DIAGNOSIS — I48.91 ATRIAL FIBRILLATION WITH RVR (H): ICD-10-CM

## 2023-01-01 DIAGNOSIS — R58 ACUTE HEMORRHAGE: ICD-10-CM

## 2023-01-01 DIAGNOSIS — E11.69 TYPE 2 DIABETES MELLITUS WITH OTHER SPECIFIED COMPLICATION, WITH LONG-TERM CURRENT USE OF INSULIN (H): ICD-10-CM

## 2023-01-01 DIAGNOSIS — S81.802A OPEN WOUND OF BOTH LOWER EXTREMITIES WITH COMPLICATION, INITIAL ENCOUNTER: ICD-10-CM

## 2023-01-01 DIAGNOSIS — D72.829 ELEVATED WHITE BLOOD CELL COUNT, UNSPECIFIED: ICD-10-CM

## 2023-01-01 DIAGNOSIS — R53.1 GENERALIZED WEAKNESS: ICD-10-CM

## 2023-01-01 DIAGNOSIS — R07.81 PLEURITIC CHEST PAIN: ICD-10-CM
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
## 2023-01-01 DIAGNOSIS — Z91.148 NONCOMPLIANCE WITH MEDICATION REGIMEN: ICD-10-CM

## 2023-01-01 DIAGNOSIS — R79.1 SUPRATHERAPEUTIC INR: ICD-10-CM

## 2023-01-01 DIAGNOSIS — I48.0 PAROXYSMAL ATRIAL FIBRILLATION (H): Primary | ICD-10-CM

## 2023-01-01 DIAGNOSIS — N18.6 END STAGE RENAL DISEASE (H): ICD-10-CM

## 2023-01-01 DIAGNOSIS — S81.002A OPEN WOUND OF KNEE, LEG, AND ANKLE, LEFT, INITIAL ENCOUNTER: ICD-10-CM

## 2023-01-01 LAB
ABO/RH(D): ABNORMAL
ABO/RH(D): NORMAL
ABO/RH(D): NORMAL
ALBUMIN SERPL BCG-MCNC: 2.3 G/DL (ref 3.5–5.2)
ALBUMIN SERPL BCG-MCNC: 2.5 G/DL (ref 3.5–5.2)
ALBUMIN SERPL BCG-MCNC: 3 G/DL (ref 3.5–5.2)
ALBUMIN SERPL BCG-MCNC: 3 G/DL (ref 3.5–5.2)
ALBUMIN UR-MCNC: 100 MG/DL
ALBUMIN UR-MCNC: 200 MG/DL
ALP SERPL-CCNC: 276 U/L (ref 40–129)
ALP SERPL-CCNC: 285 U/L (ref 40–129)
ALP SERPL-CCNC: 363 U/L (ref 40–129)
ALP SERPL-CCNC: 404 U/L (ref 40–129)
ALP SERPL-CCNC: 439 U/L (ref 40–129)
ALT SERPL W P-5'-P-CCNC: 12 U/L (ref 10–50)
ALT SERPL W P-5'-P-CCNC: 14 U/L (ref 10–50)
ALT SERPL W P-5'-P-CCNC: 15 U/L (ref 10–50)
ALT SERPL W P-5'-P-CCNC: 15 U/L (ref 10–50)
ALT SERPL W P-5'-P-CCNC: 31 U/L (ref 10–50)
AMORPH CRY #/AREA URNS HPF: ABNORMAL /HPF
ANION GAP SERPL CALCULATED.3IONS-SCNC: 11 MMOL/L (ref 7–15)
ANION GAP SERPL CALCULATED.3IONS-SCNC: 12 MMOL/L (ref 7–15)
ANION GAP SERPL CALCULATED.3IONS-SCNC: 13 MMOL/L (ref 7–15)
ANION GAP SERPL CALCULATED.3IONS-SCNC: 13 MMOL/L (ref 7–15)
ANION GAP SERPL CALCULATED.3IONS-SCNC: 14 MMOL/L (ref 7–15)
ANION GAP SERPL CALCULATED.3IONS-SCNC: 15 MMOL/L (ref 7–15)
ANION GAP SERPL CALCULATED.3IONS-SCNC: 16 MMOL/L (ref 7–15)
ANION GAP SERPL CALCULATED.3IONS-SCNC: 16 MMOL/L (ref 7–15)
ANION GAP SERPL CALCULATED.3IONS-SCNC: 18 MMOL/L (ref 7–15)
ANION GAP SERPL CALCULATED.3IONS-SCNC: 18 MMOL/L (ref 7–15)
ANION GAP SERPL CALCULATED.3IONS-SCNC: 19 MMOL/L (ref 7–15)
ANION GAP SERPL CALCULATED.3IONS-SCNC: 20 MMOL/L (ref 7–15)
ANION GAP SERPL CALCULATED.3IONS-SCNC: 22 MMOL/L (ref 7–15)
ANION GAP SERPL CALCULATED.3IONS-SCNC: 22 MMOL/L (ref 7–15)
ANTIBODY SCREEN, TUBE: NORMAL
ANTIBODY SCREEN: NEGATIVE
ANTIBODY SCREEN: NEGATIVE
ANTIBODY SCREEN: POSITIVE
ANTIBODY UNIDENTIFIED: NORMAL
APPEARANCE FLD: ABNORMAL
APPEARANCE FLD: ABNORMAL
APPEARANCE UR: ABNORMAL
APPEARANCE UR: ABNORMAL
APTT PPP: 54 SECONDS (ref 22–38)
AST SERPL W P-5'-P-CCNC: 17 U/L (ref 10–50)
AST SERPL W P-5'-P-CCNC: 20 U/L (ref 10–50)
AST SERPL W P-5'-P-CCNC: 21 U/L (ref 10–50)
AST SERPL W P-5'-P-CCNC: 25 U/L (ref 10–50)
AST SERPL W P-5'-P-CCNC: 41 U/L (ref 10–50)
ATRIAL RATE - MUSE: 112 BPM
ATRIAL RATE - MUSE: 115 BPM
ATRIAL RATE - MUSE: 121 BPM
ATRIAL RATE - MUSE: 133 BPM
ATRIAL RATE - MUSE: 288 BPM
ATRIAL RATE - MUSE: 60 BPM
ATRIAL RATE - MUSE: NORMAL BPM
BACTERIA #/AREA URNS HPF: ABNORMAL /HPF
BACTERIA BLD CULT: NO GROWTH
BACTERIA PLR CULT: NO GROWTH
BACTERIA UR CULT: ABNORMAL
BACTERIA UR CULT: NO GROWTH
BASOPHILS # BLD AUTO: 0 10E3/UL (ref 0–0.2)
BASOPHILS NFR BLD AUTO: 0 %
BILIRUB DIRECT SERPL-MCNC: 0.21 MG/DL (ref 0–0.3)
BILIRUB SERPL-MCNC: 0.3 MG/DL
BILIRUB SERPL-MCNC: 0.4 MG/DL
BILIRUB SERPL-MCNC: 0.4 MG/DL
BILIRUB SERPL-MCNC: 0.5 MG/DL
BILIRUB SERPL-MCNC: 0.5 MG/DL
BILIRUB UR QL STRIP: NEGATIVE
BILIRUB UR QL STRIP: NEGATIVE
BLD PROD TYP BPU: NORMAL
BLOOD COMPONENT TYPE: NORMAL
BUN SERPL-MCNC: 115.7 MG/DL (ref 8–23)
BUN SERPL-MCNC: 28.2 MG/DL (ref 8–23)
BUN SERPL-MCNC: 31.9 MG/DL (ref 8–23)
BUN SERPL-MCNC: 34.6 MG/DL (ref 8–23)
BUN SERPL-MCNC: 39 MG/DL (ref 8–23)
BUN SERPL-MCNC: 39.9 MG/DL (ref 8–23)
BUN SERPL-MCNC: 41.6 MG/DL (ref 8–23)
BUN SERPL-MCNC: 45.7 MG/DL (ref 8–23)
BUN SERPL-MCNC: 47.6 MG/DL (ref 8–23)
BUN SERPL-MCNC: 47.8 MG/DL (ref 8–23)
BUN SERPL-MCNC: 48 MG/DL (ref 8–23)
BUN SERPL-MCNC: 51.7 MG/DL (ref 8–23)
BUN SERPL-MCNC: 52.6 MG/DL (ref 8–23)
BUN SERPL-MCNC: 59.4 MG/DL (ref 8–23)
BUN SERPL-MCNC: 76.2 MG/DL (ref 8–23)
BUN SERPL-MCNC: 84.3 MG/DL (ref 8–23)
BUN SERPL-MCNC: 84.3 MG/DL (ref 8–23)
CALCIUM SERPL-MCNC: 6.9 MG/DL (ref 8.8–10.2)
CALCIUM SERPL-MCNC: 7.1 MG/DL (ref 8.8–10.2)
CALCIUM SERPL-MCNC: 7.2 MG/DL (ref 8.8–10.2)
CALCIUM SERPL-MCNC: 7.2 MG/DL (ref 8.8–10.2)
CALCIUM SERPL-MCNC: 7.3 MG/DL (ref 8.8–10.2)
CALCIUM SERPL-MCNC: 7.5 MG/DL (ref 8.8–10.2)
CALCIUM SERPL-MCNC: 7.8 MG/DL (ref 8.8–10.2)
CALCIUM SERPL-MCNC: 7.9 MG/DL (ref 8.8–10.2)
CALCIUM SERPL-MCNC: 7.9 MG/DL (ref 8.8–10.2)
CALCIUM SERPL-MCNC: 8 MG/DL (ref 8.8–10.2)
CALCIUM SERPL-MCNC: 8.1 MG/DL (ref 8.8–10.2)
CALCIUM SERPL-MCNC: 8.4 MG/DL (ref 8.8–10.2)
CELL COUNT BODY FLUID SOURCE: ABNORMAL
CELL COUNT BODY FLUID SOURCE: ABNORMAL
CHLORIDE SERPL-SCNC: 92 MMOL/L (ref 98–107)
CHLORIDE SERPL-SCNC: 93 MMOL/L (ref 98–107)
CHLORIDE SERPL-SCNC: 93 MMOL/L (ref 98–107)
CHLORIDE SERPL-SCNC: 94 MMOL/L (ref 98–107)
CHLORIDE SERPL-SCNC: 95 MMOL/L (ref 98–107)
CHLORIDE SERPL-SCNC: 96 MMOL/L (ref 98–107)
CHLORIDE SERPL-SCNC: 97 MMOL/L (ref 98–107)
CODING SYSTEM: NORMAL
COLOR FLD: ABNORMAL
COLOR FLD: ABNORMAL
COLOR UR AUTO: YELLOW
COLOR UR AUTO: YELLOW
CREAT SERPL-MCNC: 2.87 MG/DL (ref 0.67–1.17)
CREAT SERPL-MCNC: 3.26 MG/DL (ref 0.67–1.17)
CREAT SERPL-MCNC: 3.49 MG/DL (ref 0.67–1.17)
CREAT SERPL-MCNC: 3.76 MG/DL (ref 0.67–1.17)
CREAT SERPL-MCNC: 4.07 MG/DL (ref 0.67–1.17)
CREAT SERPL-MCNC: 4.44 MG/DL (ref 0.67–1.17)
CREAT SERPL-MCNC: 4.74 MG/DL (ref 0.67–1.17)
CREAT SERPL-MCNC: 4.81 MG/DL (ref 0.67–1.17)
CREAT SERPL-MCNC: 4.9 MG/DL (ref 0.67–1.17)
CREAT SERPL-MCNC: 5.04 MG/DL (ref 0.67–1.17)
CREAT SERPL-MCNC: 5.35 MG/DL (ref 0.67–1.17)
CREAT SERPL-MCNC: 5.38 MG/DL (ref 0.67–1.17)
CREAT SERPL-MCNC: 5.6 MG/DL (ref 0.67–1.17)
CREAT SERPL-MCNC: 6.23 MG/DL (ref 0.67–1.17)
CREAT SERPL-MCNC: 6.23 MG/DL (ref 0.67–1.17)
CREAT SERPL-MCNC: 6.9 MG/DL (ref 0.67–1.17)
CREAT SERPL-MCNC: 7.97 MG/DL (ref 0.67–1.17)
CROSSMATCH: NORMAL
CRP SERPL-MCNC: 107.47 MG/L
CRP SERPL-MCNC: 114.53 MG/L
CRP SERPL-MCNC: 137.91 MG/L
CRP SERPL-MCNC: 140.53 MG/L
CRP SERPL-MCNC: 144.37 MG/L
CRP SERPL-MCNC: 150.55 MG/L
CRP SERPL-MCNC: 151.68 MG/L
CRP SERPL-MCNC: 173.05 MG/L
CRP SERPL-MCNC: 328.88 MG/L
CRP SERPL-MCNC: 58.5 MG/L
DEPRECATED HCO3 PLAS-SCNC: 19 MMOL/L (ref 22–29)
DEPRECATED HCO3 PLAS-SCNC: 20 MMOL/L (ref 22–29)
DEPRECATED HCO3 PLAS-SCNC: 22 MMOL/L (ref 22–29)
DEPRECATED HCO3 PLAS-SCNC: 23 MMOL/L (ref 22–29)
DEPRECATED HCO3 PLAS-SCNC: 23 MMOL/L (ref 22–29)
DEPRECATED HCO3 PLAS-SCNC: 25 MMOL/L (ref 22–29)
DEPRECATED HCO3 PLAS-SCNC: 26 MMOL/L (ref 22–29)
DEPRECATED HCO3 PLAS-SCNC: 26 MMOL/L (ref 22–29)
DEPRECATED HCO3 PLAS-SCNC: 27 MMOL/L (ref 22–29)
DEPRECATED HCO3 PLAS-SCNC: 28 MMOL/L (ref 22–29)
DEPRECATED HCO3 PLAS-SCNC: 29 MMOL/L (ref 22–29)
DEPRECATED HCO3 PLAS-SCNC: 30 MMOL/L (ref 22–29)
DEPRECATED HCO3 PLAS-SCNC: 30 MMOL/L (ref 22–29)
DIASTOLIC BLOOD PRESSURE - MUSE: NORMAL MMHG
EOSINOPHIL # BLD AUTO: 0 10E3/UL (ref 0–0.7)
EOSINOPHIL # BLD AUTO: 0.1 10E3/UL (ref 0–0.7)
EOSINOPHIL # BLD AUTO: 0.1 10E3/UL (ref 0–0.7)
EOSINOPHIL # BLD AUTO: 0.2 10E3/UL (ref 0–0.7)
EOSINOPHIL NFR BLD AUTO: 0 %
EOSINOPHIL NFR BLD AUTO: 1 %
ERYTHROCYTE [DISTWIDTH] IN BLOOD BY AUTOMATED COUNT: 14.9 % (ref 10–15)
ERYTHROCYTE [DISTWIDTH] IN BLOOD BY AUTOMATED COUNT: 15.1 % (ref 10–15)
ERYTHROCYTE [DISTWIDTH] IN BLOOD BY AUTOMATED COUNT: 15.9 % (ref 10–15)
ERYTHROCYTE [DISTWIDTH] IN BLOOD BY AUTOMATED COUNT: 15.9 % (ref 10–15)
ERYTHROCYTE [DISTWIDTH] IN BLOOD BY AUTOMATED COUNT: 16 % (ref 10–15)
ERYTHROCYTE [DISTWIDTH] IN BLOOD BY AUTOMATED COUNT: 16 % (ref 10–15)
ERYTHROCYTE [DISTWIDTH] IN BLOOD BY AUTOMATED COUNT: 16.3 % (ref 10–15)
ERYTHROCYTE [DISTWIDTH] IN BLOOD BY AUTOMATED COUNT: 16.3 % (ref 10–15)
ERYTHROCYTE [DISTWIDTH] IN BLOOD BY AUTOMATED COUNT: 16.4 % (ref 10–15)
ERYTHROCYTE [DISTWIDTH] IN BLOOD BY AUTOMATED COUNT: 16.5 % (ref 10–15)
ERYTHROCYTE [DISTWIDTH] IN BLOOD BY AUTOMATED COUNT: 16.5 % (ref 10–15)
ERYTHROCYTE [DISTWIDTH] IN BLOOD BY AUTOMATED COUNT: 16.6 % (ref 10–15)
ERYTHROCYTE [DISTWIDTH] IN BLOOD BY AUTOMATED COUNT: 16.6 % (ref 10–15)
ERYTHROCYTE [DISTWIDTH] IN BLOOD BY AUTOMATED COUNT: 16.8 % (ref 10–15)
ERYTHROCYTE [DISTWIDTH] IN BLOOD BY AUTOMATED COUNT: 16.9 % (ref 10–15)
ERYTHROCYTE [DISTWIDTH] IN BLOOD BY AUTOMATED COUNT: 17.1 % (ref 10–15)
ERYTHROCYTE [DISTWIDTH] IN BLOOD BY AUTOMATED COUNT: 17.3 % (ref 10–15)
ERYTHROCYTE [DISTWIDTH] IN BLOOD BY AUTOMATED COUNT: 17.5 % (ref 10–15)
ERYTHROCYTE [SEDIMENTATION RATE] IN BLOOD BY WESTERGREN METHOD: 108 MM/HR (ref 0–20)
FLUAV RNA SPEC QL NAA+PROBE: NEGATIVE
FLUBV RNA RESP QL NAA+PROBE: NEGATIVE
GFR SERPL CREATININE-BSD FRML MDRD: 10 ML/MIN/1.73M2
GFR SERPL CREATININE-BSD FRML MDRD: 11 ML/MIN/1.73M2
GFR SERPL CREATININE-BSD FRML MDRD: 12 ML/MIN/1.73M2
GFR SERPL CREATININE-BSD FRML MDRD: 13 ML/MIN/1.73M2
GFR SERPL CREATININE-BSD FRML MDRD: 14 ML/MIN/1.73M2
GFR SERPL CREATININE-BSD FRML MDRD: 16 ML/MIN/1.73M2
GFR SERPL CREATININE-BSD FRML MDRD: 17 ML/MIN/1.73M2
GFR SERPL CREATININE-BSD FRML MDRD: 19 ML/MIN/1.73M2
GFR SERPL CREATININE-BSD FRML MDRD: 22 ML/MIN/1.73M2
GFR SERPL CREATININE-BSD FRML MDRD: 6 ML/MIN/1.73M2
GFR SERPL CREATININE-BSD FRML MDRD: 8 ML/MIN/1.73M2
GFR SERPL CREATININE-BSD FRML MDRD: 9 ML/MIN/1.73M2
GFR SERPL CREATININE-BSD FRML MDRD: 9 ML/MIN/1.73M2
GLUCOSE BLDC GLUCOMTR-MCNC: 110 MG/DL (ref 70–99)
GLUCOSE BLDC GLUCOMTR-MCNC: 112 MG/DL (ref 70–99)
GLUCOSE BLDC GLUCOMTR-MCNC: 117 MG/DL (ref 70–99)
GLUCOSE BLDC GLUCOMTR-MCNC: 117 MG/DL (ref 70–99)
GLUCOSE BLDC GLUCOMTR-MCNC: 128 MG/DL (ref 70–99)
GLUCOSE BLDC GLUCOMTR-MCNC: 128 MG/DL (ref 70–99)
GLUCOSE BLDC GLUCOMTR-MCNC: 133 MG/DL (ref 70–99)
GLUCOSE BLDC GLUCOMTR-MCNC: 134 MG/DL (ref 70–99)
GLUCOSE BLDC GLUCOMTR-MCNC: 134 MG/DL (ref 70–99)
GLUCOSE BLDC GLUCOMTR-MCNC: 137 MG/DL (ref 70–99)
GLUCOSE BLDC GLUCOMTR-MCNC: 138 MG/DL (ref 70–99)
GLUCOSE BLDC GLUCOMTR-MCNC: 142 MG/DL (ref 70–99)
GLUCOSE BLDC GLUCOMTR-MCNC: 144 MG/DL (ref 70–99)
GLUCOSE BLDC GLUCOMTR-MCNC: 145 MG/DL (ref 70–99)
GLUCOSE BLDC GLUCOMTR-MCNC: 147 MG/DL (ref 70–99)
GLUCOSE BLDC GLUCOMTR-MCNC: 147 MG/DL (ref 70–99)
GLUCOSE BLDC GLUCOMTR-MCNC: 148 MG/DL (ref 70–99)
GLUCOSE BLDC GLUCOMTR-MCNC: 153 MG/DL (ref 70–99)
GLUCOSE BLDC GLUCOMTR-MCNC: 156 MG/DL (ref 70–99)
GLUCOSE BLDC GLUCOMTR-MCNC: 157 MG/DL (ref 70–99)
GLUCOSE BLDC GLUCOMTR-MCNC: 163 MG/DL (ref 70–99)
GLUCOSE BLDC GLUCOMTR-MCNC: 163 MG/DL (ref 70–99)
GLUCOSE BLDC GLUCOMTR-MCNC: 164 MG/DL (ref 70–99)
GLUCOSE BLDC GLUCOMTR-MCNC: 168 MG/DL (ref 70–99)
GLUCOSE BLDC GLUCOMTR-MCNC: 170 MG/DL (ref 70–99)
GLUCOSE BLDC GLUCOMTR-MCNC: 174 MG/DL (ref 70–99)
GLUCOSE BLDC GLUCOMTR-MCNC: 176 MG/DL (ref 70–99)
GLUCOSE BLDC GLUCOMTR-MCNC: 179 MG/DL (ref 70–99)
GLUCOSE BLDC GLUCOMTR-MCNC: 180 MG/DL (ref 70–99)
GLUCOSE BLDC GLUCOMTR-MCNC: 183 MG/DL (ref 70–99)
GLUCOSE BLDC GLUCOMTR-MCNC: 184 MG/DL (ref 70–99)
GLUCOSE BLDC GLUCOMTR-MCNC: 189 MG/DL (ref 70–99)
GLUCOSE BLDC GLUCOMTR-MCNC: 190 MG/DL (ref 70–99)
GLUCOSE BLDC GLUCOMTR-MCNC: 192 MG/DL (ref 70–99)
GLUCOSE BLDC GLUCOMTR-MCNC: 196 MG/DL (ref 70–99)
GLUCOSE BLDC GLUCOMTR-MCNC: 198 MG/DL (ref 70–99)
GLUCOSE BLDC GLUCOMTR-MCNC: 198 MG/DL (ref 70–99)
GLUCOSE BLDC GLUCOMTR-MCNC: 199 MG/DL (ref 70–99)
GLUCOSE BLDC GLUCOMTR-MCNC: 200 MG/DL (ref 70–99)
GLUCOSE BLDC GLUCOMTR-MCNC: 202 MG/DL (ref 70–99)
GLUCOSE BLDC GLUCOMTR-MCNC: 204 MG/DL (ref 70–99)
GLUCOSE BLDC GLUCOMTR-MCNC: 206 MG/DL (ref 70–99)
GLUCOSE BLDC GLUCOMTR-MCNC: 211 MG/DL (ref 70–99)
GLUCOSE BLDC GLUCOMTR-MCNC: 211 MG/DL (ref 70–99)
GLUCOSE BLDC GLUCOMTR-MCNC: 212 MG/DL (ref 70–99)
GLUCOSE BLDC GLUCOMTR-MCNC: 213 MG/DL (ref 70–99)
GLUCOSE BLDC GLUCOMTR-MCNC: 216 MG/DL (ref 70–99)
GLUCOSE BLDC GLUCOMTR-MCNC: 220 MG/DL (ref 70–99)
GLUCOSE BLDC GLUCOMTR-MCNC: 220 MG/DL (ref 70–99)
GLUCOSE BLDC GLUCOMTR-MCNC: 223 MG/DL (ref 70–99)
GLUCOSE BLDC GLUCOMTR-MCNC: 227 MG/DL (ref 70–99)
GLUCOSE BLDC GLUCOMTR-MCNC: 228 MG/DL (ref 70–99)
GLUCOSE BLDC GLUCOMTR-MCNC: 234 MG/DL (ref 70–99)
GLUCOSE BLDC GLUCOMTR-MCNC: 237 MG/DL (ref 70–99)
GLUCOSE BLDC GLUCOMTR-MCNC: 237 MG/DL (ref 70–99)
GLUCOSE BLDC GLUCOMTR-MCNC: 239 MG/DL (ref 70–99)
GLUCOSE BLDC GLUCOMTR-MCNC: 242 MG/DL (ref 70–99)
GLUCOSE BLDC GLUCOMTR-MCNC: 243 MG/DL (ref 70–99)
GLUCOSE BLDC GLUCOMTR-MCNC: 244 MG/DL (ref 70–99)
GLUCOSE BLDC GLUCOMTR-MCNC: 247 MG/DL (ref 70–99)
GLUCOSE BLDC GLUCOMTR-MCNC: 251 MG/DL (ref 70–99)
GLUCOSE BLDC GLUCOMTR-MCNC: 262 MG/DL (ref 70–99)
GLUCOSE BLDC GLUCOMTR-MCNC: 265 MG/DL (ref 70–99)
GLUCOSE BLDC GLUCOMTR-MCNC: 266 MG/DL (ref 70–99)
GLUCOSE BLDC GLUCOMTR-MCNC: 276 MG/DL (ref 70–99)
GLUCOSE BLDC GLUCOMTR-MCNC: 283 MG/DL (ref 70–99)
GLUCOSE BLDC GLUCOMTR-MCNC: 288 MG/DL (ref 70–99)
GLUCOSE BLDC GLUCOMTR-MCNC: 292 MG/DL (ref 70–99)
GLUCOSE BLDC GLUCOMTR-MCNC: 310 MG/DL (ref 70–99)
GLUCOSE BLDC GLUCOMTR-MCNC: 315 MG/DL (ref 70–99)
GLUCOSE BLDC GLUCOMTR-MCNC: 315 MG/DL (ref 70–99)
GLUCOSE BLDC GLUCOMTR-MCNC: 324 MG/DL (ref 70–99)
GLUCOSE BLDC GLUCOMTR-MCNC: 341 MG/DL (ref 70–99)
GLUCOSE BLDC GLUCOMTR-MCNC: 375 MG/DL (ref 70–99)
GLUCOSE BLDC GLUCOMTR-MCNC: 441 MG/DL (ref 70–99)
GLUCOSE BLDC GLUCOMTR-MCNC: 453 MG/DL (ref 70–99)
GLUCOSE BLDC GLUCOMTR-MCNC: 56 MG/DL (ref 70–99)
GLUCOSE BLDC GLUCOMTR-MCNC: 63 MG/DL (ref 70–99)
GLUCOSE BLDC GLUCOMTR-MCNC: 65 MG/DL (ref 70–99)
GLUCOSE BLDC GLUCOMTR-MCNC: 67 MG/DL (ref 70–99)
GLUCOSE BLDC GLUCOMTR-MCNC: 68 MG/DL (ref 70–99)
GLUCOSE BLDC GLUCOMTR-MCNC: 73 MG/DL (ref 70–99)
GLUCOSE BLDC GLUCOMTR-MCNC: 79 MG/DL (ref 70–99)
GLUCOSE BLDC GLUCOMTR-MCNC: 83 MG/DL (ref 70–99)
GLUCOSE BLDC GLUCOMTR-MCNC: 86 MG/DL (ref 70–99)
GLUCOSE BLDC GLUCOMTR-MCNC: 90 MG/DL (ref 70–99)
GLUCOSE BLDC GLUCOMTR-MCNC: 99 MG/DL (ref 70–99)
GLUCOSE BODY FLUID SOURCE: NORMAL
GLUCOSE FLD-MCNC: 153 MG/DL
GLUCOSE SERPL-MCNC: 102 MG/DL (ref 70–99)
GLUCOSE SERPL-MCNC: 118 MG/DL (ref 70–99)
GLUCOSE SERPL-MCNC: 124 MG/DL (ref 70–99)
GLUCOSE SERPL-MCNC: 124 MG/DL (ref 70–99)
GLUCOSE SERPL-MCNC: 150 MG/DL (ref 70–99)
GLUCOSE SERPL-MCNC: 155 MG/DL (ref 70–99)
GLUCOSE SERPL-MCNC: 165 MG/DL (ref 70–99)
GLUCOSE SERPL-MCNC: 171 MG/DL (ref 70–99)
GLUCOSE SERPL-MCNC: 197 MG/DL (ref 70–99)
GLUCOSE SERPL-MCNC: 204 MG/DL (ref 70–99)
GLUCOSE SERPL-MCNC: 216 MG/DL (ref 70–99)
GLUCOSE SERPL-MCNC: 217 MG/DL (ref 70–99)
GLUCOSE SERPL-MCNC: 220 MG/DL (ref 70–99)
GLUCOSE SERPL-MCNC: 238 MG/DL (ref 70–99)
GLUCOSE SERPL-MCNC: 238 MG/DL (ref 70–99)
GLUCOSE SERPL-MCNC: 246 MG/DL (ref 70–99)
GLUCOSE SERPL-MCNC: 263 MG/DL (ref 70–99)
GLUCOSE SERPL-MCNC: 329 MG/DL (ref 70–99)
GLUCOSE UR STRIP-MCNC: 100 MG/DL
GLUCOSE UR STRIP-MCNC: 200 MG/DL
GRAM STAIN RESULT: NORMAL
GRAM STAIN RESULT: NORMAL
HBA1C MFR BLD: 9.1 %
HBV SURFACE AB SERPL IA-ACNC: 328.43 M[IU]/ML
HBV SURFACE AB SERPL IA-ACNC: REACTIVE M[IU]/ML
HBV SURFACE AG SERPL QL IA: NONREACTIVE
HCO3 BLDV-SCNC: 23 MMOL/L (ref 21–28)
HCO3 BLDV-SCNC: 24 MMOL/L (ref 21–28)
HCT VFR BLD AUTO: 22.3 % (ref 40–53)
HCT VFR BLD AUTO: 22.5 % (ref 40–53)
HCT VFR BLD AUTO: 24.6 % (ref 40–53)
HCT VFR BLD AUTO: 24.7 % (ref 40–53)
HCT VFR BLD AUTO: 24.9 % (ref 40–53)
HCT VFR BLD AUTO: 25.5 % (ref 40–53)
HCT VFR BLD AUTO: 26.4 % (ref 40–53)
HCT VFR BLD AUTO: 27.1 % (ref 40–53)
HCT VFR BLD AUTO: 28.3 % (ref 40–53)
HCT VFR BLD AUTO: 28.8 % (ref 40–53)
HCT VFR BLD AUTO: 29.1 % (ref 40–53)
HCT VFR BLD AUTO: 29.4 % (ref 40–53)
HCT VFR BLD AUTO: 29.6 % (ref 40–53)
HCT VFR BLD AUTO: 29.8 % (ref 40–53)
HCT VFR BLD AUTO: 30.7 % (ref 40–53)
HCT VFR BLD AUTO: 31.8 % (ref 40–53)
HCT VFR BLD AUTO: 32.1 % (ref 40–53)
HCT VFR BLD AUTO: 35.7 % (ref 40–53)
HGB BLD-MCNC: 10.8 G/DL (ref 13.3–17.7)
HGB BLD-MCNC: 6.8 G/DL (ref 13.3–17.7)
HGB BLD-MCNC: 7.1 G/DL (ref 13.3–17.7)
HGB BLD-MCNC: 7.5 G/DL (ref 13.3–17.7)
HGB BLD-MCNC: 7.8 G/DL (ref 13.3–17.7)
HGB BLD-MCNC: 7.9 G/DL (ref 13.3–17.7)
HGB BLD-MCNC: 8 G/DL (ref 13.3–17.7)
HGB BLD-MCNC: 8.3 G/DL (ref 13.3–17.7)
HGB BLD-MCNC: 8.5 G/DL (ref 13.3–17.7)
HGB BLD-MCNC: 8.6 G/DL (ref 13.3–17.7)
HGB BLD-MCNC: 8.7 G/DL (ref 13.3–17.7)
HGB BLD-MCNC: 8.8 G/DL (ref 13.3–17.7)
HGB BLD-MCNC: 8.9 G/DL (ref 13.3–17.7)
HGB BLD-MCNC: 9.2 G/DL (ref 13.3–17.7)
HGB BLD-MCNC: 9.3 G/DL (ref 13.3–17.7)
HGB BLD-MCNC: 9.3 G/DL (ref 13.3–17.7)
HGB BLD-MCNC: 9.4 G/DL (ref 13.3–17.7)
HGB BLD-MCNC: 9.8 G/DL (ref 13.3–17.7)
HGB UR QL STRIP: ABNORMAL
HGB UR QL STRIP: ABNORMAL
HOLD SPECIMEN: NORMAL
HYALINE CASTS: 1 /LPF
IMM GRANULOCYTES # BLD: 0 10E3/UL
IMM GRANULOCYTES # BLD: 0.1 10E3/UL
IMM GRANULOCYTES NFR BLD: 0 %
IMM GRANULOCYTES NFR BLD: 1 %
INR PPP: 1.66 (ref 0.85–1.15)
INR PPP: 1.83 (ref 0.85–1.15)
INR PPP: 1.87 (ref 0.85–1.15)
INR PPP: 1.87 (ref 0.85–1.15)
INR PPP: 2.02 (ref 0.85–1.15)
INR PPP: 2.07 (ref 0.85–1.15)
INR PPP: 2.09 (ref 0.85–1.15)
INR PPP: 2.17 (ref 0.85–1.15)
INR PPP: 2.17 (ref 0.85–1.15)
INR PPP: 2.21 (ref 0.85–1.15)
INR PPP: 2.35 (ref 0.85–1.15)
INR PPP: 2.48 (ref 0.85–1.15)
INR PPP: 2.49 (ref 0.85–1.15)
INR PPP: 2.49 (ref 0.85–1.15)
INR PPP: 2.5 (ref 0.85–1.15)
INR PPP: 2.55 (ref 0.85–1.15)
INR PPP: 2.55 (ref 0.85–1.15)
INR PPP: 2.91 (ref 0.85–1.15)
INR PPP: 3.32 (ref 0.85–1.15)
INR PPP: 3.38 (ref 0.85–1.15)
INR PPP: 3.56 (ref 0.85–1.15)
INR PPP: 3.6 (ref 0.85–1.15)
INR PPP: 3.87 (ref 0.85–1.15)
INR PPP: 4.55 (ref 0.85–1.15)
INR PPP: 9.62 (ref 0.85–1.15)
INR PPP: 9.87 (ref 0.85–1.15)
INTERPRETATION ECG - MUSE: NORMAL
ISSUE DATE AND TIME: NORMAL
KETONES UR STRIP-MCNC: NEGATIVE MG/DL
KETONES UR STRIP-MCNC: NEGATIVE MG/DL
LACTATE BLD-SCNC: 1.2 MMOL/L
LACTATE BLD-SCNC: 1.5 MMOL/L
LACTATE SERPL-SCNC: 0.8 MMOL/L (ref 0.7–2)
LACTATE SERPL-SCNC: 0.9 MMOL/L (ref 0.7–2)
LACTATE SERPL-SCNC: 0.9 MMOL/L (ref 0.7–2)
LACTATE SERPL-SCNC: 1 MMOL/L (ref 0.7–2)
LACTATE SERPL-SCNC: 1 MMOL/L (ref 0.7–2)
LACTATE SERPL-SCNC: 1.2 MMOL/L (ref 0.7–2)
LACTATE SERPL-SCNC: 1.3 MMOL/L (ref 0.7–2)
LACTATE SERPL-SCNC: 1.5 MMOL/L (ref 0.7–2)
LACTATE SERPL-SCNC: 1.6 MMOL/L (ref 0.7–2)
LACTATE SERPL-SCNC: 1.7 MMOL/L (ref 0.7–2)
LACTATE SERPL-SCNC: 1.7 MMOL/L (ref 0.7–2)
LACTATE SERPL-SCNC: 1.9 MMOL/L (ref 0.7–2)
LACTATE SERPL-SCNC: 2 MMOL/L (ref 0.7–2)
LACTATE SERPL-SCNC: 2 MMOL/L (ref 0.7–2)
LD BODY BODY FLUID SOURCE: NORMAL
LD BODY BODY FLUID SOURCE: NORMAL
LDH FLD L TO P-CCNC: 227 U/L
LDH FLD L TO P-CCNC: 314 U/L
LDH SERPL L TO P-CCNC: 216 U/L (ref 0–250)
LDH SERPL L TO P-CCNC: 268 U/L (ref 0–250)
LEUKOCYTE ESTERASE UR QL STRIP: ABNORMAL
LEUKOCYTE ESTERASE UR QL STRIP: ABNORMAL
LIPASE SERPL-CCNC: 14 U/L (ref 13–60)
LVEF ECHO: NORMAL
LYMPHOCYTES # BLD AUTO: 0.8 10E3/UL (ref 0.8–5.3)
LYMPHOCYTES # BLD AUTO: 1.3 10E3/UL (ref 0.8–5.3)
LYMPHOCYTES # BLD AUTO: 1.3 10E3/UL (ref 0.8–5.3)
LYMPHOCYTES # BLD AUTO: 1.5 10E3/UL (ref 0.8–5.3)
LYMPHOCYTES # BLD AUTO: 1.6 10E3/UL (ref 0.8–5.3)
LYMPHOCYTES # BLD AUTO: 2 10E3/UL (ref 0.8–5.3)
LYMPHOCYTES # BLD AUTO: 2.1 10E3/UL (ref 0.8–5.3)
LYMPHOCYTES NFR BLD AUTO: 12 %
LYMPHOCYTES NFR BLD AUTO: 15 %
LYMPHOCYTES NFR BLD AUTO: 17 %
LYMPHOCYTES NFR BLD AUTO: 6 %
LYMPHOCYTES NFR BLD AUTO: 7 %
LYMPHOCYTES NFR BLD AUTO: 7 %
LYMPHOCYTES NFR BLD AUTO: 9 %
LYMPHOCYTES NFR FLD MANUAL: 19 %
LYMPHOCYTES NFR FLD MANUAL: 63 %
Lab: NORMAL
MAGNESIUM SERPL-MCNC: 1.7 MG/DL (ref 1.7–2.3)
MAGNESIUM SERPL-MCNC: 1.8 MG/DL (ref 1.7–2.3)
MCH RBC QN AUTO: 27.8 PG (ref 26.5–33)
MCH RBC QN AUTO: 27.9 PG (ref 26.5–33)
MCH RBC QN AUTO: 27.9 PG (ref 26.5–33)
MCH RBC QN AUTO: 28.1 PG (ref 26.5–33)
MCH RBC QN AUTO: 28.1 PG (ref 26.5–33)
MCH RBC QN AUTO: 28.5 PG (ref 26.5–33)
MCH RBC QN AUTO: 28.6 PG (ref 26.5–33)
MCH RBC QN AUTO: 29.1 PG (ref 26.5–33)
MCH RBC QN AUTO: 29.3 PG (ref 26.5–33)
MCH RBC QN AUTO: 29.5 PG (ref 26.5–33)
MCH RBC QN AUTO: 29.6 PG (ref 26.5–33)
MCH RBC QN AUTO: 29.7 PG (ref 26.5–33)
MCH RBC QN AUTO: 29.7 PG (ref 26.5–33)
MCH RBC QN AUTO: 29.8 PG (ref 26.5–33)
MCH RBC QN AUTO: 30 PG (ref 26.5–33)
MCH RBC QN AUTO: 30.1 PG (ref 26.5–33)
MCHC RBC AUTO-ENTMCNC: 28.7 G/DL (ref 31.5–36.5)
MCHC RBC AUTO-ENTMCNC: 29.2 G/DL (ref 31.5–36.5)
MCHC RBC AUTO-ENTMCNC: 30.1 G/DL (ref 31.5–36.5)
MCHC RBC AUTO-ENTMCNC: 30.2 G/DL (ref 31.5–36.5)
MCHC RBC AUTO-ENTMCNC: 30.2 G/DL (ref 31.5–36.5)
MCHC RBC AUTO-ENTMCNC: 30.3 G/DL (ref 31.5–36.5)
MCHC RBC AUTO-ENTMCNC: 30.3 G/DL (ref 31.5–36.5)
MCHC RBC AUTO-ENTMCNC: 30.5 G/DL (ref 31.5–36.5)
MCHC RBC AUTO-ENTMCNC: 30.6 G/DL (ref 31.5–36.5)
MCHC RBC AUTO-ENTMCNC: 30.6 G/DL (ref 31.5–36.5)
MCHC RBC AUTO-ENTMCNC: 31.1 G/DL (ref 31.5–36.5)
MCHC RBC AUTO-ENTMCNC: 31.2 G/DL (ref 31.5–36.5)
MCHC RBC AUTO-ENTMCNC: 31.3 G/DL (ref 31.5–36.5)
MCHC RBC AUTO-ENTMCNC: 31.4 G/DL (ref 31.5–36.5)
MCHC RBC AUTO-ENTMCNC: 31.6 G/DL (ref 31.5–36.5)
MCHC RBC AUTO-ENTMCNC: 31.7 G/DL (ref 31.5–36.5)
MCHC RBC AUTO-ENTMCNC: 31.8 G/DL (ref 31.5–36.5)
MCHC RBC AUTO-ENTMCNC: 32.1 G/DL (ref 31.5–36.5)
MCV RBC AUTO: 90 FL (ref 78–100)
MCV RBC AUTO: 92 FL (ref 78–100)
MCV RBC AUTO: 93 FL (ref 78–100)
MCV RBC AUTO: 93 FL (ref 78–100)
MCV RBC AUTO: 94 FL (ref 78–100)
MCV RBC AUTO: 95 FL (ref 78–100)
MCV RBC AUTO: 96 FL (ref 78–100)
MCV RBC AUTO: 97 FL (ref 78–100)
MCV RBC AUTO: 98 FL (ref 78–100)
MONOCYTES # BLD AUTO: 0.7 10E3/UL (ref 0–1.3)
MONOCYTES # BLD AUTO: 1.2 10E3/UL (ref 0–1.3)
MONOCYTES # BLD AUTO: 1.2 10E3/UL (ref 0–1.3)
MONOCYTES # BLD AUTO: 1.4 10E3/UL (ref 0–1.3)
MONOCYTES # BLD AUTO: 1.4 10E3/UL (ref 0–1.3)
MONOCYTES # BLD AUTO: 1.7 10E3/UL (ref 0–1.3)
MONOCYTES # BLD AUTO: 1.9 10E3/UL (ref 0–1.3)
MONOCYTES NFR BLD AUTO: 14 %
MONOCYTES NFR BLD AUTO: 5 %
MONOCYTES NFR BLD AUTO: 7 %
MONOCYTES NFR BLD AUTO: 9 %
MONOS+MACROS NFR FLD MANUAL: 27 %
MONOS+MACROS NFR FLD MANUAL: 8 %
NEUTROPHILS # BLD AUTO: 10 10E3/UL (ref 1.6–8.3)
NEUTROPHILS # BLD AUTO: 11 10E3/UL (ref 1.6–8.3)
NEUTROPHILS # BLD AUTO: 16.9 10E3/UL (ref 1.6–8.3)
NEUTROPHILS # BLD AUTO: 17.4 10E3/UL (ref 1.6–8.3)
NEUTROPHILS # BLD AUTO: 18.4 10E3/UL (ref 1.6–8.3)
NEUTROPHILS # BLD AUTO: 7.3 10E3/UL (ref 1.6–8.3)
NEUTROPHILS # BLD AUTO: 8.7 10E3/UL (ref 1.6–8.3)
NEUTROPHILS NFR BLD AUTO: 70 %
NEUTROPHILS NFR BLD AUTO: 72 %
NEUTROPHILS NFR BLD AUTO: 77 %
NEUTROPHILS NFR BLD AUTO: 81 %
NEUTROPHILS NFR BLD AUTO: 83 %
NEUTROPHILS NFR BLD AUTO: 86 %
NEUTROPHILS NFR BLD AUTO: 87 %
NEUTS BAND NFR FLD MANUAL: 10 %
NEUTS BAND NFR FLD MANUAL: 73 %
NITRATE UR QL: NEGATIVE
NITRATE UR QL: NEGATIVE
NRBC # BLD AUTO: 0 10E3/UL
NRBC BLD AUTO-RTO: 0 /100
NT-PROBNP SERPL-MCNC: ABNORMAL PG/ML (ref 0–1800)
P AXIS - MUSE: 18 DEGREES
P AXIS - MUSE: NORMAL DEGREES
PATH REPORT.COMMENTS IMP SPEC: NORMAL
PATH REPORT.FINAL DX SPEC: NORMAL
PATH REPORT.GROSS SPEC: NORMAL
PATH REPORT.MICROSCOPIC SPEC OTHER STN: NORMAL
PATH REPORT.RELEVANT HX SPEC: NORMAL
PCO2 BLDV: 38 MM HG (ref 40–50)
PCO2 BLDV: 45 MM HG (ref 40–50)
PERFORMING LABORATORY: NORMAL
PH BLDV: 7.34 [PH] (ref 7.32–7.43)
PH BLDV: 7.38 [PH] (ref 7.32–7.43)
PH BODY FLUID SOURCE: NORMAL
PH FLD: 8 PH
PH UR STRIP: 5.5 [PH] (ref 5–7)
PH UR STRIP: 5.5 [PH] (ref 5–7)
PHOSPHATE SERPL-MCNC: 5.3 MG/DL (ref 2.5–4.5)
PHOSPHATE SERPL-MCNC: 5.5 MG/DL (ref 2.5–4.5)
PHOSPHATE SERPL-MCNC: 6.2 MG/DL (ref 2.5–4.5)
PHOSPHATE SERPL-MCNC: 8.9 MG/DL (ref 2.5–4.5)
PLATELET # BLD AUTO: 290 10E3/UL (ref 150–450)
PLATELET # BLD AUTO: 292 10E3/UL (ref 150–450)
PLATELET # BLD AUTO: 308 10E3/UL (ref 150–450)
PLATELET # BLD AUTO: 329 10E3/UL (ref 150–450)
PLATELET # BLD AUTO: 331 10E3/UL (ref 150–450)
PLATELET # BLD AUTO: 336 10E3/UL (ref 150–450)
PLATELET # BLD AUTO: 341 10E3/UL (ref 150–450)
PLATELET # BLD AUTO: 354 10E3/UL (ref 150–450)
PLATELET # BLD AUTO: 357 10E3/UL (ref 150–450)
PLATELET # BLD AUTO: 382 10E3/UL (ref 150–450)
PLATELET # BLD AUTO: 388 10E3/UL (ref 150–450)
PLATELET # BLD AUTO: 414 10E3/UL (ref 150–450)
PLATELET # BLD AUTO: 419 10E3/UL (ref 150–450)
PLATELET # BLD AUTO: 423 10E3/UL (ref 150–450)
PLATELET # BLD AUTO: 434 10E3/UL (ref 150–450)
PLATELET # BLD AUTO: 444 10E3/UL (ref 150–450)
PLATELET # BLD AUTO: 446 10E3/UL (ref 150–450)
PLATELET # BLD AUTO: 472 10E3/UL (ref 150–450)
PO2 BLDV: 19 MM HG (ref 25–47)
PO2 BLDV: 27 MM HG (ref 25–47)
POTASSIUM SERPL-SCNC: 3.5 MMOL/L (ref 3.4–5.3)
POTASSIUM SERPL-SCNC: 3.9 MMOL/L (ref 3.4–5.3)
POTASSIUM SERPL-SCNC: 4.1 MMOL/L (ref 3.4–5.3)
POTASSIUM SERPL-SCNC: 4.2 MMOL/L (ref 3.4–5.3)
POTASSIUM SERPL-SCNC: 4.2 MMOL/L (ref 3.4–5.3)
POTASSIUM SERPL-SCNC: 4.3 MMOL/L (ref 3.4–5.3)
POTASSIUM SERPL-SCNC: 4.4 MMOL/L (ref 3.4–5.3)
POTASSIUM SERPL-SCNC: 4.6 MMOL/L (ref 3.4–5.3)
POTASSIUM SERPL-SCNC: 4.7 MMOL/L (ref 3.4–5.3)
POTASSIUM SERPL-SCNC: 4.8 MMOL/L (ref 3.4–5.3)
POTASSIUM SERPL-SCNC: 5.2 MMOL/L (ref 3.4–5.3)
POTASSIUM SERPL-SCNC: 5.3 MMOL/L (ref 3.4–5.3)
POTASSIUM SERPL-SCNC: 5.3 MMOL/L (ref 3.4–5.3)
POTASSIUM SERPL-SCNC: 5.7 MMOL/L (ref 3.4–5.3)
POTASSIUM SERPL-SCNC: 6.2 MMOL/L (ref 3.4–5.3)
PR INTERVAL - MUSE: 224 MS
PR INTERVAL - MUSE: NORMAL MS
PROCALCITONIN SERPL IA-MCNC: 3.37 NG/ML
PROCALCITONIN SERPL IA-MCNC: 3.68 NG/ML
PROCALCITONIN SERPL IA-MCNC: 8.8 NG/ML
PROT FLD-MCNC: 2 G/DL
PROT FLD-MCNC: 2.1 G/DL
PROT SERPL-MCNC: 5.8 G/DL (ref 6.4–8.3)
PROT SERPL-MCNC: 5.8 G/DL (ref 6.4–8.3)
PROT SERPL-MCNC: 6 G/DL (ref 6.4–8.3)
PROT SERPL-MCNC: 6 G/DL (ref 6.4–8.3)
PROT SERPL-MCNC: 6.3 G/DL (ref 6.4–8.3)
PROT SERPL-MCNC: 6.7 G/DL (ref 6.4–8.3)
PROT SERPL-MCNC: 6.8 G/DL (ref 6.4–8.3)
PROTEIN BODY FLUID SOURCE: NORMAL
PROTEIN BODY FLUID SOURCE: NORMAL
QRS DURATION - MUSE: 100 MS
QRS DURATION - MUSE: 100 MS
QRS DURATION - MUSE: 104 MS
QRS DURATION - MUSE: 106 MS
QRS DURATION - MUSE: 112 MS
QRS DURATION - MUSE: 146 MS
QRS DURATION - MUSE: 96 MS
QRS DURATION - MUSE: 98 MS
QT - MUSE: 290 MS
QT - MUSE: 306 MS
QT - MUSE: 318 MS
QT - MUSE: 364 MS
QT - MUSE: 364 MS
QT - MUSE: 366 MS
QT - MUSE: 374 MS
QT - MUSE: 402 MS
QT - MUSE: 414 MS
QT - MUSE: 420 MS
QT - MUSE: 434 MS
QTC - MUSE: 381 MS
QTC - MUSE: 432 MS
QTC - MUSE: 439 MS
QTC - MUSE: 516 MS
QTC - MUSE: 517 MS
QTC - MUSE: 519 MS
QTC - MUSE: 523 MS
QTC - MUSE: 525 MS
QTC - MUSE: 530 MS
QTC - MUSE: 536 MS
QTC - MUSE: 573 MS
R AXIS - MUSE: -12 DEGREES
R AXIS - MUSE: -3 DEGREES
R AXIS - MUSE: -3 DEGREES
R AXIS - MUSE: -34 DEGREES
R AXIS - MUSE: -38 DEGREES
R AXIS - MUSE: -39 DEGREES
R AXIS - MUSE: -4 DEGREES
R AXIS - MUSE: -53 DEGREES
R AXIS - MUSE: -9 DEGREES
R AXIS - MUSE: 2 DEGREES
R AXIS - MUSE: 31 DEGREES
RBC # BLD AUTO: 2.29 10E6/UL (ref 4.4–5.9)
RBC # BLD AUTO: 2.37 10E6/UL (ref 4.4–5.9)
RBC # BLD AUTO: 2.56 10E6/UL (ref 4.4–5.9)
RBC # BLD AUTO: 2.62 10E6/UL (ref 4.4–5.9)
RBC # BLD AUTO: 2.67 10E6/UL (ref 4.4–5.9)
RBC # BLD AUTO: 2.69 10E6/UL (ref 4.4–5.9)
RBC # BLD AUTO: 2.71 10E6/UL (ref 4.4–5.9)
RBC # BLD AUTO: 2.86 10E6/UL (ref 4.4–5.9)
RBC # BLD AUTO: 3.06 10E6/UL (ref 4.4–5.9)
RBC # BLD AUTO: 3.12 10E6/UL (ref 4.4–5.9)
RBC # BLD AUTO: 3.12 10E6/UL (ref 4.4–5.9)
RBC # BLD AUTO: 3.13 10E6/UL (ref 4.4–5.9)
RBC # BLD AUTO: 3.16 10E6/UL (ref 4.4–5.9)
RBC # BLD AUTO: 3.25 10E6/UL (ref 4.4–5.9)
RBC # BLD AUTO: 3.33 10E6/UL (ref 4.4–5.9)
RBC # BLD AUTO: 3.34 10E6/UL (ref 4.4–5.9)
RBC # BLD AUTO: 3.43 10E6/UL (ref 4.4–5.9)
RBC # BLD AUTO: 3.77 10E6/UL (ref 4.4–5.9)
RBC # FLD: ABNORMAL /UL
RBC URINE: 115 /HPF
RBC URINE: 30 /HPF
RSV RNA SPEC NAA+PROBE: NEGATIVE
SAO2 % BLDV: 27 % (ref 94–100)
SAO2 % BLDV: 48 % (ref 94–100)
SARS-COV-2 RNA RESP QL NAA+PROBE: NEGATIVE
SCANNED LAB RESULT: NORMAL
SODIUM SERPL-SCNC: 130 MMOL/L (ref 136–145)
SODIUM SERPL-SCNC: 132 MMOL/L (ref 136–145)
SODIUM SERPL-SCNC: 132 MMOL/L (ref 136–145)
SODIUM SERPL-SCNC: 133 MMOL/L (ref 136–145)
SODIUM SERPL-SCNC: 134 MMOL/L (ref 136–145)
SODIUM SERPL-SCNC: 135 MMOL/L (ref 136–145)
SODIUM SERPL-SCNC: 136 MMOL/L (ref 136–145)
SODIUM SERPL-SCNC: 136 MMOL/L (ref 136–145)
SODIUM SERPL-SCNC: 137 MMOL/L (ref 136–145)
SODIUM SERPL-SCNC: 138 MMOL/L (ref 136–145)
SODIUM SERPL-SCNC: 138 MMOL/L (ref 136–145)
SODIUM SERPL-SCNC: 140 MMOL/L (ref 136–145)
SP GR UR STRIP: 1.02 (ref 1–1.03)
SP GR UR STRIP: 1.02 (ref 1–1.03)
SPECIMEN EXPIRATION DATE: ABNORMAL
SPECIMEN EXPIRATION DATE: NORMAL
SYSTOLIC BLOOD PRESSURE - MUSE: NORMAL MMHG
T AXIS - MUSE: 107 DEGREES
T AXIS - MUSE: 112 DEGREES
T AXIS - MUSE: 115 DEGREES
T AXIS - MUSE: 119 DEGREES
T AXIS - MUSE: 164 DEGREES
T AXIS - MUSE: 167 DEGREES
T AXIS - MUSE: 173 DEGREES
T AXIS - MUSE: 188 DEGREES
T AXIS - MUSE: 200 DEGREES
T AXIS - MUSE: 206 DEGREES
T AXIS - MUSE: 60 DEGREES
TEST NAME: NORMAL
TROPONIN T SERPL HS-MCNC: 309 NG/L
TROPONIN T SERPL HS-MCNC: 310 NG/L
TROPONIN T SERPL HS-MCNC: 322 NG/L
TROPONIN T SERPL HS-MCNC: 326 NG/L
TROPONIN T SERPL HS-MCNC: 340 NG/L
TROPONIN T SERPL HS-MCNC: 340 NG/L
TROPONIN T SERPL HS-MCNC: 350 NG/L
TSH SERPL DL<=0.005 MIU/L-ACNC: 3.12 UIU/ML (ref 0.3–4.2)
TSH SERPL DL<=0.005 MIU/L-ACNC: 3.46 UIU/ML (ref 0.3–4.2)
UNIT ABO/RH: NORMAL
UNIT NUMBER: NORMAL
UNIT STATUS: NORMAL
UNIT TYPE ISBT: 5100
UROBILINOGEN UR STRIP-MCNC: NORMAL MG/DL
UROBILINOGEN UR STRIP-MCNC: NORMAL MG/DL
VANCOMYCIN SERPL-MCNC: 15.6 UG/ML
VENTRICULAR RATE- MUSE: 104 BPM
VENTRICULAR RATE- MUSE: 107 BPM
VENTRICULAR RATE- MUSE: 112 BPM
VENTRICULAR RATE- MUSE: 115 BPM
VENTRICULAR RATE- MUSE: 115 BPM
VENTRICULAR RATE- MUSE: 120 BPM
VENTRICULAR RATE- MUSE: 121 BPM
VENTRICULAR RATE- MUSE: 125 BPM
VENTRICULAR RATE- MUSE: 126 BPM
VENTRICULAR RATE- MUSE: 86 BPM
VENTRICULAR RATE- MUSE: 96 BPM
WBC # BLD AUTO: 10.4 10E3/UL (ref 4–11)
WBC # BLD AUTO: 10.6 10E3/UL (ref 4–11)
WBC # BLD AUTO: 10.7 10E3/UL (ref 4–11)
WBC # BLD AUTO: 11 10E3/UL (ref 4–11)
WBC # BLD AUTO: 11.5 10E3/UL (ref 4–11)
WBC # BLD AUTO: 11.6 10E3/UL (ref 4–11)
WBC # BLD AUTO: 12.2 10E3/UL (ref 4–11)
WBC # BLD AUTO: 12.7 10E3/UL (ref 4–11)
WBC # BLD AUTO: 13 10E3/UL (ref 4–11)
WBC # BLD AUTO: 13 10E3/UL (ref 4–11)
WBC # BLD AUTO: 13.4 10E3/UL (ref 4–11)
WBC # BLD AUTO: 15.2 10E3/UL (ref 4–11)
WBC # BLD AUTO: 15.4 10E3/UL (ref 4–11)
WBC # BLD AUTO: 17.3 10E3/UL (ref 4–11)
WBC # BLD AUTO: 19.4 10E3/UL (ref 4–11)
WBC # BLD AUTO: 20.1 10E3/UL (ref 4–11)
WBC # BLD AUTO: 20.3 10E3/UL (ref 4–11)
WBC # BLD AUTO: 21.2 10E3/UL (ref 4–11)
WBC # BLD AUTO: 21.5 10E3/UL (ref 4–11)
WBC # FLD AUTO: 367 /UL
WBC # FLD AUTO: 522 /UL
WBC CLUMPS #/AREA URNS HPF: PRESENT /HPF
WBC URINE: 127 /HPF
WBC URINE: 42 /HPF

## 2023-01-01 PROCEDURE — 250N000013 HC RX MED GY IP 250 OP 250 PS 637: Performed by: INTERNAL MEDICINE

## 2023-01-01 PROCEDURE — 258N000003 HC RX IP 258 OP 636: Performed by: STUDENT IN AN ORGANIZED HEALTH CARE EDUCATION/TRAINING PROGRAM

## 2023-01-01 PROCEDURE — 120N000001 HC R&B MED SURG/OB

## 2023-01-01 PROCEDURE — 634N000001 HC RX 634: Performed by: INTERNAL MEDICINE

## 2023-01-01 PROCEDURE — 99232 SBSQ HOSP IP/OBS MODERATE 35: CPT | Performed by: HOSPITALIST

## 2023-01-01 PROCEDURE — 85652 RBC SED RATE AUTOMATED: CPT | Performed by: EMERGENCY MEDICINE

## 2023-01-01 PROCEDURE — 90937 HEMODIALYSIS REPEATED EVAL: CPT

## 2023-01-01 PROCEDURE — 36415 COLL VENOUS BLD VENIPUNCTURE: CPT | Performed by: EMERGENCY MEDICINE

## 2023-01-01 PROCEDURE — 93005 ELECTROCARDIOGRAM TRACING: CPT

## 2023-01-01 PROCEDURE — 84100 ASSAY OF PHOSPHORUS: CPT | Performed by: HOSPITALIST

## 2023-01-01 PROCEDURE — 250N000012 HC RX MED GY IP 250 OP 636 PS 637: Performed by: INTERNAL MEDICINE

## 2023-01-01 PROCEDURE — 82310 ASSAY OF CALCIUM: CPT | Performed by: HOSPITALIST

## 2023-01-01 PROCEDURE — 71046 X-RAY EXAM CHEST 2 VIEWS: CPT

## 2023-01-01 PROCEDURE — 36415 COLL VENOUS BLD VENIPUNCTURE: CPT | Performed by: STUDENT IN AN ORGANIZED HEALTH CARE EDUCATION/TRAINING PROGRAM

## 2023-01-01 PROCEDURE — 85730 THROMBOPLASTIN TIME PARTIAL: CPT | Performed by: EMERGENCY MEDICINE

## 2023-01-01 PROCEDURE — 84484 ASSAY OF TROPONIN QUANT: CPT | Performed by: HOSPITALIST

## 2023-01-01 PROCEDURE — 250N000011 HC RX IP 250 OP 636: Performed by: INTERNAL MEDICINE

## 2023-01-01 PROCEDURE — 87088 URINE BACTERIA CULTURE: CPT | Performed by: EMERGENCY MEDICINE

## 2023-01-01 PROCEDURE — 250N000011 HC RX IP 250 OP 636: Performed by: NURSE PRACTITIONER

## 2023-01-01 PROCEDURE — 255N000002 HC RX 255 OP 636: Performed by: RADIOLOGY

## 2023-01-01 PROCEDURE — 87070 CULTURE OTHR SPECIMN AEROBIC: CPT | Performed by: INTERNAL MEDICINE

## 2023-01-01 PROCEDURE — 250N000009 HC RX 250: Performed by: PHYSICIAN ASSISTANT

## 2023-01-01 PROCEDURE — 5A1D70Z PERFORMANCE OF URINARY FILTRATION, INTERMITTENT, LESS THAN 6 HOURS PER DAY: ICD-10-PCS | Performed by: STUDENT IN AN ORGANIZED HEALTH CARE EDUCATION/TRAINING PROGRAM

## 2023-01-01 PROCEDURE — 83036 HEMOGLOBIN GLYCOSYLATED A1C: CPT | Performed by: INTERNAL MEDICINE

## 2023-01-01 PROCEDURE — 250N000011 HC RX IP 250 OP 636: Performed by: EMERGENCY MEDICINE

## 2023-01-01 PROCEDURE — 81003 URINALYSIS AUTO W/O SCOPE: CPT | Performed by: EMERGENCY MEDICINE

## 2023-01-01 PROCEDURE — 85610 PROTHROMBIN TIME: CPT | Performed by: EMERGENCY MEDICINE

## 2023-01-01 PROCEDURE — 87040 BLOOD CULTURE FOR BACTERIA: CPT | Performed by: EMERGENCY MEDICINE

## 2023-01-01 PROCEDURE — 82962 GLUCOSE BLOOD TEST: CPT

## 2023-01-01 PROCEDURE — 258N000003 HC RX IP 258 OP 636: Performed by: INTERNAL MEDICINE

## 2023-01-01 PROCEDURE — 85027 COMPLETE CBC AUTOMATED: CPT | Performed by: HOSPITALIST

## 2023-01-01 PROCEDURE — 93922 UPR/L XTREMITY ART 2 LEVELS: CPT

## 2023-01-01 PROCEDURE — 250N000012 HC RX MED GY IP 250 OP 636 PS 637: Performed by: STUDENT IN AN ORGANIZED HEALTH CARE EDUCATION/TRAINING PROGRAM

## 2023-01-01 PROCEDURE — 97110 THERAPEUTIC EXERCISES: CPT | Mod: GO

## 2023-01-01 PROCEDURE — 0HQCXZZ REPAIR LEFT UPPER ARM SKIN, EXTERNAL APPROACH: ICD-10-PCS | Performed by: EMERGENCY MEDICINE

## 2023-01-01 PROCEDURE — G0378 HOSPITAL OBSERVATION PER HR: HCPCS

## 2023-01-01 PROCEDURE — 96366 THER/PROPH/DIAG IV INF ADDON: CPT

## 2023-01-01 PROCEDURE — 82945 GLUCOSE OTHER FLUID: CPT | Performed by: INTERNAL MEDICINE

## 2023-01-01 PROCEDURE — 99223 1ST HOSP IP/OBS HIGH 75: CPT | Mod: AI | Performed by: INTERNAL MEDICINE

## 2023-01-01 PROCEDURE — 96372 THER/PROPH/DIAG INJ SC/IM: CPT | Performed by: INTERNAL MEDICINE

## 2023-01-01 PROCEDURE — 99152 MOD SED SAME PHYS/QHP 5/>YRS: CPT

## 2023-01-01 PROCEDURE — 97161 PT EVAL LOW COMPLEX 20 MIN: CPT | Mod: GP | Performed by: PHYSICAL THERAPIST

## 2023-01-01 PROCEDURE — 84145 PROCALCITONIN (PCT): CPT | Performed by: HOSPITALIST

## 2023-01-01 PROCEDURE — C1725 CATH, TRANSLUMIN NON-LASER: HCPCS

## 2023-01-01 PROCEDURE — 83605 ASSAY OF LACTIC ACID: CPT | Performed by: HOSPITALIST

## 2023-01-01 PROCEDURE — 250N000011 HC RX IP 250 OP 636: Performed by: PHYSICIAN ASSISTANT

## 2023-01-01 PROCEDURE — 85610 PROTHROMBIN TIME: CPT | Performed by: INTERNAL MEDICINE

## 2023-01-01 PROCEDURE — 250N000013 HC RX MED GY IP 250 OP 250 PS 637: Performed by: HOSPITALIST

## 2023-01-01 PROCEDURE — 85610 PROTHROMBIN TIME: CPT | Performed by: STUDENT IN AN ORGANIZED HEALTH CARE EDUCATION/TRAINING PROGRAM

## 2023-01-01 PROCEDURE — 250N000013 HC RX MED GY IP 250 OP 250 PS 637: Performed by: STUDENT IN AN ORGANIZED HEALTH CARE EDUCATION/TRAINING PROGRAM

## 2023-01-01 PROCEDURE — 80048 BASIC METABOLIC PNL TOTAL CA: CPT | Mod: ORL

## 2023-01-01 PROCEDURE — 84460 ALANINE AMINO (ALT) (SGPT): CPT | Performed by: HOSPITALIST

## 2023-01-01 PROCEDURE — 99232 SBSQ HOSP IP/OBS MODERATE 35: CPT | Performed by: INTERNAL MEDICINE

## 2023-01-01 PROCEDURE — 88112 CYTOPATH CELL ENHANCE TECH: CPT | Mod: TC | Performed by: INTERNAL MEDICINE

## 2023-01-01 PROCEDURE — 99222 1ST HOSP IP/OBS MODERATE 55: CPT | Mod: AI | Performed by: HOSPITALIST

## 2023-01-01 PROCEDURE — 83605 ASSAY OF LACTIC ACID: CPT | Performed by: INTERNAL MEDICINE

## 2023-01-01 PROCEDURE — 634N000001 HC RX 634: Performed by: STUDENT IN AN ORGANIZED HEALTH CARE EDUCATION/TRAINING PROGRAM

## 2023-01-01 PROCEDURE — 97116 GAIT TRAINING THERAPY: CPT | Mod: GP | Performed by: PHYSICAL THERAPIST

## 2023-01-01 PROCEDURE — 96376 TX/PRO/DX INJ SAME DRUG ADON: CPT

## 2023-01-01 PROCEDURE — 97535 SELF CARE MNGMENT TRAINING: CPT | Mod: GO

## 2023-01-01 PROCEDURE — 85025 COMPLETE CBC W/AUTO DIFF WBC: CPT | Performed by: EMERGENCY MEDICINE

## 2023-01-01 PROCEDURE — 86140 C-REACTIVE PROTEIN: CPT | Performed by: PHYSICIAN ASSISTANT

## 2023-01-01 PROCEDURE — 96375 TX/PRO/DX INJ NEW DRUG ADDON: CPT

## 2023-01-01 PROCEDURE — 99222 1ST HOSP IP/OBS MODERATE 55: CPT | Performed by: INTERNAL MEDICINE

## 2023-01-01 PROCEDURE — 73721 MRI JNT OF LWR EXTRE W/O DYE: CPT | Mod: LT

## 2023-01-01 PROCEDURE — 83735 ASSAY OF MAGNESIUM: CPT | Performed by: HOSPITALIST

## 2023-01-01 PROCEDURE — 258N000003 HC RX IP 258 OP 636: Performed by: SURGERY

## 2023-01-01 PROCEDURE — 90935 HEMODIALYSIS ONE EVALUATION: CPT | Performed by: INTERNAL MEDICINE

## 2023-01-01 PROCEDURE — 96374 THER/PROPH/DIAG INJ IV PUSH: CPT

## 2023-01-01 PROCEDURE — 73721 MRI JNT OF LWR EXTRE W/O DYE: CPT | Mod: 26 | Performed by: RADIOLOGY

## 2023-01-01 PROCEDURE — C1769 GUIDE WIRE: HCPCS

## 2023-01-01 PROCEDURE — 84484 ASSAY OF TROPONIN QUANT: CPT | Performed by: STUDENT IN AN ORGANIZED HEALTH CARE EDUCATION/TRAINING PROGRAM

## 2023-01-01 PROCEDURE — 99239 HOSP IP/OBS DSCHRG MGMT >30: CPT | Performed by: INTERNAL MEDICINE

## 2023-01-01 PROCEDURE — 82803 BLOOD GASES ANY COMBINATION: CPT

## 2023-01-01 PROCEDURE — 36415 COLL VENOUS BLD VENIPUNCTURE: CPT | Performed by: INTERNAL MEDICINE

## 2023-01-01 PROCEDURE — 36415 COLL VENOUS BLD VENIPUNCTURE: CPT | Performed by: PHYSICIAN ASSISTANT

## 2023-01-01 PROCEDURE — 86901 BLOOD TYPING SEROLOGIC RH(D): CPT | Performed by: EMERGENCY MEDICINE

## 2023-01-01 PROCEDURE — 250N000009 HC RX 250: Performed by: HOSPITALIST

## 2023-01-01 PROCEDURE — 84484 ASSAY OF TROPONIN QUANT: CPT | Performed by: EMERGENCY MEDICINE

## 2023-01-01 PROCEDURE — 250N000012 HC RX MED GY IP 250 OP 636 PS 637: Performed by: HOSPITALIST

## 2023-01-01 PROCEDURE — 99233 SBSQ HOSP IP/OBS HIGH 50: CPT | Performed by: INTERNAL MEDICINE

## 2023-01-01 PROCEDURE — 250N000009 HC RX 250: Performed by: INTERNAL MEDICINE

## 2023-01-01 PROCEDURE — 99207 PR APP CREDIT; MD BILLING SHARED VISIT: CPT | Performed by: INTERNAL MEDICINE

## 2023-01-01 PROCEDURE — 86900 BLOOD TYPING SEROLOGIC ABO: CPT | Performed by: STUDENT IN AN ORGANIZED HEALTH CARE EDUCATION/TRAINING PROGRAM

## 2023-01-01 PROCEDURE — 84443 ASSAY THYROID STIM HORMONE: CPT | Performed by: EMERGENCY MEDICINE

## 2023-01-01 PROCEDURE — 74176 CT ABD & PELVIS W/O CONTRAST: CPT

## 2023-01-01 PROCEDURE — 97530 THERAPEUTIC ACTIVITIES: CPT | Mod: GP | Performed by: PHYSICAL THERAPIST

## 2023-01-01 PROCEDURE — 85018 HEMOGLOBIN: CPT | Performed by: EMERGENCY MEDICINE

## 2023-01-01 PROCEDURE — 87637 SARSCOV2&INF A&B&RSV AMP PRB: CPT | Performed by: EMERGENCY MEDICINE

## 2023-01-01 PROCEDURE — 84155 ASSAY OF PROTEIN SERUM: CPT | Performed by: HOSPITALIST

## 2023-01-01 PROCEDURE — C9803 HOPD COVID-19 SPEC COLLECT: HCPCS

## 2023-01-01 PROCEDURE — 90935 HEMODIALYSIS ONE EVALUATION: CPT | Performed by: STUDENT IN AN ORGANIZED HEALTH CARE EDUCATION/TRAINING PROGRAM

## 2023-01-01 PROCEDURE — 250N000011 HC RX IP 250 OP 636: Performed by: HOSPITALIST

## 2023-01-01 PROCEDURE — 99232 SBSQ HOSP IP/OBS MODERATE 35: CPT | Performed by: STUDENT IN AN ORGANIZED HEALTH CARE EDUCATION/TRAINING PROGRAM

## 2023-01-01 PROCEDURE — 250N000009 HC RX 250: Performed by: EMERGENCY MEDICINE

## 2023-01-01 PROCEDURE — 80048 BASIC METABOLIC PNL TOTAL CA: CPT | Performed by: HOSPITALIST

## 2023-01-01 PROCEDURE — 36415 COLL VENOUS BLD VENIPUNCTURE: CPT | Mod: ORL

## 2023-01-01 PROCEDURE — 250N000011 HC RX IP 250 OP 636: Performed by: SURGERY

## 2023-01-01 PROCEDURE — P9603 ONE-WAY ALLOW PRORATED MILES: HCPCS | Mod: ORL

## 2023-01-01 PROCEDURE — 73718 MRI LOWER EXTREMITY W/O DYE: CPT | Mod: 26 | Performed by: RADIOLOGY

## 2023-01-01 PROCEDURE — 86850 RBC ANTIBODY SCREEN: CPT | Performed by: EMERGENCY MEDICINE

## 2023-01-01 PROCEDURE — 255N000002 HC RX 255 OP 636: Performed by: INTERNAL MEDICINE

## 2023-01-01 PROCEDURE — 87340 HEPATITIS B SURFACE AG IA: CPT | Performed by: STUDENT IN AN ORGANIZED HEALTH CARE EDUCATION/TRAINING PROGRAM

## 2023-01-01 PROCEDURE — 93010 ELECTROCARDIOGRAM REPORT: CPT | Performed by: INTERNAL MEDICINE

## 2023-01-01 PROCEDURE — 99497 ADVNCD CARE PLAN 30 MIN: CPT | Performed by: NURSE PRACTITIONER

## 2023-01-01 PROCEDURE — 99239 HOSP IP/OBS DSCHRG MGMT >30: CPT | Performed by: HOSPITALIST

## 2023-01-01 PROCEDURE — 36415 COLL VENOUS BLD VENIPUNCTURE: CPT | Performed by: HOSPITALIST

## 2023-01-01 PROCEDURE — 88305 TISSUE EXAM BY PATHOLOGIST: CPT | Mod: 26 | Performed by: PATHOLOGY

## 2023-01-01 PROCEDURE — 272N000566 HC SHEATH CR3

## 2023-01-01 PROCEDURE — 86860 RBC ANTIBODY ELUTION: CPT | Performed by: STUDENT IN AN ORGANIZED HEALTH CARE EDUCATION/TRAINING PROGRAM

## 2023-01-01 PROCEDURE — 250N000013 HC RX MED GY IP 250 OP 250 PS 637: Performed by: NURSE PRACTITIONER

## 2023-01-01 PROCEDURE — 81001 URINALYSIS AUTO W/SCOPE: CPT | Performed by: EMERGENCY MEDICINE

## 2023-01-01 PROCEDURE — 85027 COMPLETE CBC AUTOMATED: CPT | Performed by: INTERNAL MEDICINE

## 2023-01-01 PROCEDURE — 99285 EMERGENCY DEPT VISIT HI MDM: CPT | Mod: CS,25

## 2023-01-01 PROCEDURE — 272N000706 US THORACENTESIS

## 2023-01-01 PROCEDURE — 85025 COMPLETE CBC W/AUTO DIFF WBC: CPT | Performed by: STUDENT IN AN ORGANIZED HEALTH CARE EDUCATION/TRAINING PROGRAM

## 2023-01-01 PROCEDURE — 80069 RENAL FUNCTION PANEL: CPT | Performed by: INTERNAL MEDICINE

## 2023-01-01 PROCEDURE — 83605 ASSAY OF LACTIC ACID: CPT | Performed by: STUDENT IN AN ORGANIZED HEALTH CARE EDUCATION/TRAINING PROGRAM

## 2023-01-01 PROCEDURE — 87205 SMEAR GRAM STAIN: CPT | Performed by: INTERNAL MEDICINE

## 2023-01-01 PROCEDURE — 82248 BILIRUBIN DIRECT: CPT | Performed by: HOSPITALIST

## 2023-01-01 PROCEDURE — 97165 OT EVAL LOW COMPLEX 30 MIN: CPT | Mod: GO

## 2023-01-01 PROCEDURE — 250N000009 HC RX 250: Performed by: RADIOLOGY

## 2023-01-01 PROCEDURE — 84145 PROCALCITONIN (PCT): CPT | Performed by: EMERGENCY MEDICINE

## 2023-01-01 PROCEDURE — 99231 SBSQ HOSP IP/OBS SF/LOW 25: CPT | Performed by: HOSPITALIST

## 2023-01-01 PROCEDURE — 250N000013 HC RX MED GY IP 250 OP 250 PS 637

## 2023-01-01 PROCEDURE — 99285 EMERGENCY DEPT VISIT HI MDM: CPT | Mod: 25

## 2023-01-01 PROCEDURE — 80053 COMPREHEN METABOLIC PANEL: CPT | Performed by: EMERGENCY MEDICINE

## 2023-01-01 PROCEDURE — 96365 THER/PROPH/DIAG IV INF INIT: CPT | Mod: 59

## 2023-01-01 PROCEDURE — 85025 COMPLETE CBC W/AUTO DIFF WBC: CPT | Performed by: INTERNAL MEDICINE

## 2023-01-01 PROCEDURE — 999N000128 HC STATISTIC PERIPHERAL IV START W/O US GUIDANCE

## 2023-01-01 PROCEDURE — 83605 ASSAY OF LACTIC ACID: CPT

## 2023-01-01 PROCEDURE — 84155 ASSAY OF PROTEIN SERUM: CPT | Performed by: INTERNAL MEDICINE

## 2023-01-01 PROCEDURE — 86901 BLOOD TYPING SEROLOGIC RH(D): CPT | Performed by: INTERNAL MEDICINE

## 2023-01-01 PROCEDURE — 73718 MRI LOWER EXTREMITY W/O DYE: CPT | Mod: RT,XS,76

## 2023-01-01 PROCEDURE — 272N000116 HC CATH CR1

## 2023-01-01 PROCEDURE — 86870 RBC ANTIBODY IDENTIFICATION: CPT | Performed by: PHYSICIAN ASSISTANT

## 2023-01-01 PROCEDURE — 99222 1ST HOSP IP/OBS MODERATE 55: CPT

## 2023-01-01 PROCEDURE — 85025 COMPLETE CBC W/AUTO DIFF WBC: CPT | Mod: ORL

## 2023-01-01 PROCEDURE — 84145 PROCALCITONIN (PCT): CPT | Performed by: INTERNAL MEDICINE

## 2023-01-01 PROCEDURE — 210N000002 HC R&B HEART CARE

## 2023-01-01 PROCEDURE — 80202 ASSAY OF VANCOMYCIN: CPT | Performed by: INTERNAL MEDICINE

## 2023-01-01 PROCEDURE — 86901 BLOOD TYPING SEROLOGIC RH(D): CPT | Performed by: STUDENT IN AN ORGANIZED HEALTH CARE EDUCATION/TRAINING PROGRAM

## 2023-01-01 PROCEDURE — 86923 COMPATIBILITY TEST ELECTRIC: CPT | Performed by: PHYSICIAN ASSISTANT

## 2023-01-01 PROCEDURE — P9047 ALBUMIN (HUMAN), 25%, 50ML: HCPCS | Performed by: HOSPITALIST

## 2023-01-01 PROCEDURE — 85027 COMPLETE CBC AUTOMATED: CPT | Performed by: PHYSICIAN ASSISTANT

## 2023-01-01 PROCEDURE — 84157 ASSAY OF PROTEIN OTHER: CPT | Performed by: INTERNAL MEDICINE

## 2023-01-01 PROCEDURE — P9045 ALBUMIN (HUMAN), 5%, 250 ML: HCPCS | Performed by: INTERNAL MEDICINE

## 2023-01-01 PROCEDURE — 85610 PROTHROMBIN TIME: CPT | Performed by: HOSPITALIST

## 2023-01-01 PROCEDURE — 99231 SBSQ HOSP IP/OBS SF/LOW 25: CPT | Performed by: INTERNAL MEDICINE

## 2023-01-01 PROCEDURE — 272N000196 HC ACCESSORY CR5

## 2023-01-01 PROCEDURE — 99254 IP/OBS CNSLTJ NEW/EST MOD 60: CPT | Performed by: INTERNAL MEDICINE

## 2023-01-01 PROCEDURE — 97161 PT EVAL LOW COMPLEX 20 MIN: CPT | Mod: GP

## 2023-01-01 PROCEDURE — 86922 COMPATIBILITY TEST ANTIGLOB: CPT

## 2023-01-01 PROCEDURE — 258N000003 HC RX IP 258 OP 636: Performed by: EMERGENCY MEDICINE

## 2023-01-01 PROCEDURE — 83615 LACTATE (LD) (LDH) ENZYME: CPT | Performed by: INTERNAL MEDICINE

## 2023-01-01 PROCEDURE — 81403 MOPATH PROCEDURE LEVEL 4: CPT | Performed by: STUDENT IN AN ORGANIZED HEALTH CARE EDUCATION/TRAINING PROGRAM

## 2023-01-01 PROCEDURE — 36430 TRANSFUSION BLD/BLD COMPNT: CPT

## 2023-01-01 PROCEDURE — 5A1D70Z PERFORMANCE OF URINARY FILTRATION, INTERMITTENT, LESS THAN 6 HOURS PER DAY: ICD-10-PCS | Performed by: INTERNAL MEDICINE

## 2023-01-01 PROCEDURE — G0463 HOSPITAL OUTPT CLINIC VISIT: HCPCS

## 2023-01-01 PROCEDURE — B51N1ZZ FLUOROSCOPY OF LEFT UPPER EXTREMITY VEINS USING LOW OSMOLAR CONTRAST: ICD-10-PCS | Performed by: RADIOLOGY

## 2023-01-01 PROCEDURE — 99223 1ST HOSP IP/OBS HIGH 75: CPT | Mod: 25 | Performed by: NURSE PRACTITIONER

## 2023-01-01 PROCEDURE — 82310 ASSAY OF CALCIUM: CPT | Performed by: STUDENT IN AN ORGANIZED HEALTH CARE EDUCATION/TRAINING PROGRAM

## 2023-01-01 PROCEDURE — 96360 HYDRATION IV INFUSION INIT: CPT

## 2023-01-01 PROCEDURE — 97116 GAIT TRAINING THERAPY: CPT | Mod: GP

## 2023-01-01 PROCEDURE — 84999 UNLISTED CHEMISTRY PROCEDURE: CPT | Performed by: STUDENT IN AN ORGANIZED HEALTH CARE EDUCATION/TRAINING PROGRAM

## 2023-01-01 PROCEDURE — 36907 BALO ANGIOP CTR DIALYSIS SEG: CPT

## 2023-01-01 PROCEDURE — 85027 COMPLETE CBC AUTOMATED: CPT | Mod: ORL

## 2023-01-01 PROCEDURE — 5A1D70Z PERFORMANCE OF URINARY FILTRATION, INTERMITTENT, LESS THAN 6 HOURS PER DAY: ICD-10-PCS | Performed by: EMERGENCY MEDICINE

## 2023-01-01 PROCEDURE — 86140 C-REACTIVE PROTEIN: CPT | Performed by: EMERGENCY MEDICINE

## 2023-01-01 PROCEDURE — 83690 ASSAY OF LIPASE: CPT | Performed by: EMERGENCY MEDICINE

## 2023-01-01 PROCEDURE — 80048 BASIC METABOLIC PNL TOTAL CA: CPT | Performed by: EMERGENCY MEDICINE

## 2023-01-01 PROCEDURE — 89051 BODY FLUID CELL COUNT: CPT | Performed by: INTERNAL MEDICINE

## 2023-01-01 PROCEDURE — 250N000011 HC RX IP 250 OP 636: Performed by: STUDENT IN AN ORGANIZED HEALTH CARE EDUCATION/TRAINING PROGRAM

## 2023-01-01 PROCEDURE — B51W1ZZ FLUOROSCOPY OF DIALYSIS SHUNT/FISTULA USING LOW OSMOLAR CONTRAST: ICD-10-PCS | Performed by: RADIOLOGY

## 2023-01-01 PROCEDURE — 272N000564 HC SHEATH CR2

## 2023-01-01 PROCEDURE — 85018 HEMOGLOBIN: CPT | Performed by: HOSPITALIST

## 2023-01-01 PROCEDURE — 86140 C-REACTIVE PROTEIN: CPT | Performed by: STUDENT IN AN ORGANIZED HEALTH CARE EDUCATION/TRAINING PROGRAM

## 2023-01-01 PROCEDURE — 272N000302 HC DEVICE INFLATION CR5

## 2023-01-01 PROCEDURE — 96365 THER/PROPH/DIAG IV INF INIT: CPT

## 2023-01-01 PROCEDURE — 83605 ASSAY OF LACTIC ACID: CPT | Performed by: EMERGENCY MEDICINE

## 2023-01-01 PROCEDURE — 97530 THERAPEUTIC ACTIVITIES: CPT | Mod: GP

## 2023-01-01 PROCEDURE — 73718 MRI LOWER EXTREMITY W/O DYE: CPT | Mod: RT

## 2023-01-01 PROCEDURE — 97110 THERAPEUTIC EXERCISES: CPT | Mod: GP | Performed by: PHYSICAL THERAPIST

## 2023-01-01 PROCEDURE — P9016 RBC LEUKOCYTES REDUCED: HCPCS | Performed by: HOSPITALIST

## 2023-01-01 PROCEDURE — 71275 CT ANGIOGRAPHY CHEST: CPT

## 2023-01-01 PROCEDURE — 12001 RPR S/N/AX/GEN/TRNK 2.5CM/<: CPT

## 2023-01-01 PROCEDURE — 97110 THERAPEUTIC EXERCISES: CPT | Mod: GP

## 2023-01-01 PROCEDURE — 86850 RBC ANTIBODY SCREEN: CPT | Performed by: PHYSICIAN ASSISTANT

## 2023-01-01 PROCEDURE — 84075 ASSAY ALKALINE PHOSPHATASE: CPT | Performed by: HOSPITALIST

## 2023-01-01 PROCEDURE — 83986 ASSAY PH BODY FLUID NOS: CPT | Performed by: INTERNAL MEDICINE

## 2023-01-01 PROCEDURE — 80053 COMPREHEN METABOLIC PANEL: CPT | Performed by: HOSPITALIST

## 2023-01-01 PROCEDURE — 32555 ASPIRATE PLEURA W/ IMAGING: CPT

## 2023-01-01 PROCEDURE — G0463 HOSPITAL OUTPT CLINIC VISIT: HCPCS | Mod: 25

## 2023-01-01 PROCEDURE — P9604 ONE-WAY ALLOW PRORATED TRIP: HCPCS | Mod: ORL

## 2023-01-01 PROCEDURE — 86923 COMPATIBILITY TEST ELECTRIC: CPT | Performed by: HOSPITALIST

## 2023-01-01 PROCEDURE — 99223 1ST HOSP IP/OBS HIGH 75: CPT | Mod: AI | Performed by: STUDENT IN AN ORGANIZED HEALTH CARE EDUCATION/TRAINING PROGRAM

## 2023-01-01 PROCEDURE — 85048 AUTOMATED LEUKOCYTE COUNT: CPT | Performed by: HOSPITALIST

## 2023-01-01 PROCEDURE — 99254 IP/OBS CNSLTJ NEW/EST MOD 60: CPT | Mod: 25 | Performed by: INTERNAL MEDICINE

## 2023-01-01 PROCEDURE — 85014 HEMATOCRIT: CPT | Performed by: INTERNAL MEDICINE

## 2023-01-01 PROCEDURE — 93971 EXTREMITY STUDY: CPT | Mod: RT

## 2023-01-01 PROCEDURE — 0W993ZZ DRAINAGE OF RIGHT PLEURAL CAVITY, PERCUTANEOUS APPROACH: ICD-10-PCS | Performed by: RADIOLOGY

## 2023-01-01 PROCEDURE — 86880 COOMBS TEST DIRECT: CPT | Performed by: STUDENT IN AN ORGANIZED HEALTH CARE EDUCATION/TRAINING PROGRAM

## 2023-01-01 PROCEDURE — 93926 LOWER EXTREMITY STUDY: CPT | Mod: LT

## 2023-01-01 PROCEDURE — 73718 MRI LOWER EXTREMITY W/O DYE: CPT | Mod: LT

## 2023-01-01 PROCEDURE — 83735 ASSAY OF MAGNESIUM: CPT | Performed by: EMERGENCY MEDICINE

## 2023-01-01 PROCEDURE — 70450 CT HEAD/BRAIN W/O DYE: CPT

## 2023-01-01 PROCEDURE — 86870 RBC ANTIBODY IDENTIFICATION: CPT | Performed by: STUDENT IN AN ORGANIZED HEALTH CARE EDUCATION/TRAINING PROGRAM

## 2023-01-01 PROCEDURE — 82310 ASSAY OF CALCIUM: CPT | Performed by: INTERNAL MEDICINE

## 2023-01-01 PROCEDURE — 80048 BASIC METABOLIC PNL TOTAL CA: CPT | Performed by: STUDENT IN AN ORGANIZED HEALTH CARE EDUCATION/TRAINING PROGRAM

## 2023-01-01 PROCEDURE — 05763ZZ DILATION OF LEFT SUBCLAVIAN VEIN, PERCUTANEOUS APPROACH: ICD-10-PCS | Performed by: RADIOLOGY

## 2023-01-01 PROCEDURE — 89050 BODY FLUID CELL COUNT: CPT | Performed by: INTERNAL MEDICINE

## 2023-01-01 PROCEDURE — 250N000012 HC RX MED GY IP 250 OP 636 PS 637: Performed by: EMERGENCY MEDICINE

## 2023-01-01 PROCEDURE — 87086 URINE CULTURE/COLONY COUNT: CPT | Performed by: EMERGENCY MEDICINE

## 2023-01-01 PROCEDURE — 93306 TTE W/DOPPLER COMPLETE: CPT | Mod: 26 | Performed by: INTERNAL MEDICINE

## 2023-01-01 PROCEDURE — 250N000009 HC RX 250: Performed by: STUDENT IN AN ORGANIZED HEALTH CARE EDUCATION/TRAINING PROGRAM

## 2023-01-01 PROCEDURE — 90935 HEMODIALYSIS ONE EVALUATION: CPT

## 2023-01-01 PROCEDURE — G0378 HOSPITAL OBSERVATION PER HR: HCPCS | Performed by: PHYSICAL THERAPIST

## 2023-01-01 PROCEDURE — 84443 ASSAY THYROID STIM HORMONE: CPT | Performed by: PHYSICIAN ASSISTANT

## 2023-01-01 PROCEDURE — 999N000208 ECHOCARDIOGRAM COMPLETE

## 2023-01-01 PROCEDURE — 73562 X-RAY EXAM OF KNEE 3: CPT | Mod: RT

## 2023-01-01 PROCEDURE — 86706 HEP B SURFACE ANTIBODY: CPT | Performed by: STUDENT IN AN ORGANIZED HEALTH CARE EDUCATION/TRAINING PROGRAM

## 2023-01-01 PROCEDURE — 88112 CYTOPATH CELL ENHANCE TECH: CPT | Mod: 26 | Performed by: PATHOLOGY

## 2023-01-01 PROCEDURE — 99222 1ST HOSP IP/OBS MODERATE 55: CPT | Mod: AI | Performed by: INTERNAL MEDICINE

## 2023-01-01 PROCEDURE — 88305 TISSUE EXAM BY PATHOLOGIST: CPT | Mod: TC | Performed by: INTERNAL MEDICINE

## 2023-01-01 PROCEDURE — 83880 ASSAY OF NATRIURETIC PEPTIDE: CPT | Performed by: EMERGENCY MEDICINE

## 2023-01-01 PROCEDURE — 99222 1ST HOSP IP/OBS MODERATE 55: CPT | Performed by: STUDENT IN AN ORGANIZED HEALTH CARE EDUCATION/TRAINING PROGRAM

## 2023-01-01 RX ORDER — AMOXICILLIN 250 MG
1 CAPSULE ORAL 2 TIMES DAILY PRN
Status: DISCONTINUED | OUTPATIENT
Start: 2023-01-01 | End: 2023-01-01 | Stop reason: HOSPADM

## 2023-01-01 RX ORDER — METOPROLOL TARTRATE 75 MG/1
150 TABLET, FILM COATED ORAL 2 TIMES DAILY
Qty: 60 TABLET | Refills: 3 | Status: SHIPPED | OUTPATIENT
Start: 2023-01-01 | End: 2023-01-01

## 2023-01-01 RX ORDER — ACETAMINOPHEN 650 MG/1
650 SUPPOSITORY RECTAL EVERY 6 HOURS PRN
Status: DISCONTINUED | OUTPATIENT
Start: 2023-01-01 | End: 2023-01-01 | Stop reason: HOSPADM

## 2023-01-01 RX ORDER — HEPARIN SODIUM 1000 [USP'U]/ML
500 INJECTION, SOLUTION INTRAVENOUS; SUBCUTANEOUS CONTINUOUS
Status: DISCONTINUED | OUTPATIENT
Start: 2023-01-01 | End: 2023-01-01

## 2023-01-01 RX ORDER — NALOXONE HYDROCHLORIDE 0.4 MG/ML
0.1 INJECTION, SOLUTION INTRAMUSCULAR; INTRAVENOUS; SUBCUTANEOUS
Status: DISCONTINUED | OUTPATIENT
Start: 2023-01-01 | End: 2023-01-01 | Stop reason: HOSPADM

## 2023-01-01 RX ORDER — MIRTAZAPINE 7.5 MG/1
7.5 TABLET, FILM COATED ORAL AT BEDTIME
Status: DISCONTINUED | OUTPATIENT
Start: 2023-01-01 | End: 2023-01-01 | Stop reason: HOSPADM

## 2023-01-01 RX ORDER — ALBUMIN (HUMAN) 12.5 G/50ML
50 SOLUTION INTRAVENOUS
Status: DISCONTINUED | OUTPATIENT
Start: 2023-01-01 | End: 2023-01-01

## 2023-01-01 RX ORDER — CALCIUM GLUCONATE 94 MG/ML
1 INJECTION, SOLUTION INTRAVENOUS ONCE
Status: COMPLETED | OUTPATIENT
Start: 2023-01-01 | End: 2023-01-01

## 2023-01-01 RX ORDER — ASPIRIN 81 MG/1
81 TABLET ORAL AT BEDTIME
Status: DISCONTINUED | OUTPATIENT
Start: 2023-01-01 | End: 2023-01-01 | Stop reason: HOSPADM

## 2023-01-01 RX ORDER — HYDROMORPHONE HYDROCHLORIDE 1 MG/ML
0.5 INJECTION, SOLUTION INTRAMUSCULAR; INTRAVENOUS; SUBCUTANEOUS
Status: DISCONTINUED | OUTPATIENT
Start: 2023-01-01 | End: 2023-01-01

## 2023-01-01 RX ORDER — METOPROLOL TARTRATE 50 MG
150 TABLET ORAL 2 TIMES DAILY
Status: DISCONTINUED | OUTPATIENT
Start: 2023-01-01 | End: 2023-01-01 | Stop reason: HOSPADM

## 2023-01-01 RX ORDER — ACETAMINOPHEN 325 MG/1
650 TABLET ORAL EVERY 6 HOURS PRN
Qty: 12 TABLET | Refills: 0 | Status: SHIPPED | OUTPATIENT
Start: 2023-01-01 | End: 2023-04-28

## 2023-01-01 RX ORDER — BISACODYL 10 MG
10 SUPPOSITORY, RECTAL RECTAL DAILY PRN
Status: DISCONTINUED | OUTPATIENT
Start: 2023-01-01 | End: 2023-01-01 | Stop reason: HOSPADM

## 2023-01-01 RX ORDER — LIDOCAINE 40 MG/G
CREAM TOPICAL
Status: DISCONTINUED | OUTPATIENT
Start: 2023-01-01 | End: 2023-01-01 | Stop reason: HOSPADM

## 2023-01-01 RX ORDER — CLONIDINE HYDROCHLORIDE 0.1 MG/1
0.1 TABLET ORAL 2 TIMES DAILY
Status: DISCONTINUED | OUTPATIENT
Start: 2023-01-01 | End: 2023-01-01 | Stop reason: HOSPADM

## 2023-01-01 RX ORDER — METOPROLOL TARTRATE 1 MG/ML
2.5 INJECTION, SOLUTION INTRAVENOUS EVERY 4 HOURS PRN
Status: DISCONTINUED | OUTPATIENT
Start: 2023-01-01 | End: 2023-01-01 | Stop reason: HOSPADM

## 2023-01-01 RX ORDER — LISINOPRIL 20 MG/1
10 TABLET ORAL DAILY
Qty: 30 TABLET | Refills: 3 | Status: ON HOLD | OUTPATIENT
Start: 2023-01-01 | End: 2023-01-01

## 2023-01-01 RX ORDER — PROCHLORPERAZINE MALEATE 5 MG
5 TABLET ORAL EVERY 6 HOURS PRN
Status: DISCONTINUED | OUTPATIENT
Start: 2023-01-01 | End: 2023-01-01 | Stop reason: HOSPADM

## 2023-01-01 RX ORDER — AMOXICILLIN 250 MG
2 CAPSULE ORAL 2 TIMES DAILY PRN
Status: DISCONTINUED | OUTPATIENT
Start: 2023-01-01 | End: 2023-01-01 | Stop reason: HOSPADM

## 2023-01-01 RX ORDER — PROCHLORPERAZINE 25 MG
12.5 SUPPOSITORY, RECTAL RECTAL EVERY 12 HOURS PRN
Status: DISCONTINUED | OUTPATIENT
Start: 2023-01-01 | End: 2023-01-01 | Stop reason: HOSPADM

## 2023-01-01 RX ORDER — AMOXICILLIN 250 MG
1 CAPSULE ORAL 2 TIMES DAILY PRN
Qty: 6 TABLET | Refills: 0 | Status: SHIPPED | OUTPATIENT
Start: 2023-01-01

## 2023-01-01 RX ORDER — DEXTROSE MONOHYDRATE 25 G/50ML
25-50 INJECTION, SOLUTION INTRAVENOUS
Status: DISCONTINUED | OUTPATIENT
Start: 2023-01-01 | End: 2023-01-01 | Stop reason: HOSPADM

## 2023-01-01 RX ORDER — NICOTINE POLACRILEX 4 MG
15-30 LOZENGE BUCCAL
Status: DISCONTINUED | OUTPATIENT
Start: 2023-01-01 | End: 2023-01-01 | Stop reason: HOSPADM

## 2023-01-01 RX ORDER — ONDANSETRON 2 MG/ML
4 INJECTION INTRAMUSCULAR; INTRAVENOUS EVERY 6 HOURS PRN
Status: DISCONTINUED | OUTPATIENT
Start: 2023-01-01 | End: 2023-01-01 | Stop reason: HOSPADM

## 2023-01-01 RX ORDER — ACETAMINOPHEN 325 MG/1
650 TABLET ORAL EVERY 6 HOURS PRN
Status: DISCONTINUED | OUTPATIENT
Start: 2023-01-01 | End: 2023-01-01 | Stop reason: HOSPADM

## 2023-01-01 RX ORDER — WARFARIN SODIUM 3 MG/1
6 TABLET ORAL
Status: COMPLETED | OUTPATIENT
Start: 2023-01-01 | End: 2023-01-01

## 2023-01-01 RX ORDER — PIPERACILLIN SODIUM, TAZOBACTAM SODIUM 2; .25 G/10ML; G/10ML
2.25 INJECTION, POWDER, LYOPHILIZED, FOR SOLUTION INTRAVENOUS EVERY 6 HOURS
Status: DISCONTINUED | OUTPATIENT
Start: 2023-01-01 | End: 2023-01-01 | Stop reason: HOSPADM

## 2023-01-01 RX ORDER — NALOXONE HYDROCHLORIDE 0.4 MG/ML
0.4 INJECTION, SOLUTION INTRAMUSCULAR; INTRAVENOUS; SUBCUTANEOUS
Status: DISCONTINUED | OUTPATIENT
Start: 2023-01-01 | End: 2023-01-01

## 2023-01-01 RX ORDER — LORAZEPAM 2 MG/ML
.5-1 CONCENTRATE ORAL
Status: DISCONTINUED | OUTPATIENT
Start: 2023-01-01 | End: 2023-01-01 | Stop reason: HOSPADM

## 2023-01-01 RX ORDER — ONDANSETRON 4 MG/1
4 TABLET, ORALLY DISINTEGRATING ORAL EVERY 6 HOURS PRN
Status: DISCONTINUED | OUTPATIENT
Start: 2023-01-01 | End: 2023-01-01 | Stop reason: HOSPADM

## 2023-01-01 RX ORDER — HYDRALAZINE HYDROCHLORIDE 20 MG/ML
10 INJECTION INTRAMUSCULAR; INTRAVENOUS EVERY 4 HOURS PRN
Status: DISCONTINUED | OUTPATIENT
Start: 2023-01-01 | End: 2023-01-01

## 2023-01-01 RX ORDER — ACYCLOVIR 200 MG/1
10 CAPSULE ORAL ONCE
Status: COMPLETED | OUTPATIENT
Start: 2023-01-01 | End: 2023-01-01

## 2023-01-01 RX ORDER — WARFARIN SODIUM 5 MG/1
5 TABLET ORAL
Status: DISCONTINUED | OUTPATIENT
Start: 2023-01-01 | End: 2023-01-01 | Stop reason: HOSPADM

## 2023-01-01 RX ORDER — ALBUMIN (HUMAN) 12.5 G/50ML
25 SOLUTION INTRAVENOUS ONCE
Status: COMPLETED | OUTPATIENT
Start: 2023-01-01 | End: 2023-01-01

## 2023-01-01 RX ORDER — LISINOPRIL 20 MG/1
20 TABLET ORAL DAILY
Status: DISCONTINUED | OUTPATIENT
Start: 2023-01-01 | End: 2023-01-01 | Stop reason: HOSPADM

## 2023-01-01 RX ORDER — WARFARIN SODIUM 5 MG/1
5 TABLET ORAL
Status: DISCONTINUED | OUTPATIENT
Start: 2023-01-01 | End: 2023-01-01

## 2023-01-01 RX ORDER — GABAPENTIN 100 MG/1
100 CAPSULE ORAL AT BEDTIME
Qty: 3 CAPSULE | Refills: 0 | Status: SHIPPED | OUTPATIENT
Start: 2023-01-01 | End: 2023-04-28

## 2023-01-01 RX ORDER — MIRTAZAPINE 15 MG/1
15 TABLET, FILM COATED ORAL AT BEDTIME
Status: DISCONTINUED | OUTPATIENT
Start: 2023-01-01 | End: 2023-01-01 | Stop reason: HOSPADM

## 2023-01-01 RX ORDER — METOPROLOL TARTRATE 1 MG/ML
5 INJECTION, SOLUTION INTRAVENOUS
Status: COMPLETED | OUTPATIENT
Start: 2023-01-01 | End: 2023-01-01

## 2023-01-01 RX ORDER — OXYCODONE HYDROCHLORIDE 5 MG/1
5-10 TABLET ORAL EVERY 4 HOURS PRN
Status: DISCONTINUED | OUTPATIENT
Start: 2023-01-01 | End: 2023-01-01

## 2023-01-01 RX ORDER — METOPROLOL TARTRATE 100 MG
100 TABLET ORAL 2 TIMES DAILY
Status: DISCONTINUED | OUTPATIENT
Start: 2023-01-01 | End: 2023-01-01

## 2023-01-01 RX ORDER — WARFARIN SODIUM 5 MG/1
5 TABLET ORAL ONCE
Status: COMPLETED | OUTPATIENT
Start: 2023-01-01 | End: 2023-01-01

## 2023-01-01 RX ORDER — METOPROLOL TARTRATE 75 MG/1
75 TABLET, FILM COATED ORAL 2 TIMES DAILY
Status: DISCONTINUED | OUTPATIENT
Start: 2023-01-01 | End: 2023-01-01

## 2023-01-01 RX ORDER — ONDANSETRON 4 MG/1
4 TABLET, ORALLY DISINTEGRATING ORAL EVERY 6 HOURS PRN
Qty: 12 TABLET | Refills: 0 | Status: SHIPPED | OUTPATIENT
Start: 2023-01-01

## 2023-01-01 RX ORDER — OXYCODONE HYDROCHLORIDE 5 MG/1
5-10 TABLET ORAL
Status: DISCONTINUED | OUTPATIENT
Start: 2023-01-01 | End: 2023-01-01 | Stop reason: HOSPADM

## 2023-01-01 RX ORDER — FENTANYL CITRATE 50 UG/ML
25-50 INJECTION, SOLUTION INTRAMUSCULAR; INTRAVENOUS EVERY 5 MIN PRN
Status: DISCONTINUED | OUTPATIENT
Start: 2023-01-01 | End: 2023-01-01 | Stop reason: HOSPADM

## 2023-01-01 RX ORDER — LIDOCAINE HYDROCHLORIDE 10 MG/ML
10 INJECTION, SOLUTION EPIDURAL; INFILTRATION; INTRACAUDAL; PERINEURAL ONCE
Status: COMPLETED | OUTPATIENT
Start: 2023-01-01 | End: 2023-01-01

## 2023-01-01 RX ORDER — WARFARIN SODIUM 5 MG/1
5 TABLET ORAL
Status: COMPLETED | OUTPATIENT
Start: 2023-01-01 | End: 2023-01-01

## 2023-01-01 RX ORDER — PANTOPRAZOLE SODIUM 40 MG/1
40 TABLET, DELAYED RELEASE ORAL DAILY
Status: DISCONTINUED | OUTPATIENT
Start: 2023-01-01 | End: 2023-01-01 | Stop reason: HOSPADM

## 2023-01-01 RX ORDER — LORAZEPAM 2 MG/ML
1 CONCENTRATE ORAL EVERY 8 HOURS PRN
Qty: 30 ML | Refills: 0 | Status: SHIPPED | OUTPATIENT
Start: 2023-01-01

## 2023-01-01 RX ORDER — WARFARIN SODIUM 2.5 MG/1
2.5 TABLET ORAL ONCE
Status: COMPLETED | OUTPATIENT
Start: 2023-01-01 | End: 2023-01-01

## 2023-01-01 RX ORDER — WARFARIN SODIUM 1 MG/1
1 TABLET ORAL
Status: DISCONTINUED | OUTPATIENT
Start: 2023-01-01 | End: 2023-01-01 | Stop reason: HOSPADM

## 2023-01-01 RX ORDER — METOPROLOL TARTRATE 25 MG/1
100 TABLET, FILM COATED ORAL 2 TIMES DAILY
Status: DISCONTINUED | OUTPATIENT
Start: 2023-01-01 | End: 2023-01-01

## 2023-01-01 RX ORDER — METOPROLOL TARTRATE 100 MG
100 TABLET ORAL 2 TIMES DAILY
Status: ON HOLD | COMMUNITY
End: 2023-01-01

## 2023-01-01 RX ORDER — SODIUM CHLORIDE 9 MG/ML
INJECTION, SOLUTION INTRAVENOUS CONTINUOUS
Status: DISCONTINUED | OUTPATIENT
Start: 2023-01-01 | End: 2023-01-01

## 2023-01-01 RX ORDER — ALLOPURINOL 100 MG/1
100 TABLET ORAL 2 TIMES DAILY
Status: DISCONTINUED | OUTPATIENT
Start: 2023-01-01 | End: 2023-01-01 | Stop reason: HOSPADM

## 2023-01-01 RX ORDER — CEPHALEXIN 500 MG/1
500 CAPSULE ORAL EVERY 12 HOURS SCHEDULED
Status: DISCONTINUED | OUTPATIENT
Start: 2023-01-01 | End: 2023-01-01 | Stop reason: HOSPADM

## 2023-01-01 RX ORDER — GABAPENTIN 100 MG/1
100 CAPSULE ORAL 2 TIMES DAILY
Status: DISCONTINUED | OUTPATIENT
Start: 2023-01-01 | End: 2023-01-01

## 2023-01-01 RX ORDER — METOPROLOL TARTRATE 50 MG
100 TABLET ORAL 2 TIMES DAILY
Status: DISCONTINUED | OUTPATIENT
Start: 2023-01-01 | End: 2023-01-01

## 2023-01-01 RX ORDER — POLYETHYLENE GLYCOL 3350 17 G/17G
17 POWDER, FOR SOLUTION ORAL DAILY
Qty: 510 G | Refills: 0 | Status: SHIPPED | OUTPATIENT
Start: 2023-01-01

## 2023-01-01 RX ORDER — DILTIAZEM HYDROCHLORIDE 120 MG/1
120 CAPSULE, COATED, EXTENDED RELEASE ORAL DAILY
Status: DISCONTINUED | OUTPATIENT
Start: 2023-01-01 | End: 2023-01-01 | Stop reason: HOSPADM

## 2023-01-01 RX ORDER — DEXTROSE MONOHYDRATE 25 G/50ML
25-50 INJECTION, SOLUTION INTRAVENOUS
Status: DISCONTINUED | OUTPATIENT
Start: 2023-01-01 | End: 2023-01-01

## 2023-01-01 RX ORDER — IOPAMIDOL 755 MG/ML
80 INJECTION, SOLUTION INTRAVASCULAR ONCE
Status: COMPLETED | OUTPATIENT
Start: 2023-01-01 | End: 2023-01-01

## 2023-01-01 RX ORDER — BUMETANIDE 1 MG/1
2 TABLET ORAL
Status: DISCONTINUED | OUTPATIENT
Start: 2023-01-01 | End: 2023-01-01 | Stop reason: HOSPADM

## 2023-01-01 RX ORDER — ATROPINE SULFATE 10 MG/ML
2 SOLUTION/ DROPS OPHTHALMIC EVERY 4 HOURS PRN
Qty: 2 ML | Refills: 0 | Status: SHIPPED | OUTPATIENT
Start: 2023-01-01

## 2023-01-01 RX ORDER — NICOTINE POLACRILEX 4 MG
15-30 LOZENGE BUCCAL
Status: DISCONTINUED | OUTPATIENT
Start: 2023-01-01 | End: 2023-01-01

## 2023-01-01 RX ORDER — DOXERCALCIFEROL 4 UG/2ML
4 INJECTION INTRAVENOUS
Status: COMPLETED | OUTPATIENT
Start: 2023-01-01 | End: 2023-01-01

## 2023-01-01 RX ORDER — HYDRALAZINE HYDROCHLORIDE 25 MG/1
25 TABLET, FILM COATED ORAL EVERY 6 HOURS PRN
Status: DISCONTINUED | OUTPATIENT
Start: 2023-01-01 | End: 2023-01-01 | Stop reason: HOSPADM

## 2023-01-01 RX ORDER — GABAPENTIN 100 MG/1
100 CAPSULE ORAL 2 TIMES DAILY
Status: DISCONTINUED | OUTPATIENT
Start: 2023-01-01 | End: 2023-01-01 | Stop reason: HOSPADM

## 2023-01-01 RX ORDER — ONDANSETRON 2 MG/ML
4 INJECTION INTRAMUSCULAR; INTRAVENOUS EVERY 6 HOURS PRN
Status: DISCONTINUED | OUTPATIENT
Start: 2023-01-01 | End: 2023-01-01

## 2023-01-01 RX ORDER — HEPARIN SODIUM 1000 [USP'U]/ML
500 INJECTION, SOLUTION INTRAVENOUS; SUBCUTANEOUS CONTINUOUS
Status: CANCELLED | OUTPATIENT
Start: 2023-01-01

## 2023-01-01 RX ORDER — METOPROLOL TARTRATE 1 MG/ML
2.5 INJECTION, SOLUTION INTRAVENOUS EVERY 4 HOURS PRN
Status: DISCONTINUED | OUTPATIENT
Start: 2023-01-01 | End: 2023-01-01

## 2023-01-01 RX ORDER — CEFTRIAXONE 1 G/1
1 INJECTION, POWDER, FOR SOLUTION INTRAMUSCULAR; INTRAVENOUS ONCE
Status: COMPLETED | OUTPATIENT
Start: 2023-01-01 | End: 2023-01-01

## 2023-01-01 RX ORDER — MIRTAZAPINE 15 MG/1
15 TABLET, FILM COATED ORAL AT BEDTIME
Status: DISCONTINUED | OUTPATIENT
Start: 2023-01-01 | End: 2023-01-01

## 2023-01-01 RX ORDER — CEFTRIAXONE 1 G/1
1 INJECTION, POWDER, FOR SOLUTION INTRAMUSCULAR; INTRAVENOUS EVERY 24 HOURS
Status: DISCONTINUED | OUTPATIENT
Start: 2023-01-01 | End: 2023-01-01

## 2023-01-01 RX ORDER — ATROPINE SULFATE 10 MG/ML
2 SOLUTION/ DROPS OPHTHALMIC EVERY 4 HOURS PRN
Status: DISCONTINUED | OUTPATIENT
Start: 2023-01-01 | End: 2023-01-01 | Stop reason: HOSPADM

## 2023-01-01 RX ORDER — WARFARIN SODIUM 5 MG/1
5 TABLET ORAL DAILY
Status: ON HOLD
Start: 2023-01-01 | End: 2023-01-01

## 2023-01-01 RX ORDER — FLUMAZENIL 0.1 MG/ML
0.2 INJECTION, SOLUTION INTRAVENOUS
Status: DISCONTINUED | OUTPATIENT
Start: 2023-01-01 | End: 2023-01-01 | Stop reason: HOSPADM

## 2023-01-01 RX ORDER — POLYETHYLENE GLYCOL 3350 17 G/17G
17 POWDER, FOR SOLUTION ORAL DAILY PRN
Status: DISCONTINUED | OUTPATIENT
Start: 2023-01-01 | End: 2023-01-01 | Stop reason: HOSPADM

## 2023-01-01 RX ORDER — HEPARIN SODIUM 200 [USP'U]/100ML
1 INJECTION, SOLUTION INTRAVENOUS CONTINUOUS PRN
Status: DISCONTINUED | OUTPATIENT
Start: 2023-01-01 | End: 2023-01-01 | Stop reason: HOSPADM

## 2023-01-01 RX ORDER — EZETIMIBE 10 MG/1
10 TABLET ORAL EVERY MORNING
Status: DISCONTINUED | OUTPATIENT
Start: 2023-01-01 | End: 2023-01-01 | Stop reason: HOSPADM

## 2023-01-01 RX ORDER — ONDANSETRON 2 MG/ML
4 INJECTION INTRAMUSCULAR; INTRAVENOUS ONCE
Status: COMPLETED | OUTPATIENT
Start: 2023-01-01 | End: 2023-01-01

## 2023-01-01 RX ORDER — ONDANSETRON 4 MG/1
4 TABLET, ORALLY DISINTEGRATING ORAL EVERY 6 HOURS PRN
Status: DISCONTINUED | OUTPATIENT
Start: 2023-01-01 | End: 2023-01-01

## 2023-01-01 RX ORDER — METOPROLOL TARTRATE 1 MG/ML
2.5 INJECTION, SOLUTION INTRAVENOUS ONCE
Status: COMPLETED | OUTPATIENT
Start: 2023-01-01 | End: 2023-01-01

## 2023-01-01 RX ORDER — GABAPENTIN 100 MG/1
100 CAPSULE ORAL 2 TIMES DAILY
Status: ON HOLD | DISCHARGE
Start: 2023-01-01 | End: 2023-01-01

## 2023-01-01 RX ORDER — ALBUMIN (HUMAN) 12.5 G/50ML
50 SOLUTION INTRAVENOUS
Status: DISCONTINUED | OUTPATIENT
Start: 2023-01-01 | End: 2023-01-01 | Stop reason: HOSPADM

## 2023-01-01 RX ORDER — INSULIN ASPART 100 [IU]/ML
INJECTION, SOLUTION INTRAVENOUS; SUBCUTANEOUS
Qty: 10 ML | Refills: 0 | Status: ON HOLD | DISCHARGE
Start: 2023-01-01 | End: 2023-01-01

## 2023-01-01 RX ORDER — LABETALOL HYDROCHLORIDE 5 MG/ML
10 INJECTION, SOLUTION INTRAVENOUS
Status: DISCONTINUED | OUTPATIENT
Start: 2023-01-01 | End: 2023-01-01

## 2023-01-01 RX ORDER — ALLOPURINOL 100 MG/1
100 TABLET ORAL 2 TIMES DAILY
Status: DISCONTINUED | OUTPATIENT
Start: 2023-01-01 | End: 2023-01-01

## 2023-01-01 RX ORDER — NALOXONE HYDROCHLORIDE 0.4 MG/ML
0.4 INJECTION, SOLUTION INTRAMUSCULAR; INTRAVENOUS; SUBCUTANEOUS
Status: DISCONTINUED | OUTPATIENT
Start: 2023-01-01 | End: 2023-01-01 | Stop reason: HOSPADM

## 2023-01-01 RX ORDER — BUMETANIDE 2 MG/1
2 TABLET ORAL
Status: DISCONTINUED | OUTPATIENT
Start: 2023-01-01 | End: 2023-01-01 | Stop reason: HOSPADM

## 2023-01-01 RX ORDER — IOPAMIDOL 612 MG/ML
100 INJECTION, SOLUTION INTRAVASCULAR ONCE
Status: COMPLETED | OUTPATIENT
Start: 2023-01-01 | End: 2023-01-01

## 2023-01-01 RX ORDER — DILTIAZEM HYDROCHLORIDE 120 MG/1
120 CAPSULE, COATED, EXTENDED RELEASE ORAL DAILY
Status: ON HOLD | DISCHARGE
Start: 2023-01-01 | End: 2023-01-01

## 2023-01-01 RX ORDER — POLYETHYLENE GLYCOL 3350 17 G/17G
17 POWDER, FOR SOLUTION ORAL DAILY
Status: DISCONTINUED | OUTPATIENT
Start: 2023-01-01 | End: 2023-01-01 | Stop reason: HOSPADM

## 2023-01-01 RX ORDER — BUMETANIDE 2 MG/1
4 TABLET ORAL
Status: DISCONTINUED | OUTPATIENT
Start: 2023-01-01 | End: 2023-01-01 | Stop reason: HOSPADM

## 2023-01-01 RX ORDER — NALOXONE HYDROCHLORIDE 0.4 MG/ML
0.2 INJECTION, SOLUTION INTRAMUSCULAR; INTRAVENOUS; SUBCUTANEOUS
Status: DISCONTINUED | OUTPATIENT
Start: 2023-01-01 | End: 2023-01-01 | Stop reason: HOSPADM

## 2023-01-01 RX ORDER — DILTIAZEM HYDROCHLORIDE 30 MG/1
30 TABLET, FILM COATED ORAL EVERY 6 HOURS SCHEDULED
Status: DISCONTINUED | OUTPATIENT
Start: 2023-01-01 | End: 2023-01-01

## 2023-01-01 RX ORDER — MIRTAZAPINE 7.5 MG/1
15 TABLET, FILM COATED ORAL AT BEDTIME
Status: DISCONTINUED | OUTPATIENT
Start: 2023-01-01 | End: 2023-01-01 | Stop reason: HOSPADM

## 2023-01-01 RX ORDER — ASPIRIN 81 MG/1
81 TABLET ORAL AT BEDTIME
Status: DISCONTINUED | OUTPATIENT
Start: 2023-01-01 | End: 2023-01-01

## 2023-01-01 RX ORDER — METOPROLOL TARTRATE 1 MG/ML
2.5-5 INJECTION, SOLUTION INTRAVENOUS EVERY 4 HOURS PRN
Status: DISCONTINUED | OUTPATIENT
Start: 2023-01-01 | End: 2023-01-01 | Stop reason: HOSPADM

## 2023-01-01 RX ORDER — NALOXONE HYDROCHLORIDE 0.4 MG/ML
0.2 INJECTION, SOLUTION INTRAMUSCULAR; INTRAVENOUS; SUBCUTANEOUS
Status: DISCONTINUED | OUTPATIENT
Start: 2023-01-01 | End: 2023-01-01

## 2023-01-01 RX ORDER — WARFARIN SODIUM 2.5 MG/1
2.5 TABLET ORAL
Status: COMPLETED | OUTPATIENT
Start: 2023-01-01 | End: 2023-01-01

## 2023-01-01 RX ORDER — WARFARIN SODIUM 7.5 MG/1
7.5 TABLET ORAL
Status: COMPLETED | OUTPATIENT
Start: 2023-01-01 | End: 2023-01-01

## 2023-01-01 RX ORDER — WARFARIN SODIUM 5 MG/1
5 TABLET ORAL DAILY
Status: ON HOLD | DISCHARGE
Start: 2023-01-01 | End: 2023-01-01

## 2023-01-01 RX ORDER — GLYCOPYRROLATE 0.2 MG/ML
0.2 INJECTION, SOLUTION INTRAMUSCULAR; INTRAVENOUS EVERY 4 HOURS PRN
Status: DISCONTINUED | OUTPATIENT
Start: 2023-01-01 | End: 2023-01-01 | Stop reason: HOSPADM

## 2023-01-01 RX ORDER — OXYCODONE HYDROCHLORIDE 5 MG/1
5 TABLET ORAL
Qty: 18 TABLET | Refills: 0 | Status: SHIPPED | OUTPATIENT
Start: 2023-01-01

## 2023-01-01 RX ORDER — DILTIAZEM HYDROCHLORIDE 120 MG/1
120 CAPSULE, COATED, EXTENDED RELEASE ORAL DAILY
Status: DISCONTINUED | OUTPATIENT
Start: 2023-01-01 | End: 2023-01-01

## 2023-01-01 RX ORDER — METOPROLOL TARTRATE 50 MG
50 TABLET ORAL ONCE
Status: COMPLETED | OUTPATIENT
Start: 2023-01-01 | End: 2023-01-01

## 2023-01-01 RX ORDER — CEPHALEXIN 500 MG/1
500 CAPSULE ORAL EVERY 12 HOURS
Qty: 2 CAPSULE | Refills: 0 | DISCHARGE
Start: 2023-01-01 | End: 2023-01-01

## 2023-01-01 RX ORDER — MIDODRINE HYDROCHLORIDE 5 MG/1
10 TABLET ORAL ONCE
Status: COMPLETED | OUTPATIENT
Start: 2023-01-01 | End: 2023-01-01

## 2023-01-01 RX ORDER — GABAPENTIN 100 MG/1
100 CAPSULE ORAL AT BEDTIME
Status: DISCONTINUED | OUTPATIENT
Start: 2023-01-01 | End: 2023-01-01 | Stop reason: HOSPADM

## 2023-01-01 RX ORDER — HALOPERIDOL 2 MG/ML
0.5 SOLUTION ORAL EVERY 6 HOURS PRN
Qty: 15 ML | Refills: 0 | Status: SHIPPED | OUTPATIENT
Start: 2023-01-01

## 2023-01-01 RX ORDER — MIRTAZAPINE 7.5 MG/1
7.5 TABLET, FILM COATED ORAL AT BEDTIME
Qty: 3 TABLET | Refills: 0 | Status: SHIPPED | OUTPATIENT
Start: 2023-01-01

## 2023-01-01 RX ORDER — CLONIDINE HYDROCHLORIDE 0.1 MG/1
0.1 TABLET ORAL 2 TIMES DAILY
Status: DISCONTINUED | OUTPATIENT
Start: 2023-01-01 | End: 2023-01-01

## 2023-01-01 RX ORDER — CLONIDINE HYDROCHLORIDE 0.1 MG/1
0.1 TABLET ORAL DAILY
Status: DISCONTINUED | OUTPATIENT
Start: 2023-01-01 | End: 2023-01-01

## 2023-01-01 RX ORDER — HYDROMORPHONE HYDROCHLORIDE 1 MG/ML
.5-1 INJECTION, SOLUTION INTRAMUSCULAR; INTRAVENOUS; SUBCUTANEOUS
Status: DISCONTINUED | OUTPATIENT
Start: 2023-01-01 | End: 2023-01-01 | Stop reason: HOSPADM

## 2023-01-01 RX ORDER — LORAZEPAM 2 MG/ML
1 CONCENTRATE ORAL EVERY 8 HOURS PRN
Status: DISCONTINUED | OUTPATIENT
Start: 2023-01-01 | End: 2023-01-01 | Stop reason: HOSPADM

## 2023-01-01 RX ORDER — METOPROLOL TARTRATE 75 MG/1
75 TABLET, FILM COATED ORAL 2 TIMES DAILY
Status: ON HOLD | COMMUNITY
End: 2023-01-01

## 2023-01-01 RX ORDER — METOPROLOL TARTRATE 1 MG/ML
2.5-5 INJECTION, SOLUTION INTRAVENOUS EVERY 4 HOURS PRN
Status: DISCONTINUED | OUTPATIENT
Start: 2023-01-01 | End: 2023-01-01

## 2023-01-01 RX ORDER — HALOPERIDOL 2 MG/ML
.5-1 SOLUTION ORAL EVERY 6 HOURS PRN
Status: DISCONTINUED | OUTPATIENT
Start: 2023-01-01 | End: 2023-01-01 | Stop reason: HOSPADM

## 2023-01-01 RX ORDER — BUMETANIDE 2 MG/1
2 TABLET ORAL
Status: DISCONTINUED | OUTPATIENT
Start: 2023-01-01 | End: 2023-01-01

## 2023-01-01 RX ORDER — ALLOPURINOL 100 MG/1
100 TABLET ORAL 2 TIMES DAILY
Status: ON HOLD | COMMUNITY
End: 2023-01-01

## 2023-01-01 RX ORDER — ACETAMINOPHEN 650 MG/1
650 SUPPOSITORY RECTAL EVERY 6 HOURS PRN
Qty: 12 SUPPOSITORY | Refills: 0 | Status: SHIPPED | OUTPATIENT
Start: 2023-01-01

## 2023-01-01 RX ORDER — METOPROLOL TARTRATE 25 MG/1
100 TABLET, FILM COATED ORAL ONCE
Status: COMPLETED | OUTPATIENT
Start: 2023-01-01 | End: 2023-01-01

## 2023-01-01 RX ORDER — BISACODYL 10 MG
10 SUPPOSITORY, RECTAL RECTAL DAILY PRN
Qty: 3 SUPPOSITORY | Refills: 0 | Status: SHIPPED | OUTPATIENT
Start: 2023-01-01

## 2023-01-01 RX ADMIN — LISINOPRIL 20 MG: 20 TABLET ORAL at 18:35

## 2023-01-01 RX ADMIN — MIRTAZAPINE 15 MG: 15 TABLET, FILM COATED ORAL at 22:16

## 2023-01-01 RX ADMIN — PIPERACILLIN AND TAZOBACTAM 2.25 G: 2; .25 INJECTION, POWDER, FOR SOLUTION INTRAVENOUS at 22:32

## 2023-01-01 RX ADMIN — SODIUM CHLORIDE 300 ML: 9 INJECTION, SOLUTION INTRAVENOUS at 07:50

## 2023-01-01 RX ADMIN — ALLOPURINOL 100 MG: 100 TABLET ORAL at 08:04

## 2023-01-01 RX ADMIN — METOPROLOL TARTRATE 150 MG: 50 TABLET, FILM COATED ORAL at 19:47

## 2023-01-01 RX ADMIN — CLONIDINE HYDROCHLORIDE 0.1 MG: 0.1 TABLET ORAL at 09:22

## 2023-01-01 RX ADMIN — Medication: at 16:19

## 2023-01-01 RX ADMIN — MIDAZOLAM 1 MG: 1 INJECTION INTRAMUSCULAR; INTRAVENOUS at 09:01

## 2023-01-01 RX ADMIN — WARFARIN SODIUM 7.5 MG: 7.5 TABLET ORAL at 18:08

## 2023-01-01 RX ADMIN — METOPROLOL TARTRATE 2.5 MG: 5 INJECTION INTRAVENOUS at 03:43

## 2023-01-01 RX ADMIN — CEFTRIAXONE SODIUM 1 G: 1 INJECTION, POWDER, FOR SOLUTION INTRAMUSCULAR; INTRAVENOUS at 22:05

## 2023-01-01 RX ADMIN — DILTIAZEM HYDROCHLORIDE 120 MG: 120 CAPSULE, COATED, EXTENDED RELEASE ORAL at 10:10

## 2023-01-01 RX ADMIN — ACETAMINOPHEN 650 MG: 325 TABLET, FILM COATED ORAL at 15:01

## 2023-01-01 RX ADMIN — CLONIDINE HYDROCHLORIDE 0.1 MG: 0.1 TABLET ORAL at 08:03

## 2023-01-01 RX ADMIN — GABAPENTIN 100 MG: 100 CAPSULE ORAL at 22:01

## 2023-01-01 RX ADMIN — DILTIAZEM HYDROCHLORIDE 120 MG: 120 CAPSULE, COATED, EXTENDED RELEASE ORAL at 08:03

## 2023-01-01 RX ADMIN — PANTOPRAZOLE SODIUM 40 MG: 40 TABLET, DELAYED RELEASE ORAL at 09:23

## 2023-01-01 RX ADMIN — ALLOPURINOL 100 MG: 100 TABLET ORAL at 20:29

## 2023-01-01 RX ADMIN — PIPERACILLIN AND TAZOBACTAM 2.25 G: 2; .25 INJECTION, POWDER, FOR SOLUTION INTRAVENOUS at 12:45

## 2023-01-01 RX ADMIN — SODIUM CHLORIDE 250 ML: 9 INJECTION, SOLUTION INTRAVENOUS at 12:00

## 2023-01-01 RX ADMIN — METOPROLOL TARTRATE 100 MG: 100 TABLET, FILM COATED ORAL at 08:01

## 2023-01-01 RX ADMIN — INSULIN ASPART 2 UNITS: 100 INJECTION, SOLUTION INTRAVENOUS; SUBCUTANEOUS at 08:23

## 2023-01-01 RX ADMIN — WARFARIN SODIUM 6 MG: 3 TABLET ORAL at 17:51

## 2023-01-01 RX ADMIN — CLONIDINE HYDROCHLORIDE 0.1 MG: 0.1 TABLET ORAL at 08:01

## 2023-01-01 RX ADMIN — SODIUM CHLORIDE 300 ML: 9 INJECTION, SOLUTION INTRAVENOUS at 16:05

## 2023-01-01 RX ADMIN — SODIUM CHLORIDE 250 ML: 9 INJECTION, SOLUTION INTRAVENOUS at 08:48

## 2023-01-01 RX ADMIN — ACETAMINOPHEN 650 MG: 325 TABLET, FILM COATED ORAL at 18:19

## 2023-01-01 RX ADMIN — CLONIDINE HYDROCHLORIDE 0.1 MG: 0.1 TABLET ORAL at 08:05

## 2023-01-01 RX ADMIN — SODIUM CHLORIDE 300 ML: 9 INJECTION, SOLUTION INTRAVENOUS at 10:48

## 2023-01-01 RX ADMIN — METOPROLOL TARTRATE 175 MG: 50 TABLET, FILM COATED ORAL at 08:04

## 2023-01-01 RX ADMIN — LORAZEPAM 1 MG: 2 LIQUID ORAL at 23:48

## 2023-01-01 RX ADMIN — OXYCODONE HYDROCHLORIDE 10 MG: 5 TABLET ORAL at 10:45

## 2023-01-01 RX ADMIN — MIRTAZAPINE 15 MG: 15 TABLET, FILM COATED ORAL at 21:57

## 2023-01-01 RX ADMIN — METOPROLOL TARTRATE 2.5 MG: 5 INJECTION INTRAVENOUS at 16:35

## 2023-01-01 RX ADMIN — SODIUM CHLORIDE 250 ML: 9 INJECTION, SOLUTION INTRAVENOUS at 16:18

## 2023-01-01 RX ADMIN — ACETAMINOPHEN 650 MG: 325 TABLET, FILM COATED ORAL at 16:31

## 2023-01-01 RX ADMIN — ALBUMIN HUMAN 250 ML: 0.05 INJECTION, SOLUTION INTRAVENOUS at 09:06

## 2023-01-01 RX ADMIN — ALLOPURINOL 100 MG: 100 TABLET ORAL at 14:10

## 2023-01-01 RX ADMIN — INSULIN ASPART 2 UNITS: 100 INJECTION, SOLUTION INTRAVENOUS; SUBCUTANEOUS at 22:02

## 2023-01-01 RX ADMIN — CEPHALEXIN 500 MG: 500 CAPSULE ORAL at 21:25

## 2023-01-01 RX ADMIN — BUMETANIDE 2 MG: 1 TABLET ORAL at 20:22

## 2023-01-01 RX ADMIN — SODIUM CHLORIDE 250 ML: 9 INJECTION, SOLUTION INTRAVENOUS at 14:49

## 2023-01-01 RX ADMIN — SODIUM CHLORIDE 300 ML: 9 INJECTION, SOLUTION INTRAVENOUS at 14:20

## 2023-01-01 RX ADMIN — ALLOPURINOL 100 MG: 100 TABLET ORAL at 08:38

## 2023-01-01 RX ADMIN — CLONIDINE HYDROCHLORIDE 0.1 MG: 0.1 TABLET ORAL at 20:29

## 2023-01-01 RX ADMIN — PIPERACILLIN AND TAZOBACTAM 2.25 G: 2; .25 INJECTION, POWDER, FOR SOLUTION INTRAVENOUS at 17:34

## 2023-01-01 RX ADMIN — CLONIDINE HYDROCHLORIDE 0.1 MG: 0.1 TABLET ORAL at 14:11

## 2023-01-01 RX ADMIN — WARFARIN SODIUM 5 MG: 5 TABLET ORAL at 20:41

## 2023-01-01 RX ADMIN — OXYCODONE HYDROCHLORIDE 5 MG: 5 TABLET ORAL at 10:54

## 2023-01-01 RX ADMIN — SODIUM CHLORIDE 7 UNITS: 9 INJECTION, SOLUTION INTRAVENOUS at 12:25

## 2023-01-01 RX ADMIN — ALLOPURINOL 100 MG: 100 TABLET ORAL at 20:28

## 2023-01-01 RX ADMIN — BUMETANIDE 2 MG: 1 TABLET ORAL at 20:28

## 2023-01-01 RX ADMIN — BUMETANIDE 4 MG: 2 TABLET ORAL at 08:43

## 2023-01-01 RX ADMIN — OXYCODONE HYDROCHLORIDE 5 MG: 5 TABLET ORAL at 17:41

## 2023-01-01 RX ADMIN — INSULIN GLARGINE 10 UNITS: 100 INJECTION, SOLUTION SUBCUTANEOUS at 22:17

## 2023-01-01 RX ADMIN — MIRTAZAPINE 7.5 MG: 7.5 TABLET, FILM COATED ORAL at 21:17

## 2023-01-01 RX ADMIN — METOPROLOL TARTRATE 2.5 MG: 5 INJECTION INTRAVENOUS at 03:26

## 2023-01-01 RX ADMIN — METOPROLOL TARTRATE 175 MG: 50 TABLET, FILM COATED ORAL at 20:20

## 2023-01-01 RX ADMIN — SODIUM CHLORIDE 250 ML: 9 INJECTION, SOLUTION INTRAVENOUS at 14:20

## 2023-01-01 RX ADMIN — ONDANSETRON 4 MG: 2 INJECTION INTRAMUSCULAR; INTRAVENOUS at 14:26

## 2023-01-01 RX ADMIN — INSULIN GLARGINE 15 UNITS: 100 INJECTION, SOLUTION SUBCUTANEOUS at 21:04

## 2023-01-01 RX ADMIN — INSULIN ASPART 5 UNITS: 100 INJECTION, SOLUTION INTRAVENOUS; SUBCUTANEOUS at 21:40

## 2023-01-01 RX ADMIN — DOXERCALCIFEROL 4 MCG: 4 INJECTION, SOLUTION INTRAVENOUS at 14:45

## 2023-01-01 RX ADMIN — MIDAZOLAM 0.5 MG: 1 INJECTION INTRAMUSCULAR; INTRAVENOUS at 09:32

## 2023-01-01 RX ADMIN — PIPERACILLIN AND TAZOBACTAM 2.25 G: 2; .25 INJECTION, POWDER, FOR SOLUTION INTRAVENOUS at 10:11

## 2023-01-01 RX ADMIN — ONDANSETRON 4 MG: 2 INJECTION INTRAMUSCULAR; INTRAVENOUS at 22:18

## 2023-01-01 RX ADMIN — MIRTAZAPINE 15 MG: 15 TABLET, FILM COATED ORAL at 21:59

## 2023-01-01 RX ADMIN — METOPROLOL TARTRATE 150 MG: 50 TABLET, FILM COATED ORAL at 08:44

## 2023-01-01 RX ADMIN — ASPIRIN 81 MG: 81 TABLET, COATED ORAL at 22:01

## 2023-01-01 RX ADMIN — LIDOCAINE HYDROCHLORIDE 0.5 ML: 10 INJECTION, SOLUTION EPIDURAL; INFILTRATION; INTRACAUDAL; PERINEURAL at 13:10

## 2023-01-01 RX ADMIN — ASPIRIN 81 MG: 81 TABLET, COATED ORAL at 21:57

## 2023-01-01 RX ADMIN — GABAPENTIN 100 MG: 100 CAPSULE ORAL at 22:27

## 2023-01-01 RX ADMIN — DILTIAZEM HYDROCHLORIDE 30 MG: 30 TABLET, FILM COATED ORAL at 00:32

## 2023-01-01 RX ADMIN — Medication: at 14:53

## 2023-01-01 RX ADMIN — HYDROMORPHONE HYDROCHLORIDE 0.5 MG: 1 INJECTION, SOLUTION INTRAMUSCULAR; INTRAVENOUS; SUBCUTANEOUS at 21:45

## 2023-01-01 RX ADMIN — ACETAMINOPHEN 650 MG: 325 TABLET, FILM COATED ORAL at 20:15

## 2023-01-01 RX ADMIN — DOXERCALCIFEROL 4 MCG: 4 INJECTION, SOLUTION INTRAVENOUS at 16:17

## 2023-01-01 RX ADMIN — PANTOPRAZOLE SODIUM 40 MG: 40 TABLET, DELAYED RELEASE ORAL at 08:49

## 2023-01-01 RX ADMIN — ALLOPURINOL 100 MG: 100 TABLET ORAL at 08:19

## 2023-01-01 RX ADMIN — IRON SUCROSE 300 MG: 20 INJECTION, SOLUTION INTRAVENOUS at 16:00

## 2023-01-01 RX ADMIN — Medication: at 17:16

## 2023-01-01 RX ADMIN — ASPIRIN 81 MG: 81 TABLET, COATED ORAL at 21:42

## 2023-01-01 RX ADMIN — CEPHALEXIN 500 MG: 500 CAPSULE ORAL at 20:29

## 2023-01-01 RX ADMIN — METOPROLOL TARTRATE 100 MG: 100 TABLET, FILM COATED ORAL at 08:31

## 2023-01-01 RX ADMIN — PANTOPRAZOLE SODIUM 40 MG: 40 TABLET, DELAYED RELEASE ORAL at 08:19

## 2023-01-01 RX ADMIN — CLONIDINE HYDROCHLORIDE 0.1 MG: 0.1 TABLET ORAL at 13:53

## 2023-01-01 RX ADMIN — EZETIMIBE 10 MG: 10 TABLET ORAL at 08:38

## 2023-01-01 RX ADMIN — BUMETANIDE 2 MG: 1 TABLET ORAL at 21:34

## 2023-01-01 RX ADMIN — EZETIMIBE 10 MG: 10 TABLET ORAL at 08:18

## 2023-01-01 RX ADMIN — LIDOCAINE HYDROCHLORIDE 10 ML: 10 INJECTION, SOLUTION EPIDURAL; INFILTRATION; INTRACAUDAL; PERINEURAL at 09:42

## 2023-01-01 RX ADMIN — METOPROLOL TARTRATE 175 MG: 50 TABLET, FILM COATED ORAL at 13:52

## 2023-01-01 RX ADMIN — EPOETIN ALFA-EPBX 10000 UNITS: 10000 INJECTION, SOLUTION INTRAVENOUS; SUBCUTANEOUS at 14:23

## 2023-01-01 RX ADMIN — CEPHALEXIN 500 MG: 500 CAPSULE ORAL at 14:11

## 2023-01-01 RX ADMIN — TRAZODONE HYDROCHLORIDE 75 MG: 50 TABLET ORAL at 21:57

## 2023-01-01 RX ADMIN — MIDODRINE HYDROCHLORIDE 10 MG: 5 TABLET ORAL at 08:51

## 2023-01-01 RX ADMIN — CEPHALEXIN 500 MG: 500 CAPSULE ORAL at 08:18

## 2023-01-01 RX ADMIN — OXYCODONE HYDROCHLORIDE 5 MG: 5 TABLET ORAL at 21:23

## 2023-01-01 RX ADMIN — GABAPENTIN 100 MG: 100 CAPSULE ORAL at 08:18

## 2023-01-01 RX ADMIN — LIDOCAINE HYDROCHLORIDE 0.5 ML: 10 INJECTION, SOLUTION EPIDURAL; INFILTRATION; INTRACAUDAL; PERINEURAL at 13:00

## 2023-01-01 RX ADMIN — GABAPENTIN 100 MG: 100 CAPSULE ORAL at 21:25

## 2023-01-01 RX ADMIN — OXYCODONE HYDROCHLORIDE 5 MG: 5 TABLET ORAL at 20:13

## 2023-01-01 RX ADMIN — FENTANYL CITRATE 50 MCG: 50 INJECTION, SOLUTION INTRAMUSCULAR; INTRAVENOUS at 09:02

## 2023-01-01 RX ADMIN — PIPERACILLIN AND TAZOBACTAM 2.25 G: 2; .25 INJECTION, POWDER, FOR SOLUTION INTRAVENOUS at 18:02

## 2023-01-01 RX ADMIN — PHYTONADIONE 5 MG: 10 INJECTION, EMULSION INTRAMUSCULAR; INTRAVENOUS; SUBCUTANEOUS at 20:09

## 2023-01-01 RX ADMIN — GABAPENTIN 100 MG: 100 CAPSULE ORAL at 14:13

## 2023-01-01 RX ADMIN — EPOETIN ALFA-EPBX 3000 UNITS: 3000 INJECTION, SOLUTION INTRAVENOUS; SUBCUTANEOUS at 16:30

## 2023-01-01 RX ADMIN — ACETAMINOPHEN 650 MG: 325 TABLET, FILM COATED ORAL at 12:31

## 2023-01-01 RX ADMIN — METOPROLOL TARTRATE 50 MG: 50 TABLET, FILM COATED ORAL at 15:31

## 2023-01-01 RX ADMIN — METOPROLOL TARTRATE 100 MG: 100 TABLET, FILM COATED ORAL at 21:42

## 2023-01-01 RX ADMIN — METOPROLOL TARTRATE 175 MG: 50 TABLET, FILM COATED ORAL at 20:29

## 2023-01-01 RX ADMIN — EZETIMIBE 10 MG: 10 TABLET ORAL at 08:08

## 2023-01-01 RX ADMIN — CLONIDINE HYDROCHLORIDE 0.1 MG: 0.1 TABLET ORAL at 19:47

## 2023-01-01 RX ADMIN — MIRTAZAPINE 15 MG: 15 TABLET, FILM COATED ORAL at 02:31

## 2023-01-01 RX ADMIN — METOPROLOL TARTRATE 175 MG: 50 TABLET, FILM COATED ORAL at 21:23

## 2023-01-01 RX ADMIN — FENTANYL CITRATE 25 MCG: 50 INJECTION, SOLUTION INTRAMUSCULAR; INTRAVENOUS at 09:17

## 2023-01-01 RX ADMIN — MIRTAZAPINE 15 MG: 15 TABLET, FILM COATED ORAL at 21:58

## 2023-01-01 RX ADMIN — LISINOPRIL 20 MG: 20 TABLET ORAL at 08:31

## 2023-01-01 RX ADMIN — EZETIMIBE 10 MG: 10 TABLET ORAL at 08:13

## 2023-01-01 RX ADMIN — MIRTAZAPINE 7.5 MG: 7.5 TABLET, FILM COATED ORAL at 21:29

## 2023-01-01 RX ADMIN — ALLOPURINOL 100 MG: 100 TABLET ORAL at 08:13

## 2023-01-01 RX ADMIN — MIRTAZAPINE 7.5 MG: 7.5 TABLET, FILM COATED ORAL at 21:56

## 2023-01-01 RX ADMIN — METOPROLOL TARTRATE 100 MG: 25 TABLET, FILM COATED ORAL at 00:36

## 2023-01-01 RX ADMIN — FENTANYL CITRATE 25 MCG: 50 INJECTION, SOLUTION INTRAMUSCULAR; INTRAVENOUS at 09:32

## 2023-01-01 RX ADMIN — EPOETIN ALFA-EPBX 4000 UNITS: 4000 INJECTION, SOLUTION INTRAVENOUS; SUBCUTANEOUS at 14:49

## 2023-01-01 RX ADMIN — ASPIRIN 81 MG: 81 TABLET, COATED ORAL at 21:25

## 2023-01-01 RX ADMIN — METOPROLOL TARTRATE 175 MG: 50 TABLET, FILM COATED ORAL at 20:02

## 2023-01-01 RX ADMIN — INSULIN ASPART 3 UNITS: 100 INJECTION, SOLUTION INTRAVENOUS; SUBCUTANEOUS at 21:59

## 2023-01-01 RX ADMIN — BUMETANIDE 2 MG: 1 TABLET ORAL at 08:13

## 2023-01-01 RX ADMIN — EPOETIN ALFA-EPBX 4000 UNITS: 4000 INJECTION, SOLUTION INTRAVENOUS; SUBCUTANEOUS at 09:20

## 2023-01-01 RX ADMIN — WARFARIN SODIUM 6 MG: 3 TABLET ORAL at 17:07

## 2023-01-01 RX ADMIN — SODIUM CHLORIDE 250 ML: 9 INJECTION, SOLUTION INTRAVENOUS at 17:14

## 2023-01-01 RX ADMIN — SODIUM CHLORIDE 250 ML: 9 INJECTION, SOLUTION INTRAVENOUS at 10:49

## 2023-01-01 RX ADMIN — MIRTAZAPINE 15 MG: 15 TABLET, FILM COATED ORAL at 21:25

## 2023-01-01 RX ADMIN — ACETAMINOPHEN 650 MG: 325 TABLET, FILM COATED ORAL at 06:00

## 2023-01-01 RX ADMIN — HEPARIN SODIUM IN SODIUM CHLORIDE 1 BAG: 200 INJECTION INTRAVENOUS at 09:14

## 2023-01-01 RX ADMIN — METOPROLOL TARTRATE 150 MG: 50 TABLET, FILM COATED ORAL at 12:44

## 2023-01-01 RX ADMIN — HUMAN ALBUMIN MICROSPHERES AND PERFLUTREN 6 ML: 10; .22 INJECTION, SOLUTION INTRAVENOUS at 10:30

## 2023-01-01 RX ADMIN — CEPHALEXIN 500 MG: 500 CAPSULE ORAL at 21:33

## 2023-01-01 RX ADMIN — WARFARIN SODIUM 5 MG: 5 TABLET ORAL at 18:02

## 2023-01-01 RX ADMIN — METOPROLOL TARTRATE 5 MG: 5 INJECTION INTRAVENOUS at 06:56

## 2023-01-01 RX ADMIN — ACETAMINOPHEN 650 MG: 325 TABLET, FILM COATED ORAL at 04:14

## 2023-01-01 RX ADMIN — MIRTAZAPINE 7.5 MG: 7.5 TABLET, FILM COATED ORAL at 22:01

## 2023-01-01 RX ADMIN — ASPIRIN 81 MG: 81 TABLET, COATED ORAL at 21:29

## 2023-01-01 RX ADMIN — CLONIDINE HYDROCHLORIDE 0.1 MG: 0.1 TABLET ORAL at 20:28

## 2023-01-01 RX ADMIN — DILTIAZEM HYDROCHLORIDE 120 MG: 120 CAPSULE, COATED, EXTENDED RELEASE ORAL at 09:22

## 2023-01-01 RX ADMIN — METOPROLOL TARTRATE 175 MG: 50 TABLET, FILM COATED ORAL at 21:33

## 2023-01-01 RX ADMIN — PIPERACILLIN AND TAZOBACTAM 2.25 G: 2; .25 INJECTION, POWDER, FOR SOLUTION INTRAVENOUS at 23:17

## 2023-01-01 RX ADMIN — METOPROLOL TARTRATE 175 MG: 50 TABLET, FILM COATED ORAL at 08:11

## 2023-01-01 RX ADMIN — ASPIRIN 81 MG: 81 TABLET, COATED ORAL at 21:46

## 2023-01-01 RX ADMIN — METOPROLOL TARTRATE 175 MG: 50 TABLET, FILM COATED ORAL at 20:40

## 2023-01-01 RX ADMIN — METOPROLOL TARTRATE 175 MG: 50 TABLET, FILM COATED ORAL at 08:13

## 2023-01-01 RX ADMIN — CEPHALEXIN 500 MG: 500 CAPSULE ORAL at 13:53

## 2023-01-01 RX ADMIN — INSULIN ASPART 1 UNITS: 100 INJECTION, SOLUTION INTRAVENOUS; SUBCUTANEOUS at 21:37

## 2023-01-01 RX ADMIN — DEXTROSE 15 G: 15 GEL ORAL at 02:28

## 2023-01-01 RX ADMIN — DILTIAZEM HYDROCHLORIDE 30 MG: 30 TABLET, FILM COATED ORAL at 06:04

## 2023-01-01 RX ADMIN — BUMETANIDE 2 MG: 1 TABLET ORAL at 08:18

## 2023-01-01 RX ADMIN — SODIUM CHLORIDE 80 ML: 9 INJECTION, SOLUTION INTRAVENOUS at 21:21

## 2023-01-01 RX ADMIN — PIPERACILLIN AND TAZOBACTAM 2.25 G: 2; .25 INJECTION, POWDER, FOR SOLUTION INTRAVENOUS at 11:17

## 2023-01-01 RX ADMIN — OXYCODONE HYDROCHLORIDE 5 MG: 5 TABLET ORAL at 00:40

## 2023-01-01 RX ADMIN — TRAZODONE HYDROCHLORIDE 150 MG: 50 TABLET ORAL at 21:59

## 2023-01-01 RX ADMIN — CALCIUM GLUCONATE 1 G: 98 INJECTION, SOLUTION INTRAVENOUS at 12:25

## 2023-01-01 RX ADMIN — IOPAMIDOL 80 ML: 755 INJECTION, SOLUTION INTRAVENOUS at 21:21

## 2023-01-01 RX ADMIN — METOPROLOL TARTRATE 5 MG: 5 INJECTION INTRAVENOUS at 23:17

## 2023-01-01 RX ADMIN — TRAZODONE HYDROCHLORIDE 150 MG: 50 TABLET ORAL at 22:16

## 2023-01-01 RX ADMIN — CEPHALEXIN 500 MG: 500 CAPSULE ORAL at 20:22

## 2023-01-01 RX ADMIN — EPOETIN ALFA-EPBX 10000 UNITS: 10000 INJECTION, SOLUTION INTRAVENOUS; SUBCUTANEOUS at 10:47

## 2023-01-01 RX ADMIN — BUMETANIDE 2 MG: 2 TABLET ORAL at 17:41

## 2023-01-01 RX ADMIN — PIPERACILLIN AND TAZOBACTAM 2.25 G: 2; .25 INJECTION, POWDER, FOR SOLUTION INTRAVENOUS at 05:35

## 2023-01-01 RX ADMIN — CLONIDINE HYDROCHLORIDE 0.1 MG: 0.1 TABLET ORAL at 10:48

## 2023-01-01 RX ADMIN — SODIUM CHLORIDE 300 ML: 9 INJECTION, SOLUTION INTRAVENOUS at 16:18

## 2023-01-01 RX ADMIN — ONDANSETRON 4 MG: 4 TABLET, ORALLY DISINTEGRATING ORAL at 08:20

## 2023-01-01 RX ADMIN — CLONIDINE HYDROCHLORIDE 0.1 MG: 0.1 TABLET ORAL at 21:34

## 2023-01-01 RX ADMIN — PIPERACILLIN AND TAZOBACTAM 2.25 G: 2; .25 INJECTION, POWDER, FOR SOLUTION INTRAVENOUS at 05:36

## 2023-01-01 RX ADMIN — MIRTAZAPINE 15 MG: 15 TABLET, FILM COATED ORAL at 22:01

## 2023-01-01 RX ADMIN — METOPROLOL TARTRATE 150 MG: 100 TABLET, FILM COATED ORAL at 21:47

## 2023-01-01 RX ADMIN — METOPROLOL TARTRATE 5 MG: 5 INJECTION INTRAVENOUS at 23:03

## 2023-01-01 RX ADMIN — INSULIN ASPART 1 UNITS: 100 INJECTION, SOLUTION INTRAVENOUS; SUBCUTANEOUS at 21:54

## 2023-01-01 RX ADMIN — POLYETHYLENE GLYCOL 3350 17 G: 17 POWDER, FOR SOLUTION ORAL at 15:02

## 2023-01-01 RX ADMIN — INSULIN ASPART 3 UNITS: 100 INJECTION, SOLUTION INTRAVENOUS; SUBCUTANEOUS at 14:25

## 2023-01-01 RX ADMIN — METOPROLOL TARTRATE 5 MG: 5 INJECTION INTRAVENOUS at 23:11

## 2023-01-01 RX ADMIN — CLONIDINE HYDROCHLORIDE 0.1 MG: 0.1 TABLET ORAL at 20:40

## 2023-01-01 RX ADMIN — BUMETANIDE 2 MG: 1 TABLET ORAL at 09:24

## 2023-01-01 RX ADMIN — MIRTAZAPINE 7.5 MG: 7.5 TABLET, FILM COATED ORAL at 22:27

## 2023-01-01 RX ADMIN — CEPHALEXIN 500 MG: 500 CAPSULE ORAL at 20:34

## 2023-01-01 RX ADMIN — VANCOMYCIN HYDROCHLORIDE 1750 MG: 10 INJECTION, POWDER, LYOPHILIZED, FOR SOLUTION INTRAVENOUS at 10:38

## 2023-01-01 RX ADMIN — METOPROLOL TARTRATE 175 MG: 50 TABLET, FILM COATED ORAL at 09:22

## 2023-01-01 RX ADMIN — MIRTAZAPINE 15 MG: 15 TABLET, FILM COATED ORAL at 22:27

## 2023-01-01 RX ADMIN — CLONIDINE HYDROCHLORIDE 0.1 MG: 0.1 TABLET ORAL at 08:44

## 2023-01-01 RX ADMIN — WARFARIN SODIUM 2.5 MG: 2.5 TABLET ORAL at 17:47

## 2023-01-01 RX ADMIN — INSULIN GLARGINE 10 UNITS: 100 INJECTION, SOLUTION SUBCUTANEOUS at 22:27

## 2023-01-01 RX ADMIN — EPOETIN ALFA-EPBX 4000 UNITS: 4000 INJECTION, SOLUTION INTRAVENOUS; SUBCUTANEOUS at 14:45

## 2023-01-01 RX ADMIN — ALLOPURINOL 100 MG: 100 TABLET ORAL at 21:25

## 2023-01-01 RX ADMIN — PIPERACILLIN AND TAZOBACTAM 2.25 G: 2; .25 INJECTION, POWDER, FOR SOLUTION INTRAVENOUS at 05:52

## 2023-01-01 RX ADMIN — WARFARIN SODIUM 6 MG: 3 TABLET ORAL at 17:49

## 2023-01-01 RX ADMIN — ONDANSETRON 4 MG: 2 INJECTION INTRAMUSCULAR; INTRAVENOUS at 14:41

## 2023-01-01 RX ADMIN — HEPARIN SODIUM 500 UNITS/HR: 1000 INJECTION INTRAVENOUS; SUBCUTANEOUS at 16:24

## 2023-01-01 RX ADMIN — METOPROLOL TARTRATE 150 MG: 100 TABLET, FILM COATED ORAL at 20:26

## 2023-01-01 RX ADMIN — LIDOCAINE HYDROCHLORIDE 2 ML: 10 INJECTION, SOLUTION INFILTRATION; PERINEURAL at 09:04

## 2023-01-01 RX ADMIN — MIRTAZAPINE 15 MG: 15 TABLET, FILM COATED ORAL at 22:19

## 2023-01-01 RX ADMIN — ACETAMINOPHEN 650 MG: 325 TABLET, FILM COATED ORAL at 04:41

## 2023-01-01 RX ADMIN — METOPROLOL TARTRATE 175 MG: 50 TABLET, FILM COATED ORAL at 08:38

## 2023-01-01 RX ADMIN — WARFARIN SODIUM 5 MG: 5 TABLET ORAL at 20:29

## 2023-01-01 RX ADMIN — INSULIN ASPART 2 UNITS: 100 INJECTION, SOLUTION INTRAVENOUS; SUBCUTANEOUS at 08:10

## 2023-01-01 RX ADMIN — Medication 1 MG: at 23:48

## 2023-01-01 RX ADMIN — TRAZODONE HYDROCHLORIDE 150 MG: 50 TABLET ORAL at 22:27

## 2023-01-01 RX ADMIN — CLONIDINE HYDROCHLORIDE 0.1 MG: 0.1 TABLET ORAL at 08:19

## 2023-01-01 RX ADMIN — METOPROLOL TARTRATE 150 MG: 50 TABLET, FILM COATED ORAL at 19:44

## 2023-01-01 RX ADMIN — METOPROLOL TARTRATE 2.5 MG: 5 INJECTION INTRAVENOUS at 00:58

## 2023-01-01 RX ADMIN — MIRTAZAPINE 15 MG: 15 TABLET, FILM COATED ORAL at 00:59

## 2023-01-01 RX ADMIN — ACETAMINOPHEN 650 MG: 325 TABLET, FILM COATED ORAL at 07:57

## 2023-01-01 RX ADMIN — GABAPENTIN 100 MG: 100 CAPSULE ORAL at 21:29

## 2023-01-01 RX ADMIN — PIPERACILLIN AND TAZOBACTAM 2.25 G: 2; .25 INJECTION, POWDER, FOR SOLUTION INTRAVENOUS at 17:41

## 2023-01-01 RX ADMIN — ALLOPURINOL 100 MG: 100 TABLET ORAL at 20:02

## 2023-01-01 RX ADMIN — OXYCODONE HYDROCHLORIDE 5 MG: 5 TABLET ORAL at 13:33

## 2023-01-01 RX ADMIN — Medication 1 MG: at 00:16

## 2023-01-01 RX ADMIN — DILTIAZEM HYDROCHLORIDE 30 MG: 30 TABLET, FILM COATED ORAL at 18:40

## 2023-01-01 RX ADMIN — WARFARIN SODIUM 7.5 MG: 7.5 TABLET ORAL at 18:35

## 2023-01-01 RX ADMIN — CEPHALEXIN 500 MG: 500 CAPSULE ORAL at 08:12

## 2023-01-01 RX ADMIN — MIRTAZAPINE 15 MG: 15 TABLET, FILM COATED ORAL at 21:48

## 2023-01-01 RX ADMIN — ASPIRIN 81 MG: 81 TABLET, COATED ORAL at 21:04

## 2023-01-01 RX ADMIN — METOPROLOL TARTRATE 2.5 MG: 5 INJECTION INTRAVENOUS at 03:52

## 2023-01-01 RX ADMIN — BUMETANIDE 2 MG: 2 TABLET ORAL at 08:44

## 2023-01-01 RX ADMIN — PIPERACILLIN AND TAZOBACTAM 2.25 G: 2; .25 INJECTION, POWDER, FOR SOLUTION INTRAVENOUS at 12:24

## 2023-01-01 RX ADMIN — METOPROLOL TARTRATE 150 MG: 50 TABLET, FILM COATED ORAL at 08:05

## 2023-01-01 RX ADMIN — CLONIDINE HYDROCHLORIDE 0.1 MG: 0.1 TABLET ORAL at 08:34

## 2023-01-01 RX ADMIN — ACETAMINOPHEN 650 MG: 325 TABLET, FILM COATED ORAL at 08:00

## 2023-01-01 RX ADMIN — SODIUM CHLORIDE 300 ML: 9 INJECTION, SOLUTION INTRAVENOUS at 14:46

## 2023-01-01 RX ADMIN — ACETAMINOPHEN 650 MG: 325 TABLET, FILM COATED ORAL at 13:37

## 2023-01-01 RX ADMIN — ALLOPURINOL 100 MG: 100 TABLET ORAL at 21:28

## 2023-01-01 RX ADMIN — WARFARIN SODIUM 5 MG: 5 TABLET ORAL at 17:41

## 2023-01-01 RX ADMIN — OXYCODONE HYDROCHLORIDE 5 MG: 5 TABLET ORAL at 11:35

## 2023-01-01 RX ADMIN — INSULIN ASPART 2 UNITS: 100 INJECTION, SOLUTION INTRAVENOUS; SUBCUTANEOUS at 21:05

## 2023-01-01 RX ADMIN — CLONIDINE HYDROCHLORIDE 0.1 MG: 0.1 TABLET ORAL at 08:13

## 2023-01-01 RX ADMIN — ASPIRIN 81 MG: 81 TABLET, COATED ORAL at 21:48

## 2023-01-01 RX ADMIN — ALBUMIN HUMAN 25 G: 0.25 SOLUTION INTRAVENOUS at 22:45

## 2023-01-01 RX ADMIN — INSULIN GLARGINE 10 UNITS: 100 INJECTION, SOLUTION SUBCUTANEOUS at 21:58

## 2023-01-01 RX ADMIN — WARFARIN SODIUM 5 MG: 5 TABLET ORAL at 17:31

## 2023-01-01 RX ADMIN — OXYCODONE HYDROCHLORIDE 5 MG: 5 TABLET ORAL at 21:59

## 2023-01-01 RX ADMIN — CLONIDINE HYDROCHLORIDE 0.1 MG: 0.1 TABLET ORAL at 08:31

## 2023-01-01 RX ADMIN — METOPROLOL TARTRATE 100 MG: 100 TABLET, FILM COATED ORAL at 20:28

## 2023-01-01 RX ADMIN — BUMETANIDE 4 MG: 2 TABLET ORAL at 18:35

## 2023-01-01 RX ADMIN — ASPIRIN 81 MG: 81 TABLET, COATED ORAL at 21:59

## 2023-01-01 RX ADMIN — SODIUM CHLORIDE 300 ML: 9 INJECTION, SOLUTION INTRAVENOUS at 08:47

## 2023-01-01 RX ADMIN — CLONIDINE HYDROCHLORIDE 0.1 MG: 0.1 TABLET ORAL at 08:38

## 2023-01-01 RX ADMIN — INSULIN GLARGINE 15 UNITS: 100 INJECTION, SOLUTION SUBCUTANEOUS at 23:48

## 2023-01-01 RX ADMIN — GABAPENTIN 100 MG: 100 CAPSULE ORAL at 08:04

## 2023-01-01 RX ADMIN — METOPROLOL TARTRATE 175 MG: 50 TABLET, FILM COATED ORAL at 11:01

## 2023-01-01 RX ADMIN — EPOETIN ALFA-EPBX 4000 UNITS: 4000 INJECTION, SOLUTION INTRAVENOUS; SUBCUTANEOUS at 10:16

## 2023-01-01 RX ADMIN — INSULIN ASPART 2 UNITS: 100 INJECTION, SOLUTION INTRAVENOUS; SUBCUTANEOUS at 21:59

## 2023-01-01 RX ADMIN — CEPHALEXIN 500 MG: 500 CAPSULE ORAL at 08:08

## 2023-01-01 RX ADMIN — CLONIDINE HYDROCHLORIDE 0.1 MG: 0.1 TABLET ORAL at 21:47

## 2023-01-01 RX ADMIN — INSULIN GLARGINE 5 UNITS: 100 INJECTION, SOLUTION SUBCUTANEOUS at 22:12

## 2023-01-01 RX ADMIN — CLONIDINE HYDROCHLORIDE 0.1 MG: 0.1 TABLET ORAL at 08:08

## 2023-01-01 RX ADMIN — PANTOPRAZOLE SODIUM 40 MG: 40 TABLET, DELAYED RELEASE ORAL at 08:04

## 2023-01-01 RX ADMIN — ALLOPURINOL 100 MG: 100 TABLET ORAL at 13:53

## 2023-01-01 RX ADMIN — HEPARIN SODIUM 500 UNITS: 1000 INJECTION INTRAVENOUS; SUBCUTANEOUS at 13:00

## 2023-01-01 RX ADMIN — ALLOPURINOL 100 MG: 100 TABLET ORAL at 09:21

## 2023-01-01 RX ADMIN — INSULIN ASPART 3 UNITS: 100 INJECTION, SOLUTION INTRAVENOUS; SUBCUTANEOUS at 12:22

## 2023-01-01 RX ADMIN — ALLOPURINOL 100 MG: 100 TABLET ORAL at 08:08

## 2023-01-01 RX ADMIN — METOPROLOL TARTRATE 150 MG: 100 TABLET, FILM COATED ORAL at 10:48

## 2023-01-01 RX ADMIN — MIDAZOLAM 0.5 MG: 1 INJECTION INTRAMUSCULAR; INTRAVENOUS at 09:17

## 2023-01-01 RX ADMIN — BUMETANIDE 2 MG: 1 TABLET ORAL at 08:38

## 2023-01-01 RX ADMIN — ASPIRIN 81 MG: 81 TABLET, COATED ORAL at 21:34

## 2023-01-01 RX ADMIN — GABAPENTIN 100 MG: 100 CAPSULE ORAL at 21:56

## 2023-01-01 RX ADMIN — CEPHALEXIN 500 MG: 500 CAPSULE ORAL at 09:26

## 2023-01-01 RX ADMIN — TRAZODONE HYDROCHLORIDE 75 MG: 50 TABLET ORAL at 21:46

## 2023-01-01 RX ADMIN — CLONIDINE HYDROCHLORIDE 0.1 MG: 0.1 TABLET ORAL at 21:25

## 2023-01-01 RX ADMIN — INSULIN ASPART 2 UNITS: 100 INJECTION, SOLUTION INTRAVENOUS; SUBCUTANEOUS at 23:13

## 2023-01-01 RX ADMIN — METOPROLOL TARTRATE 175 MG: 50 TABLET, FILM COATED ORAL at 14:11

## 2023-01-01 RX ADMIN — BUMETANIDE 4 MG: 2 TABLET ORAL at 08:31

## 2023-01-01 RX ADMIN — INSULIN GLARGINE 10 UNITS: 100 INJECTION, SOLUTION SUBCUTANEOUS at 22:38

## 2023-01-01 RX ADMIN — DILTIAZEM HYDROCHLORIDE 30 MG: 30 TABLET, FILM COATED ORAL at 15:39

## 2023-01-01 RX ADMIN — LIDOCAINE HYDROCHLORIDE 10 ML: 10 INJECTION, SOLUTION EPIDURAL; INFILTRATION; INTRACAUDAL; PERINEURAL at 10:31

## 2023-01-01 RX ADMIN — Medication 1 MG: at 23:24

## 2023-01-01 RX ADMIN — INSULIN GLARGINE 10 UNITS: 100 INJECTION, SOLUTION SUBCUTANEOUS at 21:49

## 2023-01-01 RX ADMIN — METOPROLOL TARTRATE 2.5 MG: 5 INJECTION INTRAVENOUS at 21:03

## 2023-01-01 RX ADMIN — PIPERACILLIN AND TAZOBACTAM 2.25 G: 2; .25 INJECTION, POWDER, FOR SOLUTION INTRAVENOUS at 00:16

## 2023-01-01 RX ADMIN — PIPERACILLIN AND TAZOBACTAM 2.25 G: 2; .25 INJECTION, POWDER, FOR SOLUTION INTRAVENOUS at 10:53

## 2023-01-01 RX ADMIN — SODIUM CHLORIDE 300 ML: 9 INJECTION, SOLUTION INTRAVENOUS at 14:53

## 2023-01-01 RX ADMIN — CLONIDINE HYDROCHLORIDE 0.1 MG: 0.1 TABLET ORAL at 19:44

## 2023-01-01 RX ADMIN — IOPAMIDOL 65 ML: 612 INJECTION, SOLUTION INTRAVENOUS at 09:42

## 2023-01-01 RX ADMIN — ALLOPURINOL 100 MG: 100 TABLET ORAL at 20:40

## 2023-01-01 RX ADMIN — SODIUM CHLORIDE 250 ML: 9 INJECTION, SOLUTION INTRAVENOUS at 16:05

## 2023-01-01 RX ADMIN — CEFTRIAXONE SODIUM 1 G: 1 INJECTION, POWDER, FOR SOLUTION INTRAMUSCULAR; INTRAVENOUS at 21:29

## 2023-01-01 RX ADMIN — HYDROMORPHONE HYDROCHLORIDE 0.5 MG: 1 INJECTION, SOLUTION INTRAMUSCULAR; INTRAVENOUS; SUBCUTANEOUS at 20:02

## 2023-01-01 RX ADMIN — DOXERCALCIFEROL 4 MCG: 4 INJECTION, SOLUTION INTRAVENOUS at 10:16

## 2023-01-01 RX ADMIN — INSULIN GLARGINE 10 UNITS: 100 INJECTION, SOLUTION SUBCUTANEOUS at 22:02

## 2023-01-01 RX ADMIN — EPOETIN ALFA-EPBX 4000 UNITS: 4000 INJECTION, SOLUTION INTRAVENOUS; SUBCUTANEOUS at 16:05

## 2023-01-01 RX ADMIN — SODIUM CHLORIDE 300 ML: 9 INJECTION, SOLUTION INTRAVENOUS at 17:14

## 2023-01-01 RX ADMIN — INSULIN ASPART 2 UNITS: 100 INJECTION, SOLUTION INTRAVENOUS; SUBCUTANEOUS at 12:03

## 2023-01-01 RX ADMIN — INSULIN ASPART 3 UNITS: 100 INJECTION, SOLUTION INTRAVENOUS; SUBCUTANEOUS at 18:12

## 2023-01-01 RX ADMIN — INSULIN ASPART 3 UNITS: 100 INJECTION, SOLUTION INTRAVENOUS; SUBCUTANEOUS at 21:32

## 2023-01-01 RX ADMIN — CLONIDINE HYDROCHLORIDE 0.1 MG: 0.1 TABLET ORAL at 19:45

## 2023-01-01 RX ADMIN — METOPROLOL TARTRATE 100 MG: 100 TABLET, FILM COATED ORAL at 11:14

## 2023-01-01 RX ADMIN — ACETAMINOPHEN 650 MG: 325 TABLET, FILM COATED ORAL at 16:30

## 2023-01-01 RX ADMIN — EZETIMIBE 10 MG: 10 TABLET ORAL at 09:24

## 2023-01-01 RX ADMIN — GABAPENTIN 100 MG: 100 CAPSULE ORAL at 21:16

## 2023-01-01 RX ADMIN — SODIUM CHLORIDE 1000 ML: 9 INJECTION, SOLUTION INTRAVENOUS at 16:44

## 2023-01-01 RX ADMIN — EZETIMIBE 10 MG: 10 TABLET ORAL at 14:11

## 2023-01-01 RX ADMIN — ALLOPURINOL 100 MG: 100 TABLET ORAL at 09:24

## 2023-01-01 RX ADMIN — CLONIDINE HYDROCHLORIDE 0.1 MG: 0.1 TABLET ORAL at 21:42

## 2023-01-01 RX ADMIN — ACETAMINOPHEN 650 MG: 325 TABLET, FILM COATED ORAL at 16:53

## 2023-01-01 RX ADMIN — INSULIN ASPART 3 UNITS: 100 INJECTION, SOLUTION INTRAVENOUS; SUBCUTANEOUS at 17:42

## 2023-01-01 RX ADMIN — WARFARIN SODIUM 5 MG: 5 TABLET ORAL at 18:30

## 2023-01-01 RX ADMIN — ALLOPURINOL 100 MG: 100 TABLET ORAL at 21:34

## 2023-01-01 RX ADMIN — EPOETIN ALFA-EPBX 4000 UNITS: 4000 INJECTION, SOLUTION INTRAVENOUS; SUBCUTANEOUS at 16:10

## 2023-01-01 RX ADMIN — METOPROLOL TARTRATE 175 MG: 50 TABLET, FILM COATED ORAL at 09:24

## 2023-01-01 RX ADMIN — PIPERACILLIN AND TAZOBACTAM 2.25 G: 2; .25 INJECTION, POWDER, FOR SOLUTION INTRAVENOUS at 16:53

## 2023-01-01 RX ADMIN — BUMETANIDE 2 MG: 1 TABLET ORAL at 08:08

## 2023-01-01 RX ADMIN — EZETIMIBE 10 MG: 10 TABLET ORAL at 13:53

## 2023-01-01 RX ADMIN — CLONIDINE HYDROCHLORIDE 0.1 MG: 0.1 TABLET ORAL at 20:22

## 2023-01-01 RX ADMIN — INSULIN ASPART 1 UNITS: 100 INJECTION, SOLUTION INTRAVENOUS; SUBCUTANEOUS at 16:29

## 2023-01-01 RX ADMIN — SODIUM CHLORIDE 250 ML: 9 INJECTION, SOLUTION INTRAVENOUS at 16:19

## 2023-01-01 RX ADMIN — METOPROLOL TARTRATE 175 MG: 50 TABLET, FILM COATED ORAL at 08:18

## 2023-01-01 RX ADMIN — WARFARIN SODIUM 2.5 MG: 2.5 TABLET ORAL at 02:22

## 2023-01-01 RX ADMIN — ASPIRIN 81 MG: 81 TABLET, COATED ORAL at 22:19

## 2023-01-01 RX ADMIN — MIRTAZAPINE 15 MG: 15 TABLET, FILM COATED ORAL at 21:04

## 2023-01-01 RX ADMIN — CLONIDINE HYDROCHLORIDE 0.1 MG: 0.1 TABLET ORAL at 20:26

## 2023-01-01 RX ADMIN — MIRTAZAPINE 15 MG: 15 TABLET, FILM COATED ORAL at 21:34

## 2023-01-01 RX ADMIN — BUMETANIDE 2 MG: 2 TABLET ORAL at 08:05

## 2023-01-01 RX ADMIN — METOPROLOL TARTRATE 150 MG: 50 TABLET, FILM COATED ORAL at 19:45

## 2023-01-01 RX ADMIN — METOPROLOL TARTRATE 175 MG: 50 TABLET, FILM COATED ORAL at 20:27

## 2023-01-01 RX ADMIN — INSULIN ASPART 3 UNITS: 100 INJECTION, SOLUTION INTRAVENOUS; SUBCUTANEOUS at 08:07

## 2023-01-01 RX ADMIN — HYDROMORPHONE HYDROCHLORIDE 1 MG: 1 INJECTION, SOLUTION INTRAMUSCULAR; INTRAVENOUS; SUBCUTANEOUS at 04:09

## 2023-01-01 RX ADMIN — PIPERACILLIN AND TAZOBACTAM 2.25 G: 2; .25 INJECTION, POWDER, FOR SOLUTION INTRAVENOUS at 07:04

## 2023-01-01 RX ADMIN — CLONIDINE HYDROCHLORIDE 0.1 MG: 0.1 TABLET ORAL at 09:24

## 2023-01-01 RX ADMIN — PHYTONADIONE 5 MG: 10 INJECTION, EMULSION INTRAMUSCULAR; INTRAVENOUS; SUBCUTANEOUS at 10:27

## 2023-01-01 RX ADMIN — INSULIN GLARGINE 5 UNITS: 100 INJECTION, SOLUTION SUBCUTANEOUS at 04:43

## 2023-01-01 RX ADMIN — METOPROLOL TARTRATE 150 MG: 100 TABLET, FILM COATED ORAL at 08:34

## 2023-01-01 RX ADMIN — METOPROLOL TARTRATE 2.5 MG: 5 INJECTION INTRAVENOUS at 06:10

## 2023-01-01 RX ADMIN — ALLOPURINOL 100 MG: 100 TABLET ORAL at 20:22

## 2023-01-01 ASSESSMENT — ACTIVITIES OF DAILY LIVING (ADL)
ADLS_ACUITY_SCORE: 31
ADLS_ACUITY_SCORE: 39
ADLS_ACUITY_SCORE: 26
ADLS_ACUITY_SCORE: 30
CONCENTRATING,_REMEMBERING_OR_MAKING_DECISIONS_DIFFICULTY: NO
ADLS_ACUITY_SCORE: 30
ADLS_ACUITY_SCORE: 35
ADLS_ACUITY_SCORE: 41
ADLS_ACUITY_SCORE: 32
EQUIPMENT_CURRENTLY_USED_AT_HOME: WALKER, ROLLING
ADLS_ACUITY_SCORE: 27
ADLS_ACUITY_SCORE: 27
ADLS_ACUITY_SCORE: 39
ADLS_ACUITY_SCORE: 44
ADLS_ACUITY_SCORE: 41
WALKING_OR_CLIMBING_STAIRS: AMBULATION DIFFICULTY, REQUIRES EQUIPMENT
ADLS_ACUITY_SCORE: 30
ADLS_ACUITY_SCORE: 32
ADLS_ACUITY_SCORE: 41
DIFFICULTY_EATING/SWALLOWING: NO
ADLS_ACUITY_SCORE: 32
ADLS_ACUITY_SCORE: 44
CONCENTRATING,_REMEMBERING_OR_MAKING_DECISIONS_DIFFICULTY: NO
TOILETING_ISSUES: NO
ADLS_ACUITY_SCORE: 39
ADLS_ACUITY_SCORE: 27
ADLS_ACUITY_SCORE: 37
DRESSING/BATHING_DIFFICULTY: NO
ADLS_ACUITY_SCORE: 27
ADLS_ACUITY_SCORE: 41
ADLS_ACUITY_SCORE: 31
ADLS_ACUITY_SCORE: 32
ADLS_ACUITY_SCORE: 31
ADLS_ACUITY_SCORE: 27
ADLS_ACUITY_SCORE: 31
ADLS_ACUITY_SCORE: 30
ADLS_ACUITY_SCORE: 45
ADLS_ACUITY_SCORE: 30
ADLS_ACUITY_SCORE: 39
ADLS_ACUITY_SCORE: 41
ADLS_ACUITY_SCORE: 31
ADLS_ACUITY_SCORE: 37
ADLS_ACUITY_SCORE: 41
ADLS_ACUITY_SCORE: 35
ADLS_ACUITY_SCORE: 27
ADLS_ACUITY_SCORE: 41
ADLS_ACUITY_SCORE: 37
ADLS_ACUITY_SCORE: 35
ADLS_ACUITY_SCORE: 32
ADLS_ACUITY_SCORE: 44
ADLS_ACUITY_SCORE: 26
ADLS_ACUITY_SCORE: 30
HEARING_DIFFICULTY_OR_DEAF: NO
DEPENDENT_IADLS:: TRANSPORTATION;MEDICATION MANAGEMENT;SHOPPING
ADLS_ACUITY_SCORE: 35
ADLS_ACUITY_SCORE: 30
ADLS_ACUITY_SCORE: 43
ADLS_ACUITY_SCORE: 31
ADLS_ACUITY_SCORE: 30
DOING_ERRANDS_INDEPENDENTLY_DIFFICULTY: YES
ADLS_ACUITY_SCORE: 35
ADLS_ACUITY_SCORE: 37
ADLS_ACUITY_SCORE: 43
WALKING_OR_CLIMBING_STAIRS_DIFFICULTY: YES
ADLS_ACUITY_SCORE: 37
ADLS_ACUITY_SCORE: 41
TRANSFERRING: 1-->ASSISTANCE (EQUIPMENT/PERSON) NEEDED
ADLS_ACUITY_SCORE: 37
ADLS_ACUITY_SCORE: 30
WEAR_GLASSES_OR_BLIND: YES
ADLS_ACUITY_SCORE: 39
ADLS_ACUITY_SCORE: 30
ADLS_ACUITY_SCORE: 30
ADLS_ACUITY_SCORE: 37
ADLS_ACUITY_SCORE: 41
ADLS_ACUITY_SCORE: 31
ADLS_ACUITY_SCORE: 27
WALKING_OR_CLIMBING_STAIRS: AMBULATION DIFFICULTY, REQUIRES EQUIPMENT;STAIR CLIMBING DIFFICULTY, DEPENDENT
ADLS_ACUITY_SCORE: 27
ADLS_ACUITY_SCORE: 30
ADLS_ACUITY_SCORE: 31
BATHING: 1-->ASSISTANCE NEEDED
ADLS_ACUITY_SCORE: 43
ADLS_ACUITY_SCORE: 37
ADLS_ACUITY_SCORE: 31
ADLS_ACUITY_SCORE: 32
ADLS_ACUITY_SCORE: 30
ADLS_ACUITY_SCORE: 30
ADLS_ACUITY_SCORE: 41
ADLS_ACUITY_SCORE: 32
ADLS_ACUITY_SCORE: 37
ADLS_ACUITY_SCORE: 31
ADLS_ACUITY_SCORE: 44
ADLS_ACUITY_SCORE: 29
ADLS_ACUITY_SCORE: 32
ADLS_ACUITY_SCORE: 37
ADLS_ACUITY_SCORE: 35
ADLS_ACUITY_SCORE: 30
ADLS_ACUITY_SCORE: 26
ADLS_ACUITY_SCORE: 44
ADLS_ACUITY_SCORE: 41
ADLS_ACUITY_SCORE: 39
ADLS_ACUITY_SCORE: 31
ADLS_ACUITY_SCORE: 35
ADLS_ACUITY_SCORE: 26
DRESS: 1-->ASSISTANCE (EQUIPMENT/PERSON) NEEDED (NOT DEVELOPMENTALLY APPROPRIATE)
ADLS_ACUITY_SCORE: 31
ADLS_ACUITY_SCORE: 32
ADLS_ACUITY_SCORE: 27
ADLS_ACUITY_SCORE: 32
ADLS_ACUITY_SCORE: 37
WEAR_GLASSES_OR_BLIND: NO
ADLS_ACUITY_SCORE: 37
ADLS_ACUITY_SCORE: 39
ADLS_ACUITY_SCORE: 30
DIFFICULTY_EATING/SWALLOWING: NO
ADLS_ACUITY_SCORE: 43
ADLS_ACUITY_SCORE: 45
ADLS_ACUITY_SCORE: 30
CHANGE_IN_FUNCTIONAL_STATUS_SINCE_ONSET_OF_CURRENT_ILLNESS/INJURY: YES
ADLS_ACUITY_SCORE: 37
ADLS_ACUITY_SCORE: 39
ADLS_ACUITY_SCORE: 29
ADLS_ACUITY_SCORE: 32
ADLS_ACUITY_SCORE: 31
ADLS_ACUITY_SCORE: 29
ADLS_ACUITY_SCORE: 27
ADLS_ACUITY_SCORE: 44
ADLS_ACUITY_SCORE: 30
DIFFICULTY_EATING/SWALLOWING: NO
ADLS_ACUITY_SCORE: 32
ADLS_ACUITY_SCORE: 31
ADLS_ACUITY_SCORE: 30
DRESSING/BATHING_MANAGEMENT: ASSISTANCE
ADLS_ACUITY_SCORE: 32
ADLS_ACUITY_SCORE: 32
ADLS_ACUITY_SCORE: 31
ADLS_ACUITY_SCORE: 30
ADLS_ACUITY_SCORE: 31
ADLS_ACUITY_SCORE: 43
EQUIPMENT_CURRENTLY_USED_AT_HOME: WALKER, ROLLING
ADLS_ACUITY_SCORE: 43
VISION_MANAGEMENT: GLASSESS
ADLS_ACUITY_SCORE: 37
ADLS_ACUITY_SCORE: 31
ADLS_ACUITY_SCORE: 37
ADLS_ACUITY_SCORE: 31
CHANGE_IN_FUNCTIONAL_STATUS_SINCE_ONSET_OF_CURRENT_ILLNESS/INJURY: YES
ADLS_ACUITY_SCORE: 32
ADLS_ACUITY_SCORE: 35
ADLS_ACUITY_SCORE: 31
CHANGE_IN_FUNCTIONAL_STATUS_SINCE_ONSET_OF_CURRENT_ILLNESS/INJURY: YES
ADLS_ACUITY_SCORE: 45
ADLS_ACUITY_SCORE: 39
ADLS_ACUITY_SCORE: 35
DIFFICULTY_COMMUNICATING: NO
ADLS_ACUITY_SCORE: 37
ADLS_ACUITY_SCORE: 41
TOILETING_ISSUES: NO
ADLS_ACUITY_SCORE: 43
ADLS_ACUITY_SCORE: 31
ADLS_ACUITY_SCORE: 30
ADLS_ACUITY_SCORE: 35
ADLS_ACUITY_SCORE: 41
DOING_ERRANDS_INDEPENDENTLY_DIFFICULTY: YES
ADLS_ACUITY_SCORE: 30
ADLS_ACUITY_SCORE: 32
ADLS_ACUITY_SCORE: 40
ADLS_ACUITY_SCORE: 41
ADLS_ACUITY_SCORE: 45
ADLS_ACUITY_SCORE: 37
ADLS_ACUITY_SCORE: 41
ADLS_ACUITY_SCORE: 39
ADLS_ACUITY_SCORE: 39
ADLS_ACUITY_SCORE: 41
ADLS_ACUITY_SCORE: 39
ADLS_ACUITY_SCORE: 30
ADLS_ACUITY_SCORE: 41
ADLS_ACUITY_SCORE: 45
DRESSING/BATHING: BATHING DIFFICULTY, ASSISTANCE 1 PERSON
ADLS_ACUITY_SCORE: 26
TOILETING_ISSUES: NO
ADLS_ACUITY_SCORE: 30
ADLS_ACUITY_SCORE: 44
ADLS_ACUITY_SCORE: 30
ADLS_ACUITY_SCORE: 30
ADLS_ACUITY_SCORE: 39
ADLS_ACUITY_SCORE: 29
ADLS_ACUITY_SCORE: 30
ADLS_ACUITY_SCORE: 37
ADLS_ACUITY_SCORE: 27
ADLS_ACUITY_SCORE: 37
ADLS_ACUITY_SCORE: 45
ADLS_ACUITY_SCORE: 26
ADLS_ACUITY_SCORE: 35
FALL_HISTORY_WITHIN_LAST_SIX_MONTHS: NO
ADLS_ACUITY_SCORE: 37
ADLS_ACUITY_SCORE: 31
ADLS_ACUITY_SCORE: 37
HEARING_DIFFICULTY_OR_DEAF: NO
ADLS_ACUITY_SCORE: 37
ADLS_ACUITY_SCORE: 45
ADLS_ACUITY_SCORE: 43
ADLS_ACUITY_SCORE: 32
ADLS_ACUITY_SCORE: 35
FALL_HISTORY_WITHIN_LAST_SIX_MONTHS: NO
DEPENDENT_IADLS:: SHOPPING;MEDICATION MANAGEMENT;TRANSPORTATION
ADLS_ACUITY_SCORE: 37
ADLS_ACUITY_SCORE: 41
DRESSING/BATHING: BATHING DIFFICULTY, ASSISTANCE 1 PERSON
ADLS_ACUITY_SCORE: 30
ADLS_ACUITY_SCORE: 27
ADLS_ACUITY_SCORE: 37
ADLS_ACUITY_SCORE: 37
ADLS_ACUITY_SCORE: 44
ADLS_ACUITY_SCORE: 32
ADLS_ACUITY_SCORE: 41
ADLS_ACUITY_SCORE: 32
WALKING_OR_CLIMBING_STAIRS_DIFFICULTY: NO
ADLS_ACUITY_SCORE: 26
ADLS_ACUITY_SCORE: 31
TRANSFERRING: 1-->ASSISTANCE (EQUIPMENT/PERSON) NEEDED (NOT DEVELOPMENTALLY APPROPRIATE)
ADLS_ACUITY_SCORE: 37
ADLS_ACUITY_SCORE: 26
ADLS_ACUITY_SCORE: 43
ADLS_ACUITY_SCORE: 44
ADLS_ACUITY_SCORE: 26
DRESSING/BATHING_DIFFICULTY: YES
ADLS_ACUITY_SCORE: 45
ADLS_ACUITY_SCORE: 26
ADLS_ACUITY_SCORE: 41
ADLS_ACUITY_SCORE: 31
ADLS_ACUITY_SCORE: 27
ADLS_ACUITY_SCORE: 30
DRESSING/BATHING_DIFFICULTY: YES
ADLS_ACUITY_SCORE: 44
ADLS_ACUITY_SCORE: 31
ADLS_ACUITY_SCORE: 41
ADLS_ACUITY_SCORE: 35
ADLS_ACUITY_SCORE: 27
ADLS_ACUITY_SCORE: 30
ADLS_ACUITY_SCORE: 41
ADLS_ACUITY_SCORE: 32
ADLS_ACUITY_SCORE: 41
ADLS_ACUITY_SCORE: 41
ADLS_ACUITY_SCORE: 27
ADLS_ACUITY_SCORE: 37
ADLS_ACUITY_SCORE: 43
PREVIOUS_RESPONSIBILITIES: MEAL PREP;LAUNDRY;DRIVING
ADLS_ACUITY_SCORE: 37
ADLS_ACUITY_SCORE: 45
ADLS_ACUITY_SCORE: 27
ADLS_ACUITY_SCORE: 26
ADLS_ACUITY_SCORE: 27
ADLS_ACUITY_SCORE: 37
WALKING_OR_CLIMBING_STAIRS_DIFFICULTY: YES
ADLS_ACUITY_SCORE: 27
EQUIPMENT_CURRENTLY_USED_AT_HOME: WALKER, STANDARD
ADLS_ACUITY_SCORE: 26
ADLS_ACUITY_SCORE: 32
CONCENTRATING,_REMEMBERING_OR_MAKING_DECISIONS_DIFFICULTY: NO
ADLS_ACUITY_SCORE: 37
ADLS_ACUITY_SCORE: 35
DRESS: 1-->ASSISTANCE (EQUIPMENT/PERSON) NEEDED
ADLS_ACUITY_SCORE: 41
ADLS_ACUITY_SCORE: 33
ADLS_ACUITY_SCORE: 32
ADLS_ACUITY_SCORE: 27
ADLS_ACUITY_SCORE: 31
ADLS_ACUITY_SCORE: 45
ADLS_ACUITY_SCORE: 31
ADLS_ACUITY_SCORE: 45
ADLS_ACUITY_SCORE: 37
ADLS_ACUITY_SCORE: 45
ADLS_ACUITY_SCORE: 31
DIFFICULTY_COMMUNICATING: NO
ADLS_ACUITY_SCORE: 32
ADLS_ACUITY_SCORE: 37
ADLS_ACUITY_SCORE: 45
ADLS_ACUITY_SCORE: 26
ADLS_ACUITY_SCORE: 31
ADLS_ACUITY_SCORE: 41
ADLS_ACUITY_SCORE: 27
ADLS_ACUITY_SCORE: 37
ADLS_ACUITY_SCORE: 37
ADLS_ACUITY_SCORE: 41
ADLS_ACUITY_SCORE: 37
ADLS_ACUITY_SCORE: 30
FALL_HISTORY_WITHIN_LAST_SIX_MONTHS: NO
ADLS_ACUITY_SCORE: 31
ADLS_ACUITY_SCORE: 39
ADLS_ACUITY_SCORE: 41
ADLS_ACUITY_SCORE: 30
ADLS_ACUITY_SCORE: 45
ADLS_ACUITY_SCORE: 35
ADLS_ACUITY_SCORE: 27
ADLS_ACUITY_SCORE: 37
ADLS_ACUITY_SCORE: 29
ADLS_ACUITY_SCORE: 39
ADLS_ACUITY_SCORE: 26
ADLS_ACUITY_SCORE: 45
ADLS_ACUITY_SCORE: 31
ADLS_ACUITY_SCORE: 30
DOING_ERRANDS_INDEPENDENTLY_DIFFICULTY: YES
ADLS_ACUITY_SCORE: 37
ADLS_ACUITY_SCORE: 31
ADLS_ACUITY_SCORE: 46
ADLS_ACUITY_SCORE: 46
ADLS_ACUITY_SCORE: 37
ADLS_ACUITY_SCORE: 37
ADLS_ACUITY_SCORE: 41
ADLS_ACUITY_SCORE: 33
ADLS_ACUITY_SCORE: 45
ADLS_ACUITY_SCORE: 37
ADLS_ACUITY_SCORE: 29
ADLS_ACUITY_SCORE: 30
ADLS_ACUITY_SCORE: 37
ADLS_ACUITY_SCORE: 39
ADLS_ACUITY_SCORE: 37
ADLS_ACUITY_SCORE: 30
ADLS_ACUITY_SCORE: 27
ADLS_ACUITY_SCORE: 27
ADLS_ACUITY_SCORE: 32
ADLS_ACUITY_SCORE: 30
ADLS_ACUITY_SCORE: 27
WEAR_GLASSES_OR_BLIND: NO
ADLS_ACUITY_SCORE: 41

## 2023-01-01 ASSESSMENT — ENCOUNTER SYMPTOMS
FEVER: 0
WOUND: 1
VOMITING: 0
WEAKNESS: 1
SHORTNESS OF BREATH: 0

## 2023-02-07 NOTE — ED NOTES
Bed: ED08  Expected date:   Expected time:   Means of arrival:   Comments:  Barry 532- 77 m dialysis shunt bleeding eta 1849

## 2023-02-07 NOTE — LETTER
Transition Communication Hand-off for Care Transitions to Next Level of Care Provider    Name: Gordon Gann : 1945 [facility] MRN #: [id number]  Primary Care Provider: Belen Prieto     Primary Clinic: [facility] [address] [facility] [address]     Reason for Hospitalization:  Pleuritic chest pain [R07.81]  Atrial fibrillation with RVR (H) [I48.91]  Supratherapeutic INR [R79.1]  Acute hemorrhage [R58]  A-fib (H) [I48.91]  Admit Date/Time: 2023  5:56 PM  Discharge Date: 23   Payor Source: Payor: BCBS / Plan: [facility] OUT OF STATE MEDICARE REPLACEMENT / Product Type: Medicare /     Discharge Plan: home with home health and resumed outpatient dialysis   Discharge Needs Assessment:  Needs    Flowsheet Row Most Recent Value   Equipment Currently Used at Home walker, rolling          Referrals     Future Labs/Procedures    Home Care Referral     Scheduling Instructions:    Check inr on 23    Comments:    Your provider has ordered home health services. If you have not been contacted within 2 days of your discharge please call the inpatient department phone number at [phone] .          Damaris Butterfield RN  [phone]    AVS/Discharge Summary is the source of truth; this is a helpful guide for improved communication of patient story

## 2023-02-08 NOTE — PROGRESS NOTES
[facility]    Hospitalist Progress Note    Interval History   - Thoracentesis successful today  - Heart rates improved, low 110s. Patient denies any pain or discomfort currently  - He has a stitch in his fistula presumably above the fistula. We discussed to discharge with outpatient dialysis tomorrow, or inpatient dialysis tomorrow to ensure the stitch will not interfere with future dialysis. He agrees to stay and discharge tomorrow after dialysis  - Okay to resume warfarin. Will likely take several days for patient to be therapeutic again    Assessment & Plan   Summary: Gordon Gann is a 77 year old male with PMH  ESRD, DM, hypertension, and gout, who was admitted on 2/7/2023 with bleeding left upper extremity fistula, atrial fibrillation with RVR, and R pleural effusion.    Bleeding left upper extremity fistula  Supratherapeutic INR, improved  Due to supratherapeutic INR 10. A stitch was placed in the ED and bandaged.  He is currently not bleeding. INR down to 1.9  - Dialysis tomorrow  - Stitch likely needs to be removed as outpatient  - Okay to resume warfarin tonight  - Resume ASA tomorrow    End-stage renal disease: Last dialyzed on 2/7, apparently uneventfully.  I do think that his recurrent pleural effusions are likely due at least in part from his not getting 2 dry weights and he likely needs further adjustment of his dialysis runs.  - Dialysis here tomorrow  - Resume Bumex  - Nephrology consulted    Recurrent right-sided pleural effusion s/p thoracentesis 2/7/23: Fluid appears transudative, likely related to patient's ESRD.  - Consider repeating CXR in 2-3 weeks  - Fluid studies still pending    Atrial fibrillation/flutter with RVR: Remains tachycardic to 110s here.  - Continue PTA metoprolol    DM2: Resume Lantus 15 units at bedtime + MDSSI    Gout: Obs status hold    Hypertension:   - Resume clonidine, lisinopril  - Continue metoprolol    DVT Prophylaxis: Pneumatic Compression  Devices  Code Status: No CPR- Do NOT Intubate  PT/OT: ordered  Diet: Combination Diet Renal Diet (dialysis); Moderate Consistent Carb (60 g CHO per Meal) Diet      Disposition: Expected discharge tomorrow after dialysis    - Total time spent on encounter is 35 minutes, which includes counseling, coordination of care, time spent discussing with family, and/or time spent discussing with consultants.    Ezio Jarvis MD, MD  Text Page  (7am to 6pm)  -Data reviewed today: I reviewed all new labs and imaging results over the last 24 hours.    Physical Exam   Temp: 97.4  F (36.3  C) Temp src: Axillary BP: (!) 140/91 Pulse: 120   Resp: 19 SpO2: 97 % O2 Device: None (Room air)    Vitals:    02/07/23 1802 02/08/23 0300   Weight: 86.2 kg (190 lb) 77.7 kg (171 lb 6.4 oz)     Vital Signs with Ranges  Temp:  [97.4  F (36.3  C)-97.8  F (36.6  C)] 97.4  F (36.3  C)  Pulse:  [] 120  Resp:  [11-19] 19  BP: (115-145)/(71-99) 140/91  SpO2:  [96 %-98 %] 97 %  No intake/output data recorded.  O2 requirements: none    Constitutional: Male in NAD  HEENT: Eyes nonicteric, oral mucosa moist  Cardiovascular: RRR, normal S1/2, no m/r/g  Respiratory: CTAB, no wheezing or crackles  Vascular: LUE fistula with superficial stitch, nont bleeding  GI: Normoactive bowel sounds, nontender, nondistended  Skin/Integumen: Bruising UE and LE  Neuro/Psych: Appropriate affect and mood. A&Ox3, moves all extremities    Medications       - MEDICATION INSTRUCTIONS for Dialysis Patients -   Does not apply See Admin Instructions     metoprolol tartrate  100 mg Oral BID     Warfarin Therapy Reminder  1 each Oral See Admin Instructions       Data   Recent Labs   Lab 02/08/23  0609 02/07/23  2253 02/07/23  2156 02/07/23  1946/23  1819   WBC 11.0  --   --   --  12.2*   HGB 8.0* 8.0*  --   --  8.6*   MCV 95  --   --   --  95     --   --   --  423   INR 1.87*  --  4.55* 9.87* 9.62*   NA  --   --   --  134*  --    POTASSIUM  --   --   --  4.3   --    CHLORIDE  --   --   --  95*  --    CO2  --   --   --  26  --    BUN  --   --   --  34.6*  --    CR  --   --   --  3.26*  --    ANIONGAP  --   --   --  13  --    LC  --   --   --  8.0*  --    GLC  --   --   --  165*  --    PROTTOTAL  --   --  5.8*  --   --        Imaging:   Recent Results (from the past 24 hour(s))   XR Chest 2 Views    Narrative    EXAM: XR CHEST 2 VIEWS  LOCATION: [facility]  DATE/TIME: 2/7/2023 8:17 PM    INDICATION: chest pain  COMPARISON: 08/30/2022      Impression    IMPRESSION:     Moderate to large right pleural effusion significantly increased from August 2022. A degree of loculation presumably accounts for opacity medially at the apex.    Left lung well aerated and clear.    Unchanged heart size with normal pulmonary vasculature as visualized.   CTA Chest with Contrast    Narrative    EXAM: CTA CHEST WITH CONTRAST  LOCATION: [facility]  DATE/TIME: 2/7/2023 9:38 PM    INDICATION: chest pain, high INR, pleural effusion, on dialysis  COMPARISON: 08/16/2022 CT chest. Multiple prior chest radiograph most recently earlier today.    TECHNIQUE: Helical acquisition through the chest was performed during the arterial phase of contrast enhancement. 2D and 3D reconstructions performed by the CT technologist. Dose reduction techniques were used.  CONTRAST: 80 mL Isovue 370    FINDINGS:     ARTERIAL FINDINGS: Normal caliber thoracic aorta with no evidence of dissection or other acute pathology. Minimal scattered atherosclerotic calcification. Arch branches unremarkable. Mild celiac stenosis noted in the included upper abdomen with a widely   patent proximal SMA. No acute vascular pathology.    NONVASCULAR:    MEDIASTINUM: Unchanged cardiac enlargement with prominent right atrium. No pericardial effusion. Pulmonary arteries demonstrate no evidence of acute or chronic emboli. No mediastinal/hilar mass or lymphadenopathy. Normal esophagus. Axilla  and   supraclavicular regions are unremarkable. Small thyroid nodules similar to prior.    CORONARY ARTERY CALCIFICATION: Suspect prior stenting.    LUNGS/PLEURA: Nonhemorrhagic right pleural effusion, moderate in size. Concurrent chest radiograph demonstrates interval enlargement from the most recent prior imaging. There is atelectasis/near complete collapse involving the right lower and middle   lobes, with milder dependent atelectasis involving the upper lobe. The left lung is well aerated and clear. No significant left pleural fluid.    UPPER ABDOMEN: No acute pathology identified. A few colonic diverticula are noted.    MUSCULOSKELETAL: Degenerative change thoracic spine. No acute osseous findings.      Impression    IMPRESSION:    1.  Unremarkable CTA of the chest. No evidence of acute aortic pathology or pulmonary embolic disease.    2.  Moderate right pleural effusion is nonhemorrhagic. Concurrent chest radiograph demonstrates interval enlargement from most recent prior imaging.    3.  There is atelectasis/near-complete collapse of the right lower and middle lobes. Lungs otherwise well aerated and clear.    4.  Unchanged cardiac enlargement with suspected prior coronary stenting.     US Thoracentesis    Narrative    ULTRASOUND GUIDED THORACENTESIS  2/8/2023 10:52 AM     HISTORY: recurrent R pleural effusion    FINDINGS: Ultrasound was used to evaluate for the presence and best  approach for drainage of a pleural effusion. Written and oral informed  consent was obtained. A pause for the cause procedure to verify the  correct patient and correct procedure. The skin overlying the right  chest posteriorly was prepped and draped in the usual sterile fashion.  The subcutaneous tissues were anesthetized with 10mL 1% lidocaine. A  catheter was advanced into the pleural space and 550 mL of  rosina  colored fluid was drained. Patient was monitored by nurse under my  direct supervision throughout the exam. Ultrasound  images were  permanently stored.  There were no immediate complications. Patient  left the ultrasound suite in satisfactory condition.      Impression    IMPRESSION: Technically successful thoracentesis without immediate  complications.    GISELE LOVE MD         SYSTEM ID:  [id number]

## 2023-02-08 NOTE — CONSULTS
Vascular surgery consult    Pt is a 76 yo male with ESRD, DM, HTN, A fib on coumadin and gout who was admitted with bleeding fistula after HD yesterday. Fistula is an AV graft which was placed by Dr Hernandez in 2021. A stitch was placed in the ED which obtained hemostasis and it has not re bled since that time. Pt was found to have supratherapeutic INR of 10 now corrected to 1.8. Vascular surgery consulted for suture removal recs.    Fistula has not bled since yesterday PM.     Agree with fistulagram while inpatient  OK to use AV graft for HD after fistulagram completed  Can remove suture in 7 days, can be performed by RN at dialysis or primary care and place normal post dialysis bandage  No further follow up with vascular surgery indicated unless graft is not accessible during HD    Lani Sanders, DO  Fellow

## 2023-02-08 NOTE — ED NOTES
DATE:  2/7/2023   TIME OF RECEIPT FROM LAB:  1900  LAB TEST:  INR  LAB VALUE:  9.62  RESULTS GIVEN WITH READ-BACK TO (PROVIDER):  Fili Galdamez MD  TIME LAB VALUE REPORTED TO PROVIDER:   1900

## 2023-02-08 NOTE — ED NOTES
"[facility]  ED Nurse Handoff Report    ED Chief complaint: Vascular Access Problem      ED Diagnosis:   Final diagnoses:   None       Code Status: To be determined by hospitalist    Allergies: No Known Allergies    Patient Story: Pt was at dialysis today, got home and when taking off his jacket, he noticed his fistula site was bleeding. Upon ED arrival pressure was applied, artery sutured by ER MD, pressure dressing applied and dialysis clamp placed.   Focused Assessment:  Pt is A&Ox4, can make needs known. At 1900 pt stated he felt as though he could not take a deep breath. At approximately 1940 he stated he had mid chest pain. Repeat EKG and biomarkers taken. Troponin and repeat troponin critical at 350 and 322. CT obtained. At approximately 2200 EKG changed to sinus tach with HR in 120s when previously about 105. Metoprolol ordered. Pt started to get nauseous at about 2230, zofran given. Pt no longer nauseous and requesting to eat food daughter had brought. Food discouraged at this time.   Pt complaining of front right rib, stating it has hurt for several days and is worse on exertion.     Treatments and/or interventions provided: See MAR  3 doses of metoprolol to lower HR. Zofran  Patient's response to treatments and/or interventions: Pt no longer nauseous    To be done/followed up on inpatient unit:      Does this patient have any cognitive concerns?: none    Activity level - Baseline/Home:  Walker  Activity Level - Current:   Unknown    Patient's Preferred language: English   Needed?: No    Isolation: None  Infection: Not Applicable  Patient tested for COVID 19 prior to admission: NO  Bariatric?: No    Vital Signs:   Vitals:    02/07/23 1802 02/07/23 1930 02/07/23 2048   BP: (!) 145/97 115/71 128/81   Pulse: 114 99 99   Resp: 16  16   Temp: 97.8  F (36.6  C)     TempSrc: Oral     SpO2: 98% 97% 98%   Weight: 86.2 kg (190 lb)     Height: 1.727 m (5' 8\")         Cardiac Rhythm: sinus " tach    Was the PSS-3 completed:   Yes  What interventions are required if any?               Family Comments: none present  OBS brochure/video discussed/provided to patient/family: N/A              Name of person given brochure if not patient:               Relationship to patient:     For the majority of the shift this patient's behavior was Green.   Behavioral interventions performed were .    ED NURSE PHONE NUMBER: *[phone]

## 2023-02-08 NOTE — PHARMACY-ANTICOAGULATION SERVICE
Clinical Pharmacy - Warfarin Dosing Consult     Pharmacy has been consulted to manage this patient s warfarin therapy.  Indication: Atrial Fibrillation  Therapy Goal: INR 2-3  Warfarin Prior to Admission: Yes  Warfarin PTA Regimen: 7.5mg daily    INR   Date Value Ref Range Status   02/08/2023 1.87 (H) 0.85 - 1.15 Final   02/07/2023 4.55 (H) 0.85 - 1.15 Final       Recommend warfarin 7.5 mg today.  Pharmacy will monitor Gordon Gann daily and order warfarin doses to achieve specified goal.      Please contact pharmacy as soon as possible if the warfarin needs to be held for a procedure or if the warfarin goals change.      Mitra Lin, PharmD

## 2023-02-08 NOTE — CONSULTS
[facility]    Nephrology Consultation     Date of Admission:  2/7/2023    Assessment & Plan     Gordon Gann is a 77 year old male with PMH ESRD on HD, DM 2, HTN, gout, A-fib on warfarin admitted 2/7/2023 with graft bleeding.  S/p suture.  Also underwent right-sided thoracentesis due to right pleural effusion.      Bleeding LUE AVG  Placed 8/5/2021.  Did have issues with clotting in December 2021 requiring fistulogram for declot and angioplasty of stenoses.  Has had some intermittent prolonged bleeding lately.  He had prolonged brisk bleeding of graft after dialysis 2/7, suture placed in ED.  Bleeding resolved.  INR supratherapeutic.  Suspect additionally that he has some stenoses that are exacerbating bleeding.  We will plan for fistulogram tomorrow.  Additionally unclear when suture should be removed, will consult vascular surgery for guidance.  -IR consult for fistulogram  -Vascular surgery consult for suture removal    ESRD on HD  Dialyzes TTS at [facility].  Primary nephrologist is Dr. Perla.  LUE AVG.  No heparin.  EDW 77.7 kg.  -HD tomorrow after fistulogram    Anemia of CKD  Acute blood loss due to bleeding AV graft  Hgb lower than baseline. Will increase epo dose and give IV iron during dialysis.     Shortness of breath   R pleural effusion   S/p R thoracentesis, 550 ml of rosina colored fluid. Exudative, 522 cells with lymphocyte predominance.     HTN  Continue PTA meds.    CKD MBD  Doesn't appear to be on binder PTA, ok to hold off and address outpatient. Continue PTA cholecalciferol.     Plan/Recs:  1) IR consult for fistulogram  2) NPO at midnight  3) Vascular surgery consult to determine suture removal/follow up   4) HD tomorrow after fistulogram   5) epo and iron during HD      --------------------------------------------------------------------------------------------  Reason for Consult     I was asked to see the patient for ESRD/bleeding fistula.    Primary Care  Physician     Belen Prieto    Chief Complaint     bleeding    History is obtained from the patient and chart review.      History of Present Illness     Gordon Gann is a 77 year old male who presents with bleeding AV graft.  He attended dialysis as normal 2/7.  When he was home he noted prolonged bleeding from dialysis access.  Bleeding was brisk but not pulsatile per his report.  He held pressure but was unable to control the bleeding thus called EMS.  He notes that he has had issues with prolonged bleeding in the past which have resolved with holding pressure longer.  He does note sometimes having blood on his towels when he gets out of the shower as well.  He is on Coumadin for A-fib, notably had high INR on arrival.  In ED, suture was placed to control the bleeding.  He has been stable since.  Hemoglobin dropped slightly from previous baseline but not enough to need blood transfusion.  Warfarin is being held.  He denies any other complaints including shortness of breath, swelling, lower extremity edema, nausea, vomiting, lack of appetite.  He is otherwise at his baseline.  He had some shortness of breath notable in the ED, had a chest x-ray showing right-sided pleural effusion and thus underwent a right-sided thoracentesis.    Past Medical History   I have reviewed this patient's medical history and updated it with pertinent information if needed.   Past Medical History:   Diagnosis Date     Alcohol abuse      Atrial fibrillation (H)      CKD (chronic kidney disease) stage 4, GFR 15-29 ml/min (H)      Diabetes (H)      Gout      Hypokalemia      Hypophosphatemia      Insomnia      STEMI (ST elevation myocardial infarction) (H)        Past Surgical History   I have reviewed this patient's surgical history and updated it with pertinent information if needed.  Past Surgical History:   Procedure Laterality Date     CREATE GRAFT LOOP ARTERIOVENOUS UPPER EXTREMITY Left 8/5/2021    Procedure: left arm arteriovenous  graft fistula.;  Surgeon: Noemí Hernandez MD;  Location: [facility] OR     IR DIALYSIS FISTULOGRAM LEFT  12/8/2021       Prior to Admission Medications   Prior to Admission Medications   Prescriptions Last Dose Informant Patient Reported? Taking?   Vitamin D, Cholecalciferol, 25 MCG (1000 UT) CAPS  Self Yes Yes   Sig: Take 1,000 Units by mouth every morning   acetaminophen (TYLENOL) 500 MG tablet prn med  Yes Yes   Sig: Take 1,000 mg by mouth every 8 hours as needed for mild pain   allopurinol (ZYLOPRIM) 100 MG tablet   Yes No   Sig: Take 200 mg by mouth daily   aspirin 81 MG EC tablet   No Yes   Sig: Take 1 tablet (81 mg) by mouth At Bedtime Starting 12/12   bumetanide (BUMEX) 2 MG tablet   No Yes   Sig: Take 1 tablet (2 mg) by mouth 2 times daily On non dialysis days   Patient taking differently: Take 4 mg by mouth daily ON MONDAY, WEDNESDAY, FRIDAY AND SUNDAY (NON-DIALYSIS DAYS).   cloNIDine (CATAPRES) 0.1 MG tablet   No Yes   Sig: Take 1 tablet (0.1 mg) by mouth 2 times daily   cyclobenzaprine (FLEXERIL) 10 MG tablet   No No   Sig: Take 1 tablet (10 mg) by mouth 3 times daily   Patient taking differently: Take 10 mg by mouth 2 times daily   ezetimibe (ZETIA) 10 MG tablet  Self Yes Yes   Sig: Take 10 mg by mouth every morning   fish oil-omega-3 fatty acids 1000 MG capsule  Self Yes Yes   Sig: Take 1 g by mouth every morning   folic acid (FOLVITE) 1 MG tablet  Self Yes Yes   Sig: Take 1 mg by mouth every morning   hydrALAZINE (APRESOLINE) 25 MG tablet  Self No Yes   Sig: Take 1 tablet (25 mg) by mouth every 6 hours as needed (SBP >180)   insulin aspart (NOVOLOG PEN) 100 UNIT/ML pen  Self No No   Sig: Inject 5 Units Subcutaneous 3 times daily (with meals)   Patient taking differently: Inject 5-6 Units Subcutaneous 3 times daily (with meals) Patient states he will add more insulin per sliding scale for BG >200   insulin glargine (LANTUS PEN) 100 UNIT/ML pen   No Yes   Sig: Inject 8 Units Subcutaneous At Bedtime    Patient taking differently: Inject 15 Units Subcutaneous At Bedtime   lisinopril (ZESTRIL) 20 MG tablet   No Yes   Sig: Take 1 tablet (20 mg) by mouth daily   melatonin 3 MG tablet prn med Self No Yes   Sig: Take 1 tablet (3 mg) by mouth At Bedtime   Patient taking differently: Take 3 mg by mouth nightly as needed   metoprolol tartrate (LOPRESSOR) 100 MG tablet   No Yes   Sig: Take 1 tablet (100 mg) by mouth 2 times daily   mirtazapine (REMERON) 15 MG tablet  Self No Yes   Sig: Take 1 tablet (15 mg) by mouth At Bedtime   traZODone (DESYREL) 150 MG tablet   Yes Yes   Sig: Take 150 mg by mouth At Bedtime   vitamin B complex with vitamin C (STRESS TAB) tablet  Self Yes Yes   Sig: Take 1 tablet by mouth every morning   warfarin ANTICOAGULANT (COUMADIN) 5 MG tablet 2/6/2023  Yes No   Sig: Take 7.5 mg by mouth daily      Facility-Administered Medications: None     Allergies   No Known Allergies    Social History   I have reviewed this patient's social history and updated it with pertinent information if needed. Gordon Gann  reports that he has never smoked. He has never used smokeless tobacco. He reports current alcohol use.    Family History   I have reviewed this patient's family history and updated it with pertinent information if needed.   No family history on file.   He denies any significant family history.  Has not aware of any renal disease.    Review of Systems   The 10 point Review of Systems is negative other than noted in the HPI.     Physical Exam   Temp: 97.6  F (36.4  C) Temp src: Oral BP: 135/89 Pulse: 94   Resp: 18 SpO2: 95 % O2 Device: None (Room air)    Vital Signs with Ranges  Temp:  [97.4  F (36.3  C)-97.8  F (36.6  C)] 97.6  F (36.4  C)  Pulse:  [] 94  Resp:  [11-19] 18  BP: (115-145)/(71-99) 135/89  SpO2:  [95 %-98 %] 95 %  171 lbs 6.4 oz    GENERAL: No acute distress, resting comfortably in bed  HEENT: MMM  CV: Irregularly irregular, systolic murmur  RESP: Clear bilaterally with good  efforts  GI: Very active bowel sounds  MUSCULOSKELETAL: No lower extremity edema  SKIN: Bruises diffusely  NEURO:  Strength normal and symmetric.  Alert, oriented, normal speech  PSYCH: mood good, flat affect  Access: LUE AVG with palpable thrill and audible bruit.  Left arm appears larger than right with pitting edema diffusely.  Dilated prominent proximal veins on upper shoulder and chest.  Suture in place over previously bleeding area.    Data   BMP  Recent Labs   Lab 02/08/23  0609 02/07/23  1946   * 134*   POTASSIUM 4.8 4.3   CHLORIDE 94* 95*   LC 8.1* 8.0*   CO2 22 26   BUN 47.6* 34.6*   CR 3.76* 3.26*   * 165*     Phos@LABRCNTIPR(phos:4)  CBC)  Recent Labs   Lab 02/08/23  0609 02/07/23  2253 02/07/23  1819   WBC 11.0  --  12.2*   HGB 8.0* 8.0* 8.6*   HCT 25.5*  --  27.1*   MCV 95  --  95     --  423     No lab results found in last 7 days.    Invalid input(s): BILIRUBININDIRECT  Recent Labs   Lab 02/08/23  0609   INR 1.87*     No results found for: D2VIT, D3VIT, DTOT  Recent Labs   Lab 02/08/23  0609   HGB 8.0*   HCT 25.5*   MCV 95     No results for input(s): PTHI in the last 168 hours.     Galen Benson MD   [facility] - Nephrology  [phone]

## 2023-02-08 NOTE — PLAN OF CARE
VS: Stable on arrival to [facility]  Assist by: 1 with walker; GB    Cardiac: Rhythm irregular; denies chest pain  Neuro: A&Ox4  CMS: Denies numbness or tingling; Arthritic pain to Right knee and B/L ankles  Respi: Saturating well on RA; Right sided (jesus-lateral) chest pain on inspiration (Large R Pleural Eff on CXR/CTAP); Prn tylenol given as charted  : Continent to bathroom  GI: Abdomen round; soft, hypoactive BS all quadrants    Strip/Tele: Afib/Aflutter with prolonged QTC    Pressure areas/Skin: Scar to  upper back; Excoriation to sacral cleft.    LDA's: AV fistula to left arm, bandaged from ED; PIVC to right arm on SL      Plans:  >Hold Coumadin  >Thoracentesis  >Sepsis protocol triggered; Obs q15 stable, stat Lac normal

## 2023-02-08 NOTE — ED TRIAGE NOTES
Pt had HD today and was done around 1500. Once got home, took jacket off and realized arm was soaked with blood tried to wrap gauze but wouldn't stop bleeding. Patient is asymptomatic. Large amount of blood on dressing/bed.      Triage Assessment     Row Name 02/07/23 0832       Respiratory WDL    Respiratory WDL WDL       Cardiac WDL    Cardiac WDL WDL       Cognitive/Neuro/Behavioral WDL    Cognitive/Neuro/Behavioral WDL WDL

## 2023-02-08 NOTE — ED NOTES
DATE:  2/7/2023   TIME OF RECEIPT FROM LAB:  2041  LAB TEST:  INR  LAB VALUE:  9.87  RESULTS GIVEN WITH READ-BACK TO (PROVIDER):  Dr. Galdamez  TIME LAB VALUE REPORTED TO PROVIDER:   2046

## 2023-02-08 NOTE — PROGRESS NOTES
RECEIVING UNIT ED HANDOFF REVIEW    ED Nurse Handoff Report was reviewed by: Radha Allred RN on February 8, 2023 at 2:10 AM

## 2023-02-08 NOTE — H&P
Admitted: 02/07/2023    PRIMARY CARE PHYSICIAN:  Dr. Belen Prieto    CODE STATUS:  DNR/DNI, discussed with the patient.    CHIEF COMPLAINT:  Bleeding left fistula.    HISTORY OF PRESENT ILLNESS:  Mr. Gann is a 77-year-old male with past medical history significant for end-stage renal disease, atrial fibrillation/flutter, gout, who presents to the emergency department with the above concerns.  History is obtained through discussion with the ED physician as well as the patient.  The patient notes that he had dialysis today, which he states went normally and did well upon discharge to home.  Unfortunately, at that point, he started developing some bleeding from his fistula, which he could not stop.  He comes to the emergency department for evaluation and was noted to have a very high INR at nearly 10.  He was able to get control of the bleeding with help of the ED and a stitch was placed and subsequently, he has not had any further bleeding.  During the course of his ED stay, however, he did complain of some right-sided chest discomfort and mild shortness of breath.  He had a chest x-ray showing a large right pleural effusion, possibly loculated. So, a CT of the chest with contrast was also done, which was negative for PE, but did show a large right pleural effusion.  Also noted was that he started becoming tachycardic.  EKG showed apparently junctional rhythm, but he did respond well to metoprolol and his heart rate has decreased into the 90s.    When I saw the patient, he was resting comfortably and eating [employer] while watching the news.  He states he occasionally gets some right-sided chest discomfort that comes in twinges.  He does not get chest pain on a regular basis.  He does not feel particularly short of breath now.  He has not had any cough, fevers or chills.  No further bleeding from his left fistula.  He has not had any bleeding in his gums nor in his urine or stool.    I did review his records through  [facility] and notes that he did have a pleural effusion back last summer and had a thoracentesis x 2.  Back in August, he had what looks to be nearly complete resolution of his pleural effusions after thoracentesis.  He was seen by both nephrology and thoracic surgery.  Thoracic surgery thought that his pleural effusions were transitive and due to end-stage renal disease.  In discussion with the patient, he does have fairly significant lower extremity edema and states that they are not able to get him to his dry weight because he has cramps and is unwilling to tolerate the cramps to get to dry weight.  He has not taken any of his evening medications.  Of note, he did get vitamin K in the ED and INR has drifted down from 10 to about 5 on recheck.    PAST MEDICAL HISTORY:     1.  End-stage renal disease, dialyzing Tuesday, Thursday and Saturday.  2.  Atrial fibrillation/flutter.  3.  Gout.  4.  Diabetes.  5.  Recurrent right pleural effusion.    MEDICATIONS:    Medications Prior to Admission   Medication Sig Dispense Refill Last Dose     acetaminophen (TYLENOL) 500 MG tablet Take 1,000 mg by mouth every 8 hours as needed for mild pain   prn med     aspirin 81 MG EC tablet Take 1 tablet (81 mg) by mouth At Bedtime Starting 12/12        bumetanide (BUMEX) 2 MG tablet Take 1 tablet (2 mg) by mouth 2 times daily On non dialysis days (Patient taking differently: Take 4 mg by mouth daily ON MONDAY, WEDNESDAY, FRIDAY AND SUNDAY (NON-DIALYSIS DAYS).)        cloNIDine (CATAPRES) 0.1 MG tablet Take 1 tablet (0.1 mg) by mouth 2 times daily        ezetimibe (ZETIA) 10 MG tablet Take 10 mg by mouth every morning        fish oil-omega-3 fatty acids 1000 MG capsule Take 1 g by mouth every morning        folic acid (FOLVITE) 1 MG tablet Take 1 mg by mouth every morning        hydrALAZINE (APRESOLINE) 25 MG tablet Take 1 tablet (25 mg) by mouth every 6 hours as needed (SBP >180) 30 tablet 0      insulin glargine (LANTUS PEN) 100 UNIT/ML  pen Inject 8 Units Subcutaneous At Bedtime (Patient taking differently: Inject 15 Units Subcutaneous At Bedtime) 15 mL 0      lisinopril (ZESTRIL) 20 MG tablet Take 1 tablet (20 mg) by mouth daily        melatonin 3 MG tablet Take 1 tablet (3 mg) by mouth At Bedtime (Patient taking differently: Take 3 mg by mouth nightly as needed)   prn med     metoprolol tartrate (LOPRESSOR) 100 MG tablet Take 1 tablet (100 mg) by mouth 2 times daily        mirtazapine (REMERON) 15 MG tablet Take 1 tablet (15 mg) by mouth At Bedtime 10 tablet 0      traZODone (DESYREL) 150 MG tablet Take 150 mg by mouth At Bedtime        vitamin B complex with vitamin C (STRESS TAB) tablet Take 1 tablet by mouth every morning        Vitamin D, Cholecalciferol, 25 MCG (1000 UT) CAPS Take 1,000 Units by mouth every morning        allopurinol (ZYLOPRIM) 100 MG tablet Take 200 mg by mouth daily (Patient not taking: Reported on 2/8/2023)   Not Taking     cyclobenzaprine (FLEXERIL) 10 MG tablet Take 1 tablet (10 mg) by mouth 3 times daily (Patient taking differently: Take 10 mg by mouth 2 times daily)        insulin aspart (NOVOLOG PEN) 100 UNIT/ML pen Inject 5 Units Subcutaneous 3 times daily (with meals) (Patient taking differently: Inject 5-6 Units Subcutaneous 3 times daily (with meals) Patient states he will add more insulin per sliding scale for BG >200) 15 mL 0      warfarin ANTICOAGULANT (COUMADIN) 5 MG tablet Take 7.5 mg by mouth daily   2/6/2023         ALLERGIES:  NO KNOWN DRUG ALLERGIES.    REVIEW OF SYSTEMS:  A complete review of systems reviewed and negative, save for the pertinent positives which are recorded in the HPI.    PHYSICAL EXAMINATION:    VITAL SIGNS:  Show blood pressure of 124/92, heart rate in the 90s-120s, respirations 12, satting 97% on room air, temperature 97.8 degrees Fahrenheit.  GENERAL:  The patient is sitting in bed and appears fairly comfortable.  HEENT:  Pupils equal, round, reactive to light.  Extraocular muscle  function intact.  No scleral icterus.  Oropharynx is clear.  NECK:  No lymphadenopathy or thyromegaly.  CARDIOVASCULAR:  Heart sounds are irregular with controlled rates.  He has a 2/6 systolic murmur heard throughout.  PULMONARY:  Lungs are clear to auscultation bilaterally, though diminished at the right.  No wheezing or crackles.  GASTROINTESTINAL:  Positive bowel sounds, soft, nontender and nondistended.  SKIN:  No new rashes or lesions.  He does have a bandage over his left fistula, which I did not remove.  LYMPHATICS:  He has 2+ pitting edema of the bilateral lower extremities.  PSYCHIATRIC:  Alert and oriented x 3, normal affect.  NEUROLOGIC:  Cranial nerves II through XII are grossly intact without any focal deficits.    LABORATORY DATA:  WBC count 12.2, hemoglobin 8.6, platelets of 423.  INR was 9.87 at its peak, now down to 4.55.  Troponin 350 and recheck 322.    CT of the chest with contrast shows no evidence of acute aortic pathology or pulmonary embolism.  There is a moderate right-sided pleural effusion, which is nonhemorrhagic, interval increase from prior imaging in August.  There is atelectasis and near complete collapse of the right lower and middle lobes. Left lung, otherwise clear.    EKG appears to show either junctional rhythm or fibrillation.    IMPRESSION AND PLAN:  Mr. Gann is a 77-year-old male with past medical history significant for end-stage renal disease, diabetes, hypertension, and gout, who presents to the emergency department with bleeding left upper extremity fistula and also found to have tachycardia and a right-sided pleural effusion.  1.  Bleeding left upper extremity fistula.  A stitch was placed in the ED and bandaged.  He is currently not bleeding.  Can follow up with nephrology regarding this.  2.  End-stage renal disease:  He did dialyze today apparently uneventfully.  I do think that his recurrent pleural effusions are likely due at least in part from his not getting 2  dry weights and he likely needs further adjustment of his dialysis runs.  If he stays longer than tomorrow, can consult Nephrology.  Continue with PTA regimen once confirmed.  3.  Recurrent right-sided pleural effusion:  Noted on CT moderate to large.  He is relatively asymptomatic from a respiratory perspective, but I do think it is causing his right-sided chest pain.  As above, I think this is due in large part to needing to pull more fluid on dialysis.  I note that back in August this was transudative.  He was seen by thoracic surgery and they thought that it was likely due to his end-stage renal disease.  No obvious evidence of infectious etiology at this point.  He does agree to getting a thoracentesis again in the morning if INR allows, which has been ordered.  Recent cell count and diff as well as pleural fluid studies.  Monitor for any infectious symptoms, but we will hold on any antibiotics at this point.  4.  Atrial fibrillation/flutter:  He is tachycardic here, I think in part due to missing his evening medications.  I am going to give him his nighttime 100 mg dose of metoprolol, monitor on telemetry, repeat an EKG in the morning and have p.r.n. IV metoprolol available.  Holding Coumadin for now given it was supratherapeutic and also because he will get a thoracentesis in the morning.  5.  Supratherapeutic INR:  INR was high as 9.87 and got 5 mg of IV vitamin K.  We will continue to monitor as it trends down and restart Coumadin after his thoracentesis.  6.  Diabetes:  Blood sugar appears to be appropriate.  We will await med rec in the morning.  Sliding scale will be available if needed.  7.  Gout:  Can resume allopurinol in the morning once reconciled.  8.  Hypertension:  Continue with PTA regimen in the morning once reconciled, but appears to include lisinopril, metoprolol and clonidine.  9.  Disposition:  Brought under observation status, and hopefully can discharge to home tomorrow if rate is  controlled and thoracentesis complete.    Myles Arechiga DO        D: 2023   T: 2023   MT: YURIY    Name:     JULIO RIZVI  MRN:      [id number]        Account:     [id number]   :      1945           Admitted:    2023       Document: [id number]

## 2023-02-08 NOTE — ED NOTES
DATE:  2/7/2023   TIME OF RECEIPT FROM LAB:  2021  LAB TEST:  Troponin  LAB VALUE:  350  RESULTS GIVEN WITH READ-BACK TO (PROVIDER):  Dr Galdamez  TIME LAB VALUE REPORTED TO PROVIDER:   2030

## 2023-02-08 NOTE — ED PROVIDER NOTES
History     Chief Complaint:  Vascular Access Problem       HPI   Gordon Gann is a 77 year old male with a past medical history significant for ESRD on dialysis, a flutter/A-fib on anticoagulation who presents to the ED via/accompanied by EMS with a chief complaint of bleeding from left upper extremity dialysis fistula onset shortly prior to arrival and after he received dialysis today.  Patient denies chest pain, shortness of breath, abdominal pain, nausea, vomiting, lightheadedness.    Patient denies tobacco use.    Independent Historian: History provided by EMS and patient    Review of External Notes: See MDM    ROS:  Review of Systems  Full ROS completed and negative other than pertinent positives and negatives noted in HPI    Allergies:  No Known Allergies     Medications:    acetaminophen (TYLENOL) 500 MG tablet  allopurinol (ZYLOPRIM) 100 MG tablet  aspirin 81 MG EC tablet  bumetanide (BUMEX) 2 MG tablet  cloNIDine (CATAPRES) 0.1 MG tablet  cyclobenzaprine (FLEXERIL) 10 MG tablet  ezetimibe (ZETIA) 10 MG tablet  fish oil-omega-3 fatty acids 1000 MG capsule  folic acid (FOLVITE) 1 MG tablet  hydrALAZINE (APRESOLINE) 25 MG tablet  insulin aspart (NOVOLOG PEN) 100 UNIT/ML pen  insulin glargine (LANTUS PEN) 100 UNIT/ML pen  lisinopril (ZESTRIL) 20 MG tablet  melatonin 3 MG tablet  metoprolol tartrate (LOPRESSOR) 100 MG tablet  mirtazapine (REMERON) 15 MG tablet  traZODone (DESYREL) 150 MG tablet  vitamin B complex with vitamin C (STRESS TAB) tablet  Vitamin D, Cholecalciferol, 25 MCG (1000 UT) CAPS  warfarin ANTICOAGULANT (COUMADIN) 5 MG tablet  warfarin ANTICOAGULANT (COUMADIN) 5 MG tablet        Past Medical History:    Past Medical History:   Diagnosis Date     Alcohol abuse      Atrial fibrillation (H)      CKD (chronic kidney disease) stage 4, GFR 15-29 ml/min (H)      Diabetes (H)      Gout      Hypokalemia      Hypophosphatemia      Insomnia      STEMI (ST elevation myocardial infarction) (H)   "      Past Surgical History:    Past Surgical History:   Procedure Laterality Date     CREATE GRAFT LOOP ARTERIOVENOUS UPPER EXTREMITY Left 8/5/2021    Procedure: left arm arteriovenous graft fistula.;  Surgeon: Noemí Hernandez MD;  Location: [facility] OR     IR DIALYSIS FISTULOGRAM LEFT  12/8/2021        Family History:    family history is not on file.    Social History:   reports that he has never smoked. He has never used smokeless tobacco. He reports current alcohol use.  PCP: Belen Prieto     Physical Exam     Patient Vitals for the past 24 hrs:   BP Temp Temp src Pulse Resp SpO2 Height Weight   02/07/23 2316 -- -- -- 109 18 97 % -- --   02/07/23 2315 (!) 138/96 -- -- 108 -- -- -- --   02/07/23 2312 (!) 133/92 -- -- 114 16 97 % -- --   02/07/23 2310 -- -- -- 115 14 98 % -- --   02/07/23 2309 (!) 134/99 -- -- (!) 121 -- -- -- --   02/07/23 2200 -- -- -- (!) 124 -- -- -- --   02/07/23 2142 125/84 -- -- 105 19 96 % -- --   02/07/23 2100 128/82 -- -- 106 -- 97 % -- --   02/07/23 2048 128/81 -- -- 99 16 98 % -- --   02/07/23 1930 115/71 -- -- 99 -- 97 % -- --   02/07/23 1802 (!) 145/97 97.8  F (36.6  C) Oral 114 16 98 % 1.727 m (5' 8\") 86.2 kg (190 lb)        Physical Exam  Constitutional: Well developed, nontox appearance  Head: Atraumatic.   Neck:  no stridor  Eyes: no scleral icterus  Cardiovascular: Borderline irregularly irregular tachycardia, palpable thrill to dialysis fistula  Pulmonary/Chest: nml resp effort  Abdominal: ND, soft, NT, no rebound or guarding   Ext: Warm, well perfused, no edema  Neurological: A&O, symmetric facies, moves ext x4  Skin: Skin is warm and dry.   Psychiatric: Behavior is normal. Thought content normal.   Nursing note and vitals reviewed.    Emergency Department Course   ECG:  ECG results from 08/16/22   EKG 12 lead     Value    Systolic Blood Pressure     Diastolic Blood Pressure     Ventricular Rate 100    Atrial Rate 100    IA Interval 224    QRS Duration 104        " QTc 513    P Axis 46    R AXIS -10    T Axis 128    Interpretation ECG      Sinus rhythm with 1st degree A-V block  Incomplete right bundle branch block  Nonspecific ST and T wave abnormality  Prolonged QT  Abnormal ECG  When compared with ECG of 04-DEC-2021 00:05,  Sinus rhythm has replaced Atrial flutter  Incomplete right bundle branch block is now Present  Confirmed by GENERATED REPORT, COMPUTER ([id number]),  Janell Centeno ([id number]) on 8/16/2022 11:39:45 AM         Imaging:  CTA Chest with Contrast   Final Result   IMPRESSION:      1.  Unremarkable CTA of the chest. No evidence of acute aortic pathology or pulmonary embolic disease.      2.  Moderate right pleural effusion is nonhemorrhagic. Concurrent chest radiograph demonstrates interval enlargement from most recent prior imaging.      3.  There is atelectasis/near-complete collapse of the right lower and middle lobes. Lungs otherwise well aerated and clear.      4.  Unchanged cardiac enlargement with suspected prior coronary stenting.         XR Chest 2 Views   Final Result   IMPRESSION:       Moderate to large right pleural effusion significantly increased from August 2022. A degree of loculation presumably accounts for opacity medially at the apex.      Left lung well aerated and clear.      Unchanged heart size with normal pulmonary vasculature as visualized.          Laboratory:  Labs Ordered and Resulted from Time of ED Arrival to Time of ED Departure   INR - Abnormal       Result Value    INR 9.62 (*)    CBC WITH PLATELETS AND DIFFERENTIAL - Abnormal    WBC Count 12.2 (*)     RBC Count 2.86 (*)     Hemoglobin 8.6 (*)     Hematocrit 27.1 (*)     MCV 95      MCH 30.1      MCHC 31.7      RDW 14.9      Platelet Count 423      % Neutrophils 72      % Lymphocytes 17      % Monocytes 9      % Eosinophils 1      % Basophils 0      % Immature Granulocytes 1      NRBCs per 100 WBC 0      Absolute Neutrophils 8.7 (*)     Absolute Lymphocytes 2.1      Absolute  Monocytes 1.2      Absolute Eosinophils 0.1      Absolute Basophils 0.0      Absolute Immature Granulocytes 0.1      Absolute NRBCs 0.0     TROPONIN T, HIGH SENSITIVITY - Abnormal    Troponin T, High Sensitivity 350 (*)    INR - Abnormal    INR 9.87 (*)    BASIC METABOLIC PANEL - Abnormal    Sodium 134 (*)     Potassium 4.3      Chloride 95 (*)     Carbon Dioxide (CO2) 26      Anion Gap 13      Urea Nitrogen 34.6 (*)     Creatinine 3.26 (*)     Calcium 8.0 (*)     Glucose 165 (*)     GFR Estimate 19 (*)    INR - Abnormal    INR 4.55 (*)    TROPONIN T, HIGH SENSITIVITY - Abnormal    Troponin T, High Sensitivity 322 (*)    HEMOGLOBIN - Abnormal    Hemoglobin 8.0 (*)         Procedures     Narrative: Procedure: Laceration Repair        LACERATION: Bleeding dialysis fistula      LOCATION: Left upper arm      FUNCTION:  Distally sensation, circulation, motor and tendon function are intact.      ANESTHESIA:  Local using lidocaine 1% total of 1 mLs      PREPARATION: Laceration suture emergently given large amount of bleeding      DEBRIDEMENT:  no debridement      CLOSURE:  Wound was closed with One Layer.  Skin closed with 1 x 5.0 Ethylon using a pursestring suture and Dermabond      Emergency Department Course & Assessments:             Interventions:  Medications   HOLD: warfarin (COUMADIN) therapy (has no administration in time range)   ondansetron (ZOFRAN) injection 4 mg (has no administration in time range)   metoprolol (LOPRESSOR) injection 5 mg (has no administration in time range)   phytonadione 5 mg in sodium chloride 0.9 % 50 mL intermittent infusion (0 mg Intravenous Stopped 2/7/23 2058)   iopamidol (ISOVUE-370) solution 80 mL (80 mLs Intravenous Given 2/7/23 2121)   Saline Flush (80 mLs Intravenous Given 2/7/23 2121)   ondansetron (ZOFRAN) injection 4 mg (4 mg Intravenous Given 2/7/23 2218)   metoprolol (LOPRESSOR) injection 5 mg (5 mg Intravenous Given 2/7/23 2317)        Independent Interpretation (X-rays,  CTs, rhythm strip):  See MDM    Consultations/Discussion of Management or Tests:  See MDM    Social Determinants of Health affecting care:  See MDM    Disposition:  The patient was admitted to the hospital under the care of Dr. Arechiga.     Impression & Plan    CMS Diagnoses: None  Medical Decision Makin year old male presenting w/ bleeding dialysis fistula    Social determinants affecting patient's health include:  Age, end-stage renal disease on dialysis increasing risk for recurrent ED visits     I reviewed medical records from  [facility] anticoagulation therapy clinic appointment on 2023    DDx includes bleeding dialysis fistula, supratherapeutic INR, anemia.  Dialysis fistula is closed with a pursestring suture during my initial evaluation.   Labs significant for mildly worsening interval anemia, supratherapeutic INR.  Vitamin K given as noted above.  Patient later did develop chest pain and during his emergency department stay.  EKG showed atrial fibrillation and troponin level was elevated likely in part due to patient's known end-stage renal disease.  Upon reevaluation, patient reports that his chest pain is right-sided, some pleuritic.  Chest x-ray demonstrated worsening right pleural effusion and no pneumothorax on my initial read with radiology interpretation as noted above.  CTA chest ordered to evaluate for vascular catastrophe.  CTA without evidence of dissection and redemonstration of pleural effusion as described above.  Patient was noted to be tachycardic later in his emergency department stay, EKG demonstrated a likely junctional rhythm versus a flutter with 2:1 block.  The patient was given metoprolol with improvement in rate.  He was subsequently admitted to the hospitalist service under observation status given his intermittent tachycardia and chest pain.  Repeat troponin downtrending.  I discussed the case and the patient with the admitting hospitalist.  Patient counseled on all results,  disposition and diagnosis.  They are understanding and agreeable to plan. Patient admitted in guarded condition.      Diagnosis:    ICD-10-CM    1. Supratherapeutic INR  R79.1       2. Pleuritic chest pain  R07.81       3. Atrial fibrillation with RVR (H)  I48.91       4. Acute hemorrhage  R58     hemodialysis fistula           Discharge Medications:  New Prescriptions    No medications on file        2/7/2023   Fili Galdamez MD Vaughn, Christopher E, MD  02/07/23 [id number]

## 2023-02-08 NOTE — PHARMACY-ADMISSION MEDICATION HISTORY
Pharmacy Medication History  Admission medication history interview status for the 2/7/2023  admission is in progress . See EPIC admission navigator for prior to admission medications     Talked to patient's daughter, Omayra. Verified all medications with her but she does not know frequencies on bumetanide, clonidine, lisinopril & metoprolol. She also think pt no longer takes allopurinol & confirm whether or not pt on cyclobenzaprine. She will call back (likely on PMs to clarify).  Insulin clarified with pt.    Location of Interview: Patient room  Medication history sources: Patient, Patient's family/friend (Omayra - daughter) and Surescripts    Significant changes made to the medication list:  1) Updated insulin    In the past week, patient estimated taking medication this percent of the time: greater than 90%    Medication reconciliation completed by provider prior to medication history? No    Time spent in this activity: 25 minutes    Prior to Admission medications    Medication Sig Last Dose Taking? Auth Provider Long Term End Date   acetaminophen (TYLENOL) 500 MG tablet Take 1,000 mg by mouth every 8 hours as needed for mild pain prn med Yes Unknown, Entered By History     aspirin 81 MG EC tablet Take 1 tablet (81 mg) by mouth At Bedtime Starting 12/12  Yes Fili Ireland MD     bumetanide (BUMEX) 2 MG tablet Take 1 tablet (2 mg) by mouth 2 times daily On non dialysis days  Yes Fili Ireland MD Yes    cloNIDine (CATAPRES) 0.1 MG tablet Take 1 tablet (0.1 mg) by mouth 2 times daily  Yes Fili Ireland MD Yes    ezetimibe (ZETIA) 10 MG tablet Take 10 mg by mouth every morning  Yes Unknown, Entered By History Yes    fish oil-omega-3 fatty acids 1000 MG capsule Take 1 g by mouth every morning  Yes Unknown, Entered By History     folic acid (FOLVITE) 1 MG tablet Take 1 mg by mouth every morning  Yes Unknown, Entered By History     hydrALAZINE (APRESOLINE) 25 MG tablet Take 1 tablet (25 mg)  by mouth every 6 hours as needed (SBP >180)  Yes Julia Combs MD Yes    insulin glargine (LANTUS PEN) 100 UNIT/ML pen Inject 8 Units Subcutaneous At Bedtime  Patient taking differently: Inject 15 Units Subcutaneous At Bedtime  Yes Melissa Sanon DO Yes    lisinopril (ZESTRIL) 20 MG tablet Take 1 tablet (20 mg) by mouth daily  Yes Fili Ireland MD Yes    melatonin 3 MG tablet Take 1 tablet (3 mg) by mouth At Bedtime  Patient taking differently: Take 3 mg by mouth nightly as needed prn med Yes Mitesh Nicholson MD     metoprolol tartrate (LOPRESSOR) 100 MG tablet Take 1 tablet (100 mg) by mouth 2 times daily  Yes Fili Ireland MD Yes    mirtazapine (REMERON) 15 MG tablet Take 1 tablet (15 mg) by mouth At Bedtime  Yes Mitesh Nicholson MD Yes    traZODone (DESYREL) 150 MG tablet Take 150 mg by mouth At Bedtime  Yes Unknown, Entered By History No    vitamin B complex with vitamin C (STRESS TAB) tablet Take 1 tablet by mouth every morning  Yes Unknown, Entered By History     Vitamin D, Cholecalciferol, 25 MCG (1000 UT) CAPS Take 1,000 Units by mouth every morning  Yes Unknown, Entered By History     allopurinol (ZYLOPRIM) 100 MG tablet Take 200 mg by mouth daily   Unknown, Entered By History     cyclobenzaprine (FLEXERIL) 10 MG tablet Take 1 tablet (10 mg) by mouth 3 times daily  Patient taking differently: Take 10 mg by mouth 2 times daily   Fili Ireland MD     insulin aspart (NOVOLOG PEN) 100 UNIT/ML pen Inject 5 Units Subcutaneous 3 times daily (with meals)   Julia Combs MD Yes    warfarin ANTICOAGULANT (COUMADIN) 5 MG tablet Take 7.5 mg by mouth daily 2/6/2023  Unknown, Entered By History         The information provided in this note is only as accurate as the sources available at the time of update(s)

## 2023-02-08 NOTE — ED NOTES
Pt used call light to say he started to have midsternal chest pain. Upon questioning, the pt stated that it feels as though he cannot take a deep breath. MD notified. Biomarkers drawn and EKG obtained.

## 2023-02-08 NOTE — H&P
"[facility]    History and Physical - Hospitalist Service       Date of Admission:  2/7/2023  Dictation #: [id number]  Brief Summary (see dictation for more details): 77M hx ESRD, Gout, afib/flutter presents with bleeding LUE fistula likely from INR of nearly 10.  Vit K given with INR trending down to <5, suture placed in bleeding fistula in ED.  Monitor on tele for afib with RVR, suspect in part due to missing evening metoprolol.  Thoracentesis planned in am for right pleural effusion if INR allows.  Hold coumadin for now.      Clinically Significant Risk Factors Present on Admission               # Drug Induced Coagulation Defect: home medication list includes an anticoagulant medication    # Hypertension: home medication list includes antihypertensive(s)      # Overweight: Estimated body mass index is 28.89 kg/m  as calculated from the following:    Height as of this encounter: 1.727 m (5' 8\").    Weight as of this encounter: 86.2 kg (190 lb).           Medical Decision Making             Myles Arechiga,   Hospitalist Service  [facility]  Securely message with [employer] (more info)  Text page via [employer] Paging/Directory   "

## 2023-02-09 NOTE — PRE-PROCEDURE
GENERAL PRE-PROCEDURE:   Procedure:  Left upper extremity fistulagram with possible angioplasty, stenting, declot, dialysis catheter placement    Written consent obtained?: Yes    Risks and benefits: Risks, benefits and alternatives were discussed    Consent given by:  Patient  Patient states understanding of procedure being performed: Yes    Patient's understanding of procedure matches consent: Yes    Procedure consent matches procedure scheduled: Yes    Expected level of sedation:  Moderate  Appropriately NPO:  Yes  ASA Class:  3  Mallampati  :  Grade 3- soft palate visible, posterior pharyngeal wall not visible  Lungs:  Lungs clear with good breath sounds bilaterally  Heart:  Normal heart sounds and rate  History & Physical reviewed:  History and physical reviewed and no updates needed  Statement of review:  I have reviewed the lab findings, diagnostic data, medications, and the plan for sedation

## 2023-02-09 NOTE — PROGRESS NOTES
Renal Medicine Progress Note            Assessment/Plan:     Gordon Gann is a 77 year old male with PMH ESRD on HD, DM 2, HTN, gout, A-fib on warfarin admitted 2/7/2023 with graft bleeding.  S/p suture.  Also underwent right-sided thoracentesis due to right pleural effusion.       Bleeding LUE AVG, resolved  Supratherapeutic INR, resolved  Placed 8/5/2021.  Did have issues with clotting in December 2021 requiring fistulogram for declot and angioplasty of stenoses.  Has had some intermittent prolonged bleeding lately.  He had prolonged brisk bleeding of graft after dialysis 2/7, suture placed in ED.  Bleeding resolved.  INR supratherapeutic, but now resolved.  Suspect additionally that he has some stenoses that are exacerbating bleeding.  Fistulogram 2/9 showing multiple stenotic areas and one focal area in L subclavian vein that were angioplastied with resulting good flow. Will dialyze today. Per vascular surgery, suture can be removed in 7 days at dialysis.   - fistulogram today  - HD today   - appreciate vascular surgery recommendations   - suture removal 2/14 at dialysis unit      ESRD on HD  Dialyzes TTS at [facility].  Primary nephrologist is Dr. Perla.  LUE AVG.  No heparin.  EDW 77.7 kg.  -HD today      Anemia of CKD  Acute blood loss due to bleeding AV graft  Hgb lower than baseline. Will increase epo dose and give IV iron during dialysis.      Shortness of breath   R pleural effusion   S/p R thoracentesis, 550 ml of rosina colored fluid. Exudative, 522 cells with lymphocyte predominance.      HTN  Continue PTA meds.     CKD MBD  Doesn't appear to be on binder PTA, ok to hold off and address outpatient. Continue PTA cholecalciferol.     R knee pain and swelling   Warm to touch and swollen. Defer to primary team for management.      Plan/Recs:  1) s/p fistulogram, multiple stenosis plastied  2) HD today   3) suture should be removed 2/14 at dialysis unit   4) epo and venofer at dialysis            "Interval History:      - no acute events overnight   - fistulogram today with multiple stenotic areas that were plastied  - reports R knee pain, particularly with movement   - denies SOB   - otherwise feeling ok  - hoping to discharge today          Medications and Allergies:       - MEDICATION INSTRUCTIONS for Dialysis Patients -   Does not apply See Admin Instructions     aspirin  81 mg Oral At Bedtime     bumetanide  4 mg Oral Once per day on Sun Mon Wed Fri     cloNIDine  0.1 mg Oral BID     insulin aspart  1-7 Units Subcutaneous TID AC     insulin aspart  1-5 Units Subcutaneous At Bedtime     insulin glargine  15 Units Subcutaneous At Bedtime     lisinopril  20 mg Oral Daily     metoprolol tartrate  100 mg Oral BID     mirtazapine  15 mg Oral At Bedtime     warfarin ANTICOAGULANT  7.5 mg Oral ONCE at 18:00     Warfarin Therapy Reminder  1 each Oral See Admin Instructions      No Known Allergies         Physical Exam:   Vitals were reviewed  BP (!) 129/123   Pulse 111   Temp 97.6  F (36.4  C) (Oral)   Resp 24   Ht 1.727 m (5' 8\")   Wt 76.2 kg (168 lb)   SpO2 97%   BMI 25.54 kg/m      Wt Readings from Last 3 Encounters:   02/09/23 76.2 kg (168 lb)   08/29/22 87.5 kg (192 lb 14.4 oz)   08/16/22 88.5 kg (195 lb)       Intake/Output Summary (Last 24 hours) at 2/9/2023 1227  Last data filed at 2/9/2023 1200  Gross per 24 hour   Intake 210 ml   Output --   Net 210 ml       GENERAL APPEARANCE: no acute distress, well appearing   HEENT: MMM  RESP: clear bilaterally   CV: irregularly irregular, systolic murmur   EXTREMITIES/SKIN: 1+ pitting edema in BLE. R kneewarm to touch with effusion, no erythema  NEURO:  Alert, oriented, normal speech  ACCESS: LUE AVG with palpable thrill and audible bruit              Data:     BMP  Recent Labs   Lab 02/09/23  0820 02/09/23  0210 02/08/23  2338 02/08/23  2127 02/08/23  1839 02/08/23  0609 02/07/23  1946   NA  --   --   --   --   --  130* 134*   POTASSIUM  --   --   --   --   " --  4.8 4.3   CHLORIDE  --   --   --   --   --  94* 95*   LC  --   --   --   --   --  8.1* 8.0*   CO2  --   --   --   --   --  22 26   BUN  --   --   --   --   --  47.6* 34.6*   CR  --   --   --   --   --  3.76* 3.26*   * 310* 375* 453*   < > 155* 165*    < > = values in this interval not displayed.     CBC  Recent Labs   Lab 02/08/23  0609 02/07/23  2253 02/07/23  1819   WBC 11.0  --  12.2*   HGB 8.0* 8.0* 8.6*   HCT 25.5*  --  27.1*   MCV 95  --  95     --  423     Lab Results   Component Value Date    AST 22 08/19/2022    ALT 32 08/19/2022    ALKPHOS 213 (H) 08/19/2022    BILITOTAL 0.5 08/19/2022     Lab Results   Component Value Date    INR 1.66 (H) 02/09/2023       Fistulogram 2/9  IMPRESSION:  1. Severe segmental stenosis and peripheral outflow vein treated up to  8 mm balloon angioplasty with good result.  2. Moderate to severe focal stenosis in the medial left subclavian  vein dilated up to 14 mm. There is improved blood flow through this  segment with diminished collateralized veins.    Attestation:  I have reviewed today's vital signs, notes, medications, labs and imaging.    Galen Benson MD  [facility] - Nephrology  Office: [phone]

## 2023-02-09 NOTE — PROVIDER NOTIFICATION
MD Notification    Notified Person: MD    Notified Person Name: Hawk    Notification Date/Time: 02/09/23  12:01 AM    Notification Interaction: Amcom    Purpose of Notification: Evening blood glucose was 453, 5 units of Novolog given, and recheck was 375. Orders to notify provider if BG remains >350. Please advise.    Orders Received: 5 units of Novolog ordered

## 2023-02-09 NOTE — PROGRESS NOTES
[facility]    Hospitalist Progress Note    Interval History   - Underwent fistulogram with balloon angioplasty done today  - Underwent dialysis today  - He has right knee pain and swelling. He has a history of hemarthrosis in Dec 2021. Ortho was consulted but he was in dialysis when they went by his room, so they will see him tomorrow. Patient will have to stay until tomorrow. PT also consulted    Assessment & Plan   Summary: Gordon Gann is a 77 year old male with PMH  ESRD, DM, hypertension, and gout, who was admitted on 2/7/2023 with bleeding left upper extremity fistula, atrial fibrillation with RVR, and R pleural effusion.    Right knee swelling  Hx R knee hemarthrosis Dec 2021  Patient reports R knee swelling this time not as severe as prvious, however given presenting supratherapeutic INR and bleeding, this should be evaluated by orthopedic surgery  - Ortho consult pending  - PT ordered    Bleeding left upper extremity fistula, resolved  Supratherapeutic INR, improved  Due to supratherapeutic INR 10. A stitch was placed in the ED and bandaged.  He is currently not bleeding. INR down to 1.9  - Warfarin restarted  - Stitch to be removed as outpatient in one week  - ASA resumed as well    End-stage renal disease: Last dialyzed on 2/7, apparently uneventfully.  I do think that his recurrent pleural effusions are likely due at least in part from his not getting 2 dry weights and he likely needs further adjustment of his dialysis runs.  - Nephrology consult appreciated  - Dialysis here today  - Continue Bumex    Recurrent right-sided pleural effusion s/p thoracentesis 2/7/23: Fluid appears transudative, likely related to patient's ESRD. Could have been underdialyzing, underwent balloon angioplasty for stenosis with fistulogram 2/9 so hopefully less fluid buildup thereafter.  - Consider repeating CXR in 2-3 weeks  - Fluid studies still pending    Atrial fibrillation/flutter with RVR:  Remains tachycardic to 110s here.  - Continue PTA metoprolol    DM2: Continue Lantus 15 units at bedtime + MDSSI  - Blood sugars under good control today    Gout: Obs status hold    Hypertension:   - Continue clonidine, lisinopril  - Continue metoprolol    DVT Prophylaxis: Pneumatic Compression Devices  Code Status: No CPR- Do NOT Intubate  PT/OT: ordered  Diet: Combination Diet Renal Diet (dialysis); Moderate Consistent Carb (60 g CHO per Meal) Diet      Disposition: Expected discharge tomorrow after Ortho consult    Ezio Jarvis MD, MD  Text Page  (7am to 6pm)  -Data reviewed today: I reviewed all new labs and imaging results over the last 24 hours.    Physical Exam   Temp: 97  F (36.1  C) Temp src: Oral BP: (!) 100/38 Pulse: 119   Resp: 17 SpO2: 99 % O2 Device: None (Room air) Oxygen Delivery: 2 LPM  Vitals:    02/07/23 1802 02/08/23 0300 02/09/23 0254   Weight: 86.2 kg (190 lb) 77.7 kg (171 lb 6.4 oz) 76.2 kg (168 lb)     Vital Signs with Ranges  Temp:  [97  F (36.1  C)-97.8  F (36.6  C)] 97  F (36.1  C)  Pulse:  [] 119  Resp:  [13-24] 17  BP: (100-158)/() 100/38  SpO2:  [96 %-100 %] 99 %  I/O last 3 completed shifts:  In: 210 [P.O.:210]  Out: -   O2 requirements: none    Constitutional: Male in NAD  HEENT: Eyes nonicteric, oral mucosa moist  Cardiovascular: RRR, normal S1/2, no m/r/g  Respiratory: CTAB, no wheezing or crackles  Vascular: LUE fistula with superficial stitch, nont bleeding  GI: Normoactive bowel sounds, nontender, nondistended  Skin/Integumen: Bruising UE and LE. Right knee mild swelling and warmth compared to left  Neuro/Psych: Appropriate affect and mood. A&Ox3, moves all extremities    Medications     - MEDICATION INSTRUCTIONS -         - MEDICATION INSTRUCTIONS for Dialysis Patients -   Does not apply See Admin Instructions     sodium chloride 0.9%  250 mL Intravenous Once in dialysis/CRRT     sodium chloride 0.9%  300 mL Hemodialysis Machine Once     aspirin  81 mg Oral  At Bedtime     bumetanide  4 mg Oral Once per day on Sun Mon Wed Fri     cloNIDine  0.1 mg Oral BID     epoetin jyotsna-epbx  3,000 Units Intravenous Once in dialysis/CRRT     insulin aspart  1-7 Units Subcutaneous TID AC     insulin aspart  1-5 Units Subcutaneous At Bedtime     insulin glargine  15 Units Subcutaneous At Bedtime     iron sucrose  300 mg Intravenous Once in dialysis/CRRT     lisinopril  20 mg Oral Daily     metoprolol tartrate  100 mg Oral BID     mirtazapine  15 mg Oral At Bedtime     - MEDICATION INSTRUCTIONS -   Does not apply Once     warfarin ANTICOAGULANT  7.5 mg Oral ONCE at 18:00     Warfarin Therapy Reminder  1 each Oral See Admin Instructions       Data   Recent Labs   Lab 02/09/23  1045 02/09/23  0820 02/09/23  0210 02/08/23  2338 02/08/23  1839 02/08/23  0609 02/07/23  2253 02/07/23  2156 02/07/23  1946/23  1819 02/07/23  1819   WBC  --   --   --   --   --  11.0  --   --   --   --  12.2*   HGB  --   --   --   --   --  8.0* 8.0*  --   --   --  8.6*   MCV  --   --   --   --   --  95  --   --   --   --  95   PLT  --   --   --   --   --  382  --   --   --   --  423   INR 1.66*  --   --   --   --  1.87*  --  4.55* 9.87*  --  9.62*   NA  --   --   --   --   --  130*  --   --  134*  --   --    POTASSIUM  --   --   --   --   --  4.8  --   --  4.3  --   --    CHLORIDE  --   --   --   --   --  94*  --   --  95*  --   --    CO2  --   --   --   --   --  22  --   --  26  --   --    BUN  --   --   --   --   --  47.6*  --   --  34.6*  --   --    CR  --   --   --   --   --  3.76*  --   --  3.26*  --   --    ANIONGAP  --   --   --   --   --  14  --   --  13  --   --    LC  --   --   --   --   --  8.1*  --   --  8.0*  --   --    GLC  --  128* 310* 375*   < > 155*  --   --  165*   < >  --    PROTTOTAL  --   --   --   --   --   --   --  5.8*  --   --   --     < > = values in this interval not displayed.       Imaging:   Recent Results (from the past 24 hour(s))   IR Dialysis Fistulogram Left    Narrative     INTERVENTIONAL RADIOLOGY DIALYSIS FISTULOGRAM LEFT  2/9/2023 9:42 AM    HISTORY:  77-year-old patient with presentation to the hospital with  bleeding from hemodialysis graft site. The graft is in the left upper  arm. Patient was supratherapeutic INR upon presentation, which has  since been corrected. Plan is for fistulogram to determine any  anatomic abnormalities.    COMPARISON: December 8, 2021    TECHNIQUE: Patient was brought to the interventional radiology  department and informed consent obtained. Patient was placed in a  supine position. Skin overlying the left upper arm was prepped and  draped in standard sterile fashion. Ultrasound was used to visualize  the graft, patency confirmed, and image stored for documentation. With  continuous ultrasound guidance after lidocaine administration,  micropuncture kit was used to access the fistula in an antegrade  direction. Over a series of maneuvers, up to a 7 Hebrew sheath was  placed. Fistulogram was performed from the AV anastomosis to the right  atrium. Several areas of stenosis identified. Berenstein catheter was  used to navigate through the areas of stenosis and central venogram  performed from the left subclavian vein. A Howell wire was placed. 6 mm  and 8 mm balloon angioplasty performed in the peripheral outflow vein  segment with good result. Central vein stenosis was dilated with 8mm,  10 mm, 12 mm, and 14 mm balloon angioplasty with repeat venograms  between each sequence. There is mild persistent stenosis in the medial  aspect of the subclavian vein, though overall improved blood flow  through this segment at completion. Sheath was removed and hemostasis  achieved with manual compression.    Sedation: A moderate level of sedation was achieved with 2 mg IV  Versed and 100 mcg IV fentanyl.  Sedation time: 40 minutes  Please note the above medications were administered by the  interventional radiology staff under my direct supervision. Patient's  vital  signs were monitored and remained stable throughout the  procedure.  Fluoroscopic time: 3.6 minutes  Air kerma: 24.1 mGy  Contrast: 65 mL of Isovue 300 administered throughout the procedure,  without complication.  Local anesthetic: 2 mL 1% lidocaine.    FINDINGS: Total of 42 spot fluoroscopic images and fistulogram  sequences obtained throughout the procedure. AV anastomosis is patent.  Severe segmental stenosis in the outflow vein, treated with 6 mm and 8  mm balloon angioplasty with improved result at completion. Moderate to  severe focal stenosis in the medial aspect of the subclavian vein with  collateralized vein formation treated up to 14 mm balloon angioplasty  with improved result.      Impression    IMPRESSION:  1. Severe segmental stenosis and peripheral outflow vein treated up to  8 mm balloon angioplasty with good result.  2. Moderate to severe focal stenosis in the medial left subclavian  vein dilated up to 14 mm. There is improved blood flow through this  segment with diminished collateralized veins.    BON ROSE MD         SYSTEM ID:  [id number]

## 2023-02-09 NOTE — IR NOTE
Interventional Radiology Intra-procedural Nursing Note    Patient Name: Gordon Gann  Medical Record Number: [id number]  Today's Date: February 9, 2023    Procedure: Left upper extremity fistulagram with possible angioplasty, stenting, declot, dialysis catheter placement  Start time: 0902  End time: 0940  Report provided to: ANA Salvador  Patient depart time and location: 0955 to Room 254    Note: Patient entered Interventional Radiology Suite number 2 via cart. Patient awake, alert and oriented. Assisted onto procedural table in supine position. Prepped and draped.  Dr. Pickard in room. Time out and procedure started. Vital signs stable. Telemetry reading A-fib with RVR.    Procedure well tolerated by patient without complications. Procedure end with debrief by Dr. Pickard.  7 Fr sheath removed at 0942, and  manual pressure applied until hemostasis achieved at 0949. Quick clot and tegaderm dressing applied to left interventional procedure access site, dressing is c/d/i.    Administered medication totals:  Lidocaine 1% 2 mL Intradermal  Versed 2 mg IVP  Fentanyl 100 mcg IVP    Last dose of sedation administered at 0932.

## 2023-02-09 NOTE — PLAN OF CARE
Care plan note:      Recent Vitals:  Temp: 97.6  F (36.4  C) Temp src: Oral BP: 113/64 Pulse: 97   Resp: 24 SpO2: 98 % O2 Device: None (Room air)      Orientation/Neuro: Alert and Oriented x 4  Pain: pt having pain in right knee, swollen, - using tylenol for pain control.   Tele: Atrial fibrillation - rapid 110's   IV medications: None   Mobility:A-2- unable to stand this shift r/t knee   Skin: fistula L arm CDI. - suture noted.    GI: WDL  : anuria- on HD     Diet: Tolerating diet:   Well      Safety/Concerns:  Fall Risk, Limb alert,   Aggression Color: Yellow    Plan: Hospitalist and nephrology following. Completed fistulagram in IR, and now down for dialysis. Blood sugar stable. Awaiting Orthopedic surgery consult for knee.    Continue to monitor.      Madeleine Javed RN

## 2023-02-09 NOTE — PROGRESS NOTES
MD Notification    Notified Person: DR. Jarvis    Notification Date/Time:  2/9/2023  9:13 AM    Notification Interaction:   [employer] Messaging    Purpose of Notification: Updated MD that pt having a lot of pain in rt knee, swollen and tender to touch, not hot. unable to stand on it.       Orders Received:   MD to see on rounds

## 2023-02-09 NOTE — PLAN OF CARE
[id number]- Pt A&Ox4. VSS ex AFib/ST. Thoracentesis done this AM. Pt feeling better post thora. High BG in evening, MD paged for insulin orders. Plan for fistula to be assessed & dialysis tomorrow.

## 2023-02-09 NOTE — PROGRESS NOTES
Heart rate controlled  -Not met  Thoracentesis complete  -Met  No further bleeding from fistula  -Met    VSS on RA ex. -120s NOC, asymptomatic. Tele: Afib RVR. BLE edema 2+. R back site s/p thora CDI. Plan for vascular consult r/t fistulagram and dialysis run today. NPO since 0000  Blood sugars high NOC, 453, 375, 310. Novolog and Lantus given, patient states he hadn't gotten any insulin for approximately 36 hours before glucose checks.

## 2023-02-09 NOTE — PROGRESS NOTES
Potassium   Date Value Ref Range Status   02/08/2023 4.8 3.4 - 5.3 mmol/L Final   08/30/2022 3.4 3.4 - 5.3 mmol/L Final     Hemoglobin   Date Value Ref Range Status   02/08/2023 8.0 (L) 13.3 - 17.7 g/dL Final     Creatinine   Date Value Ref Range Status   02/08/2023 3.76 (H) 0.67 - 1.17 mg/dL Final     Urea Nitrogen   Date Value Ref Range Status   02/08/2023 47.6 (H) 8.0 - 23.0 mg/dL Final   08/30/2022 18 7 - 30 mg/dL Final     Sodium   Date Value Ref Range Status   02/08/2023 130 (L) 136 - 145 mmol/L Final     INR   Date Value Ref Range Status   02/09/2023 1.66 (H) 0.85 - 1.15 Final       DIALYSIS PROCEDURE NOTE  Hepatitis status of previous patient on machine log was checked and verified ok to use with this patients hepatitis status.  Patient dialyzed for 3.5 hrs. on a K2 bath with a net fluid removal of  3L.  A BFR of 350 ml/min was obtained via a  Left AVG  Using 15gauge needles.      The treatment plan was discussed with Dr. Aaron during the treatment.    Total heparin received during the treatment: 0 units.   Needle cannulation sites held x 15 min.     Meds  Given: 3000 units epoetin and 300mg venofer given  Complications: none    Person educated: patient. Knowledge base substantial. Barriers to learning: none. Educated on procedure, access care via oral mode. Patient verbalized understanding. Pt prefers verbal education style.     ICEBOAT? Timeout performed pre-treatment  I: Patient was identified using 2 identifiers  C:  Consent Signed Yes  E: Equipment preventative maintenance is current and dialysis delivery system OK to use  B: Hepatitis B Surface Antigen: unknown; Draw Date: 2/9/23      Hepatitis B Surface Antibody: unknown; Draw Date: 2/9/23  O: Dialysis orders present and complete prior to treatment  A: Vascular access verified and assessed prior to treatment  T: Treatment was performed at a clinically appropriate time  ?: Patient was allowed to ask questions and address concerns prior to  treatment  See Adult Hemodialysis flowsheet in [facility] for further details and post assessment.  Machine water alarm in place and functioning. Transducer pods intact and checked every 15min.   Pt returned via bed transport.  Chlorine/Chloramine water system checked every 4 hours.  Outpatient Dialysis at     Patient repositioned every 2 hours during the treatment.  Post treatment report given to CARLOS Raymond RN regarding 3L of fluid removed, last BP of 120/99, and patient pain rating of 0/10.     Please remove patient dressing on AVF and AVG needle sites 24 hours after dialysis. If leaking occurs please apply a Band-Aid.

## 2023-02-09 NOTE — CONSULTS
"  Interventional Radiology - Pre-Procedure Note:  2/9/2023    Procedure Requested: LUE fistulagram  Requested by: Galen Benson MD      History and Physical Reviewed: H&P documented within 30 days (by Dr Arechiga on 2/8/23).  I have personally reviewed the patient's medical history and have updated the medical record as necessary.    Brief HPI: Gordon Gann is a 77 year old male with h/o Afib, ESRD on HD who presented to dialysis on 2/7 and had a full session of HD. When he got home he was bleeding \"profusely\" from the access site and he presented to the ED. INR found to be supratherapeutic and has since been corrected. AV graft was created in 2021 by Dr Hernandez. Most recent intervention was declot in December 2021. Has HD T/Th/Sat. Today no complaints of LUE pain. Denies N/V/F/C. No CP or SOB.      IMAGING:  Fistulagram 12/8/21:  \"FINDINGS: After obtaining informed consent, the patient was placed in  a supine position on the fluoroscopy table. The left arm was prepped  and draped in the usual sterile manner. 1% lidocaine was injected for  local anesthesia. Preliminary ultrasound showed a clotted left arm  dialysis graft. Under sterile ultrasound guidance, access into the  graft was obtained at the distal humerus level and mid humerus level.  6 Syriac vascular sheaths were placed. 2 mg TPA was injected into the  graft. An angiogram was performed. This confirmed occlusion of the  graft. The outflow vein at the proximal humerus level was diffusely  atretic     The atretic segment of the outflow vein was angioplastied with a 6 mm  and 7 mm standard; compliant balloons and an 8 mm high-pressure  balloon. Follow-up angiogram showed improvement in luminal diameter.  Thrombus within the graft was then macerated using a Trerotola device  as well as an over-the-wire wedge balloon. Flow within the graft was  then reestablished. Final angiography showed adequate flow through the  graft with no significant residual " "thrombus. The central veins were  patent without significant stenoses.     Both sheaths were pulled. Pressure was held at the puncture sites for  10 minutes with good hemostasis.     I determined this patient to be an appropriate candidate for the  planned sedation and procedure and reassessed the patient immediately  prior to sedation and procedure. Moderate intravenous conscious  sedation was supervised by me. The patient was independently monitored  by a registered nurse assigned to the Department of radiology using  automated blood pressure, EKG and pulse oximetry. The patient  tolerated the procedure well. There were no immediate postprocedure  complications. The patient's vital signs were monitored by radiology  nursing staff under my supervision and remained stable throughout the  study. Radiation dose for this scan was reduced using automated  exposure control, adjustment of the mA and/or kV according to patient  size, or iterative reconstruction technique.     MEDICATIONS: 1 mg Versed, 50 mcg fentanyl, 3000 units heparin, 2 mg  TPA.     Sedation time: 12.3 minutes.     Total fluoroscopy dose: 23 mGy Air Kerma.     Contrast: 60 cc Isovue.                                                                      IMPRESSION: Dialysis graft declot performed as above. Significant  stenoses in the outflow vein just past the venous anastomosis dilated  as above\"    NPO: YES since MN  ANTICOAGULANTS: ASA 81mg PO daily, coumadin  ANTIBIOTICS: NONE    ALLERGIES  No Known Allergies      LABS:  INR   Date Value Ref Range Status   02/08/2023 1.87 (H) 0.85 - 1.15 Final      Hemoglobin   Date Value Ref Range Status   02/08/2023 8.0 (L) 13.3 - 17.7 g/dL Final   ]  Platelet Count   Date Value Ref Range Status   02/08/2023 382 150 - 450 10e3/uL Final     Creatinine   Date Value Ref Range Status   02/08/2023 3.76 (H) 0.67 - 1.17 mg/dL Final     Potassium   Date Value Ref Range Status   02/08/2023 4.8 3.4 - 5.3 mmol/L Final " "  08/30/2022 3.4 3.4 - 5.3 mmol/L Final         EXAM:  BP (!) 146/96 (BP Location: Right arm)   Pulse 116   Temp 97.6  F (36.4  C) (Oral)   Resp 18   Ht 1.727 m (5' 8\")   Wt 76.2 kg (168 lb)   SpO2 99%   BMI 25.54 kg/m    General:  Stable.  In no acute distress.    Neuro:  A&O x 3. Moves all extremities equally.  Heart: Tachycardic, no murmur  Lungs: No increased work of breathing, CTA bilat  Ext: LUE AVG with palpable pulsatility throughout, suture in place      Pre-Sedation Code Status Assessment:  Code Status: Full Code intra procedure, per discussion with patient. Suspend DNR/DNI, resume immediately following procedure and recovery.         ASSESSMENT/PLAN:   ESRD on HD  LUE AV Graft malfunction  Afib on coumadin  Supratherapeutic INR (corrected)    -LUE fistulagram with moderate sedation today  -Keep NPO for procedure    Procedure, risks/benefits, details, alternatives, and sedation reviewed with patient and patient verbalized understanding. All questions answered. OK to proceed with above radiology procedure.     Ady Diallo PA-C  Interventional Radiology  [phone]      Total time spent on the date of the encounter is 30 minutes, including time spent counseling the patient, performing a medically appropriate evaluation, reviewing prior medical history, ordering medications and tests, documenting clinical information in the medical record, and communication of results.    "

## 2023-02-09 NOTE — TREATMENT PLAN
Orthopedic Treatment Plan    Orthopedics consulted for right knee pain. History of right knee hemarthrosis in December 2021. Patient found to have supratherapeutic INR on admission of 9.62. Today's INR is 1.66. I attempted to evaluate the patient at 1530 today but he was not in his room. Will plan for formal consult tomorrow.     Erica Barcenas PA-C  [facility]

## 2023-02-10 PROBLEM — I48.91 A-FIB (H): Status: ACTIVE | Noted: 2023-01-01

## 2023-02-10 NOTE — PROGRESS NOTES
OUTPATIENT/OBSERVATION GOALS TO BE MET BEFORE DISCHARGE:  1. ADLs back to baseline: Yes    2. Activity and level of assistance: Up with AX1, GB/W. PT evaluation pending.     3. Fistula site dressing C/D/I. Thrill/Bruit present at fistula.    4. Orthopedic consult pending for R knee swelling.      Discharge Planner Nurse   Safe discharge environment identified: Ongoing  Barriers to discharge: No       Entered by: Leah Severson, RN 02/10/2023 10:32 AM     Please review provider order for any additional goals.   Nurse to notify provider when observation goals have been met and patient is ready for discharge.

## 2023-02-10 NOTE — PROGRESS NOTES
02/10/23 1100   Appointment Info   Signing Clinician's Name / Credentials (PT) Demi Espitia, GODFREY   Living Environment   People in Home alone   Current Living Arrangements independent living facility   Home Accessibility no concerns   Transportation Anticipated health plan transportation   Self-Care   Usual Activity Tolerance good   Current Activity Tolerance moderate   Regular Exercise No   Equipment Currently Used at Home walker, rolling   Fall history within last six months no   General Information   Onset of Illness/Injury or Date of Surgery 02/07/23   Referring Physician Dr. Jarvis   Patient/Family Therapy Goals Statement (PT) To go home   Pertinent History of Current Problem (include personal factors and/or comorbidities that impact the POC) Gordon Gann is a 77 year old male with PMH  ESRD, DM, hypertension, and gout, who was admitted on 2/7/2023 with bleeding left upper extremity fistula, atrial fibrillation with RVR, and R pleural effusion.   Cognition   Affect/Mental Status (Cognition) WFL   Orientation Status (Cognition) oriented x 4   Pain Assessment   Patient Currently in Pain No   Range of Motion (ROM)   Range of Motion ROM is WFL   Strength (Manual Muscle Testing)   Strength (Manual Muscle Testing) strength is WFL   Bed Mobility   Bed Mobility no deficits identified   Transfers   Transfers no deficits identified   Gait/Stairs (Locomotion)   Comment, (Gait/Stairs) Pt ambulates 100' with 4ww modified independent. Patient able to start/stop and change directions without difficulty. Pt is fatigued following ambulation.   Balance   Balance Comments Good in sitting, requires UE support in standing   Clinical Impression   Criteria for Skilled Therapeutic Intervention Evaluation only   Clinical Presentation (PT Evaluation Complexity) Stable/Uncomplicated   Clinical Presentation Rationale VSS, pain controlled   Clinical Decision Making (Complexity) low complexity   Risk & Benefits of therapy have been  explained evaluation/treatment results reviewed;care plan/treatment goals reviewed;risks/benefits reviewed;current/potential barriers reviewed;participants voiced agreement with care plan;participants included;patient   PT Total Evaluation Time   PT Yanick, Low Complexity Minutes (30630) 25   PT Discharge Planning   PT Discharge Recommendation (DC Rec) home with home care physical therapy   PT Rationale for DC Rec At baseline, pt resides in an independent living apartment. Pt reports that he uses a 4ww for ambulation, otherwise, is independent with mobility. This date, pt is independent with bed mobility and transfers. Pt is able to ambulate household distance (100') with 4ww modified independent, no LOB noted with mobility. Pt does have decreased activity tolerance compared to baseline and would therefore benefit from HHPT to further improve endurance. Pt will require assist of one, assistive device, and considerable effort to come and go from house, therefore, home health PT is recommended.   PT Brief overview of current status Modified independent   Total Session Time   Total Session Time (sum of timed and untimed services) 25

## 2023-02-10 NOTE — PROGRESS NOTES
Renal Medicine Progress Note            Assessment/Plan:     Gordon Gann is a 77 year old male with PMH ESRD on HD, DM 2, HTN, gout, A-fib on warfarin admitted 2/7/2023 with graft bleeding.  S/p suture.  Also underwent right-sided thoracentesis due to right pleural effusion.       Bleeding LUE AVG, resolved  Supratherapeutic INR, resolved  Placed 8/5/2021.  Did have issues with clotting in December 2021 requiring fistulogram for declot and angioplasty of stenoses.  Has had some intermittent prolonged bleeding lately.  He had prolonged brisk bleeding of graft after dialysis 2/7, suture placed in ED.  Bleeding resolved.  INR supratherapeutic, but now resolved.  Suspect additionally that he has some stenoses that are exacerbating bleeding.  Fistulogram 2/9 showing multiple stenotic areas and one focal area in L subclavian vein that were angioplastied with resulting good flow. Will dialyze today. Per vascular surgery, suture can be removed in 7 days at dialysis.   - fistulogram today  - HD today   - appreciate vascular surgery recommendations   - suture removal 2/14 at dialysis unit (updated dialysis unit RN, they will plan on removing it)     ESRD on HD  Dialyzes TTS at [facility].  Primary nephrologist is Dr. Perla.  LUE AVG.  No heparin.  EDW 77.7 kg.  -HD today      Anemia of CKD  Acute blood loss due to bleeding AV graft  Hgb lower than baseline. Will increase epo dose and give IV iron during dialysis.      Shortness of breath   R pleural effusion   S/p R thoracentesis, 550 ml of rosina colored fluid. Exudative, 522 cells with lymphocyte predominance.      HTN  Continue PTA meds.     CKD MBD  Doesn't appear to be on binder PTA, ok to hold off and address outpatient. Continue PTA cholecalciferol.     R knee pain and swelling   Warm to touch and swollen. Ortho consulted.      Plan/Recs:  1) s/p fistulogram, multiple stenosis plastied  2) HD tomorrow if still inpatient  3) suture should be removed 2/14 at  "dialysis unit (discussed with [facility] unit)  4) epo and venofer at dialysis     Ok to discharge from nephrology standpoint          Interval History:     - no acute events overnight   - ortho saw patient today, believe to have resolving hemarthrosis in setting of supratherapeutic INR   - LE  planned for today   - denies SOB   - Denies bleeding from access  - dialysis went well yesterday without issue  - [facility] dialysis unit updated with fistulogram results and suture removal recommendation         Medications and Allergies:       - MEDICATION INSTRUCTIONS for Dialysis Patients -   Does not apply See Admin Instructions     aspirin  81 mg Oral At Bedtime     bumetanide  4 mg Oral Once per day on Sun Mon Wed Fri     cloNIDine  0.1 mg Oral BID     insulin aspart  1-7 Units Subcutaneous TID AC     insulin aspart  1-5 Units Subcutaneous At Bedtime     insulin glargine  15 Units Subcutaneous At Bedtime     lisinopril  20 mg Oral Daily     metoprolol tartrate  100 mg Oral BID     mirtazapine  15 mg Oral At Bedtime     warfarin ANTICOAGULANT  6 mg Oral ONCE at 18:00     Warfarin Therapy Reminder  1 each Oral See Admin Instructions      No Known Allergies         Physical Exam:   Vitals were reviewed  /84 (BP Location: Right arm)   Pulse 96   Temp 98.4  F (36.9  C) (Oral)   Resp 18   Ht 1.727 m (5' 8\")   Wt 76.2 kg (168 lb)   SpO2 98%   BMI 25.54 kg/m      Wt Readings from Last 3 Encounters:   02/09/23 76.2 kg (168 lb)   08/29/22 87.5 kg (192 lb 14.4 oz)   08/16/22 88.5 kg (195 lb)       Intake/Output Summary (Last 24 hours) at 2/9/2023 1227  Last data filed at 2/9/2023 1200  Gross per 24 hour   Intake 210 ml   Output --   Net 210 ml       GENERAL APPEARANCE: no acute distress, well appearing   Breathing comfortably, no increased work of breathing.   Normal speech. Moves all four extremitie s  R leg wrapped in ACE wrap   LUE AVG with dressing in place, not saturated, no obvious blood   Remainder of exam " deferred due to another provider visiting patient.              Data:     BMP  Recent Labs   Lab 02/10/23  1252 02/10/23  0753 02/09/23  2057 02/09/23  1754 02/08/23  1839 02/08/23  0609 02/07/23  1946   NA  --   --   --   --   --  130* 134*   POTASSIUM  --   --   --   --   --  4.8 4.3   CHLORIDE  --   --   --   --   --  94* 95*   LC  --   --   --   --   --  8.1* 8.0*   CO2  --   --   --   --   --  22 26   BUN  --   --   --   --   --  47.6* 34.6*   CR  --   --   --   --   --  3.76* 3.26*   * 134* 262* 153*   < > 155* 165*    < > = values in this interval not displayed.     CBC  Recent Labs   Lab 02/10/23  0856 02/08/23  0609 02/07/23  2253 02/07/23  1819   WBC 13.4* 11.0  --  12.2*   HGB 7.9* 8.0* 8.0* 8.6*   HCT 24.6* 25.5*  --  27.1*   MCV 92 95  --  95    382  --  423     Lab Results   Component Value Date    AST 22 08/19/2022    ALT 32 08/19/2022    ALKPHOS 213 (H) 08/19/2022    BILITOTAL 0.5 08/19/2022     Lab Results   Component Value Date    INR 2.02 (H) 02/10/2023       Fistulogram 2/9  IMPRESSION:  1. Severe segmental stenosis and peripheral outflow vein treated up to  8 mm balloon angioplasty with good result.  2. Moderate to severe focal stenosis in the medial left subclavian  vein dilated up to 14 mm. There is improved blood flow through this  segment with diminished collateralized veins.    Attestation:  I have reviewed today's vital signs, notes, medications, labs and imaging.    Galen Benson MD  [facility] - Nephrology  Office: [phone]

## 2023-02-10 NOTE — CONSULTS
[facility]    Orthopedic Consultation    Gordon Gann MRN# [id number]   Age: 77 year old YOB: 1945     Date of Admission:  2/7/2023    Reason for consult: Right knee pain and swelling, previously supratherapeutic INR, concern for hemarthrosis       Requesting physician: Ezio Jarvis MD       Level of consult: One-time consult to assist in determining a diagnosis and to recommend an appropriate treatment plan           Assessment and Plan:   Assessment:   1. Improving right knee pain and effusion, probable resolving hemarthrosis in the setting of supratherapeutic INR at time of onset  2. History of right knee hemarthrosis (12/2021)  3. Moderate right knee osteoarthritis  4. Right calf swelling      Plan:   The patient's history and clinical/diagnostic findings were reviewed with the on-call orthopedic trauma surgeon on 2/9/23, Dr. Ollie Kohler. Patient presented several days ago with supratherapeutic INR (9.62). He noted right knee swelling and pain at time of presentation, which has progressively improved. INR now within therapeutic limits (2.02). No active concerns for septic joint on exam given no pain with PROM, erythema, or erythema. No systemic symptoms. Suspect resolving right knee hemarthrosis that will continue to improve without intervention. Differentials include gouty flare or exacerbation of underlying DJD. Patient does, however, have moderate right calf swelling. Will obtain Doppler US of the right lower extremity prior to anticipate discharge today.     Nonoperative management:  -No indications for aspiration and fluid analysis.   -Will follow Doppler US results. Unlikely to be DVT due to chronic anticoagulation and previously supratherapeutic INR.  -Encourage cold compresses and compression.  -Continue pain management.  -WBAT RLE with walker.  -Mobilize with PT/OT.  -If any symptoms related to DJD in the future, may consider cortisone injection but would  be cautious due to chronic Warfarin use, T2DM, and history of hemarthrosis.  -Follow up with Dr. Ollie Kohler in 4 weeks or as needed if there are no concerns.    Please contact orthopedic trauma team if any questions or concerns arise.    ADDENDUM:  Reviewed RLE Doppler US dated 2/10/23 as below:  IMPRESSION:   1. Nonocclusive deep vein thrombosis in the distal femoral vein and  occlusive deep vein thrombosis in the peroneal vein.  2. Superficial thrombus in the central portion of the great saphenous  Vein.    Will defer any anticoagulation changes to hospitalist team.           Chief Complaint:   Right knee pain and swelling         History of Present Illness:   Medical history obtained via chart review and discussion with patient. Gordon Gann is a 77 year old male with past medical history of ESRD on HD, T2DM, HTN, and gout who was admitted on 2/7/23 for a bleeding left upper extremity fistula in the setting of supratherapeutic INR of 9.62 at admission. Patient noted to have right knee pain and swelling starting a few days ago. Denies known trauma or precipitating activity. He has a history of a right knee hemarthrosis in December 2021 that self-resolved. Patient reports that right knee pain and swelling have significantly improved over the past couple of days. Pain is deep to the knee and mild in severity. Right knee swelling has nearly resolved, but continues to have right calf swelling/edema. Denies numbness and tingling. The patient is able to move his knee and weight bear with a walker. He uses a walker at baseline. No shortness of breath or chest pain. History of right knee arthroscopy several years ago.          Past Medical History:     Past Medical History:   Diagnosis Date     Alcohol abuse      Atrial fibrillation (H)      CKD (chronic kidney disease) stage 4, GFR 15-29 ml/min (H)      Diabetes (H)      Gout      Hypokalemia      Hypophosphatemia      Insomnia      STEMI (ST elevation myocardial  infarction) (H)              Past Surgical History:     Past Surgical History:   Procedure Laterality Date     CREATE GRAFT LOOP ARTERIOVENOUS UPPER EXTREMITY Left 8/5/2021    Procedure: left arm arteriovenous graft fistula.;  Surgeon: Noemí Hernandez MD;  Location: [facility] OR     IR DIALYSIS FISTULOGRAM LEFT  12/8/2021     IR DIALYSIS FISTULOGRAM LEFT  2/9/2023             Social History:     Social History     Tobacco Use     Smoking status: Never     Smokeless tobacco: Never   Substance Use Topics     Alcohol use: Yes             Family History:   No family history on file.          Immunizations:     VACCINE/DOSE   Diptheria   DPT   DTAP   HBIG   Hepatitis A   Hepatitis B   HIB   Influenza   Measles   Meningococcal   MMR   Mumps   Pneumococcal   Polio   Rubella   Small Pox   TDAP   Varicella   Zoster             Allergies:   No Known Allergies          Medications:     Current Facility-Administered Medications   Medication     - MEDICATION INSTRUCTIONS for Dialysis Patients -     acetaminophen (TYLENOL) tablet 650 mg    Or     acetaminophen (TYLENOL) Suppository 650 mg     aspirin EC tablet 81 mg     bisacodyl (DULCOLAX) suppository 10 mg     bumetanide (BUMEX) tablet 4 mg     cloNIDine (CATAPRES) tablet 0.1 mg     glucose gel 15-30 g    Or     dextrose 50 % injection 25-50 mL    Or     glucagon injection 1 mg     HOLD: warfarin (COUMADIN) therapy     insulin aspart (NovoLOG) injection (RAPID ACTING)     insulin aspart (NovoLOG) injection (RAPID ACTING)     insulin glargine (LANTUS PEN) injection 15 Units     lisinopril (ZESTRIL) tablet 20 mg     melatonin tablet 1 mg     metoprolol (LOPRESSOR) injection 2.5 mg     metoprolol tartrate (LOPRESSOR) tablet 100 mg     mirtazapine (REMERON) tablet TABS 15 mg     ondansetron (ZOFRAN ODT) ODT tab 4 mg    Or     ondansetron (ZOFRAN) injection 4 mg     prochlorperazine (COMPAZINE) injection 5 mg    Or     prochlorperazine (COMPAZINE) tablet 5 mg    Or      prochlorperazine (COMPAZINE) suppository 12.5 mg     senna-docusate (SENOKOT-S/PERICOLACE) 8.6-50 MG per tablet 1 tablet    Or     senna-docusate (SENOKOT-S/PERICOLACE) 8.6-50 MG per tablet 2 tablet     warfarin ANTICOAGULANT (COUMADIN) tablet 6 mg     Warfarin Dose Required Daily - Pharmacist Managed             Review of Systems:   CV: NEGATIVE for chest pain, palpitations or peripheral edema  C: NEGATIVE for fever, chills, change in weight  E/M: NEGATIVE for ear, mouth and throat problems  R: NEGATIVE for significant cough or SOB          Physical Exam:   All vitals have been reviewed  Patient Vitals for the past 24 hrs:   BP Temp Temp src Pulse Resp SpO2   02/10/23 0747 135/84 98.4  F (36.9  C) Oral 96 18 98 %   02/10/23 0340 129/89 98.1  F (36.7  C) Oral 107 19 99 %   02/10/23 0012 120/74 97.5  F (36.4  C) Oral 105 17 97 %   02/09/23 2028 104/67 -- -- 115 -- --   02/09/23 1952 101/64 97.7  F (36.5  C) Oral 110 20 --   02/09/23 1715 (!) 120/99 97.8  F (36.6  C) Axillary 90 20 98 %   02/09/23 1700 100/55 -- -- (!) 121 20 98 %   02/09/23 1645 (!) 115/93 -- -- 115 19 99 %   02/09/23 1630 98/86 -- -- 88 23 98 %   02/09/23 1615 (!) 100/38 -- -- 119 17 99 %   02/09/23 1600 (!) 141/57 -- -- 119 16 99 %   02/09/23 1545 132/69 -- -- 99 17 99 %   02/09/23 1530 134/87 -- -- 96 18 99 %   02/09/23 1515 125/86 -- -- 119 16 98 %   02/09/23 1500 (!) 141/116 -- -- 118 18 100 %   02/09/23 1445 (!) 119/92 -- -- 95 16 99 %   02/09/23 1430 (!) 120/90 -- -- 100 16 99 %   02/09/23 1415 (!) 158/128 -- -- 105 14 99 %   02/09/23 1400 (!) 151/65 -- -- 104 16 100 %   02/09/23 1345 (!) 153/71 -- -- 103 15 99 %   02/09/23 1330 (!) 147/105 -- -- 88 18 98 %   02/09/23 1315 138/56 -- -- 101 16 99 %   02/09/23 1300 129/68 97  F (36.1  C) Oral 83 18 98 %   02/09/23 1200 113/64 -- -- 97 -- 98 %       Intake/Output Summary (Last 24 hours) at 2/10/2023 1145  Last data filed at 2/10/2023 0930  Gross per 24 hour   Intake 450 ml   Output 3000 ml   Net  -2550 ml       Constitutional: Pleasant, alert, appropriate, following commands.  HEENT: Head atraumatic normocephalic. Pupils equal round and reactive.  Respiratory: Unlabored breathing no audible wheeze  Cardiovascular: Regular rate and rhythm per pulses.  GI: Abdomen is non-distended.  Lymph/Hematologic: No lymphadenopathy in areas examined.  Genitourinary: No villareal  Skin: No rashes, no cyanosis. Pitting edema to distal right lower extremity.  Musculoskeletal: Right lower extremity: Skin intact. Resolving ecchymosis to right anterior thigh. Minimal effusion right knee. Moderate right calf swelling with healing excoriations. 2+ pitting edema, distally. PROM right knee 0-90 degrees without pain. Able to DP/PF ankle against resistance. DP pulse palpable. Toes are warm and well-perfused. Sensation grossly intact to light touch.  Neurologic: normal without focal findings, intact mental status, speech normal, alert and oriented x iii          Data:   All laboratory data reviewed  Results for orders placed or performed during the hospital encounter of 02/07/23   XR Chest 2 Views     Status: None    Narrative    EXAM: XR CHEST 2 VIEWS  LOCATION: [facility]  DATE/TIME: 2/7/2023 8:17 PM    INDICATION: chest pain  COMPARISON: 08/30/2022      Impression    IMPRESSION:     Moderate to large right pleural effusion significantly increased from August 2022. A degree of loculation presumably accounts for opacity medially at the apex.    Left lung well aerated and clear.    Unchanged heart size with normal pulmonary vasculature as visualized.   CTA Chest with Contrast     Status: None    Narrative    EXAM: CTA CHEST WITH CONTRAST  LOCATION: [facility]  DATE/TIME: 2/7/2023 9:38 PM    INDICATION: chest pain, high INR, pleural effusion, on dialysis  COMPARISON: 08/16/2022 CT chest. Multiple prior chest radiograph most recently earlier today.    TECHNIQUE: Helical acquisition through  the chest was performed during the arterial phase of contrast enhancement. 2D and 3D reconstructions performed by the CT technologist. Dose reduction techniques were used.  CONTRAST: 80 mL Isovue 370    FINDINGS:     ARTERIAL FINDINGS: Normal caliber thoracic aorta with no evidence of dissection or other acute pathology. Minimal scattered atherosclerotic calcification. Arch branches unremarkable. Mild celiac stenosis noted in the included upper abdomen with a widely   patent proximal SMA. No acute vascular pathology.    NONVASCULAR:    MEDIASTINUM: Unchanged cardiac enlargement with prominent right atrium. No pericardial effusion. Pulmonary arteries demonstrate no evidence of acute or chronic emboli. No mediastinal/hilar mass or lymphadenopathy. Normal esophagus. Axilla and   supraclavicular regions are unremarkable. Small thyroid nodules similar to prior.    CORONARY ARTERY CALCIFICATION: Suspect prior stenting.    LUNGS/PLEURA: Nonhemorrhagic right pleural effusion, moderate in size. Concurrent chest radiograph demonstrates interval enlargement from the most recent prior imaging. There is atelectasis/near complete collapse involving the right lower and middle   lobes, with milder dependent atelectasis involving the upper lobe. The left lung is well aerated and clear. No significant left pleural fluid.    UPPER ABDOMEN: No acute pathology identified. A few colonic diverticula are noted.    MUSCULOSKELETAL: Degenerative change thoracic spine. No acute osseous findings.      Impression    IMPRESSION:    1.  Unremarkable CTA of the chest. No evidence of acute aortic pathology or pulmonary embolic disease.    2.  Moderate right pleural effusion is nonhemorrhagic. Concurrent chest radiograph demonstrates interval enlargement from most recent prior imaging.    3.  There is atelectasis/near-complete collapse of the right lower and middle lobes. Lungs otherwise well aerated and clear.    4.  Unchanged cardiac enlargement  with suspected prior coronary stenting.     US Thoracentesis     Status: None    Narrative    ULTRASOUND GUIDED THORACENTESIS  2/8/2023 10:52 AM     HISTORY: recurrent R pleural effusion    FINDINGS: Ultrasound was used to evaluate for the presence and best  approach for drainage of a pleural effusion. Written and oral informed  consent was obtained. A pause for the cause procedure to verify the  correct patient and correct procedure. The skin overlying the right  chest posteriorly was prepped and draped in the usual sterile fashion.  The subcutaneous tissues were anesthetized with 10mL 1% lidocaine. A  catheter was advanced into the pleural space and 550 mL of  rosina  colored fluid was drained. Patient was monitored by nurse under my  direct supervision throughout the exam. Ultrasound images were  permanently stored.  There were no immediate complications. Patient  left the ultrasound suite in satisfactory condition.      Impression    IMPRESSION: Technically successful thoracentesis without immediate  complications.    GISELE LOVE MD         SYSTEM ID:  [id number]   IR Dialysis Fistulogram Left     Status: None    Narrative    INTERVENTIONAL RADIOLOGY DIALYSIS FISTULOGRAM LEFT  2/9/2023 9:42 AM    HISTORY:  77-year-old patient with presentation to the hospital with  bleeding from hemodialysis graft site. The graft is in the left upper  arm. Patient was supratherapeutic INR upon presentation, which has  since been corrected. Plan is for fistulogram to determine any  anatomic abnormalities.    COMPARISON: December 8, 2021    TECHNIQUE: Patient was brought to the interventional radiology  department and informed consent obtained. Patient was placed in a  supine position. Skin overlying the left upper arm was prepped and  draped in standard sterile fashion. Ultrasound was used to visualize  the graft, patency confirmed, and image stored for documentation. With  continuous ultrasound guidance after lidocaine  administration,  micropuncture kit was used to access the fistula in an antegrade  direction. Over a series of maneuvers, up to a 7 [place] sheath was  placed. Fistulogram was performed from the AV anastomosis to the right  atrium. Several areas of stenosis identified. Berenstein catheter was  used to navigate through the areas of stenosis and central venogram  performed from the left subclavian vein. A Howell wire was placed. 6 mm  and 8 mm balloon angioplasty performed in the peripheral outflow vein  segment with good result. Central vein stenosis was dilated with 8mm,  10 mm, 12 mm, and 14 mm balloon angioplasty with repeat venograms  between each sequence. There is mild persistent stenosis in the medial  aspect of the subclavian vein, though overall improved blood flow  through this segment at completion. Sheath was removed and hemostasis  achieved with manual compression.    Sedation: A moderate level of sedation was achieved with 2 mg IV  Versed and 100 mcg IV fentanyl.  Sedation time: 40 minutes  Please note the above medications were administered by the  interventional radiology staff under my direct supervision. Patient's  vital signs were monitored and remained stable throughout the  procedure.  Fluoroscopic time: 3.6 minutes  Air kerma: 24.1 mGy  Contrast: 65 mL of Isovue 300 administered throughout the procedure,  without complication.  Local anesthetic: 2 mL 1% lidocaine.    FINDINGS: Total of 42 spot fluoroscopic images and fistulogram  sequences obtained throughout the procedure. AV anastomosis is patent.  Severe segmental stenosis in the outflow vein, treated with 6 mm and 8  mm balloon angioplasty with improved result at completion. Moderate to  severe focal stenosis in the medial aspect of the subclavian vein with  collateralized vein formation treated up to 14 mm balloon angioplasty  with improved result.      Impression    IMPRESSION:  1. Severe segmental stenosis and peripheral outflow vein  treated up to  8 mm balloon angioplasty with good result.  2. Moderate to severe focal stenosis in the medial left subclavian  vein dilated up to 14 mm. There is improved blood flow through this  segment with diminished collateralized veins.    BON ROSE MD         SYSTEM ID:  [id number]   XR Knee Right 3 Views     Status: None    Narrative    EXAM: XR KNEE RIGHT 3 VIEWS  LOCATION: [facility]  DATE/TIME: 2/9/2023 8:49 PM    INDICATION: Right knee pain.  COMPARISON: 12/03/2021.      Impression    IMPRESSION: Moderate progressive tricompartmental right knee osteoarthritis. No acute right knee fracture or dislocation. Small right knee joint effusion. No significant anterior knee soft tissue swelling. Extensive vascular calcification.   [place] Draw     Status: None    Narrative    The following orders were created for panel order [place] Draw.  Procedure                               Abnormality         Status                     ---------                               -----------         ------                     Extra Blue Top Tube[[id number]]                              Final result               Extra Red Top Tube[[id number]]                                                          Extra Green Top (Lithium...[[id number]]                      Final result               Extra Purple Top Tube[[id number]]                            Final result               Extra Blood Bank Purple ...[[id number]]                      Final result                 Please view results for these tests on the individual orders.   Extra Blue Top Tube     Status: None   Result Value Ref Range    Hold Specimen JIC    Extra Green Top (Lithium Heparin) Tube     Status: None   Result Value Ref Range    Hold Specimen JIC    Extra Purple Top Tube     Status: None   Result Value Ref Range    Hold Specimen JIC    Extra Blood Bank Purple Top Tube     Status: None   Result Value Ref Range    Hold Specimen JIC    INR      Status: Abnormal   Result Value Ref Range    INR 9.62 (HH) 0.85 - 1.15   CBC with platelets and differential     Status: Abnormal   Result Value Ref Range    WBC Count 12.2 (H) 4.0 - 11.0 10e3/uL    RBC Count 2.86 (L) 4.40 - 5.90 10e6/uL    Hemoglobin 8.6 (L) 13.3 - 17.7 g/dL    Hematocrit 27.1 (L) 40.0 - 53.0 %    MCV 95 78 - 100 fL    MCH 30.1 26.5 - 33.0 pg    MCHC 31.7 31.5 - 36.5 g/dL    RDW 14.9 10.0 - 15.0 %    Platelet Count 423 150 - 450 10e3/uL    % Neutrophils 72 %    % Lymphocytes 17 %    % Monocytes 9 %    % Eosinophils 1 %    % Basophils 0 %    % Immature Granulocytes 1 %    NRBCs per 100 WBC 0 <1 /100    Absolute Neutrophils 8.7 (H) 1.6 - 8.3 10e3/uL    Absolute Lymphocytes 2.1 0.8 - 5.3 10e3/uL    Absolute Monocytes 1.2 0.0 - 1.3 10e3/uL    Absolute Eosinophils 0.1 0.0 - 0.7 10e3/uL    Absolute Basophils 0.0 0.0 - 0.2 10e3/uL    Absolute Immature Granulocytes 0.1 <=0.4 10e3/uL    Absolute NRBCs 0.0 10e3/uL   Troponin T, High Sensitivity     Status: Abnormal   Result Value Ref Range    Troponin T, High Sensitivity 350 (HH) <=22 ng/L   INR     Status: Abnormal   Result Value Ref Range    INR 9.87 (HH) 0.85 - 1.15   Basic metabolic panel     Status: Abnormal   Result Value Ref Range    Sodium 134 (L) 136 - 145 mmol/L    Potassium 4.3 3.4 - 5.3 mmol/L    Chloride 95 (L) 98 - 107 mmol/L    Carbon Dioxide (CO2) 26 22 - 29 mmol/L    Anion Gap 13 7 - 15 mmol/L    Urea Nitrogen 34.6 (H) 8.0 - 23.0 mg/dL    Creatinine 3.26 (H) 0.67 - 1.17 mg/dL    Calcium 8.0 (L) 8.8 - 10.2 mg/dL    Glucose 165 (H) 70 - 99 mg/dL    GFR Estimate 19 (L) >60 mL/min/1.73m2   INR     Status: Abnormal   Result Value Ref Range    INR 4.55 (H) 0.85 - 1.15   Troponin T, High Sensitivity     Status: Abnormal   Result Value Ref Range    Troponin T, High Sensitivity 322 (HH) <=22 ng/L   Hemoglobin     Status: Abnormal   Result Value Ref Range    Hemoglobin 8.0 (L) 13.3 - 17.7 g/dL   [place] Draw     Status: None    Narrative    The  following orders were created for panel order [place] Draw.  Procedure                               Abnormality         Status                     ---------                               -----------         ------                     Extra Purple Top Tube[[id number]]                            Final result                 Please view results for these tests on the individual orders.   Extra Purple Top Tube     Status: None   Result Value Ref Range    Hold Specimen JIC    Protein total     Status: Abnormal   Result Value Ref Range    Protein Total 5.8 (L) 6.4 - 8.3 g/dL   Protein fluid     Status: None   Result Value Ref Range    Protein Fluid Source Pleural Cavity, Right     Protein Total Fluid 2.1 g/dL    Narrative    No reference ranges have been established. This result should be interpreted in the context of the patient's clinical condition and compared to simultaneous measurement in the patient's blood. This is a lab developed test. It has not been cleared or approved by the FDA. FDA clearance is not required for clinical use.   Lactate dehydrogenase fluid     Status: None   Result Value Ref Range    LD Fluid Source Pleural Cavity, Right     Lactate dehydrogenase fluid 227 U/L    Narrative    No reference ranges have been established. This result should be interpreted in the context of the patient's clinical condition and compared to simultaneous measurement in the patient's blood. This is a lab developed test. It has not been cleared or approved by the FDA. FDA clearance is not required for clinical use.   Lactic Acid STAT     Status: Normal   Result Value Ref Range    Lactic Acid 1.5 0.7 - 2.0 mmol/L   INR     Status: Abnormal   Result Value Ref Range    INR 1.87 (H) 0.85 - 1.15   CBC with platelets     Status: Abnormal   Result Value Ref Range    WBC Count 11.0 4.0 - 11.0 10e3/uL    RBC Count 2.69 (L) 4.40 - 5.90 10e6/uL    Hemoglobin 8.0 (L) 13.3 - 17.7 g/dL    Hematocrit 25.5 (L) 40.0 - 53.0 %    MCV 95 78  - 100 fL    MCH 29.7 26.5 - 33.0 pg    MCHC 31.4 (L) 31.5 - 36.5 g/dL    RDW 15.1 (H) 10.0 - 15.0 %    Platelet Count 382 150 - 450 10e3/uL   Lactate Dehydrogenase     Status: Abnormal   Result Value Ref Range    Lactate Dehydrogenase 268 (H) 0 - 250 U/L   Cell Count Body Fluid     Status: Abnormal   Result Value Ref Range    Color Red (A) Colorless, Yellow    Clarity Cloudy (A) Clear    Cell Count Fluid Source Pleural Cavity, Right     Total Nucleated Cells 522 /uL    Narrative    No reference ranges have been established.  This result  should be interpreted in the context of the patient's clinical condition and   compared to simultaneous measurement in the patient's blood.        Small clot present, count may be inaccurate.   Differential Body Fluid     Status: None   Result Value Ref Range    % Neutrophils 10 %    % Lymphocytes 63 %    % Monocyte/Macrophages 27 %    Narrative    No reference ranges have been established. This result should be interpreted in the context of the patient's clinical condition and compared to simultaneous measurement in the patient's blood.   Basic metabolic panel     Status: Abnormal   Result Value Ref Range    Sodium 130 (L) 136 - 145 mmol/L    Potassium 4.8 3.4 - 5.3 mmol/L    Chloride 94 (L) 98 - 107 mmol/L    Carbon Dioxide (CO2) 22 22 - 29 mmol/L    Anion Gap 14 7 - 15 mmol/L    Urea Nitrogen 47.6 (H) 8.0 - 23.0 mg/dL    Creatinine 3.76 (H) 0.67 - 1.17 mg/dL    Calcium 8.1 (L) 8.8 - 10.2 mg/dL    Glucose 155 (H) 70 - 99 mg/dL    GFR Estimate 16 (L) >60 mL/min/1.73m2   Glucose by meter     Status: Abnormal   Result Value Ref Range    GLUCOSE BY METER POCT 441 (H) 70 - 99 mg/dL   Hemoglobin A1c     Status: Abnormal   Result Value Ref Range    Hemoglobin A1C 9.1 (H) <5.7 %   Glucose by meter     Status: Abnormal   Result Value Ref Range    GLUCOSE BY METER POCT 453 (H) 70 - 99 mg/dL   Glucose by meter     Status: Abnormal   Result Value Ref Range    GLUCOSE BY METER POCT 375 (H) 70  - 99 mg/dL   INR     Status: Abnormal   Result Value Ref Range    INR 1.66 (H) 0.85 - 1.15   Glucose by meter     Status: Abnormal   Result Value Ref Range    GLUCOSE BY METER POCT 310 (H) 70 - 99 mg/dL   Glucose by meter     Status: Abnormal   Result Value Ref Range    GLUCOSE BY METER POCT 128 (H) 70 - 99 mg/dL   Glucose by meter     Status: Abnormal   Result Value Ref Range    GLUCOSE BY METER POCT 153 (H) 70 - 99 mg/dL   Glucose by meter     Status: Abnormal   Result Value Ref Range    GLUCOSE BY METER POCT 262 (H) 70 - 99 mg/dL   INR     Status: Abnormal   Result Value Ref Range    INR 2.02 (H) 0.85 - 1.15   CBC with platelets     Status: Abnormal   Result Value Ref Range    WBC Count 13.4 (H) 4.0 - 11.0 10e3/uL    RBC Count 2.67 (L) 4.40 - 5.90 10e6/uL    Hemoglobin 7.9 (L) 13.3 - 17.7 g/dL    Hematocrit 24.6 (L) 40.0 - 53.0 %    MCV 92 78 - 100 fL    MCH 29.6 26.5 - 33.0 pg    MCHC 32.1 31.5 - 36.5 g/dL    RDW 15.9 (H) 10.0 - 15.0 %    Platelet Count 414 150 - 450 10e3/uL   Glucose by meter     Status: Abnormal   Result Value Ref Range    GLUCOSE BY METER POCT 134 (H) 70 - 99 mg/dL   EKG 12-lead, tracing only     Status: None   Result Value Ref Range    Systolic Blood Pressure  mmHg    Diastolic Blood Pressure  mmHg    Ventricular Rate 126 BPM    Atrial Rate  BPM    AZ Interval  ms    QRS Duration 98 ms     ms    QTc 530 ms    P Axis  degrees    R AXIS -3 degrees    T Axis 115 degrees    Interpretation ECG       Accelerated Junctional rhythm  Incomplete right bundle branch block  ST & T wave abnormality, consider lateral ischemia  Abnormal ECG  When compared with ECG of 07-FEB-2023 19:38, (unconfirmed)  Previous ECG has undetermined rhythm, needs review  Nonspecific T wave abnormality no longer evident in Inferior leads  Nonspecific T wave abnormality no longer evident in Anterior leads  Confirmed by GENERATED REPORT, COMPUTER ([id number]),  CRISTIAN BANUELOS ([id number]) on 2/8/2023 2:39:44 PM  Also confirmed  by GENERATED REPORT, COMPUTER ([id number]),  Chanell Humphrey ([id number])  on 2/9/2023 7:09:29 AM     EKG 12-lead, tracing only     Status: None   Result Value Ref Range    Systolic Blood Pressure  mmHg    Diastolic Blood Pressure  mmHg    Ventricular Rate 125 BPM    Atrial Rate  BPM    ME Interval  ms    QRS Duration 98 ms     ms    QTc 525 ms    P Axis  degrees    R AXIS -4 degrees    T Axis 112 degrees    Interpretation ECG       Accelerated Junctional rhythm  Incomplete right bundle branch block  ST & T wave abnormality, consider lateral ischemia  Abnormal ECG  When compared with ECG of 07-FEB-2023 19:38, (unconfirmed)  Previous ECG has undetermined rhythm, needs review  Nonspecific T wave abnormality no longer evident in Inferior leads  Nonspecific T wave abnormality no longer evident in Anterior leads  Confirmed by GENERATED REPORT, COMPUTER ([id number]),  CRISTIAN BANUELOS ([id number]) on 2/8/2023 2:39:35 PM  Also confirmed by GENERATED REPORT, COMPUTER ([id number]),  Chanell Humphrey ([id number])  on 2/9/2023 7:08:36 AM     EKG 12-lead, tracing only     Status: None   Result Value Ref Range    Systolic Blood Pressure  mmHg    Diastolic Blood Pressure  mmHg    Ventricular Rate 115 BPM    Atrial Rate 115 BPM    ME Interval  ms    QRS Duration 98 ms     ms    QTc 517 ms    P Axis  degrees    R AXIS -53 degrees    T Axis 107 degrees    Interpretation ECG       Supraventricular tachycardia likely Atrial Tachycardia  Left axis deviation  Incomplete right bundle branch block  T wave abnormality, consider lateral ischemia  Abnormal ECG  When compared with ECG of 07-FEB-2023 22:21, (unconfirmed)  No significant change was found  Confirmed by MD JAIR, VERONICA ([id number]),  Jama Lyles ([id number]) on 2/10/2023 8:05:39 AM     CBC with platelets differential     Status: Abnormal    Narrative    The following orders were created for panel order CBC with platelets differential.  Procedure                               Abnormality         Status                      ---------                               -----------         ------                     CBC with platelets and d...[[id number]]  Abnormal            Final result                 Please view results for these tests on the individual orders.   Cell count with differential fluid     Status: Abnormal    Narrative    The following orders were created for panel order Cell count with differential fluid.  Procedure                               Abnormality         Status                     ---------                               -----------         ------                     Cell Count Body Fluid[[id number]]        Abnormal            Final result               Differential Body Fluid[[id number]]                          Final result                 Please view results for these tests on the individual orders.          Attestation:  I have reviewed today's vital signs, notes, medications, labs and imaging with Dr. Ollie Kohler.  Amount of time performed on this consult: 45 minutes.    Asha Barcenas PA-C  [facility]

## 2023-02-10 NOTE — PROGRESS NOTES
A&O x4. VSS on RA. Up w/ Ax1, GB/W. Tele: afib, HR 90-110s. Given additional dose metoprolol PO per MD. KASIA lower leg edematous. Denied pain. Anuric. Fistula dressing C/D/I. Tolerating renal diet. ECHO ordered. Plan for dialysis tomorrow.

## 2023-02-10 NOTE — CONSULTS
Care Management Initial Consult    General Information  Assessment completed with: Patient, Lai  Type of CM/SW Visit: Initial Assessment    Primary Care Provider verified and updated as needed: Yes   Readmission within the last 30 days: no previous admission in last 30 days      Reason for Consult: discharge planning  Advance Care Planning: Advance Care Planning Reviewed: no concerns identified          Communication Assessment  Patient's communication style: spoken language (English or Bilingual)    Hearing Difficulty or Deaf: no        Cognitive  Cognitive/Neuro/Behavioral: WDL  Level of Consciousness: alert  Arousal Level: opens eyes spontaneously  Orientation: oriented x 4  Mood/Behavior: calm, cooperative  Best Language: 0 - No aphasia  Speech: clear, spontaneous    Living Environment:   People in home: alone     Current living Arrangements: apartment      Able to return to prior arrangements:         Family/Social Support:  Care provided by: self  Provides care for: no one  Marital Status:   Children          Description of Support System: Supportive, Involved         Current Resources:   Patient receiving home care services:       Community Resources:    Equipment currently used at home: walker, rolling  Supplies currently used at home:      Employment/Financial:  Employment Status: retired        Financial Concerns:             Lifestyle & Psychosocial Needs:  Social Determinants of Health     Tobacco Use: Low Risk      Smoking Tobacco Use: Never     Smokeless Tobacco Use: Never     Passive Exposure: Not on file   Alcohol Use: Not on file   Financial Resource Strain: Not on file   Food Insecurity: Not on file   Transportation Needs: Not on file   Physical Activity: Not on file   Stress: Not on file   Social Connections: Not on file   Intimate Partner Violence: Not on file   Depression: Not on file   Housing Stability: Not on file       Functional Status:  Prior to admission patient needed assistance:    Dependent ADLs:: Ambulation-walker  Dependent IADLs:: Transportation, Medication Management, Shopping       Mental Health Status:  Mental Health Status: No Current Concerns       Chemical Dependency Status:  Chemical Dependency Status: No Current Concerns             Values/Beliefs:  Spiritual, Cultural Beliefs, Latter-day Practices, Values that affect care: no               Additional Information:  PT recommending home care.  Met with patient to introduce self and discuss home care.  Pt lives IND in an apartment.  He uses a walker but is otherwise independent.  Pt uses [employer] [facility] to get to and from dialysis, his dialysis runs are T, Th, Sat at [facility].     [facility] has accepted pt for home care-PT/OT and RN for INR checks. Orders to be faxed to [phone].       Pt stated his daughter would be able to transport at discharge.  Possible discharge later today,pending ulltrasound of leg.     Cristina Crenshaw RN, BS  Care Coordinator  [email or web address]

## 2023-02-10 NOTE — PROGRESS NOTES
Patient with more afib with RVR today, rate at rest 120's.  This is in spite of reduced knee pain and no worsening SOB, other pain, other new complaints.  RLE with DVT but now therapeutic again on warfarin, DVT presumed to have formed when recently subtherapeutic.  Doubt PE as he has no chest discomfort or SOB.  Echo will be obtained.   Will increase metoprolol to 150 mg BID.  If not adequate, consider adding a second rate control agent.  Lisinopril held for AM to allow more BP for other rate control if needed.  Also left hold parameters on clonidine he chronically takes.   As he needs to stay given the worsening afib with RVR despite improvement of his other acute issues, I will change him to inpatient status as it is possible he will need to stay past tomorrow with the worsened rate issues.  Full progress note to follow.

## 2023-02-10 NOTE — PLAN OF CARE
Neuro: AO x4  Tele/cardiac: Afib RVR/CVR  Respiration: on RA  Activity: A1 GBW  Pain: denies  Drips: PIV SL  Drains/tubes: none  Skin: scattered bruises, LUE AVG dressing CDI, bruit and thrill present  GI/: renal, mod carb diet  Aggression color: green  Isolation: none  Plan: R knee xray completed, awaits orthopedic consult today

## 2023-02-11 NOTE — PROGRESS NOTES
[facility]    Medicine Progress Note - Hospitalist Service    Date of Admission:  2/7/2023    Assessment & Plan    Gordon Gann is a 77 year old male with PMH  ESRD, DM, hypertension, and gout, who was admitted on 2/7/2023 with bleeding left upper extremity fistula, atrial fibrillation with RVR, and R pleural effusion.     Right knee swelling, probable recurrent hemarthrosis  Hx R knee hemarthrosis Dec 2021  Right LE DVT:  Patient reports R knee swelling this time not as severe as previous, however given presenting supratherapeutic INR and bleeding suspected had some hemarthrosis.  Also calf swollen more than usual and venous US obtained.  DVT noted.  Suspect this formed when INR was intermittently low/reduced activity.  Of note even prior to this admission he has had intermittent low INR's the past 6 months in care everywhere.  INR now above 2 today so have not added additional anticoag.  Right knee not felt infected and swelling/discomfort improving slowly.  Suspected by ortho this is hemarthrosis.  Monitor for now.  No tap felt indicated unless worsens.    Atrial fibrillation/flutter with RVR (rate worse on 2/10)  Hypertension:   -  Trend has worsened past 24 hours.  Much of my visit this afternoon 's at complete rest in bed and not in pain.  Discussed this with him.  I'm concerned with trend and dialysis he is going to have more rate issues.   Will increase metoprolol to 150 mg BID.  If not adequate, consider adding another rate control agent tomorrow/cardiology consult.  I will also check an echocardiogram given more rate issues and the recent pleural effusion issues.  I doubt PE.  If echo with significant right side change could consider further imaging but already on anticoag and not having any pain/incrased SOB.  His breathing he thinks is better past day.  - HOLD lisinopril with metoprolol increase until BP trend clear in AM.  I also added a hold parameter to his clonidine  in case BP drifts down with beta blocker increase.      Bleeding left upper extremity fistula, resolved  Supratherapeutic INR initially, reversed and then later warfarin resumed  Drug Induced Coagulation Defect:  Due to supratherapeutic INR 10. A stitch was placed in the ED and bandaged.  Stitch to be removed one week outpatient.  He is currently not bleeding. INR improved after reversal and warfarin now resumed.  Also on ASA.  -  Continue warfarin, pharmacy assisting in dosing  -  Advised patient to have a much more frequent INR check, atleast the first 2-3 weeks after discharge given the wide variability of INR's recent past.       End-stage renal disease: Last dialyzed on 2/9.  I do think that his recurrent pleural effusions are likely due at least in part from his not getting 2 dry weights and he likely needs further adjustment of his dialysis runs.  - Nephrology consult appreciated  - Dialysis Tu/Th/Sat.  Will need dialysis tomorrow 2/11 as not discharging today.  - Continue Bumex     Recurrent right-sided pleural effusion s/p thoracentesis 2/7/23: Fluid appears transudative, likely related to patient's ESRD. Could have been underdialyzing, underwent balloon angioplasty for stenosis with fistulogram 2/9 so hopefully less fluid buildup thereafter.  - Would repeat CXR in 2-3 weeks.  If continues to occur even with improved dry weights/dialysis runs consider outpatient thoracic surgery consult.  - Fluid studies mostly back, a couple still pending that need follow-up.       DM2: Continue Lantus 15 units at bedtime + MDSSI  - Glu a little higher today.  Will slightly increase lantus to 17 units as multiple glu over 200.    Recent Labs   Lab 02/10/23  1755 02/10/23  1252 02/10/23  0753 02/09/23 2057 02/09/23  1754   * 213* 134* 262* 153*       Gout:   -  Sounds like actually not taking allopurinol regularly, hold for now.       Medical Decision Making       50 MINUTES SPENT BY ME on the date of service doing  "chart review, history, exam, documentation & further activities per the note.        Labs/Imaging Reviewed:  See Information above and Data section below     Diet: Combination Diet Renal Diet (dialysis); Moderate Consistent Carb (60 g CHO per Meal) Diet    DVT Prophylaxis: Warfarin  Flood Catheter: Not present  Lines: None     Cardiac Monitoring: None  Code Status: No CPR- Do NOT Intubate      Clinically Significant Risk Factors Present on Admission                   # Hypertension: home medication list includes antihypertensive(s)      # Overweight: Estimated body mass index is 25.54 kg/m  as calculated from the following:    Height as of this encounter: 1.727 m (5' 8\").    Weight as of this encounter: 76.2 kg (168 lb).           Disposition Plan      Expected Discharge Date: 02/12/2023      Destination: home            Wali Murrieta DO  Hospitalist Service  [facility]  Securely message with [employer] (more info)  Text page via [employer] Paging/Directory   ______________________________________________________________________    Interval History   Patient feels better but staff report increased HR today.  He is not feeling his accelerated HR.  SOB improved from initially.  No chest pain.  Knee pain improved today.  He thinks swelling similar to prior.    Physical Exam   Vital Signs: Temp: 98.5  F (36.9  C) Temp src: Oral BP: 124/78 Pulse: 119   Resp: 18 SpO2: 97 % O2 Device: None (Room air)    Weight: 168 lbs 0 oz    GEN:  Alert, oriented x 3, appears comfortable, no overt distress.  HEENT:  Normocephalic/atraumatic, no scleral icterus, no nasal discharge, mouth moist.  CV:  Irreg Irreg with rate 120's.  No loud murmur/rub.  LUNGS:  Clear to auscultation upper with mildly decreased sounds bases.  No wheezes/retractions.  Symmetric chest rise on inhalation noted.  ABD:  Active bowel sounds, soft, non-tender/non-distended.  No rebound/guarding/rigidity.  EXT:  +1-2 RLE edema below knee.  No " cyanosis.  Right knee slightly warm to touch.  SKIN:  Dry to touch, no exanthems noted in the visualized areas.    Medications       - MEDICATION INSTRUCTIONS for Dialysis Patients -   Does not apply See Admin Instructions     aspirin  81 mg Oral At Bedtime     bumetanide  4 mg Oral Once per day on Sun Mon Wed Fri     cloNIDine  0.1 mg Oral BID     insulin aspart  1-7 Units Subcutaneous TID AC     insulin aspart  1-5 Units Subcutaneous At Bedtime     insulin glargine  15 Units Subcutaneous At Bedtime     [Held by provider] lisinopril  20 mg Oral Daily     metoprolol tartrate  150 mg Oral BID     mirtazapine  15 mg Oral At Bedtime     traZODone  75 mg Oral At Bedtime     Warfarin Therapy Reminder  1 each Oral See Admin Instructions       Data     Labs and Imaging results below reviewed today.  Recent Labs   Lab 02/10/23  0856 02/08/23  0609 02/07/23  2253 02/07/23  1819   WBC 13.4* 11.0  --  12.2*   HGB 7.9* 8.0* 8.0* 8.6*   HCT 24.6* 25.5*  --  27.1*   MCV 92 95  --  95    382  --  423     Recent Labs   Lab 02/10/23  1755 02/10/23  1252 02/10/23  0753 02/08/23  1839 02/08/23  0609 02/07/23  2156 02/07/23  1946   NA  --   --   --   --  130*  --  134*   POTASSIUM  --   --   --   --  4.8  --  4.3   CHLORIDE  --   --   --   --  94*  --  95*   CO2  --   --   --   --  22  --  26   ANIONGAP  --   --   --   --  14  --  13   * 213* 134*   < > 155*  --  165*   BUN  --   --   --   --  47.6*  --  34.6*   CR  --   --   --   --  3.76*  --  3.26*   GFRESTIMATED  --   --   --   --  16*  --  19*   LC  --   --   --   --  8.1*  --  8.0*   PROTTOTAL  --   --   --   --   --  5.8*  --     < > = values in this interval not displayed.     Recent Labs   Lab 02/10/23  0606 02/09/23  1045 02/08/23  0609   INR 2.02* 1.66* 1.87*       Recent Results (from the past 24 hour(s))   XR Knee Right 3 Views    Narrative    EXAM: XR KNEE RIGHT 3 VIEWS  LOCATION: [facility]  DATE/TIME: 2/9/2023 8:49  PM    INDICATION: Right knee pain.  COMPARISON: 12/03/2021.      Impression    IMPRESSION: Moderate progressive tricompartmental right knee osteoarthritis. No acute right knee fracture or dislocation. Small right knee joint effusion. No significant anterior knee soft tissue swelling. Extensive vascular calcification.   US Lower Extremity Venous Duplex Right    Narrative    US LOWER EXTREMITY VENOUS DUPLEX RIGHT 2/10/2023 12:29 PM    CLINICAL HISTORY/INDICATION: Right calf swelling, rule out deep venous  thrombosis (discharging today).    COMPARISON: None relevant    TECHNIQUE:   Grayscale, color-flow, and spectral waveform analysis were performed  of the deep veins of the right lower extremity    FINDINGS:   Nonocclusive deep vein thrombosis in the distal femoral vein.  Occlusive deep vein thrombosis in the right peroneal veins. The right  common femoral, profunda femoral, proximal femoral, mid femoral,  popliteal and posterior tibial veins are patent. There is superficial  thrombus at the central portion of the great saphenous vein.    The contralateral left common femoral vein demonstrates normal  compressibility, spectral waveform, color flow and augmentation.      Impression    IMPRESSION:   1. Nonocclusive deep vein thrombosis in the distal femoral vein and  occlusive deep vein thrombosis in the peroneal vein.  2. Superficial thrombus in the central portion of the great saphenous  vein.    DOUG BENITEZ DO         SYSTEM ID:  [id number]

## 2023-02-11 NOTE — PROGRESS NOTES
Potassium   Date Value Ref Range Status   02/11/2023 4.4 3.4 - 5.3 mmol/L Final   08/30/2022 3.4 3.4 - 5.3 mmol/L Final     Hemoglobin   Date Value Ref Range Status   02/11/2023 7.1 (L) 13.3 - 17.7 g/dL Final     Creatinine   Date Value Ref Range Status   02/11/2023 4.81 (H) 0.67 - 1.17 mg/dL Final     Urea Nitrogen   Date Value Ref Range Status   02/11/2023 45.7 (H) 8.0 - 23.0 mg/dL Final   08/30/2022 18 7 - 30 mg/dL Final     Sodium   Date Value Ref Range Status   02/11/2023 135 (L) 136 - 145 mmol/L Final     INR   Date Value Ref Range Status   02/11/2023 2.50 (H) 0.85 - 1.15 Final       DIALYSIS PROCEDURE NOTE  Hepatitis status of previous patient on machine log was checked and verified ok to use with this patients hepatitis status.  Patient dialyzed for 3 hrs. on a K3 bath with a net fluid removal of  2.5L.  A BFR of 400 ml/min was obtained via a LAF using 15 gauge needles.      The treatment plan was discussed with Dr. Benson during the treatment.    Total heparin received during the treatment: 0 units.   Needle cannulation sites held x 25 min.     Meds  given: EPO 10,000units IV   Complications: NONE      Person educated: pt. Knowledge base substantial. Barriers to learning: none. Educated on access care via Verbal mode. patient verbalized understanding. Pt prefers verbal education style.     ICEBOAT? Timeout performed pre-treatment  I: Patient was identified using 2 identifiers  C:  Consent Signed Yes  E: Equipment preventative maintenance is current and dialysis delivery system OK to use  B: Hepatitis B Surface Antigen: neg; Draw Date: 2/9/23      Hepatitis B Surface Antibody: Immune; Draw Date: 2/9/23  O: Dialysis orders present and complete prior to treatment  A: Vascular access verified and assessed prior to treatment  T: Treatment was performed at a clinically appropriate time  ?: Patient was allowed to ask questions and address concerns prior to treatment  See Adult Hemodialysis flowsheet in EPIC  for further details and post assessment.  Machine water alarm in place and functioning. Transducer pods intact and checked every 15min.   Pt returned via BED.  Chlorine/Chloramine water system checked every 4 hours.  Outpatient Dialysis at [facility]    Patient repositioned every 2 hours during the treatment.  Post treatment report given to NICKY Aldana RN regarding 2.5L of fluid removed, last BP of 114/74, and patient pain rating of 0/10.     *Please remove patient dressing on AVF and AVG needle sites 24 hours after dialysis. If leaking occurs please apply a Band-Aid.

## 2023-02-11 NOTE — PROGRESS NOTES
"Care Management Discharge Note    Discharge Date: 02/11/2023  Discharge Disposition: Home, Home Care  Discharge Services: None  Discharge DME:  n/a  Discharge Transportation: family or friend will provide  Private pay costs discussed: Not applicable  PAS Confirmation Code:  n/a  Patient/family educated on Medicare website which has current facility and service quality ratings:no  Education Provided on the Discharge Plan:  Updated AVS  Persons Notified of Discharge Plans: hospitalist, homecare agency  Patient/Family in Agreement with the Plan: yes    Handoff Referral Completed: Yes    Additional Information:  Prior CM RN arranged home care services via [employer].  CM-RN requested updated orders from MD for when INR lab needs to be drawn, \"[facility] has accepted pt for home care-PT/OT and RN for INR checks. Orders to be faxed to [phone].  \"  CM-RN faxed orders and discharge summary at 3:45 PM.     PCP followup instructions were also added to AVS.    Dialysis info faxed to pt home HD unit,   Address: [facility] [address], [address]  Telephone: [phone]  Fax: [phone]    Damaris Butterfield RN        "

## 2023-02-11 NOTE — PLAN OF CARE
Goal Outcome Evaluation:  A&O x4, VSS ex tachy HR 90s to 110s. Tele Afib w/RVR+PVC. LUE fistula site drsg CDI, +thrill/bruit. Up with 1/GB/W. LE edema elevated-improving. Plans for dialysis today.

## 2023-02-11 NOTE — DISCHARGE SUMMARY
[facility]    Discharge Summary  Hospitalist    Date of Admission:  2/7/2023  Date of Discharge:  2/11/2023    Discharge Diagnoses      Supratherapeutic INR  Pleuritic chest pain  Atrial fibrillation with RVR (H)  Acute hemorrhage  Hypertension, unspecified type    History of Present Illness   Gordon Gann is an 77 year old male who presented with supra therapeutic inr and right knee hemarthrosis    Hospital Course   Gordon Gann was admitted on 2/7/2023.  The following problems were addressed during his hospitalization:    Gordon Gann is a 77 year old male with PMH  ESRD, DM, hypertension, and gout, who was admitted on 2/7/2023 with bleeding left upper extremity fistula, atrial fibrillation with RVR, and R pleural effusion.     Right knee swelling, probable recurrent hemarthrosis  Hx R knee hemarthrosis Dec 2021  Right LE DVT:  Patient reports R knee swelling this time not as severe as previous, however given presenting supratherapeutic INR and bleeding suspected had some hemarthrosis.  Also calf swollen more than usual and venous US obtained.  DVT noted.  Suspect this formed when INR was intermittently low/reduced activity.  Of note even prior to this admission he has had intermittent low INR's the past 6 months in care everywhere.  INR now above 2 today so have not added additional anticoag.  Right knee not felt infected and swelling/discomfort improving slowly.  Suspected by ortho this is hemarthrosis.  Monitor for now.  No tap felt indicated unless worsens.     Atrial fibrillation/flutter with RVR (rate worse on 2/10)  Hypertension:   -  Patient was on metoprolol twice daily.  Has been having persistent tachycardia over the last few days.  Increase metoprolol to 150 twice daily.  Reduce the dose of lisinopril to 10 mg daily.  -Bedside echocardiogram showed pulmonary hypertension with LVH with normal EF.  -Follow-up with cardiology as an outpatient for further titration of his  medications.  -On review of his discharge his heart rates are well controlled in the 80s.       Bleeding left upper extremity fistula, resolved  Supratherapeutic INR initially, reversed and then later warfarin resumed  Drug Induced Coagulation Defect:  Due to supratherapeutic INR 10. A stitch was placed in the ED and bandaged.  Stitch to be removed one week outpatient.  He is currently not bleeding. INR improved after reversal and warfarin now resumed.  Also on ASA.  -  Continue warfarin, pharmacy assisting in dosing  -  Advised patient to have a much more frequent INR check, atleast the first 2-3 weeks after discharge given the wide variability of INR's recent past.       End-stage renal disease: Last dialyzed on 2/9.  I do think that his recurrent pleural effusions are likely due at least in part from his not getting 2 dry weights and he likely needs further adjustment of his dialysis runs.  - Nephrology consult appreciated  - Dialysis Tu/Th/Sat.  Will need dialysis tomorrow 2/11 as not discharging today.  - Continue Bumex     Recurrent right-sided pleural effusion s/p thoracentesis 2/7/23: Fluid appears transudative, likely related to patient's ESRD. Could have been underdialyzing, underwent balloon angioplasty for stenosis with fistulogram 2/9 so hopefully less fluid buildup thereafter.  - Would repeat CXR in 2-3 weeks.  If continues to occur even with improved dry weights/dialysis runs consider outpatient thoracic surgery consult.  - Fluid studies mostly back, a couple still pending that need follow-up.        DM2: Continue Lantus 15 units at bedtime + MDSSI  -Restart home meds at time discharge.          Gout:   -  Sounds like actually not taking allopurinol regularly, hold for now.        Diet: Combination Diet Renal Diet (dialysis); Moderate Consistent Carb (60 g CHO per Meal) Diet    DVT Prophylaxis: Warfarin  Flood Catheter: Not present  Lines: None     Cardiac Monitoring: None  Code Status: No CPR- Do NOT  Intubate         Clinically Significant Risk Factors Present on Admission               # Drug Induced Coagulation Defect: home medication list includes an anticoagulant medication    # Hypertension: home medication list includes antihypertensive(s)               Maame Leyva MD, MD    Code Status   DNR / DNI       Primary Care Physician   Belen Prieto    Physical Exam   Temp: 98.4  F (36.9  C) Temp src: Oral BP: 114/78 Pulse: 82   Resp: 17 SpO2: 99 % O2 Device: None (Room air)    Vitals:    02/08/23 0300 02/09/23 0254 02/11/23 0253   Weight: 77.7 kg (171 lb 6.4 oz) 76.2 kg (168 lb) 73.8 kg (162 lb 12.8 oz)     Vital Signs with Ranges  Temp:  [97.7  F (36.5  C)-98.5  F (36.9  C)] 98.4  F (36.9  C)  Pulse:  [] 82  Resp:  [8-18] 17  BP: ()/(55-80) 114/78  SpO2:  [93 %-100 %] 99 %  I/O last 3 completed shifts:  In: 786 [P.O.:786]  Out: 2500 [Other:2500]    Physical Exam  Constitutional:       Appearance: Normal appearance.   HENT:      Head: Normocephalic.   Eyes:      Pupils: Pupils are equal, round, and reactive to light.   Cardiovascular:      Rate and Rhythm: Normal rate. Rhythm irregular.      Pulses: Normal pulses.      Heart sounds: Murmur heard.   Pulmonary:      Effort: Pulmonary effort is normal. No respiratory distress.      Breath sounds: Normal breath sounds.   Abdominal:      General: Abdomen is flat. Bowel sounds are normal. There is no distension.      Tenderness: There is no abdominal tenderness. There is no guarding.   Musculoskeletal:         General: Normal range of motion.      Cervical back: Normal range of motion.      Right lower leg: Edema present.      Left lower leg: Edema present.      Comments: Left arm av fistula    Skin:     General: Skin is warm and dry.      Comments: Ecchymoses over the arms   Neurological:      General: No focal deficit present.   Psychiatric:         Mood and Affect: Mood normal.           Discharge Disposition   Discharged to assisted  "living  Condition at discharge: Stable    Consultations This Hospital Stay   PHARMACY TO DOSE PHYTONADIONE  NEPHROLOGY IP CONSULT  PHARMACY TO DOSE WARFARIN  VASCULAR SURGERY IP CONSULT  INTERVENTIONAL RADIOLOGY ADULT/PEDS IP CONSULT  ORTHOPEDIC SURGERY IP CONSULT  PHYSICAL THERAPY ADULT IP CONSULT  CARE MANAGEMENT / SOCIAL WORK IP CONSULT    Time Spent on this Encounter   Maame DA SILVA MD, personally saw the patient today and spent greater than 30 minutes discharging this patient.    Discharge Orders      Home Care Referral      Follow-up and recommended labs and tests     Follow up with Dr. Ollie Kohler/Maricruz Armstrong PA-C at [facility] ([place] or [place]) in 4 weeks for a recheck of your right knee or PRN if symptoms resolve. No follow up labs or test are needed prior to visit. Bring any needed forms with to appointment.    Call for appointment: [phone]  After hours: [phone]  Fax: [phone] or [phone]     Activity    Your activity upon discharge: WBAT RLE with walker     Reason for your hospital stay    Supratherapeutic inr with right knee hemarthrosis     Follow-up and recommended labs and tests     Follow up with primary care provider, Belen Prieto, within 7 days for hospital follow- up.  The following labs/tests are recommended: cbc, bmp in 1 week.  + CALL your primary care clinic  (phone [phone])   + SAY, \"I need a hospital follow-up appointment within 7 days of my hospital discharge date 02/11/23  + BRING these discharge papers with you to this followup appointment     Activity    Your activity upon discharge: activity as tolerated     Diet    Follow this diet upon discharge: Orders Placed This Encounter      Combination Diet Renal Diet (dialysis); Moderate Consistent Carb (60 g CHO per Meal) Diet     Discharge Medications   Current Discharge Medication List      CONTINUE these medications which have CHANGED    Details   lisinopril (ZESTRIL) 20 MG tablet Take 0.5 " tablets (10 mg) by mouth daily  Qty: 30 tablet, Refills: 3    Associated Diagnoses: Hypertension, unspecified type      metoprolol tartrate 75 MG TABS Take 150 mg by mouth 2 times daily  Qty: 60 tablet, Refills: 3    Associated Diagnoses: Supratherapeutic INR         CONTINUE these medications which have NOT CHANGED    Details   acetaminophen (TYLENOL) 500 MG tablet Take 1,000 mg by mouth every 8 hours as needed for mild pain      aspirin 81 MG EC tablet Take 1 tablet (81 mg) by mouth At Bedtime Starting 12/12    Associated Diagnoses: Atrial flutter with rapid ventricular response (H)      bumetanide (BUMEX) 2 MG tablet Take 1 tablet (2 mg) by mouth 2 times daily On non dialysis days    Associated Diagnoses: Hypertension, unspecified type      cloNIDine (CATAPRES) 0.1 MG tablet Take 1 tablet (0.1 mg) by mouth 2 times daily    Associated Diagnoses: Hypertension, unspecified type      ezetimibe (ZETIA) 10 MG tablet Take 10 mg by mouth every morning      fish oil-omega-3 fatty acids 1000 MG capsule Take 1 g by mouth every morning      folic acid (FOLVITE) 1 MG tablet Take 1 mg by mouth every morning      hydrALAZINE (APRESOLINE) 25 MG tablet Take 1 tablet (25 mg) by mouth every 6 hours as needed (SBP >180)  Qty: 30 tablet, Refills: 0    Associated Diagnoses: Diabetic ketoacidosis without coma associated with type 2 diabetes mellitus (H)      insulin glargine (LANTUS PEN) 100 UNIT/ML pen Inject 8 Units Subcutaneous At Bedtime  Qty: 15 mL, Refills: 0    Comments: If Lantus is not covered by insurance, may substitute Basaglar or Semglee or other insulin glargine product per insurance preference at same dose and frequency.    Associated Diagnoses: Diabetic ketoacidosis without coma associated with type 2 diabetes mellitus (H)      melatonin 3 MG tablet Take 1 tablet (3 mg) by mouth At Bedtime    Associated Diagnoses: Insomnia, unspecified type      mirtazapine (REMERON) 15 MG tablet Take 1 tablet (15 mg) by mouth At  Bedtime  Qty: 10 tablet, Refills: 0    Associated Diagnoses: Insomnia, unspecified type      traZODone (DESYREL) 150 MG tablet Take 150 mg by mouth At Bedtime      vitamin B complex with vitamin C (STRESS TAB) tablet Take 1 tablet by mouth every morning      Vitamin D, Cholecalciferol, 25 MCG (1000 UT) CAPS Take 1,000 Units by mouth every morning      allopurinol (ZYLOPRIM) 100 MG tablet Take 200 mg by mouth daily      cyclobenzaprine (FLEXERIL) 10 MG tablet Take 1 tablet (10 mg) by mouth 3 times daily    Associated Diagnoses: Hemarthrosis      insulin aspart (NOVOLOG PEN) 100 UNIT/ML pen Inject 5 Units Subcutaneous 3 times daily (with meals)  Qty: 15 mL, Refills: 0    Associated Diagnoses: Diabetic ketoacidosis without coma associated with type 2 diabetes mellitus (H)      warfarin ANTICOAGULANT (COUMADIN) 5 MG tablet Take 7.5 mg by mouth daily           Allergies   No Known Allergies  Data   Recent Labs   Lab Test 02/11/23  0550 02/10/23  0856 02/10/23  0606 02/09/23  1045 02/08/23  0609   WBC 11.5* 13.4*  --   --  11.0   HGB 7.1* 7.9*  --   --  8.0*   MCV 94 92  --   --  95    414  --   --  382   INR 2.50*  --  2.02* 1.66* 1.87*      Recent Labs   Lab Test 02/11/23  1054 02/11/23  0818 02/11/23  0550 02/08/23  1839 02/08/23  0609 02/07/23  1946   NA  --   --  135*  --  130* 134*   POTASSIUM  --   --  4.4  --  4.8 4.3   CHLORIDE  --   --  96*  --  94* 95*   CO2  --   --  25  --  22 26   BUN  --   --  45.7*  --  47.6* 34.6*   CR  --   --  4.81*  --  3.76* 3.26*   ANIONGAP  --   --  14  --  14 13   LC  --   --  7.2*  --  8.1* 8.0*   * 67* 102*   < > 155* 165*    < > = values in this interval not displayed.         Results for orders placed or performed during the hospital encounter of 02/07/23   XR Chest 2 Views    Narrative    EXAM: XR CHEST 2 VIEWS  LOCATION: [facility]  DATE/TIME: 2/7/2023 8:17 PM    INDICATION: chest pain  COMPARISON: 08/30/2022      Impression     IMPRESSION:     Moderate to large right pleural effusion significantly increased from August 2022. A degree of loculation presumably accounts for opacity medially at the apex.    Left lung well aerated and clear.    Unchanged heart size with normal pulmonary vasculature as visualized.   CTA Chest with Contrast    Narrative    EXAM: CTA CHEST WITH CONTRAST  LOCATION: [facility]  DATE/TIME: 2/7/2023 9:38 PM    INDICATION: chest pain, high INR, pleural effusion, on dialysis  COMPARISON: 08/16/2022 CT chest. Multiple prior chest radiograph most recently earlier today.    TECHNIQUE: Helical acquisition through the chest was performed during the arterial phase of contrast enhancement. 2D and 3D reconstructions performed by the CT technologist. Dose reduction techniques were used.  CONTRAST: 80 mL Isovue 370    FINDINGS:     ARTERIAL FINDINGS: Normal caliber thoracic aorta with no evidence of dissection or other acute pathology. Minimal scattered atherosclerotic calcification. Arch branches unremarkable. Mild celiac stenosis noted in the included upper abdomen with a widely   patent proximal SMA. No acute vascular pathology.    NONVASCULAR:    MEDIASTINUM: Unchanged cardiac enlargement with prominent right atrium. No pericardial effusion. Pulmonary arteries demonstrate no evidence of acute or chronic emboli. No mediastinal/hilar mass or lymphadenopathy. Normal esophagus. Axilla and   supraclavicular regions are unremarkable. Small thyroid nodules similar to prior.    CORONARY ARTERY CALCIFICATION: Suspect prior stenting.    LUNGS/PLEURA: Nonhemorrhagic right pleural effusion, moderate in size. Concurrent chest radiograph demonstrates interval enlargement from the most recent prior imaging. There is atelectasis/near complete collapse involving the right lower and middle   lobes, with milder dependent atelectasis involving the upper lobe. The left lung is well aerated and clear. No significant left  pleural fluid.    UPPER ABDOMEN: No acute pathology identified. A few colonic diverticula are noted.    MUSCULOSKELETAL: Degenerative change thoracic spine. No acute osseous findings.      Impression    IMPRESSION:    1.  Unremarkable CTA of the chest. No evidence of acute aortic pathology or pulmonary embolic disease.    2.  Moderate right pleural effusion is nonhemorrhagic. Concurrent chest radiograph demonstrates interval enlargement from most recent prior imaging.    3.  There is atelectasis/near-complete collapse of the right lower and middle lobes. Lungs otherwise well aerated and clear.    4.  Unchanged cardiac enlargement with suspected prior coronary stenting.     US Thoracentesis    Narrative    ULTRASOUND GUIDED THORACENTESIS  2/8/2023 10:52 AM     HISTORY: recurrent R pleural effusion    FINDINGS: Ultrasound was used to evaluate for the presence and best  approach for drainage of a pleural effusion. Written and oral informed  consent was obtained. A pause for the cause procedure to verify the  correct patient and correct procedure. The skin overlying the right  chest posteriorly was prepped and draped in the usual sterile fashion.  The subcutaneous tissues were anesthetized with 10mL 1% lidocaine. A  catheter was advanced into the pleural space and 550 mL of  rosina  colored fluid was drained. Patient was monitored by nurse under my  direct supervision throughout the exam. Ultrasound images were  permanently stored.  There were no immediate complications. Patient  left the ultrasound suite in satisfactory condition.      Impression    IMPRESSION: Technically successful thoracentesis without immediate  complications.    GISELE LOVE MD         SYSTEM ID:  [id number]   IR Dialysis Fistulogram Left    Narrative    INTERVENTIONAL RADIOLOGY DIALYSIS FISTULOGRAM LEFT  2/9/2023 9:42 AM    HISTORY:  77-year-old patient with presentation to the hospital with  bleeding from hemodialysis graft site. The graft is in  the left upper  arm. Patient was supratherapeutic INR upon presentation, which has  since been corrected. Plan is for fistulogram to determine any  anatomic abnormalities.    COMPARISON: December 8, 2021    TECHNIQUE: Patient was brought to the interventional radiology  department and informed consent obtained. Patient was placed in a  supine position. Skin overlying the left upper arm was prepped and  draped in standard sterile fashion. Ultrasound was used to visualize  the graft, patency confirmed, and image stored for documentation. With  continuous ultrasound guidance after lidocaine administration,  micropuncture kit was used to access the fistula in an antegrade  direction. Over a series of maneuvers, up to a 7 Occitan sheath was  placed. Fistulogram was performed from the AV anastomosis to the right  atrium. Several areas of stenosis identified. Berenstein catheter was  used to navigate through the areas of stenosis and central venogram  performed from the left subclavian vein. A Howell wire was placed. 6 mm  and 8 mm balloon angioplasty performed in the peripheral outflow vein  segment with good result. Central vein stenosis was dilated with 8mm,  10 mm, 12 mm, and 14 mm balloon angioplasty with repeat venograms  between each sequence. There is mild persistent stenosis in the medial  aspect of the subclavian vein, though overall improved blood flow  through this segment at completion. Sheath was removed and hemostasis  achieved with manual compression.    Sedation: A moderate level of sedation was achieved with 2 mg IV  Versed and 100 mcg IV fentanyl.  Sedation time: 40 minutes  Please note the above medications were administered by the  interventional radiology staff under my direct supervision. Patient's  vital signs were monitored and remained stable throughout the  procedure.  Fluoroscopic time: 3.6 minutes  Air kerma: 24.1 mGy  Contrast: 65 mL of Isovue 300 administered throughout the procedure,  without  complication.  Local anesthetic: 2 mL 1% lidocaine.    FINDINGS: Total of 42 spot fluoroscopic images and fistulogram  sequences obtained throughout the procedure. AV anastomosis is patent.  Severe segmental stenosis in the outflow vein, treated with 6 mm and 8  mm balloon angioplasty with improved result at completion. Moderate to  severe focal stenosis in the medial aspect of the subclavian vein with  collateralized vein formation treated up to 14 mm balloon angioplasty  with improved result.      Impression    IMPRESSION:  1. Severe segmental stenosis and peripheral outflow vein treated up to  8 mm balloon angioplasty with good result.  2. Moderate to severe focal stenosis in the medial left subclavian  vein dilated up to 14 mm. There is improved blood flow through this  segment with diminished collateralized veins.    BON ROSE MD         SYSTEM ID:  [id number]   XR Knee Right 3 Views    Narrative    EXAM: XR KNEE RIGHT 3 VIEWS  LOCATION: [facility]  DATE/TIME: 2/9/2023 8:49 PM    INDICATION: Right knee pain.  COMPARISON: 12/03/2021.      Impression    IMPRESSION: Moderate progressive tricompartmental right knee osteoarthritis. No acute right knee fracture or dislocation. Small right knee joint effusion. No significant anterior knee soft tissue swelling. Extensive vascular calcification.   US Lower Extremity Venous Duplex Right    Narrative    US LOWER EXTREMITY VENOUS DUPLEX RIGHT 2/10/2023 12:29 PM    CLINICAL HISTORY/INDICATION: Right calf swelling, rule out deep venous  thrombosis (discharging today).    COMPARISON: None relevant    TECHNIQUE:   Grayscale, color-flow, and spectral waveform analysis were performed  of the deep veins of the right lower extremity    FINDINGS:   Nonocclusive deep vein thrombosis in the distal femoral vein.  Occlusive deep vein thrombosis in the right peroneal veins. The right  common femoral, profunda femoral, proximal femoral, mid  femoral,  popliteal and posterior tibial veins are patent. There is superficial  thrombus at the central portion of the great saphenous vein.    The contralateral left common femoral vein demonstrates normal  compressibility, spectral waveform, color flow and augmentation.      Impression    IMPRESSION:   1. Nonocclusive deep vein thrombosis in the distal femoral vein and  occlusive deep vein thrombosis in the peroneal vein.  2. Superficial thrombus in the central portion of the great saphenous  vein.    DOUG BENITEZ DO         SYSTEM ID:  [id number]   Echocardiogram Complete     Value    LVEF  55-60%    [facility]    [id number]  [id number]  [id number]  [id number]^TREY^DARRELL^REED     [facility]  Echocardiography Laboratory  [address] [place] [address]     Name: JULIO RIZVI  MRN: [id number]  : 1945  Study Date: 2023 10:12 AM  Age: 77 yrs  Gender: Male  Patient Location: [facility]  Reason For Study: Atrial Fibrillation, CHF  Referring Physician: DARRELL YANG  Performed By: Mitzi Stewart     BSA: 1.9 m2  Height: 68 in  Weight: 168 lb  HR: 95  ______________________________________________________________________________  Procedure  Complete Echo Adult. Optison (NDC #7189-9634) given intravenously.  ______________________________________________________________________________  Interpretation Summary     The rhythm was atrial flutter.  Left ventricular systolic function is normal.  The visual ejection fraction is 55-60%.  The left ventricle is normal in size.  There is moderate concentric left ventricular hypertrophy.  Mildly decreased right ventricular systolic function  The right ventricle is mildly dilated..  There is moderate biatrial enlargement.  There is mild (1+) mitral regurgitation.  Right ventricular systolic pressure is elevated, consistent with moderate  pulmonary hypertension.  Mild to moderate valvular aortic stenosis.( GATO 1.23cm3/peak gradient  19mmHg/DI  "0.37/SVI 26ml/M2  Small right pleural effusion     Since the last study 10/5/2021 there has been interim development of atrail  flutter, a small right pleural effusion ( the previously seen left pelureal  effuon has resoved) and mild to moderate \"low flow -low gradient \" aortic  stenosis  ______________________________________________________________________________  Left Ventricle  The left ventricle is normal in size. There is moderate concentric left  ventricular hypertrophy. Left ventricular systolic function is normal. The  visual ejection fraction is 55-60%. Left ventricular diastolic function is  indeterminate. No regional wall motion abnormalities noted. There is no  thrombus seen in the left ventricle.     Right Ventricle  The right ventricle is mildly dilated. Mildly decreased right ventricular  systolic function.     Atria  There is moderate biatrial enlargement. There is no color Doppler evidence of  a PFO. The left atrial appendage is not well visualized.     Mitral Valve  The mitral valve leaflets appear normal. There is no evidence of stenosis,  fluttering, or prolapse. There is mild (1+) mitral regurgitation. There is no  mitral valve stenosis.     Tricuspid Valve  Normal tricuspid valve. There is mild to moderate (1-2+) tricuspid  regurgitation. Right ventricular systolic pressure is elevated, consistent  with moderate pulmonary hypertension. The right ventricular systolic pressure  is elevated at 41.1 mmHg. There is no tricuspid stenosis.     Aortic Valve  There is moderate trileaflet aortic sclerosis. No aortic regurgitation is  present. The peak AoV pressure gradient is 19.0 mmHg. Mild to moderate  valvular aortic stenosis.     Pulmonic Valve  Normal pulmonic valve. There is mild (1+) pulmonic valvular regurgitation.     Vessels  The aortic root is normal size. Normal size ascending aorta. The inferior vena  cava is normal. The pulmonary artery is normal size.     Pericardium  The pericardium " appears normal. Small right pleural effusion.     Rhythm  The rhythm was atrial flutter.  ______________________________________________________________________________  MMode/2D Measurements & Calculations  IVSd: 1.4 cm  LVIDd: 5.2 cm  LVIDs: 4.6 cm  LVPWd: 1.4 cm  FS: 12.2 %  LV mass(C)d: 304.4 grams  LV mass(C)dI: 160.4 grams/m2  Ao root diam: 3.4 cm  asc Aorta Diam: 3.6 cm  LVOT diam: 2.0 cm  LVOT area: 3.3 cm2  LA Volume (BP): 127.0 ml  LA Volume Index (BP): 66.8 ml/m2     RWT: 0.53     Doppler Measurements & Calculations  MV E max cam: 93.8 cm/sec  MV dec slope: 566.8 cm/sec2  MV dec time: 0.17 sec  Ao V2 max: 219.4 cm/sec  Ao max P.0 mmHg  GATO(V,D): 1.2 cm2  LV V1 max P.6 mmHg  LV V1 max: 80.4 cm/sec  LV V1 VTI: 15.4 cm  SV(LVOT): 50.4 ml  SI(LVOT): 26.5 ml/m2  PA V2 max: 91.4 cm/sec  PA max PG: 3.3 mmHg  PA acc time: 0.09 sec  TR max cam: 319.2 cm/sec  TR max P.1 mmHg  AV Cam Ratio (DI): 0.37  Lateral E/e': 7.2     ______________________________________________________________________________  Report approved by: Dr. Lamont Figueroa 2023 11:47 AM

## 2023-02-11 NOTE — PROGRESS NOTES
Renal Medicine Progress Note            Assessment/Plan:     Gordon Gann is a 77 year old male with PMH ESRD on HD, DM 2, HTN, gout, A-fib on warfarin admitted 2/7/2023 with graft bleeding.  S/p suture.  Also underwent right-sided thoracentesis due to right pleural effusion.       Bleeding LUE AVG, resolved  Supratherapeutic INR, resolved  Placed 8/5/2021.  Did have issues with clotting in December 2021 requiring fistulogram for declot and angioplasty of stenoses.  Has had some intermittent prolonged bleeding lately.  He had prolonged brisk bleeding of graft after dialysis 2/7, suture placed in ED.  Bleeding resolved.  INR supratherapeutic, but now resolved.  Fistulogram 2/9 showing multiple stenotic areas and one focal area in L subclavian vein that were angioplastied with resulting good flow. Per vascular surgery, suture can be removed in 7 days at dialysis.   - HD today   - appreciate vascular surgery recommendations   - suture removal 2/14 at dialysis unit (updated dialysis unit RN, they will plan on removing it)     ESRD on HD  Dialyzes TTS at [facility].  Primary nephrologist is Dr. Perla.  LUE AVG.  No heparin.  EDW 77.7 kg.  -HD today      Anemia of CKD  Acute blood loss due to bleeding AV graft  Hgb lower than baseline. Will increase epo dose and give IV iron during dialysis.      Shortness of breath   R pleural effusion   S/p R thoracentesis, 550 ml of rosina colored fluid. Exudative, 522 cells with lymphocyte predominance.      HTN  Continue PTA meds.     CKD MBD  Doesn't appear to be on binder PTA, ok to hold off and address outpatient. Continue PTA cholecalciferol.     R knee pain and swelling   Warm to touch and swollen. Ortho consulted.      Plan/Recs:  1) s/p fistulogram, multiple stenosis plastied  2) HD today, tolerated well  3) suture should be removed 2/14 at dialysis unit (discussed with [facility])  4) epo and venofer at dialysis     Ok to discharge from nephrology  "standpoint          Interval History:     - no acute events overnight  - denies any complaints, just wants to discharge   - seen during dialysis, tolerated it well   - reports being on apixaban in the past, perhaps this is a future possibility instead of warfarin given his multiple bleeding complications he's endured          Medications and Allergies:       - MEDICATION INSTRUCTIONS for Dialysis Patients -   Does not apply See Admin Instructions     aspirin  81 mg Oral At Bedtime     bumetanide  4 mg Oral Once per day on Sun Mon Wed Fri     cloNIDine  0.1 mg Oral BID     insulin aspart  1-7 Units Subcutaneous TID AC     insulin aspart  1-5 Units Subcutaneous At Bedtime     insulin glargine  17 Units Subcutaneous At Bedtime     [Held by provider] lisinopril  20 mg Oral Daily     metoprolol tartrate  150 mg Oral BID     mirtazapine  15 mg Oral At Bedtime     traZODone  75 mg Oral At Bedtime     Warfarin Therapy Reminder  1 each Oral See Admin Instructions      No Known Allergies         Physical Exam:   Vitals were reviewed  /76   Pulse 89   Temp 97.9  F (36.6  C) (Oral)   Resp 16   Ht 1.727 m (5' 8\")   Wt 73.8 kg (162 lb 12.8 oz)   SpO2 97%   BMI 24.75 kg/m      Wt Readings from Last 3 Encounters:   02/11/23 73.8 kg (162 lb 12.8 oz)   08/29/22 87.5 kg (192 lb 14.4 oz)   08/16/22 88.5 kg (195 lb)       Intake/Output Summary (Last 24 hours) at 2/9/2023 1227  Last data filed at 2/9/2023 1200  Gross per 24 hour   Intake 210 ml   Output --   Net 210 ml       GENERAL APPEARANCE: no acute distress, well appearing   Breathing comfortably, no increased work of breathing.   Normal speech. Moves all four extremitie s  R leg wrapped in ACE wrap   LUE AVG with dressing in place, not saturated, no obvious blood   Remainder of exam deferred due to another provider visiting patient.              Data:     BMP  Recent Labs   Lab 02/11/23  1557 02/11/23  1054 02/11/23  0818 02/11/23  0550 02/08/23  1839 02/08/23  0609 " 02/1945   NA  --   --   --  135*  --  130* 134*   POTASSIUM  --   --   --  4.4  --  4.8 4.3   CHLORIDE  --   --   --  96*  --  94* 95*   LC  --   --   --  7.2*  --  8.1* 8.0*   CO2  --   --   --  25  --  22 26   BUN  --   --   --  45.7*  --  47.6* 34.6*   CR  --   --   --  4.81*  --  3.76* 3.26*   GLC 73 164* 67* 102*   < > 155* 165*    < > = values in this interval not displayed.     CBC  Recent Labs   Lab 02/11/23  0550 02/10/23  0856 02/08/23  0609 02/07/23  2253 02/07/23  1819   WBC 11.5* 13.4* 11.0  --  12.2*   HGB 7.1* 7.9* 8.0* 8.0* 8.6*   HCT 22.3* 24.6* 25.5*  --  27.1*   MCV 94 92 95  --  95    414 382  --  423     Lab Results   Component Value Date    AST 22 08/19/2022    ALT 32 08/19/2022    ALKPHOS 213 (H) 08/19/2022    BILITOTAL 0.5 08/19/2022     Lab Results   Component Value Date    INR 2.50 (H) 02/11/2023       Fistulogram 2/9  IMPRESSION:  1. Severe segmental stenosis and peripheral outflow vein treated up to  8 mm balloon angioplasty with good result.  2. Moderate to severe focal stenosis in the medial left subclavian  vein dilated up to 14 mm. There is improved blood flow through this  segment with diminished collateralized veins.    Attestation:  I have reviewed today's vital signs, notes, medications, labs and imaging.    Galen Benson MD  [facility] - Nephrology  Office: [phone]

## 2023-02-12 NOTE — PROVIDER NOTIFICATION
"While preparing pt for discharge pt walked around the bed and sat down. Pt reported \"feeling profoundly exhausted\". Pt had recently completed dialysis run. This writer asked pt if he normally feels like this after dialysis and he said no. B/P 125/74, . Of note, pt would be discharged home and would be alone tonight. Dr. Leyva notified. This RN contacted pt's daughter Omayra to relate how pt was feeling. Decision made to cancel discharge tonight for pt's safety.   "

## 2023-02-12 NOTE — PROGRESS NOTES
Neuro: AO x4  Tele/cardiac: Afib RVR/CVR  Respiration: on RA  Activity: Ax1 GBW  Pain: denies  Drips: PIV-saline locked  Drains/tubes: none  Skin: scattered bruises, LUE AVG dressing CDI, bruit and thrill present  GI/: Dialysis 2/11- 2 Litres out, Anuria   Diet: mod carb   Aggression color: green  Misc: AM Blood sugar 56.  Plan: Evaluate for discharge

## 2023-02-12 NOTE — PLAN OF CARE
Goal Outcome Evaluation:    Pt received discharge instructions and medications, questions answered. Left floor by wheelchair.

## 2023-02-13 NOTE — PROGRESS NOTES
"Clinic Care Coordination Contact  [facility]: Post-Discharge Note  SITUATION                                                      Admission:    Admission Date: 02/07/23   Reason for Admission: Supratherapeutic inr with right knee hemarthrosis  Discharge:   Discharge Date: 02/12/23  Discharge Diagnosis: Supratherapeutic inr with right knee hemarthrosis    BACKGROUND                                                      Per hospital discharge summary and inpatient provider notes:    Mr. Gann is a 77-year-old male with past medical history significant for end-stage renal disease, atrial fibrillation/flutter, gout, who presents to the emergency department with the above concerns.  History is obtained through discussion with the ED physician as well as the patient.  The patient notes that he had dialysis today, which he states went normally and did well upon discharge to home.  Unfortunately, at that point, he started developing some bleeding from his fistula, which he could not stop.  He comes to the emergency department for evaluation and was noted to have a very high INR at nearly 10.  He was able to get control of the bleeding with help of the ED and a stitch was placed and subsequently, he has not had any further bleeding.  During the course of his ED stay, however, he did complain of some right-sided chest discomfort and mild shortness of breath.  He had a chest x-ray showing a large right pleural effusion, possibly loculated. So, a CT of the chest with contrast was also done, which was negative for PE, but did show a large right pleural effusion.  Also noted was that he started becoming tachycardic.  EKG showed apparently junctional rhythm, but he did respond well to metoprolol and his heart rate has decreased into the 90s.    ASSESSMENT           Discharge Assessment  How are you doing now that you are home?: \"I'm ok thank you\"  How are your symptoms? (Red Flag symptoms escalate to triage hotline per " guidelines): Improved  Do you feel your condition is stable enough to be safe at home until your provider visit?: Yes  Does the patient have their discharge instructions? : Yes  Does the patient have questions regarding their discharge instructions? : No  Were you started on any new medications or were there changes to any of your previous medications? : Yes  Does the patient have all of their medications?: Yes  Do you have questions regarding any of your medications? : No  Do you have all of your needed medical supplies or equipment (DME)?  (i.e. oxygen tank, CPAP, cane, etc.): Yes  Discharge follow-up appointment scheduled within 14 calendar days? : No  Is patient agreeable to assistance with scheduling? : No                  PLAN                                                      Outpatient Plan:     Follow up with Dr. Ollie Kohler/Maricruz Armstrong PA-C at [facility] ([place] or [place]) in 4 weeks for a recheck of your right knee or PRN if symptoms resolve. No follow up labs or test are needed prior to visit. Bring any needed forms with to appointment.     Call for appointment: [phone]  After hours: [phone]  Fax: [phone] or [phone]         No future appointments.      For any urgent concerns, please contact our 24 hour nurse triage line: [phone] ([phone])         Tiesha Yanez  Community Health Worker  [facility]  [phone]

## 2023-02-15 NOTE — ED PROVIDER NOTES
History     Chief Complaint:  Fall  Generalized weakness    HPI   Gordon Gann is a 77 year old male with a history of dialysis dependent end-stage renal disease, atrial fibrillation (anticoagulated on warfarin) and insulin-dependent type II diabetes who presents with fall and generalized weakness.  Patient notes that he typically dialyzes Tuesday, Thursday, Saturday, though missed his last dialysis appointment yesterday due to generalized weakness.  Today patient was not using his walker and lost his balance; slid down the wall without reported associated trauma.  Patient was unable to get up secondary to generalized weakness.  Transported to the ED by EMS.  Patient follows with Dr. Zuniga of nephrology.  No reported recent fever.  Reports mild cough.  Makes a small amount of urine at baseline.  Patient was recently admitted to the hospital from 2/7 to 2/11 for A-fib RVR, pleuritic chest pain, right knee hemarthrosis, right lower extremity DVT, recurrent right-sided pleural effusion status postthoracentesis on 2/7, and supratherapeutic INR.    Independent Historian:   None - Patient Only    Review of External Notes: I reviewed patient's discharge summary from 2/11/2023    ROS:  Review of Systems, see HPI    Allergies:  No Known Allergies     Medications:    acetaminophen (TYLENOL) 500 MG tablet  allopurinol (ZYLOPRIM) 100 MG tablet  aspirin 81 MG EC tablet  bumetanide (BUMEX) 2 MG tablet  cloNIDine (CATAPRES) 0.1 MG tablet  cyclobenzaprine (FLEXERIL) 10 MG tablet  ezetimibe (ZETIA) 10 MG tablet  fish oil-omega-3 fatty acids 1000 MG capsule  folic acid (FOLVITE) 1 MG tablet  hydrALAZINE (APRESOLINE) 25 MG tablet  insulin aspart (NOVOLOG PEN) 100 UNIT/ML pen  insulin glargine (LANTUS PEN) 100 UNIT/ML pen  lisinopril (ZESTRIL) 20 MG tablet  melatonin 3 MG tablet  metoprolol tartrate 75 MG TABS  mirtazapine (REMERON) 15 MG tablet  traZODone (DESYREL) 150 MG tablet  vitamin B complex with vitamin C (STRESS TAB)  tablet  Vitamin D, Cholecalciferol, 25 MCG (1000 UT) CAPS  warfarin ANTICOAGULANT (COUMADIN) 5 MG tablet        Past Medical History:    Past Medical History:   Diagnosis Date     Alcohol abuse      Atrial fibrillation (H)      CKD (chronic kidney disease) stage 4, GFR 15-29 ml/min (H)      Diabetes (H)      Gout      Hypokalemia      Hypophosphatemia      Insomnia      STEMI (ST elevation myocardial infarction) (H)        Past Surgical History:    Past Surgical History:   Procedure Laterality Date     CREATE GRAFT LOOP ARTERIOVENOUS UPPER EXTREMITY Left 8/5/2021    Procedure: left arm arteriovenous graft fistula.;  Surgeon: Noemí Hernandez MD;  Location: [facility] OR     IR DIALYSIS FISTULOGRAM LEFT  12/8/2021     IR DIALYSIS FISTULOGRAM LEFT  2/9/2023        Family History:    family history is not on file.    Social History:   reports that he has never smoked. He has never used smokeless tobacco. He reports current alcohol use.  PCP: Belen Prieto     Physical Exam     Patient Vitals for the past 24 hrs:   BP Temp Temp src Pulse Resp SpO2   02/15/23 0500 115/74 -- -- 85 (!) 31 100 %   02/15/23 0430 131/87 -- -- 87 (!) 0 95 %   02/15/23 0427 131/87 -- -- 93 14 95 %   02/15/23 0330 116/63 -- -- 93 21 92 %   02/15/23 0301 -- -- -- 98 12 95 %   02/15/23 0259 128/46 97.6  F (36.4  C) Oral 106 16 --        Physical Exam  General: Alert and cooperative with exam. Patient in mild distress.  Non-ill/nontoxic appearance.  Normal mentation.  Head:  Scalp is NC/AT  Eyes:  No scleral icterus, PERRL  ENT:  The external nose and ears are normal. The oropharynx is normal and without erythema; mucus membranes are dry.  Neck:  Normal range of motion without rigidity.  CV:  Irregular rate and rhythm. Right upper extremity fistula c/d/i  Resp:  Diminished sounds in right lower lobe otherwise breath sounds are clear bilaterally    Non-labored, no retractions or accessory muscle use  GI:  Abdomen is soft, no distension, no  tenderness. No peritoneal signs  MS:  No lower extremity edema.  No significant lower extremity edema.  Normal passive range of motion to lower extremities without pain  Skin:  Warm and dry, small skin tear to right forearm  Neuro: Oriented x 3. No gross motor deficits.      Emergency Department Course     ECG results from 02/15/23   EKG 12 lead     Value    Systolic Blood Pressure     Diastolic Blood Pressure     Ventricular Rate 96    Atrial Rate     AZ Interval     QRS Duration 106        QTc 523    P Axis     R AXIS -12    T Axis 164    Interpretation ECG      Atrial fibrillation  Incomplete right bundle branch block  ST & T wave abnormality, consider lateral ischemia  Prolonged QT  Abnormal ECG  When compared with ECG of 08-FEB-2023 07:17,  Atrial fibrillation has replaced Sinus rhythm  Nonspecific T wave abnormality now evident in Inferior leads  Confirmed by GENERATED REPORT, COMPUTER ([id number]),  Yaquelin Maldonado ([id number]) on 2/15/2023 3:33:26 AM  Also confirmed by GENERATED REPORT, COMPUTER ([id number]),  Charles Toney ([id number])  on 2/15/2023 5:36:55 AM         Imaging:  CT Abdomen Pelvis w/o Contrast   Final Result   IMPRESSION:    1.  Moderate right Pleural effusion with atelectasis of the right lower lobe again seen, similar to prior study from 2/7/2023   2.  No evidence of hydronephrosis or hydroureter. No renal, ureteral, or bladder calculi are identified.   3.  Scattered diverticulosis of the colon without evidence of diverticulitis   4.  Normal appendix         Chest XR,  PA & LAT   Final Result   IMPRESSION: The moderate right-sided pleural effusion is again noted, similar to prior exam with adjacent atelectasis of the right lung base. Overall not significantly changed from prior exam.      Head CT w/o contrast   Final Result   IMPRESSION:   1.  No acute intracranial hemorrhage or calvarial fracture.   2.  Mild to moderate volume loss and presumed chronic small vessel ischemic changes.          Report per radiology    Laboratory:  Labs Ordered and Resulted from Time of ED Arrival to Time of ED Departure   INR - Abnormal       Result Value    INR 3.56 (*)    BASIC METABOLIC PANEL - Abnormal    Sodium 136      Potassium 5.7 (*)     Chloride 94 (*)     Carbon Dioxide (CO2) 23      Anion Gap 19 (*)     Urea Nitrogen 76.2 (*)     Creatinine 6.90 (*)     Calcium 6.9 (*)     Glucose 263 (*)     GFR Estimate 8 (*)    CBC WITH PLATELETS AND DIFFERENTIAL - Abnormal    WBC Count 21.2 (*)     RBC Count 2.56 (*)     Hemoglobin 7.5 (*)     Hematocrit 24.9 (*)     MCV 97      MCH 29.3      MCHC 30.1 (*)     RDW 17.5 (*)     Platelet Count 472 (*)     % Neutrophils 86      % Lymphocytes 6      % Monocytes 7      % Eosinophils 0      % Basophils 0      % Immature Granulocytes 1      NRBCs per 100 WBC 0      Absolute Neutrophils 18.4 (*)     Absolute Lymphocytes 1.3      Absolute Monocytes 1.4 (*)     Absolute Eosinophils 0.0      Absolute Basophils 0.0      Absolute Immature Granulocytes 0.1      Absolute NRBCs 0.0     ROUTINE UA WITH MICROSCOPIC - Abnormal    Color Urine Yellow      Appearance Urine Slightly Cloudy (*)     Glucose Urine 200 (*)     Bilirubin Urine Negative      Ketones Urine Negative      Specific Gravity Urine 1.019      Blood Urine Large (*)     pH Urine 5.5      Protein Albumin Urine 200 (*)     Urobilinogen Urine Normal      Nitrite Urine Negative      Leukocyte Esterase Urine Small (*)     RBC Urine 115 (*)     WBC Urine 42 (*)     Hyaline Casts Urine 1     INFLUENZA A/B & SARS-COV2 PCR MULTIPLEX - Normal    Influenza A PCR Negative      Influenza B PCR Negative      RSV PCR Negative      SARS CoV2 PCR Negative     LACTIC ACID WHOLE BLOOD - Normal    Lactic Acid 1.3     PROCALCITONIN   BLOOD CULTURE   BLOOD CULTURE   URINE CULTURE        Emergency Department Course & Assessments:    Interventions:  Medications - No data to display     Independent Interpretation (X-rays, CTs, rhythm strip):  I  independently reviewed patient's chest x-ray which demonstrates right-sided pleural effusion    Consultations/Discussion of Management or Tests:  Patient's care discussed with hospitalist Dr. Negro Walker    Social Determinants of Health affecting care:   None    Disposition:  The patient was admitted to the observation unit under the care of Dr. Walker.     Impression & Plan      Medical Decision Making:  Patient is a 77-year-old male with history of dialysis dependent end-stage renal disease, insulin-dependent type 2 diabetes, and atrial fibrillation (anticoagulant on warfarin) who presents with generalized weakness and fall.  Patient's medical history and records were reviewed.  On evaluation patient is without significant evidence of trauma.  No focal neurologic deficits.  Head CT obtained and demonstrates no acute pathology.  Patient's labs were notable for significant leukocytosis (WBC 21.2), mildly supratherapeutic INR (3.56), chronic anemia (hemoglobin 7.5), mild hyperkalemia (potassium 5.7; EKG without significant related changes or other acute findings; known A-fib), elevated creatinine consistent with known ESRD, normal lactic acid, and negative influenza/COVID/RSV testing.  Chest x-ray demonstrates persistent moderate right pleural effusion.  Patient's exam demonstrates no definitive source of infection and he is afebrile and otherwise well-appearing.  UA demonstrates microscopic hematuria with mild pyuria on clean-catch sample.  Blood and urine cultures were obtained.  Procalcitonin pending.  As no source of infection determined and patient currently stable will defer antibiotic treatment at this time.  CTA imaging obtained of the abdomen pelvis demonstrates pleural effusion as above without other significant acute pathology.  Given generalized weakness and significant leukocytosis and dialysis dependent patient, will admit to observation with the hospitalist service for continued evaluation and  care.    Diagnosis:    ICD-10-CM    1. Generalized weakness  R53.1       2. Fall, initial encounter  W19.XXXA       3. Leukocytosis, unspecified type  D72.829       4. Hyperkalemia  E87.5       5. ESRD (end stage renal disease) on dialysis (H)  N18.6     Z99.2                 Fili Milton,   02/15/23 0605

## 2023-02-15 NOTE — PROGRESS NOTES
Potassium   Date Value Ref Range Status   02/15/2023 5.7 (H) 3.4 - 5.3 mmol/L Final   08/30/2022 3.4 3.4 - 5.3 mmol/L Final     Hemoglobin   Date Value Ref Range Status   02/15/2023 7.5 (L) 13.3 - 17.7 g/dL Final     Creatinine   Date Value Ref Range Status   02/15/2023 6.90 (H) 0.67 - 1.17 mg/dL Final     Urea Nitrogen   Date Value Ref Range Status   02/15/2023 76.2 (H) 8.0 - 23.0 mg/dL Final   08/30/2022 18 7 - 30 mg/dL Final     Sodium   Date Value Ref Range Status   02/15/2023 136 136 - 145 mmol/L Final     INR   Date Value Ref Range Status   02/15/2023 3.60 (H) 0.85 - 1.15 Final      Latest Reference Range & Units Most Recent   Hep B Surface Agn Nonreactive  Nonreactive  2/9/23 12:46   Hepatitis B Surface Antibody  Reactive  2/9/23 12:46       DIALYSIS PROCEDURE NOTE  Hepatitis status of previous patient on machine log was checked and verified ok to use with this patients hepatitis status.  Patient dialyzed for 2 hrs. on a K2 bath with a net fluid removal of  2.5L.  A BFR of 450 ml/min was obtained via a LAVF using 15 gauge needles.      The treatment plan was discussed with Dr. Chaparro during the treatment.    Total heparin received during the treatment: 0 units.   Needle cannulation sites held x 10 min.   Meds  given: none   Complications: none      Person educated: person. Knowledge base minimal. Barriers to learning: none. Educated on procedure via verbal mode. Patient verbalized understanding. Pt prefers oral education style.     ICEBOAT? Timeout performed pre-treatment  I: Patient was identified using 2 identifiers  C:  Consent Signed Yes  E: Equipment preventative maintenance is current and dialysis delivery system OK to use  B: Hepatitis B Surface Antigen: NONREACTIVE; Draw Date: 02/09/2023      Hepatitis B Surface Antibody: IMMUNE; Draw Date: 02/09/2023  O: Dialysis orders present and complete prior to treatment  A: Vascular access verified and assessed prior to treatment  T: Treatment was performed at  a clinically appropriate time  ?: Patient was allowed to ask questions and address concerns prior to treatment  See Adult Hemodialysis flowsheet in EPIC for further details and post assessment.  Machine water alarm in place and functioning. Transducer pods intact and checked every 15min.   Pt returned via cart.  Chlorine/Chloramine water system checked every 4 hours.      Patient repositioned every 2 hours during the treatment.  Post treatment report given to OMID Gracia regarding 2.5L of fluid removed..     Please remove patient dressing on AVF and AVG needle sites 24 hours after dialysis. If leaking occurs please apply a Band-Aid.

## 2023-02-15 NOTE — PHARMACY-VANCOMYCIN DOSING SERVICE
"Pharmacy Vancomycin Initial Note  Date of Service February 15, 2023  Patient's  1945  77 year old, male    Indication: Sepsis    Current estimated CrCl = Estimated Creatinine Clearance: 9.5 mL/min (A) (based on SCr of 6.9 mg/dL (H)).    Creatinine for last 3 days  2/15/2023:  3:07 AM Creatinine 6.90 mg/dL    Recent Vancomycin Level(s) for last 3 days  No results found for requested labs within last 72 hours.      Vancomycin IV Administrations (past 72 hours)                   vancomycin (VANCOCIN) 1,750 mg in 0.9% NaCl 500 mL intermittent infusion (mg) 1,750 mg Given 02/15/23 1038                Nephrotoxins and other renal medications (From now, onward)    Start     Dose/Rate Route Frequency Ordered Stop    02/15/23 1357  vancomycin place cespedes - receiving intermittent dosing         1 each Intravenous SEE ADMIN INSTRUCTIONS 02/15/23 1357      02/15/23 0910  piperacillin-tazobactam (ZOSYN) 2.25 g vial to attach to  ml bag        Note to Pharmacy: For [facility], [facility] and [facility]: For Zosyn-naive patients, use the \"Zosyn initial dose + extended infusion\" order panel.    2.25 g  over 30 Minutes Intravenous EVERY 6 HOURS 02/15/23 0819            Contrast Orders - past 72 hours (72h ago, onward)    None          Plan:  1. Give Vanco 1750 mg IV x1 in ED. Will order lvl in AM as pt recieiving short HD run today.  2. Vancomycin monitoring method: Renal Replacement Therapy  3. Vancomycin therapeutic monitoring goal: 15-20 mg/L  4. Pharmacy will check vancomycin levels as appropriate in 2/15 AM level and in AM of HD days..      Jessica Baeza, [facility]    "

## 2023-02-15 NOTE — ED NOTES
Bed: ED01  Expected date:   Expected time:   Means of arrival:   Comments:  [id number] fall today around 00:00, unable to get up, coumadin, ETA [id number]

## 2023-02-15 NOTE — ED NOTES
Dialysis nurse called to report the patient was slightly nauseated and wanted zofan. Meds brought to dialysis unit and administered

## 2023-02-15 NOTE — H&P
[facility]    History and Physical - Hospitalist Service       Date of Admission:  2/15/2023    Assessment & Plan      Gordon Gann is a 77 year old male admitted on 2/15/2023. He presents after a fall    Leukocytosis  Generalized weakness  Fall  [place]. Was walking at home (w/o walker) when lost balance, fell into wall and slid to floor. Unable to get up. Generally denies c/o of c/p, sob, fevers, just weak last couple of days. In ED AFVSS.  K 5.7, creat 6.9 (dialysis). WBC 21.2. hgb 7.5. lactic 1.3.  UA with 42 WBC, 115 RBC. CXR with moderate R effusion. CT head w/o acute process. CT a/p notes effusion unchanged from 2/7, no other abnormalities.   - blood and urine cultures pending   - hold abx for now  - check procal  - PT  - dialysis as below    ESKD on dialysis  [facility] care of Dr. Perla through L UE AV graft. Missed dialysis yesterday 2/14 2/2 weakness. Also recently with excessive bleeding from graft, underwent angiogram with angioplasty of stenosis 2/9.   - nephrology consult for dialysis    DVT  Recent possible recurrent hemarthrosis  Hx hemarthrosis 12/2021. Had suspected hemarthrosis last admission this month.  US 2/7/23 noted DVT, thought 2/2 low/ nontherapeutic INRs recently. INR on presentation at 3.56.   - pharmacy consult for warfarin    Atrial fibrillation/ flutter  Hypertension  [needs rec- ASA 81 mg daily, bumex 2 mg BID on non-dialysis days, clonidine 0.1 BID, prn hydralazine, lisinopril 10 mg daily, metoprolol 150 mg BID, Zetia 10 mg daily]  - pharmacy consult for warfarin  - resume meds with rec    Recurrent R effusion  Has been seen by thoracic, thought related to ESKD/ volume. Previous eval 8/2022 transudative. Had thoracentesis 2/7/23. On imaging on admission appears stable/ unchanged from previous (despite thoracentesis).   - monitor for now    DM II, insulin dependent  [needs rec- lantus 8 units at HS, aspart 5 TID with  meals]  - renal mod CHO diet  - lantus 10 units at HS  - aspart 5 units TID  - TID and HS blood sugars  - med sliding scale insulin     Anemia  Hgb 7.5 on presentation. Recent baseline ~8. ? Playing role in weakness?   - monitor  - defer FENG to nephrology     Diet:   renal CHO diet  DVT Prophylaxis: Warfarin  Flood Catheter: Not present  Lines: None     Cardiac Monitoring: None  Code Status:   DNR/DNI    Clinically Significant Risk Factors Present on Admission        # Hyperkalemia: Highest K = 5.7 mmol/L in last 2 days, will monitor as appropriate      # Anion Gap Metabolic Acidosis: Highest Anion Gap = 19 mmol/L in last 2 days, will monitor and treat as appropriate   # Drug Induced Coagulation Defect: home medication list includes an anticoagulant medication    # Hypertension: home medication list includes antihypertensive(s)              Disposition Plan           Negro Walker MD  Hospitalist Service  [facility]  Securely message with [employer] (more info)  Text page via [employer] Paging/Directory     ______________________________________________________________________    Chief Complaint   weakness    History is obtained from the patient, electronic health record and emergency department physician    History of Present Illness   Gordon Gann is a 77 year old male who presents after a fall.  He lives in assisted living and the evening of presentation he states he was walking to his bed and either stumbled or turned wrong when he fell into the wall.  He then slid to the floor.  He denied head trauma.  Then he states he was unable to get up prompting his presentation.  Here he generally denies complaints.  Denies fevers but states he has had chills.  Denies chest pain or shortness of breath.  Denies abdominal pain.  He states his weakness started a couple of days ago.  He did not go to dialysis yesterday because he felt weak.  He states that this has happened before.  He notes that his  right knee, where he had a hemarthrosis, it is improving and does not have any pain.  He generally uses walker at home      Past Medical History    Past Medical History:   Diagnosis Date     Alcohol abuse      Atrial fibrillation (H)      CKD (chronic kidney disease) stage 4, GFR 15-29 ml/min (H)      Diabetes (H)      Gout      Hypokalemia      Hypophosphatemia      Insomnia      STEMI (ST elevation myocardial infarction) (H)        Past Surgical History   Past Surgical History:   Procedure Laterality Date     CREATE GRAFT LOOP ARTERIOVENOUS UPPER EXTREMITY Left 8/5/2021    Procedure: left arm arteriovenous graft fistula.;  Surgeon: Noemí Hernandez MD;  Location: [facility] OR     IR DIALYSIS FISTULOGRAM LEFT  12/8/2021     IR DIALYSIS FISTULOGRAM LEFT  2/9/2023       Prior to Admission Medications   Prior to Admission Medications   Prescriptions Last Dose Informant Patient Reported? Taking?   Vitamin D, Cholecalciferol, 25 MCG (1000 UT) CAPS  Self Yes No   Sig: Take 1,000 Units by mouth every morning   acetaminophen (TYLENOL) 500 MG tablet   Yes No   Sig: Take 1,000 mg by mouth every 8 hours as needed for mild pain   allopurinol (ZYLOPRIM) 100 MG tablet   Yes No   Sig: Take 200 mg by mouth daily   Patient not taking: Reported on 2/8/2023   aspirin 81 MG EC tablet   No No   Sig: Take 1 tablet (81 mg) by mouth At Bedtime Starting 12/12   bumetanide (BUMEX) 2 MG tablet   No No   Sig: Take 1 tablet (2 mg) by mouth 2 times daily On non dialysis days   cloNIDine (CATAPRES) 0.1 MG tablet   No No   Sig: Take 1 tablet (0.1 mg) by mouth 2 times daily   cyclobenzaprine (FLEXERIL) 10 MG tablet   No No   Sig: Take 1 tablet (10 mg) by mouth 3 times daily   ezetimibe (ZETIA) 10 MG tablet  Self Yes No   Sig: Take 10 mg by mouth every morning   fish oil-omega-3 fatty acids 1000 MG capsule  Self Yes No   Sig: Take 1 g by mouth every morning   folic acid (FOLVITE) 1 MG tablet  Self Yes No   Sig: Take 1 mg by mouth every morning    hydrALAZINE (APRESOLINE) 25 MG tablet  Self No No   Sig: Take 1 tablet (25 mg) by mouth every 6 hours as needed (SBP >180)   insulin aspart (NOVOLOG PEN) 100 UNIT/ML pen  Self No No   Sig: Inject 5 Units Subcutaneous 3 times daily (with meals)   insulin glargine (LANTUS PEN) 100 UNIT/ML pen   No No   Sig: Inject 8 Units Subcutaneous At Bedtime   lisinopril (ZESTRIL) 20 MG tablet   No No   Sig: Take 0.5 tablets (10 mg) by mouth daily   melatonin 3 MG tablet  Self No No   Sig: Take 1 tablet (3 mg) by mouth At Bedtime   Patient taking differently: Take 3 mg by mouth nightly as needed   metoprolol tartrate 75 MG TABS   No No   Sig: Take 150 mg by mouth 2 times daily   mirtazapine (REMERON) 15 MG tablet  Self No No   Sig: Take 1 tablet (15 mg) by mouth At Bedtime   traZODone (DESYREL) 150 MG tablet   Yes No   Sig: Take 150 mg by mouth At Bedtime   vitamin B complex with vitamin C (STRESS TAB) tablet  Self Yes No   Sig: Take 1 tablet by mouth every morning   warfarin ANTICOAGULANT (COUMADIN) 5 MG tablet   Yes No   Sig: Take 7.5 mg by mouth daily      Facility-Administered Medications: None        Review of Systems    The 10 point Review of Systems is negative other than noted in the HPI or here.      Physical Exam   Vital Signs: Temp: 97.6  F (36.4  C) Temp src: Oral BP: 115/74 Pulse: 85   Resp: (!) 31 SpO2: 100 % O2 Device: Nasal cannula Oxygen Delivery: 2 LPM  Weight: 0 lbs 0 oz    General Appearance: Alert, pleasant, no distress  Respiratory: mildly dyspneic at rest, generally clear bilaterally lying flat, good air movement  Cardiovascular: irregular, tachy. No edema  GI: soft, nt/nd  Skin: grossly normal  Other: R knee not swollen, good ROM     Medical Decision Making       60 MINUTES SPENT BY ME on the date of service doing chart review, history, exam, documentation & further activities per the note.      Data     I have personally reviewed the following data over the past 24 hrs:    21.2 (H)  \   7.5 (L)   / 472  (H)     136 94 (L) 76.2 (H) /  263 (H)   5.7 (H) 23 6.90 (H) \       Procal: 3.68 (H) CRP: N/A Lactic Acid: 1.3       INR:  3.56 (H) PTT:  N/A   D-dimer:  N/A Fibrinogen:  N/A       Imaging results reviewed over the past 24 hrs:   Recent Results (from the past 24 hour(s))   Head CT w/o contrast    Narrative    EXAM: CT HEAD W/O CONTRAST  LOCATION: [facility]  DATE/TIME: 2/15/2023 4:11 AM    INDICATION: fall, head injury  COMPARISON: None.  TECHNIQUE: Routine CT Head without IV contrast. Multiplanar reformats. Dose reduction techniques were used.    FINDINGS:  INTRACRANIAL CONTENTS: No intracranial hemorrhage, extraaxial collection, or mass effect.  No CT evidence of acute infarct. Mild to moderate presumed chronic small vessel ischemic changes. Mild to moderate generalized volume loss. No hydrocephalus.     VISUALIZED ORBITS/SINUSES/MASTOIDS: No intraorbital abnormality. No paranasal sinus mucosal disease. No middle ear or mastoid effusion.    BONES/SOFT TISSUES: No acute abnormality.      Impression    IMPRESSION:  1.  No acute intracranial hemorrhage or calvarial fracture.  2.  Mild to moderate volume loss and presumed chronic small vessel ischemic changes.   Chest XR,  PA & LAT    Narrative    EXAM: XR CHEST 2 VIEWS  LOCATION: [facility]  DATE/TIME: 2/15/2023 4:13 AM    INDICATION: cough, weakness  COMPARISON: 2/7/2023      Impression    IMPRESSION: The moderate right-sided pleural effusion is again noted, similar to prior exam with adjacent atelectasis of the right lung base. Overall not significantly changed from prior exam.   CT Abdomen Pelvis w/o Contrast    Narrative    EXAM: CT ABDOMEN PELVIS W/O CONTRAST  LOCATION: [facility]  DATE/TIME: 2/15/2023 5:40 AM    INDICATION: Generalized weakness, microscopic hematuria, elevated white count  COMPARISON: CTA chest dated 2/7/2023.  TECHNIQUE: CT scan of the abdomen and pelvis was  performed without IV contrast. Multiplanar reformats were obtained. Dose reduction techniques were used.  CONTRAST: None.    FINDINGS:   LOWER CHEST: A moderate right-sided pleural effusion is again seen with atelectasis of the right lower lobe again noted, similar to prior exam. The heart is enlarged, similar to prior exam.    HEPATOBILIARY: No significant mass or bile duct dilatation. No calcified gallstones.     PANCREAS: No significant mass, duct dilatation, or inflammatory change.    SPLEEN: Normal size.    ADRENAL GLANDS: Normal.    KIDNEYS/BLADDER: No significant mass, stone, or hydronephrosis.     BOWEL: No obstruction or inflammatory change. Scattered diverticula seen throughout the colon, there is no evidence of diverticulitis.  Appendix is normal.    LYMPH NODES: No lymphadenopathy.    VASCULATURE: Extensive atherosclerotic calcifications are seen throughout the abdominal aorta, mesenteric, pelvic vessels.    PELVIC ORGANS: No pelvic masses.    MUSCULOSKELETAL: Unremarkable.      Impression    IMPRESSION:   1.  Moderate right Pleural effusion with atelectasis of the right lower lobe again seen, similar to prior study from 2/7/2023  2.  No evidence of hydronephrosis or hydroureter. No renal, ureteral, or bladder calculi are identified.  3.  Scattered diverticulosis of the colon without evidence of diverticulitis  4.  Normal appendix

## 2023-02-15 NOTE — ED TRIAGE NOTES
From assisted living.  He turned the corner, lost his balance and slid down the wall to the floor, but not able to get up.  This happened at 0000.  Hx of renal failure and on blood thinners.  EMS called his son in law, who is a nurse at [employer], and encouraged him to go to the hospital (the patient did not want to come in), and he is able to pick him up in the morning.  C-collar applied by EMS.  Chronic afib.     Triage Assessment     Row Name 02/15/23 0259       Triage Assessment (Adult)    Airway WDL WDL       Respiratory WDL    Respiratory WDL WDL       Skin Circulation/Temperature WDL    Skin Circulation/Temperature WDL WDL       Cardiac WDL    Cardiac WDL WDL       Peripheral/Neurovascular WDL    Peripheral Neurovascular WDL WDL       Cognitive/Neuro/Behavioral WDL    Cognitive/Neuro/Behavioral WDL WDL

## 2023-02-15 NOTE — CONSULTS
[facility]    Nephrology Consultation     Date of Admission:  2/15/2023    Assessment & Plan     Gordon Gann is a 77 year old male who was admitted on 2/15/2023.     Assessment:  1) End Stage Kidney Disease    TTS chronic HD, [facility]  Nephroloist Dr. Estrada  Target weight 77.7kg. Was 76.2kg on discharge, target weight does not appear to have been adjusted    Noted weight today 73.8kg  Access: right graft, s/p fistulogram 2/10/23    Dialysis Rx:         Treatment Type  Hemodialysis (procedure)    Dialyzer  : Accertify Series: web care [employer] Model: 17H MELANY Factor: 1455    Dialysate  BiCarb 35 mEq/L    Dialysate  Calcium 3 mEq/L    Dialysate  Potassium 2k    Dialysate  Base Sodium 138 mEq/L    Dialysate Flow Rate  500 ML/min    Blood Flow Rate  450 ML/min    Treatment Time  210 min    UF Profile PRN  #2    UF Profiling Instructions  Try profile two to see if it helps    Temperature  Standard    Max UF Rate  2 L/Hr    Access  AV Graft Arm (Left upper)    Access Concurrent  No    Arterial Needle Guage/Length/Tip  15 g/ -1 in/Sharp    Venous Needle Guage/Length/Tip  15 g/ -1 in/Sharp    Schedule  3 Times per week    Heparin Load  No Loading Dose    Heparin Hourly Dose  No Hourly Dose         Anemia:  3200 units epogen,     CKD-MBD: 1mcg calcitriol q HD    Volume status:   noted right moderate pleural effusion, s/p thoracentesis this am and recurrent    2) s/p fall with generalized weakness        Plan/Recs:  1) short run diaysis today, 2 hrs due to missed run yesteday  2) HD tomorrow per chronic schedule, will further challenge target weight  3) Epo with HD    Crescencio Chaparro DO  [facility] - Nephrology  [phone]  --------------------------------------------------------------------------------------------  Reason for Consult     I was asked to see the patient for ESRD management.     History is obtained from the patient and chart review.      History of Present Illness      Gordon Gann is a 77 year old male who presented yesterday after a fall with inability to get up.  Patient with recent admission from the seventh through 11th of this month where he was admitted with excessive bleeding from his fistula.  He had an elevated INR but ended up having a fistulogram on the ninth with severe segmental stenosis treated with angioplasty.  This was in the outflow vein as well as the subclavian vein.  Discharge after dialysis on the 11th but did not make it to a session yesterday due to weakness.  Patient tells me today simply could not get up but he fell because he had the lights off, turned and lost orientation.  Tells me he wants to go home and give another chance but I told him he just failed a chance and may not be safe to return in his current state of health.  Denies any dyspnea, denies any edema.  Has no acute complaints today.  Only complains when pressed about soreness from laying in the bed in the emergency room.    Past Medical History   I have reviewed this patient's medical history and updated it with pertinent information if needed.   Past Medical History:   Diagnosis Date     Alcohol abuse      Atrial fibrillation (H)      CKD (chronic kidney disease) stage 4, GFR 15-29 ml/min (H)      Diabetes (H)      Gout      Hypokalemia      Hypophosphatemia      Insomnia      STEMI (ST elevation myocardial infarction) (H)        Past Surgical History   I have reviewed this patient's surgical history and updated it with pertinent information if needed.  Past Surgical History:   Procedure Laterality Date     CREATE GRAFT LOOP ARTERIOVENOUS UPPER EXTREMITY Left 8/5/2021    Procedure: left arm arteriovenous graft fistula.;  Surgeon: Noemí Hernandez MD;  Location: [facility] OR     IR DIALYSIS FISTULOGRAM LEFT  12/8/2021     IR DIALYSIS FISTULOGRAM LEFT  2/9/2023       Prior to Admission Medications   Prior to Admission Medications   Prescriptions Last Dose Informant Patient Reported?  Taking?   Vitamin D, Cholecalciferol, 25 MCG (1000 UT) CAPS 2/14/2023 at am Self Yes Yes   Sig: Take 1,000 Units by mouth every morning   acetaminophen (TYLENOL) 500 MG tablet Past Month  Yes Yes   Sig: Take 1,000 mg by mouth every 8 hours as needed for mild pain   aspirin 81 MG EC tablet 2/13/2023 at hs  No Yes   Sig: Take 1 tablet (81 mg) by mouth At Bedtime Starting 12/12   bumetanide (BUMEX) 2 MG tablet 2/14/2023 at am  No Yes   Sig: Take 1 tablet (2 mg) by mouth 2 times daily On non dialysis days   cloNIDine (CATAPRES) 0.1 MG tablet 2/14/2023 at am  No Yes   Sig: Take 1 tablet (0.1 mg) by mouth 2 times daily   ezetimibe (ZETIA) 10 MG tablet 2/14/2023 at am Self Yes Yes   Sig: Take 10 mg by mouth every morning   fish oil-omega-3 fatty acids 1000 MG capsule 2/14/2023 at am Self Yes Yes   Sig: Take 1 g by mouth every morning   folic acid (FOLVITE) 1 MG tablet 2/14/2023 at am Self Yes Yes   Sig: Take 1 mg by mouth every morning   hydrALAZINE (APRESOLINE) 25 MG tablet More than a month Self No Yes   Sig: Take 1 tablet (25 mg) by mouth every 6 hours as needed (SBP >180)   insulin aspart (NOVOLOG PEN) 100 UNIT/ML pen 2/14/2023 at am Self No Yes   Sig: Inject 5 Units Subcutaneous 3 times daily (with meals)   insulin glargine (LANTUS PEN) 100 UNIT/ML pen 2/13/2023  No No   Sig: Inject 8 Units Subcutaneous At Bedtime   lisinopril (ZESTRIL) 20 MG tablet 2/14/2023 at am  No Yes   Sig: Take 0.5 tablets (10 mg) by mouth daily   melatonin 3 MG tablet 2/13/2023 Self No No   Sig: Take 1 tablet (3 mg) by mouth At Bedtime   Patient taking differently: Take 3 mg by mouth nightly as needed   metoprolol tartrate 75 MG TABS 2/14/2023 at am  No Yes   Sig: Take 150 mg by mouth 2 times daily   mirtazapine (REMERON) 15 MG tablet 2/13/2023 at hs Self No No   Sig: Take 1 tablet (15 mg) by mouth At Bedtime   traZODone (DESYREL) 150 MG tablet 2/13/2023 at hs  Yes No   Sig: Take 150 mg by mouth At Bedtime   vitamin B complex with vitamin C  (STRESS TAB) tablet  Self Yes No   Sig: Take 1 tablet by mouth every morning   warfarin ANTICOAGULANT (COUMADIN) 5 MG tablet 2/13/2023  Yes No   Sig: Take 7.5 mg by mouth daily      Facility-Administered Medications: None     Allergies   No Known Allergies    Social History   I have reviewed this patient's social history and updated it with pertinent information if needed. Gordon Gann  reports that he has never smoked. He has never used smokeless tobacco. He reports current alcohol use.    Family History   I have reviewed this patient's family history and updated it with pertinent information if needed.   No family history on file.    Review of Systems   The 10 point Review of Systems is negative other than noted in the HPI.     Physical Exam   Temp: 97.6  F (36.4  C) Temp src: Oral BP: (!) 155/89 Pulse: 98   Resp: 16 SpO2: 100 % O2 Device: Nasal cannula Oxygen Delivery: 2 LPM  Vital Signs with Ranges  Temp:  [97.6  F (36.4  C)] 97.6  F (36.4  C)  Pulse:  [] 98  Resp:  [12-31] 16  BP: (115-155)/() 155/89  FiO2 (%):  [21 %] 21 %  SpO2:  [92 %-100 %] 100 %  162 lbs 11.19 oz    GENERAL: healthy, alert, NAD  HEENT:  Normocephalic. No gross abnormalities.  Pupils equal.  CV: RRR, 2/6 systolic murmur, no clicks, gallops, or rubs, 1+ b/l edema  RESP: Clear anterior and laterally bilaterally   GI: Abdomen soft/nt/nd, BS normal. No masses, organomegaly  MUSCULOSKELETAL: extremities nl - no gross deformities noted  SKIN: no suspicious lesions or rashes, dry to touch  NEURO:  Strength normal and symmetric.   PSYCH: mood good, affect appropriate  LYMPH: No palpable ant/post cervical and supraclavicular adenopathy    Data   BMP  Recent Labs   Lab 02/15/23  0307 02/12/23  0712 02/12/23  0638 02/12/23  0201 02/11/23  0818 02/11/23  0550     --   --   --   --  135*   POTASSIUM 5.7*  --   --   --   --  4.4   CHLORIDE 94*  --   --   --   --  96*   LC 6.9*  --   --   --   --  7.2*   CO2 23  --   --   --   --  25    BUN 76.2*  --   --   --   --  45.7*   CR 6.90*  --   --   --   --  4.81*   * 147* 56* 117*   < > 102*    < > = values in this interval not displayed.     Phos@[email or web address](phos:4)  CBC)  Recent Labs   Lab 02/15/23  0307 02/11/23  0550 02/10/23  0856   WBC 21.2* 11.5* 13.4*   HGB 7.5* 7.1* 7.9*   HCT 24.9* 22.3* 24.6*   MCV 97 94 92   * 329 414     No lab results found in last 7 days.    Invalid input(s): BILIRUBININDIRECT  Recent Labs   Lab 02/15/23  1225   INR 3.60*     No results found for: D2VIT, D3VIT, DTOT  Recent Labs   Lab 02/15/23  0307   HGB 7.5*   HCT 24.9*   MCV 97     No results for input(s): PTHI in the last 168 hours.

## 2023-02-15 NOTE — ED NOTES
[facility]  ED Nurse Handoff Report    ED Chief complaint: Fall      ED Diagnosis:   Final diagnoses:   Generalized weakness   Fall, initial encounter   Leukocytosis, unspecified type   Hyperkalemia   ESRD (end stage renal disease) on dialysis (H)       Code Status: Full Code    Allergies: No Known Allergies    Patient Story:   Pt presented to ED via EMS From assisted living.  He turned the corner, lost his balance and slid down the wall to the floor, but not able to get up.  This happened at 0000.  Hx of renal failure and on blood thinners.  EMS called his son in law, who is a nurse at [employer], and the son encouraged him to go to the hospital (the patient did not want to come in).Pt arrived to ED with a c-collar on, A+O 4, MAEW, PERRL.    Focused Assessment:    A+O4; MAEW; PERRL;  SBP's < 160; A-fib with HR in 80's bpm.  SPo2 >90% RA while awake. Intermittently dropping to 87-89% RA while sleeping.  Dialysis shunt L upper arm (bicep area)  Scattered bruising on all extremities.  Small skin tear on right lower forearm  Dark tea colored urine.    Treatments and/or interventions provided:   ecg monitoring  Supplemental O2 to keep sat's >90% while sleeping.    Patient's response to treatments and/or interventions:   Improved O2 Sat's.    To be done/followed up on inpatient unit:    Continue with plan of care    Does this patient have any cognitive concerns?:  None    Activity level - Baseline/Home:  Walker  Activity Level - Current:   Stand with Assist and Walker    Patient's Preferred language: English   Needed?: No    Isolation: None  Infection: Not Applicable  Patient tested for COVID 19 prior to admission: YES  Bariatric?: No    Vital Signs:   Vitals:    02/15/23 0430 02/15/23 0500 02/15/23 0600 02/15/23 0700   BP: 131/87 115/74 122/79 (!) 137/90   Pulse: 87 85 86 85   Resp: 12 (!) 31 14 15   Temp:       TempSrc:       SpO2: 95% 100% 100% 100%   Weight:    73.8 kg (162 lb 11.2 oz)  "  Height:    1.702 m (5' 7\")       Cardiac Rhythm:     Was the PSS-3 completed:   Yes  What interventions are required if any?               Family Comments: None at bedside in ED.  OBS brochure/video discussed/provided to patient/family: Yes    For the majority of the shift this patient's behavior was Green.   Behavioral interventions performed were None.    ED NURSE PHONE NUMBER: *[phone]         "

## 2023-02-15 NOTE — LETTER
Transition Communication Hand-off for Care Transitions to Next Level of Care Provider    Name: Gordon Gann  : 1945  MRN #: [id number]  Primary Care Provider: Belen Prieto     Primary Clinic: [facility] [address]  [facility] [address]     Reason for Hospitalization:  Hyperkalemia [E87.5]  Generalized weakness [R53.1]  ESRD (end stage renal disease) on dialysis (H) [N18.6, Z99.2]  Fall, initial encounter [W19.XXXA]  Leukocytosis, unspecified type [D72.829]  Admit Date/Time: 2/15/2023  2:55 AM  Discharge Date: 23  Payor Source: Payor: BCBS / Plan: BCBS OUT OF STATE MEDICARE REPLACEMENT / Product Type: Medicare /     Readmission Assessment Measure (TYLOR) Risk Score/category: Elevated risk 30%             Reason for Communication Hand-off Referral: Avoidable readmission within 30 days    Discharge Plan:       Concern for non-adherence with plan of care:   Y/N n  Discharge Needs Assessment:  Needs    Flowsheet Row Most Recent Value   Equipment Currently Used at Home walker, rolling, walker, standard          Already enrolled in Tele-monitoring program and name of program:  NO  Follow-up specialty is recommended: No    Follow-up plan:  F/U INR  Future Appointments   Date Time Provider Department Center   2023  8:45 AM [facility]       Any outstanding tests or procedures:        Referrals     Future Labs/Procedures    Home Care Referral     Comments:    Your provider has ordered home health services. If you have not been contacted within 2 days of your discharge please call the inpatient department phone number at [phone] .            Key Recommendations:  [facility] for RN/PT/OT  Vickie Copeland RN    AVS/Discharge Summary is the source of truth; this is a helpful guide for improved communication of patient story

## 2023-02-15 NOTE — ED NOTES
Pt missed dialysis yesterday. Usually goes T/Thurs/Sat.  He states that he makes little urine. Usually urinates once a day.    C-collar removed at this time following MD assessment.    Elly Miller RN,.......................................... 2/15/2023   3:27 AM

## 2023-02-15 NOTE — PHARMACY-ADMISSION MEDICATION HISTORY
Pharmacy Medication History  Admission medication history interview status for the 2/15/2023  admission is complete. See EPIC admission navigator for prior to admission medications     Location of Interview: Phone  Medication history sources: Patient, Patient's family/friend (Daughter Omayra) and Surescripts    Significant changes made to the medication list:  Cyclobenzaprine and allopurinol were removed since the patient no longer takes them    In the past week, patient estimated taking medication this percent of the time: greater than 90%       Additional medication history information:   Gordon missed his bedtime medication doses last night including his warfarin and glargine.      Medication reconciliation completed by provider prior to medication history? No    Time spent in this activity: 20 minutes    Prior to Admission medications    Medication Sig Last Dose Taking? Auth Provider Long Term End Date   acetaminophen (TYLENOL) 500 MG tablet Take 1,000 mg by mouth every 8 hours as needed for mild pain Past Month Yes Unknown, Entered By History     aspirin 81 MG EC tablet Take 1 tablet (81 mg) by mouth At Bedtime Starting 12/12 2/13/2023 at hs Yes Fili Ireland MD     bumetanide (BUMEX) 2 MG tablet Take 1 tablet (2 mg) by mouth 2 times daily On non dialysis days 2/14/2023 at am Yes Fili Ireland MD Yes    cloNIDine (CATAPRES) 0.1 MG tablet Take 1 tablet (0.1 mg) by mouth 2 times daily 2/14/2023 at am Yes Fili Ireland MD Yes    ezetimibe (ZETIA) 10 MG tablet Take 10 mg by mouth every morning 2/14/2023 at am Yes Unknown, Entered By History Yes    fish oil-omega-3 fatty acids 1000 MG capsule Take 1 g by mouth every morning 2/14/2023 at am Yes Unknown, Entered By History     folic acid (FOLVITE) 1 MG tablet Take 1 mg by mouth every morning 2/14/2023 at am Yes Unknown, Entered By History     hydrALAZINE (APRESOLINE) 25 MG tablet Take 1 tablet (25 mg) by mouth every 6 hours as needed (SBP  >180) More than a month Yes Julia Combs MD Yes    insulin aspart (NOVOLOG PEN) 100 UNIT/ML pen Inject 5 Units Subcutaneous 3 times daily (with meals) 2/14/2023 at am Yes Julia Combs MD Yes    lisinopril (ZESTRIL) 20 MG tablet Take 0.5 tablets (10 mg) by mouth daily 2/14/2023 at am Yes Maame Leyva MD Yes    metoprolol tartrate 75 MG TABS Take 150 mg by mouth 2 times daily 2/14/2023 at am Yes Maame Leyva MD Yes    Vitamin D, Cholecalciferol, 25 MCG (1000 UT) CAPS Take 1,000 Units by mouth every morning 2/14/2023 at am Yes Unknown, Entered By History     insulin glargine (LANTUS PEN) 100 UNIT/ML pen Inject 8 Units Subcutaneous At Bedtime 2/13/2023  Melissa Sanon DO Yes    melatonin 3 MG tablet Take 1 tablet (3 mg) by mouth At Bedtime  Patient taking differently: Take 3 mg by mouth nightly as needed 2/13/2023  Mitesh Nicholson MD     mirtazapine (REMERON) 15 MG tablet Take 1 tablet (15 mg) by mouth At Bedtime 2/13/2023 at hs  Mitesh Nicholson MD Yes    traZODone (DESYREL) 150 MG tablet Take 150 mg by mouth At Bedtime 2/13/2023 at hs  Unknown, Entered By History No    vitamin B complex with vitamin C (STRESS TAB) tablet Take 1 tablet by mouth every morning   Unknown, Entered By History     warfarin ANTICOAGULANT (COUMADIN) 5 MG tablet Take 7.5 mg by mouth daily 2/13/2023  Unknown, Entered By History         The information provided in this note is only as accurate as the sources available at the time of update(s)

## 2023-02-15 NOTE — PROGRESS NOTES
Patient seen and evaluated in the ED, was admitted early this morning with Fall, generalized weakness and found  To have leukocytosis.   Has right sided pleural effusion. WBC elevated and pro calcitonin come back taylor to 3.68  INR is supra therapeutic as well  Blood culture and urine culture obtained.  Will start on IV Vancomycin and Zosyn  US guided thoracentesis and send fluid for analysis and cultures.   PT and OT evaluation   Nephrology consulted to continue dialysis

## 2023-02-16 NOTE — PROGRESS NOTES
Potassium   Date Value Ref Range Status   02/16/2023 4.3 3.4 - 5.3 mmol/L Final   08/30/2022 3.4 3.4 - 5.3 mmol/L Final     Hemoglobin   Date Value Ref Range Status   02/16/2023 6.8 (LL) 13.3 - 17.7 g/dL Final     Creatinine   Date Value Ref Range Status   02/16/2023 5.35 (H) 0.67 - 1.17 mg/dL Final     Urea Nitrogen   Date Value Ref Range Status   02/16/2023 51.7 (H) 8.0 - 23.0 mg/dL Final   08/30/2022 18 7 - 30 mg/dL Final     Sodium   Date Value Ref Range Status   02/16/2023 137 136 - 145 mmol/L Final     INR   Date Value Ref Range Status   02/16/2023 3.38 (H) 0.85 - 1.15 Final                DIALYSIS PROCEDURE NOTE  Hepatitis status of previous patient on machine log was checked and verified ok to use with this patients hepatitis status.  Patient dialyzed for 3hrs. on a K3 bath with a net fluid removal of 3.3L (accounting for 0.3Litre gain from 1 unit PRBC bag).  A BFR of 400 ml/min was obtained via a LAVF using 15 gauge needles.      The treatment plan was discussed with Dr. Chaparro during the treatment.    Total heparin received during the treatment: 0 units.   Needle cannulation sites held x 10 min.     Meds given: 4,000 units of epoetin given, 1 unit PRBCs transfused for hgb of 6.8 today- no transfusion reaction noted during and post-transfusion at dialysis    Complications: none       Person educated: person. Knowledge base substantial. Barriers to learning: none. Educated on procedure, medication and fluid management via verbal mode. Patient verbalized understanding. Pt prefers oral education style.      ICEBOAT? Timeout performed pre-treatment  I: Patient was identified using 2 identifiers  C:  Consent Signed Yes  E: Equipment preventative maintenance is current and dialysis delivery system OK to use  B: Hepatitis B Surface Antigen: Negative; Draw Date: 02/09/2023      Hepatitis B Surface Antibody: Immune; Draw Date: 02/09/2023  O: Dialysis orders present and complete prior to treatment  A: Vascular access  verified and assessed prior to treatment  T: Treatment was performed at a clinically appropriate time  ?: Patient was allowed to ask questions and address concerns prior to treatment  See Adult Hemodialysis flowsheet in EPIC for further details and post assessment.  Machine water alarm in place and functioning. Transducer pods intact and checked every 15min.   Pt returned via cart.  Chlorine/Chloramine water system checked every 4 hours.        Patient repositioned every 2 hours during the treatment.  Post treatment report given to ELÍAS Sorensen RN regarding 3.3L of fluid removed and final BP of 154/86 post treatment.     Please remove patient dressing on AVF and AVG needle sites 24 hours after dialysis. If leaking occurs please apply a Band-Aid

## 2023-02-16 NOTE — CONSULTS
Care Management Initial Consult    General Information  Assessment completed with: Robbi Gordon  Type of CM/SW Visit: Initial Assessment    Primary Care Provider verified and updated as needed: Yes   Readmission within the last 30 days: no previous admission in last 30 days, current reason for admission unrelated to previous admission      Reason for Consult: discharge planning  Advance Care Planning:            Communication Assessment  Patient's communication style: spoken language (English or Bilingual)             Cognitive  Cognitive/Neuro/Behavioral: WDL  Level of Consciousness: alert  Arousal Level: opens eyes spontaneously  Orientation: oriented x 4  Mood/Behavior: flat affect  Best Language: 0 - No aphasia  Speech: clear, spontaneous    Living Environment:   People in home: alone     Current living Arrangements: independent living facility ([facility])      Able to return to prior arrangements: yes       Family/Social Support:  Care provided by: self, child(shirley)  Provides care for: no one  Marital Status:              Description of Support System:           Current Resources:   Patient receiving home care services: No     Community Resources: None  Equipment currently used at home: walker, rolling, walker, standard  Supplies currently used at home: Diabetic Supplies    Employment/Financial:  Employment Status: retired        Financial Concerns: No concerns identified           Lifestyle & Psychosocial Needs:  Social Determinants of Health     Tobacco Use: Low Risk      Smoking Tobacco Use: Never     Smokeless Tobacco Use: Never     Passive Exposure: Not on file   Alcohol Use: Not on file   Financial Resource Strain: Not on file   Food Insecurity: Not on file   Transportation Needs: Not on file   Physical Activity: Not on file   Stress: Not on file   Social Connections: Not on file   Intimate Partner Violence: Not on file   Depression: Not on file   Housing Stability: Not on file  "      Functional Status:  Prior to admission patient needed assistance:   Dependent ADLs:: Ambulation-walker  Dependent IADLs:: Shopping, Medication Management, Transportation       Mental Health Status:  Mental Health Status: No Current Concerns       Chemical Dependency Status:      None noted           Values/Beliefs:  Spiritual, Cultural Beliefs, Sikh Practices, Values that affect care: no               Additional Information:  Met with patient. He lives in Independent Living at [facility].  Writer went over PT recommendation with patient. Patient told writer,\"I have used [facility] in the past and would like them again.They are in [facility] system.\"   Writer called [facility] at [phone]. They confirmed that patient is actually open to them for Skilled RN/PT/OT. They would like updated notes faxed to [phone]. Writer told them that the anticipated discharge date is tomorrow. They will update their team. Writer also notified them that he was under Observation status as of now.    Apryl Mckinney RN        "

## 2023-02-16 NOTE — PROGRESS NOTES
Renal Medicine Progress Note            Assessment/Plan:     Gordon Gann is a 77 year old male who was admitted on 2/15/2023.      Assessment:  1) End Stage Kidney Disease     [facility] chronic HD, [facility]  Nephroloist Dr. Estrada  Target weight 77.7kg. Was 76.2kg on discharge, target weight does not appear to have been adjusted    Noted weight today 73.8kg  Access: right graft, s/p fistulogram 2/10/23     Dialysis Rx:          Treatment Type  Hemodialysis (procedure)    Dialyzer  : [employer] Series: ELISAppstarter Model: 17H EMLANY Factor: 1455    Dialysate  BiCarb 35 mEq/L    Dialysate  Calcium 3 mEq/L    Dialysate  Potassium 2k    Dialysate  Base Sodium 138 mEq/L    Dialysate Flow Rate  500 ML/min    Blood Flow Rate  450 ML/min    Treatment Time  210 min    UF Profile PRN  #2    UF Profiling Instructions  Try profile two to see if it helps    Temperature  Standard    Max UF Rate  2 L/Hr    Access  AV Graft Arm (Left upper)    Access Concurrent  No    Arterial Needle Guage/Length/Tip  15 g/ -1 in/Sharp    Venous Needle Guage/Length/Tip  15 g/ -1 in/Sharp    Schedule  3 Times per week    Heparin Load  No Loading Dose    Heparin Hourly Dose  No Hourly Dose          Anemia:  3200 units epogen,      CKD-MBD: 1mcg calcitriol q HD     Volume status:   noted right moderate pleural effusion, s/p thoracentesis this am and recurrent     2) s/p fall with generalized weakness. PT assessment today.            Plan/Recs:  1) HD again today per chronic schedule, will further challenge target weight.    Standing weight post HD  Would be helpful.  2) Epo with HD  3) Can give unit of blood with dialysis        Crescencio Chaparro DO  [facility]  Office: [phone]  Cell: [phone]        Interval History:      Pt s/p HD yesterday, ran 2 hrs but able to remove 2.5L fluids. He denies any complaints today. Asks me about needing blood  Transfusion.         Medications and Allergies:       insulin aspart  5 Units  "Subcutaneous TID w/meals     insulin aspart  1-7 Units Subcutaneous TID AC     insulin aspart  1-5 Units Subcutaneous At Bedtime     insulin glargine  10 Units Subcutaneous At Bedtime     piperacillin-tazobactam  2.25 g Intravenous Q6H     vancomycin  1,250 mg Intravenous Once     vancomycin place cespedes - receiving intermittent dosing  1 each Intravenous See Admin Instructions     warfarin-No DOSE today  1 each Does not apply no dose today (warfarin)      No Known Allergies         Physical Exam:   Vitals were reviewed  /82 (BP Location: Right arm)   Pulse 119   Temp 98.6  F (37  C) (Oral)   Resp 18   Ht 1.702 m (5' 7\")   Wt 73.8 kg (162 lb 11.2 oz)   SpO2 95%   BMI 25.48 kg/m      Wt Readings from Last 3 Encounters:   02/15/23 73.8 kg (162 lb 11.2 oz)   02/11/23 73.8 kg (162 lb 12.8 oz)   08/29/22 87.5 kg (192 lb 14.4 oz)       Intake/Output Summary (Last 24 hours) at 2/16/2023 1107  Last data filed at 2/16/2023 0800  Gross per 24 hour   Intake 300 ml   Output 2500 ml   Net -2200 ml     GENERAL: healthy, alert, NAD  HEENT:  Normocephalic. No gross abnormalities.    CV: RRR, 2/6 systolic murmur, no clicks, gallops, or rubs, trace edema  RESP: Clear anterior and laterally bilaterally   GI: Abdomen soft/nt/nd, BS normal. No masses, organomegaly  MUSCULOSKELETAL: extremities nl - no gross deformities noted  SKIN: no suspicious lesions or rashes, dry to touch  NEURO:  Strength normal and symmetric.   PSYCH: mood good, affect appropriate             Data:     BMP  Recent Labs   Lab 02/16/23  0719 02/16/23  0547 02/16/23  0209 02/15/23  2109 02/15/23  1321 02/15/23  0307 02/11/23  0818 02/11/23  0550   NA  --  137  --   --   --  136  --  135*   POTASSIUM  --  4.3  --   --   --  5.7*  --  4.4   CHLORIDE  --  96*  --   --   --  94*  --  96*   LC  --  7.3*  --   --   --  6.9*  --  7.2*   CO2  --  25  --   --   --  23  --  25   BUN  --  51.7*  --   --   --  76.2*  --  45.7*   CR  --  5.35*  --   --   --  6.90*  " --  4.81*   * 204* 265* 292*   < > 263*   < > 102*    < > = values in this interval not displayed.     CBC  Recent Labs   Lab 02/16/23  0547 02/15/23  0307 02/11/23  0550 02/10/23  0856   WBC 10.4 21.2* 11.5* 13.4*   HGB 6.8* 7.5* 7.1* 7.9*   HCT 22.5* 24.9* 22.3* 24.6*   MCV 98 97 94 92    472* 329 414     Lab Results   Component Value Date    AST 22 08/19/2022    ALT 32 08/19/2022    ALKPHOS 213 (H) 08/19/2022    BILITOTAL 0.5 08/19/2022     Lab Results   Component Value Date    INR 3.38 (H) 02/16/2023       Attestation:  I have reviewed today's vital signs, notes, medications, labs and imaging.    DO [facility] - Nephrology  Office: [phone]  Cell: [phone]

## 2023-02-16 NOTE — PROGRESS NOTES
"   02/16/23 0900   Appointment Info   Signing Clinician's Name / Credentials (PT) Kyle Aldana DPT       Present no   Living Environment   People in Home alone   Current Living Arrangements independent living facility   Home Accessibility no concerns   Transportation Anticipated health plan transportation   Living Environment Comments Pt lives alone in [facility]. No stairs. Pt reports his daughter will be getting lasik surgery today but will be able to assist pt starting tomorrow.   Self-Care   Usual Activity Tolerance good   Current Activity Tolerance moderate   Regular Exercise No   Equipment Currently Used at Home walker, rolling   Fall history within last six months yes   Number of times patient has fallen within last six months 1   Activity/Exercise/Self-Care Comment Pt reports being IND at baseline with all ADLs. Pt has access to meal assistance at [facility] but does not consistently use it. Pt reports ambulating w/ a FWW at baseline. Pt does not drive. Pt reports he was going to initiate HHPT before coming to hospital.   General Information   Onset of Illness/Injury or Date of Surgery 02/16/23   Referring Physician Negro Walker MD   Patient/Family Therapy Goals Statement (PT) \"To go home\"   Pertinent History of Current Problem (include personal factors and/or comorbidities that impact the POC) Per Chart: Gordon Gann is a 77 year old male admitted on 2/15/2023. He presents after a fall   Existing Precautions/Restrictions fall   Weight-Bearing Status - LLE full weight-bearing   Weight-Bearing Status - RLE full weight-bearing   Cognition   Orientation Status (Cognition) oriented x 4   Pain Assessment   Patient Currently in Pain No   Integumentary/Edema   Integumentary/Edema no deficits were identifed   Posture    Posture Forward head position;Protracted shoulders   Range of Motion (ROM)   Range of Motion ROM is WFL   Strength (Manual Muscle Testing)   Strength (Manual Muscle Testing) " Able to perform R SLR;Able to perform L SLR   Bed Mobility   Comment, (Bed Mobility) Supine>sit w/ SBA   Transfers   Comment, (Transfers) Sit>stand w/ FWW and CGA   Gait/Stairs (Locomotion)   [place] Level (Gait) contact guard   Assistive Device (Gait) walker, front-wheeled   Distance in Feet 10'   Distance in Feet (Gait) 100'   Balance   Balance Comments Good static sitting; pt ambulates w/ a FWW and is steady   Sensory Examination   Sensory Perception patient reports no sensory changes   Clinical Impression   Criteria for Skilled Therapeutic Intervention Yes, treatment indicated   PT Diagnosis (PT) Impaired gait   Influenced by the following impairments Decreased activity tolerance; decreased balance; decreased strength   Functional limitations due to impairments Impaired functional mobility   Clinical Presentation (PT Evaluation Complexity) Stable/Uncomplicated   Clinical Presentation Rationale Clinical judgement   Clinical Decision Making (Complexity) low complexity   Planned Therapy Interventions (PT) balance training;bed mobility training;gait training;patient/family education;strengthening;transfer training   Risk & Benefits of therapy have been explained evaluation/treatment results reviewed;care plan/treatment goals reviewed;risks/benefits reviewed;current/potential barriers reviewed;participants voiced agreement with care plan;participants included;patient   PT Total Evaluation Time   PT Eval, Low Complexity Minutes (56341) 10   Physical Therapy Goals   PT Frequency 5x/week   PT Predicted Duration/Target Date for Goal Attainment 02/21/23   PT Goals Bed Mobility;Transfers;Gait   PT: Bed Mobility Supervision/stand-by assist;Supine to/from sit;Goal Met   PT: Transfers Supervision/stand-by assist;Sit to/from stand;Assistive device   PT: Gait Supervision/stand-by assist;Assistive device;150 feet   Interventions   Interventions Quick Adds Gait Training;Therapeutic Activity   Therapeutic Activity    Therapeutic Activities: dynamic activities to improve functional performance Minutes (08358) 13   Symptoms Noted During/After Treatment Fatigue   Treatment Detail/Skilled Intervention Greeted pt supine in bed, agreed to PT. VSS on RA throughout session. Pt performed supine>sit w/ SBA. Once in sitting, pt able to scoot self to EOB and sit unsupported without LOB. Pt performed sit>stand x 6 w/ FWW and CGA progressing to SBA, verbal cues for hand placement. After ambulation, pt returned to bed and performed stand>sit w/ FWW and SBA, verbal cues to descend in a slow, controlled motion. Pt performed sit>supine w/ SBA, demonstrating ability to safely lift BLEs back into bed and reposition self. Pt ended session supine in bed, with all needs met and call light within reach.   Gait Training   Gait Training Minutes (45313) 15   Symptoms Noted During/After Treatment (Gait Training) fatigue   Treatment Detail/Skilled Intervention Pt ambulated w/ FWW and CGA progressing to SBA. Pt ambulated with decreased gait speed, downward gaze, FWW out too far in front of pt, and steady. Verbal cues for upright gaze and posture, FWW proximity and pacing. Pt was steady throughout and had no LOB. Pt unable to ambulate further citing fatigue.   PT Discharge Planning   PT Plan Progress gait w/ FWW; dynamic balance   PT Discharge Recommendation (DC Rec) home with home care physical therapy   PT Rationale for DC Rec Pt is below baseline but is moving well. Pt currently requiring CGA for ambulation. Pt presenting with deficits in activity tolerance. Pt does have decreased activity tolerance compared to baseline and would therefore benefit from HHPT to further improve endurance. Pt will require assist of one, assistive device, and considerable effort to come and go from house, therefore, home health PT is recommended. Pt reports his daughter is getting lasik procedure today but will be able to assist pt as needed starting tomorrow.   PT Brief  overview of current status Supine>sit w/ SBA; sit>stand w/ FWW and CGA; gait w/ FWW and CGA   Total Session Time   Timed Code Treatment Minutes 28   Total Session Time (sum of timed and untimed services) 38

## 2023-02-16 NOTE — PHARMACY-VANCOMYCIN DOSING SERVICE
"Pharmacy Vancomycin Note  Date of Service 2023  Patient's  1945   77 year old, male    Indication: Sepsis  Day of Therapy: 2  Current vancomycin regimen:  Per level   Current vancomycin monitoring method: per level   Current vancomycin therapeutic monitoring goal: 15-20 mg/L      Current estimated CrCl = Estimated Creatinine Clearance: 12.3 mL/min (A) (based on SCr of 5.35 mg/dL (H)).    Creatinine for last 3 days  2/15/2023:  3:07 AM Creatinine 6.90 mg/dL  2023:  5:47 AM Creatinine 5.35 mg/dL    Recent Vancomycin Levels (past 3 days)  2023:  5:47 AM Vancomycin 15.6 ug/mL    Vancomycin IV Administrations (past 72 hours)                   vancomycin (VANCOCIN) 1,750 mg in 0.9% NaCl 500 mL intermittent infusion (mg) 1,750 mg Given 02/15/23 1038                Nephrotoxins and other renal medications (From now, onward)    Start     Dose/Rate Route Frequency Ordered Stop    23 1030  vancomycin (VANCOCIN) 1,250 mg in 0.9% NaCl 250 mL intermittent infusion         1,250 mg  over 90 Minutes Intravenous ONCE 23 1003      02/15/23 1357  vancomycin place cespedes - receiving intermittent dosing         1 each Intravenous SEE ADMIN INSTRUCTIONS 02/15/23 1357      02/15/23 0910  piperacillin-tazobactam (ZOSYN) 2.25 g vial to attach to  ml bag        Note to Pharmacy: For [facility], [facility] and [facility]: For Zosyn-naive patients, use the \"Zosyn initial dose + extended infusion\" order panel.    2.25 g  over 30 Minutes Intravenous EVERY 6 HOURS 02/15/23 0819               Contrast Orders - past 72 hours (72h ago, onward)    None          Interpretation of levels and current regimen:  Vancomycin level is reflective of therapeutic level    Has serum creatinine changed greater than 50% in last 72 hours: No        Renal Function: ESRD on Dialysis        Plan:  1. Random vancomycin level= 15.6. will give IV vancomcyin 1250mg x 1 post dialysis today. Will get pre-dialysis level on sat   2. Vancomycin " monitoring method: Trough (Method 2 = manual dose calculation)  3. Vancomycin therapeutic monitoring goal: 15-20 mg/L  4. Pharmacy will check vancomycin levels as appropriate in 1-3 Days    Fay Gordon RPH

## 2023-02-16 NOTE — PROGRESS NOTES
[facility]    Medicine Progress Note - Hospitalist Service    Date of Admission:  2/15/2023    Assessment & Plan      Gordon Gann is a 77 year old male admitted on 2/15/2023. He presents after a fall    Leukocytosis  Generalized weakness  Fall  [place] Was walking at home (w/o walker) when lost balance, fell into wall and slid to floor. Unable to get up.   * K 5.7, creat 6.9 (dialysis).  * denies infectious symptoms; just weak last couple of days.  WBC 21.2.  Procal elevated but significance unclear in setting of ESRD. lactic 1.3.  UA with 42 WBC, 115 RBC. CXR with moderate R effusion. CT head w/o acute process. CT a/p notes effusion unchanged from 2/7, no other abnormalities.   * s/p right thoracentesis 2/15 noting loculated fluid; gram stain negative    - blood and urine and pleural fluid cultures ngtd   - leukocytosis resolved, afebrile  - with cultures showing no growth will stop vancomycin, continue zosyn for now  - dialysis as below    ESKD on dialysis  [facility] care of Dr. Perla through L UE AV graft. Missed dialysis yesterday 2/14 2/2 weakness. Also recently with excessive bleeding from graft, underwent angiogram with angioplasty of stenosis 2/9.   - nephrology consult for dialysis; will dialyze today    Chronic anemia  Rule out GIB  Hgb 7.5 on presentation. Recent baseline ~8. ? Playing role in weakness?   - monitor  - hgb 6.8 this AM, consented for transfusion and will give 1U PRBC with dialysis  - reports black stools x1 week, monitor  - repeat hgb this evening and tomorrow AM  - hold aspirin, continue coumadin for now  - defer FENG to nephrology    DVT  Supratherapeutic INR  Recent possible recurrent hemarthrosis  Hx hemarthrosis 12/2021. Had suspected hemarthrosis last admission this month.  US 2/7/23 noted DVT, thought 2/2 low/ nontherapeutic INRs recently. INR on presentation at 3.56.   - pharmacy to dose warfarin; INR remains elevated  "at 3.3    Atrial fibrillation/ flutter  Hypertension  [needs rec- ASA 81 mg daily, bumex 2 mg BID on non-dialysis days, clonidine 0.1 BID, prn hydralazine, lisinopril 10 mg daily, metoprolol 150 mg BID, Zetia 10 mg daily]  - pharmacy to dose warfarin  - continue PTA clonidine and metoprolol with hold parameters  - BP currently 120-130, will hold lisinopril for now  - hold aspirin with hgb <7    Recurrent R effusion  Has been seen by thoracic, thought related to ESKD/ volume. Previous eval 8/2022 transudative. Had thoracentesis 2/7/23. On imaging on admission appears stable/ unchanged from previous (despite thoracentesis).   * s/p thoracentesis as above  - stable on room air    DM II, on long term insulin, uncontrolled  [needs rec- lantus 8 units at HS, aspart 5 TID with meals]  A1C 9.1% on 2/8/23  - renal mod CHO diet  - lantus 10 units at HS  - aspart 5 units TID  - TID and HS blood sugars  - remains hyperglycemic increase to high dose ssi        Diet: Combination Diet Renal Diet (dialysis); Moderate Consistent Carb (60 g CHO per Meal) Diet    DVT Prophylaxis: Warfarin  Flood Catheter: Not present  Lines: None     Cardiac Monitoring: None  Code Status: No CPR- Do NOT Intubate      Clinically Significant Risk Factors Present on Admission        # Hyperkalemia: Highest K = 5.7 mmol/L in last 2 days, will monitor as appropriate      # Anion Gap Metabolic Acidosis: Highest Anion Gap = 19 mmol/L in last 2 days, will monitor and treat as appropriate  # Hypoalbuminemia: Lowest albumin = 2.5 g/dL at 2/16/2023  5:47 AM, will monitor as appropriate  # Drug Induced Coagulation Defect: home medication list includes an anticoagulant medication    # Hypertension: home medication list includes antihypertensive(s)      # Overweight: Estimated body mass index is 25.48 kg/m  as calculated from the following:    Height as of this encounter: 1.702 m (5' 7\").    Weight as of this encounter: 73.8 kg (162 lb 11.2 oz).       "     Disposition Plan      Expected Discharge Date: 02/17/2023        Discharge Comments: Dialysis.  Needs a blood trasnfusion.          Samir Simmons MD  Hospitalist Service  [facility]  Securely message with [employer] (more info)  Text page via [employer] Paging/Directory   ______________________________________________________________________    Interval History   Reports some improvement in weakness.  Denies any infectious symptoms including fever/chills, dyspnea, cough, nausea, diarrhea, abdominal pain, skin rash/sore, headache, sore throat or other complaints.     Physical Exam   Vital Signs: Temp: 98.5  F (36.9  C) Temp src: Oral BP: 135/84 Pulse: 118   Resp: 18 SpO2: 96 % O2 Device: None (Room air) Oxygen Delivery: 2 LPM  Weight: 162 lbs 11.19 oz    General Appearance: Well nourished male in NAD  Respiratory: lungs CTAB, no wheezes or crackles no tachypnea   Cardiovascular: RRR, normal s1/s2 without murmur  GI: abdomen soft, normal bowel sounds  Skin: no rash or signs of cellulitis, scattered bruising  Other: Alert and appropriate, cranial nerves grossly intact     Medical Decision Making       40 MINUTES SPENT BY ME on the date of service doing chart review, history, exam, documentation & further activities per the note.      Data     I have personally reviewed the following data over the past 24 hrs:    10.4  \   6.8 (LL)   / 341     137 96 (L) 51.7 (H) /  234 (H)   4.3 25 5.35 (H) \       ALT: N/A AST: N/A AP: N/A TBILI: N/A   ALB: 2.5 (L) TOT PROTEIN: N/A LIPASE: N/A       Procal: N/A CRP: N/A Lactic Acid: 1.2       INR:  3.38 (H) PTT:  N/A   D-dimer:  N/A Fibrinogen:  N/A       Ferritin:  N/A % Retic:  N/A LDH:  N/A

## 2023-02-16 NOTE — UTILIZATION REVIEW
Concurrent stay review; Secondary Review Determination    Under the authority of the Utilization Management Committee, the utilization review process indicated a secondary review on the above patient. The review outcome is based on review of the medical records, discussions with staff, and applying clinical experience noted on the date of the review.    (x) Observation Status Appropriate - Concurrent stay review    RATIONALE FOR DETERMINATION       Gordon Gann is a 77 year old male admitted on 2/15/2023. He presented to the ED for evaluation after a fall.  He was walking at home without his walker, lost balance, and fell the floor.  Emergency department evaluation showed BMP suggestive of end-stage renal disease, which he has.  White blood cell count was elevated at 21.2.  Urine analysis showed 40 to white blood cells and 115 red blood cells.  Chest x-ray showed moderate right-sided effusion with probably some associated atelectasis.  CT of head showed no acute process.  CT of abdomen pelvis showed pleural effusion but no other abnormalities.  He was admitted for further cares.  Thoracentesis was done on 2/15 and was not suggestive of infection.  Urine cultures are no growth to date.  He has been covered with empiric antibiotics.  White blood cell count normalized.  He has had dialysis for his end-stage renal disease.  Of note, hemoglobin was 7.5 on presentation.  It drifted down to 6.8 this morning and transfusion was given on dialysis.  He reports some dark stools last week but has not had melena or hematochezia since admission.  He has been seen by physical therapy and home with home care physical therapy has been recommended.       Patient is clinically improving and there is no clear indication to change patient's status to inpatient. The severity of illness, intensity of service provided, expected LOS and risk for adverse outcome make the care appropriate for observation.    This document was produced  using voice recognition software    The information on this document is developed by the utilization review team in order for the business office to ensure compliance. This only denotes the appropriateness of proper admission status and does not reflect the quality of care rendered.    The definitions of Inpatient Status and Observation Status used in making the determination above are those provided in the CMS Coverage Manual, Chapter 1 and Chapter 6, section 70.4.    Sincerely,    Ady Doran MD     Utilization Review    Physician Advisor    [facility].

## 2023-02-16 NOTE — PLAN OF CARE
Goal Outcome Evaluation:      Plan of Care Reviewed With: patient    Orientation/Cognitive: AOx4  Observation Goals (Met/ Not Met): Not met  Mobility Level/Assist Equipment: A1/GB/W  Fall Risk (Y/N): Y  Behavior Concerns: Green  Pain Management: Denies  Tele/VS/O2: VSS on RA  ABNL Lab/BG: See results  Diet: Mod carb  Bowel/Bladder: Continent, on dialysis, creates little urine  Skin Concerns: Scattered bruising, LUE fistula  Drains/Devices: PIV SL  Tests/Procedures for next shift: PT consulted, AM labs  Anticipated DC date & active delays: TBD  Patient Stated Goal for Today: Rest

## 2023-02-16 NOTE — PROGRESS NOTES
MD Notification    Notified Person: MD    Notified Person Name: Iris    Notification Date/Time: 2/16/23 at 10:37 pm    Notification Interaction: Vocera page    Purpose of Notification: FYI, Hgb 6.8 this morning, type and screen done, just needs consent signed. Nephrology saw and can get transfusion during dialysis today scheduled at 1:00pm.     Orders Received: Consent for blood signed     Comments:

## 2023-02-16 NOTE — PLAN OF CARE
Orientation/Cognitive: AOx4  Observation Goals (Met/ Not Met): Not met  Mobility Level/Assist Equipment: A1/GB/W  Fall Risk (Y/N): Y  Behavior Concerns: none  Pain Management: Denies  Tele/VS/O2: VSS on RA  ABNL Lab/BG: Hemoglobin 6.8 in AM Provider paged. Type & screen ordered. B, 265. Creat: 5.35. Phos: 5.3  Diet: Mod carb  Bowel/Bladder: Continent, on dialysis, creates little urine  Skin Concerns: Scattered bruising, LUE fistula  Drains/Devices: PIV SL  Tests/Procedures for next shift: PT consulted, AM labs  Anticipated DC date & active delays: TBD  Patient Stated Goal for Today: Rest

## 2023-02-16 NOTE — PLAN OF CARE
Goal Outcome Evaluation:      Plan of Care Reviewed With: patient  Orientation/Cognitive: AOx4, forgetful  Observation Goals (Met/ Not Met): Not met  Mobility Level/Assist Equipment: A1/GB/W  Fall Risk (Y/N): Y  Behavior Concerns: Green  Pain Management: Denies  Tele/VS/O2: VSS on RA  ABNL Lab/BG: Hgb 6.8, 1 unit of blood given in Dialysis. Hgb checks q12  Diet: Renal/Mod carb, C/o nausea after eating, IV Zofran given, resolved  Bowel/Bladder: Continent, on dialysis, creates little urine  Skin Concerns: Scattered bruising, blanchable sacrum mepilex in place, small skin tear to R lower forearm CDI, LUE fistula CDI  Drains/Devices: PIV SL  Tests/Procedures for next shift: Hgb at 0600  Anticipated DC date & active delays: PT recommending home with PT, CC following for discahrge to [facility]  Patient Stated Goal for Today: Rest

## 2023-02-16 NOTE — PLAN OF CARE
Observation goals:    -diagnostic tests and consults completed and resulted: Not met  -vital signs normal or at patient baseline: Met  -returns to baseline functional status: Not met  Nurse to notify provider when observation goals have been met and patient is ready for discharge.

## 2023-02-17 NOTE — PLAN OF CARE
Orientation/Cognitive: A&OX4  Observation Goals (Met/ Not Met): Not Met  Mobility Level/Assist Equipment: A1/GB/Walker  Fall Risk (Y/N): Y  Behavior Concerns: Green  Pain Management: Denies  Tele/VS/O2: VSS/RA Except Tachycardia   ABNL Lab/BG: See Chart, Blood sugar was 68, 79, 125  Diet: Renal+Mod Carb Diet  Bowel/Bladder: Continet  Skin Concerns: Bruising to upper forearms, Fistula to LUE  Drains/Devices: PIV/SL  Tests/Procedures for next shift: Hb BID, INR  Anticipated DC date & active delays: TBD    Patient refuse to take vitals @midnight and asking for sleeping pills, melatonin 1 mg given and sleep rest of the night. Mid night Blood sugar was 68, 79, 125    Observation goals  PRIOR TO DISCHARGE        Comments: -diagnostic tests and consults completed and resulted- Met  -vital signs normal or at patient baseline- Not Met  -returns to baseline functional status- Partially Met  Nurse to notify provider when observation goals have been met and patient is ready for discharge.

## 2023-02-17 NOTE — PROGRESS NOTES
Observation goals  PRIOR TO DISCHARGE        Comments: -diagnostic tests and consults completed and resulted : MET    -vital signs normal or at patient baseline : MET     -returns to baseline functional status : NOT MET

## 2023-02-17 NOTE — PROGRESS NOTES
Orientation/Cognitive: A&Ox4  Observation Goals (Met/ Not Met): Not Met  Mobility Level/Assist Equipment: A-1 GB/Walker  Fall Risk (Y/N): Y  Behavior Concerns: None  Pain Management: Denies  Tele/VS/O2: A.Fib HR to 120's. Other vitals stable on RA.  ABNL Lab/BG: BG- 142  Diet: Mod. Carb  Bowel/Bladder: Cont. B&B  Skin Concerns: Bruising to upper forearms, Fistula to LUE  Drains/Devices: IV SL to R forearm  Tests/Procedures for next shift: Pending EKG, Hgb draw for 0600 2/17/23  Anticipated DC date & active delays: 2/17/23  Patient Stated Goal for Today: None

## 2023-02-17 NOTE — PROGRESS NOTES
Renal Medicine Progress Note            Assessment/Plan:     Gordon Gann is a 77 year old male who was admitted on 2/15/2023.      Assessment:  1) End Stage Kidney Disease     [facility] chronic HD, [facility]  Nephrologist Dr. Estrada  Target weight 77.7kg. Was 76.2kg on discharge, target weight does not appear to have been adjusted    Noted weight today 71.9kg  Access: right graft, s/p fistulogram and balloon angioplasty of stenoses 2/10/23     Dialysis Rx:          Treatment Type  Hemodialysis (procedure)    Dialyzer  : [employer]an[employer] Series: [employer] Model: 17H MELANY Factor: 1455    Dialysate  BiCarb 35 mEq/L    Dialysate  Calcium 3 mEq/L    Dialysate  Potassium 2k    Dialysate  Base Sodium 138 mEq/L    Dialysate Flow Rate  500 ML/min    Blood Flow Rate  450 ML/min    Treatment Time  210 min    UF Profile PRN  #2    UF Profiling Instructions  Try profile two to see if it helps    Temperature  Standard    Max UF Rate  2 L/Hr    Access  AV Graft Arm (Left upper)    Access Concurrent  No    Arterial Needle Guage/Length/Tip  15 g/ -1 in/Sharp    Venous Needle Guage/Length/Tip  15 g/ -1 in/Sharp    Schedule  3 Times per week    Heparin Load  No Loading Dose    Heparin Hourly Dose  No Hourly Dose          Anemia:  3200 units epogen,      CKD-MBD: 1mcg calcitriol q HD     Volume status:   noted right moderate pleural effusion, s/p thoracentesis 2/15 and recurrent. Exudative. Has some weight loss lately. Would consider malignancy.      2) s/p fall with generalized weakness. PT assessment ongoing. He still feels weak.            Plan/Recs:  1) HD tomorrow, please obtain post HD standing weight  2) Epo with HD  3) will need to lower TW (likely 72kg, called his dialysis unit and notified them, if able to achieve even lower weight they will need an update)       Galen Benson MD   [facility]  Office: [phone]          Interval History:     HD yesterday tolerated well, removed 3.3L without issue.  "Weight down to 71.9kg. He reports weight loss in general, which supports us lowering his dry weight.   He denies SOB   Reports R knee pain/swelling, but no LE edema. Doesn't feel he has much more fluid on him   Denies lightheadedness or dizziness with standing   Wonders why his effusion recurrs.   Remains tachycardic to 120s           Medications and Allergies:       - MEDICATION INSTRUCTIONS for Dialysis Patients -   Does not apply See Admin Instructions     bumetanide  2 mg Oral 2 times per day on Sun Mon Wed Fri     cloNIDine  0.1 mg Oral BID     insulin aspart  1-10 Units Subcutaneous TID AC     insulin aspart  1-7 Units Subcutaneous At Bedtime     insulin aspart  5 Units Subcutaneous TID w/meals     insulin glargine  10 Units Subcutaneous At Bedtime     metoprolol tartrate  150 mg Oral BID     mirtazapine  15 mg Oral At Bedtime     piperacillin-tazobactam  2.25 g Intravenous Q6H     traZODone  150 mg Oral At Bedtime     warfarin ANTICOAGULANT  5 mg Oral ONCE at 18:00      No Known Allergies         Physical Exam:   Vitals were reviewed  /79 (BP Location: Right arm)   Pulse (!) 121   Temp 97.9  F (36.6  C) (Oral)   Resp 16   Ht 1.702 m (5' 7\")   Wt 71.9 kg (158 lb 9.6 oz)   SpO2 96%   BMI 24.84 kg/m      Wt Readings from Last 3 Encounters:   02/17/23 71.9 kg (158 lb 9.6 oz)   02/11/23 73.8 kg (162 lb 12.8 oz)   08/29/22 87.5 kg (192 lb 14.4 oz)       Intake/Output Summary (Last 24 hours) at 2/16/2023 1107  Last data filed at 2/16/2023 0800  Gross per 24 hour   Intake 300 ml   Output 2500 ml   Net -2200 ml     GENERAL: alert, NAD  HEENT:  Normocephalic. No gross abnormalities.    CV: RRR, 2/6 systolic murmur, no clicks, gallops, or rubs, trace edema  RESP: Clear anterior and laterally bilaterally   GI: Abdomen soft/nt/nd, BS normal. No masses, organomegaly  MUSCULOSKELETAL: extremities nl - mild R knee effusion. No edema  SKIN: no suspicious lesions or rashes, dry to touch  NEURO:  Strength normal " and symmetric.   PSYCH: mood good, affect appropriate             Data:     BMP  Recent Labs   Lab 02/17/23  0742 02/17/23  0507 02/17/23  0221 02/17/23  0157 02/16/23  0719 02/16/23  0547 02/15/23  1321 02/15/23  0307 02/11/23  0818 02/11/23  0550   NA  --   --   --   --   --  137  --  136  --  135*   POTASSIUM  --   --   --   --   --  4.3  --  5.7*  --  4.4   CHLORIDE  --   --   --   --   --  96*  --  94*  --  96*   LC  --   --   --   --   --  7.3*  --  6.9*  --  7.2*   CO2  --   --   --   --   --  25  --  23  --  25   BUN  --   --   --   --   --  51.7*  --  76.2*  --  45.7*   CR  --   --   --   --   --  5.35*  --  6.90*  --  4.81*   GLC 99 128* 79 68*   < > 204*   < > 263*   < > 102*    < > = values in this interval not displayed.     CBC  Recent Labs   Lab 02/17/23  0702 02/16/23  0547 02/15/23  0307 02/11/23  0550   WBC 10.7 10.4 21.2* 11.5*   HGB 8.3* 6.8* 7.5* 7.1*   HCT  --  22.5* 24.9* 22.3*   MCV  --  98 97 94   PLT  --  341 472* 329     Lab Results   Component Value Date    AST 22 08/19/2022    ALT 32 08/19/2022    ALKPHOS 213 (H) 08/19/2022    BILITOTAL 0.5 08/19/2022     Lab Results   Component Value Date    INR 2.48 (H) 02/17/2023       Attestation:  I have reviewed today's vital signs, notes, medications, labs and imaging.    Galen Benson MD  [facility] - Nephrology  Office: [phone]

## 2023-02-17 NOTE — PROVIDER NOTIFICATION
Dr Simmons notified for    Patient still c/o RUQ pain 2-3/10. RUQ is tender to touch. Reported passing gas and abdomen not distended.   Declined pain medications.       Response:  Repeat LFT tomorrow and may younger US if still not resolved.

## 2023-02-17 NOTE — PROGRESS NOTES
[facility]    Medicine Progress Note - Hospitalist Service    Date of Admission:  2/15/2023    Assessment & Plan      Gordon Gann is a 77 year old male admitted on 2/15/2023. He presents after a fall    Leukocytosis, resolved  Generalized weakness  Fall  [place] Was walking at home (w/o walker) when lost balance, fell into wall and slid to floor. Unable to get up.   * K 5.7, creat 6.9 (dialysis).  * denies infectious symptoms; just weak last couple of days.  WBC 21.2.  Procal elevated but significance unclear in setting of ESRD. lactic 1.3.  UA with 42 WBC, 115 RBC. CXR with moderate R effusion. CT head w/o acute process. CT a/p notes effusion unchanged from 2/7, no other abnormalities.   * s/p right thoracentesis 2/15 noting loculated fluid; gram stain negative  * stopped vancomycin 2/16 with negative cultures    - blood and urine and pleural fluid cultures ngtd   - leukocytosis resolved, afebrile  - feeling worse today with increased weakness and RLQ pain (reports this was present at admission)  - reviewed admission CT abd/pelvis and no acute pathology, abdominal exam benign  - check CMP, will repeat procal to see if significantly changed  - continue zosyn for now but low suspicion for intra-abdominal pathology  - no BM since admission so will give daily miralax to see if this helps with discomfort  - dialysis as below    ADDENDUM:  LFT's show elevated alk phos which is chronic and unchanged.  Lactate wnl.  Procal up to 8.8 but significance remains unclear in setting of ESRD and remains afebrile without leukocytosis.  Monitor at this time with repeat LFT's in AM.     ESKD on dialysis  [facility] under care of Dr. Perla through L UE AV graft. Missed dialysis yesterday 2/14 2/2 weakness. Also recently with excessive bleeding from graft, underwent angiogram with angioplasty of stenosis 2/9.   - nephrology consult for dialysis; will dialyze tomorrow    Chronic  anemia  Rule out GIB  Hgb 7.5 on presentation. Recent baseline ~8. ? Playing role in weakness?  Reported black stools x1 week.   * received 1U PRBC 2/16 for hgb <7  - hgb 8.3 this AM  - no further stool since arrival  - hold aspirin, continue coumadin for now  - defer FENG to nephrology    DVT  Supratherapeutic INR, resolved  Recent possible recurrent hemarthrosis  Hx hemarthrosis 12/2021. Had suspected hemarthrosis last admission this month.  US 2/7/23 noted DVT, thought 2/2 low/ nontherapeutic INRs recently. INR on presentation at 3.56.   - pharmacy to dose warfarin; INR therapeutic 02/17/23     Atrial fibrillation/ flutter with RVR  Hypertension  [ASA 81 mg daily, bumex 2 mg BID on non-dialysis days, clonidine 0.1 BID, prn hydralazine, lisinopril 10 mg daily, metoprolol 150 mg BID, Zetia 10 mg daily]  - pharmacy to dose warfarin; INR therapeutic 02/17/23   - continue PTA clonidine and metoprolol with hold parameters  - remains normotensive, will hold lisinopril for now  - hold aspirin with hgb <7  - prn IV metoprolol; tachy to 110's    Recurrent R effusion  Has been seen by thoracic, thought related to ESKD/ volume. Previous eval 8/2022 transudative. Had thoracentesis 2/7/23. On imaging on admission appears stable/ unchanged from previous (despite thoracentesis).   * s/p thoracentesis as above showing transudate, cytology negative for malignancy  - stable on room air    DM II, on long term insulin, uncontrolled  [needs rec- lantus 8 units at HS, aspart 5 TID with meals]  A1C 9.1% on 2/8/23  - renal mod CHO diet  - lantus 10 units at HS  - hypoglycemic overnight, reduce prandial insulin to 3 units, reduce to medium dose ssi       Diet: Combination Diet Renal Diet (dialysis); Moderate Consistent Carb (60 g CHO per Meal) Diet    DVT Prophylaxis: Warfarin  Flood Catheter: Not present  Lines: None     Cardiac Monitoring: ACTIVE order. Indication: Tachyarrhythmias, acute (48 hours)  Code Status: No CPR- Do NOT Intubate       Clinically Significant Risk Factors Present on Admission              # Hypoalbuminemia: Lowest albumin = 2.5 g/dL at 2/16/2023  5:47 AM, will monitor as appropriate  # Drug Induced Coagulation Defect: home medication list includes an anticoagulant medication    # Hypertension: home medication list includes antihypertensive(s)              Disposition Plan      Expected Discharge Date: 02/17/2023,  3:00 PM      Discharge Comments: Dialysis T TH Sat; PT-home w/assist. .          Samir Simmons MD  Hospitalist Service  [facility]  Securely message with [employer] (more info)  Text page via [employer] Paging/Directory   ______________________________________________________________________    Interval History   Reports feeling worse today with less energy.  Complains of right knee pain as well as right lower quadrant pain.  No BM since arrival.  Feels possibly bloated.  No nausea, no fever/chills.   Hypoglycemic overnight but reports feeling even more weak this afternoon despite blood sugars now being elevated.     Physical Exam   Vital Signs: Temp: 97.9  F (36.6  C) Temp src: Oral BP: 103/69 Pulse: 119   Resp: 16 SpO2: 98 % O2 Device: None (Room air) Oxygen Delivery: 2 LPM  Weight: 158 lbs 9.6 oz    General Appearance: Well nourished male in NAD, appearing fatigued  Respiratory: diminished right basilar lung sounds, no wheezing or tachypnea  Cardiovascular: RRR, normal s1/s2 without murmur  GI: abdomen soft, normal bowel sounds, nondistended, nontender  Skin:   Other: Alert and appropriate, cranial nerves grossly intact     Medical Decision Making       35 MINUTES SPENT BY ME on the date of service doing chart review, history, exam, documentation & further activities per the note.      Data     I have personally reviewed the following data over the past 24 hrs:    10.7  \   8.3 (L)   / N/A     N/A N/A N/A /  99   N/A N/A N/A \       TSH: N/A T4: N/A A1C: N/A       Procal: N/A CRP: N/A Lactic Acid:  0.9       INR:  2.48 (H) PTT:  N/A   D-dimer:  N/A Fibrinogen:  N/A

## 2023-02-17 NOTE — PLAN OF CARE
Goal Outcome Evaluation:    Observation goals  PRIOR TO DISCHARGE        Comments: -diagnostic tests and consults completed and resulted- Met  -vital signs normal or at patient baseline- Not Met  -returns to baseline functional status- Partially Met  Nurse to notify provider when observation goals have been met and patient is ready for discharge.

## 2023-02-17 NOTE — PROVIDER NOTIFICATION
MD Notification    Notified Person: MD    Notified Person Name: Mitra Aldana PA    Notification Date/Time: 2/16/23 1941    Notification Interaction: [employer] Web    Purpose of Notification: FYI- Pt HR up to 120's. Will be providing pt with scheduled medication and re-check vitals.    Orders Received: FYI notification.    Comments:

## 2023-02-17 NOTE — PROVIDER NOTIFICATION
MD Notification    Notified Person: MD    Notified Person Name: Mitra Aldana PA    Notification Date/Time: 2/16/23 2111    Notification Interaction: [employer]    Purpose of Notification: Pt HR sustaining in 120's. Conditional EKG order released and PRN 2.5mg Metoprolol provided. Inquiring whether pt needs telemetry.    Orders Received: Pending    Comments:

## 2023-02-17 NOTE — PROGRESS NOTES
Observation goals  PRIOR TO DISCHARGE        Comments: -diagnostic tests and consults completed and resulted- Met  -vital signs normal or at patient baseline- Not Met  -returns to baseline functional status- Partially Met  Nurse to notify provider when observation goals have been met and patient is ready for discharge.
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
## 2023-02-18 PROBLEM — N18.6 ESRD (END STAGE RENAL DISEASE) ON DIALYSIS (H): Status: ACTIVE | Noted: 2022-01-01

## 2023-02-18 PROBLEM — D72.829 LEUKOCYTOSIS, UNSPECIFIED TYPE: Status: ACTIVE | Noted: 2023-01-01

## 2023-02-18 PROBLEM — Z99.2 ESRD (END STAGE RENAL DISEASE) ON DIALYSIS (H): Status: ACTIVE | Noted: 2022-01-01

## 2023-02-18 PROBLEM — E87.5 HYPERKALEMIA: Status: ACTIVE | Noted: 2023-01-01

## 2023-02-18 PROBLEM — R53.1 GENERALIZED WEAKNESS: Status: ACTIVE | Noted: 2023-01-01

## 2023-02-18 NOTE — PLAN OF CARE
Goal Outcome Evaluation:    Orientation/Cognitive: A&OX4    Observation Goals (Met/ Not Met): Not Met    Mobility Level/Assist Equipment: A1/GB/Walker    Fall Risk (Y/N): Y    Behavior Concerns: Green    Pain Management: Denies    Tele/VS/O2: Tele: ST VSS/RA Except Tachycardia PRN   metropolol givenx1      ABNL Lab/BG: See Chart    Diet: Renal+Mod Carb Diet    Bowel/Bladder: Continet    Skin Concerns: Bruising to upper forearms, Fistula to LUE    Drains/Devices: PIV/SL    Tests/Procedures for next shift: AM labs, dialysis, possible    ultrasound if pain persist     Anticipated DC date & active delays: TBD      HR was between 118-120's MD ordered 1x metoprolol after PRN metoprolol was ineffective    Observation goals  PRIOR TO DISCHARGE        Comments:   -diagnostic tests and consults completed and resulted Not met  -vital signs normal or at patient baseline:Partially met   -returns to baseline functional status:Partially met   Nurse to notify provider when observation goals have been met and patient is ready for discharge.

## 2023-02-18 NOTE — PROGRESS NOTES
[facility]    Medicine Progress Note - Hospitalist Service    Date of Admission:  2/15/2023    Assessment & Plan      Gordon Gann is a 77 year old male admitted on 2/15/2023. He presents after a fall    Leukocytosis, resolved  Generalized weakness  Fall  [place] Was walking at home (w/o walker) when lost balance, fell into wall and slid to floor. Unable to get up.   * K 5.7, creat 6.9 (dialysis).  * denies infectious symptoms; just weak last couple of days.  WBC 21.2.  Procal elevated but significance unclear in setting of ESRD. lactic 1.3.  UA with 42 WBC, 115 RBC. CXR with moderate R effusion. CT head w/o acute process. CT a/p notes effusion unchanged from 2/7, no other abnormalities.   * s/p right thoracentesis 2/15 noting loculated fluid exudate; gram stain negative  * stopped vancomycin 2/16 with negative cultures    - blood and urine and pleural fluid cultures ngtd   - continue zosyn, WBC up to 15 but afebrile without new infectious symptoms; reports he is feeling better again today  - reports abdominal discomfort is better today after having bowel movement  - dialysis as below      ESKD on dialysis  [facility] under care of Dr. Perla through L UE AV graft. Also recently with excessive bleeding from graft, underwent angiogram with angioplasty of stenosis 2/9.   - nephrology consult for dialysis; will dialyze 02/18/23     Chronic anemia  Rule out GIB  Hgb 7.5 on presentation. Recent baseline ~8. Reported black stools x1 week and also had recent bleeding from graft as above.   * received 1U PRBC 2/16 for hgb <7  - hgb stable about 8 g/dL 02/18/23, no signs of bleeding  - hold aspirin, continue coumadin for now  - defer FENG to nephrology    DVT  Supratherapeutic INR, resolved  Recent possible recurrent hemarthrosis  Hx hemarthrosis 12/2021. Had suspected hemarthrosis last admission this month.  US 2/7/23 noted DVT, thought 2/2 low/ nontherapeutic INRs  recently. INR on presentation at 3.56.   - pharmacy to dose warfarin; INR therapeutic 02/18/23      Atrial fibrillation/ flutter with RVR  Hypertension  [ASA 81 mg daily, bumex 2 mg BID on non-dialysis days, clonidine 0.1 BID, prn hydralazine, lisinopril 10 mg daily, metoprolol 150 mg BID, Zetia 10 mg daily]  - pharmacy to dose warfarin; INR therapeutic 02/18/23   - continue PTA clonidine and metoprolol with hold parameters  - remains normotensive, will hold lisinopril for now  - hold aspirin with concern for bleeding  - prn IV metoprolol  - persistently tachy to 110's, stable; per review of flowsheets, heart rate is frequently 100-110's    Recurrent R effusion  Has been seen by thoracic, thought related to ESKD/ volume. Previous eval 8/2022 transudative. Had thoracentesis 2/7/23. On imaging on admission appears stable/ unchanged from previous (despite thoracentesis).   * s/p thoracentesis as above showing transudate, cytology negative for malignancy  - stable on room air    DM II, on long term insulin, uncontrolled  [needs rec- lantus 8 units at HS, aspart 5 TID with meals]  A1C 9.1% on 2/8/23  - renal mod CHO diet  - lantus 10 units at HS  - prandial insulin 3 units  - medium dose ssi       Diet: Combination Diet Renal Diet (dialysis); Moderate Consistent Carb (60 g CHO per Meal) Diet    DVT Prophylaxis: Warfarin  Flood Catheter: Not present  Lines: None     Cardiac Monitoring: ACTIVE order. Indication: Tachyarrhythmias, acute (48 hours)  Code Status: No CPR- Do NOT Intubate      Clinically Significant Risk Factors Present on Admission              # Hypoalbuminemia: Lowest albumin = 2.3 g/dL at 2/18/2023  6:06 AM, will monitor as appropriate  # Drug Induced Coagulation Defect: home medication list includes an anticoagulant medication    # Hypertension: home medication list includes antihypertensive(s)      # Overweight: Estimated body mass index is 25.58 kg/m  as calculated from the following:    Height as of  "this encounter: 1.702 m (5' 7\").    Weight as of this encounter: 74.1 kg (163 lb 4.8 oz).           Disposition Plan      Expected Discharge Date: 02/20/2023        Discharge Comments: Dialysis T TH Sat; PT-home w/assist. From [facility]. Low grade temps, elevated procal.  Possible abd US          Samir Simmons MD  Hospitalist Service  [facility]  Securely message with [employer] (more info)  Text page via [employer] Paging/Directory   ______________________________________________________________________    Interval History   Feeling better again today with increased strength.  Moved bowels this morning and abdominal discomfort improved after.  No diarrhea.  No fever/chills or other infectious complaints.      Physical Exam   Vital Signs: Temp: 98.5  F (36.9  C) Temp src: Axillary BP: 118/68 Pulse: 113   Resp: 16 SpO2: 96 % O2 Device: None (Room air)    Weight: 163 lbs 4.8 oz    General Appearance: Well nourished male in NAD, sitting up in chair  Respiratory: diminished right basilar lung sounds, no wheezing or tachypnea  Cardiovascular: RRR, normal s1/s2 without murmur  GI: abdomen soft, normal bowel sounds, nondistended, nontender  Skin:   Other: Alert and appropriate, cranial nerves grossly intact     Medical Decision Making       25 MINUTES SPENT BY ME on the date of service doing chart review, history, exam, documentation & further activities per the note.  MANAGEMENT DISCUSSED with the following over the past 24 hours: bedside RN   Tests ORDERED & REVIEWED in the past 24 hours:  - CMP, CBC, procal, lactic acid  Medical complexity over the past 24 hours:  - Prescription DRUG MANAGEMENT performed      Data     I have personally reviewed the following data over the past 24 hrs:    15.2 (H)  \   7.8 (L)   / 290     137 97 (L) 47.8 (H) /  133 (H)   4.6 26 5.38 (H) \       ALT: 12 AST: 17 AP: 285 (H) TBILI: 0.4   ALB: 2.3 (L) TOT PROTEIN: 5.8 (L) LIPASE: N/A       Procal: 8.80 (HH) CRP: N/A Lactic " Acid: 0.8       INR:  2.49 (H) PTT:  N/A   D-dimer:  N/A Fibrinogen:  N/A

## 2023-02-18 NOTE — UTILIZATION REVIEW
Admission Status; Secondary Review Determination    Under the authority of the Utilization Management Committee, the utilization review process indicated a secondary review on the above patient. The review outcome is based on review of the medical records, discussions with staff, and applying clinical experience noted on the date of the review.    (x) Inpatient Status Appropriate - This patient's medical care is consistent with medical management for inpatient care and reasonable inpatient medical practice.    RATIONALE FOR DETERMINATION: 77-year-old male with dialysis dependent end-stage renal disease, atrial fibrillation anticoagulated, insulin-dependent type 2 diabetes who presented to the hospital with new profound generalized weakness that resulted in a fall.  Patient recently hospitalized for atrial fib with rapid ventricular response and right knee hemarthrosis along with right lower extremity DVT with a supratherapeutic INR and right pleural effusion requiring thoracentesis on the day of admission 2/15/2023..  Patient found to have new marked leukocytosis of 21,000 whereas his discharge white blood count was 11.54 days earlier.  Patient had elevated procalcitonin level of 3.7 of unclear significance with end-stage renal disease.  Patient had missed dialysis as outpatient due to profound weakness and was dialyzed on day of admission and again the following day.  Patient requiring empiric IV antibiotics due to presentation through the first 2 nights of hospitalization.  Patient with significant ongoing difficult to control heart rate with recurrent heart rates into the 120s and persistent tachycardia on the 17th and 18th with some episodes of hypotension.  Clinically patient's symptoms worsened on hospital day 3 as well as a significant further increase in procalcitonin level requiring ongoing broad-spectrum IV antibiotics appropriate for inpatient care.    At the time of admission with the information  available to the attending physician more than 2 nights Hospital complex care was anticipated, based on patient risk of adverse outcome if treated as outpatient and complex care required. Inpatient admission is appropriate based on the Medicare guidelines.    This document was produced using voice recognition software    The information on this document is developed by the utilization review team in order for the business office to ensure compliance. This only denotes the appropriateness of proper admission status and does not reflect the quality of care rendered.    The definitions of Inpatient Status and Observation Status used in making the determination above are those provided in the CMS Coverage Manual, Chapter 1 and Chapter 6, section 70.4.    Sincerely,    Minor Gomez MD  Utilization Review  Physician Advisor  [facility].

## 2023-02-18 NOTE — PROGRESS NOTES
Observation goals  PRIOR TO DISCHARGE        Comments:   -diagnostic tests and consults completed and resulted Not met  -vital signs normal or at patient baseline:Partially met   -returns to baseline functional status:Partially met   Nurse to notify provider when observation goals have been met and patient is ready for discharge.

## 2023-02-18 NOTE — PROGRESS NOTES
Renal Medicine Progress Note            Assessment/Plan:        Gordon Gann is a 77 year old male who was admitted on 2/15/2023.      Assessment:  1) End Stage Kidney Disease     TTS chronic HD, [facility]  Nephrologist Dr. Estrada  Target weight 77.7kg. Was 76.2kg on discharge, target weight does not appear to have been adjusted    Noted weight today 71.9kg  Access: right graft, s/p fistulogram and balloon angioplasty of stenoses 2/10/23     Dialysis Rx:          Treatment Type  Hemodialysis (procedure)    Dialyzer  : MMIT Series: Monitor My Meds Model: 17H MELANY Factor: 1455    Dialysate  BiCarb 35 mEq/L    Dialysate  Calcium 3 mEq/L    Dialysate  Potassium 2k    Dialysate  Base Sodium 138 mEq/L    Dialysate Flow Rate  500 ML/min    Blood Flow Rate  450 ML/min    Treatment Time  210 min    UF Profile PRN  #2    UF Profiling Instructions  Try profile two to see if it helps    Temperature  Standard    Max UF Rate  2 L/Hr    Access  AV Graft Arm (Left upper)    Access Concurrent  No    Arterial Needle Guage/Length/Tip  15 g/ -1 in/Sharp    Venous Needle Guage/Length/Tip  15 g/ -1 in/Sharp    Schedule  3 Times per week    Heparin Load  No Loading Dose    Heparin Hourly Dose  No Hourly Dose          Anemia:  3200 units epogen,      CKD-MBD: 1mcg calcitriol q HD     Volume status:   noted right moderate pleural effusion, s/p thoracentesis 2/15 and recurrent. Exudative. Has some weight loss lately. Would consider malignancy.      2) s/p fall with generalized weakness. PT assessment ongoing. He still feels weak.      Plan/Recs:  # 3hrs HD. Qb 350 ml/min Na 140, 35 bicarb 2K. UF = 0.   # EPO 4000 untis        Interval History:     Afebrile. Tachy. SBP is okay.   Pt is sleeping while getting HD treatment.           Medications and Allergies:       - MEDICATION INSTRUCTIONS for Dialysis Patients -   Does not apply See Admin Instructions     sodium chloride 0.9%  250 mL Intravenous Once in dialysis/CRRT      "bumetanide  2 mg Oral 2 times per day on Sun Mon Wed Fri     cloNIDine  0.1 mg Oral BID     insulin aspart  1-7 Units Subcutaneous TID AC     insulin aspart  1-5 Units Subcutaneous At Bedtime     insulin aspart  3 Units Subcutaneous TID w/meals     insulin glargine  10 Units Subcutaneous At Bedtime     metoprolol tartrate  150 mg Oral BID     mirtazapine  15 mg Oral At Bedtime     piperacillin-tazobactam  2.25 g Intravenous Q6H     polyethylene glycol  17 g Oral Daily     traZODone  150 mg Oral At Bedtime     warfarin ANTICOAGULANT  5 mg Oral ONCE at 18:00      No Known Allergies         Physical Exam:   Vitals were reviewed   , Blood pressure 114/78, pulse 107, temperature 97.9  F (36.6  C), temperature source Oral, resp. rate 15, height 1.702 m (5' 7\"), weight 74.1 kg (163 lb 4.8 oz), SpO2 99 %.    Wt Readings from Last 3 Encounters:   02/18/23 74.1 kg (163 lb 4.8 oz)   02/11/23 73.8 kg (162 lb 12.8 oz)   08/29/22 87.5 kg (192 lb 14.4 oz)       Intake/Output Summary (Last 24 hours) at 2/18/2023 0941  Last data filed at 2/17/2023 1251  Gross per 24 hour   Intake 204 ml   Output --   Net 204 ml       GENERAL APPEARANCE: NAD  HEENT:  He is sleeping.   RESP: Not needing supplemental O2.  ABDOMEN: o/s/nt/nd, bs present  EXTREMITIES/SKIN: no edema         Data:     CBC RESULTS:     Recent Labs   Lab 02/18/23  0607 02/17/23  0702 02/16/23  0547 02/15/23  0307   WBC 15.2* 10.7 10.4 21.2*   RBC 2.62*  --  2.29* 2.56*   HGB 7.8* 8.3* 6.8* 7.5*   HCT 24.7*  --  22.5* 24.9*     --  341 472*       Basic Metabolic Panel:  Recent Labs   Lab 02/18/23  0606 02/18/23  0218 02/17/23  2125 02/17/23  1633 02/17/23  1510 02/17/23  1243 02/16/23  0719 02/16/23  0547 02/15/23  1321 02/15/23  0307     --   --   --  136  --   --  137  --  136   POTASSIUM 4.6  --   --   --  4.7  --   --  4.3  --  5.7*   CHLORIDE 97*  --   --   --  94*  --   --  96*  --  94*   CO2 26  --   --   --  30*  --   --  25  --  23   BUN 47.8*  --   --   " --  39.0*  --   --  51.7*  --  76.2*   CR 5.38*  --   --   --  4.74*  --   --  5.35*  --  6.90*   *  124* 183* 242* 213* 217* 237*   < > 204*   < > 263*   LC 7.1*  --   --   --  7.2*  --   --  7.3*  --  6.9*    < > = values in this interval not displayed.       INR  Recent Labs   Lab 02/18/23  0606 02/17/23  0702 02/16/23  0547 02/15/23  1225   INR 2.49* 2.48* 3.38* 3.60*      Attestation:   I have reviewed today's relevant vital signs, notes, medications, labs and imaging.    Henry Watson MD  [facility] - Nephrology  Office phone :[phone]  Pager: [phone]

## 2023-02-18 NOTE — PLAN OF CARE
Goal Outcome Evaluation:    Patient is A&O x4. Up with one assist/gb/walker to bathroom, ambulated in hallway x1 with nursing, tolerated well. On tele with sinus tachycardia, hospitalist aware. Denies chest pain or SOB. Dialysis this morning. Had large BM x1 this shift. Discharge possibly tomorrow. Will continue to monitor.

## 2023-02-18 NOTE — PLAN OF CARE
A&Ox4. VSS on RA except tachycardia to 120s. Gave prn iv Metoprolol once. Denies chest pain and SOB. Tele ST, confirmed with EKG. Hgb 8.3. L/s diminished. C/o of right abdominal pain, declined pain medications. Tolerating mod carb diet. Dialysis fistula to LUE intact. Up to chair for meals. Mepilex to coccyx for blanchable redness. Nephrology following. Plan is inpatient dialysis  tomorrow.

## 2023-02-18 NOTE — PROGRESS NOTES
Potassium   Date Value Ref Range Status   02/18/2023 4.6 3.4 - 5.3 mmol/L Final   08/30/2022 3.4 3.4 - 5.3 mmol/L Final     Hemoglobin   Date Value Ref Range Status   02/18/2023 7.8 (L) 13.3 - 17.7 g/dL Final     Creatinine   Date Value Ref Range Status   02/18/2023 5.38 (H) 0.67 - 1.17 mg/dL Final     Urea Nitrogen   Date Value Ref Range Status   02/18/2023 47.8 (H) 8.0 - 23.0 mg/dL Final   08/30/2022 18 7 - 30 mg/dL Final     Sodium   Date Value Ref Range Status   02/18/2023 137 136 - 145 mmol/L Final     INR   Date Value Ref Range Status   02/18/2023 2.49 (H) 0.85 - 1.15 Final       DIALYSIS PROCEDURE NOTE  Hepatitis status of previous patient on machine log was checked and verified ok to use with this patients hepatitis status.  Patient dialyzed for 3 hrs. on a K2 bath with a net fluid removal of  0L.  A BFR of 350 ml/min was obtained via a LAVF using 15 gauge needles.      The treatment plan was discussed with Dr. Watson during the treatment.    Total heparin received during the treatment: 0 units.   Needle cannulation sites held x 5 min.     Meds  given: Albumin 5% prime, midodrine 10mg, epogen 4,000 units    Complications: Patient hypotensive (81/59) upon arrival to treatment room. Cuff adjusted and BP recheck 86/56. Dr. Watson paged and orders obtained to give patient midodrine 10mg, prime with 5% albumin, no fluid removal and .  BP improved to 98/63 upon initiation of treatment and remained stable throughout the run.    Patient rested quietly entire duration of treatment.    Person educated: patient. Knowledge base substantial. Barriers to learning: none. Educated on procedure and medications via verbal mode. Patient verbalized understanding. Pt prefers oral education style.     ICEBOAT? Timeout performed pre-treatment  I: Patient was identified using 2 identifiers  C:  Consent Signed Yes  E: Equipment preventative maintenance is current and dialysis delivery system OK to use  B: Hepatitis B Status: see  above  O: Dialysis orders present and complete prior to treatment  A: Vascular access verified and assessed prior to treatment  T: Treatment was performed at a clinically appropriate time  ?: Patient was allowed to ask questions and address concerns prior to treatment  See Adult Hemodialysis flowsheet in EPIC for further details and post assessment.  Machine water alarm in place and functioning. Transducer pods intact and checked every 15min.   Pt returned via bed to room 527.  Chlorine/Chloramine water system checked every 4 hours.  Outpatient Dialysis at [facility] on TTS.    Post treatment report given to NICKY Long RN regarding 0L of fluid removed with a last BP of 130/84.    Dressing on AVFneedle sites may be removed 24 hours after dialysis. If leaking occurs please apply a Band-Aid.

## 2023-02-18 NOTE — PROVIDER NOTIFICATION
MD Notification    Notified Person: MD    Notified Person Name:Dr. Walker    Notification Date/Time:2/18/23 550    Notification Interaction:AMCOM    Purpose of Notification:FYI pts HR is still between 118-120 despite having PRN metoprolol. Pt denies chest pain or SOB.    Orders Received: one time dose of metoprolol ordered    Comments:

## 2023-02-19 NOTE — DISCHARGE SUMMARY
[facility]  Hospitalist Discharge Summary      Date of Admission:  2/15/2023  Date of Discharge:  2/19/2023  2:28 PM  Discharging Provider: Samir Simmons MD  Discharge Service: Hospitalist Service    Discharge Diagnoses   Generalized weakness  Possible CAP  ESKD  Hyperkalemia   Chronic anemia   DVT  Supratherapeutic INR  Recent possible hemarthrosis  A-fib/flutter with RVR  HTN  Recurrent right pleural effusion  DM II on long term insulin, uncontrolled    Follow-ups Needed After Discharge   Follow-up Appointments     Follow-up and recommended labs and tests       Follow up with primary care provider, Belen Prieto, within 7 days for   hospital follow- up.  No follow up labs or test are needed.  Follow up for INR check on 2/21/23.  Take warfarin 5 mg daily 2/19 and   2/20.   Follow up as routinely scheduled for outpatient dialysis.   Follow up with orthopedic surgery as previously recommended.             Unresulted Labs Ordered in the Past 30 Days of this Admission     Date and Time Order Name Status Description    2/15/2023  8:23 AM Pleural fluid Aerobic Bacterial Culture Routine with Gram Stain Preliminary     2/15/2023  3:30 AM Blood Culture Peripheral Blood Preliminary     2/15/2023  3:30 AM Blood Culture Peripheral Blood Preliminary       These results will be followed up by: Hospitalist group; if cultures subsequently return positive would need to be contacted as no plan to continue antibiotics at discharge    Discharge Disposition   Discharged to home  Condition at discharge: Stable    Hospital Course   Gordon Gann is a 77 year old male admitted on 2/15/2023. He presents after a fall    Generalized weakness  Possible CAP  Was walking at home (w/o walker) when lost balance, fell into wall and slid to floor. Unable to get up.  Found to have mild hyperkalemia (see below) and significant leukocytosis (21) but was without focal infectious symptoms.  Lactate wnl.  Procal was elevated  but significance is unclear in setting of ESKD.  CXR with moderate R effusion (known history). CT head w/o acute process. CT a/p without acute pathology.  He underwent right thoracentesis 2/15 noting loculated fluid exudate; gram stain negative and culture negative to date, cytology negative for malignancy.  Blood and urine cultures also remained negative and he remained afebrile.  Leukocytosis improved on empiric zosyn and he completed 5-day course prior to discharge.  Will not plan to continue antibiotics as no clear source of infection.  He was feeling improved at time of discharge and was open to home therapies.     ESKD on dialysis  Hyperkalemia, resolved  Admission K 5.7, likely due to missing most recent dialysis run.  Nephrology was consulted and underwent dialysis on admission and continued on routine schedule.     Supratherapeutic INR, resolved  DVT  Recent possible recurrent hemarthrosis  Admission INR 3.56.  Coumadin was held and resumed at 5 mg daily which maintained INR about 2.5.  Will continue coumadin 5 mg daily at discharge with close outpatient follow up for INR monitoring.  Follow up with Ortho Surg clinic as scheduled for recent hemarthrosis.    Atrial fibrillation/ flutter with RVR  Hypertension  Continue PTA clonidine and metoprolol, bumex, aspirin, Zetia.  He was noted to have frequent tachycardia to 110's, though well controlled 80-90's at discharge.  Similar issue noted prior admission.  Recommend follow up with PCP with consideration of referral to Cardiology.  Lisinopril initially held for hyperkalemia but pressures stable 100-110's off lisinopril so will continue to hold at discharge.  May consider exchanging clonidine for lisinopril in follow up if wish to continue renal protective effect for diabetes (as continues to still make some urine).     DM II, on long term insulin, uncontrolled  A1C 9.1% on 2/8/23.  Lantus was increased to 10 units daily.  He will continue on PTA aspart 5 units  with meals.       Chronic and stable medical conditions:  Chronic anemia  Recurrent R effusion            Consultations This Hospital Stay   NEPHROLOGY IP CONSULT  PHARMACY TO DOSE VANCO  PHYSICAL THERAPY ADULT IP CONSULT  PHARMACY TO DOSE WARFARIN  CARE MANAGEMENT / SOCIAL WORK IP CONSULT  VASCULAR ACCESS ADULT IP CONSULT    Code Status   No CPR- Do NOT Intubate    Time Spent on this Encounter   I, Samir Simmons MD, personally saw the patient today and spent greater than 30 minutes discharging this patient.       Samir Simmons MD  [facility]  [address] [address]  Phone: [phone]  ______________________________________________________________________    Physical Exam   Vital Signs: Temp: 97.7  F (36.5  C) Temp src: Oral BP: 114/71 Pulse: 88   Resp: 14 SpO2: 97 % O2 Device: None (Room air)    Weight: 163 lbs 3.2 oz  General Appearance:  Well nourished male in NAD, sitting up in chair  Respiratory: diminished right basilar lung sounds, no wheezing or tachypnea  Cardiovascular: RRR, normal s1/s2 without murmur  GI: abdomen soft, normal bowel sounds, nondistended, nontender  Other:  Alert and appropriate, cranial nerves grossly intact        Primary Care Physician   Belen Prieto    Discharge Orders      Home Care Referral      Reason for your hospital stay    You were admitted for generalized weakness, the cause of which is unclear but may be related to high potassium level or infection, though no infectious source was identified.     Follow-up and recommended labs and tests     Follow up with primary care provider, Belen Prieto, within 7 days for hospital follow- up.  No follow up labs or test are needed.  Follow up for INR check on 2/21/23.  Take warfarin 5 mg daily 2/19 and 2/20.   Follow up as routinely scheduled for outpatient dialysis.   Follow up with orthopedic surgery as previously recommended.     Activity    Your activity upon  discharge: activity as tolerated     Discharge Instructions    Note changes to your medication regimen:  Lisinopril has been discontinued.  Your primary doctor will check your blood pressure in follow up.   Lantus insulin has been increased to 10 units daily.   Warfarin dosing has been adjusted for the next couple days pending repeat INR.     Diet    Follow this diet upon discharge: Renal Diet (dialysis); Moderate Consistent Carb (60 g CHO per Meal) Diet       Significant Results and Procedures   Most Recent 3 CBC's:Recent Labs   Lab Test 02/19/23  0606 02/18/23  0607 02/17/23  0702 02/16/23  0547   WBC 10.6 15.2* 10.7 10.4   HGB 8.0* 7.8* 8.3* 6.8*   MCV 97 94  --  98    290  --  341     Most Recent 3 BMP's:Recent Labs   Lab Test 02/19/23  1148 02/19/23  0729 02/19/23  0300 02/18/23  1243 02/18/23  0606 02/17/23  1633 02/17/23  1510 02/16/23  0719 02/16/23  0547   NA  --   --   --   --  137  --  136  --  137   POTASSIUM  --   --   --   --  4.6  --  4.7  --  4.3   CHLORIDE  --   --   --   --  97*  --  94*  --  96*   CO2  --   --   --   --  26  --  30*  --  25   BUN  --   --   --   --  47.8*  --  39.0*  --  51.7*   CR  --   --   --   --  5.38*  --  4.74*  --  5.35*   ANIONGAP  --   --   --   --  14  --  12  --  16*   LC  --   --   --   --  7.1*  --  7.2*  --  7.3*   * 148* 184*   < > 124*  124*   < > 217*   < > 204*    < > = values in this interval not displayed.     7-Day Micro Results     Collected Updated Procedure Result Status      02/15/2023 0947 02/15/2023 1210 Cell count with differential fluid [[id number]]    (Abnormal)   Pleural fluid from Pleural Cavity, Right    Final result Component Value   No component results            02/15/2023 0947 02/19/2023 0649 Pleural fluid Aerobic Bacterial Culture Routine with Gram Stain [[id number]]   Pleural fluid from Pleural Cavity, Right    Preliminary result Component Value   Culture No growth after 3 days  [P]    Gram Stain Result No organisms seen   [P]     2+ WBC seen  [P]                02/15/2023 0947 02/15/2023 1427 Glucose fluid [[id number]]    Pleural fluid from Pleural Cavity, Right    Final result Component Value Units   Glucose Fluid Source Pleural Cavity, Right    Glucose fluid 153 mg/dL            02/15/2023 0947 02/15/2023 1433 Lactate dehydrogenase fluid [[id number]]    Pleural fluid from Pleural Cavity, Right    Final result Component Value Units   LD Fluid Source Pleural Cavity, Right    Lactate dehydrogenase fluid 314 U/L            02/15/2023 0947 02/15/2023 1401 Protein fluid [[id number]]    Pleural fluid from Pleural Cavity, Right    Final result Component Value Units   Protein Fluid Source Pleural Cavity, Right    Protein Total Fluid 2.0 g/dL            02/15/2023 0947 02/16/2023 1734 Cytology, non-gynecologic [[id number]]   Pleural fluid from Pleural Cavity, Right    Final result Component Value   Final Diagnosis This result contains rich text formatting which cannot be displayed here.   Clinical Information This result contains rich text formatting which cannot be displayed here.   Gross Description This result contains rich text formatting which cannot be displayed here.   Microscopic Description This result contains rich text formatting which cannot be displayed here.   Performing Labs This result contains rich text formatting which cannot be displayed here.            02/15/2023 0947 02/15/2023 1013 pH fluid [[id number]]    Pleural fluid from Pleural Cavity, Right    Final result Component Value Units   pH Fluid Source Pleural Cavity, Right    pH Fluid 8.0 pH            02/15/2023 0947 02/15/2023 1210 Cell Count Body Fluid [[id number]]    (Abnormal)   Pleural fluid from Pleural Cavity, Right    Final result Component Value Units   Color Brown    Clarity Hazy    RBC Count 84,000 /uL   Cell Count Fluid Source Pleural Cavity, Right    Total Nucleated Cells 367 /uL            02/15/2023 0947 02/15/2023 1207 Differential Body  Fluid [[id number]]    Pleural fluid from Pleural Cavity, Right    Final result Component Value Units   % Neutrophils 73 %   % Lymphocytes 19 %   % Monocyte/Macrophages 8 %            02/15/2023 0426 02/16/2023 0706 Urine Culture [[id number]]   Urine, Clean Catch    Final result Component Value   Culture No Growth               02/15/2023 0341 02/19/2023 0902 Blood Culture Peripheral Blood [[id number]]   Peripheral Blood    Preliminary result Component Value   Culture No growth after 4 days  [P]                02/15/2023 0341 02/19/2023 0902 Blood Culture Peripheral Blood [[id number]]   Peripheral Blood    Preliminary result Component Value   Culture No growth after 4 days  [P]                02/15/2023 0331 02/15/2023 0529 Symptomatic Influenza A/B & SARS-CoV2 (COVID-19) Virus PCR Multiplex Nasopharyngeal [[id number]]    Swab from Nasopharyngeal    Final result Component Value   Influenza A PCR Negative   Influenza B PCR Negative   RSV PCR Negative   SARS CoV2 PCR Negative   NEGATIVE: SARS-CoV-2 (COVID-19) RNA not detected, presumed negative.              ,   Results for orders placed or performed during the hospital encounter of 02/15/23   Chest XR,  PA & LAT    Narrative    EXAM: XR CHEST 2 VIEWS  LOCATION: [facility]  DATE/TIME: 2/15/2023 4:13 AM    INDICATION: cough, weakness  COMPARISON: 2/7/2023      Impression    IMPRESSION: The moderate right-sided pleural effusion is again noted, similar to prior exam with adjacent atelectasis of the right lung base. Overall not significantly changed from prior exam.   Head CT w/o contrast    Narrative    EXAM: CT HEAD W/O CONTRAST  LOCATION: [facility]  DATE/TIME: 2/15/2023 4:11 AM    INDICATION: fall, head injury  COMPARISON: None.  TECHNIQUE: Routine CT Head without IV contrast. Multiplanar reformats. Dose reduction techniques were used.    FINDINGS:  INTRACRANIAL CONTENTS: No intracranial hemorrhage,  extraaxial collection, or mass effect.  No CT evidence of acute infarct. Mild to moderate presumed chronic small vessel ischemic changes. Mild to moderate generalized volume loss. No hydrocephalus.     VISUALIZED ORBITS/SINUSES/MASTOIDS: No intraorbital abnormality. No paranasal sinus mucosal disease. No middle ear or mastoid effusion.    BONES/SOFT TISSUES: No acute abnormality.      Impression    IMPRESSION:  1.  No acute intracranial hemorrhage or calvarial fracture.  2.  Mild to moderate volume loss and presumed chronic small vessel ischemic changes.   CT Abdomen Pelvis w/o Contrast    Narrative    EXAM: CT ABDOMEN PELVIS W/O CONTRAST  LOCATION: [facility]  DATE/TIME: 2/15/2023 5:40 AM    INDICATION: Generalized weakness, microscopic hematuria, elevated white count  COMPARISON: CTA chest dated 2/7/2023.  TECHNIQUE: CT scan of the abdomen and pelvis was performed without IV contrast. Multiplanar reformats were obtained. Dose reduction techniques were used.  CONTRAST: None.    FINDINGS:   LOWER CHEST: A moderate right-sided pleural effusion is again seen with atelectasis of the right lower lobe again noted, similar to prior exam. The heart is enlarged, similar to prior exam.    HEPATOBILIARY: No significant mass or bile duct dilatation. No calcified gallstones.     PANCREAS: No significant mass, duct dilatation, or inflammatory change.    SPLEEN: Normal size.    ADRENAL GLANDS: Normal.    KIDNEYS/BLADDER: No significant mass, stone, or hydronephrosis.     BOWEL: No obstruction or inflammatory change. Scattered diverticula seen throughout the colon, there is no evidence of diverticulitis.  Appendix is normal.    LYMPH NODES: No lymphadenopathy.    VASCULATURE: Extensive atherosclerotic calcifications are seen throughout the abdominal aorta, mesenteric, pelvic vessels.    PELVIC ORGANS: No pelvic masses.    MUSCULOSKELETAL: Unremarkable.      Impression    IMPRESSION:   1.  Moderate right  Pleural effusion with atelectasis of the right lower lobe again seen, similar to prior study from 2/7/2023  2.  No evidence of hydronephrosis or hydroureter. No renal, ureteral, or bladder calculi are identified.  3.  Scattered diverticulosis of the colon without evidence of diverticulitis  4.  Normal appendix     US Thoracentesis    Narrative    1. RIGHT THORACENTESIS  2. ULTRASOUND GUIDANCE    DATE/TIME: February 15, 2023 10:01 AM    INDICATION: Pleural effusion.    PROCEDURE: Informed consent obtained. Time out performed. The chest  was prepped and draped in a sterile fashion. 10 mL of 1% lidocaine was  infused into local soft tissues. A 5 French catheter system was  introduced into the pleural effusion under ultrasound guidance.    0.4 liters of clear fluid were removed and sent to lab if requested.    Patient tolerated procedure well.    Ultrasound images have been permanently captured for documentation.      Impression    IMPRESSION: Status post ultrasound-guided right thoracentesis. Note  the fluid is loculated.    JHOAN YUAN MD         SYSTEM ID:  [id number]       Discharge Medications   Current Discharge Medication List      CONTINUE these medications which have CHANGED    Details   insulin glargine (LANTUS PEN) 100 UNIT/ML pen Inject 10 Units Subcutaneous At Bedtime  Qty: 15 mL, Refills: 0    Comments: If Lantus is not covered by insurance, may substitute Basaglar or Semglee or other insulin glargine product per insurance preference at same dose and frequency.    Associated Diagnoses: Diabetic ketoacidosis without coma associated with type 2 diabetes mellitus (H)      warfarin ANTICOAGULANT (COUMADIN) 5 MG tablet Take 1 tablet (5 mg) by mouth daily    Associated Diagnoses: Paroxysmal atrial fibrillation (H)         CONTINUE these medications which have NOT CHANGED    Details   acetaminophen (TYLENOL) 500 MG tablet Take 1,000 mg by mouth every 8 hours as needed for mild pain      aspirin 81 MG EC tablet  Take 1 tablet (81 mg) by mouth At Bedtime Starting 12/12    Associated Diagnoses: Atrial flutter with rapid ventricular response (H)      bumetanide (BUMEX) 2 MG tablet Take 1 tablet (2 mg) by mouth 2 times daily On non dialysis days    Associated Diagnoses: Hypertension, unspecified type      cloNIDine (CATAPRES) 0.1 MG tablet Take 1 tablet (0.1 mg) by mouth 2 times daily    Associated Diagnoses: Hypertension, unspecified type      ezetimibe (ZETIA) 10 MG tablet Take 10 mg by mouth every morning      fish oil-omega-3 fatty acids 1000 MG capsule Take 1 g by mouth every morning      folic acid (FOLVITE) 1 MG tablet Take 1 mg by mouth every morning      hydrALAZINE (APRESOLINE) 25 MG tablet Take 1 tablet (25 mg) by mouth every 6 hours as needed (SBP >180)  Qty: 30 tablet, Refills: 0    Associated Diagnoses: Diabetic ketoacidosis without coma associated with type 2 diabetes mellitus (H)      insulin aspart (NOVOLOG PEN) 100 UNIT/ML pen Inject 5 Units Subcutaneous 3 times daily (with meals)  Qty: 15 mL, Refills: 0    Associated Diagnoses: Diabetic ketoacidosis without coma associated with type 2 diabetes mellitus (H)      metoprolol tartrate 75 MG TABS Take 150 mg by mouth 2 times daily  Qty: 60 tablet, Refills: 3    Associated Diagnoses: Supratherapeutic INR      Vitamin D, Cholecalciferol, 25 MCG (1000 UT) CAPS Take 1,000 Units by mouth every morning      melatonin 3 MG tablet Take 1 tablet (3 mg) by mouth At Bedtime    Associated Diagnoses: Insomnia, unspecified type      mirtazapine (REMERON) 15 MG tablet Take 1 tablet (15 mg) by mouth At Bedtime  Qty: 10 tablet, Refills: 0    Associated Diagnoses: Insomnia, unspecified type      traZODone (DESYREL) 150 MG tablet Take 150 mg by mouth At Bedtime      vitamin B complex with vitamin C (STRESS TAB) tablet Take 1 tablet by mouth every morning         STOP taking these medications       lisinopril (ZESTRIL) 20 MG tablet Comments:   Reason for Stopping:              Allergies   No Known Allergies

## 2023-02-19 NOTE — PROGRESS NOTES
Care Management Discharge Note    Discharge Date: 02/19/2023       Discharge Disposition:      Discharge Services:      Discharge DME:      Discharge Transportation: family or friend will provide    Private pay costs discussed: Not applicable    PAS Confirmation Code:    Patient/family educated on Medicare website which has current facility and service quality ratings:      Education Provided on the Discharge Plan:    Persons Notified of Discharge Plans: Bedside RN, Pt  Patient/Family in Agreement with the Plan:      Handoff Referral Completed: Yes    Additional Information:  Pt is discharging back to [facility]. [facility], RN/PT/OT, called for resumption of cares. Writer got call back from RN and stated that we will have to call in AM of next day. Writer faxed new orders to [employer] (f: [phone]). They will call if there is any other needs.   Pt's daughter will  Pt to return to facility at 1400.            Vickie Copeland, RN, BSN, Care Coordinator

## 2023-02-19 NOTE — PLAN OF CARE
Physical Therapy Discharge Summary    Reason for therapy discharge:    Discharged to home with home therapy.    Progress towards therapy goal(s). See goals on Care Plan in [facility] electronic health record for goal details.  Goals partially met.  Barriers to achieving goals:   discharge from facility.    Therapy recommendation(s):    Continued therapy is recommended.  Rationale/Recommendations:  To further increase independence with mobility.

## 2023-02-19 NOTE — PROGRESS NOTES
Orientation/Cognitive: A&O x4  Observation Goals (Met/ Not Met): Met  Mobility Level/Assist Equipment: A1, walker and Gb  Fall Risk (Y/N): Yes  Behavior Concerns: none  Pain Management: Denies pain at this time  Tele/VS/O2: Afib  ABNL Lab/B and 243  Diet:  Sierra diet and Mod carb  Bowel/Bladder:  Continent  Skin Concerns: Bruising UE meghan, LE meghan dry skin  Drains/Devices: Fistula to LUE-Bruit and Thrill Present, PIV  Tests/Procedures for next shift: None  Anticipated DC date & active delays: Today afternppn  Patient Stated Goal for Today: Go home

## 2023-02-19 NOTE — PLAN OF CARE
Goal Outcome Evaluation:    Orientation/Cognitive: A&OX4, flat affect    Observation Goals (Met/ Not Met): Inpatient    Mobility Level/Assist Equipment: A1/GB/Walker    Fall Risk (Y/N): Y    Behavior Concerns: Green    Pain Management: Denies    Tele/VS/O2: Tele:Afib CVR VSS/RA Except afib/Tachycardia.    ABNL Lab/BG:  B Creat:5.38b WBC 15.2, Hgb 7.8,   Alk:285 INR:2.49    Diet: Renal+Mod Carb Diet    Bowel/Bladder: Continent/ on hemodialysis.    Skin Concerns: Bruising to upper forearms, Fistula to LUE,   +bruit/thrill    Drains/Devices: PIV/SL    Tests/Procedures for next shift: N/A     Pt's EKG resulted as A flutter w/ variable A-V block, Pt denies any chest pain/ palpitations or SOB.

## 2023-02-19 NOTE — PLAN OF CARE
Goal Outcome Evaluation:       2/18/2023 [id number]    Orientation/Cognitive: A&OX4, flat affect  Observation Goals (Met/ Not Met): Inpatient  Mobility Level/Assist Equipment: A1/GB/Walker  Fall Risk (Y/N): Y  Behavior Concerns: Green  Pain Management: Denies  Tele/VS/O2: Tele: ST VSS/RA Except afib/Tachycardia. PRN Metoprolol for HR sustained >120  ABNL Lab/BG: WBC 15.2, Hgb 7.8, a1c 9.1,  at HS, 1u for coverage  Diet: Renal+Mod Carb Diet  Bowel/Bladder: Continent/ on hemodialysis.  Skin Concerns: Bruising to upper forearms, Fistula to LUE, +bruit/thrill  Drains/Devices: PIV/SL  Tests/Procedures for next shift: Sepsis fired at 22:11. LA pending. VSS on RA

## 2023-02-19 NOTE — PROVIDER NOTIFICATION
MD Notification    Notified Person: MD    Notified Person Name:    Notification Date/Time:2/19/22 0700    Notification Interaction:AMCOM    Purpose of Notification:FYI please see the most updated abnormal EKG from this AM. Call with any concerns      Orders Received:    Comments:

## 2023-02-21 NOTE — PROGRESS NOTES
[facility] Contact  [facility]/Voicemail     Clinical Data: Transitional Care Management Outreach     Outreach attempted x 2.  Left message on patient's voicemail, providing [facility]'s 24/7 scheduling and nurse triage phone number [phone] ([phone]) for questions/concerns and/or to schedule an appt with an [facility] provider, if they do not have a PCP.      Plan:  [facility] will do no further outreaches at this time.     PATRICE Aguirre  [phone]  [facility]    *Connected Care Resource Team does NOT follow patient ongoing. Referrals are identified based on internal discharge reports and the outreach is to ensure patient has an understanding of their discharge instructions.

## 2023-04-04 PROBLEM — Z91.148 NONCOMPLIANCE WITH MEDICATION REGIMEN: Status: ACTIVE | Noted: 2023-01-01

## 2023-04-04 PROBLEM — R06.00 DYSPNEA, UNSPECIFIED TYPE: Status: ACTIVE | Noted: 2022-01-01

## 2023-04-04 PROBLEM — E87.70 HYPERVOLEMIA, UNSPECIFIED HYPERVOLEMIA TYPE: Status: ACTIVE | Noted: 2023-01-01

## 2023-04-04 NOTE — PROGRESS NOTES
RECEIVING UNIT ED HANDOFF REVIEW    ED Nurse Handoff Report was reviewed by: Nga Emery RN on April 4, 2023 at 2:23 PM
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
## 2023-04-04 NOTE — ED NOTES
Bed: ED21  Expected date:   Expected time:   Means of arrival:   Comments:  [facility] - [phone] 1017

## 2023-04-04 NOTE — PHARMACY-ADMISSION MEDICATION HISTORY
Pharmacy Medication History  Admission medication history interview status for the 4/4/2023  admission is complete. See EPIC admission navigator for prior to admission medications     Location of Interview: Patient room  Medication history sources: Patient and Surescripts    Significant changes made to the medication list:  Changed metoprolol from 150 mg to 175 mg BID  Added allopurinol    In the past week, patient estimated taking medication this percent of the time: greater than 90%    Medication Affordability:  Not including over the counter (OTC) medications, was there a time in the past 12 months when you did not take your medications as prescribed because of cost?: No    Additional medication history information:   - Patient was on doxycycline 100 mg BID 3/27-4/2 for a foot infection, has completed all doses  - Patient was scheduled for dialysis today so he did not take his Bumex    Medication reconciliation completed by provider prior to medication history? No    Time spent in this activity: 20 minutes    Prior to Admission medications    Medication Sig Last Dose Taking? Auth Provider Long Term End Date   acetaminophen (TYLENOL) 500 MG tablet Take 1,000 mg by mouth every 8 hours as needed for mild pain  at PRN Yes Unknown, Entered By History     allopurinol (ZYLOPRIM) 100 MG tablet Take 100 mg by mouth 2 times daily 4/4/2023 Yes Unknown, Entered By History     aspirin 81 MG EC tablet Take 1 tablet (81 mg) by mouth At Bedtime Starting 12/12 4/3/2023 Yes Fili Ireland MD     bumetanide (BUMEX) 2 MG tablet Take 1 tablet (2 mg) by mouth 2 times daily On non dialysis days 4/3/2023 Yes Fili Ireland MD Yes    cloNIDine (CATAPRES) 0.1 MG tablet Take 1 tablet (0.1 mg) by mouth 2 times daily 4/4/2023 Yes Fili Ireland MD Yes    ezetimibe (ZETIA) 10 MG tablet Take 10 mg by mouth every morning 4/4/2023 Yes Unknown, Entered By History Yes    fish oil-omega-3 fatty acids 1000 MG capsule Take  1 g by mouth every morning 4/4/2023 Yes Unknown, Entered By History     folic acid (FOLVITE) 1 MG tablet Take 1 mg by mouth every morning 4/4/2023 Yes Unknown, Entered By History     hydrALAZINE (APRESOLINE) 25 MG tablet Take 1 tablet (25 mg) by mouth every 6 hours as needed (SBP >180)  at prn Yes Julia Combs MD Yes    insulin aspart (NOVOLOG PEN) 100 UNIT/ML pen Inject 5 Units Subcutaneous 3 times daily (with meals) 4/3/2023 Yes Julia Combs MD Yes    insulin glargine (LANTUS PEN) 100 UNIT/ML pen Inject 10 Units Subcutaneous At Bedtime 4/3/2023 Yes Samir Simmons MD Yes    melatonin 3 MG tablet Take 1 tablet (3 mg) by mouth At Bedtime  Patient taking differently: Take 3 mg by mouth nightly as needed 4/3/2023 Yes Mitesh Nicholson MD     metoprolol tartrate (LOPRESSOR) 100 MG tablet Take 100 mg by mouth 2 times daily Take with 75 mg for total of 175 mg twice daily 4/4/2023 Yes Unknown, Entered By History No    Metoprolol Tartrate 75 MG TABS Take 75 mg by mouth 2 times daily Take with 100 mg for total of 175 mg twice daily 4/4/2023 Yes Unknown, Entered By History No    mirtazapine (REMERON) 15 MG tablet Take 1 tablet (15 mg) by mouth At Bedtime 4/3/2023 Yes Mitesh Nicholson MD Yes    traZODone (DESYREL) 150 MG tablet Take 150 mg by mouth At Bedtime 4/3/2023 Yes Unknown, Entered By History No    vitamin B complex with vitamin C (STRESS TAB) tablet Take 1 tablet by mouth every morning 4/4/2023 Yes Unknown, Entered By History     Vitamin D, Cholecalciferol, 25 MCG (1000 UT) CAPS Take 1,000 Units by mouth every morning 4/4/2023 Yes Unknown, Entered By History     warfarin ANTICOAGULANT (COUMADIN) 5 MG tablet Take 1 tablet (5 mg) by mouth daily 4/3/2023 Yes Samir Simmons MD         The information provided in this note is only as accurate as the sources available at the time of update(s)

## 2023-04-04 NOTE — PROGRESS NOTES
Potassium   Date Value Ref Range Status   04/04/2023 6.2 (HH) 3.4 - 5.3 mmol/L Final   08/30/2022 3.4 3.4 - 5.3 mmol/L Final     Hemoglobin   Date Value Ref Range Status   04/04/2023 9.2 (L) 13.3 - 17.7 g/dL Final     Creatinine   Date Value Ref Range Status   04/04/2023 7.97 (H) 0.67 - 1.17 mg/dL Final     Urea Nitrogen   Date Value Ref Range Status   04/04/2023 115.7 (H) 8.0 - 23.0 mg/dL Final   08/30/2022 18 7 - 30 mg/dL Final     Sodium   Date Value Ref Range Status   04/04/2023 138 136 - 145 mmol/L Final     INR   Date Value Ref Range Status   04/04/2023 2.09 (H) 0.85 - 1.15 Final       DIALYSIS PROCEDURE NOTE  Hepatitis status of previous patient on machine log was checked and verified ok to use with this patients hepatitis status.  Patient dialyzed for hrs. on a K2 bath for entire treatment and confirmed with nephrologist (K of 6.2 today) with a net fluid removal of 2L. A BFR of 400 ml/min was obtained via a Left AVG using 15 gauge needles.      The treatment plan was discussed with Dr. Mcarthur during the treatment.    Total heparin received during the treatment: 0units.   Needle cannulation sites held x 10 min.     Meds given: None  Complications: None    Person educated: patient. Knowledge base substantial. Barriers to learning: none. Educated on procedure and potassium via oral mode. Patient verbalized understanding. Pt prefers verbal education style.     ICEBOAT? Timeout performed pre-treatment  I: Patient was identified using 2 identifiers  C:  Consent Signed Yes  E: Equipment preventative maintenance is current and dialysis delivery system OK to use  B: Hepatitis B Surface Antigen: Negative; Draw Date: 02/09/23      Hepatitis B Surface Antibody: Immune; Draw Date: 02/09/23  O: Dialysis orders present and complete prior to treatment  A: Vascular access verified and assessed prior to treatment  T: Treatment was performed at a clinically appropriate time  ?: Patient was allowed to ask questions and address  concerns prior to treatment  See Adult Hemodialysis flowsheet in [facility] for further details and post assessment.  Machine water alarm in place and functioning. Transducer pods intact and checked every 15min.   Pt returned via ED cart.  Chlorine/Chloramine water system checked every 4 hours.  Outpatient Dialysis at [facility] T/Th/Sat    Patient repositioned every 2 hours during the treatment.  Post treatment report given to SANTIAGO Emery RN regarding 2L of fluid removed, last BP of 142/80, and patient pain rating of 6-8/10 bilateral feet up to calves. Patient transferred to ultrasound after hemodialysis- primary RN made aware.    Please remove patient dressing on AVF and AVG needle sites 24 hours after dialysis. If leaking occurs please apply a Band-Aid.
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
## 2023-04-05 NOTE — PROGRESS NOTES
Care Transitions Care Note  Received a call from [facility].  Patient is open to their services for RN OT and SW.  If patient is discharging over the w/e please call on Friday to notify them at [phone] and fax orders to [phone].

## 2023-04-05 NOTE — PROGRESS NOTES
Assessment and Plan:   ESRD: Runs [facility] [place] [facility]. Orders placed to run in am.   Na 138, K 5.2, HCO3 30, Cr 5.04. Ca 7.3 . Hgb 8.9.             Interval History:   DM  ASCVD: Afib  LE wounds                  Review of Systems:   C/O weakness, Alert and oriented, no pain. No anorexia.           Medications:       - MEDICATION INSTRUCTIONS for Dialysis Patients -   Does not apply See Admin Instructions     allopurinol  100 mg Oral BID     aspirin  81 mg Oral At Bedtime     bumetanide  2 mg Oral 2 times per day on Sun Mon Wed Fri     cloNIDine  0.1 mg Oral BID     ezetimibe  10 mg Oral QAM     insulin aspart  1-7 Units Subcutaneous TID AC     insulin aspart  1-5 Units Subcutaneous At Bedtime     insulin glargine  10 Units Subcutaneous At Bedtime     metoprolol tartrate  175 mg Oral BID     mirtazapine  15 mg Oral At Bedtime     sodium chloride (PF)  3 mL Intracatheter Q8H     warfarin ANTICOAGULANT  5 mg Oral ONCE at 18:00     Warfarin Therapy Reminder  1 each Oral See Admin Instructions       - MEDICATION INSTRUCTIONS -       Current active medications and PTA medications reviewed, see medication list for details.            Physical Exam:   Vitals were reviewed  Patient Vitals for the past 24 hrs:   BP Temp Temp src Pulse Resp SpO2   04/05/23 1144 117/79 97.7  F (36.5  C) Oral 115 16 100 %   04/05/23 0841 111/75 98  F (36.7  C) Oral (!) 121 16 98 %   04/05/23 0728 114/80 97.5  F (36.4  C) Oral 116 20 95 %   04/04/23 2346 130/84 97.4  F (36.3  C) Oral 117 18 98 %   04/04/23 1954 111/89 97.5  F (36.4  C) Oral 120 20 99 %   04/04/23 1810 (!) 142/80 97.6  F (36.4  C) Axillary 119 (!) 33 96 %   04/04/23 1805 129/82 -- -- 120 15 96 %   04/04/23 1800 (!) 145/90 -- -- (!) 121 24 --   04/04/23 1745 (!) 141/94 -- -- 120 16 --   04/04/23 1730 (!) 141/90 -- -- 120 17 --   04/04/23 1715 (!) 135/94 -- -- (!) 121 15 --   04/04/23 1700 (!) 141/100 -- -- 119 14 --   04/04/23 1645 (!) 141/96 -- -- 119 17 --   04/04/23  1630 (!) 135/93 -- -- 118 13 --   23 1615 119/87 -- -- 105 16 --   23 1600 130/84 -- -- 105 15 --   23 1545 (!) 128/92 -- -- 105 17 --   23 1530 128/84 -- -- 104 19 --   23 1515 -- -- -- 114 16 --   23 1505 128/83 -- -- 107 19 --   23 1500 133/87 -- -- 107 20 97 %   23 1421 118/84 -- -- 92 11 97 %       Temp:  [97.4  F (36.3  C)-98  F (36.7  C)] 97.7  F (36.5  C)  Pulse:  [] 115  Resp:  [11-33] 16  BP: (111-145)/() 117/79  SpO2:  [95 %-100 %] 100 %    Temperatures:  Current - Temp: 97.7  F (36.5  C); Max - Temp  Av.6  F (36.4  C)  Min: 97.4  F (36.3  C)  Max: 98  F (36.7  C)  Respiration range: Resp  Av.6  Min: 11  Max: 33  Pulse range: Pulse  Av.9  Min: 92  Max: 121  Blood pressure range: Systolic (24hrs), Av , Min:111 , Max:145   ; Diastolic (24hrs), Av, Min:75, Max:100    Pulse oximetry range: SpO2  Av.3 %  Min: 95 %  Max: 100 %    I/O last 3 completed shifts:  In: 0   Out:  [Other:]      Intake/Output Summary (Last 24 hours) at 2023 1312  Last data filed at 2023 1300  Gross per 24 hour   Intake 420 ml   Output 2000 ml   Net -1580 ml       Alert, no distress  Lungs clear BS  Cor RRR nl S1 S2 no M  No edema, multiple leg wounds  LAG good bruit       Wt Readings from Last 4 Encounters:   23 74 kg (163 lb 3.2 oz)   23 73.8 kg (162 lb 12.8 oz)   22 87.5 kg (192 lb 14.4 oz)   22 88.5 kg (195 lb)          Data:          Lab Results   Component Value Date     2023     2023     2023    Lab Results   Component Value Date    CHLORIDE 97 2023    CHLORIDE 95 2023    CHLORIDE 97 2023    CHLORIDE 98 2022    CHLORIDE 102 2022    CHLORIDE 101 2022    Lab Results   Component Value Date    BUN 59.4 2023    .7 2023    BUN 47.8 2023    BUN 18 2022    BUN 55 2022    BUN 49 2022      Lab  Results   Component Value Date    POTASSIUM 5.2 04/05/2023    POTASSIUM 6.2 04/04/2023    POTASSIUM 4.6 02/18/2023    POTASSIUM 3.4 08/30/2022    POTASSIUM 3.7 08/29/2022    POTASSIUM 4.6 08/28/2022    Lab Results   Component Value Date    CO2 30 04/05/2023    CO2 23 04/04/2023    CO2 26 02/18/2023    CO2 35 08/30/2022    CO2 25 08/29/2022    CO2 28 08/28/2022    Lab Results   Component Value Date    CR 5.04 04/05/2023    CR 7.97 04/04/2023    CR 5.38 02/18/2023        Recent Labs   Lab Test 04/05/23  0910 04/04/23  1043 02/19/23  0606   WBC 13.0* 12.7* 10.6   HGB 8.9* 9.2* 8.0*   HCT 29.1* 29.4* 26.4*   MCV 93 93 97    354 292     Recent Labs   Lab Test 04/04/23  1043 02/18/23  0606 02/17/23  1510   AST 41 17 21   ALT 31 12 14   ALKPHOS 404* 285* 276*   BILITOTAL 0.3 0.4 0.4       Recent Labs   Lab Test 12/06/21  1118 12/03/21  2329 10/06/21  0634   MAG 2.4* 2.1 2.2     Recent Labs   Lab Test 02/18/23  0606 02/16/23  0547 08/29/22  0915   PHOS 6.2* 5.3* 4.6*     Recent Labs   Lab Test 04/05/23  0910 04/04/23  1043 02/18/23  0606   LC 7.3* 7.8* 7.1*       Lab Results   Component Value Date    LC 7.3 (L) 04/05/2023     Lab Results   Component Value Date    WBC 13.0 (H) 04/05/2023    HGB 8.9 (L) 04/05/2023    HCT 29.1 (L) 04/05/2023    MCV 93 04/05/2023     04/05/2023     Lab Results   Component Value Date     04/05/2023    POTASSIUM 5.2 04/05/2023    CHLORIDE 97 (L) 04/05/2023    CO2 30 (H) 04/05/2023     (H) 04/05/2023     Lab Results   Component Value Date    BUN 59.4 (H) 04/05/2023    CR 5.04 (H) 04/05/2023     Lab Results   Component Value Date    MAG 2.4 (H) 12/06/2021     Lab Results   Component Value Date    PHOS 6.2 (H) 02/18/2023       Creatinine   Date Value Ref Range Status   04/05/2023 5.04 (H) 0.67 - 1.17 mg/dL Final   04/04/2023 7.97 (H) 0.67 - 1.17 mg/dL Final   02/18/2023 5.38 (H) 0.67 - 1.17 mg/dL Final   02/17/2023 4.74 (H) 0.67 - 1.17 mg/dL Final   02/16/2023 5.35 (H)  0.67 - 1.17 mg/dL Final   02/15/2023 6.90 (H) 0.67 - 1.17 mg/dL Final       Attestation:  I have reviewed today's vital signs, notes, medications, labs and imaging.     Dontae Roger MD

## 2023-04-05 NOTE — PHARMACY-ANTICOAGULATION SERVICE
Clinical Pharmacy - Warfarin Dosing Consult     Pharmacy has been consulted to manage this patient s warfarin therapy.  Indication: Atrial Fibrillation  Therapy Goal: INR 2-3  Warfarin Prior to Admission: Yes  Warfarin PTA Regimen: 5 mg daily  Significant drug interactions: aspirin    INR   Date Value Ref Range Status   04/04/2023 2.09 (H) 0.85 - 1.15 Final   02/19/2023 2.49 (H) 0.85 - 1.15 Final       Recommend warfarin 5 mg today.  Pharmacy will monitor Gordon Gann daily and order warfarin doses to achieve specified goal.      Please contact pharmacy as soon as possible if the warfarin needs to be held for a procedure or if the warfarin goals change.
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
## 2023-04-05 NOTE — PROVIDER NOTIFICATION
MD Notification    Notified Person: MD    Notified Person Name: Sadiq    Notification Date/Time: 4/4/23 2204    Notification Interaction: text page    Purpose of Notification:  critical lab, Troponin 340. Pt has chronic elevated troponins. Thank you.    [place] [id number]    Orders Received: pending    Comments:
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
## 2023-04-05 NOTE — CONSULTS
[facility]    Orthopedic Consultation    Gordon Gann MRN# [id number]   Age: 78 year old YOB: 1945     Date of Admission: 4/4/2023    Reason for consult: Bilateral lower leg wounds       Requesting physician: Jasmyn Soliman MD       Level of consult: Consult, follow and place orders           Assessment and Plan:   Assessment:   1. Multiple wounds to the bilateral feet, left posteromedial ankle, and right medial distal third lower leg, concern for underlying osteomyelitis  2. Type II diabetes mellitus with peripheral neuropathy  3. Endstage renal disease on hemodialysis      Plan:   The patient's history and clinical/diagnostic findings were reviewed with the on-call orthopedic trauma surgeon, Dr. Carlos Sr. The patient reports a 1.5 week history of new wounds to the bilateral lower legs and feet. He has been evaluated by his PCP who recommended follow up in a wound clinic. Patient admitted on 4/4/23 for weakness and missing dialysis and was noted to have wounds to the bilateral feet, left posteromedial ankle, and right medial distal third lower leg. Given the appearance of the wounds, there is concern for underlying osteomyelitis to the affected areas. No obvious signs of cellulitis, but difficult to determine from exam given chronic venous stasis changes. .4, POC lactate 1.2, and WBC 12.7, INR 2.09. Afebrile but slightly tachycardic. No systemic symptoms aside from ongoing fatigue. No recent imaging per chart review. Discussed findings with the patient and his RN at bedside. We reviewed the need for WOC involvement, IV antibiotics, and possible surgical debridement vs amputation. Discussed patient's findings with Dr. Sr, who reviewed pictures taken today during the exam. Likely amputation will be the ideal option and Dr. Sr will discuss this with the patient, likely later today. Current recommendations:    -Bilateral feet, left ankle, and right tib/fib MRIs  without contrast ordered to evaluate for osteomyelitis. Ortho will follow for results.  -Continue to trend CRP and WBC.  -NPO at midnight in the event patient and surgeon decide on surgical treatment. Okay for diet today from orthopedic standpoint.   -If at some point the patient proceeds with surgery, would recommend INR goal of < 1.5.  -Recommend WOC, ID, and possibly vascular surgery consults.   -Will defer antibiotic initiation to primary at this point, but reasonable to hold off as vital signs are overall stable (mildly tachycardic at times) and no clinical signs suggestive of cellulitis pending further recommendations and work-up.  -PT/OT consulted for mobilization.  -WBAT BLE with walker.   -Continue pain regimen.  -Type and screen ordered.  -Orthopedics will continue to the follow the patient.    Please contact orthopedic trauma team if any questions or concerns arise.           Chief Complaint:   Bilateral lower extremity wounds         History of Present Illness:   Medical history obtained via chart review and discussion with the patient. Gordon Gann is a very pleasant 78 year old male with past medical history of ESRD on hemodialysis, atrial fibrillation on chronic Warfarin, T2DM with peripheral neuropathy, HTN, possible NSTEMI, and gout who was admitted on 4/4/23 for weakness and missing dialysis. Patient was also noted to have bilateral lower leg/foot wounds on admission. He states that the large wounds over the left posteromedial ankle and right medial distal third lower leg have been ongoing for approximately 1.5 weeks to his recollection. He believes the wounds/scabbing to his bilateral feet have started more recently. Denies trauma to either area. The patient does note being evaluated by his PCP since the onset, who recommended he follow up in a wound clinic. The patient has yet to establish care with a wound care team. He describes pain affecting the left posteromedial ankle greater than the right  heel. The patient denies any changes in sensation, but believes he has chronic neuropathy at baseline. He denies fever, chills, nausea, vomiting, shortness of breath, and chest pain, but does report chronic fatigue. Per chart review, the patient is not on antibiotics. He has been able to weight bear normally with the use of his walker. The patient lives independently in a senior living facility. Denies prior injuries or surgeries to his bilateral lower legs. Currently tolerating PO diet.          Past Medical History:     Past Medical History:   Diagnosis Date     Alcohol abuse      Atrial fibrillation (H)      CKD (chronic kidney disease) stage 4, GFR 15-29 ml/min (H)      Diabetes (H)      Gout      Hypokalemia      Hypophosphatemia      Insomnia      STEMI (ST elevation myocardial infarction) (H)              Past Surgical History:     Past Surgical History:   Procedure Laterality Date     CREATE GRAFT LOOP ARTERIOVENOUS UPPER EXTREMITY Left 8/5/2021    Procedure: left arm arteriovenous graft fistula.;  Surgeon: Noemí Hernandez MD;  Location: [facility] OR     IR DIALYSIS FISTULOGRAM LEFT  12/8/2021     IR DIALYSIS FISTULOGRAM LEFT  2/9/2023             Social History:     Social History     Tobacco Use     Smoking status: Never     Smokeless tobacco: Never   Vaping Use     Vaping status: Not on file   Substance Use Topics     Alcohol use: Yes             Family History:   No family history on file.          Immunizations:     VACCINE/DOSE   Diptheria   DPT   DTAP   HBIG   Hepatitis A   Hepatitis B   HIB   Influenza   Measles   Meningococcal   MMR   Mumps   Pneumococcal   Polio   Rubella   Small Pox   TDAP   Varicella   Zoster             Allergies:   No Known Allergies          Medications:     Current Facility-Administered Medications   Medication     - MEDICATION INSTRUCTIONS for Dialysis Patients -     acetaminophen (TYLENOL) tablet 650 mg    Or     acetaminophen (TYLENOL) Suppository 650 mg      allopurinol (ZYLOPRIM) tablet 100 mg     aspirin EC tablet 81 mg     bumetanide (BUMEX) tablet 2 mg     cloNIDine (CATAPRES) tablet 0.1 mg     glucose gel 15-30 g    Or     dextrose 50 % injection 25-50 mL    Or     glucagon injection 1 mg     ezetimibe (ZETIA) tablet 10 mg     hydrALAZINE (APRESOLINE) tablet 25 mg     insulin aspart (NovoLOG) injection (RAPID ACTING)     insulin aspart (NovoLOG) injection (RAPID ACTING)     insulin glargine (LANTUS PEN) injection 10 Units     lidocaine (LMX4) cream     lidocaine 1 % 0.1-1 mL     melatonin tablet 1 mg     metoprolol tartrate (LOPRESSOR) tablet 175 mg     mirtazapine (REMERON) tablet 15 mg     ondansetron (ZOFRAN ODT) ODT tab 4 mg    Or     ondansetron (ZOFRAN) injection 4 mg     Patient is already receiving anticoagulation with heparin, enoxaparin (LOVENOX), warfarin (COUMADIN)  or other anticoagulant medication     sodium chloride (PF) 0.9% PF flush 3 mL     sodium chloride (PF) 0.9% PF flush 3 mL     Warfarin Dose Required Daily - Pharmacist Managed             Review of Systems:   CV: NEGATIVE for chest pain, palpitations or peripheral edema  C: POSITIVE for fatigue, NEGATIVE for fever, chills, change in weight  E/M: NEGATIVE for ear, mouth and throat problems  R: NEGATIVE for significant cough or SOB          Physical Exam:   All vitals have been reviewed  Patient Vitals for the past 24 hrs:   BP Temp Temp src Pulse Resp SpO2   04/05/23 0841 111/75 98  F (36.7  C) Oral (!) 121 16 98 %   04/05/23 0728 114/80 97.5  F (36.4  C) Oral 116 20 95 %   04/04/23 2346 130/84 97.4  F (36.3  C) Oral 117 18 98 %   04/04/23 1954 111/89 97.5  F (36.4  C) Oral 120 20 99 %   04/04/23 1810 (!) 142/80 97.6  F (36.4  C) Axillary 119 (!) 33 96 %   04/04/23 1805 129/82 -- -- 120 15 96 %   04/04/23 1800 (!) 145/90 -- -- (!) 121 24 --   04/04/23 1745 (!) 141/94 -- -- 120 16 --   04/04/23 1730 (!) 141/90 -- -- 120 17 --   04/04/23 1715 (!) 135/94 -- -- (!) 121 15 --   04/04/23 1700 (!)  141/100 -- -- 119 14 --   04/04/23 1645 (!) 141/96 -- -- 119 17 --   04/04/23 1630 (!) 135/93 -- -- 118 13 --   04/04/23 1615 119/87 -- -- 105 16 --   04/04/23 1600 130/84 -- -- 105 15 --   04/04/23 1545 (!) 128/92 -- -- 105 17 --   04/04/23 1530 128/84 -- -- 104 19 --   04/04/23 1515 -- -- -- 114 16 --   04/04/23 1505 128/83 -- -- 107 19 --   04/04/23 1500 133/87 -- -- 107 20 97 %   04/04/23 1421 118/84 -- -- 92 11 97 %   04/04/23 1039 111/86 97.5  F (36.4  C) Oral 108 18 100 %       Intake/Output Summary (Last 24 hours) at 4/5/2023 0908  Last data filed at 4/5/2023 0841  Gross per 24 hour   Intake 240 ml   Output 2000 ml   Net -1760 ml       Constitutional: Pleasant, alert, appropriate, following commands.  HEENT: Head atraumatic normocephalic. Pupils equal round and reactive.  Respiratory: Unlabored breathing no audible wheeze  Cardiovascular: Regular rate and rhythm per pulses.  GI: Abdomen is non-distended.  Lymph/Hematologic: No lymphadenopathy in areas examined.  Genitourinary: No villareal  Skin: No rashes, no cyanosis, no edema.  Musculoskeletal: Left lower extremity: ~5 cm length malodorous wound over the posterolateral ankle with eschar and granulation tissue present. No active or expressible drainage. Multiple dried sores to the dorsal forefoot with trace surrounding erythema. No significant erythema, but chronic venous stasis changes and xeroderma present. Patchy ecchymosis to anterior lower leg. 1+ pitting edema distally. No significant TTP throughout the lower leg. Nontender over the medial and lateral knee joint. Calf is soft and nontender. Able to perform SLR unassisted without difficulty. 5/5 ankle DF and PF, EHL, and FHL. Able to actively wiggle toes. DP pulse weak. Patient reports intact sensation to light touch over the first dorsal webspace and lateral thigh.  Right lower extremity: ~4 cm length wound over the medial right distal third of the lower leg with scant serosanguinous discharge. Unable to  express purulence. Multiple smaller dried wounds to the dorsal forefoot, right fourth digit, and heel. Deformity and swelling at the first MTP joint. No significant erythema, but chronic venous stasis changes and xeroderma present. Patchy ecchymosis to anterior lower leg. 1+ pitting edema distally. No significant TTP throughout the lower leg. Nontender over the medial and lateral knee joint. Calf is soft and nontender. Able to perform SLR unassisted without difficulty. 5/5 ankle DF and PF, EHL, and FHL. Able to actively wiggle toes. DP pulse weak. Patient reports intact sensation to light touch over the first dorsal webspace and lateral thigh.  Neurologic: normal without focal findings, intact mental status, speech normal, alert and oriented x iii          Data:   All laboratory data reviewed  Results for orders placed or performed during the hospital encounter of 04/04/23   Chest XR,  PA & LAT     Status: None    Narrative    XR CHEST 2 VIEWS 4/4/2023 11:43 AM    HISTORY: shortness of breath    COMPARISON: 2/15/2023      Impression    IMPRESSION: Stable small-to-moderate right pleural effusion and right  basilar atelectasis and scarring. On the prior ultrasound on  2/15/2023, the effusion was multi-septated. The left lung is clear. No  pneumothorax. Normal heart size.    BRIDGETT WARNER MD         SYSTEM ID:  [id number]   US JERI Doppler No Exercise     Status: None    Narrative    EXAM: RESTING ANKLE-BRACHIAL INDICES (ABIs)  LOCATION: [facility]  DATE/TIME: 4/4/2023 7:20 PM    INDICATION: chronic LE wounds  COMPARISON: None.    JERI FINDINGS:  RIGHT  Brachial: 136  Ankle (PT): 112 Index: 0.82  Ankle (DP): 104 Index: 0.76    LEFT  Ankle (PT): 0  Ankle (DP): 131 Index: 0.96    The right JERI at rest is 0.82. The left JERI at rest is 0.96.      WAVEFORMS: The dorsalis pedis and posterior tibial arteries are monophasic.      Impression    IMPRESSION:  1.  RIGHT LOWER EXTREMITY: Resting ankle-brachial index  compatible with mild arterial insufficiency. Abnormal monophasic waveforms.  2.  LEFT LOWER EXTREMITY: Resting ankle-brachial index is likely falsely elevated within the dorsalis pedis artery distribution. Nonobtainable pressure within the posterior tibial artery distribution. Abnormal monophasic waveforms.   iStat Gases (lactate) venous, POCT     Status: Abnormal   Result Value Ref Range    Lactic Acid POCT 1.2 <=2.0 mmol/L    Bicarbonate Venous POCT 23 21 - 28 mmol/L    O2 Sat, Venous POCT 48 (L) 94 - 100 %    pCO2V Venous POCT 38 (L) 40 - 50 mm Hg    pH Venous POCT 7.38 7.32 - 7.43    pO2 Venous POCT 27 25 - 47 mm Hg   INR     Status: Abnormal   Result Value Ref Range    INR 2.09 (H) 0.85 - 1.15   Comprehensive metabolic panel     Status: Abnormal   Result Value Ref Range    Sodium 138 136 - 145 mmol/L    Potassium 6.2 (HH) 3.4 - 5.3 mmol/L    Chloride 95 (L) 98 - 107 mmol/L    Carbon Dioxide (CO2) 23 22 - 29 mmol/L    Anion Gap 20 (H) 7 - 15 mmol/L    Urea Nitrogen 115.7 (H) 8.0 - 23.0 mg/dL    Creatinine 7.97 (H) 0.67 - 1.17 mg/dL    Calcium 7.8 (L) 8.8 - 10.2 mg/dL    Glucose 329 (H) 70 - 99 mg/dL    Alkaline Phosphatase 404 (H) 40 - 129 U/L    AST 41 10 - 50 U/L    ALT 31 10 - 50 U/L    Protein Total 6.8 6.4 - 8.3 g/dL    Albumin 3.0 (L) 3.5 - 5.2 g/dL    Bilirubin Total 0.3 <=1.2 mg/dL    GFR Estimate 6 (L) >60 mL/min/1.73m2   CBC with platelets and differential     Status: Abnormal   Result Value Ref Range    WBC Count 12.7 (H) 4.0 - 11.0 10e3/uL    RBC Count 3.16 (L) 4.40 - 5.90 10e6/uL    Hemoglobin 9.2 (L) 13.3 - 17.7 g/dL    Hematocrit 29.4 (L) 40.0 - 53.0 %    MCV 93 78 - 100 fL    MCH 29.1 26.5 - 33.0 pg    MCHC 31.3 (L) 31.5 - 36.5 g/dL    RDW 16.0 (H) 10.0 - 15.0 %    Platelet Count 354 150 - 450 10e3/uL    % Neutrophils 87 %    % Lymphocytes 7 %    % Monocytes 5 %    % Eosinophils 0 %    % Basophils 0 %    % Immature Granulocytes 1 %    NRBCs per 100 WBC 0 <1 /100    Absolute Neutrophils 11.0 (H)  1.6 - 8.3 10e3/uL    Absolute Lymphocytes 0.8 0.8 - 5.3 10e3/uL    Absolute Monocytes 0.7 0.0 - 1.3 10e3/uL    Absolute Eosinophils 0.0 0.0 - 0.7 10e3/uL    Absolute Basophils 0.0 0.0 - 0.2 10e3/uL    Absolute Immature Granulocytes 0.1 <=0.4 10e3/uL    Absolute NRBCs 0.0 10e3/uL   BNP     Status: Abnormal   Result Value Ref Range    N terminal Pro BNP Inpatient >70,000 (H) 0 - 1,800 pg/mL   Troponin T, High Sensitivity     Status: Abnormal   Result Value Ref Range    Troponin T, High Sensitivity 326 (HH) <=22 ng/L   Glucose by meter     Status: Abnormal   Result Value Ref Range    GLUCOSE BY METER POCT 244 (H) 70 - 99 mg/dL   Troponin T, High Sensitivity     Status: Abnormal   Result Value Ref Range    Troponin T, High Sensitivity 340 (HH) <=22 ng/L   CRP inflammation     Status: Abnormal   Result Value Ref Range    CRP Inflammation 107.47 (H) <5.00 mg/L   Glucose by meter     Status: Abnormal   Result Value Ref Range    GLUCOSE BY METER POCT 168 (H) 70 - 99 mg/dL   Glucose by meter     Status: Abnormal   Result Value Ref Range    GLUCOSE BY METER POCT 211 (H) 70 - 99 mg/dL   Glucose by meter     Status: Abnormal   Result Value Ref Range    GLUCOSE BY METER POCT 157 (H) 70 - 99 mg/dL   EKG 12 lead     Status: None   Result Value Ref Range    Systolic Blood Pressure  mmHg    Diastolic Blood Pressure  mmHg    Ventricular Rate 107 BPM    Atrial Rate  BPM    VA Interval  ms    QRS Duration 112 ms     ms    QTc 536 ms    P Axis  degrees    R AXIS -39 degrees    T Axis 188 degrees    Interpretation ECG       Accelerated Junctional rhythm  Left axis deviation  Pulmonary disease pattern  Incomplete right bundle branch block  T wave abnormality, consider inferolateral ischemia  Prolonged QT  Abnormal ECG  When compared with ECG of 19-FEB-2023 06:31,  Junctional rhythm has replaced Atrial flutter  QRS axis Shifted left  Confirmed by GENERATED REPORT, COMPUTER ([id number]),  ROMARIO CABRAL ([id number]) on 4/4/2023 2:35:02  PM     CBC with platelets + differential     Status: Abnormal    Narrative    The following orders were created for panel order CBC with platelets + differential.  Procedure                               Abnormality         Status                     ---------                               -----------         ------                     CBC with platelets and d...[[id number]]  Abnormal            Final result                 Please view results for these tests on the individual orders.          Attestation:  I have reviewed today's vital signs, notes, medications, labs and imaging with Dr. Carlos Sr.  Amount of time performed on this consult: 60 minutes.    Asha Barcenas PA-C  [facility]

## 2023-04-05 NOTE — PROGRESS NOTES
"   04/05/23 1500   Appointment Info   Signing Clinician's Name / Credentials (OT) Tigist Hoffman, OTR/L   Living Environment   People in Home alone   Current Living Arrangements independent living facility   Home Accessibility no concerns  (Elevator)   Transportation Anticipated family or friend will provide   Living Environment Comments Pt resides alone in ILF. Pt uses 4WW baseline. BR Set up: walk in shower w/grab bars, pt typically stands, pt reports daughter just ordered shower chair; grab bars by toilet.   Self-Care   Usual Activity Tolerance moderate   Regular Exercise No   Equipment Currently Used at Home walker, rolling;shower chair;grab bar, tub/shower   Fall history within last six months yes   Number of times patient has fallen within last six months 1  (\"I turned out lights, I thought I knew where I was, I became disoriented and I slid down the wall.\")   Activity/Exercise/Self-Care Comment Pt Ind all ADLs baseline.   Instrumental Activities of Daily Living (IADL)   Previous Responsibilities meal prep;laundry;driving   IADL Comments pt reports most of the time daughter gets groceries, pt reports daughters assist with med management (daughter sets up meds in pill organizer). Pt drives but has only driven a couple times in winter.   General Information   Onset of Illness/Injury or Date of Surgery 04/04/23   Referring Physician Jasmyn Soliman, DO   Patient/Family Therapy Goal Statement (OT) Not stated   Additional Occupational Profile Info/Pertinent History of Current Problem Per chart: \"Gordon Gann is a 78 year old male admitted on 4/4/2023 for weakness and missing dialysis.\"   Existing Precautions/Restrictions fall;weight bearing  (Per [place])   Right Upper Extremity (Weight-bearing Status) weight-bearing as tolerated (WBAT)   Left Lower Extremity (Weight-bearing Status) weight-bearing as tolerated (WBAT)   Cognitive Status Examination   Orientation Status orientation to person, place and time   Visual " Perception   Visual Impairment/Limitations WFL;corrective lenses for reading   Sensory   Sensory Comments Pt reports some numbness/tingling in BLEs   Pain Assessment   Patient Currently in Pain   (2/10 L foot pre activity, 6/10 with activity in BLEs)   Range of Motion Comprehensive   Comment, General Range of Motion BUEs WFL   Strength Comprehensive (MMT)   Comment, General Manual Muscle Testing (MMT) Assessment BUEs WFL   Coordination   Upper Extremity Coordination No deficits were identified   Bed Mobility   Bed Mobility supine-sit   Comment (Bed Mobility) SBA   Transfers   Transfers sit-stand transfer;bed-chair transfer   Transfer Skill: Bed to Chair/Chair to Bed   Transfer Comments CGA, FWW   Sit-Stand Transfer   Sit/Stand Transfer Comments Mod A, FWW   Balance   Balance Comments Posterior lean coming into standing   Activities of Daily Living   BADL Assessment/Intervention lower body dressing   Lower Body Dressing Assessment/Training   Comment, (Lower Body Dressing) Dep A to manage socks EOB   Clinical Impression   Criteria for Skilled Therapeutic Interventions Met (OT) Yes, treatment indicated   OT Diagnosis Decreased ind with I/ADLs   OT Problem List-Impairments impacting ADL problems related to;activity tolerance impaired;balance;mobility;strength;pain   Assessment of Occupational Performance 3-5 Performance Deficits   Identified Performance Deficits TB dressing, toileting, bathing, home management   Planned Therapy Interventions (OT) ADL retraining;IADL retraining;transfer training   Clinical Decision Making Complexity (OT) low complexity   Anticipated Equipment Needs Upon Discharge (OT)   (TBD)   Risk & Benefits of therapy have been explained patient   OT Total Evaluation Time   OT Eval, Low Complexity Minutes (77516) 10   OT Goals   Therapy Frequency (OT) 5 times/wk   OT Predicted Duration/Target Date for Goal Attainment 04/19/23   OT Goals Hygiene/Grooming;Lower Body Dressing;Toilet Transfer/Toileting    OT: Hygiene/Grooming supervision/stand-by assist  (FWW, sinkside)   OT: Lower Body Dressing Supervision/stand-by assist  (FWW)   OT: Toilet Transfer/Toileting Supervision/stand-by assist;toilet transfer;cleaning and garment management  (FWW)   Self-Care/Home Management   Self-Care/Home Mgmt/ADL, Compensatory, Meal Prep Minutes (97256) 17   Symptoms Noted During/After Treatment (Meal Preparation/Planning Training) fatigue;increased pain   Treatment Detail/Skilled Intervention Pt supine in bed, agreeable to OT. Pt reports increased putting weight through BLEs, pt edcuated on WBAT BLEs, pt verbalized understanding. Pt completes semi-supine > sitting EOB with SBA. Pt dep A to adjust socks sitting EOB. Pt completes sit > stand from EOB with Mod A, posterior lean noted, VCs/tactile cues for upright posture, VCs for hand placement and to push up from bed with at least one hand. Pt completes stand pivot to chair with CGA for safety, FWW, to simulate functional BSC transfer. Pt completes stand > sit in chair with CGA for safety, VCs for task sequence, VCs for controlled sit. Pt up in chair with needs met.   OT Discharge Planning   OT Plan standing g/h at chair vs EOB, progress mobility as able for toilet transfer   OT Discharge Recommendation (DC Rec) Transitional Care Facility   OT Rationale for DC Rec Pt functioning below baseline, with noted limitations in strength, increased pain, mobility, and decreased I/ADL independence. Pt currently A x 1 for all functional mobility and requires increased I/ADL assist. Pt would benefit from skilled TCU to address I/ADL independence prior to return home.   OT Brief overview of current status 6/10 pain with activity; SBA bed mob.. Mod A sit <>stand FWW; CGA stand pivot to chair FWW - VCs throughout session for safe technique.   Total Session Time   Timed Code Treatment Minutes 17   Total Session Time (sum of timed and untimed services) 27

## 2023-04-05 NOTE — PLAN OF CARE
Summary:  Patient admitted with weakness, skipped last dialysis due to weather; BLE pain  DATE & TIME: 04/04/23 Admitted through ED, went to dialysis and then US of BLE    Cognitive Concerns/ Orientation : Alert and oriented x4, pleasant   BEHAVIOR & AGGRESSION TOOL COLOR: GREEN  CIWA SCORE: NA   ABNL VS/O2: WDL on RA  MOBILITY: SBA   PAIN MANAGMENT: Complained of BLE- gave tylenol   DIET: 2K diet- great appetite  BOWEL/BLADDER: Continent- on hemodialysis  ABNL LAB/BG: NA  DRAIN/DEVICES: NA  TELEMETRY RHYTHM: NA  SKIN: Pale- BLE iker  TESTS/PROCEDURES: Dialysis  D/C DAY/GOALS/PLACE: Just admitted today  OTHER IMPORTANT INFO:           Goal Outcome Evaluation:

## 2023-04-05 NOTE — PLAN OF CARE
Summary:  Patient admitted with weakness, skipped last dialysis due to weather; BLE pain  DATE & TIME: 04/05/23 Admitted through ED, went to dialysis and then US of BLE    Cognitive Concerns/ Orientation : Alert and oriented x4, pleasant   BEHAVIOR & AGGRESSION TOOL COLOR: GREEN  CIWA SCORE: NA   ABNL VS/O2: WDL on RA  MOBILITY: Ax1 WGB  PAIN MANAGMENT: Denies pain-unless legs are moved  DIET: 2K diet- great appetite  BOWEL/BLADDER: Continent- on hemodialysis  ABNL LAB/BG: /213  DRAIN/DEVICES:   TELEMETRY RHYTHM: afib w/ RVR in 100-120s  SKIN: Pale- BLE iker  TESTS/PROCEDURES: Dialysis  D/C DAY/GOALS/PLACE: 2-3 days  OTHER IMPORTANT INFO: Orthopedic Surgery IP consulted- following. Possible wound debridement-  depending on MRIs. Ankle wounds, black and foul smelling- redressed and examined by WOC RN.         Goal Outcome Evaluation:

## 2023-04-05 NOTE — CONSULTS
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   Wound history/plan of care: found open to air   Wound base:  serous scabbing and eschar     Palpation of the wound bed: firm      Drainage: none     Description of drainage: none     Measurements (length x width x depth, in cm): largest 5  x 5  x  0.1 cm      Tunneling: N/A     Undermining: N/A  Periwound skin: Intact      Color: pink      Temperature: normal   Odor: none  Pain: mild, tender  Pain interventions prior to dressing change: slow and gentle cares   Treatment goal: Heal  and Infection control/prevention  STATUS: initial assessment  Supplies ordered: supplies stored on unit and discussed with patient  [place]ear ordered through [facility] through  4/5     Wound location: left medial lower leg     Last photo: 4/5  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   Wound history/plan of care: found open to air   Wound base:  serous scabbing and eschar     Palpation of the wound bed: firm      Drainage: none     Description of drainage: none     Measurements (length x width x depth, in cm): 5  x 4  x  0.1 cm      Tunneling: N/A     Undermining: N/A  Periwound skin: Intact      Color: pink      Temperature: normal   Odor: none  Pain: mild, tender  Pain interventions prior to dressing change: slow and gentle cares   Treatment goal: Heal  and Infection control/prevention  STATUS: initial assessment  Supplies ordered: supplies stored on unit and discussed with patient  [place] [facility] [place]  4/5        Treatment Plan:      Wound care  Start:  04/06/23 0600,   DAILY,   Routine        Comments: Location: BLE  "  Care: provided every day by primary RN   1. Cleanse wounds and periwound skin with 4x4\" gauze and commercial wound cleanser, pat dry   2. Apply Triad paste generously over wound areas   3. Then cover Triad paste with Adaptic gauze   4. Apply EdemaWear size small ( #[id number]) to BLE per manufacture instructions   5. Apply ABD pads as needed OVER EdemaWear and wrap with kerlix. EdemaWear should make contact with as much of the patient skin as possible, this is why absorption of drainage is applied on the outside of EdemaWear             Orders: Written    RECOMMEND PRIMARY TEAM ORDER: None, at this time  Education provided: importance of repositioning, plan of care, Moisture management, Hygiene and Off-loading pressure  Discussed plan of care with: Patient and Nurse  WOC nurse follow-up plan: weekly  Notify WOC if wound(s) deteriorate.  Nursing to notify the Provider(s) and re-consult the WOC Nurse if new skin concern.    DATA:     Current support surface: Standard  Standard gel/foam mattress (IsoFlex, Atmos air, etc)  Containment of urine/stool: not assessed with consult   BMI: There is no height or weight on file to calculate BMI.   Active diet order: Orders Placed This Encounter      2 Gram K Diet      NPO per Anesthesia Guidelines for Procedure/Surgery Except for: Meds, Ice Chips     Output: I/O last 3 completed shifts:  In: 0   Out: 2000 [Other:2000]     Labs: Recent Labs   Lab 04/05/23  0910 04/04/23  1043   ALBUMIN  --  3.0*   HGB 8.9* 9.2*   INR 2.21* 2.09*   WBC 13.0* 12.7*     Pressure injury risk assessment:   Sensory Perception: 4-->no impairment  Moisture: 4-->rarely moist  Activity: 3-->walks occasionally  Mobility: 3-->slightly limited  Nutrition: 3-->adequate  Friction and Shear: 3-->no apparent problem  Shalom Score: 20    Magaly CWOCN   1st: Vocera Connect, call \"Magaly Weiner\" or call Group \"WO Nurse\"   (2nd option: leave a voicemail at Dept. Office Number: [phone]. Messages checked " occasionally M-F)

## 2023-04-05 NOTE — DISCHARGE INSTRUCTIONS
He brought pants, book, cellphone/ cellphone , sweet shirt, wallet, socks, overnight bag, and shoes    Goes to [facility] on Tues, Thurs and Saturday, needs to arrive by 10:20 AM  [facility] . They have been scheduled to  patient at [address] [facility] for 4/13 and 4/15.  The van will arrive at 9:20 AM and picks him up at [facility] at 3:00p.m.  [facility] at [phone] to arrange rides for 4/18 and beyond.        Bilateral lower leg wounds:     -Pt to follow-up with Vascular Surgery vs Ortho in future if considering BKA.  Could also consider wound clinic ([facility] [place] [phone]) if he desires to continue attempts at wound healing.     -Discontinuing EdemaWear for now as pt states he could not tolerate it.  Also changing to Plurogel instead of Triad due to pain with paste removal.  Ok to resume Triad once the Plurogel is gone, if pt tolerates.     -Wound care to BLE: Daily:  1.  Cleanse intact skin on lower legs and feet with Kassidy perineal lotion and dry cloths, then apply Sween 24 cream (or similar).  2.  Cleanse wounds with wound cleanser (ie. MicroKlenz).   3.  Apply thin layer Plurogel to pieces of oil emulsion gauze (Adaptic) that have been cut to fit the approx size of wounds.  Then place the gelled dressings onto wounds.   4.  Cover with gauze or ABDs and light Kerlix.  Use socks or stockinette to help hold Kerlix in place.    5.  Elevate legs; float heel at all times.

## 2023-04-06 NOTE — PROGRESS NOTES
Potassium   Date Value Ref Range Status   04/05/2023 5.2 3.4 - 5.3 mmol/L Final   08/30/2022 3.4 3.4 - 5.3 mmol/L Final     Hemoglobin   Date Value Ref Range Status   04/05/2023 8.9 (L) 13.3 - 17.7 g/dL Final     Creatinine   Date Value Ref Range Status   04/05/2023 5.04 (H) 0.67 - 1.17 mg/dL Final     Urea Nitrogen   Date Value Ref Range Status   04/05/2023 59.4 (H) 8.0 - 23.0 mg/dL Final   08/30/2022 18 7 - 30 mg/dL Final     Sodium   Date Value Ref Range Status   04/05/2023 138 136 - 145 mmol/L Final     INR   Date Value Ref Range Status   04/05/2023 2.21 (H) 0.85 - 1.15 Final       DIALYSIS PROCEDURE NOTE  Hepatitis status of previous patient on machine log was checked and verified ok to use with this patients hepatitis status.  Patient dialyzed for 3.5hrs. on a K2 bath with a net fluid removal of 2L. A BFR of 400 ml/min was obtained via a L AVG using 15gauge needles.      The treatment plan was discussed with Dr. Roger during the treatment.    Total heparin received during the treatment: 1750units.   Needle cannulation sites held x 15min.     Meds given: 4000 units epo and 4mcg hectorol given  Complications: None    Person educated: patient. Knowledge base substantial. Barriers to learning: none. Educated on procedure and potassium via oral mode. Patient verbalized understanding. Pt prefers verbal education style.      ICEBOAT? Timeout performed pre-treatment  I: Patient was identified using 2 identifiers  C:  Consent Signed Yes  E: Equipment preventative maintenance is current and dialysis delivery system OK to use  B: Hepatitis B Surface Antigen: Negative; Draw Date: 02/09/23      Hepatitis B Surface Antibody: Immune; Draw Date: 02/09/23  O: Dialysis orders present and complete prior to treatment  A: Vascular access verified and assessed prior to treatment  T: Treatment was performed at a clinically appropriate time  ?: Patient was allowed to ask questions and address concerns prior to treatment  See Adult  Hemodialysis flowsheet in EPIC for further details and post assessment.  Machine water alarm in place and functioning. Transducer pods intact and checked every 15min.   Pt returned via bed transport.  Chlorine/Chloramine water system checked every 4 hours.  Outpatient Dialysis at [facility] T/Th/Sat     Patient repositioned every 2 hours during the treatment.  Post treatment report given to ALEJA Jenkins RN regarding 2L of fluid removed, last BP of 158/123, and patient pain rating of 0/10. Patient transferred to ultrasound per request from primary RN.      Please remove patient dressing on AVF and AVG needle sites 24 hours after dialysis. If leaking occurs please apply a Band-Aid.

## 2023-04-06 NOTE — PROGRESS NOTES
04/06/23 0959   Appointment Info   Signing Clinician's Name / Credentials (PT) Karina Meeks DPT   Living Environment   People in Home alone   Current Living Arrangements independent living facility   Home Accessibility no concerns   Transportation Anticipated family or friend will provide   Living Environment Comments Pt resides alone in ILF. Pt uses 4WW baseline. BR Set up: walk in shower w/grab bars, pt typically stands, pt reports daughter just ordered shower chair; grab bars by toilet.   Self-Care   Usual Activity Tolerance moderate   Current Activity Tolerance poor   Equipment Currently Used at Home walker, rolling;shower chair;grab bar, tub/shower;walker, standard   Fall history within last six months yes   Number of times patient has fallen within last six months 1   Activity/Exercise/Self-Care Comment Pt IND at baseline   General Information   Onset of Illness/Injury or Date of Surgery 04/04/23   Referring Physician Jasmyn Soliman, DO   Patient/Family Therapy Goals Statement (PT) Return home   Pertinent History of Current Problem (include personal factors and/or comorbidities that impact the POC) Per chart: Gordon Gann is a 78 year old male admitted on 4/4/2023 for weakness and missing dialysis   Existing Precautions/Restrictions fall   General Observations Pt sitting in recliner upon therapist arrival, agreeable to PT   Cognition   Affect/Mental Status (Cognition) WFL   Orientation Status (Cognition) oriented x 4   Follows Commands (Cognition) WFL   Pain Assessment   Patient Currently in Pain   (Pt reports pain in LEs)   Integumentary/Edema   Integumentary/Edema Comments Wounds on B LE, covered by bandage   Posture    Posture Forward head position;Protracted shoulders   Range of Motion (ROM)   Range of Motion ROM is WFL   Strength (Manual Muscle Testing)   Strength (Manual Muscle Testing) Deficits observed during functional mobility   Bed Mobility   Comment, (Bed Mobility) Not observed, pt in  recliner upon therapist arrival and requesting to stay at end of session   Transfers   Comment, (Transfers) Sit to stand Mod A   Gait/Stairs (Locomotion)   Comment, (Gait/Stairs) Pt amb w/ FWW and CGA   Balance   Balance Comments Fair seated and standing w/ FWW   Sensory Examination   Sensory Perception Comments Pt reports numbness in B LEs and feet   Clinical Impression   Criteria for Skilled Therapeutic Intervention Yes, treatment indicated   PT Diagnosis (PT) Impaired functional mobility and gait   Influenced by the following impairments Pain, weakness, decreased activity tolerance, impaired balance   Functional limitations due to impairments Limited functional mobility requiring AD and assist   Clinical Presentation (PT Evaluation Complexity) Stable/Uncomplicated   Clinical Presentation Rationale Based on PMH, current status, and social support   Clinical Decision Making (Complexity) low complexity   Planned Therapy Interventions (PT) balance training;bed mobility training;gait training;patient/family education;strengthening;transfer training   Risk & Benefits of therapy have been explained evaluation/treatment results reviewed;care plan/treatment goals reviewed;risks/benefits reviewed;current/potential barriers reviewed;participants voiced agreement with care plan;participants included;patient   PT Total Evaluation Time   PT Eval, Low Complexity Minutes (04371) 8   Physical Therapy Goals   PT Frequency 5x/week   PT Predicted Duration/Target Date for Goal Attainment 04/13/23   PT Goals Bed Mobility;Transfers;Gait   PT: Bed Mobility Modified independent;Supine to/from sit   PT: Transfers Modified independent;Sit to/from stand;Assistive device   PT: Gait Supervision/stand-by assist;Assistive device;100 feet   Interventions   Interventions Quick Adds Therapeutic Activity   Therapeutic Activity   Therapeutic Activities: dynamic activities to improve functional performance Minutes (93882) 10   Symptoms Noted  During/After Treatment Fatigue   Treatment Detail/Skilled Intervention Pt performed sit to stand w/ FWW and Mod A, pt required multiple attempts to come up to standing, pt using momentum. Pt amb 40' around the room w/ FWW and CGA, pt reported fatigue, sat in recliner. Pt reported feeling better than yesterday, but still fatigued. Pt declined further mobility d/t fatigue and arrival of food. Pt in recliner at end of session, chair alarm on, all needs w/in reach, RN updated.   PT Discharge Planning   PT Plan Progress transfers and amb, assess bed mob, activity tolerance   PT Discharge Recommendation (DC Rec) Transitional Care Facility   PT Rationale for DC Rec Pt below baseline, currently Ax1, pt limited by weakness and decreased activity tolerance, pt unable to amb more than 50' today. Recommend TCU to increase strength and functional mobility.   PT Brief overview of current status Transfers Mod A, amb CGA w/ FWW   Total Session Time   Timed Code Treatment Minutes 10   Total Session Time (sum of timed and untimed services) 18

## 2023-04-06 NOTE — PLAN OF CARE
Summary:  Patient admitted with weakness, skipped last dialysis due to weather; BLE pain  DATE & TIME: 4/6/23 Day shift  Cognitive Concerns/ Orientation : Alert and oriented x4, pleasant   BEHAVIOR & AGGRESSION TOOL COLOR: GREEN  CIWA SCORE: NA   ABNL VS/O2: VSS on room air, tachycardic   MOBILITY: Assist of one with walker/gait belt   PAIN MANAGMENT: Denies   DIET: NPO except for meds/ice chips for possible wound debridement   BOWEL/BLADDER: Continent- on hemodialysis  ABNL LAB/BG: BG 65/179 (MD aware)  DRAIN/DEVICES: Peripheral IV SL right AC, left arm dialysis fistula +B/T  TELEMETRY RHYTHM: afib w/ RVR   SKIN: Pale- BLE red/iker/warm with scabs and wounds-wound dressings changed and CDI  TESTS/PROCEDURES: Dialysis right now- still need US on BLE per Vascular  D/C DAY/GOALS/PLACE: 1-2 days to TCU  OTHER IMPORTANT INFO: Orthopedic Surgery IP consulted- following. MRIs negative for osteomyelitis- no surgery at this time. Vascular following- seems to have great arterial blood flow for possible BKAs in future. Started on antibiotic for possible cellulitis on BLE.         Goal Outcome Evaluation:

## 2023-04-06 NOTE — PLAN OF CARE
Summary:  Patient admitted with weakness, skipped last dialysis due to weather; BLE pain  DATE & TIME: 04/05/23 1193-2352  Admitted through ED, went to dialysis and then US of BLE    Cognitive Concerns/ Orientation : Alert and oriented x4, pleasant   BEHAVIOR & AGGRESSION TOOL COLOR: GREEN  CIWA SCORE: NA   ABNL VS/O2: WDL on RA  MOBILITY: Ax1 WGB  PAIN MANAGMENT: Denies   DIET: 2 gram K diet  BOWEL/BLADDER: Continent- on hemodialysis  ABNL LAB/BG: , 324  DRAIN/DEVICES:   TELEMETRY RHYTHM: afib w/ RVR   SKIN: Pale- BLE iker  TESTS/PROCEDURES: Dialysis  D/C DAY/GOALS/PLACE: 2-3 days  OTHER IMPORTANT INFO: Orthopedic Surgery IP consulted- following. Possible wound debridement- depending on MRIs. Ankle wounds, follow by WOC.

## 2023-04-06 NOTE — PLAN OF CARE
Goal Outcome Evaluation:     Cognitive Concerns/ Orientation : Alert and oriented x4, pleasant   BEHAVIOR & AGGRESSION TOOL COLOR: GREEN  CIWA SCORE: NA   ABNL VS/O2: VSS on room air, tachycardic   MOBILITY: Assist of one with walker/gait belt   PAIN MANAGMENT: Denies   DIET: NPO except for meds/ice chips for possible wound debridement   BOWEL/BLADDER: Continent- on hemodialysis  ABNL LAB/BG: , lactic acid 2.0  DRAIN/DEVICES: Peripheral IV SL right AC, left arm dialysis fistula +B/T  TELEMETRY RHYTHM: afib w/ RVR   SKIN: Pale- BLE iker  TESTS/PROCEDURES: Dialysis  D/C DAY/GOALS/PLACE: 2-3 days  OTHER IMPORTANT INFO: Orthopedic Surgery IP consulted- following. Possible wound debridement- depending on MRIs. Ankle wounds, follow by WOC.

## 2023-04-06 NOTE — CONSULTS
VASCULAR SURGERY INPATIENT CONSULTATION / Initial In-Patient visit    VASCULAR SURGEON: Dr. Monahan    LOCATION: [facility]    Gordon Gann  Medical Record #:  [id number]  YOB: 1945  Age:  78 year old     Date of Service: 4/4/2023    PRIMARY CARE PROVIDER: Belen Prieto    Reason for consultation:  To assess if patient would heal BKA    Mr. Gordon Gann is a 79 yo male with a hx of ESRD-on dialysis, who was admitted for generalized weakness. Hx of non-compliance with consistent dialysis, lives at home independently with home services. He denies feeling ill, just complaining of weakness. Alert and oriented x4.     Found on admission to have chronic wounds on bilateral feet, with left wound having black eschar and smelling, WBC mildly elevated at 12.7. Imaging does not show any signs of osteo or abscess. States the wounds started 2-3 weeks ago. Able to bear weight. Denies claudication, just reports legs hurting 2-3 weeks ago at the time of when the wounds started.          RECOMMENDATION:  Left lower extremity arterial duplex to be done. Will review once resulted. WOC should see the patient as well.     Looking at duplex and discussion with Dr. Monahan, Gordon would be able to heal a BKA. Again recommend WOC and more conservative measures to try and heal wound at this time before BKA but will defer to ortho.     PHH:    Past Medical History:   Diagnosis Date     Alcohol abuse      Atrial fibrillation (H)      CKD (chronic kidney disease) stage 4, GFR 15-29 ml/min (H)      Diabetes (H)      Gout      Hypokalemia      Hypophosphatemia      Insomnia      STEMI (ST elevation myocardial infarction) (H)          Past Surgical History:   Procedure Laterality Date     CREATE GRAFT LOOP ARTERIOVENOUS UPPER EXTREMITY Left 8/5/2021    Procedure: left arm arteriovenous graft fistula.;  Surgeon: Noemí Hernandez MD;  Location: [facility] OR     IR DIALYSIS FISTULOGRAM LEFT  12/8/2021     IR  DIALYSIS FISTULOGRAM LEFT  2/9/2023        ALLERGIES:   No Known Allergies     MEDS:    Current Facility-Administered Medications:      - MEDICATION INSTRUCTIONS for Dialysis Patients -, , Does not apply, See Admin Instructions, Jasmyn Soliman DO     0.9% sodium chloride BOLUS, 250 mL, Intravenous, Once in dialysis/CRRT, Dontae Roger MD     0.9% sodium chloride BOLUS, 300 mL, Hemodialysis Machine, Once, Dontae Roger MD     0.9% sodium chloride BOLUS, 100-150 mL, Intravenous, Q15 Min PRN, Dontae Roger MD     acetaminophen (TYLENOL) tablet 650 mg, 650 mg, Oral, Q6H PRN, 650 mg at 04/04/23 1819 **OR** acetaminophen (TYLENOL) Suppository 650 mg, 650 mg, Rectal, Q6H PRN, Jasmyn Soliman DO     albumin human 25 % injection 50 mL, 50 mL, Intravenous, Q1H PRN, Dontae Roger MD     allopurinol (ZYLOPRIM) tablet 100 mg, 100 mg, Oral, BID, Jasmyn Soliman, DO, 100 mg at 04/05/23 2028     aspirin EC tablet 81 mg, 81 mg, Oral, At Bedtime, Jasmyn Soliman DO, 81 mg at 04/05/23 2157     bumetanide (BUMEX) tablet 2 mg, 2 mg, Oral, 2 times per day on Sun Mon Wed Fri, Jasmyn Soliman, DO, 2 mg at 04/05/23 2028     cloNIDine (CATAPRES) tablet 0.1 mg, 0.1 mg, Oral, BID, Jasmyn Soliman, DO, 0.1 mg at 04/05/23 2028     glucose gel 15-30 g, 15-30 g, Oral, Q15 Min PRN **OR** dextrose 50 % injection 25-50 mL, 25-50 mL, Intravenous, Q15 Min PRN **OR** glucagon injection 1 mg, 1 mg, Subcutaneous, Q15 Min PRN, Jasmyn Soliman DO     doxercalciferol (HECTOROL) injection 4 mcg, 4 mcg, Intravenous, Once in dialysis/CRRT, Dontae Roger MD     epoetin jyotsna-epbx (RETACRIT) injection 4,000 Units, 4,000 Units, Intravenous, Once in dialysis/CRRT, Dontae Roger MD     ezetimibe (ZETIA) tablet 10 mg, 10 mg, Oral, PERRYMJourdan Alison E, DO, 10 mg at 04/05/23 0838     heparin (porcine) injection, 500 Units, Hemodialysis Machine OR IV Push, Once in dialysis/CRRT,  Dontae Roger MD     heparin 10,000 units/10 mL infusion (DIALYSIS USE), 500 Units/hr, Hemodialysis Machine, Continuous, Dontae Roger MD     hydrALAZINE (APRESOLINE) tablet 25 mg, 25 mg, Oral, Q6H PRN, Jasmyn Soliman E, DO     insulin aspart (NovoLOG) injection (RAPID ACTING), 1-7 Units, Subcutaneous, TID AC, Jasmyn Soliman, DO, 5 Units at 04/05/23 1825     insulin aspart (NovoLOG) injection (RAPID ACTING), 1-5 Units, Subcutaneous, At Bedtime, Jasmyn Soliman, DO, 3 Units at 04/05/23 2159     insulin glargine (LANTUS PEN) injection 10 Units, 10 Units, Subcutaneous, At Bedtime, Berta Muhammad DO, 10 Units at 04/05/23 2158     lidocaine (LMX4) cream, , Topical, Q1H PRN, Jasmyn Soliman, DO     lidocaine 1 % 0.1-1 mL, 0.1-1 mL, Other, Q1H PRN, Jasmyn Soliman, DO     lidocaine 1 % 0.5 mL, 0.5 mL, Intradermal, Once PRN, Dontae Roger MD     lidocaine 1 % 0.5 mL, 0.5 mL, Intradermal, Once PRN, Dontae Roger MD     melatonin tablet 1 mg, 1 mg, Oral, At Bedtime PRN, Jasmyn Soliman E, DO     metoprolol tartrate (LOPRESSOR) tablet 175 mg, 175 mg, Oral, BID, Jasmyn Soliman, DO, 175 mg at 04/05/23 2027     mirtazapine (REMERON) tablet 15 mg, 15 mg, Oral, At Bedtime, Jasmyn Soliman, DO, 15 mg at 04/05/23 2157     ondansetron (ZOFRAN ODT) ODT tab 4 mg, 4 mg, Oral, Q6H PRN **OR** ondansetron (ZOFRAN) injection 4 mg, 4 mg, Intravenous, Q6H PRN, Jasmyn Soliman E, DO     Patient is already receiving anticoagulation with heparin, enoxaparin (LOVENOX), warfarin (COUMADIN)  or other anticoagulant medication, , Does not apply, Continuous PRN, Jasmyn Soliman, DO     sodium chloride (PF) 0.9% PF flush 3 mL, 3 mL, Intracatheter, Q8H, Jasmyn Soliman DO, 3 mL at 04/05/23 2029     sodium chloride (PF) 0.9% PF flush 3 mL, 3 mL, Intracatheter, q1 min prn, Jasmyn Soliman DO     Stop Heparin 60 minutes before end of treatment, , Does not apply,  "Continuous PRN, Dontae Roger MD     [Held by provider] warfarin ANTICOAGULANT (COUMADIN) tablet 5 mg, 5 mg, Oral, ONCE at 18:00, Jasmyn Soliman DO     Warfarin Dose Required Daily - Pharmacist Managed, 1 each, Oral, See Admin Instructions, Jasmyn Soliman DO     SOCIAL HABITS:    Tobacco Use      Smoking status: Never      Smokeless tobacco: Never    Vaping Use      Vaping status: None     Social History    Substance and Sexual Activity      Alcohol use: Yes       History   Drug Use Not on file        FAMILY HISTORY:  No family history on file.    REVIEW OF SYSTEMS:    A 12 point ROS was reviewed and except for what is listed in the HPI above, all others are negative    PE:    Vital signs:  Temp: 97.5  F (36.4  C) Temp src: Oral BP: 133/89 Pulse: 120   Resp: 16 SpO2: 100 % O2 Device: None (Room air)        Estimated body mass index is 25.56 kg/m  as calculated from the following:    Height as of 2/15/23: 1.702 m (5' 7\").    Weight as of 2/19/23: 74 kg (163 lb 3.2 oz).       Wt Readings from Last 1 Encounters:   02/19/23 74 kg (163 lb 3.2 oz)     There is no height or weight on file to calculate BMI.        DIAGNOSTIC STUDIES:     Images:  MR Foot Left w/o Contrast    Result Date: 4/5/2023  MR left ankle/foot without  contrast 4/5/2023 3:37 PM History: Evaluate for left foot osteomyelitis Techniques: Multiplanar multisequence imaging of the left ankle and foot was obtained without  administration of intra-articular or intravenous contrast. Comparison: None. Findings: Images are moderately degraded by motion artifact. MEDIAL COMPARTMENT Tendons: Medial flexor tendons are intact. Ligaments: Deltoid and spring ligamentous complexes are intact. LATERAL COMPARTMENT Tendons: Peroneal tendons are intact. Ligaments: Lateral ligamentous complex and syndesmotic ligamentous complex are intact. POSTERIOR COMPARTMENT Achilles tendon: Achilles tendinosis. Plantar fascia: Thickening of the proximal central cord " of the plantar fascia. ANTERIOR COMPARTMENT Tendons: Anterior extensor tendons are intact. JOINTS Joint effusion: Physiologic amount of joint fluid present. Plantar plates: Intersesamoidal ligament and sesamoidal phalangeal ligaments of the first metatarsophalangeal joints are grossly intact although poorly visualized due to motion. Plantar plates of the second through fifth toe at metatarsophalangeal joints are grossly intact. Intermetatarsal spaces: No interdigital neuroma. No intermetatarsal bursitis. Lisfranc interosseous ligament: Intact. GENERAL FINDINGS Bones: No fracture, marrow contusion or infiltrative change. No talar dome osteochondral lesion. Moderate degeneration with subchondral cystic changes at the tarsometatarsal and naviculocuneiform joints. Muscles: Diffuse muscular fatty infiltration and edema compatible with polyneuropathy/microangiopathy. Tarsal tunnel: Normal. Sinus tarsi: Normal. ANCILLARY FINDINGS Moderate subcutaneous edema surrounding the medial and lateral malleolus extending distally to the dorsum and less likely plantar aspect of mid/forefoot.     Impression: Examination degraded by motion. 1. No evidence of osteomyelitis. 2. Moderate subcutaneous edema surrounding the medial and lateral malleolus extending distally to the dorsum and less likely plantar aspect of mid/forefoot, as can be seen with cellulitis. No evidence of soft tissue abscess on this noncontrast study. 3. Moderate tarsometatarsal and naviculocuneiform joint degenerative change. I have personally reviewed the examination and initial interpretation and I agree with the findings. ELIZA MIDDLETON MD (Joe)   SYSTEM ID:  [id number]    MR Ankle Left w/o Contrast    Result Date: 4/5/2023  MR left ankle/foot without  contrast 4/5/2023 3:37 PM History: Evaluate for left foot osteomyelitis Techniques: Multiplanar multisequence imaging of the left ankle and foot was obtained without  administration of intra-articular or  intravenous contrast. Comparison: None. Findings: Images are moderately degraded by motion artifact. MEDIAL COMPARTMENT Tendons: Medial flexor tendons are intact. Ligaments: Deltoid and spring ligamentous complexes are intact. LATERAL COMPARTMENT Tendons: Peroneal tendons are intact. Ligaments: Lateral ligamentous complex and syndesmotic ligamentous complex are intact. POSTERIOR COMPARTMENT Achilles tendon: Achilles tendinosis. Plantar fascia: Thickening of the proximal central cord of the plantar fascia. ANTERIOR COMPARTMENT Tendons: Anterior extensor tendons are intact. JOINTS Joint effusion: Physiologic amount of joint fluid present. Plantar plates: Intersesamoidal ligament and sesamoidal phalangeal ligaments of the first metatarsophalangeal joints are grossly intact although poorly visualized due to motion. Plantar plates of the second through fifth toe at metatarsophalangeal joints are grossly intact. Intermetatarsal spaces: No interdigital neuroma. No intermetatarsal bursitis. Lisfranc interosseous ligament: Intact. GENERAL FINDINGS Bones: No fracture, marrow contusion or infiltrative change. No talar dome osteochondral lesion. Moderate degeneration with subchondral cystic changes at the tarsometatarsal and naviculocuneiform joints. Muscles: Diffuse muscular fatty infiltration and edema compatible with polyneuropathy/microangiopathy. Tarsal tunnel: Normal. Sinus tarsi: Normal. ANCILLARY FINDINGS Moderate subcutaneous edema surrounding the medial and lateral malleolus extending distally to the dorsum and less likely plantar aspect of mid/forefoot.     Impression: Examination degraded by motion. 1. No evidence of osteomyelitis. 2. Moderate subcutaneous edema surrounding the medial and lateral malleolus extending distally to the dorsum and less likely plantar aspect of mid/forefoot, as can be seen with cellulitis. No evidence of soft tissue abscess on this noncontrast study. 3. Moderate tarsometatarsal and  naviculocuneiform joint degenerative change. I have personally reviewed the examination and initial interpretation and I agree with the findings. ELIZA MIDDLETON MD (Joe)   SYSTEM ID:  [id number]    US JERI Doppler No Exercise    Result Date: 4/4/2023  EXAM: RESTING ANKLE-BRACHIAL INDICES (ABIs) LOCATION: [facility] DATE/TIME: 4/4/2023 7:20 PM INDICATION: chronic LE wounds COMPARISON: None. JERI FINDINGS: RIGHT Brachial: 136 Ankle (PT): 112 Index: 0.82 Ankle (DP): 104 Index: 0.76 LEFT Ankle (PT): 0 Ankle (DP): 131 Index: 0.96 The right JERI at rest is 0.82. The left JERI at rest is 0.96.  WAVEFORMS: The dorsalis pedis and posterior tibial arteries are monophasic.     IMPRESSION: 1.  RIGHT LOWER EXTREMITY: Resting ankle-brachial index compatible with mild arterial insufficiency. Abnormal monophasic waveforms. 2.  LEFT LOWER EXTREMITY: Resting ankle-brachial index is likely falsely elevated within the dorsalis pedis artery distribution. Nonobtainable pressure within the posterior tibial artery distribution. Abnormal monophasic waveforms.    Chest XR,  PA & LAT    Result Date: 4/4/2023  XR CHEST 2 VIEWS 4/4/2023 11:43 AM HISTORY: shortness of breath COMPARISON: 2/15/2023     IMPRESSION: Stable small-to-moderate right pleural effusion and right basilar atelectasis and scarring. On the prior ultrasound on 2/15/2023, the effusion was multi-septated. The left lung is clear. No pneumothorax. Normal heart size. BRIDGETT WARNER MD   SYSTEM ID:  [id number]      LABS:      Sodium   Date Value Ref Range Status   04/05/2023 138 136 - 145 mmol/L Final   04/04/2023 138 136 - 145 mmol/L Final   02/18/2023 137 136 - 145 mmol/L Final     Urea Nitrogen   Date Value Ref Range Status   04/05/2023 59.4 (H) 8.0 - 23.0 mg/dL Final   04/04/2023 115.7 (H) 8.0 - 23.0 mg/dL Final   02/18/2023 47.8 (H) 8.0 - 23.0 mg/dL Final   08/30/2022 18 7 - 30 mg/dL Final   08/29/2022 55 (H) 7 - 30 mg/dL Final   08/28/2022 49 (H) 7 - 30 mg/dL  Final     Hemoglobin   Date Value Ref Range Status   04/05/2023 8.9 (L) 13.3 - 17.7 g/dL Final   04/04/2023 9.2 (L) 13.3 - 17.7 g/dL Final   02/19/2023 8.0 (L) 13.3 - 17.7 g/dL Final     Platelet Count   Date Value Ref Range Status   04/05/2023 308 150 - 450 10e3/uL Final   04/04/2023 354 150 - 450 10e3/uL Final   02/19/2023 292 150 - 450 10e3/uL Final     INR   Date Value Ref Range Status   04/05/2023 2.21 (H) 0.85 - 1.15 Final   04/04/2023 2.09 (H) 0.85 - 1.15 Final   02/19/2023 2.49 (H) 0.85 - 1.15 Final       Total time spent 35 minutes face to face with patient with more than 50% time spent in counseling and coordination of care.    Marya Roblero NP  [facility] Vascular Surgery

## 2023-04-06 NOTE — CONSULTS
Care Management Initial Consult    General Information  Assessment completed with:  Chart review, [facility] Jb WHITING and patient        Primary Care Provider verified and updated as needed:     Readmission within the last 30 days:           Advance Care Planning:            Communication Assessment  Patient's communication style: spoken language (English or Bilingual)    Hearing Difficulty or Deaf: no   Wear Glasses or Blind: no    Cognitive  Cognitive/Neuro/Behavioral: WDL                      Living Environment:   People in home:     Alone   Current living Arrangements:   [facility]     Able to return to prior arrangements:  TCU is recommended and home services when he returns to his apartment       Family/Social Support:  Care provided by:  Self   Provides care for:                  Description of Support System:  Daughter supportive, sets up his medications         Current Resources:   Patient receiving home care services:  [facility]  for RN, OT and Social Work. Their intake # is [phone].     Community Resources:  Goes to [facility] on Tues, Thurs and Saturday, needs to arrive by 10:am  Equipment currently used at home: walker, rolling, shower chair, grab bar, tub/shower, walker, standard  Supplies currently used at home:      Employment/Financial:  Employment Status:          Financial Concerns:             Lifestyle & Psychosocial Needs:  Social Determinants of Health     Tobacco Use: Low Risk  (2/9/2023)    Patient History      Smoking Tobacco Use: Never      Smokeless Tobacco Use: Never      Passive Exposure: Not on file   Alcohol Use: Not on file   Financial Resource Strain: Not on file   Food Insecurity: Not on file   Transportation Needs: Not on file   Physical Activity: Not on file   Stress: Not on file   Social Connections: Not on file   Intimate Partner Violence: Not on file   Depression: Not on file   Housing Stability: Not on file       Functional  Status:  Prior to admission patient needed assistance:   Medication set up.              Mental Health Status: non noted          Chemical Dependency Status:non noted.                Values/Beliefs:  Spiritual, Cultural Beliefs, Yarsanism Practices, Values that affect care:                 Additional Information:  Received a call from Rubi Khan, [place] [phone].  She has recently become involved in [employer].  She had spoken with patient over the phone and was to meet with him in his apartment on the day he admitted to the hospital.  She reports patient does live in [facility] which is independent living on the [facility].  Concerns expressed by home care staff, is patient's apartment is not clean, concern with his food choices and missing dialysis.    When Ms Khan spoke with patient and asked why he has missed dialysis he blamed missing dialysis on [facility].  Ms Khan had planned to set patient up with a standing order for transport with [facility]. She also had planned to discuss arranging home cleaning services however has been told patient reluctant to consider due to cost.      Ms Khan reports patient's daughter sets up his medications.   Writer explained TCU is recommended by PT and OT and would also be adviseable due to his daily wound care.  Ms Khan strongly supports TCU.  She also explains the home care staff feel patient would benefit from assisted living.  As writer was completing this note, Dr Muhammad called.  She stated she discussed TCU with patient which he is in agreement with and he reports he has been at [facility] in the past. She anticipates patient will be ready for discharge on Friday or Saturday 4/7 or 4/8.  Writer will make a referral to [facility] TCU thru [facility].    Carrie Simpson LincolnHealthSW

## 2023-04-06 NOTE — PROGRESS NOTES
Orthopedic Surgery  Gordon Gann  04/06/2023     Admit Date:  4/4/2023    Assessment:   1. Multiple wounds to the bilateral feet, left posteromedial ankle, and right medial distal third lower leg       Patient resting comfortably in chair. Getting out of the shower.   Alert and conversational. He is feeling more optimistic today regarding his treatment plan.   Discussed findings of no evidence of osteomyelitis on MRI results today  Pain controlled.      Temp:  [97.5  F (36.4  C)-97.9  F (36.6  C)] 97.9  F (36.6  C)  Pulse:  [113-120] 120  Resp:  [16] 16  BP: (117-134)/(79-93) 134/93  SpO2:  [98 %-100 %] 98 %    Alert and oriented  LLE: 5 cm length malodorous wound over the posterolateral ankle with eschar and granulation tissue present  Multiple dried sores to the dorsal forefoot with surrounding erythema.   Erythema dorsum left foot  Chronic venous stasis changes and xeroderma present.  Patchy ecchymosis to anterior lower leg. 1+ pitting edema distally.   Calf is soft and nontender.   5/5 ankle DF and PF, EHL, and FHL.   Able to actively wiggle toes.   DP pulse weak.   Patient reports intact sensation to light touch over the first dorsal webspace and lateral thigh.    RLE: 4 cm length wound over the medial right distal third of the lower leg with scant serosanguinous discharge.  Multiple smaller dried wounds to the dorsal forefoot, right fourth digit, and heel.   Deformity and swelling at the first MTP joint.  Mild erythema, and chronic venous stasis changes and xeroderma present. Patchy ecchymosis to anterior lower leg. 1+ pitting edema distally.   Calf is soft and nontender.  5/5 ankle DF and PF, EHL, and FHL.   Able to actively wiggle toes.   DP pulse weak.   Patient reports intact sensation to light touch over the first dorsal webspace and lateral thigh.      Labs:  Recent Labs   Lab Test 04/05/23  0910 04/04/23  1043 02/19/23  0606   WBC 13.0* 12.7* 10.6   HGB 8.9* 9.2* 8.0*    354 292     Recent Labs    Lab Test 04/05/23  0910 04/04/23  1043 02/19/23  0606   INR 2.21* 2.09* 2.49*     Recent Labs   Lab Test 04/05/23  0910 04/04/23  1043 02/09/23  1246   CRPI 114.53* 107.47* 58.50*         1. PLAN:    -Non-surgical intervention at this time   -Appreciate WOC consults  -Will defer antibiotic initiation to primary  -mobilize with PT/OT   -WBAT BLE with walker.   -Continue pain regimen.      2. Disposition   Anticipate d/c to TCU when medically cleared and progressing in PT.    Michelle Mckinney PA-C  [facility]

## 2023-04-06 NOTE — PROGRESS NOTES
Assessment and Plan:   ESRD: dialysis today per usual schedule of T Th S. Last HD 4/4. Runs T Th S [facility].  Dialysis today: 3.5 h,  BFR, 2L UF. 2K 35 HCO3 and 138 Na. Hectorol and EPO on dialysis. Low dose heparin.             Interval History:   DM  ASCVD: Afib  LE wounds : vascular surgery following.       Hypertension: he is on bumex, clonidine and metoprolol.           Review of Systems:   Somnolent. Nurses note alert and oriented.           Medications:       - MEDICATION INSTRUCTIONS for Dialysis Patients -   Does not apply See Admin Instructions     sodium chloride 0.9%  250 mL Intravenous Once in dialysis/CRRT     sodium chloride 0.9%  300 mL Hemodialysis Machine Once     allopurinol  100 mg Oral BID     aspirin  81 mg Oral At Bedtime     bumetanide  2 mg Oral 2 times per day on Sun Mon Wed Fri     cloNIDine  0.1 mg Oral BID     doxercalciferol  4 mcg Intravenous Once in dialysis/CRRT     epoetin jyotsna-epbx  4,000 Units Intravenous Once in dialysis/CRRT     ezetimibe  10 mg Oral QAM     heparin  500 Units Hemodialysis Machine OR IV Push Once in dialysis/CRRT     insulin aspart  1-7 Units Subcutaneous TID AC     insulin aspart  1-5 Units Subcutaneous At Bedtime     insulin glargine  10 Units Subcutaneous At Bedtime     metoprolol tartrate  175 mg Oral BID     mirtazapine  15 mg Oral At Bedtime     sodium chloride (PF)  3 mL Intracatheter Q8H     Warfarin Therapy Reminder  1 each Oral See Admin Instructions       heparin (porcine)       - MEDICATION INSTRUCTIONS -       - MEDICATION INSTRUCTIONS -       Current active medications and PTA medications reviewed, see medication list for details.            Physical Exam:   Vitals were reviewed  Patient Vitals for the past 24 hrs:   BP Temp Temp src Pulse Resp SpO2   04/06/23 0935 (!) 134/93 97.9  F (36.6  C) Oral 120 16 98 %   04/06/23 0147 133/89 97.5  F (36.4  C) Oral 120 16 --   04/06/23 0055 (!) 130/92 97.9  F (36.6  C) Oral 117 16 100 %    23 1727 125/84 97.8  F (36.6  C) Oral 113 16 98 %       Temp:  [97.5  F (36.4  C)-97.9  F (36.6  C)] 97.9  F (36.6  C)  Pulse:  [113-120] 120  Resp:  [16] 16  BP: (125-134)/(84-93) 134/93  SpO2:  [98 %-100 %] 98 %    Temperatures:  Current - Temp: 97.9  F (36.6  C); Max - Temp  Av.8  F (36.6  C)  Min: 97.5  F (36.4  C)  Max: 97.9  F (36.6  C)  Respiration range: Resp  Av  Min: 16  Max: 16  Pulse range: Pulse  Av.5  Min: 113  Max: 120  Blood pressure range: Systolic (24hrs), Av , Min:125 , Max:134   ; Diastolic (24hrs), Av, Min:84, Max:93    Pulse oximetry range: SpO2  Av.7 %  Min: 98 %  Max: 100 %    I/O last 3 completed shifts:  In: 420 [P.O.:420]  Out: 150 [Urine:150]      Intake/Output Summary (Last 24 hours) at 2023 1145  Last data filed at 2023 0326  Gross per 24 hour   Intake 180 ml   Output 150 ml   Net 30 ml       Resting in bed  Lungs with clear BS  Cor irreg, tachy, nl S1 S2  holosys M  LE erythema, wounds with dressings in place.        Wt Readings from Last 4 Encounters:   23 74 kg (163 lb 3.2 oz)   23 73.8 kg (162 lb 12.8 oz)   22 87.5 kg (192 lb 14.4 oz)   22 88.5 kg (195 lb)          Data:          Lab Results   Component Value Date     2023     2023     2023    Lab Results   Component Value Date    CHLORIDE 97 2023    CHLORIDE 95 2023    CHLORIDE 97 2023    CHLORIDE 98 2022    CHLORIDE 102 2022    CHLORIDE 101 2022    Lab Results   Component Value Date    BUN 59.4 2023    .7 2023    BUN 47.8 2023    BUN 18 2022    BUN 55 2022    BUN 49 2022      Lab Results   Component Value Date    POTASSIUM 5.2 2023    POTASSIUM 6.2 2023    POTASSIUM 4.6 2023    POTASSIUM 3.4 2022    POTASSIUM 3.7 2022    POTASSIUM 4.6 2022    Lab Results   Component Value Date    CO2 30 2023    CO2 23  04/04/2023    CO2 26 02/18/2023    CO2 35 08/30/2022    CO2 25 08/29/2022    CO2 28 08/28/2022    Lab Results   Component Value Date    CR 5.04 04/05/2023    CR 7.97 04/04/2023    CR 5.38 02/18/2023        Recent Labs   Lab Test 04/05/23  0910 04/04/23  1043 02/19/23  0606   WBC 13.0* 12.7* 10.6   HGB 8.9* 9.2* 8.0*   HCT 29.1* 29.4* 26.4*   MCV 93 93 97    354 292     Recent Labs   Lab Test 04/04/23  1043 02/18/23  0606 02/17/23  1510   AST 41 17 21   ALT 31 12 14   ALKPHOS 404* 285* 276*   BILITOTAL 0.3 0.4 0.4       Recent Labs   Lab Test 12/06/21  1118 12/03/21  2329 10/06/21  0634   MAG 2.4* 2.1 2.2     Recent Labs   Lab Test 02/18/23  0606 02/16/23  0547 08/29/22  0915   PHOS 6.2* 5.3* 4.6*     Recent Labs   Lab Test 04/05/23  0910 04/04/23  1043 02/18/23  0606   LC 7.3* 7.8* 7.1*       Lab Results   Component Value Date    LC 7.3 (L) 04/05/2023     Lab Results   Component Value Date    WBC 13.0 (H) 04/05/2023    HGB 8.9 (L) 04/05/2023    HCT 29.1 (L) 04/05/2023    MCV 93 04/05/2023     04/05/2023     Lab Results   Component Value Date     04/05/2023    POTASSIUM 5.2 04/05/2023    CHLORIDE 97 (L) 04/05/2023    CO2 30 (H) 04/05/2023    GLC 65 (L) 04/06/2023     Lab Results   Component Value Date    BUN 59.4 (H) 04/05/2023    CR 5.04 (H) 04/05/2023     Lab Results   Component Value Date    MAG 2.4 (H) 12/06/2021     Lab Results   Component Value Date    PHOS 6.2 (H) 02/18/2023       Creatinine   Date Value Ref Range Status   04/05/2023 5.04 (H) 0.67 - 1.17 mg/dL Final   04/04/2023 7.97 (H) 0.67 - 1.17 mg/dL Final   02/18/2023 5.38 (H) 0.67 - 1.17 mg/dL Final   02/17/2023 4.74 (H) 0.67 - 1.17 mg/dL Final   02/16/2023 5.35 (H) 0.67 - 1.17 mg/dL Final   02/15/2023 6.90 (H) 0.67 - 1.17 mg/dL Final       Attestation:  I have reviewed today's vital signs, notes, medications, labs and imaging.  Seen on dialysis.      Dontae Roger MD
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
## 2023-04-07 NOTE — PROGRESS NOTES
Orthopedic Surgery  Gordon Gann  04/07/2023     Admit Date:  4/4/2023    Assessment:   1. Multiple wounds to the bilateral feet, left posteromedial ankle, and right medial distal third lower leg       Patient resting comfortably in bed this morning, bilateral LE elevated  Alert and conversational. He continues to feel optimistic regarding his treatment plan, and focus on wound healing.   Pain controlled.      Temp:  [97.5  F (36.4  C)-98.1  F (36.7  C)] 98.1  F (36.7  C)  Pulse:  [] 123  Resp:  [8-18] 16  BP: (120-170)/() 120/83  SpO2:  [90 %-99 %] 97 %    Alert and oriented  LLE: 5 cm length malodorous wound over the posterolateral ankle with eschar and granulation tissue - currently covered with dressing  Multiple dried sores to the dorsal forefoot with surrounding erythema.   Erythema distal dorsum left foot  Patchy ecchymosis to anterior lower leg.   Calf is soft and nontender.   Able to actively wiggle toes.   Actively resists dorsiflexion and plantarflexion of ankle  DP pulse weak.   Sensation intact    RLE: 4 cm length wound over the medial right distal third of the lower leg with scant serosanguinous discharge - currently covered with dressing   Multiple smaller dried wounds to the dorsal forefoot, right fourth digit, and heel.   Deformity and swelling at the first MTP joint.  Patchy ecchymosis to anterior lower leg.   Erythema distal dorsum right foot  Calf is soft and nontender.  Able to actively wiggle toes.   Actively resists dorsiflexion and plantarflexion of ankle  DP pulse weak.   Sensation intact      Labs:  Recent Labs   Lab Test 04/07/23  0837 04/05/23  0910 04/04/23  1043   WBC 13.0* 13.0* 12.7*   HGB 10.8* 8.9* 9.2*    308 354     Recent Labs   Lab Test 04/07/23  0837 04/06/23  1950 04/05/23  0910   INR 1.87* 2.17* 2.21*     Recent Labs   Lab Test 04/07/23  0837 04/06/23  1950 04/05/23  0910   CRPI 173.05* 137.91* 114.53*         1. PLAN:    -Non-surgical intervention at  this time   -Appreciate WOC consults  -Will defer antibiotic management to primary  -mobilize with PT/OT   -WBAT BLE with walker.   -Continue pain regimen.      2. Disposition   Anticipate d/c to TCU when medically cleared and progressing in PT.    ARA MccollumC  [facility]

## 2023-04-07 NOTE — PROGRESS NOTES
"         Assessment and Plan:   ESRD: dialysis yesterday. Will run tomorrow for T Th S schedule. Orders placed for dialysis tomorrow.             Interval History:   DM    ASCVD: Afib    LE wounds : vascular surgery following.  Saw Ortho as well.        Hypertension: he is on bumex, clonidine and metoprolol. BP and pulse high. Monitor for now. May want to add diltiazem.                Review of Systems:   He runs T Th S at the [facility] with Dr. Jacobs.   Some cramps on run. Some prolonged bleeding post run > on coumadin. Will run without heparin.           Medications:       - MEDICATION INSTRUCTIONS for Dialysis Patients -   Does not apply See Admin Instructions     allopurinol  100 mg Oral BID     aspirin  81 mg Oral At Bedtime     bumetanide  2 mg Oral 2 times per day on Sun Mon Wed Fri     cephALEXin  500 mg Oral Q12H [facility] (08/20)     cloNIDine  0.1 mg Oral BID     ezetimibe  10 mg Oral QAM     insulin aspart  1-7 Units Subcutaneous TID AC     insulin aspart  1-5 Units Subcutaneous At Bedtime     insulin glargine  5 Units Subcutaneous At Bedtime     metoprolol tartrate  175 mg Oral BID     mirtazapine  15 mg Oral At Bedtime     sodium chloride (PF)  3 mL Intracatheter Q8H     Warfarin Therapy Reminder  1 each Oral See Admin Instructions       - MEDICATION INSTRUCTIONS -       Current active medications and PTA medications reviewed, see medication list for details.            Physical Exam:   Vitals were reviewed  Patient Vitals for the past 24 hrs:   BP Temp Temp src Pulse Resp SpO2   04/07/23 0739 120/83 98.1  F (36.7  C) Oral (!) 123 16 97 %   04/07/23 0058 133/81 97.5  F (36.4  C) Oral 97 16 98 %   04/06/23 2029 120/76 -- -- 120 -- --   04/06/23 1645 (!) 158/123 97.6  F (36.4  C) Oral 110 13 98 %   04/06/23 1630 (!) 158/103 -- -- (!) 121 14 98 %   04/06/23 1615 (!) 146/108 -- -- (!) 121 17 97 %   04/06/23 1600 (!) 170/99 -- -- 107 13 99 %   04/06/23 1545 (!) 143/114 -- -- 110 18 98 %   04/06/23 1530 (!) " 150/87 -- -- (!) 121 (!) 8 97 %   23 1515 (!) 140/89 -- -- (!) 122 13 98 %   23 1500 (!) 152/105 -- -- (!) 122 18 98 %   23 1445 (!) 149/95 -- -- 110 17 99 %   23 1430 (!) 144/96 -- -- 118 17 97 %   23 1415 (!) 137/90 -- -- 120 17 98 %   23 1400 136/87 -- -- 109 17 97 %   23 1345 (!) 127/92 -- -- 120 18 99 %   23 1330 (!) 122/101 -- -- 120 16 98 %   23 1315 (!) 134/115 -- -- 108 11 90 %   23 1310 126/86 -- -- 105 13 97 %   23 1305 126/86 -- -- 118 18 98 %   23 1300 126/86 97.8  F (36.6  C) Oral 116 18 92 %       Temp:  [97.5  F (36.4  C)-98.1  F (36.7  C)] 98.1  F (36.7  C)  Pulse:  [] 123  Resp:  [8-18] 16  BP: (120-170)/() 120/83  SpO2:  [90 %-99 %] 97 %    Temperatures:  Current - Temp: 98.1  F (36.7  C); Max - Temp  Av.8  F (36.6  C)  Min: 97.5  F (36.4  C)  Max: 98.1  F (36.7  C)  Respiration range: Resp  Avg: 15.4  Min: 8  Max: 18  Pulse range: Pulse  Av.1  Min: 97  Max: 123  Blood pressure range: Systolic (24hrs), Av , Min:120 , Max:170   ; Diastolic (24hrs), Av, Min:76, Max:123    Pulse oximetry range: SpO2  Av.2 %  Min: 90 %  Max: 99 %    I/O last 3 completed shifts:  In: 120 [P.O.:120]  Out:  [Other:]      Intake/Output Summary (Last 24 hours) at 2023 1219  Last data filed at 2023 0810  Gross per 24 hour   Intake 600 ml   Output 2000 ml   Net -1400 ml       Alert and responsive  Dressings on legs  LAG with good bruit and thrill       Wt Readings from Last 4 Encounters:   23 74 kg (163 lb 3.2 oz)   23 73.8 kg (162 lb 12.8 oz)   22 87.5 kg (192 lb 14.4 oz)   22 88.5 kg (195 lb)          Data:          Lab Results   Component Value Date     2023     2023     2023    Lab Results   Component Value Date    CHLORIDE 97 2023    CHLORIDE 97 2023    CHLORIDE 95 2023    CHLORIDE 98 2022    CHLORIDE 102  08/29/2022    CHLORIDE 101 08/28/2022      Lab Results   Component Value Date    BUN 41.6 04/07/2023    BUN 59.4 04/05/2023    .7 04/04/2023    BUN 18 08/30/2022    BUN 55 08/29/2022    BUN 49 08/28/2022      Lab Results   Component Value Date    POTASSIUM 4.3 04/07/2023    POTASSIUM 5.2 04/05/2023    POTASSIUM 6.2 04/04/2023    POTASSIUM 3.4 08/30/2022    POTASSIUM 3.7 08/29/2022    POTASSIUM 4.6 08/28/2022    Lab Results   Component Value Date    CO2 27 04/07/2023    CO2 30 04/05/2023    CO2 23 04/04/2023    CO2 35 08/30/2022    CO2 25 08/29/2022    CO2 28 08/28/2022    Lab Results   Component Value Date    CR 4.07 04/07/2023    CR 5.04 04/05/2023    CR 7.97 04/04/2023        Recent Labs   Lab Test 04/07/23  0837 04/05/23  0910 04/04/23  1043   WBC 13.0* 13.0* 12.7*   HGB 10.8* 8.9* 9.2*   HCT 35.7* 29.1* 29.4*   MCV 95 93 93    308 354     Recent Labs   Lab Test 04/04/23  1043 02/18/23  0606 02/17/23  1510   AST 41 17 21   ALT 31 12 14   ALKPHOS 404* 285* 276*   BILITOTAL 0.3 0.4 0.4       Recent Labs   Lab Test 04/07/23  0837 12/06/21  1118 12/03/21  2329   MAG 1.7 2.4* 2.1     Recent Labs   Lab Test 04/07/23  0837 02/18/23  0606 02/16/23  0547   PHOS 5.5* 6.2* 5.3*     Recent Labs   Lab Test 04/07/23  0837 04/05/23  0910 04/04/23  1043   LC 7.5* 7.3* 7.8*       Lab Results   Component Value Date    LC 7.5 (L) 04/07/2023     Lab Results   Component Value Date    WBC 13.0 (H) 04/07/2023    HGB 10.8 (L) 04/07/2023    HCT 35.7 (L) 04/07/2023    MCV 95 04/07/2023     04/07/2023     Lab Results   Component Value Date     04/07/2023    POTASSIUM 4.3 04/07/2023    CHLORIDE 97 (L) 04/07/2023    CO2 27 04/07/2023     (H) 04/07/2023     Lab Results   Component Value Date    BUN 41.6 (H) 04/07/2023    CR 4.07 (H) 04/07/2023     Lab Results   Component Value Date    MAG 1.7 04/07/2023     Lab Results   Component Value Date    PHOS 5.5 (H) 04/07/2023       Creatinine   Date Value Ref Range  Status   04/07/2023 4.07 (H) 0.67 - 1.17 mg/dL Final   04/05/2023 5.04 (H) 0.67 - 1.17 mg/dL Final   04/04/2023 7.97 (H) 0.67 - 1.17 mg/dL Final   02/18/2023 5.38 (H) 0.67 - 1.17 mg/dL Final   02/17/2023 4.74 (H) 0.67 - 1.17 mg/dL Final   02/16/2023 5.35 (H) 0.67 - 1.17 mg/dL Final       Attestation:  I have reviewed today's vital signs, notes, medications, labs and imaging.     Dontae Roger MD
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
## 2023-04-07 NOTE — PROGRESS NOTES
Care Management Follow Up    Length of Stay (days): 3    Expected Discharge Date: 04/07/2023     Concerns to be Addressed:       Patient plan of care discussed at interdisciplinary rounds: Yes    Anticipated Discharge Disposition: Transitional Care     Anticipated Discharge Services:    Anticipated Discharge DME:      Patient/family educated on Medicare website which has current facility and service quality ratings:    Education Provided on the Discharge Plan:    Patient/Family in Agreement with the Plan: yes    Referrals Placed by CM/SW:    Private pay costs discussed:     Additional Information:  [facility] TCU declined patient due to his use of dialysis, his wound care and history of non compliance per [facility].   Writer met with patient to update him and discuss making additional TCU referrals.   Patient's response was to ask if he can go directly home.  Writer explained TCU is recommended by therapy, and he has daily wound care which home care cannot manage with daily visits.   Explained his home care agency favors he go to a TCU.  Writer then  suggested she return to give him time to eat his lunch and then we can sit down and talk further.  Writer also did update Dr Muhammad.  Dr Muhammad plans to keep patient thru today due to drop in his glucose.  Plan Will return to speak with patient.  Prior to discharge, we will want to contact [facility] mobility to arrange standing rides for the first week post discharge.     Update at 1330  Met with patient and explained reason TCU is recommended.  Patient did not object.  Writer explained [facility] has a vacancy and can assess him. He stated [place] is [place] [facility]  Patient also stated his daughter wanted to be involved and he asked that writer call her.   Writer called her and reviewed above. She supports TCU and likely [facility] after TCU. She will call patient and speak with him.  [place] [facility] [place] of  4/8.      Carrie Simpson, Southern Maine Health CareSW
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
## 2023-04-07 NOTE — PROGRESS NOTES
Care Management Follow Up    Length of Stay (days): 3    Expected Discharge Date: 04/07/2023     Concerns to be Addressed:       Patient plan of care discussed at interdisciplinary rounds: Yes    Anticipated Discharge Disposition:  TCU   Anticipated Discharge Services:    Anticipated Discharge DME:      Patient/family educated on Medicare website which has current facility and service quality ratings:    Education Provided on the Discharge Plan:    Patient/Family in Agreement with the Plan: yes    Referrals Placed by CM/SW:    Private pay costs discussed: private room fee at [facility] beginning day #8.    Additional Information:  Patient's preference for TCU is [facility]. Patient has been there before and the TCU is on the same campus as his apartment at [facility].  Writer sent a referral to Marielena in admissions at [facility] yesterday and spoke with her on Thursday.  She said their TCU generally does not accept patient's on dialysis however she will bring this to leadership team to ask for an exception since patient resides within their system.       GLENNA AbdallaSW
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
## 2023-04-08 NOTE — PROGRESS NOTES
Care Management Follow Up    Length of Stay (days): 4    Expected Discharge Date: 04/09/2023     Concerns to be Addressed:       Patient plan of care discussed at interdisciplinary rounds: Yes    Anticipated Discharge Disposition: Transitional Care     Anticipated Discharge Services:    Anticipated Discharge DME:      Patient/family educated on Medicare website which has current facility and service quality ratings:    Education Provided on the Discharge Plan:    Patient/Family in Agreement with the Plan: yes    Referrals Placed by CM/SW:    Private pay costs discussed: Not applicable    Additional Information:    1130: Call placed to [facility] to follow up on referral as patient is ready to discharge. Writer told that it was under review.    1215: Call to [facility] to follow up on referral and still under review. Admissions will update writer when DON has responded. Writer asked that she Teams message writer.    1500: Message sent through TEAMS regarding referral. Writer informed that admissions anticipates the answer soon but DON was still reviewing referrals.    1539: Writer spoke to admissions who is requesting full referral as the DON is requesting more information. Writer sent via [facility] initial SNF referral. Writer told that they may not be able to provide update on referral until tomorrow.    Call to daughter mOayra to update on referral and that we will likely hear back tomorrow. She understands. She also asked about a scooter for patient and how that would be ordered. Writer explained that would be a question for therapy or the providers. If [facility] updated SW today, writer will call Omayra back to update. Writer called into patient room and updated him as well.    LIANA Sanford

## 2023-04-08 NOTE — PLAN OF CARE
Summary: [id number] 4/7/23 BLE pain, generalized weakness  Orientation: A/Ox4   Activity Level: Ax1 GB/W  Fall Risk: yes  Behavior & Aggression Tool Color: green  Pain Management: PRN Tylenol given  ABNL VS/O2: VSS on RA, tachycardic  ABNL Lab/BG:  and 199  Diet: combo renal  Bowel/Bladder: continent   Drains/Devices: R PIV saline locked  Tests/Procedures for next shift: n/a  Anticipated DC date: discharge to TCU after dialysis 4/8/23  Other Important Info: L arm fistula.
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
## 2023-04-08 NOTE — PROGRESS NOTES
HEMODIALYSIS TREATMENT NOTE    Date: 4/8/2023  Time: 10:49 AM    Data:  Pre Wt: 74 kg    Desired Wt: 72  kg   Post Wt:  72 kg  Weight change: 2  kg  Ultrafiltration - Post Run Net Total Removed (mL): 2000 mL  Vascular Access Status: patent  Dialyzer Rinse: Streaked  Total Blood Volume Processed: 69.5 Liters  Total Dialysis (Treatment) Time: 3  Hours    Lab:   No    Interventions:  2k 2.5ca bath, 600 dfr, 400 bfr, hectorol, retacrit    Assessment:  Pt completed a 3 hr hd tx with 2L net fluid removed. Pt tolerated well, remained aaox4, verbal. Access patent, mild pressure dressings c/d/i, no s/s infection or bleeding noted. Report given to primary nurse.      Plan:    Per nephrology MD

## 2023-04-08 NOTE — PROGRESS NOTES
This patient was seen and examined while on dialysis. Professional oversight of the patient's dialysis care, access care and dialysis related co-morbidities were addressed as necessary with the patient and / or staff.     Access - L avf  BFR - 400   UF Goal 2L   2K     Early part of dialysis going okay.  Patient laying flat.  Denies shortness of breath.  No cramps.     Exam   BP (!) 153/92   Pulse 102   Temp 98  F (36.7  C) (Oral)   Resp 16   SpO2 99%   CV: s1+s2, no rubs   - JVP -   - Edema trace   GI: soft, nontender,   MS: no evidence of inflammation in joints  : no villareal  SKIN: no rash, warm, dry, no cyanosis.  Right leg with superficial ulcer  NEURO: face symmetric, no asterixis   Lt UE AVG - good bruit     Recent Labs   Lab Test 04/08/23  0747 04/08/23  0353 04/07/23  1121 04/07/23  0837 04/05/23  1140 04/05/23  0910   NA  --   --   --  140  --  138   POTASSIUM  --   --   --  4.3  --  5.2   CHLORIDE  --   --   --  97*  --  97*   CO2  --   --   --  27  --  30*   ANIONGAP  --   --   --  16*  --  11   GLC 68* 83   < > 118*   < > 246*   BUN  --   --   --  41.6*  --  59.4*   CR  --   --   --  4.07*  --  5.04*   LC  --   --   --  7.5*  --  7.3*    < > = values in this interval not displayed.     Lab Results   Component Value Date    WBC 13.0 04/07/2023     Lab Results   Component Value Date    RBC 3.77 04/07/2023     Lab Results   Component Value Date    HGB 10.8 04/07/2023     Lab Results   Component Value Date    HCT 35.7 04/07/2023     No components found for: MCT  Lab Results   Component Value Date    MCV 95 04/07/2023     Lab Results   Component Value Date    MCH 28.6 04/07/2023     Lab Results   Component Value Date    MCHC 30.3 04/07/2023     Lab Results   Component Value Date    RDW 17.1 04/07/2023     Lab Results   Component Value Date     04/07/2023       ASSESSMENT AND RECOMMENDATIONS:   1 ESRD -   TTS chronic dialysis at [facility] under care of Dr. Perla  Target weight 72 kg.  Gets  up to 3 L ultrafiltration, but usually cramps with more than 2 .      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  Last hemoglobin of 10.8 in outpatient unit     3 hyperphosphatemia-On Tums     4 Generalized weakness and failure to thrive  - PT/ OT     HD today . 2 K bath . 3 hrs, 2 L UF   Next on Tuesday    Loly Mcarthur MD   April 8, 2023
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
## 2023-04-09 NOTE — PROGRESS NOTES
Care Management Follow Up    Length of Stay (days): 5    Expected Discharge Date: 04/09/2023     Concerns to be Addressed:       Patient plan of care discussed at interdisciplinary rounds: Yes    Anticipated Discharge Disposition: Transitional Care     Anticipated Discharge Services:    Anticipated Discharge DME:      Patient/family educated on Medicare website which has current facility and service quality ratings:    Education Provided on the Discharge Plan:    Patient/Family in Agreement with the Plan: yes    Referrals Placed by CM/SW:    Private pay costs discussed: private room/amenity fees and transportation costs    Additional Information:  Call placed to [facility] this morning asking if their RN has completed their assessment and if they can accept patient.  Requested the office call writer back at [phone].    MELANY Abdalla
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
## 2023-04-09 NOTE — PROGRESS NOTES
Care Management Follow Up    Length of Stay (days): 5    Expected Discharge Date: 04/10/2023     Concerns to be Addressed:       Patient plan of care discussed at interdisciplinary rounds: Yes    Anticipated Discharge Disposition: Transitional Care     Anticipated Discharge Services:    Anticipated Discharge DME:      Patient/family educated on Medicare website which has current facility and service quality ratings:    Education Provided on the Discharge Plan:    Patient/Family in Agreement with the Plan: yes    Referrals Placed by CM/SW:    Private pay costs discussed: private room/amenity fees and transportation costs    Additional Information:  This afternoon the SNF referral was resent to [facility].  We will receive a response back on Monday morning.  [place] [facility] [place], writer will need to make additional referrals.     GLENNA AbdallaSW
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
## 2023-04-10 NOTE — PROGRESS NOTES
Care Management Follow Up    Length of Stay (days): 6    Expected Discharge Date: 04/11/2023     Concerns to be Addressed:       Patient plan of care discussed at interdisciplinary rounds: Yes    Anticipated Discharge Disposition: Transitional Care     Anticipated Discharge Services:    Anticipated Discharge DME:      Patient/family educated on Medicare website which has current facility and service quality ratings:    Education Provided on the Discharge Plan:    Patient/Family in Agreement with the Plan: yes    Referrals Placed by CM/SW:    Private pay costs discussed: private room/amenity fees and transportation costs    Additional Information:  Spoke with Carmel,  for [facility].  She will review patient's referral this morning.  [facility]CU has declined due to acuity, [facility] declined also and acuity was part of their reason.  Writer has asked [facility] if it is patient's wound care that causes their concern for acuity.  In the meantime have made multiple TCU referrals to [facility], [facility], [facility] and [facility].  Will update patient and dtr.      GLENNA AbdallaSW
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
## 2023-04-10 NOTE — PROGRESS NOTES
Care Management Follow Up    Length of Stay (days): 6    Expected Discharge Date: 04/11/2023     Concerns to be Addressed:       Patient plan of care discussed at interdisciplinary rounds: Yes    Anticipated Discharge Disposition: Transitional Care     Anticipated Discharge Services:    Anticipated Discharge DME:      Patient/family educated on Medicare website which has current facility and service quality ratings:    Education Provided on the Discharge Plan:    Patient/Family in Agreement with the Plan: yes    Referrals Placed by CM/SW:    Private pay costs discussed: private room/amenity fees and transportation costs    Additional Information:  Spoke with Carmel,  for [facility].  She will review patient's referral this morning.      GLENNA AbdallaSW

## 2023-04-10 NOTE — PROGRESS NOTES
Pt wants to be discharged soon.  No issues.    AF-VSS  Sitting in chair, NAD  LAF - good thrill/bruit    1.  ESKD.  HD tmrw.  Orders placed.  2.  Weakness.  PT/OT.  Await rehab placement.  3.  Anemia.  EPO 10k qHD.  Follow hb, clinically.

## 2023-04-10 NOTE — PROGRESS NOTES
Care Management Follow Up    Length of Stay (days): 6    Expected Discharge Date: 04/11/2023     Concerns to be Addressed:       Patient plan of care discussed at interdisciplinary rounds: Yes    Anticipated Discharge Disposition: Transitional Care     Anticipated Discharge Services:    Anticipated Discharge DME:      Patient/family educated on Medicare website which has current facility and service quality ratings:    Education Provided on the Discharge Plan:    Patient/Family in Agreement with the Plan: yes    Referrals Placed by CM/SW:    Private pay costs discussed:     Additional Information:  Additional TCU referrals were made today.  Patient has been accepted for tomorrow at [facility].  Writer will need to obtain [employer] Medicare Advantage SNF authorization before patient discharges.  Patient currently working with therapy.  Will review options with patient/daughter tomorrow morning.      GLENNA AbdallaSW

## 2023-04-10 NOTE — PROGRESS NOTES
Care Management Follow Up    Length of Stay (days): 6    Expected Discharge Date: 04/10/2023     Concerns to be Addressed:       Patient plan of care discussed at interdisciplinary rounds: Yes    Anticipated Discharge Disposition: Transitional Care     Anticipated Discharge Services:    Anticipated Discharge DME:      Patient/family educated on Medicare website which has current facility and service quality ratings:    Education Provided on the Discharge Plan:    Patient/Family in Agreement with the Plan: yes    Referrals Placed by CM/SW:    Private pay costs discussed: private room/amenity fees and transportation costs    Additional Information:  Spoke with Carmel,  for [facility].  She will review patient's referral this morning.    [facility].  Acuity was also part of the reason [facility] declined.   Will need to make multiple referrals to TCU's in order to find a TCU which can meet patient's care needs.      GLENNA AbdallaSW
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
## 2023-04-11 NOTE — PROGRESS NOTES
Care Management Follow Up    Length of Stay (days): 7    Expected Discharge Date: 04/11/2023     Concerns to be Addressed:       Patient plan of care discussed at interdisciplinary rounds: Yes    Anticipated Discharge Disposition: Transitional Care     Anticipated Discharge Services:    Anticipated Discharge DME:      Patient/family educated on Medicare website which has current facility and service quality ratings:    Education Provided on the Discharge Plan:    Patient/Family in Agreement with the Plan: yes    Referrals Placed by CM/SW:    Private pay costs discussed: private room/amenity fees and transportation costs    Additional Information:  Patient has been offered admission to [facility] for today and [place] accepted patient for a room available on Wednesday.      Carrie Simpson, GLENNASW
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
## 2023-04-11 NOTE — PROGRESS NOTES
Potassium   Date Value Ref Range Status   04/11/2023 5.3 3.4 - 5.3 mmol/L Final   04/11/2023 5.3 3.4 - 5.3 mmol/L Final   08/30/2022 3.4 3.4 - 5.3 mmol/L Final     Hemoglobin   Date Value Ref Range Status   04/11/2023 9.3 (L) 13.3 - 17.7 g/dL Final     Creatinine   Date Value Ref Range Status   04/11/2023 6.23 (H) 0.67 - 1.17 mg/dL Final   04/11/2023 6.23 (H) 0.67 - 1.17 mg/dL Final     Urea Nitrogen   Date Value Ref Range Status   04/11/2023 84.3 (H) 8.0 - 23.0 mg/dL Final   04/11/2023 84.3 (H) 8.0 - 23.0 mg/dL Final   08/30/2022 18 7 - 30 mg/dL Final     Sodium   Date Value Ref Range Status   04/11/2023 135 (L) 136 - 145 mmol/L Final   04/11/2023 135 (L) 136 - 145 mmol/L Final     INR   Date Value Ref Range Status   04/11/2023 3.87 (H) 0.85 - 1.15 Final       DIALYSIS PROCEDURE NOTE  Hepatitis status of previous patient on machine log was checked and verified ok to use with this patients hepatitis status.  Patient dialyzed for 3 hrs. on a K3/K2 bath (changed mid run following new lab result review) with a net fluid removal of  2L.  A BFR of 400 ml/min was obtained via a AVF using 15 gauge needles.      The treatment plan was discussed with Dr. Ferro during the treatment.    Total heparin received during the treatment: 0 units.   Needle cannulation sites held x 10 min.     Meds  given: epogen   Complications:   Extended post treatment bleeding ~100 minutes. Nephrology and floor RN notified. Stopped before patient returned to hospital room.     Person educated: pre-tx. Knowledge base minimal. Barriers to learning: none. Educated on procedure via verbal mode. Patient/family verbalized understanding.    ICEBOAT? Timeout performed pre-treatment  I: Patient was identified using 2 identifiers  C:  Consent Signed Yes  E: Equipment preventative maintenance is current and dialysis delivery system OK to use  B: Hepatitis B Surface Antigen: negative; Draw Date: 2/9/23      Hepatitis B Surface Antibody: immune; Draw Date:  2/9/23  O: Dialysis orders present and complete prior to treatment  A: Vascular access verified and assessed prior to treatment  T: Treatment was performed at a clinically appropriate time  ?: Patient was allowed to ask questions and address concerns prior to treatment  See Adult Hemodialysis flowsheet in EPIC for further details and post assessment.  Machine water alarm in place and functioning. Transducer pods intact and checked every 15min.   Pt returned via bed.  Chlorine/Chloramine water system checked every 4 hours.  Outpatient Dialysis at [facility]    Patient repositioned every 2 hours during the treatment.  Post treatment report given to Mecca Negron RN regarding 2L of fluid removed, last BP of 143/91, and patient pain rating of 0/10.     Please remove patient dressing on AVF and AVG needle sites 24 hours after dialysis. If leaking occurs please apply a Band-Aid.

## 2023-04-11 NOTE — PROGRESS NOTES
"Care Management Follow Up    Length of Stay (days): 7    Expected Discharge Date: 04/12/2023     Concerns to be Addressed:       Patient plan of care discussed at interdisciplinary rounds: Yes    Anticipated Discharge Disposition: Transitional Care     Anticipated Discharge Services:    Anticipated Discharge DME:      Patient/family educated on Medicare website which has current facility and service quality ratings:    Education Provided on the Discharge Plan:    Patient/Family in Agreement with the Plan: yes    Referrals Placed by CM/SW:    Private pay costs discussed: private room/amenity fees and transportation costs    Additional Information:  Writer spoke with both daughter Omayra and patient.  Omayra reviewed the medicare ratings for [facility] and [facility].  She said she would like her father to accept [facility] and it is up to him if he wants the private room or semi-private.  Met with patient this afternoon and relayed Omayra's opinion.  Patient accepts Omayra's recommendation. After informed of the private room fee at [facility], he stated \"I can live with a roommate\".  Writer has updated admissions at [facility].  The room is available after 1200 tomorrow.   Discussed transportation with patient. Patient agrees with use of medivan.  He requested to use [employer] however the vans do not provide a w/c.  Suggested use of [facility] and did give him the cost which he does accept.  The medivan is scheduled for 12:40 to 1:25 PM.   Plan:  Discharge on Wednesday to [facility] TCU at 12:40 to 1:25 PM.    Web paged Dr Jones.     Carrie Simpson, GLENNASW      "
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
## 2023-04-11 NOTE — PROGRESS NOTES
[facility]     Renal Progress Note       SHORTHAND KEY FOR MY NOTES:  c = with, s = without, p = after, a = before, x = except, asx = asymptomatic, tx = transplant or treatment, sx = symptoms or symptomatic, cx = canceled or culture, rxn = reaction, yday = yesterday, nl = normal, abx = antibiotics, fxn = function, dx = diagnosis, dz = disease, m/h = melena/hematochezia, c/d/l/ha = cramping/dizziness/lightheadedness/headache, d/c = discharge or diarrhea/constipation, f/c/n/v = fevers/chills/nausea/vomiting, cp/sob = chest pain/shortness of breath, tbv = total body volume, rxn = reaction, tdc = tunneled dialysis catheter, pta = prior to admission, hd = hemodialysis, pd = peritoneal dialysis, hhd = home hemodialysis, edw = estimated dry wt         Assessment/Plan:     1.  ESKD.  Pt dialysing today per a TRS schedule.  No issues c the run and he was able to pull 2L s probs.  A.  Next HD on Thursday, likely as an outpt.  B.  If he is dc'd, will call his HD unit c any new orders.    2.  Weakness.  Pt is due for TCU placement to get stronger.  A.  PT/OT.        Interval History:     Pt is doing ok c HD.  No issues.          Medications and Allergies:       - MEDICATION INSTRUCTIONS for Dialysis Patients -   Does not apply See Admin Instructions     allopurinol  100 mg Oral BID     aspirin  81 mg Oral At Bedtime     bumetanide  2 mg Oral 2 times per day on Sun Mon Wed Fri     cephALEXin  500 mg Oral Q12H ERIC (08/20)     cloNIDine  0.1 mg Oral BID     diltiazem  30 mg Oral Q6H ERCI     ezetimibe  10 mg Oral QAM     gabapentin  100 mg Oral BID     insulin aspart  1-7 Units Subcutaneous TID AC     insulin aspart  1-5 Units Subcutaneous At Bedtime     insulin glargine  8 Units Subcutaneous At Bedtime     metoprolol tartrate  175 mg Oral BID     mirtazapine  15 mg Oral At Bedtime     sodium chloride (PF)  3 mL Intracatheter Q8H     Warfarin Therapy Reminder  1 each Oral See Admin Instructions     No  Known Allergies       Physical Exam:     Vitals were reviewed     , Blood pressure (!) 147/98, pulse 120, temperature 98.4  F (36.9  C), temperature source Oral, resp. rate 17, weight 73.7 kg (162 lb 8 oz), SpO2 98 %.  Wt Readings from Last 3 Encounters:   04/11/23 73.7 kg (162 lb 8 oz)   02/19/23 74 kg (163 lb 3.2 oz)   02/11/23 73.8 kg (162 lb 12.8 oz)     Intake/Output Summary (Last 24 hours) at 4/11/2023 1348  Last data filed at 4/11/2023 0827  Gross per 24 hour   Intake 720 ml   Output --   Net 720 ml     GENERAL APPEARANCE: pleasant, NAD, alert  HEENT:  eyes/ears/nose/neck grossly nl  RESP: CTA B c good efforts  CV: irreg/irreg, tachy, nl S1/S2   ABDOMEN: s/nt/nd  EXTREMITIES/SKIN: BLE dressed    Pt seen on HD.  Stable run.  Good BFR via LAF.  -2L UF goal.         Data:     CBC RESULTS:     Recent Labs   Lab 04/11/23  0737 04/08/23  1457 04/07/23  0837 04/05/23  0910   WBC 15.4* 11.6* 13.0* 13.0*   RBC 3.25* 3.43* 3.77* 3.12*   HGB 9.3* 9.8* 10.8* 8.9*   HCT 29.8* 32.1* 35.7* 29.1*    357 331 308     Basic Metabolic Panel:  Recent Labs   Lab 04/11/23  0737 04/11/23  0736 04/11/23  0147 04/10/23  2100 04/10/23  1702 04/10/23  1648 04/08/23  1747 04/08/23  1457 04/07/23  1121 04/07/23  0837 04/05/23  1140 04/05/23  0910   *  135*  --   --   --   --   --   --  137  --  140  --  138   POTASSIUM 5.3  5.3  --   --   --   --   --   --  4.2  --  4.3  --  5.2   CHLORIDE 93*  93*  --   --   --   --   --   --  95*  --  97*  --  97*   CO2 20*  20*  --   --   --   --   --   --  29  --  27  --  30*   BUN 84.3*  84.3*  --   --   --   --   --   --  28.2*  --  41.6*  --  59.4*   CR 6.23*  6.23*  --   --   --   --   --   --  2.87*  --  4.07*  --  5.04*   *  238* 220* 266* 237* 220* 212*   < > 197*   < > 118*   < > 246*   LC 6.9*  6.9*  --   --   --   --   --   --  7.3*  --  7.5*  --  7.3*    < > = values in this interval not displayed.     INR  Recent Labs   Lab 04/11/23  0737 04/10/23  0841  04/09/23  0906 04/08/23  1457   INR 3.87* 2.91* 2.55* 2.17*      Attestation:   I have reviewed today's relevant vital signs, notes, medications, labs and imaging.    Macario Ferro MD  [facility] - Nephrology  [phone]
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
## 2023-04-12 NOTE — PROGRESS NOTES
Pt is due to discharge today.    AF-VSS  Labs from yday reviewed  Lying in bed, sleeping.    1.  ESKD.  HD tmrw.  Orders called to [facility].  2.  Weakness.  He is going to a TCU for rehab.
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
## 2023-04-12 NOTE — PROGRESS NOTES
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  Wound history/plan of care: [facility] has seen pt, no interventions planned, note he could consider BKA, no osteo currently  Wound base:  serous scabbing and eschar     Palpation of the wound bed: firm      Drainage: small     Description of drainage: serosang     Measurements (length x width x depth, in cm): medial leg 5  x 5  x  0.1+ cm, scattered smaller wounds to medial heel, distal foot/toes, lateral foot, all <2cm      Tunneling: N/A     Undermining: N/A  Periwound skin: Intact      Color: pink      Temperature: normal   Odor: none  Pain: mild, tender  Pain interventions prior to dressing change: slow and gentle cares   Treatment goal: Heal  and Infection control/prevention  STATUS: minimal change  Supplies ordered: gathered      Wound location: left medial lower leg     Last photo: 4-12-23 left medial      4-12-23 left dorsal      4-12-23 left posterior/lateral      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   Wound history/plan of care: see above; per pt he does not tolerate the edemawear; per nursing it has been very painful for pt when cleaning wounds; no osteo per MRI  Wound base:  serous scabbing and eschar     Palpation of the wound bed: firm      Drainage: small     Description of drainage: serosanguinous     Measurements (length x width x depth, in cm): medial leg 5  x 4  x  0.1+cm, dorsal foot approx 3 x 2.5 x 0.2cm     Tunneling: N/A     Undermining: N/A  Periwound skin: Intact, ecchymotic      Color: pink      Temperature: normal    Odor: none  Pain: mild, tender  Pain interventions prior to dressing change: slow and gentle cares   Treatment goal: Heal  and Infection control/prevention  STATUS: minimal change  Supplies ordered: gathered        Treatment Plan:     Recommend pt follow-up in vascular vs wound clinic on discharge if he intends to pursue attempts at wound healing, ie [facility] [place] [phone].    Discontinuing EdemaWear for now as pt states he could not tolerate it.  Also changing to Plurogel instead of Triad due to pain with paste removal.    Wound care to BLE: Daily:  1.  Cleanse intact skin on lower legs and feet with Kassidy perineal lotion and dry cloths, then apply Sween 24 cream.  2.  Cleanse wounds with wound cleanser.   3.  Apply thin layer Plurogel to pieces of oil emulsion gauze (Adaptic) that have been cut to fit the approx size of wounds.  Then place the gelled dressings onto wounds.   4.  Cover with gauze or ABDs and light Kerlix.  Use socks or stockinette to help hold Kerlix in place.    5.  Elevate legs; float heel at all times.       Orders: Reviewed and Updated    RECOMMEND PRIMARY TEAM ORDER: None, at this time  Education provided: importance of repositioning, plan of care, Moisture management, Hygiene and Off-loading pressure  Discussed plan of care with: Patient and Nurse  WOC nurse follow-up plan: weekly  Notify WOC if wound(s) deteriorate.  Nursing to notify the Provider(s) and re-consult the WOC Nurse if new skin concern.    DATA:     Current support surface: Standard  Standard gel/foam mattress (IsoFlex, Atmos air, etc)  Containment of urine/stool: not assessed with consult   BMI: Body mass index is 25.45 kg/m .   Active diet order: Orders Placed This Encounter      Combination Diet Renal Diet (dialysis); Moderate Consistent Carb (60 g CHO per Meal) Diet      Diet     Output: I/O last 3 completed shifts:  In: 960 [P.O.:960]  Out: -      Labs:   Recent Labs   Lab 04/12/23  9377  04/11/23  0737   ALBUMIN  --  3.0*   HGB  --  9.3*   INR 3.32* 3.87*   WBC  --  15.4*     Pressure injury risk assessment:   Sensory Perception: 4-->no impairment  Moisture: 4-->rarely moist  Activity: 3-->walks occasionally  Mobility: 3-->slightly limited  Nutrition: 4-->excellent  Friction and Shear: 2-->potential problem  Shalom Score: 20    Genevieve Jones RN CWOCN  -Securely message with [employer] (more info) - can reach individually by name or search 'WOC Nurse' (Thomas) to reach all current WOCs on duty.  -WOC Office Phone: [phone]

## 2023-04-12 NOTE — PLAN OF CARE
Physical Therapy Discharge Summary    Reason for therapy discharge:    Discharged to transitional care facility.    Progress towards therapy goal(s). See goals on Care Plan in [facility] electronic health record for goal details.  Goals partially met.  Barriers to achieving goals:   discharge from facility.    Therapy recommendation(s):    Continued therapy is recommended.  Rationale/Recommendations:  in order to progress strength, activity tolerance, and overall safety and independence with functional mobility. .

## 2023-04-12 NOTE — DISCHARGE SUMMARY
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 (H)         CONTINUE these medications which have NOT CHANGED    Details   acetaminophen (TYLENOL) 500 MG tablet Take 1,000 mg by mouth every 8 hours as needed for mild pain      allopurinol (ZYLOPRIM) 100 MG tablet Take 100 mg by mouth 2 times daily      aspirin 81 MG EC tablet Take 1 tablet (81 mg) by mouth At Bedtime Starting 12/12    Associated Diagnoses: Atrial flutter with rapid ventricular response (H)      bumetanide (BUMEX) 2 MG tablet Take 1 tablet (2 mg) by mouth 2 times daily On non dialysis days    Associated Diagnoses: Hypertension, unspecified type      cloNIDine (CATAPRES) 0.1 MG tablet Take 1 tablet (0.1 mg) by mouth 2 times daily    Associated Diagnoses: Hypertension, unspecified type      ezetimibe (ZETIA) 10 MG tablet Take 10 mg by mouth every morning      fish oil-omega-3 fatty acids 1000 MG capsule Take 1 g by mouth every morning      folic acid (FOLVITE) 1 MG tablet Take 1 mg by mouth every morning      hydrALAZINE (APRESOLINE) 25 MG tablet Take 1 tablet (25 mg) by mouth every 6 hours as needed (SBP >180)  Qty: 30 tablet, Refills: 0    Associated Diagnoses: Diabetic  ketoacidosis without coma associated with type 2 diabetes mellitus (H)      insulin glargine (LANTUS PEN) 100 UNIT/ML pen Inject 10 Units Subcutaneous At Bedtime  Qty: 15 mL, Refills: 0    Comments: If Lantus is not covered by insurance, may substitute Basaglar or Semglee or other insulin glargine product per insurance preference at same dose and frequency.    Associated Diagnoses: Diabetic ketoacidosis without coma associated with type 2 diabetes mellitus (H)      melatonin 3 MG tablet Take 1 tablet (3 mg) by mouth At Bedtime    Associated Diagnoses: Insomnia, unspecified type      !! metoprolol tartrate (LOPRESSOR) 100 MG tablet Take 100 mg by mouth 2 times daily Take with 75 mg for total of 175 mg twice daily      !! Metoprolol Tartrate 75 MG TABS Take 75 mg by mouth 2 times daily Take with 100 mg for total of 175 mg twice daily      mirtazapine (REMERON) 15 MG tablet Take 1 tablet (15 mg) by mouth At Bedtime  Qty: 10 tablet, Refills: 0    Associated Diagnoses: Insomnia, unspecified type      traZODone (DESYREL) 150 MG tablet Take 150 mg by mouth At Bedtime      vitamin B complex with vitamin C (STRESS TAB) tablet Take 1 tablet by mouth every morning      Vitamin D, Cholecalciferol, 25 MCG (1000 UT) CAPS Take 1,000 Units by mouth every morning       !! - Potential duplicate medications found. Please discuss with provider.      STOP taking these medications       insulin aspart (NOVOLOG PEN) 100 UNIT/ML pen Comments:   Reason for Stopping:             Allergies   No Known Allergies

## 2023-04-12 NOTE — PLAN OF CARE
Occupational Therapy Discharge Summary    Reason for therapy discharge:    Discharged to transitional care facility.    Progress towards therapy goal(s). See goals on Care Plan in [facility] electronic health record for goal details.  Goals partially met.  Barriers to achieving goals:   discharge from facility.    Therapy recommendation(s):    Pt below baseline function in self care and ADL, will need TCU at discharge to increase indp and function for ADL, mobility and IADL. Limitations including strength, activity tolerance, pain, etc.

## 2023-04-12 NOTE — PROGRESS NOTES
Care Management Discharge Note    Discharge Date: 04/12/2023       Discharge Disposition: Transitional Care    Discharge Services:      Discharge DME:      Discharge Transportation: family or friend will provide    Private pay costs discussed: private room/amenity fees and transportation costs    PAS Confirmation Code:  [id number]  Patient/family educated on Medicare website which has current facility and service quality ratings:  yes    Education Provided on the Discharge Plan:  yes  Persons Notified of Discharge Plans: [facility] SW, hand off to TCU SW  Patient/Family in Agreement with the Plan: yes    Handoff Referral Completed: No    Additional Information:  Patient transferred via medivan today to a semi-private room at [facility].  Writer made dialysis transportation arrangements thru [employer] for Thursday and Friday 4/13 and 4/15.  Patient said he would notify his daughter of his discharge today.   [employer] [place] [facility] benefit received via [employer] #[id number], next review due 4/14. [employer] also updated admission staff at the TCU.        MELANY Abdalla

## 2023-04-13 NOTE — PROGRESS NOTES
[facility]    Background: Transitional Care Management program identified per system criteria and reviewed by Connected Care Resource Center team for possible outreach.    Assessment: Upon chart review, CCRC Team member will not proceed with patient outreach related to this episode of Transitional Care Management program due to reason below:    Non-[facility] TCU: CCRC team member noted patient discharged to TCU/ARU/LTACH. Patient is not established with a [facility] currently supported by Primary Care-Care Coordination therefore handoff to Primary Care-Care Coordination is not appropriate at this time.    Plan: Transitional Care Management episode addressed appropriately per reason noted above.      Belen Longoria MA  Connected Care Resource [place], [facility]    *Connected Care Resource Team does NOT follow patient ongoing. Referrals are identified based on internal discharge reports and the outreach is to ensure patient has an understanding of their discharge instructions.

## 2023-04-19 PROBLEM — R00.0 TACHYCARDIA: Status: ACTIVE | Noted: 2023-01-01

## 2023-04-19 PROBLEM — N39.0 URINARY TRACT INFECTION WITHOUT HEMATURIA, SITE UNSPECIFIED: Status: ACTIVE | Noted: 2023-01-01

## 2023-04-19 PROBLEM — M62.81 GENERALIZED MUSCLE WEAKNESS: Status: ACTIVE | Noted: 2023-01-01

## 2023-04-19 NOTE — ED TRIAGE NOTES
Pt BIBA from [facility] TCU. Patient has bi-lateral leg infections. Patient near sepsis per EMS.  Nursing home staff reports pt having increased weakness and lethargic since this am. Pt awakens when spoken to, answers questions appropriately, states he feels weakers

## 2023-04-19 NOTE — ED PROVIDER NOTES
History     Chief Complaint:  Wound Check and Altered Mental Status       HPI   Gordon Gann is a 78 year old male with a history of hypertension, hyperlipidemia, atrial fibrillation, STEMI, and type II diabetes mellitus who presents from a care facility with altered mental status. The patient has chronic sores and ulcers on his bilateral lower extremities, and the staff at his care facility noted him to be more lethargic today with lower pressures in the 100s systolic. Blood sugar of 181. Here in the ED, the patient endorses weakness and soreness in his bilateral legs. No shortness of breath, chest pain, other pain, or vomiting. He is dialyzed on a T/Th/Sat schedule, but states that he was not dialyzed yesterday (Tuesday). No fevers here.       Independent Historian:   Care facility staff, as noted above.     Review of External Notes: Telephone note from today reviewed where the patient's chronic lower extremity wounds been discussed along with the possibility of planning a BKA.    ROS:  Review of Systems   Constitutional: Negative for fever.   Respiratory: Negative for shortness of breath.    Cardiovascular: Negative for chest pain.   Gastrointestinal: Negative for vomiting.   Skin: Positive for wound.   Neurological: Positive for weakness.   All other systems reviewed and are negative.      Allergies:  No known drug allergies     Medications:    Warfarin  Aspirin 81 mg  Bumetanide  Lisinopril   Clonidine  Diltiazem  Gabapentin  Hydralazine  Mirtazapine   Allopurinol  Zetia  Folic acid  Metoprolol tartrate  Trazodone    Past Medical History:    STEMI  Atrial fibrillation  Type II diabetes mellitus   Alcohol abuse  ESRD on dialysis  Hyperlipidemia   Hypertension   Gout  Pleural effusion  Hemarthrosis  DKA  Noncompliance with medication regimen  Failure to thrive in adult  Hyperparathyroidism  CAD    Past Surgical History:    Coronary stent placement  Parotidectomy  TURP  Mohs surgery  IR dialysis fistulogram  "  Create graft loop arteriovenous upper left extremity     Family History:    Father- heart disease  Mother- cerebral aneurysm     Social History:  The patient presents via EMS  Resides in care facility     Physical Exam     Patient Vitals for the past 24 hrs:   BP Temp Pulse Resp SpO2 Height Weight   04/19/23 2200 103/50 -- 117 (!) 8 98 % -- --   04/19/23 2145 111/77 -- 117 (!) 9 -- -- --   04/19/23 2045 108/87 -- 116 -- -- -- --   04/19/23 2000 104/59 -- 116 -- 99 % -- --   04/19/23 1945 104/52 -- 116 -- 100 % -- --   04/19/23 1930 105/82 -- 116 -- 100 % -- --   04/19/23 1900 92/53 -- 114 -- 98 % -- --   04/19/23 1845 (!) 97/27 -- 114 14 98 % -- --   04/19/23 1800 103/48 -- 114 15 99 % -- --   04/19/23 1745 90/62 -- 114 15 99 % -- --   04/19/23 1730 (!) 115/36 -- 59 -- -- -- --   04/19/23 1700 98/50 -- 114 12 100 % -- --   04/19/23 1644 119/74 -- 113 21 96 % -- --   04/19/23 1600 103/74 97.6  F (36.4  C) 114 20 100 % 1.702 m (5' 7\") 76.9 kg (169 lb 8.5 oz)        Physical Exam  General: Laying on the ED bed, no distress  HEENT: Normocephalic, atraumatic  Cardiac: Left medial bicep AV fistula with thrill, irregular tachycardia  Pulm: Breathing comfortably, no accessory muscle usage, no conversational dyspnea, and lungs clear bilaterally  GI: Abdomen soft, nontender, no rigidity or guarding  MSK: No bony deformities  Skin: Bilateral legs and feet with clean stockingette dressings over gauze, multiple underlying eschar like wounds without thiago purulence, surrounding skin is erythematous to dusky in appearance  Neuro: Eyes closed but awakes to voice and answers questions appropriately in context  Psych: Normal mood and affect    Emergency Department Course   ECG  ECG taken at 1656, ECG read at 1710  Accelerated junctional rhythm with frequent PVCs; left axis deviation; incomplete RBBB; nonspecific ST and T wave abnormality   New ventricular bigeminy as compared to prior, dated 4/3/23.  Rate 115 bpm. IN interval * ms. " QRS duration 100 ms. QT/QTc 318/439 ms. P-R-T axes * -38 200.      Imaging:  XR Chest 2 Views   Final Result   IMPRESSION: Stable chest with again seen mild to moderate pleural fluid versus thickening in the lower right hemithorax with associated atelectasis. Borderline heart size with normal pulmonary vascularity. Mild vascular calcification. Moderate    hypertrophic changes of the AC joints. No evidence for an obvious pneumothorax or inflammatory infiltrates.         Report per radiology    Laboratory:  Labs Ordered and Resulted from Time of ED Arrival to Time of ED Departure   COMPREHENSIVE METABOLIC PANEL - Abnormal       Result Value    Sodium 132 (*)     Potassium 3.9      Chloride 92 (*)     Carbon Dioxide (CO2) 25      Anion Gap 15      Urea Nitrogen 39.9 (*)     Creatinine 4.44 (*)     Calcium 8.4 (*)     Glucose 171 (*)     Alkaline Phosphatase 439 (*)     AST 25      ALT 15      Protein Total 6.3 (*)     Albumin 2.5 (*)     Bilirubin Total 0.5      GFR Estimate 13 (*)    TROPONIN T, HIGH SENSITIVITY - Abnormal    Troponin T, High Sensitivity 340 (*)    ROUTINE UA WITH MICROSCOPIC REFLEX TO CULTURE - Abnormal    Color Urine Yellow      Appearance Urine Slightly Cloudy (*)     Glucose Urine 100 (*)     Bilirubin Urine Negative      Ketones Urine Negative      Specific Gravity Urine 1.017      Blood Urine Large (*)     pH Urine 5.5      Protein Albumin Urine 100 (*)     Urobilinogen Urine Normal      Nitrite Urine Negative      Leukocyte Esterase Urine Moderate (*)     Bacteria Urine Few (*)     WBC Clumps Urine Present (*)     Amorphous Crystals Urine Few (*)     RBC Urine 30 (*)     WBC Urine 127 (*)    INR - Abnormal    INR 2.07 (*)    PARTIAL THROMBOPLASTIN TIME - Abnormal    aPTT 54 (*)    CBC WITH PLATELETS AND DIFFERENTIAL - Abnormal    WBC Count 21.5 (*)     RBC Count 3.34 (*)     Hemoglobin 9.4 (*)     Hematocrit 30.7 (*)     MCV 92      MCH 28.1      MCHC 30.6 (*)     RDW 16.5 (*)     Platelet  Count 419      % Neutrophils 81      % Lymphocytes 9      % Monocytes 9      % Eosinophils 0      % Basophils 0      % Immature Granulocytes 1      NRBCs per 100 WBC 0      Absolute Neutrophils 17.4 (*)     Absolute Lymphocytes 2.0      Absolute Monocytes 1.9 (*)     Absolute Eosinophils 0.0      Absolute Basophils 0.0      Absolute Immature Granulocytes 0.1      Absolute NRBCs 0.0     ISTAT GASES LACTATE VENOUS POCT - Abnormal    Lactic Acid POCT 1.5      Bicarbonate Venous POCT 24      O2 Sat, Venous POCT 27 (*)     pCO2V Venous POCT 45      pH Venous POCT 7.34      pO2 Venous POCT 19 (*)    CRP INFLAMMATION - Abnormal    CRP Inflammation 328.88 (*)    ERYTHROCYTE SEDIMENTATION RATE AUTO - Abnormal    Erythrocyte Sedimentation Rate 108 (*)    PROCALCITONIN - Abnormal    Procalcitonin 3.37 (*)    TYPE AND SCREEN, ADULT - Abnormal    ABO/RH(D) O POS      Antibody Screen Positive (*)     SPECIMEN EXPIRATION DATE [id number]     LIPASE - Normal    Lipase 14     MAGNESIUM - Normal    Magnesium 1.8     TSH WITH FREE T4 REFLEX - Normal    TSH 3.12     ANTIBODY SCREEN (REFLEX)    ANTIBODY SCREEN, TUBE NEG      SPECIMEN EXPIRATION DATE [id number]     PREPARE RED BLOOD CELLS (UNIT)    Blood Component Type Red Blood Cells      Product Code E1667F00      Unit Status Ready for issue      Unit Number [id number]      CROSSMATCH COMPATIBLE      CODING SYSTEM EQUW190     PREPARE RED BLOOD CELLS (UNIT)    Blood Component Type Red Blood Cells      Product Code O5014C48      Unit Status Ready for issue      Unit Number [id number]      CROSSMATCH COMPATIBLE      CODING SYSTEM RCLI147     URINE CULTURE   BLOOD CULTURE   BLOOD CULTURE   ABO/RH TYPE AND SCREEN        Emergency Department Course & Assessments:     Interventions:  Medications   0.9% sodium chloride BOLUS (0 mLs Intravenous Stopped 4/19/23 0366)     Followed by   sodium chloride 0.9% infusion (has no administration in time range)   albumin human 25 % injection  25 g (has no administration in time range)   cefTRIAXone (ROCEPHIN) 1 g vial to attach to  mL bag for ADULTS or NS 50 mL bag for PEDS (1 g Intravenous $New Bag 23)      Assessments:  160 I obtained history and examined the patient as noted above.   I rechecked the patient and explained all findings. He is agreeable to admission.     Independent Interpretation (X-rays, CTs, rhythm strip):  ED Course as of 23   1803 XR Chest 2 Views  On my review, chest x-ray shows right-sided pleural effusion without any thiago new consolidation compared to prior.  Prior shows a right-sided pleural effusion as well.        Consultations/Discussion of Management or Tests:   I spoke with Dr. Manning from hospitalist service regarding the patient's presentation and workup. Accepts care of patient.    Social Determinants of Health affecting care:   None    Disposition:  The patient was admitted to the hospital under the care of Dr. Manning.     Impression & Plan      Medical Decision Makin-year-old male presents with fatigue and tachycardia.  He also has a somewhat soft blood pressures on initial presentation.  Prior echocardiogram from 2 months ago shows adequate LV ejection fraction but right-sided hypertension.  Initial presentation is concerning for sepsis.  Lactate is thankfully not elevated.  Blood cultures were drawn prior to antibiotics.  Given the patient's prior echo, a fluid bolus was ordered with minimal heart rate response.  The patient's lower extremities are mottled and have several wounds, however it is unclear whether this is his baseline or represents a worsening/developing cellulitis.  The telephone note from today appears to suggest the latter.  Urinalysis is also consistent with UTI.  Ordering ceftriaxone for coverage in either case.  Plan is for admission to the hospitalist for further care.    Critical Care time:  was 0 minutes for this patient excluding  procedures.    Diagnosis:    ICD-10-CM    1. Urinary tract infection without hematuria, site unspecified  N39.0       2. Tachycardia  R00.0       3. Generalized muscle weakness  M62.81             Scribe Disclosure:  I, Marielena Zhu, am serving as a scribe at 4:03 PM on 4/19/2023 to document services personally performed by Raza Cheung MD based on my observations and the provider's statements to me.      4/19/2023   Raza Cheung MD King, Colin, MD  04/19/23 4568

## 2023-04-19 NOTE — TELEPHONE ENCOUNTER
[facility]    Who is the name of the provider?:  Unknown    What is the location you see this provider at/preferred location?: [place]  Person calling / Facility: Herrera Francis/tcu   Phone number:  [phone]  Nurse call back needed:  yes     Reason for call:  Herrera called from Tcu will send referal from there inhouse  , pt was seen 2 years ago by Dr Hernandez , pt suffers from afib, is presently very lathargic     Pharmacy location:  chart  Outside Imaging: no   Can we leave a detailed message on this number?  yes

## 2023-04-19 NOTE — TELEPHONE ENCOUNTER
I returned call to Herrera and he states patient bilateral wounds are much worse than a few weeks ago and Dr. Helm at [facility] would like to refer patient to [facility]. Patient was consulted on on 4/6/23 by Marya Roblero NP. The reason vascular was consulted while patient was in hospital was orthopedics was possibly planning on BKA and wanted clearance from vascular that patient could heal a BKA. No follow up with vascular was planned.     Per hospital discharge patient was supposed to follow up with Dr. Sr at [facility] for debridement and possible BKA. Herrera states patient has not followed up with [facility]. I provided Herrera the number to Dr. Sr care coordinator [place]  and he will call to schedule appt to discuss the BKA as planned. I also stated to Herrera we are happy to see the patient if he is not able to get in with  for the BKA and to call us back if anything changes.     Chasidy MARTIN, RN    [facility]  [facility]  Office: [phone]  Fax: [phone]

## 2023-04-20 NOTE — PROVIDER NOTIFICATION
MD Notification    Notified Person: MD    Notified Person Name: Maame Leyva    Notification Date/Time: 5:07 am     Notification Interaction:   [employer]     Purpose of Notification:  5 mg PRN metoprolol given for elevated HR. HR still 120. Please advise.     Orders Received: Pending     Comments:

## 2023-04-20 NOTE — CONSULTS
Assessment and Plan:   ESRD: HD T Th S, missed Tuesday. No evidence of fluid overload. Labs reviewed.     Dialysis today: 3h, , low dose heparin, 3K 38 HCO3 and 138 Na. Hectorol and EPO on the run.             Interval History:   Decreased MS: taper off neurontin, remeron.   Ulcerations: R/O calciphylaxis or coumarin necrosis. Add renvela when taking po.    Anemia: EPO on dialysis.                 Review of Systems:   Unable.          Medications:       - MEDICATION INSTRUCTIONS for Dialysis Patients -   Does not apply See Admin Instructions     sodium chloride 0.9%  250 mL Intravenous Once in dialysis/CRRT     sodium chloride 0.9%  300 mL Hemodialysis Machine Once     allopurinol  100 mg Oral BID     aspirin  81 mg Oral At Bedtime     cefTRIAXone  1 g Intravenous Q24H     [START ON 4/21/2023] cloNIDine  0.1 mg Oral Daily     diltiazem ER COATED BEADS  120 mg Oral Daily     doxercalciferol  4 mcg Intravenous Once in dialysis/CRRT     epoetin yjotsna-epbx  4,000 Units Intravenous Once in dialysis/CRRT     gabapentin  100 mg Oral At Bedtime     heparin  500 Units Hemodialysis Machine OR IV Push Once in dialysis/CRRT     insulin aspart  1-7 Units Subcutaneous TID AC     insulin aspart  1-5 Units Subcutaneous At Bedtime     insulin glargine  5 Units Subcutaneous At Bedtime     metoprolol tartrate  175 mg Oral BID     mirtazapine  7.5 mg Oral At Bedtime     pantoprazole  40 mg Oral Daily     sodium chloride (PF)  3 mL Intracatheter Q8H     Warfarin Therapy Reminder  1 each Oral See Admin Instructions       heparin (porcine)       - MEDICATION INSTRUCTIONS -       sodium chloride Stopped (04/19/23 8358)     - MEDICATION INSTRUCTIONS -       Current active medications and PTA medications reviewed, see medication list for details.            Physical Exam:   Vitals were reviewed  Patient Vitals for the past 24 hrs:   BP Temp Temp src Pulse Resp SpO2 Height Weight   04/20/23 0900 105/60 -- -- 119 18 98 % -- --  "  23 0830 129/85 -- -- 120 20 96 % -- --   23 0754 125/71 98.3  F (36.8  C) Oral 120 18 99 % -- --   23 0500 -- -- -- 61 -- -- -- --   23 0443 127/75 98.3  F (36.8  C) Oral 120 19 -- -- --   23 0326 122/75 -- -- 120 -- -- -- --   23 0135 124/82 98.1  F (36.7  C) Oral 120 16 97 % 1.702 m (5' 7\") 73.8 kg (162 lb 11.2 oz)   23 0003 106/62 -- -- 117 13 97 % -- --   23 112/58 -- -- 116 18 100 % -- --   23 112/58 -- -- 116 12 100 % -- --   23 2300 99/63 -- -- 117 (!) 9 93 % -- --   23 99/89 -- -- 116 10 96 % -- --   23 2200 103/50 -- -- 117 (!) 8 98 % -- --   23 214 111/77 -- -- 117 (!) 9 -- -- --   23 108/87 -- -- 116 -- -- -- --   23 104/59 -- -- 116 -- 99 % -- --   23 194 104/52 -- -- 116 -- 100 % -- --   23 1930 105/82 -- -- 116 -- 100 % -- --   23 1900 92/53 -- -- 114 -- 98 % -- --   23 1845 (!) 97/27 -- -- 114 14 98 % -- --   23 1800 103/48 -- -- 114 15 99 % -- --   23 1745 90/62 -- -- 114 15 99 % -- --   23 1730 (!) 115/36 -- -- 59 -- -- -- --   23 1700 98/50 -- -- 114 12 100 % -- --   23 1644 119/74 -- -- 113 21 96 % -- --   23 1600 103/74 97.6  F (36.4  C) -- 114 20 100 % 1.702 m (5' 7\") 76.9 kg (169 lb 8.5 oz)       Temp:  [97.6  F (36.4  C)-98.3  F (36.8  C)] 98.3  F (36.8  C)  Pulse:  [] 119  Resp:  [8-21] 18  BP: ()/(27-89) 105/60  SpO2:  [93 %-100 %] 98 %    Temperatures:  Current - Temp: 98.3  F (36.8  C); Max - Temp  Av.1  F (36.7  C)  Min: 97.6  F (36.4  C)  Max: 98.3  F (36.8  C)  Respiration range: Resp  Av.8  Min: 8  Max: 21  Pulse range: Pulse  Av.2  Min: 59  Max: 120  Blood pressure range: Systolic (24hrs), Av , Min:90 , Max:129   ; Diastolic (24hrs), Av, Min:27, Max:89    Pulse oximetry range: SpO2  Av.2 %  Min: 93 %  Max: 100 %    I/O last 3 completed shifts:  In: 100 [IV " Piggyback:100]  Out: -       Intake/Output Summary (Last 24 hours) at 4/20/2023 1137  Last data filed at 4/19/2023 2232  Gross per 24 hour   Intake 100 ml   Output --   Net 100 ml       Obtunded  LE with multiple large ulcers with eschar, erythema and ecchymosis.   UE with areas of ecchymosis  LAF with good bruit  Lungs clear ant BS  Cor irreg, tachy, nl S1 S2       Wt Readings from Last 4 Encounters:   04/20/23 73.8 kg (162 lb 11.2 oz)   04/11/23 73.7 kg (162 lb 8 oz)   02/19/23 74 kg (163 lb 3.2 oz)   02/11/23 73.8 kg (162 lb 12.8 oz)          Data:          Lab Results   Component Value Date     04/20/2023     04/19/2023     04/17/2023    Lab Results   Component Value Date    CHLORIDE 96 04/20/2023    CHLORIDE 92 04/19/2023    CHLORIDE 94 04/17/2023    CHLORIDE 98 08/30/2022    CHLORIDE 102 08/29/2022    CHLORIDE 101 08/28/2022    Lab Results   Component Value Date    BUN 48.0 04/20/2023    BUN 39.9 04/19/2023    BUN 52.6 04/17/2023    BUN 18 08/30/2022    BUN 55 08/29/2022    BUN 49 08/28/2022      Lab Results   Component Value Date    POTASSIUM 4.1 04/20/2023    POTASSIUM 3.9 04/19/2023    POTASSIUM 4.2 04/17/2023    POTASSIUM 3.4 08/30/2022    POTASSIUM 3.7 08/29/2022    POTASSIUM 4.6 08/28/2022    Lab Results   Component Value Date    CO2 19 04/20/2023    CO2 25 04/19/2023    CO2 20 04/17/2023    CO2 35 08/30/2022    CO2 25 08/29/2022    CO2 28 08/28/2022    Lab Results   Component Value Date    CR 4.90 04/20/2023    CR 4.44 04/19/2023    CR 5.60 04/17/2023        Recent Labs   Lab Test 04/20/23  0629 04/19/23  1640 04/19/23  0606   WBC 20.3* 21.5* 20.1*   HGB 8.7* 9.4* 9.3*   HCT 28.8* 30.7* 31.8*   MCV 92 92 96    419 434     Recent Labs   Lab Test 04/20/23  0629 04/19/23  1640 04/04/23  1043   AST 20 25 41   ALT 15 15 31   ALKPHOS 363* 439* 404*   BILITOTAL 0.5 0.5 0.3       Recent Labs   Lab Test 04/19/23  1640 04/07/23  0837 12/06/21  1118   MAG 1.8 1.7 2.4*     Recent Labs   Lab  Test 04/11/23  0737 04/07/23  0837 02/18/23  0606   PHOS 8.9* 5.5* 6.2*     Recent Labs   Lab Test 04/20/23  0629 04/19/23  1640 04/17/23  0600   LC 7.9* 8.4* 7.9*       Lab Results   Component Value Date    LC 7.9 (L) 04/20/2023     Lab Results   Component Value Date    WBC 20.3 (H) 04/20/2023    HGB 8.7 (L) 04/20/2023    HCT 28.8 (L) 04/20/2023    MCV 92 04/20/2023     04/20/2023     Lab Results   Component Value Date     (L) 04/20/2023    POTASSIUM 4.1 04/20/2023    CHLORIDE 96 (L) 04/20/2023    CO2 19 (L) 04/20/2023     (H) 04/20/2023     Lab Results   Component Value Date    BUN 48.0 (H) 04/20/2023    CR 4.90 (H) 04/20/2023     Lab Results   Component Value Date    MAG 1.8 04/19/2023     Lab Results   Component Value Date    PHOS 8.9 (H) 04/11/2023       Creatinine   Date Value Ref Range Status   04/20/2023 4.90 (H) 0.67 - 1.17 mg/dL Final   04/19/2023 4.44 (H) 0.67 - 1.17 mg/dL Final   04/17/2023 5.60 (H) 0.67 - 1.17 mg/dL Final   04/11/2023 6.23 (H) 0.67 - 1.17 mg/dL Final   04/11/2023 6.23 (H) 0.67 - 1.17 mg/dL Final   04/08/2023 2.87 (H) 0.67 - 1.17 mg/dL Final       Attestation:  I have reviewed today's vital signs, notes, medications, labs and imaging.  Seen on dialysis.      Dontae Roger MD

## 2023-04-20 NOTE — CONSULTS
"[facility] Inpatient Consultation Note    Gordon Gann MRN# [id number]   Age: 78 year old YOB: 1945     Date of Admission:  4/19/2023    Reason for consult: Penile and scrotal wounds       Requesting physician: Myles Yip MD                History of Present Illness:   Gordon Gann is a 78 year old male in poor overall health admitted from his rehab TCU at [place] with weakness and for wound check.  He recently discharged on 4/12/23 from admission for right pleural effusion. He has ESRD and is on dialysis and has multiple lower extremity wounds that are quite painful. Wound nurse noted penile and scrotal wounds and urology has been consulted for further evaluation. Vascular surgery also consulted.    Labs notable for leukocytosis (20.3), elevated CRP/ESR, lactic acid 1.5. INR 2.3. UA with large blood and moderate LE, nitrite negative; UCx pending. He is on IV rocephin currently. Patient evaluated this afternoon while at dialysis. Patient is overall poor historian, though he is able to state that his scrotum and penis has been painful for about 3 weeks. He states the lower legs are what hurt him the most. The pain seems to be getting worse over the past 48 hours. He denies urethral discharge. He makes no urine and is dialyzed T/Th/Sa. He inquires \"when do we start thinking about ending things all together.\" He then expressed interest in speaking with palliative team regarding discussion on goals of care and end of life.             Past Medical History:     Past Medical History:   Diagnosis Date     Alcohol abuse      Atrial fibrillation (H)      CKD (chronic kidney disease) stage 4, GFR 15-29 ml/min (H)      Diabetes (H)      Gout      Hypokalemia      Hypophosphatemia      Insomnia      STEMI (ST elevation myocardial infarction) (H)              Past Surgical History:     Past Surgical History:   Procedure Laterality Date     CREATE GRAFT LOOP ARTERIOVENOUS UPPER EXTREMITY Left " 8/5/2021    Procedure: left arm arteriovenous graft fistula.;  Surgeon: Noemí Hernandez MD;  Location: [facility] OR     IR DIALYSIS FISTULOGRAM LEFT  12/8/2021     IR DIALYSIS FISTULOGRAM LEFT  2/9/2023             Social History:     Social History     Socioeconomic History     Marital status:      Spouse name: Not on file     Number of children: Not on file     Years of education: Not on file     Highest education level: Not on file   Occupational History     Not on file   Tobacco Use     Smoking status: Never     Smokeless tobacco: Never   Vaping Use     Vaping status: Not on file   Substance and Sexual Activity     Alcohol use: Yes     Drug use: Not on file     Sexual activity: Not on file   Other Topics Concern     Parent/sibling w/ CABG, MI or angioplasty before 65F 55M? Not Asked   Social History Narrative     Not on file     Social Determinants of Health     Financial Resource Strain: Not on file   Food Insecurity: Not on file   Transportation Needs: Not on file   Physical Activity: Not on file   Stress: Not on file   Social Connections: Not on file   Intimate Partner Violence: Not on file   Housing Stability: Not on file             Family History:   No family history on file.          Immunizations:     Immunization History   Administered Date(s) Administered     COVID-19 Vaccine 18+ (Moderna) 11/30/2021, 05/10/2022     COVID-19 Vaccine Bivalent Booster 12+ (Pfizer) 12/06/2022             Allergies:   No Known Allergies          Medications:     Current Facility-Administered Medications   Medication     - MEDICATION INSTRUCTIONS for Dialysis Patients -     acetaminophen (TYLENOL) tablet 650 mg    Or     acetaminophen (TYLENOL) Suppository 650 mg     allopurinol (ZYLOPRIM) tablet 100 mg     aspirin EC tablet 81 mg     bisacodyl (DULCOLAX) suppository 10 mg     cefTRIAXone (ROCEPHIN) 1 g vial to attach to  mL bag for ADULTS or NS 50 mL bag for PEDS     [START ON 4/21/2023] cloNIDine  "(CATAPRES) tablet 0.1 mg     glucose gel 15-30 g    Or     dextrose 50 % injection 25-50 mL    Or     glucagon injection 1 mg     diltiazem ER COATED BEADS (CARDIZEM CD/CARTIA XT) 24 hr capsule 120 mg     gabapentin (NEURONTIN) capsule 100 mg     hydrALAZINE (APRESOLINE) injection 10 mg     insulin aspart (NovoLOG) injection (RAPID ACTING)     insulin aspart (NovoLOG) injection (RAPID ACTING)     insulin glargine (LANTUS PEN) injection 5 Units     labetalol (NORMODYNE/TRANDATE) injection 10 mg     lidocaine (LMX4) cream     lidocaine 1 % 0.1-1 mL     melatonin tablet 1 mg     metoprolol (LOPRESSOR) injection 2.5-5 mg     metoprolol tartrate (LOPRESSOR) tablet 175 mg     mirtazapine (REMERON) tablet TABS 7.5 mg     ondansetron (ZOFRAN ODT) ODT tab 4 mg    Or     ondansetron (ZOFRAN) injection 4 mg     pantoprazole (PROTONIX) EC tablet 40 mg     Patient is already receiving anticoagulation with heparin, enoxaparin (LOVENOX), warfarin (COUMADIN)  or other anticoagulant medication     polyethylene glycol (MIRALAX) Packet 17 g     senna-docusate (SENOKOT-S/PERICOLACE) 8.6-50 MG per tablet 1 tablet    Or     senna-docusate (SENOKOT-S/PERICOLACE) 8.6-50 MG per tablet 2 tablet     sodium chloride (PF) 0.9% PF flush 3 mL     sodium chloride (PF) 0.9% PF flush 3 mL     sodium chloride 0.9% infusion     Warfarin Dose Required Daily - Pharmacist Managed             Review of Systems:   Comprehensive review of systems from the Admission note dated 4/19/23 at [facility] was reviewed with no changes except per HPI.     Examination:  /83 (BP Location: Right arm, Cuff Size: Adult Regular)   Pulse 87   Temp 98.5  F (36.9  C) (Axillary)   Resp 18   Ht 1.702 m (5' 7\")   Wt 73.8 kg (162 lb 11.2 oz)   SpO2 98%   BMI 25.48 kg/m    General: Alert and oriented, distressed and grimacing. In dialysis bed.  HEENT: Face symmetric, mucous membranes moist and pink  Eyes: No scleral icterus  Neck: Symmetric  Chest " wall: Symmetric  Respiratory: Breathing unlabored, no audible wheezing  Cardiac: Extensive LE necrotic wounds with eschar bilaterally involving feet and calves  Abdomen: soft, non tender, non distended  : circumcised phallus with mild penile edema; glans penis is non-tender with light purple hue; urethral meatus bright red without discharge; ventral aspect of shaft has dark red erythema without obvious necrosis or eschar; left hemiscrotum with 1 cm region of dark purple eschar with surrounding region of erythema which is tender, weeping malodorous serosanguinous drainage; minimal involvement right hemiscrotum; no extension into groin bilaterally; no fluctuance.   Extremities: No evidence of deformities or trauma  Neuro:Grossly non focal  Pysch: Normal mood and affect  Skin: No evident rashes or lesions            Data:     Lab Results   Component Value Date    WBC 20.3 04/20/2023     Lab Results   Component Value Date    RBC 3.12 04/20/2023     Lab Results   Component Value Date    HGB 8.7 04/20/2023     Lab Results   Component Value Date    HCT 28.8 04/20/2023     Lab Results   Component Value Date     04/20/2023     Creatinine   Date Value Ref Range Status   04/20/2023 4.90 (H) 0.67 - 1.17 mg/dL Final   ]  Lab Results   Component Value Date    BUN 48.0 04/20/2023    BUN 18 08/30/2022         Impression:  78 year old male with ESRD, severe lower extremity wounds, significant physical deconditioning. Urology consulted for scrotal and penile wounds. 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 Anjum's gangrene considered in differential, though no crepitus present and vitally stable. Calciphylaxis carries a high morbidity and mortality rate, with an estimated six-month survival of approximately 50 percent. Unfortunately, there is no approved treatment for calciphylaxis.     Plan:  - Continue wound cares and pain management  - Our team will follow clinically on exam in the morning  - Continue  antimicrobial therapy   - Would not recommend any urgent surgical intervention at this time. Would need INR reversal prior to any potential debridement.   - Recommend palliative care consult to identify goals of care     Urology following    This patient was discussed with Dr. Harper who agrees with the plan stated above.    Nasreen Rush PA-C  [facility]   [email or web address]  Text Page (7:30am to 4:30pm)

## 2023-04-20 NOTE — PROGRESS NOTES
Potassium   Date Value Ref Range Status   04/20/2023 4.1 3.4 - 5.3 mmol/L Final   08/30/2022 3.4 3.4 - 5.3 mmol/L Final     Hemoglobin   Date Value Ref Range Status   04/20/2023 8.7 (L) 13.3 - 17.7 g/dL Final     Creatinine   Date Value Ref Range Status   04/20/2023 4.90 (H) 0.67 - 1.17 mg/dL Final     Urea Nitrogen   Date Value Ref Range Status   04/20/2023 48.0 (H) 8.0 - 23.0 mg/dL Final   08/30/2022 18 7 - 30 mg/dL Final     Sodium   Date Value Ref Range Status   04/20/2023 133 (L) 136 - 145 mmol/L Final     INR   Date Value Ref Range Status   04/20/2023 2.35 (H) 0.85 - 1.15 Final       DIALYSIS PPROCEDURE NOTE  Hepatitis status of previous patient on machine log was checked and verified ok to use with this patients hepatitis status.  Patient dialyzed for 3hrs. on a K3 bath with a net fluid removal of 2L. A BFR of 400 ml/min was obtained via a L AVG using 15gauge needles.      The treatment plan was discussed with Dr. Roger during the treatment.    Total heparin received during the treatment: held due to multiple wounds and bruising  Needle cannulation sites held x 15min.      Meds given: 4000 units epo and 4mcg hectorol given  Complications: None     Person educated: patient. Knowledge base substantial. Barriers to learning: none. Educated on procedure and potassium via oral mode. Patient verbalized understanding. Pt prefers verbal education style.      ICEBOAT? Timeout performed pre-treatment  I: Patient was identified using 2 identifiers  C:  Consent Signed Yes  E: Equipment preventative maintenance is current and dialysis delivery system OK to use  B: Hepatitis B Surface Antigen: Negative; Draw Date: 02/09/23      Hepatitis B Surface Antibody: Immune; Draw Date: 02/09/23  O: Dialysis orders present and complete prior to treatment  A: Vascular access verified and assessed prior to treatment  T: Treatment was performed at a clinically appropriate time  ?: Patient was allowed to ask questions and address  concerns prior to treatment  See Adult Hemodialysis flowsheet in [facility] for further details and post assessment.  Machine water alarm in place and functioning. Transducer pods intact and checked every 15min.   Pt returned via bed transport.  Chlorine/Chloramine water system checked every 4 hours.  Outpatient Dialysis at [facility] T/Th/Sat     Patient repositioned every 2 hours during the treatment.  Post treatment report given to TASHIA Swanson RN regarding 2L of fluid removed, last BP of 126/83, and patient pain rating of 7/10 BLE on palpation     Please remove patient dressing on AVF and AVG needle sites 24 hours after dialysis. If leaking occurs please apply a Band-Aid.

## 2023-04-20 NOTE — CONSULTS
VASCULAR SURGERY INPATIENT CONSULTATION / Initial In-Patient visit    VASCULAR SURGEON: Dr Hernandez    LOCATION: [facility]    Gordon Gann  Medical Record #:  [id number]  YOB: 1945  Age:  78 year old     Date of Service: 4/19/2023    PRIMARY CARE PROVIDER: Belen Prieto    Reason for consultation: Chronic limb threatening ischemia    IMPRESSION: Patient with multiple medical comorbidities including acute deconditioning which appears significant, cellulitis of bilateral lower extremities without osteomyelitis, and very diffuse and severe lower extremity wounds that extend to mid calf on patient's right leg and in multiple areas of vascular distribution in left foot.  Patient seen on previous hospital stay where he was noted to have bilateral lower extremity weakness without evidence of osteomyelitis.  Initially consulted for evaluation for healing potential of BKA and vascular studies at that time revealed potential for appropriate healing.  Now consulted for consideration of angiogram with possible intervention to improve vascular inflow in hopes of healing wounds.    RECOMMENDATION: Patient significantly clinically decompensated with very extensive wounds and bilateral lower extremity disease which appears to be infrapopliteal based on palpable femoral and popliteal pulses.  Likely extensive small vessel disease not amenable to revascularization.  Additionally patient with multiple comorbidities and will likely require amputation, patient with questions about comfort care if he were to need an amputation.  Would not recommend further intervention at this time.  Recommend palliative care consult to identify goals of care and discussed patient's wishes for further intervention including continuation of dialysis or any further intervention to bilateral lower extremity, most likely amputation.  Vascular to follow.    HPI:  Gordon Gann is a 78 year old male who was seen today in  consultation for chronic limb threatening ischemia.  Patient has extensive past medical history including atrial fibrillation, end-stage renal disease on dialysis, diabetes, urinary tract infection, extensive bilateral lower extremity wounds, overall clinical deconditioning, anxiety, depression, and neuropathy who was previously seen by orthopedic surgery and vascular surgery during last hospital stay.  At that time discussion was held with orthopedic team and patient regarding need for amputation and vascular anticipated appropriate healing potential of below-knee amputation site.  Patient did not undergo amputation at that time but now returns with worsening wounds.  He is now unable to ambulate and is wheelchair dependent, admits to rest pain and significant pain throughout bilateral legs.  In discussion with patient he voices concerns with amputation and understands that he has multiple medical comorbidities and is very sick at this time.  He voiced questions of comfort care and no longer pursuing further intervention but requested to continue this discussion at a later time.     PHH:    Past Medical History:   Diagnosis Date     Alcohol abuse      Atrial fibrillation (H)      CKD (chronic kidney disease) stage 4, GFR 15-29 ml/min (H)      Diabetes (H)      Gout      Hypokalemia      Hypophosphatemia      Insomnia      STEMI (ST elevation myocardial infarction) (H)          Past Surgical History:   Procedure Laterality Date     CREATE GRAFT LOOP ARTERIOVENOUS UPPER EXTREMITY Left 8/5/2021    Procedure: left arm arteriovenous graft fistula.;  Surgeon: Noemí Hernandez MD;  Location: [facility] OR     IR DIALYSIS FISTULOGRAM LEFT  12/8/2021     IR DIALYSIS FISTULOGRAM LEFT  2/9/2023        ALLERGIES:   No Known Allergies     MEDS:  Confirmed in MAR     SOCIAL HABITS:  Non smoker       FAMILY HISTORY:  No family history on file.    REVIEW OF SYSTEMS:    A 12 point ROS was reviewed and except for what is listed in  "the HPI above, all others are negative    PE:    Vital signs:  Temp: 98.5  F (36.9  C) Temp src: Axillary BP: 126/83 Pulse: 87   Resp: 18 SpO2: 98 % O2 Device: None (Room air) Oxygen Delivery: 3 LPM Height: 170.2 cm (5' 7\") Weight: 73.8 kg (162 lb 11.2 oz)  Estimated body mass index is 25.48 kg/m  as calculated from the following:    Height as of this encounter: 1.702 m (5' 7\").    Weight as of this encounter: 73.8 kg (162 lb 11.2 oz).       Wt Readings from Last 1 Encounters:   04/20/23 73.8 kg (162 lb 11.2 oz)     Body mass index is 25.48 kg/m .    EXAM:  GENERAL: This is a well-developed 78 year old male who appears older than stated age, appears fatigued, somnolent, falling asleep during exam  CARDIAC:  Tachycardia noted  CHEST/LUNG:  Increased work of breathing on room air, shortness of breath noted with conversation  GASTROINTESINAL (ABDOMEN):Soft, nontender   MUSCULOSKELETAL: Lower extremities with significant wounds right leg extending up to posterior calf with eschars noted, significant pain on palpation of bilateral lower extremities, strength 4 out of 5 bilaterally and equal.  HEME/LYMPH: No lymphedema  NEUROLOGIC: Focally intact, appears somnolent, states he does not remember conversation earlier today  PSYCH: appropriate affect  INTEGUMENT: Multiple eschars and wounds to bilateral lower extremity  VASCULAR: Unable to Doppler pulses in lower extremities patient was seen in dialysis, 2+ popliteal and 2+ femoral pulses bilaterally.                DIAGNOSTIC STUDIES:     Images:  XR Chest 2 Views    US Lower Extremity Arterial Duplex Left    IMPRESSION:  Patent arteries throughout the left lower extremity, with two-vessel runoff via the posterior tibial and peroneal arteries. However, slow velocities are noted throughout with velocities ranging between 16 and 34 cm/s in the femoral popliteal arteries.  DOUG BENITEZ DO   SYSTEM ID:  [id number]    MR Foot Right w/o Contrast    Impression: Examination " degraded by motion. 1. No evidence of osteomyelitis. 2. Subcutaneous edema as can be seen in cellulitis. No discrete fluid collection on this noncontrast study. 3. Naviculocuneiform and first metatarsophalangeal joint degenerative change. 4. 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 ELIZA MIDDLETON MD (Joe)   SYSTEM ID:  [id number]    MR Tibia Fibula Lower Leg Right wo Contrast     Impression: No evidence of osteomyelitis in the right leg. ELIZA MIDDLETON MD (Joe)   SYSTEM ID:  [id number]    MR Foot Left w/o Contrast    Impression: Examination degraded by motion. 1. No evidence of osteomyelitis. 2. Moderate subcutaneous edema surrounding the medial and lateral malleolus extending distally to the dorsum and less likely plantar aspect of mid/forefoot, as can be seen with cellulitis. No evidence of soft tissue abscess on this noncontrast study. 3. Moderate tarsometatarsal and naviculocuneiform joint degenerative change. I have personally reviewed the examination and initial interpretation and I agree with the findings. ELIZA MIDDLETON MD (Joe)   SYSTEM ID:  [id number]    MR Ankle Left w/o Contrast    Result Date: 4/5/2023    Impression: Examination degraded by motion. 1. No evidence of osteomyelitis. 2. Moderate subcutaneous edema surrounding the medial and lateral malleolus extending distally to the dorsum and less likely plantar aspect of mid/forefoot, as can be seen with cellulitis. No evidence of soft tissue abscess on this noncontrast study. 3. Moderate tarsometatarsal and naviculocuneiform joint degenerative change. I have personally reviewed the examination and initial interpretation and I agree with the findings. ELIZA MIDDLETON MD (Joe)   SYSTEM ID:  [id number]    US JERI Doppler No Exercise    Result Date: 4/4/2023  EXAM: RESTING ANKLE-BRACHIAL INDICES (ABIs) LOCATION: [facility] DATE/TIME: 4/4/2023 7:20 PM INDICATION: chronic LE wounds COMPARISON: None. JERI  FINDINGS: RIGHT Brachial: 136 Ankle (PT): 112 Index: 0.82 Ankle (DP): 104 Index: 0.76 LEFT Ankle (PT): 0 Ankle (DP): 131 Index: 0.96 The right JERI at rest is 0.82. The left JERI at rest is 0.96.  WAVEFORMS: The dorsalis pedis and posterior tibial arteries are monophasic.     IMPRESSION: 1.  RIGHT LOWER EXTREMITY: Resting ankle-brachial index compatible with mild arterial insufficiency. Abnormal monophasic waveforms. 2.  LEFT LOWER EXTREMITY: Resting ankle-brachial index is likely falsely elevated within the dorsalis pedis artery distribution. Nonobtainable pressure within the           LABS:      Sodium   Date Value Ref Range Status   04/20/2023 133 (L) 136 - 145 mmol/L Final   04/19/2023 132 (L) 136 - 145 mmol/L Final   04/17/2023 132 (L) 136 - 145 mmol/L Final     Urea Nitrogen   Date Value Ref Range Status   04/20/2023 48.0 (H) 8.0 - 23.0 mg/dL Final   04/19/2023 39.9 (H) 8.0 - 23.0 mg/dL Final   04/17/2023 52.6 (H) 8.0 - 23.0 mg/dL Final   08/30/2022 18 7 - 30 mg/dL Final   08/29/2022 55 (H) 7 - 30 mg/dL Final   08/28/2022 49 (H) 7 - 30 mg/dL Final     Hemoglobin   Date Value Ref Range Status   04/20/2023 8.7 (L) 13.3 - 17.7 g/dL Final   04/19/2023 9.4 (L) 13.3 - 17.7 g/dL Final   04/19/2023 9.3 (L) 13.3 - 17.7 g/dL Final     Platelet Count   Date Value Ref Range Status   04/20/2023 388 150 - 450 10e3/uL Final   04/19/2023 419 150 - 450 10e3/uL Final   04/19/2023 434 150 - 450 10e3/uL Final     INR   Date Value Ref Range Status   04/20/2023 2.35 (H) 0.85 - 1.15 Final   04/19/2023 2.07 (H) 0.85 - 1.15 Final   04/12/2023 3.32 (H) 0.85 - 1.15 Final       Total time spent 25 minutes face to face with patient with more than 50% time spent in counseling and coordination of care.    Lani Sanders DO  [facility]ascular Surgery

## 2023-04-20 NOTE — PROGRESS NOTES
[facility]    Medicine Progress Note - Hospitalist Service    Date of Admission:  4/19/2023    Assessment & Plan   Gordon Gann is a 78 year old male admitted on 4/19/2023.     Lower extremity wounds, scrotal wound - cellulitis  Acute physical deconditioning  Recently discharged on April 12, 2023, right-sided pleural effusion  Patient apparently at rehab TCU at [place] and sent to the emergency department for wound check and weakness. Patient has chronic sores and ulcers on his legs, he is more lethargic and then sent to the emergency department, has had intermittent low blood pressures, is on dialysis Tuesday Thursday and Saturday, and apparently he was not dialyzed this past Tuesday per report.    *, WBC 21.5,  on adm  *Lactic acid is 1.5 with a lipase of 14  *Procalcitonin of 3.37 (pt on dialysis)  *Venous blood gas shows 7.3 4/45/19/27.    *INR is 2.07 on admission.   *CXR in the emergency department shows stable chest with mild to moderate pleural fluid versus thickening in the right lower hemithorax, see report for further details.  Plan:  - Rocephin  - Wound nurse --> rec surgery consultation for penile/scrotal wound - urology consulted  - Vascular surgery consulted.  - Therapy evals.  - Telemetry.  - Close hemodynamic monitoring     Suspected urinary tract infection, hematuria  Urinalysis completed in the emergency department with moderate leukocyte esterase, no mention of squamous epithelial cells.  Patient is typically on dialysis and urine output may be affected by dialysis.  - Urine culture pending.  - Blood cultures pending.  - Started on Rocephin in the emergency department.  - Recommend outpatient follow-up with urology as clinically indicated.  - as above, urology consulted for penile/scrotal wound - greatly appreciate assistance. Will ask general surgery if necessary.     End-stage renal disease, dialysis, anemia  Patient dialysis on Tuesday Thursday  Saturday but missed this last Tuesday per report.  Hemoglobin is 9.4 on admission with no report of external blood loss.  - Nephrology consulted.     Hyponatremia  Patient with a sodium of 132 on admission.  - Monitor.    Hypertension  History of atrial fibrillation  Patient with elevated troponin on admission at 340.  Denies chest pain.  TSH is 3.12 on admission.  Last echocardiogram completed in February 2023 with a rhythm of atrial flutter, ejection fraction of 55 to 60%, moderate biatrial enlargement, moderate pulmonary hypertension, see report for further details.  - Serial troponin.  - Telemetry.  - Continue warfarin, pharmacy to dose.  - Continue PTA clonidine   - Continue PTA diltiazem  - Continue PTA metoprolol   - Continue PTA aspirin 81 mg   - PTA bumex to be determined by nephrology     History of gout  Patient on allopurinol in the past according to electronic medical records.  - Continue PTA allopurinol when verified by pharmacy.    Anxiety, depression  Patient previously on mirtazapine in the past.  - Continue PTA mirtazapine     Diabetes  Patient previously on Lantus 10 units and insulin sliding scale in the past.  - Insulin sliding scale.  - Continue decreased dose of 50% Lantus at 5 units down from 10 units PTA     Neuropathy  Patient previously on gabapentin in the past.  - Continue PTA gabapentin         Diet: Combination Diet Moderate Consistent Carb (60 g CHO per Meal) Diet; Low Saturated Fat Na <2400mg Diet, No Caffeine Diet    DVT Prophylaxis: Warfarin  Flood Catheter: Not present  Lines: None     Cardiac Monitoring: ACTIVE order. Indication: Tachyarrhythmias, acute (48 hours)  Code Status: Full Code      Clinically Significant Risk Factors Present on Admission              # Hypoalbuminemia: Lowest albumin = 2.5 g/dL at 4/20/2023  6:29 AM, will monitor as appropriate  # Drug Induced Coagulation Defect: home medication list includes an anticoagulant medication    # Hypertension: home  "medication list includes antihypertensive(s)      # Overweight: Estimated body mass index is 25.48 kg/m  as calculated from the following:    Height as of this encounter: 1.702 m (5' 7\").    Weight as of this encounter: 73.8 kg (162 lb 11.2 oz).           Disposition Plan      Expected Discharge Date: 04/22/2023                  Myles Yip MD  Hospitalist Service  [facility]  Securely message with [employer] (more info)  Text page via [employer] Paging/Directory   ______________________________________________________________________    Interval History   Care assumed today. Seen and examined early afternoon in HD. Resting but awakened to voice. Denies new pain, sob, or fevers. Some slight chills noted which has happened in HD previously.         Physical Exam   Vital Signs: Temp: 98.3  F (36.8  C) Temp src: Oral BP: 125/71 Pulse: 120   Resp: 18 SpO2: 99 % O2 Device: None (Room air) Oxygen Delivery: 3 LPM  Weight: 162 lbs 11.19 oz    Gen: NAD, pleasant  HEENT: EOMI, MMM  Resp: no focal crackles, no wheezes, no increased work of resp  CV: S1S2 heard, reg rhythm, reg rate  Abdo: soft, nontender, nondistended, bowel sounds present  Ext: calves nontender, well perfused  Neuro: awakened to voice, conversant, oriented x3, CN grossly intact, no facial asymmetry      Medical Decision Making       39. MINUTES SPENT BY ME on the date of service doing chart review, history, exam, documentation & further activities per the note.      Data   Recent Labs   Lab 04/20/23  1152 04/20/23  0927 04/20/23  0752 04/20/23  0629 04/20/23  0159 04/19/23  1640 04/19/23  0606 04/17/23  0600   WBC  --   --   --  20.3*  --  21.5* 20.1* 17.3*   HGB  --   --   --  8.7*  --  9.4* 9.3* 8.5*   MCV  --   --   --  92  --  92 96 97   PLT  --   --   --  388  --  419 434 446   INR  --   --   --  2.35*  --  2.07*  --   --    NA  --   --   --  133*  --  132*  --  132*   POTASSIUM  --   --   --  4.1  --  3.9  --  4.2   CHLORIDE  --   " --   --  96*  --  92*  --  94*   CO2  --   --   --  19*  --  25  --  20*   BUN  --   --   --  48.0*  --  39.9*  --  52.6*   CR  --   --   --  4.90*  --  4.44*  --  5.60*   ANIONGAP  --   --   --  18*  --  15  --  18*   LC  --   --   --  7.9*  --  8.4*  --  7.9*   * 198* 202* 216*   < > 171*  --  220*   ALBUMIN  --   --   --  2.5*  --  2.5*  --   --    PROTTOTAL  --   --   --  6.0*  --  6.3*  --   --    BILITOTAL  --   --   --  0.5  --  0.5  --   --    ALKPHOS  --   --   --  363*  --  439*  --   --    ALT  --   --   --  15  --  15  --   --    AST  --   --   --  20  --  25  --   --    LIPASE  --   --   --   --   --  14  --   --     < > = values in this interval not displayed.

## 2023-04-20 NOTE — PLAN OF CARE
Goal Outcome Evaluation:     Orientation: A+Ox4, forgetful     Vitals/Tele: HR elevated, PRN metoprolol given     IV Access/drains: PIV, SL, L fistula     Diet: Mod carbs, low fat, low Na, no caffeine     Mobility: A2 with lift    GI/: Incontinent of bowel, on hemodialysis    Wound/Skin: Abrasion on L and R arms, scattered bruises, redness on kathie area, scrotal wound, meghan low extremities wound     Consults; Vascular, nephrology     Discharge Plan: Pending     Others: Q4 troponin checks, 309    See Flow sheets for assessment

## 2023-04-20 NOTE — PLAN OF CARE
Goal Outcome Evaluation:    Orientation: Oriented x4 at times forgetful and deconditioned, follows commends.     Vitals/Tele: BP WNL, HR has been tachy at times drops down to 60s. Pt prefers O2 at times for comfort. RA saturation >94%.      IV Access/drains: PIV, SL on Right AC. HemoDialysis shunt on Left arm, pt had dialysis thi am.     Diet: Regular, needs help ordering, appetite fair.     Mobility: Ax2 pivot to BSC + uses urinal.    GI/: For the majority pt is continent and able to notify nursing staff when he needs to use the bathroom, incontinent when not seated correctly on BSC.     Wound/Skin: Chronic bilateral leg wounds that are elevated on pillows and require daily woc cares. Scrotum and penis wound needs to be access by surgery and urology.     Consults: Vascular, surgery, urology and hospitalist to be following.     Discharge Plan: TBD.       See Flow sheets for assessment

## 2023-04-20 NOTE — PROGRESS NOTES
MD Notification    Notified Person: MD    Notified Person Name: LORNA Yip    Notification Date/Time: 4/20 at 0735    Notification Interaction: Vocera    Purpose of Notification: Good Morning, FYI: troponin this am 310, Howard RN [phone]    Orders Received: awaiting     Comments:

## 2023-04-20 NOTE — CONSULTS
Care Management Initial Consult    General Information  Assessment completed with: Children,    Type of CM/SW Visit: Initial Assessment    Primary Care Provider verified and updated as needed:     Readmission within the last 30 days:        Reason for Consult: discharge planning  Advance Care Planning:            Communication Assessment  Patient's communication style: spoken language (English or Bilingual)    Hearing Difficulty or Deaf: no   Wear Glasses or Blind: yes    Cognitive  Cognitive/Neuro/Behavioral: .WDL except, level of consciousness  Level of Consciousness: lethargic  Arousal Level:  (loc intact. pt lethargic.)  Orientation: oriented x 4  Mood/Behavior: calm, cooperative, behavior appropriate to situation, other (see comments) (lethargic)  Best Language: 0 - No aphasia  Speech: slow    Living Environment:   People in home: alone     Current living Arrangements: independent living facility      Able to return to prior arrangements: no       Family/Social Support:  Care provided by: self, child(shirley)  Provides care for: no one  Marital Status:   Children          Description of Support System: Involved, Supportive    Support Assessment: Adequate social supports, Adequate family and caregiver support    Current Resources:   Patient receiving home care services:       Community Resources:    Equipment currently used at home: walker, rolling  Supplies currently used at home:      Employment/Financial:  Employment Status: retired        Financial Concerns:             Does the patient's insurance plan have a 3 day qualifying hospital stay waiver?  No    Lifestyle & Psychosocial Needs:  Social Determinants of Health     Tobacco Use: Low Risk  (2/9/2023)    Patient History      Smoking Tobacco Use: Never      Smokeless Tobacco Use: Never      Passive Exposure: Not on file   Alcohol Use: Not on file   Financial Resource Strain: Not on file   Food Insecurity: Not on file   Transportation Needs: Not on file    Physical Activity: Not on file   Stress: Not on file   Social Connections: Not on file   Intimate Partner Violence: Not on file   Depression: Not on file   Housing Stability: Not on file       Functional Status:  Prior to admission patient needed assistance:              Mental Health Status:          Chemical Dependency Status:                Values/Beliefs:  Spiritual, Cultural Beliefs, Sikh Practices, Values that affect care:                 Additional Information:  CM/SW consulted for discharge planning/disposition. SW completed chart review. Patient is a 78 year old male admitted on 4/19/2023. Patient apparently at rehab TCU at [place] and sent to the emergency department for wound check and weakness.  SW spoke with patient's daughter Omayra on the phone to ask about bedhold status at [facility]. Omayra stated she did ask to hold the bed for a few days but unsure how long patient will be in the hospital. Omayra wondering about vascular surgery consult. Looks like patient is on their schedule for tomorrow. It was decided SW would reach out to Omayra tomorrow after vascular surgery has seen patient and decided about bedhold and discharge planning.     HENOK will continue to follow for coordination.     LIANA Fermin

## 2023-04-20 NOTE — PROVIDER NOTIFICATION
April 20, 2023    Vitamin K given to patient earlier today. TORB Dr Yip to hold patient's warfarin dose for tonight pending further decision on surgery.     Zuri KnowlesD

## 2023-04-20 NOTE — PROGRESS NOTES
MD Notification    Notified Person: MD    Notified Person Name: CARMELA Yip MD    Notification Date/Time: 4/20 at 1644    Notification Interaction: Vocera     Purpose of Notification: Additional pain medication needed.     Orders Received: awaiting     Comments:

## 2023-04-20 NOTE — PROGRESS NOTES
RECEIVING UNIT ED HANDOFF REVIEW    ED Nurse Handoff Report was reviewed by: Lucho Padilla RN on April 20, 2023 at 12:36 AM

## 2023-04-20 NOTE — H&P
[facility]    History and Physical - Hospitalist Service       Date of Admission:  4/19/2023    Assessment & Plan      Gordon Gann is a 78 year old male admitted on 4/19/2023.    Acute physical deconditioning, cellulitis, lower extremity wounds, patient recently discharged on April 12, 2023, right-sided pleural effusion: Patient apparently at rehab TCU at [place] and sent to the emergency department for wound check and weakness.  Additional history of present illness and review of systems is limited due to patient with limited ability to provide history.  Patient has chronic sores and ulcers on his legs, he is more lethargic and then sent to the emergency department, has had intermittent low blood pressures, is on dialysis Tuesday Thursday and Saturday, and apparently he was not dialyzed this past Tuesday per report.  CRP elevated to 328 on admission.  Lactic acid is 1.5 with a lipase of 14 and a procalcitonin of 3.37.  Venous blood gas shows 7.3 4/45/19/27.  White blood cell count is elevated at 21.5 on admission.  ESR is 108.  INR is 2.07 on admission.  Please see discharge summary from April 12, 2023 for further information.  This x-ray in the emergency department shows stable chest with mild to moderate pleural fluid versus thickening in the right lower hemithorax, see report for further details.  Plan:  -Rocephin.  -Wound nurse.  -Vascular surgery consulted.  -Therapy evals.  -Telemetry.  -Close hemodynamic monitoring.    Suspected urinary tract infection, hematuria: Urinalysis completed in the emergency department with moderate leukocyte esterase, no mention of squamous epithelial cells.  Patient is typically on dialysis and urine output may be affected by dialysis.  -Urine culture pending.  -Blood cultures pending.  -Started on Rocephin in the emergency department.  -Recommend outpatient follow-up with urology as clinically indicated.    End-stage renal disease, dialysis,  anemia: Patient dialysis on Tuesday Thursday Saturday but missed this last Tuesday per report.  Hemoglobin is 9.4 on admission with no report of external blood loss.  -Nephrology consulted.    Hyponatremia: Patient with a sodium of 132 on admission.  -Monitor.    Cardiac, hypertension, history of atrial fibrillation: Patient with elevated troponin on admission at 340.  Denies chest pain.  TSH is 3.12 on admission.  Last echocardiogram completed in February 2023 with a rhythm of atrial flutter, ejection fraction of 55 to 60%, moderate biatrial enlargement, moderate pulmonary hypertension, see report for further details.  -Serial troponin.  -Telemetry.  -Continue warfarin when verified by pharmacy, pharmacy to dose.  -Continue prior to admission clonidine when verified by pharmacy.  -Continue prior to admission diltiazem when verified by pharmacy.  -Continue prior to admission hydralazine when verified by pharmacy.  -Continue prior to admission metoprolol when verified by pharmacy.  -Continue prior to admission aspirin 81 mg when verified by pharmacy.  -Continue prior to admission Bumex when verified by pharmacy.    History of gout: Patient on allopurinol in the past according to electronic medical records.  -Continue prior to admission allopurinol when verified by pharmacy.    Anxiety, depression: Patient previously on mirtazapine in the past.  -Continue prior to admission mirtazapine when verified by pharmacy.    Diabetes: Patient previously on Lantus 10 units and insulin sliding scale in the past.  -Insulin sliding scale.  -Continue decreased dose of 50% Lantus at 5 units down from 10 units prior to admission when verified by pharmacy    Neuropathy: Patient previously on gabapentin in the past.  -Continue prior to admission gabapentin when verified by pharmacy.    DVT Prophylaxis: Warfarin  Code Status:  DNR/DNI    Clinically Significant Risk Factors Present on Admission              # Hypoalbuminemia: Lowest  "albumin = 2.5 g/dL at 4/19/2023  4:40 PM, will monitor as appropriate  # Drug Induced Coagulation Defect: home medication list includes an anticoagulant medication    # Hypertension: home medication list includes antihypertensive(s)      # Overweight: Estimated body mass index is 26.55 kg/m  as calculated from the following:    Height as of this encounter: 1.702 m (5' 7\").    Weight as of this encounter: 76.9 kg (169 lb 8.5 oz).           Disposition Plan           Walt Manning DO  Hospitalist Service  [facility]  Securely message with [employer] (more info)  Text page via [facility] Paging/Directory     ______________________________________________________________________    Chief Complaint   Lower extremity wounds        History of Present Illness   Gordon Gann is a 78 year old male admitted on 4/19/2023. Patient apparently at rehab TCU at [place] and sent to the emergency department for wound check and weakness.  Additional history of present illness and review of systems is limited due to patient with limited ability to provide history.  Patient has chronic sores and ulcers on his legs, he is more lethargic and then sent to the emergency department, has had intermittent low blood pressures, is on dialysis Tuesday Thursday and Saturday, and apparently he was not dialyzed this past Tuesday per report.  CRP elevated to 328 on admission.  Lactic acid is 1.5 with a lipase of 14 and a procalcitonin of 3.37.  Venous blood gas shows 7.3 4/45/19/27.  White blood cell count is elevated at 21.5 on admission.  ESR is 108.  INR is 2.07 on admission.  Please see discharge summary from April 12, 2023 for further information.      Past Medical History    Past Medical History:   Diagnosis Date     Alcohol abuse      Atrial fibrillation (H)      CKD (chronic kidney disease) stage 4, GFR 15-29 ml/min (H)      Diabetes (H)      Gout      Hypokalemia      Hypophosphatemia      Insomnia      STEMI " (ST elevation myocardial infarction) (H)        Past Surgical History   Past Surgical History:   Procedure Laterality Date     CREATE GRAFT LOOP ARTERIOVENOUS UPPER EXTREMITY Left 8/5/2021    Procedure: left arm arteriovenous graft fistula.;  Surgeon: Noemí Hernandez MD;  Location: [facility] OR     IR DIALYSIS FISTULOGRAM LEFT  12/8/2021     IR DIALYSIS FISTULOGRAM LEFT  2/9/2023       Prior to Admission Medications   Prior to Admission Medications   Prescriptions Last Dose Informant Patient Reported? Taking?   Metoprolol Tartrate 75 MG TABS   Yes No   Sig: Take 75 mg by mouth 2 times daily Take with 100 mg for total of 175 mg twice daily   Vitamin D, Cholecalciferol, 25 MCG (1000 UT) CAPS  Self Yes No   Sig: Take 1,000 Units by mouth every morning   acetaminophen (TYLENOL) 500 MG tablet   Yes No   Sig: Take 1,000 mg by mouth every 8 hours as needed for mild pain   allopurinol (ZYLOPRIM) 100 MG tablet   Yes No   Sig: Take 100 mg by mouth 2 times daily   aspirin 81 MG EC tablet   No No   Sig: Take 1 tablet (81 mg) by mouth At Bedtime Starting 12/12   bumetanide (BUMEX) 2 MG tablet   No No   Sig: Take 1 tablet (2 mg) by mouth 2 times daily On non dialysis days   cloNIDine (CATAPRES) 0.1 MG tablet   No No   Sig: Take 1 tablet (0.1 mg) by mouth 2 times daily   diltiazem ER COATED BEADS (CARDIZEM CD/CARTIA XT) 120 MG 24 hr capsule   No No   Sig: Take 1 capsule (120 mg) by mouth daily   ezetimibe (ZETIA) 10 MG tablet  Self Yes No   Sig: Take 10 mg by mouth every morning   fish oil-omega-3 fatty acids 1000 MG capsule  Self Yes No   Sig: Take 1 g by mouth every morning   folic acid (FOLVITE) 1 MG tablet  Self Yes No   Sig: Take 1 mg by mouth every morning   gabapentin (NEURONTIN) 100 MG capsule   No No   Sig: Take 1 capsule (100 mg) by mouth 2 times daily   hydrALAZINE (APRESOLINE) 25 MG tablet  Self No No   Sig: Take 1 tablet (25 mg) by mouth every 6 hours as needed (SBP >180)   insulin aspart (NOVOLOG VIAL) 100  UNITS/ML vial   No No   Sig: Give before meals and before bed:  For Pre-Meal Glucose:  140-189 give 1 unit   190-239 give 2 units   240-289 give 3 units   290-339 give 4 units   = or >340 give 5 units     For Bedtime Glucose  200 - 239 give 1 units   240 - 289 give 1.5 units  290 - 339 give 2 units  = or >340 give 2.5 units   insulin glargine (LANTUS PEN) 100 UNIT/ML pen   No No   Sig: Inject 10 Units Subcutaneous At Bedtime   melatonin 3 MG tablet  Self No No   Sig: Take 1 tablet (3 mg) by mouth At Bedtime   Patient taking differently: Take 3 mg by mouth nightly as needed   metoprolol tartrate (LOPRESSOR) 100 MG tablet   Yes No   Sig: Take 100 mg by mouth 2 times daily Take with 75 mg for total of 175 mg twice daily   mirtazapine (REMERON) 15 MG tablet  Self No No   Sig: Take 1 tablet (15 mg) by mouth At Bedtime   traZODone (DESYREL) 150 MG tablet   Yes No   Sig: Take 150 mg by mouth At Bedtime   vitamin B complex with vitamin C (STRESS TAB) tablet  Self Yes No   Sig: Take 1 tablet by mouth every morning   warfarin ANTICOAGULANT (COUMADIN) 5 MG tablet   No No   Sig: Take 1 tablet (5 mg) by mouth daily      Facility-Administered Medications: None          Physical Exam   Vital Signs: Temp: 97.6  F (36.4  C)   BP: 104/59 Pulse: 116   Resp: 14 SpO2: 99 % O2 Device: Nasal cannula Oxygen Delivery: 2 LPM  Weight: 169 lbs 8.54 oz    GENERAL: Appears deconditioned and weak.  Appears chronically ill  HEENT: Normocephalic. Nares normal.   LUNGS: Decrement to bases. No dyspnea at rest.   HEART: Irregular rate. Extremities perfused.   ABDOMEN: Soft, nontender, and nondistended. Positive bowel sounds.   EXTREMITIES: Left upper extremity fistula, bilateral lower extremity wounds.  NEUROLOGIC: Moves extremities x4, follows commands.        Medical Decision Making       56 MINUTES SPENT BY ME on the date of service doing chart review, history, exam, documentation & further activities per the note.      Data     I have personally  reviewed the following data over the past 24 hrs:    21.5 (H)  \   9.4 (L)   / 419     132 (L) 92 (L) 39.9 (H) /  171 (H)   3.9 25 4.44 (H) \       ALT: 15 AST: 25 AP: 439 (H) TBILI: 0.5   ALB: 2.5 (L) TOT PROTEIN: 6.3 (L) LIPASE: 14       Trop: 340 (HH) BNP: N/A       TSH: 3.12 T4: N/A A1C: N/A       Procal: 3.37 (H) CRP: 328.88 (H) Lactic Acid: 1.5       INR:  2.07 (H) PTT:  54 (H)   D-dimer:  N/A Fibrinogen:  N/A       Imaging results reviewed over the past 24 hrs:   Recent Results (from the past 24 hour(s))   XR Chest 2 Views    Narrative    EXAM: XR CHEST 2 VIEWS  LOCATION: [facility]  DATE/TIME: 4/19/2023 5:22 PM CDT    INDICATION: Fatigue, tachycardia  COMPARISON: 04/04/2023      Impression    IMPRESSION: Stable chest with again seen mild to moderate pleural fluid versus thickening in the lower right hemithorax with associated atelectasis. Borderline heart size with normal pulmonary vascularity. Mild vascular calcification. Moderate   hypertrophic changes of the AC joints. No evidence for an obvious pneumothorax or inflammatory infiltrates.

## 2023-04-20 NOTE — ED NOTES
[facility]  ED Nurse Handoff Report    ED Chief complaint: Wound Check and Altered Mental Status      ED Diagnosis:   Final diagnoses:   Urinary tract infection without hematuria, site unspecified   Tachycardia   Generalized muscle weakness       Code Status: DNR / DNI    Allergies: No Known Allergies    Patient Story: Pt brought in by EMS from [facility].  Pt was at dialysis earlier today and after returning, reported increased weakness and lethargy.  Pt states that he possibly has an infection in his legs.  Focused Assessment:    Physical Exam  General: Laying on the ED bed, no distress  HEENT: Normocephalic, atraumatic  Cardiac: Left medial bicep AV fistula with thrill, irregular tachycardia  Pulm: Breathing comfortably, no accessory muscle usage, no conversational dyspnea, and lungs clear bilaterally  GI: Abdomen soft, nontender, no rigidity or guarding  MSK: No bony deformities  Skin: Bilateral legs and feet with clean stockingette dressings over gauze, multiple underlying eschar like wounds without thiago purulence, surrounding skin is erythematous to dusky in appearance  Neuro: Eyes closed but awakes to voice and answers questions appropriately in context  Psych: Normal mood and affect    COMPREHENSIVE METABOLIC PANEL - Abnormal       Result Value      Sodium 132 (*)       Potassium 3.9        Chloride 92 (*)       Carbon Dioxide (CO2) 25        Anion Gap 15        Urea Nitrogen 39.9 (*)       Creatinine 4.44 (*)       Calcium 8.4 (*)       Glucose 171 (*)       Alkaline Phosphatase 439 (*)       AST 25        ALT 15        Protein Total 6.3 (*)       Albumin 2.5 (*)       Bilirubin Total 0.5        GFR Estimate 13 (*)     TROPONIN T, HIGH SENSITIVITY - Abnormal     Troponin T, High Sensitivity 340 (*)     ROUTINE UA WITH MICROSCOPIC REFLEX TO CULTURE - Abnormal     Color Urine Yellow        Appearance Urine Slightly Cloudy (*)       Glucose Urine 100 (*)       Bilirubin Urine Negative         Ketones Urine Negative        Specific Gravity Urine 1.017        Blood Urine Large (*)       pH Urine 5.5        Protein Albumin Urine 100 (*)       Urobilinogen Urine Normal        Nitrite Urine Negative        Leukocyte Esterase Urine Moderate (*)       Bacteria Urine Few (*)       WBC Clumps Urine Present (*)       Amorphous Crystals Urine Few (*)       RBC Urine 30 (*)       WBC Urine 127 (*)     INR - Abnormal     INR 2.07 (*)     PARTIAL THROMBOPLASTIN TIME - Abnormal     aPTT 54 (*)     CBC WITH PLATELETS AND DIFFERENTIAL - Abnormal     WBC Count 21.5 (*)       RBC Count 3.34 (*)       Hemoglobin 9.4 (*)       Hematocrit 30.7 (*)       MCV 92        MCH 28.1        MCHC 30.6 (*)       RDW 16.5 (*)       Platelet Count 419        % Neutrophils 81        % Lymphocytes 9        % Monocytes 9        % Eosinophils 0        % Basophils 0        % Immature Granulocytes 1        NRBCs per 100 WBC 0        Absolute Neutrophils 17.4 (*)       Absolute Lymphocytes 2.0        Absolute Monocytes 1.9 (*)       Absolute Eosinophils 0.0        Absolute Basophils 0.0        Absolute Immature Granulocytes 0.1        Absolute NRBCs 0.0      ISTAT GASES LACTATE VENOUS POCT - Abnormal     Lactic Acid POCT 1.5        Bicarbonate Venous POCT 24        O2 Sat, Venous POCT 27 (*)       pCO2V Venous POCT 45        pH Venous POCT 7.34        pO2 Venous POCT 19 (*)     CRP INFLAMMATION - Abnormal     CRP Inflammation 328.88 (*)     ERYTHROCYTE SEDIMENTATION RATE AUTO - Abnormal     Erythrocyte Sedimentation Rate 108 (*)     PROCALCITONIN - Abnormal     Procalcitonin 3.37 (*)     TYPE AND SCREEN, ADULT - Abnormal     ABO/RH(D) O POS        Antibody Screen Positive (*)       SPECIMEN EXPIRATION DATE [id number]      LIPASE - Normal     Lipase 14      MAGNESIUM - Normal     Magnesium 1.8      TSH WITH FREE T4 REFLEX - Normal     TSH 3.12           Treatments and/or interventions provided: See MAR, Labs, Imaging  Patient's response  to treatments and/or interventions: Tolerated    To be done/followed up on inpatient unit:  IV antibiotics    Does this patient have any cognitive concerns?: Somnolent, but A&O x4    Activity level - Baseline/Home:  Unknown  Activity Level - Current:   Total Care    Patient's Preferred language: English   Needed?: No    Isolation: None  Infection: Not Applicable  Patient tested for COVID 19 prior to admission: NO  Bariatric?: No    Vital Signs:   Vitals:    04/19/23 1900 04/19/23 1930 04/19/23 1945 04/19/23 2000   BP: 92/53 105/82 104/52 104/59   Pulse: 114 116 116 116   Resp:       Temp:       SpO2: 98% 100% 100% 99%   Weight:       Height:           Cardiac Rhythm:Cardiac Rhythm: Atrial fibrillation    Was the PSS-3 completed:   Yes  What interventions are required if any?               Family Comments:   OBS brochure/video discussed/provided to patient/family: N/A              Name of person given brochure if not patient:               Relationship to patient:     For the majority of the shift this patient's behavior was Green.   Behavioral interventions performed were .    ED NURSE PHONE NUMBER: *[phone]

## 2023-04-20 NOTE — CONSULTS
[facility] Nurse Inpatient Assessment     Consulted for: BLE, feet and right heel penis scrotum     Summary: ble right heel feet are all necrotic and very painful, 90% of scrotum is necrotic foul smelling and requesting surgical consult     Patient History (according to provider note(s):      Gordon Gann is a 78 year old male admitted on 4/19/2023.     Acute physical deconditioning, cellulitis, lower extremity wounds, patient recently discharged on April 12, 2023, right-sided pleural effusion: Patient apparently at rehab TCU at [place] and sent to the emergency department for wound check and weakness.  Additional history of present illness and review of systems is limited due to patient with limited ability to provide history.  Patient has chronic sores and ulcers on his legs, he is more lethargic and then sent to the emergency department, has had intermittent low blood pressures, is on dialysis Tuesday Thursday and Saturday, and apparently he was not dialyzed this past Tuesday per report.  CRP elevated to 328 on admission.  Lactic acid is 1.5 with a lipase of 14 and a procalcitonin of 3.37.  Venous blood gas shows 7.3 4/45/19/27.  White blood cell count is elevated at 21.5 on admission.  ESR is 108.  INR is 2.07 on admission.  Please see discharge summary from April 12, 2023 for further information.  This x-ray in the emergency department shows stable chest with mild to moderate pleural fluid versus thickening in the right lower hemithorax, see report for further details.  Plan:  -Rocephin.  -Wound nurse    Assessment:      Areas visualized during today's visit: Focused: and Lower extremities     Wound Location:  Ble, right heel and toes          Date of last photo 4/20  Wound History: unknown how he got this bad states he goes to a wound clinic but does not know where, this is clearly neglect   Wound Base: 100 % eschar and some stable on feet/toes however eschar not stable on  right calf and lower legs waxy and demarcated      Palpation of the wound bed: boggy and fluctuance      Drainage: small     Description of drainage: serosanguinous and tan     Measurements (length x width x depth, in cm)   Right upper calf 6 x 4.5 cm flush with skin purple/ red  Right medial lower leg 5 x 4 cm waxy and with demarcation, small tan and serosanguinous   Right heel 5 x 6 cm approximately thinning eschar some firm and some thin and waxy edges even with skin  - likely pressure/nueropathy and arterial unstageable PI CAPI on admisision   Left dorsal foot 6 x .5 0.2cm x waxy eschar demarcated from edges   Left lateral ankle 6x4 waxy eschar painful, edges demarcated  Periwound skin: vasulitic      Color: pale, purple and red      Temperature: cool  Odor: none  Pain: severe, tension to hands, feet and body, facial expression of distress, during dressing change and and positioning , intermittent and burning      Wound Location:  Penis with necrosis along penis shaft dark red areas  Scrotum  Date of last photo none due to phone updating   Wound History: unknown how this got this bad, poor historian   Wound Base: 90/10 % erythema, eschar and slough     Palpation of the wound bed: boggy      Drainage: moderate     Description of drainage: serosanguinous anteriorly      Measurements (length x width x depth, in cm)   80% of scrotum covered in wounds and dead tissue bleeding anteirorly   Periwound skin: edematous, irritant dermatitis and macerated      Color: red      Temperature: hot  Odor: strong and offensive  Pain: severe, tension to hands, feet and body and facial expression of distress, sharp, tender, burning and unbearable  Pain intervention prior to dressing change: RN to address     Right medial and lateral foot wounds as well as toe wounds stable eschar - lack of cleansing and lots of betadine     Treatment Plan:     ble and foot  wound(s): Daily    Cleanse gently with vashe #[id number] moistened gauze on  wounds x 15 minutes   Apply betadine  LOTA however if draining apply optifoam gentle 4x4 #[id number]  prevalon boots #[id number] when in bed    Penis & Scrotum - okay to soak with vashe moistened gauze x 15 minutes q shift    No brief lota  chux in bed   Air loss mattress - ordered      -Mattress: low air loss  -HOB: Maintain at or below 30 degrees, unless contraindicated  -Repositioning in bed: Every 1-2 hours , Left/right positioning; avoid supine, Raise foot of bed prior to raising head of bed, to reduce patient sliding down (shear) and Frequent microturns using TAPS wedges, as patient tolerates  -Heels: Keep elevated off mattress and Pillows under calves  -Protective Dressing: Sacral Mepilex for prevention (#[id number]),  especially for the agitated patient   -Positioning Equipment: TAPS wedges (#[id number]) to help maintain 30 degree side lying position as needed  -Chair positioning: Chair cushion (#[id number])  and Assist patient to reposition hourly   -Moisture Management: Perineal cleansing /protection: Follow Incontinence Protocol, Avoid brief in bed and Clean and dry skin folds with bathing   -Under Devices: Inspect skin under all medical devices during skin inspection , Ensure tubes are stabilized without tension and Ensure patient is not lying on medical devices or equipment when repositioned  Orders: Written    RECOMMEND PRIMARY TEAM ORDER: Surgical consult  Education provided: plan of care, Infection prevention , Moisture management and Off-loading pressure  Discussed plan of care with: Patient, Nurse and Physician  WOC nurse follow-up plan: weekly  Notify WOC if wound(s) deteriorate.  Nursing to notify the Provider(s) and re-consult the WOC Nurse if new skin concern.    DATA:     Current support surface: Standard  Standard gel/foam mattress (IsoFlex, Atmos air, etc)  Containment of urine/stool: Incontinence Protocol and Diaper  BMI: Body mass index is 25.48 kg/m .   Active diet order: Orders Placed This Encounter       Combination Diet Moderate Consistent Carb (60 g CHO per Meal) Diet; Low Saturated Fat Na <2400mg Diet, No Caffeine Diet     Output: I/O last 3 completed shifts:  In: 100 [IV Piggyback:100]  Out: -      Labs: Recent Labs   Lab 04/20/23  0629   ALBUMIN 2.5*   HGB 8.7*   INR 2.35*   WBC 20.3*     Pressure injury risk assessment:   Sensory Perception: 4-->no impairment  Moisture: 4-->rarely moist  Activity: 3-->walks occasionally  Mobility: 3-->slightly limited  Nutrition: 2-->probably inadequate  Friction and Shear: 2-->potential problem  Shalom Score: 18    Chloe Masters RN BS CWOCN  Pager no longer is use, please contact through [employer] group: WOC Nurse

## 2023-04-20 NOTE — PHARMACY-ADMISSION MEDICATION HISTORY
Pharmacist Admission Medication History    Admission medication history is complete. The information provided in this note is only as accurate as the sources available at the time of the update.    Medication reconciliation/reorder completed by provider prior to medication history? No    Information Source(s): Family member, Facility ([facility]/) medication list/MAR and CareEverywhere/SureScripts via [facility]    Pertinent Information:   Per call with daughter, lisinopril was stopped and metoprolol was increased 175 mg twice daily.  Patient has levemir available as a prescription in case lantus is not covered but currently takes lantus.     Changes made to PTA medication list:    Added: omeprazole, diltiazem    Deleted: NONE    Changed:   o lantus 10 units at bedtime--> 12 units at bedtime  o sliding scale insulin  o Warfarin 5 mg daily--> 2.5 mg daily until next INR on 4/20  (at [facility], patient received 2 mg daily on 4/14-4/16 and 2.5 mg 4/17-until next INR check on 4/20) for Afib INR goal 2-3    Medication Affordability:  Not including over the counter (OTC) medications, was there a time in the past 12 months when you did not take your medications as prescribed because of cost?: No    Allergies reviewed with patient and updates made in EHR: yes    Medication History Completed By: Susana Cole RPH 4/19/2023 10:34 PM    Prior to Admission medications    Medication Sig Last Dose Taking? Auth Provider Long Term End Date   acetaminophen (TYLENOL) 500 MG tablet Take 1,000 mg by mouth every 8 hours as needed for mild pain 4/17/2023 Yes Unknown, Entered By History     allopurinol (ZYLOPRIM) 100 MG tablet Take 100 mg by mouth 2 times daily 4/19/2023 at am Yes Unknown, Entered By History     aspirin 81 MG EC tablet Take 1 tablet (81 mg) by mouth At Bedtime Starting 12/12 4/18/2023 at pm Yes Fili Ireland MD     bumetanide (BUMEX) 2 MG tablet Take 1 tablet (2 mg) by mouth 2 times daily On non  dialysis days  Patient taking differently: Take 2 mg by mouth 2 times daily At 0800 and 1400 On non dialysis days (Mon, Wed, Fri, Sun) 4/19/2023 at 1400 Yes Fili Ireland MD Yes    cloNIDine (CATAPRES) 0.1 MG tablet Take 1 tablet (0.1 mg) by mouth 2 times daily 4/19/2023 at am Yes Fili Ireland MD Yes    diltiazem ER COATED BEADS (CARDIZEM CD/CARTIA XT) 120 MG 24 hr capsule Take 1 capsule (120 mg) by mouth daily 4/19/2023 at am Yes Christi Jones MD Yes    ezetimibe (ZETIA) 10 MG tablet Take 10 mg by mouth every morning 4/19/2023 at am Yes Unknown, Entered By History Yes    fish oil-omega-3 fatty acids 1000 MG capsule Take 1 g by mouth every morning 4/19/2023 at am Yes Unknown, Entered By History     folic acid (FOLVITE) 1 MG tablet Take 1 mg by mouth every morning 4/19/2023 at am Yes Unknown, Entered By History     gabapentin (NEURONTIN) 100 MG capsule Take 1 capsule (100 mg) by mouth 2 times daily 4/19/2023 at am Yes Christi Jones MD Yes    hydrALAZINE (APRESOLINE) 25 MG tablet Take 1 tablet (25 mg) by mouth every 6 hours as needed (SBP >180) PRN Yes Julia Combs MD Yes    insulin aspart (NOVOLOG VIAL) 100 UNITS/ML vial Give before meals and before bed:  For Pre-Meal Glucose:  140-189 give 1 unit   190-239 give 2 units   240-289 give 3 units   290-339 give 4 units   = or >340 give 5 units     For Bedtime Glucose  200 - 239 give 1 units   240 - 289 give 1.5 units  290 - 339 give 2 units  = or >340 give 2.5 units  Patient taking differently: Give before meals and before bed:  For Pre-Meal Glucose:  140-189 give 2 unit   190-239 give 3 units   240-289 give 4 units   290-339 give 5 units   = or >340 give 6 units     For Bedtime Glucose  200 - 249 give 1 units   250 - 299 give 2 units  3000 - 349 give 3 units  = or >350 give 4 units 4/19/2023 at 1130 Yes Christi Jones MD Yes    insulin glargine (LANTUS PEN) 100 UNIT/ML pen Inject 10 Units Subcutaneous At Bedtime  Patient  taking differently: Inject 12 Units Subcutaneous At Bedtime 4/18/2023 at pm Yes Samir Simmons MD Yes    melatonin 3 MG tablet Take 1 tablet (3 mg) by mouth At Bedtime 4/18/2023 at pm Yes Mitesh Nicholson MD     metoprolol tartrate (LOPRESSOR) 100 MG tablet Take 100 mg by mouth 2 times daily Take with 75 mg for total of 175 mg twice daily 4/19/2023 at am Yes Unknown, Entered By History No    Metoprolol Tartrate 75 MG TABS Take 75 mg by mouth 2 times daily Take with 100 mg for total of 175 mg twice daily 4/19/2023 at am Yes Unknown, Entered By History No    mirtazapine (REMERON) 15 MG tablet Take 1 tablet (15 mg) by mouth At Bedtime 4/18/2023 at pm Yes Mitesh Nicholson MD Yes    omeprazole (PRILOSEC) 20 MG DR capsule Take 20 mg by mouth daily 4/19/2023 at am Yes Unknown, Entered By History     traZODone (DESYREL) 150 MG tablet Take 150 mg by mouth At Bedtime 4/18/2023 at pm Yes Unknown, Entered By History No    vitamin B complex with vitamin C (STRESS TAB) tablet Take 1 tablet by mouth every morning 4/19/2023 at am Yes Unknown, Entered By History     Vitamin D, Cholecalciferol, 25 MCG (1000 UT) CAPS Take 1,000 Units by mouth every morning 4/19/2023 at am Yes Unknown, Entered By History     warfarin ANTICOAGULANT (COUMADIN) 5 MG tablet Take 1 tablet (5 mg) by mouth daily  Patient taking differently: Take 2.5 mg by mouth daily Until INR on 4/20/23 4/18/2023 at pm Yes Christi Jones MD

## 2023-04-21 NOTE — PLAN OF CARE
Occupational Therapy: Orders received. Chart reviewed and discussed with care team.? Occupational Therapy not indicated. Spoke with PT, recommending TCU. Spoke with RN, pt going [facility] at this time.? Defer discharge recommendations to Care Team.? Will complete orders.

## 2023-04-21 NOTE — PROGRESS NOTES
"   04/21/23 0800   Appointment Info   Signing Clinician's Name / Credentials (PT) Kyle Aldana DPT       Present no   Living Environment   People in Home alone   Current Living Arrangements independent living facility   Home Accessibility no concerns   Transportation Anticipated family or friend will provide   Living Environment Comments Pt lives alone in an ILF. No stairs. Pt reports his daughter will pick pt up upon discharge. Pt reports he has been at a TCU prior to hospitalization.   Self-Care   Usual Activity Tolerance moderate   Current Activity Tolerance poor   Regular Exercise No   Equipment Currently Used at Home raised toilet seat;shower chair;walker, rolling;grab bar, tub/shower;grab bar, toilet   Fall history within last six months yes   Number of times patient has fallen within last six months 1   Activity/Exercise/Self-Care Comment Pt reports being IND at baseline with ADLs but reports it has become difficult. Pt ambulates w/ a FWW. Pt drives.   General Information   Onset of Illness/Injury or Date of Surgery 04/21/23   Referring Physician Walt Manning, DO   Patient/Family Therapy Goals Statement (PT) \"To get better\"   Pertinent History of Current Problem (include personal factors and/or comorbidities that impact the POC) Per Chart: Gordon Gann is a 78 year old male admitted on 4/19/2023 for LE weakness and cellulitis   Existing Precautions/Restrictions fall   Weight-Bearing Status - LLE full weight-bearing   Weight-Bearing Status - RLE full weight-bearing   Cognition   Orientation Status (Cognition) oriented x 3   Pain Assessment   Patient Currently in Pain No   Integumentary/Edema   Integumentary/Edema Comments Scattered wounds throughout BLEs   Posture    Posture Forward head position;Protracted shoulders   Range of Motion (ROM)   Range of Motion ROM is WFL   Bed Mobility   Comment, (Bed Mobility) Supine>sit w/ mod A x 1   Transfers   Comment, (Transfers) " Sit>stand w/ FWW and mod A x 1   Gait/Stairs (Locomotion)   Distance in Feet 5'   Distance in Feet (Gait) 5'   Balance   Balance Comments Pt able to sit at EOB unsupported without LOB. Pt unsteady in standing   Sensory Examination   Sensory Perception patient reports no sensory changes   Clinical Impression   Criteria for Skilled Therapeutic Intervention Yes, treatment indicated   PT Diagnosis (PT) Impaired gait   Influenced by the following impairments Decreased activity tolerance; decreased balance; decreased strength   Functional limitations due to impairments Impaired functional mobility   Clinical Presentation (PT Evaluation Complexity) Stable/Uncomplicated   Clinical Presentation Rationale Clinical judgement   Clinical Decision Making (Complexity) low complexity   Planned Therapy Interventions (PT) bed mobility training;balance training;gait training;patient/family education;strengthening;transfer training;progressive activity/exercise   Risk & Benefits of therapy have been explained evaluation/treatment results reviewed;care plan/treatment goals reviewed;risks/benefits reviewed;current/potential barriers reviewed;patient;participants included;participants voiced agreement with care plan   PT Total Evaluation Time   PT Eval, Low Complexity Minutes (50198) 10   Physical Therapy Goals   PT Frequency 3x/week   PT Predicted Duration/Target Date for Goal Attainment 04/28/23   PT Goals Bed Mobility;Transfers;Gait   PT: Bed Mobility Modified independent;Supine to/from sit   PT: Transfers Modified independent;Sit to/from stand;Assistive device   PT: Gait Modified independent;Assistive device;100 feet   Therapeutic Activity   Therapeutic Activities: dynamic activities to improve functional performance Minutes (28052) 27   Symptoms Noted During/After Treatment Fatigue   Treatment Detail/Skilled Intervention Greeted pt supine in bed, agreed to PT. Pt on 2L O2 via NC upon PT entrance. VSS on RA with activity. Pt performs  all movement slowly and fatigues quickly. Pt performed supine>sit w/ mod A x 1, needing assist for trunk control. Once in sitting, pt needed assist to scoot to EOB. Pt able to sit at EOB unsupported without LOB. Pt performed sit>stand x 3 w/ FWW and mod A x 1, needing assist to stand fully upright. Verbal cues for hand placement. Pt unsteady in standing, needing assist from PT to maitain balance. Pt attempted standing marches x 10 reps bilaterally, pt unable to lift LLE off of ground and minimal foot clearance on RLE. Pt motivated to attempt to ambulate. Pt took 2 steps forward/backward w/ FWW and mod A x 1, pt unsteady having 1 LOB needing assist from PT to gain balance. Pt fatigued and asked to cease session. Pt returned to supine w/ max A x 1, needing assist to safely lift BLEs back into bed and trunk control. Total assist to boost up in bed. PT provided pt with HEP for supine LE exercises, pt too fatigued to attempt at this time but motivated to perform throughout the day. Pt ended session supine in bed, with all needs met and call light within reach.   PT Discharge Planning   PT Plan Bed mobility; repeat sit>stands; progress gait w/ FWW and WC follow as able   PT Discharge Recommendation (DC Rec) Transitional Care Facility   PT Rationale for DC Rec Pt is below baseline. Pt currently requiring assist with all functional mobility. Pt presents with deficits in activity tolerance, balance, and strength. Due to these deficits, pt is a high falls risk. Pt would benefit to return to TCU to address deficits and improve IND with safety and functional mobility.   PT Brief overview of current status Supine>sit w/ mod A x 1; sit>stand w/ fWW and mod A x1   Total Session Time   Timed Code Treatment Minutes 27   Total Session Time (sum of timed and untimed services) 37

## 2023-04-21 NOTE — CONSULTS
[facility] Short Stay  Palliative Care Spiritual Assessment    Patient: Gordon Gann  Date of Admission:  4/19/2023  Reason for consult: spiritual support      Summary and Recommendations:    Lai named appreciation that his pain is better today, and he reflected on his decision to discharge with hospice care. Together, we acknowledged the weight of that decision.     Lai identifies as agnostic (grew up [employer]) and appreciates the writing of Rupert Larios.    Reading is an important coping practice of Lai's. He asked for reading material and was provided with a collection of reflections/poetry.    Lai needs glasses for reading but does not have his with him. Provided a pair from [email or web address] desk.      PLAN: Spiritual Health remains available. Please contact should needs arise.      Mitra Da Silva  Associate   Palliative Care  [facility] Phone Line: [phone]

## 2023-04-21 NOTE — PROGRESS NOTES
"Urology Note    Subjective:  Reports leg pain and scrotal pain. He denies fevers. He again asked about end of life options. He does not want surgery.    Objective:    /68 (BP Location: Right arm)   Pulse 94   Temp 97.8  F (36.6  C) (Axillary)   Resp 18   Ht 1.702 m (5' 7\")   Wt 75.5 kg (166 lb 6.4 oz)   SpO2 95%   BMI 26.06 kg/m        Intake/Output Summary (Last 24 hours) at 4/21/2023 1311  Last data filed at 4/20/2023 1700  Gross per 24 hour   Intake 240 ml   Output 2000 ml   Net -1760 ml       Labs:    ROUTINE IP LABS (Last four results)  BMP  Recent Labs   Lab 04/21/23  0820 04/21/23  0705 04/21/23  0158 04/20/23  2153 04/20/23  0752 04/20/23  0629 04/20/23  0159 04/19/23  1640 04/17/23  0600   NA  --  137  --   --   --  133*  --  132* 132*   POTASSIUM  --  3.5  --   --   --  4.1  --  3.9 4.2   CHLORIDE  --  95*  --   --   --  96*  --  92* 94*   LC  --  7.3*  --   --   --  7.9*  --  8.4* 7.9*   CO2  --  28  --   --   --  19*  --  25 20*   BUN  --  31.9*  --   --   --  48.0*  --  39.9* 52.6*   CR  --  3.49*  --   --   --  4.90*  --  4.44* 5.60*   * 150* 192* 204*   < > 216*   < > 171* 220*    < > = values in this interval not displayed.     CBC  Recent Labs   Lab 04/21/23  0705 04/20/23  0629 04/19/23  1640 04/19/23  0606   WBC 19.4* 20.3* 21.5* 20.1*   RBC 3.13* 3.12* 3.34* 3.33*   HGB 8.8* 8.7* 9.4* 9.3*   HCT 28.3* 28.8* 30.7* 31.8*   MCV 90 92 92 96   MCH 28.1 27.9 28.1 27.9   MCHC 31.1* 30.2* 30.6* 29.2*   RDW 16.4* 16.3* 16.5* 16.9*    388 419 434     INR  Recent Labs   Lab 04/21/23  0705 04/20/23  0629 04/19/23  1640   INR 1.83* 2.35* 2.07*         Exam:    Gen: awake and alert  Ab: soft, NT, ND  : penis - circumcised - dusky glans, mild edema and erythema - meatus is black (looks ischemic)      Scrotum - left - 2 cm dark eschar - + erythema - no crepitus - no crepitus in perineum  Ext: multiple wounds on lower extremeties      Assessment/Plan:     1.  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  Scrotal infection - again appears to be due to ischemia.     - ideally would treat with debridement and antibiotics    - given vascular disease - unlikely to heal    - discussed with patient - he does not want surgery    - continue antibiotics and local cares   3.  Agree with Palliative consult    Samir Harper MD

## 2023-04-21 NOTE — PLAN OF CARE
Summary:    Care Plan Summary Note:  Orientation: A&OX4  Observation Goals (met & not met): Not Met  Activity Level: A@/GB/Walker (NOO)  Fall Risk: Y  Behavior & Aggression Tool Color: Green  Pain Management: PRN Diladed  ABNL VS/O2: VSS/RA  ABNL Lab/BG: See Chart  Diet: NPO  Bowel/Bladder: Sotero  Drains/Devices: PIV/SL  Tests/Procedures for next shift: Angiogramme  Anticipated DC date: TBD

## 2023-04-21 NOTE — CONSULTS
"[facility]  Palliative Care Consultation Note    Patient: Gordon Gann  Date of Admission:  4/19/2023    Requesting Clinician / Team: Dr. Yip/Hospitalist   Reason for consult: Goals of care    Recommendations:    Per pt request, transition to comfort measures only    Stop checking VS, labs. Stop nonessential medications that are not directly adding to comfort    Do not pursue BKA    Discontinue dialysis     No further abx     Wound care PRN for comfort     Oxycodone 5-10mg PO Q2hrs PRN pain, SOB or dilaudid 0.5-1mg IV Q1hr PRN pain, SOB    Ativan 0.5-1mg SL every 3hrs PRN anxiety    Haldol 0.5-1mg SL every 6hrs PRN agitation     Atropine, robinul PRN secretions    SW consult for hospice planning. Pt lives at [facility] independent living. May be possible to return to a different room on their campus     Prognosis reviewed with pt and he understands that time to live is likely measured in a handful of days to 1-2 weeks    Per Lai's request, I updated dtr Omayra via phone, she is in support of the plan       Per Lai's request, he would like to continue to work with PT while in the hospital (it has been hard for him emotionally to become weaker). He verbalizes understanding that therapy will be discontinued with discharge to facility with hospice     Per Lai's request, will ask for support from spiritual health team     This case and these recommendations have been extensively discussed with bedside RN Yahaira, Dr. Yip, and unit SW Nusrat.    Kerry NEWMAN, CNP  Palliative Medicine   [facility]  Securely message with [employer]   *If you are having trouble locating my name, please ensure \"global\" is checked under contacts tab, within the sites button      Thank you for the opportunity to participate in the care of this patient and family. Our team: will continue to follow.     During regular M-F work hours ([phone]) -- please contact me on Vocera     In my " "absence, securely message our team via Vocera \"[facility]\" or go to On Call tab in [facility], search for \"[facility]\"     After regular work hours and on weekends/holidays, to reach our on call physician, securely message via Vocera \"[facility]\" or go to On Call tab in [facility], search for \"[facility]\"     Assessments:  Gordon Gann is a 78 year old male with PMH significant for atrial fibrillation on chronic anticoagulation, HTN, end-stage renal disease on dialysis, diabetes, urinary tract infection, extensive bilateral lower extremity wounds, overall clinical deconditioning, anxiety, depression, and neuropathy who presents with ongoing and worsening BLE wounds. He was evaluated by Vascular Surgery for chronic limb threatening ischemia. BKA has been discussed. Pt was evaluated by Urology for penile and scrotal wounds. 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     Medical Decision Making and Advanced Care Planning:  Today, the patient was seen for:  As a separately identifiable service, I held an Advance Care Planning Discussion 4/21/2023. I, TRENT Harley CNP met with Patient, along with Dr. Yip, today at the hospital to discuss Advance Care Planning. Gordon Gann does have decisional capacity and was present for this discussion. Those present were informed of the voluntary nature of this discussion and wished to proceed. This discussion began at 1120 and ended at 1154 for a total of 34 minutes.   Introduced myself and our services; assistance in pain and symptom management, emotional and spiritual support, and complex medical decision making.  Together with Dr. Yip, we acknowledge that Lai is on dialysis and has chronic wounds. BKA has been discussed. Yet, we are noting progressing/worsening wounds to his penis and scrotum as well. 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 We note that this is a rare complication of ESRD, a " "terminal condition with no cure/fix. We note that it can be a very painful experience/process. Unfortunately, there is not much meaning to perform the BKA, recognizing that other wounds will progress and be problematic, leading to more time in the hospital and more suffering.   Lai notes that he is not afraid of the dying process and wants it to be a comfortable, peaceful process.  We thus discuss a comfort focused plan of care in the hospital, followed by hospice discharge. We discuss stepping away from dialysis and prioritizing pain relief/symptom management. We discuss what the dying process typically looks like and note that time to live is likely measured in a handful of days to 1-2 weeks.  Hospice was discussed. Educated patient regarding hospice philosophy and prognostic criteria. Dispelled common myths. Discussed what hospice is (and is not), what services are usually provided (and those that are not, ex \"[facility] care\"), under what circumstances people tend to enroll, and the variety of places people can get hospice care (along with subsequent financial implications). Discussed typical anticipated timing of discharge.    Lai is in full support to this plan.    I educated regarding the pros and cons of attempting cardiac resuscitation. Discussed probability of survival as well as quality of life implications. I educated regarding the pros and cons of intubation and mechanical ventilation. Discussed probability of survival as well as quality of life implications. Recommended DNR/DNI. Lai is in complete agreement.     Per Lai's request, I updated his daughter Omayra via phone on the above discussion. I answered questions/concerns. She is in full support of this plan of care.       Management Discussed with the following over the past 24 hours: Calciphylaxis, role of BKA, restorative vs comfort focused plan of care, code status, hospice     Notes/Medical records reviewed over the past 24hrs: Hospitalist " discharge summary from last hospitalization, Vascular surgery notes from last hospitalization and current admission, H&P, hospitalist note, nursing note, Urology note, nephrology note, PT note.       Tests reviewed in the last 24 hours: See lab/imaging results included in the data section of this note.        Supplemental History, in addition to the patient's history, over the past 24 hours obtained from: Yahaira Rodriguez RN, unit HENOK Silverio, daughter Omayra       Medical complexity over the past 24 hours: Decision to not pursue BKA, transition to comfort measures, stop dialysis, DNR/DNI, pursue hospice       Functional Status just prior to hospitalization: 3 (Capable of only limited self-care; needs help with ADLs; in bed/chair >50% of waking hours)      Prognosis, Goals, and/or Advance Care Planning were addressed today: Yes, as noted above.       Patient's decision making preferences: with strong input from medical clinicians            I have concerns about the patient/family's health literacy today: No           Patient has a completed Health Care Directive: No.       Code status: No CPR / No Intubation    Coping, Meaning, & Spirituality:   Mood, coping, and/or meaning in the context of serious illness were addressed today: Yes  Summary/Comments: Not Anabaptism per se, but enjoys learning about religous studies/bible teachings. Open to seeing the . He notes that it has been difficult emotionally to experience weakness and decline. He would like to work with PT while he is in the hospital.     Social:     Living situation: Lives at [facility] independent apartment. Recently hospitalized followed by TCU at [facility]     Treviño family / caregivers: Supportive dtr Omayra will. He has a supportive sister Fern as well     Occupational history: Former  in [place]    History of Present Illness:  History gathered today from: patient, family/loved ones, medical chart, medical team  members, unit team members    Gordon Gann is a 78 year old male with PMH significant for atrial fibrillation on chronic anticoagulation, HTN, end-stage renal disease on dialysis, diabetes, urinary tract infection, extensive bilateral lower extremity wounds, overall clinical deconditioning, anxiety, depression, and neuropathy who presents with ongoing and worsening BLE wounds. He was evaluated by Vascular Surgery for chronic limb threatening ischemia. BKA has been discussed. Pt was evaluated by Urology for penile and scrotal wounds. 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     Key Palliative Symptom Data:  # Pain severity the last 12 hours: moderate    Patient is on opioids: assessed and bowels ok/no needed changes to plan of care today.    ROS:  Comprehensive ROS is reviewed and is negative except as here & per HPI: N/A     Past Medical History:  Past Medical History:   Diagnosis Date     Alcohol abuse      Atrial fibrillation (H)      CKD (chronic kidney disease) stage 4, GFR 15-29 ml/min (H)      Diabetes (H)      Gout      Hypokalemia      Hypophosphatemia      Insomnia      STEMI (ST elevation myocardial infarction) (H)         Past Surgical History:  Past Surgical History:   Procedure Laterality Date     CREATE GRAFT LOOP ARTERIOVENOUS UPPER EXTREMITY Left 8/5/2021    Procedure: left arm arteriovenous graft fistula.;  Surgeon: Noemí Hernandez MD;  Location: [facility] OR     IR DIALYSIS FISTULOGRAM LEFT  12/8/2021     IR DIALYSIS FISTULOGRAM LEFT  2/9/2023         Family History:  No family history on file.      Allergies:  No Known Allergies     Medications:  I have reviewed this patient's medication profile and medications from this hospitalization.   Noted scheduled meds are:  Gabapentin 100mg PO at bedtime   Remeron 7.5mg PO at bedtime   PPI    Noted PRN meds are:  Oxycodone 5-10mg PO Q2hrs PRN pain, SOB  Dilaudid 0.5-1mg IV Q1hr PRN pain, SOB  Ativan 0.5-1mg SL every 3hrs PRN anxiety  Haldol 0.5-1mg SL  every 6hrs PRN agitation   Atropine, robinul PRN secretions    Physical Exam:  Vital Signs: Temp: 97.8  F (36.6  C) Temp src: Axillary BP: 113/68 Pulse: 94   Resp: 18 SpO2: 95 % O2 Device: Nasal cannula Oxygen Delivery: 3 LPM  Weight: 166 lbs 6.4 oz  CONSTITUTIONAL: Chronically ill man seen resting in bed in NAD, A&Ox3. Calm and cooperative.  HEENT: NCAT  RESPIRATORY: NL respiratory effort on 2L via NC  SKIN: Warm and intact. Scattered extensive wounds, malodorous   NEUROLOGIC: Appropriately responsive during interview  PSYCH: Affect engaged     Data reviewed:  Recent imaging independently reviewed, my comments on pertinents:   4/19 CXR no pneumonia  4/20 EKG without Qt prolongation     Recent lab data independently reviewed, my comments on pertinents:   Na 137  K 3.5  Creat 3.49  WBC 19.4  Hgb 8.8  Plt 336  Albumin 2.5  INR 1.83

## 2023-04-21 NOTE — CONSULTS
Care Management Follow Up    Length of Stay (days): 1    Expected Discharge Date: 04/22/2023     Concerns to be Addressed:       Patient plan of care discussed at interdisciplinary rounds: Yes    Anticipated Discharge Disposition:  SNF with hospice      Anticipated Discharge Services:    Anticipated Discharge DME:      Patient/family educated on Medicare website which has current facility and service quality ratings:    Education Provided on the Discharge Plan:    Patient/Family in Agreement with the Plan:      Referrals Placed by CM/SW:    Private pay costs discussed: private room/amenity fees    Additional Information:  SW was informed by palliative that pt would like to discharge to a facility with hospice. Pt was at [facility] in their independent living previously. Pt has also been at [facility]. Call to [facility]. Pt came from [facility]. Best contact is Herrera [phone]. SW informed that there are current openings in LTC.     SW spoke with pt at bedside, would like to discharge to a facility with hospice and prefers to not return to [facility]. Pt was at [facility] for a day or two and did not state a preference of this facility. Pt was not familiar with other settings but would like to be in the [facility]note 3[facility] area. SW informed of LTC and up front deposit/assessment for daily rate. Also informed that hospice can be had in a residential hospice home and a home setting as well. Pt asked about daily cost for hospice homes, informed, pt stated these were very expensive. Pt did not have any questions, but asked that SW keep pt updated on costs once a facility is found. SW asked if they could talk with daughter, pt agreeable. SW called carlos Cutler to update on the conversation with pt and informed of preferences. Omayra agreeable, did not give preference on SNF but was surprised pt did not want to return to [facility].  HENOK explained process of initial deposit/daily rate and average costs associated. Informed of Medicare ratings. HENOK offered to send Omayra SNF list, hospice homes, and hospice agency list. Her email address is [email or web address]. Omayra did not have any questions for HENOK. Provided with unit SW phone number. Omayra agreeable for sending LTC referrals to facilities in preferred areas based on Medicare ratings. Sent referrals via DOD and will continue to follow.     HENOK received call from Marielena at [facility]. They would potentially be able to take pt on Monday if willing to come. Marielena was going to see if an up front deposit was needed or not. The daily rate would be around $350-500.     LIANA Cai  Social Work  [facility]

## 2023-04-21 NOTE — CONSULTS
BRIEF NUTRITION NOTE:    Reason for Assessment:  Admission Nutrition Screen - weight loss > 34 lbs, decreased appetite/poor intake.  Diet: Regular.  Note patient has been transitioned to comfort care.  Will be available only prn.    [place] RD, LD, CNSC

## 2023-04-21 NOTE — PLAN OF CARE
"Orientation/Cognitive: A&Ox4, sleepy  Observation Goals (Met/ Not Met): Inpatient  Mobility Level/Assist Equipment: Ax2 with lift, restricted by discomfort in BLE  Fall Risk (Y/N): Y  Behavior Concerns: none  Pain Management: occasionally wincing but has been denying pain this shift  Tele/VS/O2: VSS, tele showed sinus tachy this morning. Order discontinued when comfort care order was placed  ABNL Lab/BG: WDL  Diet: tolerating a regular diet  Bowel/Bladder: continent, no BM today  Skin Concerns: BLE wounds, wound to penis. Per palliative care provider able to stop scheduled wound changes and do it PRN for comfort  Drains/Devices: PIV SL, LAV fistula  Tests/Procedures for next shift: none, transitioned to comfort cares today  Anticipated DC date & active delays: TBD pending hospice placement  Patient Stated Goal for Today: pain control    /81 (BP Location: Right arm)   Pulse 87   Temp 99.3  F (37.4  C) (Axillary)   Resp 18   Ht 1.702 m (5' 7\")   Wt 75.5 kg (166 lb 6.4 oz)   SpO2 100%   BMI 26.06 kg/m          "

## 2023-04-21 NOTE — PROVIDER NOTIFICATION
"MD Notification    Notified Person: MD    Notified Person Name: Reyna    Notification Date/Time: 04/21/23 1:39 PM     Notification Interaction: [employer] messaging    Purpose of Notification: \"Do you still want us doing full wound cares per WOC or can we just change as needed for comfort?\"    Orders Received: Change order to PRN for comfort    Comments:    "

## 2023-04-21 NOTE — PROGRESS NOTES
Per Palliative provider, pt still wants to be working with PT during his stay. Updated PT about POC

## 2023-04-21 NOTE — PROGRESS NOTES
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  Urology and vascular surgery have been consulted and ultimately palliative approach has been decided upon. Palliative care and hospitalist met with patient 4/21 and patient confirmed his wishes. Daughter Omayra has been updated and is in agreement with the plan.     Comfort cares  As of 4/21.  Palliative medicine greatly appreciated. 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    - prn analgesia, continue liberally especially as these wounds can be quite painful  - prn anxiolytics  - likely discontinuing HD - defer to nephro, palliative, and patient  - discontinue labs, vitals checks as appropriate  - SW consulted and greatly appreciated - consider discharge options  - Spiritual services  - regular diet  - therapies to continue if possible - patient requests assistance with walking in the next few days as able as he has struggled with not walking and getting weaker. He is aware that we are likely looking at a few days to maybe 1-2 weeks timeframe in the context of stopping dialysis.    =====================          Lower extremity wounds, scrotal wound - cellulitis  Acute physical deconditioning  Recently discharged on April 12, 2023, right-sided pleural effusion  Patient apparently at rehab TCU at [place] and sent to the emergency department for wound check and weakness. Patient has chronic sores and ulcers on his legs, he is more lethargic and then sent to the emergency department, has had intermittent low blood pressures, is on dialysis Tuesday  Thursday and Saturday, and apparently he was not dialyzed this past Tuesday per report.    *, WBC 21.5,  on adm  *Lactic acid is 1.5 with a lipase of 14  *Procalcitonin of 3.37 (pt on dialysis)  *Venous blood gas shows 7.3 4/45/19/27.    *INR is 2.07 on admission.   *CXR in the emergency department shows stable chest with mild to moderate pleural fluid versus thickening in the right lower hemithorax, see report for further details.  Plan:  - Rocephin  - Wound nurse --> rec surgery consultation for penile/scrotal wound - urology consulted  - Vascular surgery consulted.  - Therapy evals.  - Telemetry.  - Close hemodynamic monitoring     Suspected urinary tract infection, hematuria  Urinalysis completed in the emergency department with moderate leukocyte esterase, no mention of squamous epithelial cells.  Patient is typically on dialysis and urine output may be affected by dialysis.  - Urine culture pending.  - Blood cultures pending.  - Started on Rocephin in the emergency department.  - Recommend outpatient follow-up with urology as clinically indicated.  - as above, urology consulted for penile/scrotal wound - greatly appreciate assistance. Will ask general surgery if necessary.     End-stage renal disease, dialysis, anemia  Patient dialysis on Tuesday Thursday Saturday but missed this last Tuesday per report.  Hemoglobin is 9.4 on admission with no report of external blood loss.  - Nephrology consulted.     Hyponatremia  Patient with a sodium of 132 on admission.  - Monitor.     Hypertension  History of atrial fibrillation  Patient with elevated troponin on admission at 340.  Denies chest pain.  TSH is 3.12 on admission.  Last echocardiogram completed in February 2023 with a rhythm of atrial flutter, ejection fraction of 55 to 60%, moderate biatrial enlargement, moderate pulmonary hypertension, see report for further details.  - Serial troponin.  - Telemetry.  - Continue warfarin, pharmacy to dose.  -  "Continue PTA clonidine   - Continue PTA diltiazem  - Continue PTA metoprolol   - Continue PTA aspirin 81 mg   - PTA bumex to be determined by nephrology     History of gout  Patient on allopurinol in the past according to electronic medical records.  - Continue PTA allopurinol when verified by pharmacy.     Anxiety, depression  Patient previously on mirtazapine in the past.  - Continue PTA mirtazapine     Diabetes  Patient previously on Lantus 10 units and insulin sliding scale in the past.  - Insulin sliding scale.  - Continue decreased dose of 50% Lantus at 5 units down from 10 units PTA     Neuropathy  Patient previously on gabapentin in the past.  - Continue PTA gabapentin         Diet: Regular Diet Adult    DVT Prophylaxis: comfort care, may be stopping AC  Flood Catheter: Not present  Lines: None     Cardiac Monitoring: None  Code Status: No CPR- Do NOT Intubate      Clinically Significant Risk Factors              # Hypoalbuminemia: Lowest albumin = 2.5 g/dL at 4/20/2023  6:29 AM, will monitor as appropriate           # Overweight: Estimated body mass index is 26.06 kg/m  as calculated from the following:    Height as of this encounter: 1.702 m (5' 7\").    Weight as of this encounter: 75.5 kg (166 lb 6.4 oz)., PRESENT ON ADMISSION         Disposition Plan      Expected Discharge Date: 04/22/2023      Destination: inpatient rehabilitation facility            Myles Yip MD  Hospitalist Service  [facility]  Securely message with [employer] (more info)  Text page via [employer] Paging/Directory   ______________________________________________________________________    Interval History   Seen and examined with palliative med NANDINI.  Lengthy, detailed discussion - patient ultimately wishes to pursue comfort care given poor prognosis. He reports adequate pain control at time of encounter and we encouraged him to alert RN staff with any new symptoms or pain.  Denies fevers, chills, or SOB. "     Physical Exam   Vital Signs: Temp: 99.3  F (37.4  C) Temp src: Axillary BP: 118/81 Pulse: 87   Resp: 18 SpO2: 100 % O2 Device: Nasal cannula Oxygen Delivery: 3 LPM  Weight: 166 lbs 6.4 oz    GEN: NAD, resting in bed  HEENT: nc in place, eomi, mmm  PULM: no audible crackles or wheeze, not labored  ABDO: nondistended  EXT: ecchymoses/wounds on good portion of arms and legs  NEURO: awake, alert, conversing normally and appropriately, moving all ext    Medical Decision Making       55 MINUTES SPENT BY ME on the date of service doing chart review, history, exam, documentation & further activities per the note.      Data   Recent Labs   Lab 04/21/23  0820 04/21/23  0705 04/21/23  0158 04/20/23  0752 04/20/23  0629 04/20/23  0159 04/19/23  1640   WBC  --  19.4*  --   --  20.3*  --  21.5*   HGB  --  8.8*  --   --  8.7*  --  9.4*   MCV  --  90  --   --  92  --  92   PLT  --  336  --   --  388  --  419   INR  --  1.83*  --   --  2.35*  --  2.07*   NA  --  137  --   --  133*  --  132*   POTASSIUM  --  3.5  --   --  4.1  --  3.9   CHLORIDE  --  95*  --   --  96*  --  92*   CO2  --  28  --   --  19*  --  25   BUN  --  31.9*  --   --  48.0*  --  39.9*   CR  --  3.49*  --   --  4.90*  --  4.44*   ANIONGAP  --  14  --   --  18*  --  15   LC  --  7.3*  --   --  7.9*  --  8.4*   * 150* 192*   < > 216*   < > 171*   ALBUMIN  --   --   --   --  2.5*  --  2.5*   PROTTOTAL  --   --   --   --  6.0*  --  6.3*   BILITOTAL  --   --   --   --  0.5  --  0.5   ALKPHOS  --   --   --   --  363*  --  439*   ALT  --   --   --   --  15  --  15   AST  --   --   --   --  20  --  25   LIPASE  --   --   --   --   --   --  14    < > = values in this interval not displayed.

## 2023-04-22 NOTE — PROGRESS NOTES
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  Urology and vascular surgery have been consulted and ultimately palliative approach has been decided upon. Palliative care and hospitalist met with patient 4/21 and patient confirmed his wishes. Daughter Omayra has been updated and is in agreement with the plan.     Comfort cares  As of 4/21.  Palliative medicine greatly appreciated. 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    - prn analgesia, continue liberally especially as these wounds can be quite painful  - prn anxiolytics  - likely discontinuing HD - defer to nephro, palliative, and patient  - discontinue labs, vitals checks as appropriate  - SW consulted and greatly appreciated - consider discharge options  - Spiritual services  - regular diet  - therapies to continue if possible - patient requests assistance with walking in the next few days as able as he has struggled with not walking and getting weaker. He is aware that we are likely looking at a few days to maybe 1-2 weeks timeframe in the context of stopping dialysis.  - 4/22 - doing ok, confirmed with patient that if hospice discharge placement possible and he accepts whatever facility it is, we will make it happen     =====================              Lower extremity wounds, scrotal wound - cellulitis  Acute physical deconditioning  Recently discharged on April 12, 2023, right-sided pleural effusion  Patient apparently at rehab TCU at [place] and sent to the emergency department for wound check and weakness. Patient has chronic  sores and ulcers on his legs, he is more lethargic and then sent to the emergency department, has had intermittent low blood pressures, is on dialysis Tuesday Thursday and Saturday, and apparently he was not dialyzed this past Tuesday per report.    *, WBC 21.5,  on adm  *Lactic acid is 1.5 with a lipase of 14  *Procalcitonin of 3.37 (pt on dialysis)  *Venous blood gas shows 7.3 4/45/19/27.    *INR is 2.07 on admission.   *CXR in the emergency department shows stable chest with mild to moderate pleural fluid versus thickening in the right lower hemithorax, see report for further details.  Plan:  - Rocephin  - Wound nurse --> rec surgery consultation for penile/scrotal wound - urology consulted  - Vascular surgery consulted.  - Therapy evals.  - Telemetry.  - Close hemodynamic monitoring     Suspected urinary tract infection, hematuria  Urinalysis completed in the emergency department with moderate leukocyte esterase, no mention of squamous epithelial cells.  Patient is typically on dialysis and urine output may be affected by dialysis.  - Urine culture pending.  - Blood cultures pending.  - Started on Rocephin in the emergency department.  - Recommend outpatient follow-up with urology as clinically indicated.  - as above, urology consulted for penile/scrotal wound - greatly appreciate assistance. Will ask general surgery if necessary.     End-stage renal disease, dialysis, anemia  Patient dialysis on Tuesday Thursday Saturday but missed this last Tuesday per report.  Hemoglobin is 9.4 on admission with no report of external blood loss.  - Nephrology consulted.     Hyponatremia  Patient with a sodium of 132 on admission.  - Monitor.     Hypertension  History of atrial fibrillation  Patient with elevated troponin on admission at 340.  Denies chest pain.  TSH is 3.12 on admission.  Last echocardiogram completed in February 2023 with a rhythm of atrial flutter, ejection fraction of 55 to 60%, moderate  "biatrial enlargement, moderate pulmonary hypertension, see report for further details.  - Serial troponin.  - Telemetry.  - Continue warfarin, pharmacy to dose.  - Continue PTA clonidine   - Continue PTA diltiazem  - Continue PTA metoprolol   - Continue PTA aspirin 81 mg   - PTA bumex to be determined by nephrology     History of gout  Patient on allopurinol in the past according to electronic medical records.  - Continue PTA allopurinol when verified by pharmacy.     Anxiety, depression  Patient previously on mirtazapine in the past.  - Continue PTA mirtazapine     Diabetes  Patient previously on Lantus 10 units and insulin sliding scale in the past.  - Insulin sliding scale.  - Continue decreased dose of 50% Lantus at 5 units down from 10 units PTA     Neuropathy  Patient previously on gabapentin in the past.  - Continue PTA gabapentin            Diet: Regular Diet Adult    DVT Prophylaxis: Comfort care  Flood Catheter: Not present  Lines: None     Cardiac Monitoring: None  Code Status: No CPR- Do NOT Intubate      Clinically Significant Risk Factors              # Hypoalbuminemia: Lowest albumin = 2.5 g/dL at 4/20/2023  6:29 AM, will monitor as appropriate           # Overweight: Estimated body mass index is 26.06 kg/m  as calculated from the following:    Height as of this encounter: 1.702 m (5' 7\").    Weight as of this encounter: 75.5 kg (166 lb 6.4 oz)., PRESENT ON ADMISSION         Disposition Plan      Expected Discharge Date: 04/24/2023      Destination: inpatient rehabilitation facility  Discharge Comments: comfort cares  Hospice placement          Myles Yip MD  Hospitalist Service  [facility]  Securely message with [employer] (more info)  Text page via [employer] Paging/Directory   ______________________________________________________________________    Interval History   Seen and examined. No new complaints. Pain control adequate. Encouraged him to tell RN if he is having pain " or any other symptoms.    Physical Exam   Vital Signs: Temp: 99.3  F (37.4  C) Temp src: Axillary BP: 118/81 Pulse: 87   Resp: 18 SpO2: 100 % O2 Device: Nasal cannula Oxygen Delivery: 3 LPM  Weight: 166 lbs 6.4 oz    Gen: NAD, pleasant  HEENT: EOMI, MMM  Resp: no audible crackles,  no wheezes, no increased work of resp  Abdo: soft, nontender, nondistended, bowel sounds present  Neuro: aa, conversing, moving ext but is weak, CN grossly intact, no facial asymmetry      Medical Decision Making       37 MINUTES SPENT BY ME on the date of service doing chart review, history, exam, documentation & further activities per the note.      Data   none

## 2023-04-22 NOTE — PLAN OF CARE
Goal Outcome Evaluation:         Summary:    Care Plan Summary Note: 4-21-23, [id number]  Orientation: A/O x 4, flat affect  Observation Goals (met & not met): Not Met  Activity Level: A x 2. Lift, t/repo  Fall Risk: Y  Behavior & Aggression Tool Color: Green  Pain Management: PRN oxy x 1 with relief  ABNL VS/O2: discontinued, cc  ABNL Lab/BG: See Chart  Diet: reg, poor appetite  Bowel/Bladder: Continent no BM, dialysis low urine  Drains/Devices: PIV SL  Tests/Procedures for next shift: na  Anticipated DC date: TBD waiting hospice placement  Other:  No nausea, SOB. Lung sounds clear. Bowel sounds active. CMS intact ex baseline neuropathy feet and tenderness. Continue to monitor.

## 2023-04-22 NOTE — PLAN OF CARE
All full assessments and vital signs deferred as patient on comfort cares.  Orientation: A/O x 4, flat affect  Observation Goals (met & not met): Inpatient  Activity Level: A x 2. Lift, T/R as needed  Fall Risk: Y  Behavior & Aggression Tool Color: Green  Pain Management: Denies pain, BLE very tender to touch.  ABNL VS/O2: No routine vitals taken  ABNL Lab/BG: WBC 19.4; Hgb 8.8; Creat 3.49  Diet: reg, poor appetite  Bowel/Bladder: Continent no BM, dialysis low urine  Skin concerns: Multiple areas of necrotic tissue to BLE. Wounds to penis and scrotum.  Drains/Devices: PIV SL  Tests/Procedures for next shift: na  Anticipated DC date: Pending hospice placement, SW following.  Other: BLE numbness and tenderness. L arm AV fistula.  Wound cares as needed.  Palliative following.

## 2023-04-22 NOTE — PLAN OF CARE
"Orientation/Cognitive: A&Ox4.  Observation Goals (Met/ Not Met): Inpatient  Mobility Level/Assist Equipment: Ax2 w/ lift, asked to have PT continue to see him in the hospital but PT signed off due to comfort cares being in place  Fall Risk (Y/N): Y  Behavior Concerns: none  Pain Management: Given oxycodone once, declined further interventions  Tele/VS/O2: No VS due to comfort cares  ABNL Lab/BG: No labs drawn due to comfort cares  Diet: tolerating a regular diet  Bowel/Bladder: not making much urine d/t dialysis, smear of BM this morning  Skin Concerns: BLE wounds, wound to penis and scrotum. Per palliative not doing full dressing changes, just completing them for comfort  Drains/Devices: PIV SL  Tests/Procedures for next shift: none, waiting on hospice placement  Anticipated DC date & active delays: TBD  Patient Stated Goal for Today: pain control    /81 (BP Location: Right arm)   Pulse 87   Temp 99.3  F (37.4  C) (Axillary)   Resp 18   Ht 1.702 m (5' 7\")   Wt 75.5 kg (166 lb 6.4 oz)   SpO2 100%   BMI 26.06 kg/m          "

## 2023-04-22 NOTE — PLAN OF CARE
Physical Therapy Discharge Summary    Reason for therapy discharge:    No further expectations of functional progress.    Progress towards therapy goal(s). See goals on Care Plan in [facility] electronic health record for goal details.  Goals not met.  Barriers to achieving goals:   Patient has transitioned to comfort cares. .    Therapy recommendation(s):    With transition away from restorative measures, skilled physical therapy services will sign off. Recommend up to recliner and out of bed mobility as able for patient comfort as able. Expecting no further functional mobility gains to be made.

## 2023-04-23 NOTE — PLAN OF CARE
Goal Outcome Evaluation:         Shift: 4-23-23, [id number]  Orientation: A/O x 4, flat affect  Observation Goals (met & not met): Inpatient  Activity Level: A x 2. Lift, T/R as needed  Fall Risk: Y  Behavior & Aggression Tool Color: Green  Pain Management:  BLE very tender to touch. Oxy given x 1, tylenol given x 1  ABNL VS/O2: No routine vitals taken  ABNL Lab/BG: see results  Diet: reg, poor appetite  Bowel/Bladder: Continent no BM, dialysis low urine  Skin concerns: Multiple areas of necrotic tissue to BLE. Wounds to penis and scrotum.  Drains/Devices: PIV SL  Tests/Procedures for next shift: na  Anticipated DC date: Pending hospice placement, SW following.  Other: All full assessments and vital signs deferred as patient on comfort cares. BLE numbness and tenderness. L arm AV fistula.  Wound cares as needed.  Palliative following.

## 2023-04-23 NOTE — PROGRESS NOTES
Care Management Follow Up    Length of Stay (days): 3    Expected Discharge Date: 04/24/2023     Concerns to be Addressed:       Patient plan of care discussed at interdisciplinary rounds: Yes    Anticipated Discharge Disposition:  To a facility with hospice care     Anticipated Discharge Services:    Anticipated Discharge DME:      Patient/family educated on Medicare website which has current facility and service quality ratings:    Education Provided on the Discharge Plan:    Patient/Family in Agreement with the Plan:      Referrals Placed by CM/SW:    Private pay costs discussed:     Additional Information:  [facility] He reports family has given up the bed.  Per SW note of 4/21 patient does not want to return to [facility] where he has been in the past. This TCU is on the same campus as his apartment and does accept hospice patient's on a case by case basis. In this same note, daughter expressed surprise [facility]    Today writer will speak with patient/daughter to ascertain their wishes and to develop a discharge plan with hospice.       GLENNA AbdallaSW

## 2023-04-23 NOTE — PROGRESS NOTES
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  Urology and vascular surgery have been consulted and ultimately palliative approach has been decided upon. Palliative care and hospitalist met with patient 4/21 and patient confirmed his wishes. Daughter Omayra has been updated and is in agreement with the plan.     Comfort cares  As of 4/21.  Palliative medicine greatly appreciated. 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    - prn analgesia, continue liberally especially as these wounds can be quite painful  - prn anxiolytics  - likely discontinuing HD - defer to nephro, palliative, and patient  - discontinue labs, vitals checks as appropriate  - SW consulted and greatly appreciated - consider discharge options  - Spiritual services  - regular diet  - therapies to continue if possible - patient requests assistance with walking in the next few days as able as he has struggled with not walking and getting weaker. He is aware that we are likely looking at a few days to maybe 1-2 weeks timeframe in the context of stopping dialysis.  - 4/22 & 4/23 - doing ok, confirmed with patient that if hospice discharge placement possible and he accepts whatever facility it is, we will make it happen  - SW efforts greatly appreciated     =====================              Lower extremity wounds, scrotal wound - cellulitis  Acute physical deconditioning  Recently discharged on April 12, 2023, right-sided pleural effusion  Patient apparently at rehab TCU at [place] and sent to the emergency department for wound  check and weakness. Patient has chronic sores and ulcers on his legs, he is more lethargic and then sent to the emergency department, has had intermittent low blood pressures, is on dialysis Tuesday Thursday and Saturday, and apparently he was not dialyzed this past Tuesday per report.    *, WBC 21.5,  on adm  *Lactic acid is 1.5 with a lipase of 14  *Procalcitonin of 3.37 (pt on dialysis)  *Venous blood gas shows 7.3 4/45/19/27.    *INR is 2.07 on admission.   *CXR in the emergency department shows stable chest with mild to moderate pleural fluid versus thickening in the right lower hemithorax, see report for further details.  Plan:  - Rocephin  - Wound nurse --> rec surgery consultation for penile/scrotal wound - urology consulted  - Vascular surgery consulted.  - Therapy evals.  - Telemetry.  - Close hemodynamic monitoring     Suspected urinary tract infection, hematuria  Urinalysis completed in the emergency department with moderate leukocyte esterase, no mention of squamous epithelial cells.  Patient is typically on dialysis and urine output may be affected by dialysis.  - Urine culture pending.  - Blood cultures pending.  - Started on Rocephin in the emergency department.  - Recommend outpatient follow-up with urology as clinically indicated.  - as above, urology consulted for penile/scrotal wound - greatly appreciate assistance. Will ask general surgery if necessary.     End-stage renal disease, dialysis, anemia  Patient dialysis on Tuesday Thursday Saturday but missed this last Tuesday per report.  Hemoglobin is 9.4 on admission with no report of external blood loss.  - Nephrology consulted.     Hyponatremia  Patient with a sodium of 132 on admission.  - Monitor.     Hypertension  History of atrial fibrillation  Patient with elevated troponin on admission at 340.  Denies chest pain.  TSH is 3.12 on admission.  Last echocardiogram completed in February 2023 with a rhythm of atrial flutter,  "ejection fraction of 55 to 60%, moderate biatrial enlargement, moderate pulmonary hypertension, see report for further details.  - Serial troponin.  - Telemetry.  - Continue warfarin, pharmacy to dose.  - Continue PTA clonidine   - Continue PTA diltiazem  - Continue PTA metoprolol   - Continue PTA aspirin 81 mg   - PTA bumex to be determined by nephrology     History of gout  Patient on allopurinol in the past according to electronic medical records.  - Continue PTA allopurinol when verified by pharmacy.     Anxiety, depression  Patient previously on mirtazapine in the past.  - Continue PTA mirtazapine     Diabetes  Patient previously on Lantus 10 units and insulin sliding scale in the past.  - Insulin sliding scale.  - Continue decreased dose of 50% Lantus at 5 units down from 10 units PTA     Neuropathy  Patient previously on gabapentin in the past.  - Continue PTA gabapentin            Diet: Regular Diet Adult    DVT Prophylaxis: comfort care  Flood Catheter: Not present  Lines: None     Cardiac Monitoring: None  Code Status: No CPR- Do NOT Intubate      Clinically Significant Risk Factors              # Hypoalbuminemia: Lowest albumin = 2.5 g/dL at 4/20/2023  6:29 AM, will monitor as appropriate           # Overweight: Estimated body mass index is 26.06 kg/m  as calculated from the following:    Height as of this encounter: 1.702 m (5' 7\").    Weight as of this encounter: 75.5 kg (166 lb 6.4 oz)., PRESENT ON ADMISSION         Disposition Plan      Expected Discharge Date: 04/24/2023      Destination: inpatient rehabilitation facility  Discharge Comments: comfort cares  Hospice placement          Myles Yip MD  Hospitalist Service  [facility]  Securely message with [employer] (more info)  Text page via [employer] Paging/Directory   ______________________________________________________________________    Interval History   Seen and examined. Had some wound cares and cleaning up with staff " that was painful but is now feeling better. No new issues or complaints.     Physical Exam   Vital Signs:                    Weight: 166 lbs 6.4 oz    GEN: nad, very pleasant  HEENT: eomi, mmm  PULM: no audible crackles/wheeze, nonlabored  ABDO: s, nt, nd  EXT: multiple wounds, eschar on legs again noted    Medical Decision Making       41 MINUTES SPENT BY ME on the date of service doing chart review, history, exam, documentation & further activities per the note.      Data   none

## 2023-04-24 NOTE — PLAN OF CARE
Comfort cares. A/O x4. Denies pain. Regular diet. Up with lift. Continent BM. Multiple areas of necrotic tissue to BLE. Wounds to penis and scrotum. Vashe wound care to groin done. Discharge pending hospice placement possibly tomorrow 4/25

## 2023-04-24 NOTE — PLAN OF CARE
All full assessments and vital signs deferred as patient on comfort cares.  Orientation: A/O x 4  Observation Goals: Inpatient  Activity Level: A x 2/Lift, T/R   Fall Risk: Y  Pain Management: Oxy 5mg before wound cares today BLE and kathie area very tender to touch.  ABNL VS/O2: No Vitals (comfort cares)  ABNL Lab/BG: Hgb 8.8 (4/21)  Diet: reg, poor appetite  Bowel/Bladder: Continent no BM, dialysis low urine  Skin concerns: Multiple areas of necrotic tissue to BLE and necrosis areas penis and scrotum.  Drains/Devices: PIV SL  Tests/Procedures for next shift: na  Anticipated DC date: Pending hospice placement, SW following.  Other: BLE numbness and tenderness. L arm AV fistula. Wound cares as needed.  Palliative following.

## 2023-04-24 NOTE — PROGRESS NOTES
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  Urology and vascular surgery have been consulted and ultimately palliative approach has been decided upon. Palliative care and hospitalist met with patient 4/21 and patient confirmed his wishes. Daughter Omayra has been updated and is in agreement with the plan.     Comfort cares  As of 4/21.  Palliative medicine greatly appreciated. 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    - prn analgesia, continue liberally especially as these wounds can be quite painful  - prn anxiolytics  - likely discontinuing HD - defer to nephro, palliative, and patient  - discontinue labs, vitals checks as appropriate  - SW consulted and greatly appreciated - consider discharge options  - Spiritual services  - regular diet  - therapies to continue if possible - patient requests assistance with walking in the next few days as able as he has struggled with not walking and getting weaker. He is aware that we are likely looking at a few days to maybe 1-2 weeks timeframe in the context of stopping dialysis.  - 4/22 & 4/23 - doing ok, confirmed with patient that if hospice discharge placement possible and he accepts whatever facility it is, we will make it happen  - SW efforts greatly appreciated  -analgesics as needed     Lower extremity wounds, scrotal wound - cellulitis  Acute physical deconditioning  Recently discharged on April 12, 2023, right-sided pleural effusion  Patient apparently at rehab TCU at [facility] and sent to the emergency department for wound check and  weakness. Patient has chronic sores and ulcers on his legs, he is more lethargic and then sent to the emergency department, has had intermittent low blood pressures, is on dialysis Tuesday Thursday and Saturday, and apparently he was not dialyzed this past Tuesday per report.    *, WBC 21.5,  on adm  *Lactic acid is 1.5 with a lipase of 14  *Procalcitonin of 3.37 (pt on dialysis)  *Venous blood gas shows 7.3 4/45/19/27.    *INR is 2.07 on admission.   *CXR in the emergency department shows stable chest with mild to moderate pleural fluid versus thickening in the right lower hemithorax, see report for further details.  Plan:  - Rocephin stopped  - Wound nurse --> rec surgery consultation for penile/scrotal wound - urology consulted     Suspected urinary tract infection, hematuria  Urinalysis completed in the emergency department with moderate leukocyte esterase, no mention of squamous epithelial cells.  Patient is typically on dialysis and urine output may be affected by dialysis.  - Started on Rocephin in the emergency department, now stopped   - Recommend outpatient follow-up with urology as clinically indicated.  - as above, urology consulted for penile/scrotal wound - greatly appreciate assistance.     End-stage renal disease, dialysis, anemia  Patient dialysis on Tuesday Thursday Saturday but missed this last Tuesday per report.  Hemoglobin is 9.4 on admission with no report of external blood loss.  - Nephrology consulted- now on comfort care      Hyponatremia  Patient had a sodium of 132 on admission.     Hypertension  History of atrial fibrillation  Patient with elevated troponin on admission at 340.  Denies chest pain.  TSH is 3.12 on admission.  Last echocardiogram completed in February 2023 with a rhythm of atrial flutter, ejection fraction of 55 to 60%, moderate biatrial enlargement, moderate pulmonary hypertension, see report for further details.  -Now on comfort care      History of  "gout  Patient on allopurinol in the past according to electronic medical records.  -Stopped due to comfort care     Anxiety, depression  Patient previously on mirtazapine in the past.  - Continue PTA mirtazapine     Diabetes  Patient previously on Lantus 10 units and insulin sliding scale in the past.  -Insult has been stopped     Neuropathy  Leg cramps  Patient previously on gabapentin in the past.  -Continue PTA gabapentin  -Add low dose Valium prn       Diet: Regular Diet Adult    DVT Prophylaxis: comfort care   Flood Catheter: Not present  Lines: None     Cardiac Monitoring: None  Code Status: No CPR- Do NOT Intubate      Clinically Significant Risk Factors              # Hypoalbuminemia: Lowest albumin = 2.5 g/dL at 4/20/2023  6:29 AM, will monitor as appropriate           # Overweight: Estimated body mass index is 26.06 kg/m  as calculated from the following:    Height as of this encounter: 1.702 m (5' 7\").    Weight as of this encounter: 75.5 kg (166 lb 6.4 oz).          Disposition Plan      Expected Discharge Date: 04/25/2023      Destination: inpatient rehabilitation facility  Discharge Comments: comfort cares  Hospice placement          Fili Ireland MD  Hospitalist Service  [facility] [place]  Securely message with [employer] (more info)  Text page via [employer] Paging/Directory   ______________________________________________________________________    Interval History   Having leg cramps.     Physical Exam   Vital Signs:           Resp: 18        Weight: 166 lbs 6.4 oz  Drowsy but conversant, appropriate     Medical Decision Making       MANAGEMENT DISCUSSED with the following over the past 24 hours: patient and family   NOTE(S)/MEDICAL RECORDS REVIEWED over the past 24 hours: EPIC      Data         Imaging results reviewed over the past 24 hrs:   No results found for this or any previous visit (from the past 24 hour(s)).  "

## 2023-04-24 NOTE — PLAN OF CARE
Goal Outcome Evaluation:    Shift: 4-23-23, 9045-4919  Orientation: A/O x 4, flat affect  Observation Goals (met & not met): Comfort care  Activity Level: A x 2. Lift, Turn/Repo   Fall Risk: Y  Behavior & Aggression Tool Color: Green  Pain Management:  Oxy given x 1, tylenol given x 1, dilaudid x1 IV with effect  ABNL VS/O2: Comfort care/No vitals taken  ABNL Lab/BG: None   Diet: reg diet   Bowel/Bladder: Continent of bowel/ dialysis  Skin concerns: Multiple areas of necrotic tissue to BLE. Wounds to penis and scrotum. Vashe wound care to groin done, removed brief and repositioned at 2245.   Drains/Devices: PIV SL  Tests/Procedures for next shift: None   Anticipated DC date: Pending hospice placement, SW following.

## 2023-04-24 NOTE — PROGRESS NOTES
Care Management Follow Up    Length of Stay (days): 4    Expected Discharge Date: 04/25/2023     Concerns to be Addressed:       Patient plan of care discussed at interdisciplinary rounds: Yes    Anticipated Discharge Disposition:       Anticipated Discharge Services:  Transport   Anticipated Discharge DME:      Patient/family educated on Medicare website which has current facility and service quality ratings:    Education Provided on the Discharge Plan:    Patient/Family in Agreement with the Plan:      Referrals Placed by CM/SW:    Private pay costs discussed: transportation costs    Additional Information:  SW spoke with pt and daughter Omayra at bedside. Informed of referrals pending, as well as declines. SW informed of availability at [facility] and costs associated, as well as current positive covid case. Pt would like SW to continue searching for an accepting SNF, did not seem as interested in residential hospice homes. Carlos Omayra may tour [facility] just in case.     SW received call from Marielena at [facility] [place]. There would be no up front deposit, just daily rate with LTC. They could take pt as early as tomorrow morning. They use [facility] in house or any that pt/family identify. HENOK received call from carlos Omayra to see if any updates, informed of acceptance at [facility]. Omayra is leaning towards this, especially if no accepting facility as of tomorrow. She is going to stop by [facility] today to tour the LTC and stop by pt's apartment there, will then talk with pt to see if this will be the best plan. HENOK will plan to connect with Omayra and pt tomorrow for discharge planning.       LIANA Cai  Social Work  [facility]

## 2023-04-24 NOTE — PLAN OF CARE
Goal Outcome Evaluation:    Orientation/Cognitive:A&Ox4  Observation Goals (Met/ Not Met): INP  Mobility Level/Assist Equipment: Lift T/R  Fall Risk (Y/N):Y  Behavior Concerns: NA  Pain Management: PRN oxy and tylenol given for bilat LE and scrotum pain   Tele/VS/O2:comfort cares  ABNL Lab/BG: NA  Diet: Reg  Bowel/Bladder:Continent, Low urine output  Skin Concerns:bilat LE wounds w. Necrotic tissue , wounds to penis and scrotum, scattered bruising and scabs  Drains/Devices:PIV:S/L  Tests/Procedures for next shift: NA  Anticipated DC date & active delays: SW following for hospice placement  Patient Stated Goal for Today: To rest

## 2023-04-25 NOTE — PROGRESS NOTES
Shift: 4-25-23,9563-0465  Orientation: A/O x 4, flat affect  Observation Goals (met & not met): Comfort care  Activity Level: A x 2. Lift, Turn/Repo   Fall Risk: Y  Behavior & Aggression Tool Color: Green  Pain Management:  Dilaudid given x 1, Ativan given  ABNL VS/O2: Comfort care/No vitals taken  ABNL Lab/BG: None   Diet: reg diet   Bowel/Bladder: Continent of bowel/ dialysis  Skin concerns: Multiple areas of necrotic tissue to BLE. Wounds to penis and scrotum. Vashe wound care to groin done, removed brief and repositioned at 2245.   Drains/Devices: PIV SL  Tests/Procedures for next shift: None   Anticipated DC date: Pending hospice placement, SW following.      Comfort cares. A/O x4. Denies pain. Regular diet. Up with lift. Multiple areas of necrotic tissue to BLE. Wounds to penis and scrotum. Vashe wound care to groin done. Discharge pending hospice placement.

## 2023-04-25 NOTE — PROGRESS NOTES
Care Management Discharge Note    Discharge Date: 04/26/2023       Discharge Disposition:      Discharge Services:  Transport     Discharge DME:      Discharge Transportation: [facility] stretcher transport     Private pay costs discussed: transportation costs    PAS Confirmation Code: [id number]  Patient/family educated on Medicare website which has current facility and service quality ratings: yes      Education Provided on the Discharge Plan:    Persons Notified of Discharge Plans: daughter, bedside RN, HUC, Charge RN, MD   Patient/Family in Agreement with the Plan:  yes    Handoff Referral Completed: No    Additional Information:  HENOK spoke with bedside RN, wondered if pt is more appropriate for GIP due to rapid decline. HENOK paged MD to follow up on this. SW was updated from charge RN and bedside RN after checking on pt, feel pt is appropriate to discharge today. Call from Arielle at [facility] ([phone]), requested daughter's phone number to complete intake. Arielle confirmed an acceptance for this afternoon whenever SW can set up transport. HENOK scheduled  [facility] stretcher transport window of 5799-1805. Spoke with MD, confirmed pt is able to discharge to facility, confirmed three day comfort meds to send with pt. Spoke with Arielle at [facility], confirmed transport time and start time for services. Completed PAS, faxed and on chart. Completed PCS, faxed and on chart. SW to send discharge orders once received. Call to Marielena at [facility] to update.     PAS-RR    D: Per DHS regulation, SW completed and submitted PAS-RR to [place] [employer] Direct Connect via the Senior [employer] Line.  PAS-RR confirmation # is : [id number]    I: SW spoke with pt and they are aware a PAS-RR has been submitted.  SW reviewed with pt that they may be contacted for a follow up appointment within 10 days of hospital discharge if their SNF stay is < 30 days.  Contact information for [employer] was also provided.    A: Pt  verbalized understanding.    P: Further questions may be directed to Senior [employer] Line at #[phone], option #4 for [facility]- staff.      LIANA Cai  Social Work  [facility]                step over step

## 2023-04-25 NOTE — PROGRESS NOTES
Care Management Follow Up    Length of Stay (days): 5    Expected Discharge Date: 04/26/2023     Concerns to be Addressed:       Patient plan of care discussed at interdisciplinary rounds: No    Anticipated Discharge Disposition:  SNF w/ hospice      Anticipated Discharge Services:  Transport   Anticipated Discharge DME:      Patient/family educated on Medicare website which has current facility and service quality ratings:  yes  Education Provided on the Discharge Plan:  yes  Patient/Family in Agreement with the Plan:  yes    Referrals Placed by CM/SW:    Private pay costs discussed: transportation costs    Additional Information:  SW received VM from carlos Cutler, agreeable to move forward with [facility] pt is agreeable also. Call to [facility], can take today, they use [facility] but can work with any others too. They could take any time up until 1730, ideally avoiding the 1020-3920 time range due to shift changes at the facility. SW called carlos Cutler, would prefer [facility] or [facility] to use [facility] as a back up. Agreeable to stretcher transport and potential cost associated. Sent referral to [facility] with MD notes, nursing notes, H&P, and facesheet. Paged MD with update.     LIANA Cai  Social Work  [facility]

## 2023-04-25 NOTE — PLAN OF CARE
".Orientation/Cognitive: A&O  Observation Goals (Met/ Not Met): Inpatient  Mobility Level/Assist Equipment: Assist 2/lift  Fall Risk (Y/N): Y  Behavior Concerns: N  Pain Management: scheduled gabapentin  Tele/VS/O2: AVSS/RA  ABNL Lab/BG:   Diet: Regular  Bowel/Bladder:   Skin Concerns: BLE wound. [place] area  Drains/Devices:  Tests/Procedures for next shift:   Anticipated DC date & active delays: TBD  Patient Stated Goal for Today: \"sleep\", \"be comfortable\"                      "

## 2023-04-25 NOTE — PLAN OF CARE
Pt is discharging at this time to [facility] w/ hospice  w/ all pt's belonging. Meds sent with the pt .AVS given earlier by previous RN. Writer updated pt's daughter(Omayra). Transport via [facility] using a stretcher.       Plan of Care Reviewed With: patient

## 2023-04-25 NOTE — DISCHARGE SUMMARY
"[facility]  Hospitalist Discharge Summary      Date of Admission:  4/19/2023  Date of Discharge:  4/25/2023  Discharging Provider: Fili Ireland MD  Discharge Service: Hospitalist Service    Discharge Diagnoses    1. Scrotal cellulitis   2. Lower extremity and scrotal wounds present on admission  3. Urinary tract infection  4. Hyponatremia    History of     End-stage renal disease, dialysis, anemia    Hypertension    Atrial fibrillation    Gout    Anxiety    Depression    Type 2 diabetes mellitus     Diabetic neuropathy    Follow-ups Needed After Discharge   Follow-up Appointments     Follow Up and recommended labs and tests      Follow up with hospice agency and transitional care unit physician             Unresulted Labs Ordered in the Past 30 Days of this Admission     Date and Time Order Name Status Description    4/19/2023  9:22 PM Prepare red blood cells (unit) Preliminary     4/19/2023  9:22 PM Prepare red blood cells (unit) Preliminary     4/6/2023  9:01 PM Prepare red blood cells (unit) Preliminary     4/6/2023  9:01 PM Prepare red blood cells (unit) Preliminary           Discharge Disposition   Discharged to hospice care    Condition at discharge: Terminal      Hospital Course    HPI:  \"Gordon Gann is a 78 year old male admitted on 4/19/2023. Patient apparently at rehab TCU at [place] and sent to the emergency department for wound check and weakness.  Additional history of present illness and review of systems is limited due to patient with limited ability to provide history.  Patient has chronic sores and ulcers on his legs, he is more lethargic and then sent to the emergency department, has had intermittent low blood pressures, is on dialysis Tuesday Thursday and Saturday, and apparently he was not dialyzed this past Tuesday per report.  CRP elevated to 328 on admission.  Lactic acid is 1.5 with a lipase of 14 and a procalcitonin of 3.37.  Venous blood gas shows " "7.3 4/45/19/27.  White blood cell count is elevated at 21.5 on admission.  ESR is 108.  INR is 2.07 on admission.  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  Urology and vascular surgery have been consulted and ultimately palliative approach was decided upon. Palliative care and hospitalist met with patient 4/21 and patient confirmed his wishes.      Comfort care    Continue current regimen- see orders    Lower extremity wounds, scrotal wound - cellulitis    Antibiotics stopped    Continue wound care per [facility] recommendations-see orders      Suspected urinary tract infection, hematuria  Urinalysis completed in the emergency department with moderate leukocyte esterase, no mention of squamous epithelial cells.  Patient is typically on dialysis and urine output may be affected by dialysis.    Started on Rocephin in the emergency department, now stopped      End-stage renal disease on hemodialysis   Anemia of ESRD     Hemodialysis discontinued     Hyponatremia  Patient had a sodium of 132 on admission.     Hypertension  History of atrial fibrillation  Patient with elevated troponin on admission at 340.  Denies chest pain.  TSH is 3.12 on admission.  Last echocardiogram completed in February 2023 with a rhythm of atrial flutter, ejection fraction of 55 to 60%, moderate biatrial enlargement, moderate pulmonary hypertension, see report for further details.    Now on comfort care      History of gout  Patient on allopurinol in the past according to electronic medical records.    Allopurinol stopped due to comfort care     Anxiety, depression  Prior to admission on mirtazapine.    Continue PTA mirtazapine as able      Type 2 diabetes mellitus   Patient previously on Lantus 10 units and insulin sliding scale in the past.    Insulin has " been stopped     Neuropathy  Leg cramps  Patient previously on gabapentin.    Continue PTA gabapentin as able     Continue prn lorazepam     Consultations This Hospital Stay   PHARMACY TO DOSE WARFARIN  CARE MANAGEMENT / SOCIAL WORK IP CONSULT  PHYSICAL THERAPY ADULT IP CONSULT  OCCUPATIONAL THERAPY ADULT IP CONSULT  WOUND OSTOMY CONTINENCE NURSE  IP CONSULT  NEPHROLOGY IP CONSULT  VASCULAR SURGERY IP CONSULT  WOUND OSTOMY CONTINENCE NURSE  IP CONSULT  UROLOGY IP CONSULT  PALLIATIVE CARE ADULT IP CONSULT  SPIRITUAL HEALTH SERVICES IP CONSULT  CARE MANAGEMENT / SOCIAL WORK IP CONSULT    Code Status   No CPR- Do NOT Intubate    Time Spent on this Encounter   I, Fili Ireland MD, personally saw the patient today and spent greater than 30 minutes discharging this patient.       Fili Ireland MD  [facility]  [address] [address]  Phone: [phone]  ______________________________________________________________________    Physical Exam   Vital Signs:                    Weight: 166 lbs 6.4 oz  Constitutional: not awake, appears comfortable    Primary Care Physician   Belen Prieto    Discharge Orders      General info for SNF    Length of Stay Estimate: Short Term Care: Estimated # of Days <30  Condition at Discharge: Terminal  Level of care:skilled   Rehabilitation Potential: Poor  Admission H&P remains valid and up-to-date: No (If no, please update H&P)  Recent Chemotherapy: N/A  Use Nursing Home Standing Orders: Yes     Mantoux instructions    Give two-step Mantoux (PPD) Per Facility Policy Yes     Follow Up and recommended labs and tests    Follow up with hospice agency and transitional care unit physician     Reason for your hospital stay    Transition to hospice care     Activity - Up with nursing assistance     Additional Discharge Instructions    Hospice care    WOUND CARE  BLE and foot  wound(s): Daily    Cleanse gently  with vashe #[id number] moistened gauze on wounds x 15 minutes   Apply betadine  LOTA however if draining apply optifoam gentle 4x4 #[id number]  prevalon boots #[id number] when in bed     Penis & Scrotum - okay to soak with vashe moistened gauze x 15 minutes q shift    No brief lota  chux in bed   Air loss mattress - ordered       -Mattress: low air loss  -HOB: Maintain at or below 30 degrees, unless contraindicated  -Repositioning in bed: Every 1-2 hours , Left/right positioning; avoid supine, Raise foot of bed prior to raising head of bed, to reduce patient sliding down (shear) and Frequent microturns using TAPS wedges, as patient tolerates  -Heels: Keep elevated off mattress and Pillows under calves  -Protective Dressing: Sacral Mepilex for prevention (#914474),  especially for the agitated patient   -Positioning Equipment: TAPS wedges (#348818) to help maintain 30 degree side lying position as needed  -Chair positioning: Chair cushion (#141153)  and Assist patient to reposition hourly   -Moisture Management: Perineal cleansing /protection: Follow Incontinence Protocol, Avoid brief in bed and Clean and dry skin folds with bathing   -Under Devices: Inspect skin under all medical devices during skin inspection , Ensure tubes are stabilized without tension and Ensure patient is not lying on medical devices or equipment when repositioned  Orders: Written     No CPR- Do NOT Intubate     Diet    Follow this diet upon discharge: Orders Placed This Encounter      Regular Diet Adult       Significant Results and Procedures   Most Recent 3 CBC's:Recent Labs   Lab Test 04/21/23  0705 04/20/23  0629 04/19/23  1640   WBC 19.4* 20.3* 21.5*   HGB 8.8* 8.7* 9.4*   MCV 90 92 92    388 419     Most Recent 3 BMP's:Recent Labs   Lab Test 04/21/23  0820 04/21/23  0705 04/21/23  0158 04/20/23  0752 04/20/23  0629 04/20/23  0159 04/19/23  1640   NA  --  137  --   --  133*  --  132*   POTASSIUM  --  3.5  --   --  4.1  --  3.9   CHLORIDE  --   95*  --   --  96*  --  92*   CO2  --  28  --   --  19*  --  25   BUN  --  31.9*  --   --  48.0*  --  39.9*   CR  --  3.49*  --   --  4.90*  --  4.44*   ANIONGAP  --  14  --   --  18*  --  15   LC  --  7.3*  --   --  7.9*  --  8.4*   * 150* 192*   < > 216*   < > 171*    < > = values in this interval not displayed.   ,   Results for orders placed or performed during the hospital encounter of 04/19/23   XR Chest 2 Views    Narrative    EXAM: XR CHEST 2 VIEWS  LOCATION: [facility]  DATE/TIME: 4/19/2023 5:22 PM CDT    INDICATION: Fatigue, tachycardia  COMPARISON: 04/04/2023      Impression    IMPRESSION: Stable chest with again seen mild to moderate pleural fluid versus thickening in the lower right hemithorax with associated atelectasis. Borderline heart size with normal pulmonary vascularity. Mild vascular calcification. Moderate   hypertrophic changes of the AC joints. No evidence for an obvious pneumothorax or inflammatory infiltrates.       Discharge Medications   Current Discharge Medication List      START taking these medications    Details   acetaminophen (TYLENOL) 650 MG suppository Place 1 suppository (650 mg) rectally every 6 hours as needed for mild pain or other (and adjunct with moderate or severe pain or per patient request)  Qty: 12 suppository, Refills: 0    Associated Diagnoses: Hospice care patient      atropine 1 % ophthalmic solution Place 2 drops under the tongue every 4 hours as needed for secretions  Qty: 2 mL, Refills: 0    Associated Diagnoses: Hospice care patient      bisacodyl (DULCOLAX) 10 MG suppository Place 1 suppository (10 mg) rectally daily as needed for constipation  Qty: 3 suppository, Refills: 0    Associated Diagnoses: Hospice care patient      haloperidol (HALDOL) 2 MG/ML (HIGH CONC) solution Take 0.25 mLs (0.5 mg) by mouth every 6 hours as needed for agitation  Qty: 15 mL, Refills: 0    Associated Diagnoses: Hospice care patient      LORazepam  (ATIVAN) 2 MG/ML (HIGH CONC) oral solution Take 0.5 mLs (1 mg) by mouth every 8 hours as needed for anxiety or muscle spasms  Qty: 30 mL, Refills: 0    Associated Diagnoses: Hospice care patient      ondansetron (ZOFRAN ODT) 4 MG ODT tab Take 1 tablet (4 mg) by mouth every 6 hours as needed for nausea or vomiting  Qty: 12 tablet, Refills: 0    Associated Diagnoses: Hospice care patient      oxyCODONE (ROXICODONE) 5 MG tablet Take 1 tablet (5 mg) by mouth every 2 hours as needed for pain  Qty: 18 tablet, Refills: 0    Associated Diagnoses: Hospice care patient      polyethylene glycol (MIRALAX) 17 GM/Dose powder Take 17 g by mouth daily  Qty: 510 g, Refills: 0    Associated Diagnoses: Hospice care patient      senna-docusate (SENOKOT-S/PERICOLACE) 8.6-50 MG tablet Take 1 tablet by mouth 2 times daily as needed for constipation  Qty: 6 tablet, Refills: 0    Associated Diagnoses: Hospice care patient         CONTINUE these medications which have CHANGED    Details   acetaminophen (TYLENOL) 325 MG tablet Take 2 tablets (650 mg) by mouth every 6 hours as needed for mild pain or other (and adjunct with moderate or severe pain or per patient request)  Qty: 12 tablet, Refills: 0    Associated Diagnoses: Hospice care patient      gabapentin (NEURONTIN) 100 MG capsule Take 1 capsule (100 mg) by mouth At Bedtime for 3 days  Qty: 3 capsule, Refills: 0    Associated Diagnoses: Hospice care patient      mirtazapine (REMERON) 7.5 MG tablet Take 1 tablet (7.5 mg) by mouth At Bedtime  Qty: 3 tablet, Refills: 0    Associated Diagnoses: Hospice care patient         STOP taking these medications       allopurinol (ZYLOPRIM) 100 MG tablet Comments:   Reason for Stopping:         aspirin 81 MG EC tablet Comments:   Reason for Stopping:         bumetanide (BUMEX) 2 MG tablet Comments:   Reason for Stopping:         cloNIDine (CATAPRES) 0.1 MG tablet Comments:   Reason for Stopping:         diltiazem ER COATED BEADS (CARDIZEM CD/CARTIA XT)  120 MG 24 hr capsule Comments:   Reason for Stopping:         ezetimibe (ZETIA) 10 MG tablet Comments:   Reason for Stopping:         fish oil-omega-3 fatty acids 1000 MG capsule Comments:   Reason for Stopping:         folic acid (FOLVITE) 1 MG tablet Comments:   Reason for Stopping:         hydrALAZINE (APRESOLINE) 25 MG tablet Comments:   Reason for Stopping:         insulin aspart (NOVOLOG VIAL) 100 UNITS/ML vial Comments:   Reason for Stopping:         insulin glargine (LANTUS PEN) 100 UNIT/ML pen Comments:   Reason for Stopping:         melatonin 3 MG tablet Comments:   Reason for Stopping:         metoprolol tartrate (LOPRESSOR) 100 MG tablet Comments:   Reason for Stopping:         Metoprolol Tartrate 75 MG TABS Comments:   Reason for Stopping:         omeprazole (PRILOSEC) 20 MG DR capsule Comments:   Reason for Stopping:         traZODone (DESYREL) 150 MG tablet Comments:   Reason for Stopping:         vitamin B complex with vitamin C (STRESS TAB) tablet Comments:   Reason for Stopping:         Vitamin D, Cholecalciferol, 25 MCG (1000 UT) CAPS Comments:   Reason for Stopping:         warfarin ANTICOAGULANT (COUMADIN) 5 MG tablet Comments:   Reason for Stopping:             Allergies   No Known Allergies

## 2023-04-25 NOTE — CARE PLAN
Orientation/Cognitive: A&Ox4, lethargic at times  Mobility Level/Assist Equipment: lift/total care  Fall Risk (Y/N): yes  Behavior Concerns: none  Pain Management: pain in feet, prn oxycodone given x1  Tele/VS/O2: comfort cares  ABNL Lab/BG: comfort cares  Diet: regular  Bowel/Bladder: incontinent  Skin Concerns: necrotic wounds on BLE, RUE skin tear, bleeding gums, bleeding penis  Drains/Devices: none  Tests/Procedures for next shift: discharge  Anticipated DC date & active delays: 4/25  Patient Stated Goal for Today: rest

## 2023-04-27 ENCOUNTER — DOCUMENTATION ONLY (OUTPATIENT)
Dept: OTHER | Facility: CLINIC | Age: 78
End: 2023-04-27
Payer: COMMERCIAL

## 2023-06-30 NOTE — PLAN OF CARE
PRIMARY DIAGNOSIS: Hyperglycemia/ Placement    OUTPATIENT/OBSERVATION GOALS TO BE MET BEFORE DISCHARGE  1. Orthostatic performed: No    2. Tolerating PO medications: Yes    3. Return to near baseline physical activity: No    4. Cleared for discharge by consultants (if involved): No    Discharge Planner Nurse   Safe discharge environment identified: No  Barriers to discharge: Yes       Entered by: Denise Le 08/08/2021 5:03 PM     Please review provider order for any additional goals.   Nurse to notify provider when observation goals have been met and patient is ready for discharge.    Temp: 98.4  F (36.9  C) Temp src: Oral BP: 133/63 Pulse: 59   Resp: 18 SpO2: 98 % O2 Device: None (Room air)       A&Ox4. Pt up Ax1 with walker and gait belt. Complains of neck pain from the hospital bed, prn tylenol given x1 and ordered heating pad. Daughter at bedside and updated on plan of care. Renal diet- ordering dinner. LUE swollen/ace wrapped, elevated on pillows. PT/OT recommending TCU. Plan- need to still collect UA, elevate LUE, recheck labs in am, SW involvement for TCU placement.      Complex Repair And Split-Thickness Skin Graft Text: The defect edges were debeveled with a #15 scalpel blade.  The primary defect was closed partially with a complex linear closure.  Given the location of the defect, shape of the defect and the proximity to free margins a split thickness skin graft was deemed most appropriate to repair the remaining defect.  The graft was trimmed to fit the size of the remaining defect.  The graft was then placed in the primary defect, oriented appropriately, and sutured into place.

## 2024-02-21 NOTE — PLAN OF CARE
A&O, VSS on room air. Tele: A fib CVR, HR 90's. Denies CP and SOB. Up w/ 1 and walker/gaitbelt. Pt had dialysis run today, plan for next dialysis run on Thursday at outpt dialysis facility. Per MD patient adequate to discharge following dialysis. All belongings have been returned to pt. AVS copy printed for patient and all questions have been answered at this time. Tele and IV removed. Pt did not take lasix today per MD order to skip lasix dose on dialysis days. Transportation here to provide WC ride for pt to TCU. Packet for TCU has been sent with EMS. Script for Remeron included in discharge packet.    Show Aperture Variable?: Yes Duration Of Freeze Thaw-Cycle (Seconds): 0 Post-Care Instructions: I reviewed with the patient in detail post-care instructions. Patient is to wear sunprotection, and avoid picking at any of the treated lesions. Pt may apply Vaseline to crusted or scabbing areas. Render Note In Bullet Format When Appropriate: No Consent: The patient's consent was obtained including but not limited to risks of crusting, scabbing, blistering, scarring, darker or lighter pigmentary change, recurrence, incomplete removal and infection. Detail Level: Simple
